# Patient Record
Sex: MALE | Race: WHITE | NOT HISPANIC OR LATINO | Employment: UNEMPLOYED | URBAN - NONMETROPOLITAN AREA
[De-identification: names, ages, dates, MRNs, and addresses within clinical notes are randomized per-mention and may not be internally consistent; named-entity substitution may affect disease eponyms.]

---

## 2017-01-03 RX ORDER — LOSARTAN POTASSIUM 50 MG/1
TABLET ORAL
Qty: 30 TABLET | Refills: 2 | Status: SHIPPED | OUTPATIENT
Start: 2017-01-03 | End: 2017-04-02 | Stop reason: SDUPTHER

## 2017-01-13 ENCOUNTER — OFFICE VISIT (OUTPATIENT)
Dept: CARDIOLOGY | Facility: CLINIC | Age: 62
End: 2017-01-13

## 2017-01-13 ENCOUNTER — HOSPITAL ENCOUNTER (OUTPATIENT)
Dept: CARDIOLOGY | Facility: HOSPITAL | Age: 62
Discharge: HOME | End: 2017-01-13
Admitting: NURSE PRACTITIONER

## 2017-01-13 VITALS
HEART RATE: 68 BPM | HEIGHT: 72 IN | BODY MASS INDEX: 36.03 KG/M2 | WEIGHT: 266 LBS | SYSTOLIC BLOOD PRESSURE: 138 MMHG | DIASTOLIC BLOOD PRESSURE: 80 MMHG

## 2017-01-13 VITALS
SYSTOLIC BLOOD PRESSURE: 123 MMHG | WEIGHT: 265 LBS | HEIGHT: 72 IN | BODY MASS INDEX: 35.89 KG/M2 | DIASTOLIC BLOOD PRESSURE: 73 MMHG

## 2017-01-13 DIAGNOSIS — I05.0 RHEUMATIC MITRAL STENOSIS: ICD-10-CM

## 2017-01-13 DIAGNOSIS — Z95.2 H/O MECHANICAL AORTIC VALVE REPLACEMENT: ICD-10-CM

## 2017-01-13 DIAGNOSIS — Z95.2 AORTIC VALVE REPLACED: ICD-10-CM

## 2017-01-13 DIAGNOSIS — E78.5 HYPERLIPIDEMIA LDL GOAL <70: ICD-10-CM

## 2017-01-13 DIAGNOSIS — I50.32 CHRONIC DIASTOLIC CONGESTIVE HEART FAILURE (HCC): Primary | ICD-10-CM

## 2017-01-13 DIAGNOSIS — I25.10 CORONARY ARTERY DISEASE INVOLVING NATIVE CORONARY ARTERY OF NATIVE HEART WITHOUT ANGINA PECTORIS: ICD-10-CM

## 2017-01-13 DIAGNOSIS — I10 ESSENTIAL HYPERTENSION: ICD-10-CM

## 2017-01-13 LAB
BH CV ECHO MEAS - AO MAX PG (FULL): 30.3 MMHG
BH CV ECHO MEAS - AO MAX PG: 32.7 MMHG
BH CV ECHO MEAS - AO MEAN PG (FULL): 17 MMHG
BH CV ECHO MEAS - AO MEAN PG: 18 MMHG
BH CV ECHO MEAS - AO ROOT AREA (BSA CORRECTED): 1.2
BH CV ECHO MEAS - AO ROOT AREA: 7.1 CM^2
BH CV ECHO MEAS - AO ROOT DIAM: 3 CM
BH CV ECHO MEAS - AO V2 MAX: 286 CM/SEC
BH CV ECHO MEAS - AO V2 MEAN: 202 CM/SEC
BH CV ECHO MEAS - AO V2 VTI: 56.1 CM
BH CV ECHO MEAS - AVA(I,A): 1.5 CM^2
BH CV ECHO MEAS - AVA(I,D): 1.5 CM^2
BH CV ECHO MEAS - AVA(V,A): 1.7 CM^2
BH CV ECHO MEAS - AVA(V,D): 1.7 CM^2
BH CV ECHO MEAS - BSA(HAYCOCK): 2.5 M^2
BH CV ECHO MEAS - BSA: 2.4 M^2
BH CV ECHO MEAS - BZI_BMI: 35.9 KILOGRAMS/M^2
BH CV ECHO MEAS - BZI_METRIC_HEIGHT: 182.9 CM
BH CV ECHO MEAS - BZI_METRIC_WEIGHT: 120.2 KG
BH CV ECHO MEAS - CONTRAST EF 4CH: 60.6 ML/M^2
BH CV ECHO MEAS - EDV(CUBED): 124.3 ML
BH CV ECHO MEAS - EDV(MOD-SP4): 160 ML
BH CV ECHO MEAS - EDV(TEICH): 117.7 ML
BH CV ECHO MEAS - EF(CUBED): 64.9 %
BH CV ECHO MEAS - EF(MOD-SP4): 60.6 %
BH CV ECHO MEAS - EF(TEICH): 56.2 %
BH CV ECHO MEAS - ESV(CUBED): 43.6 ML
BH CV ECHO MEAS - ESV(MOD-SP4): 63 ML
BH CV ECHO MEAS - ESV(TEICH): 51.6 ML
BH CV ECHO MEAS - FS: 29.5 %
BH CV ECHO MEAS - IVS/LVPW: 1
BH CV ECHO MEAS - IVSD: 1.1 CM
BH CV ECHO MEAS - LA DIMENSION: 4.1 CM
BH CV ECHO MEAS - LA/AO: 1.4
BH CV ECHO MEAS - LV DIASTOLIC VOL/BSA (35-75): 66.6 ML/M^2
BH CV ECHO MEAS - LV MASS(C)D: 196.3 GRAMS
BH CV ECHO MEAS - LV MASS(C)DI: 81.7 GRAMS/M^2
BH CV ECHO MEAS - LV MAX PG: 2.4 MMHG
BH CV ECHO MEAS - LV MEAN PG: 1 MMHG
BH CV ECHO MEAS - LV SYSTOLIC VOL/BSA (12-30): 26.2 ML/M^2
BH CV ECHO MEAS - LV V1 MAX: 77.1 CM/SEC
BH CV ECHO MEAS - LV V1 MEAN: 45.3 CM/SEC
BH CV ECHO MEAS - LV V1 VTI: 13.4 CM
BH CV ECHO MEAS - LVIDD: 5 CM
BH CV ECHO MEAS - LVIDS: 3.5 CM
BH CV ECHO MEAS - LVLD AP4: 8.2 CM
BH CV ECHO MEAS - LVLS AP4: 6.8 CM
BH CV ECHO MEAS - LVOT AREA (M): 6.2 CM^2
BH CV ECHO MEAS - LVOT AREA: 6.2 CM^2
BH CV ECHO MEAS - LVOT DIAM: 2.8 CM
BH CV ECHO MEAS - LVPWD: 1.1 CM
BH CV ECHO MEAS - MV A MAX VEL: 201 CM/SEC
BH CV ECHO MEAS - MV E MAX VEL: 163 CM/SEC
BH CV ECHO MEAS - MV E/A: 0.81
BH CV ECHO MEAS - MV MAX PG: 16 MMHG
BH CV ECHO MEAS - MV MEAN PG: 8 MMHG
BH CV ECHO MEAS - PA ACC SLOPE: 1238 CM/SEC^2
BH CV ECHO MEAS - PA ACC TIME: 0.12 SEC
BH CV ECHO MEAS - PA PR(ACCEL): 23.2 MMHG
BH CV ECHO MEAS - PI END-D VEL: 94.5 CM/SEC
BH CV ECHO MEAS - RAP SYSTOLE: 8 MMHG
BH CV ECHO MEAS - RVDD: 4.3 CM
BH CV ECHO MEAS - RVSP: 33.2 MMHG
BH CV ECHO MEAS - SI(AO): 165.2 ML/M^2
BH CV ECHO MEAS - SI(CUBED): 33.6 ML/M^2
BH CV ECHO MEAS - SI(LVOT): 34.4 ML/M^2
BH CV ECHO MEAS - SI(MOD-SP4): 40.4 ML/M^2
BH CV ECHO MEAS - SI(TEICH): 27.5 ML/M^2
BH CV ECHO MEAS - SV(AO): 396.5 ML
BH CV ECHO MEAS - SV(CUBED): 80.6 ML
BH CV ECHO MEAS - SV(LVOT): 82.5 ML
BH CV ECHO MEAS - SV(MOD-SP4): 97 ML
BH CV ECHO MEAS - SV(TEICH): 66.1 ML
BH CV ECHO MEAS - TR MAX VEL: 251 CM/SEC
LV EF 2D ECHO EST: 60 %

## 2017-01-13 PROCEDURE — C8929 TTE W OR WO FOL WCON,DOPPLER: HCPCS

## 2017-01-13 PROCEDURE — 25010000002 SULFUR HEXAFLUORIDE MICROSPH 60.7-25 MG RECONSTITUTED SUSPENSION

## 2017-01-13 PROCEDURE — 25010000002 SULFUR HEXAFLUORIDE MICROSPH 60.7-25 MG RECONSTITUTED SUSPENSION: Performed by: INTERNAL MEDICINE

## 2017-01-13 PROCEDURE — 93306 TTE W/DOPPLER COMPLETE: CPT | Performed by: INTERNAL MEDICINE

## 2017-01-13 PROCEDURE — 99213 OFFICE O/P EST LOW 20 MIN: CPT | Performed by: NURSE PRACTITIONER

## 2017-01-13 RX ADMIN — SULFUR HEXAFLUORIDE 3 ML: KIT at 13:46

## 2017-01-13 NOTE — MR AVS SNAPSHOT
Benitez GUARDADO Ruben   1/13/2017 2:15 PM   Office Visit    Dept Phone:  [phone]   Encounter #:  [id number]    Provider:  Velasquez Torres MD   Department:  [facility] CARDIOLOGY                Your Full Care Plan              Today's Medication Changes          These changes are accurate as of: 1/13/17  3:03 PM.  If you have any questions, ask your nurse or doctor.               Medication(s)that have changed:     Insulin Glargine 100 UNIT/ML injection pen   Commonly known as:  LANTUS SOLOSTAR   Inject 55 Units under the skin 2 (two) times a day.   What changed:  Another medication with the same name was removed. Continue taking this medication, and follow the directions you see here.   Changed by:  Mesha Christina MD         Stop taking medication(s)listed here:     albuterol 108 (90 BASE) MCG/ACT inhaler   Commonly known as:  PROVENTIL HFA;VENTOLIN HFA   Stopped by:  Velasquez Torres MD           cetirizine-pseudoephedrine 5-120 MG per 12 hr tablet   Commonly known as:  ZyrTEC-D   Stopped by:  Velasquez Torres MD           doxycycline 100 MG enteric coated tablet   Commonly known as:  DORYX   Stopped by:  Velasquez Torres MD           insulin aspart 100 UNIT/ML injection   Commonly known as:  novoLOG   Stopped by:  Velasquez Torres MD           tiotropium 18 MCG per inhalation capsule   Commonly known as:  SPIRIVA   Stopped by:  Velasquez Torres MD                      Your Updated Medication List          This list is accurate as of: 1/13/17  3:03 PM.  Always use your most recent med list.                cetirizine 10 MG tablet   Commonly known as:  zyrTEC       clotrimazole 1 % cream   Commonly known as:  LOTRIMIN   Apply  topically 2 (Two) Times a Day.       cyclobenzaprine 10 MG tablet   Commonly known as:  FLEXERIL   TAKE ONE TABLET BY MOUTH THREE TIMES DAILY AS NEEDED FOR MUSCLE SPASM       Insulin  Glargine 100 UNIT/ML injection pen   Commonly known as:  LANTUS SOLOSTAR   Inject 55 Units under the skin 2 (two) times a day.       insulin lispro 100 UNIT/ML injection   Commonly known as:  humaLOG   Inject 15 Units under the skin 3 (three) times a day before meals.       losartan 50 MG tablet   Commonly known as:  COZAAR   TAKE ONE TABLET BY MOUTH ONCE DAILY       metoprolol succinate XL 50 MG 24 hr tablet   Commonly known as:  TOPROL-XL   Take 1 tablet by mouth daily.       pravastatin 40 MG tablet   Commonly known as:  PRAVACHOL   Take 1 tablet by mouth daily.       sertraline 100 MG tablet   Commonly known as:  ZOLOFT   Take 1.5 tablets by mouth daily.       spironolactone 50 MG tablet   Commonly known as:  ALDACTONE   Take 1 tablet by mouth daily.       * warfarin 2 MG tablet   Commonly known as:  COUMADIN   Take 1 tablet by mouth daily.       * warfarin 5 MG tablet   Commonly known as:  COUMADIN   Take 1 tablet by mouth daily.       * warfarin 4 MG tablet   Commonly known as:  COUMADIN   Use as directed. (currently using two tablets daily)       * warfarin 7.5 MG tablet   Commonly known as:  COUMADIN   TAKE ONE TABLET BY MOUTH ONCE DAILY       * Notice:  This list has 4 medication(s) that are the same as other medications prescribed for you. Read the directions carefully, and ask your doctor or other care provider to review them with you.            You Were Diagnosed With        Codes Comments    Chronic diastolic congestive heart failure    -  Primary ICD-10-CM: I50.32  ICD-9-CM: 428.32, 428.0     Essential hypertension     ICD-10-CM: I10  ICD-9-CM: 401.9     Coronary artery disease involving native coronary artery of native heart without angina pectoris     ICD-10-CM: I25.10  ICD-9-CM: 414.01     Hyperlipidemia LDL goal <70     ICD-10-CM: E78.5  ICD-9-CM: 272.4     Rheumatic mitral stenosis     ICD-10-CM: I05.0  ICD-9-CM: 394.0     H/O mechanical aortic valve replacement     ICD-10-CM: Z95.2  ICD-9-CM:  V43.3       Instructions     None    Patient Instructions History      Upcoming Appointments     Visit Type Date Time Department    FOLLOW UP 2017  2:15 PM [facility] NIDA CARD [facility]     NIDA ECHO 2D COMPLETE WITH CONTRAST VT 2017  1:00 PM [facility] NIDA CARD CLIN [facility]    FOLLOW UP 2017  9:30 AM [facility] PC DIO    FOLLOW UP 2017  2:45 PM [facility] NIDA CARD [facility]      [facility] Signup     [facility] [employer] allows you to send messages to your doctor, view your test results, renew your prescriptions, schedule appointments, and more. To sign up, go to [employer] and click on the Sign Up Now link in the New User? box. Enter your [employer] Activation Code exactly as it appears below along with the last four digits of your Social Security Number and your Date of Birth () to complete the sign-up process. If you do not sign up before the expiration date, you must request a new code.    [employer] Activation Code: [email or web address]  Expires: 2017  5:36 AM    If you have questions, you can email [email or web address] or call [phone] to talk to our [employer] staff. Remember, [employer] is NOT to be used for urgent needs. For medical emergencies, dial 911.               Other Info from Your Visit           Your Appointments     2017  9:30 AM EST   Follow Up with Mesha Christina MD   [facility] FAMILY MEDICINE (--)    [address]  [address]  [facility] [address] [phone]           Arrive 15 minutes prior to appointment.            2017  2:45 PM EDT   Follow Up with Velasquez Torres MD   [facility] CARDIOLOGY (--)    [address]  [facility] [address] [phone]           Arrive 15 minutes prior to appointment.              Allergies     Penicillins Allergy Swelling    Tongue swelled    Ace Inhibitors  Angioedema    Contrast Dye Intolerance Rash      Reason for Visit     Mitral valve stenosis 6 month  "follow up with echo    Hypertension     Nicotine Dependence           Vital Signs     Blood Pressure Pulse Height Weight Body Mass Index Smoking Status    138/80 (BP Location: Left arm, Patient Position: Sitting) 68 72\" (182.9 cm) 266 lb (121 kg) 36.08 kg/m2 Former Smoker      Problems and Diagnoses Noted     Heart failure    Coronary artery disease involving native coronary artery of native heart without angina pectoris    High blood pressure    History of mechanical aortic valve replacement    Hyperlipidemia LDL goal <70    Rheumatic mitral valve obstruction        "

## 2017-01-13 NOTE — PROGRESS NOTES
"Encounter Date:01/13/2017    Patient ID: Benitez Miranda is a 61 y.o. male who is  and resides in [place], [place].    CC/Reason for visit:  Mitral valve stenosis (6 month follow up with echo); Hypertension; and Nicotine Dependence          Benitez Miranda returns today for follow-up of his coronary artery disease, hypertension, hyperlipidemia and valvular heart disease.  Since his last visit with us he has done well.  He has had no progression of his shortness of breath and  is currently at his baseline of an NYHA class II.  He has been diagnosed with having COPD and is using inhalers which has helped his symptoms.  His losartan was increased to 50 mg daily due to high blood pressures and his creatinine did slightly elevate but he does have known chronic kidney disease.  He follows Dr. Landis who is managing his medications and according to the patient he is \"okay\" with his current creatinine level in the higher dose of losartan.  He underwent an echocardiogram earlier today which shows no further progression of his mitral stenosis and his mechanical aortic valve appears to be functioning normally.    Review of Systems   Respiratory: Positive for sleep disturbances due to breathing and snoring.    Skin: Positive for poor wound healing.   Musculoskeletal: Positive for arthritis, back pain, joint pain and joint swelling.   Neurological: Positive for loss of balance.       The patient's past medical, social, and family history reviewed in the patient's electronic medical record.    Allergies  Penicillins; Ace inhibitors; and Contrast dye    Outpatient Prescriptions Marked as Taking for the 1/13/17 encounter (Office Visit) with Velasquez Torres MD   Medication Sig Dispense Refill   • clotrimazole (LOTRIMIN) 1 % cream Apply  topically 2 (Two) Times a Day. 60 g 2   • cyclobenzaprine (FLEXERIL) 10 MG tablet TAKE ONE TABLET BY MOUTH THREE TIMES DAILY AS NEEDED FOR MUSCLE SPASM 90 tablet 0   • Insulin Glargine " "(LANTUS SOLOSTAR) 100 UNIT/ML injection pen Inject 55 Units under the skin 2 (two) times a day. 5 pen 5   • insulin lispro (HumaLOG) 100 UNIT/ML injection Inject 15 Units under the skin 3 (three) times a day before meals. 1350 mL 5   • losartan (COZAAR) 50 MG tablet TAKE ONE TABLET BY MOUTH ONCE DAILY 30 tablet 2   • metoprolol succinate XL (TOPROL-XL) 50 MG 24 hr tablet Take 1 tablet by mouth daily. 30 tablet 5   • pravastatin (PRAVACHOL) 40 MG tablet Take 1 tablet by mouth daily. 30 tablet 5   • sertraline (ZOLOFT) 100 MG tablet Take 1.5 tablets by mouth daily. 45 tablet 5   • spironolactone (ALDACTONE) 50 MG tablet Take 1 tablet by mouth daily. 30 tablet 5   • warfarin (COUMADIN) 7.5 MG tablet TAKE ONE TABLET BY MOUTH ONCE DAILY 30 tablet 2   • [DISCONTINUED] cetirizine-pseudoephedrine (ZyrTEC-D) 5-120 MG per 12 hr tablet Take 1 tablet by mouth 2 (Two) Times a Day. 60 tablet 0   • [DISCONTINUED] insulin aspart (novoLOG) 100 UNIT/ML injection Inject 37 Units under the skin 3 (Three) Times a Day Before Meals.     • [DISCONTINUED] tiotropium (SPIRIVA) 18 MCG per inhalation capsule Place 2 puffs into inhaler and inhale 1 (one) time daily. 30 capsule 5         Blood pressure 138/80, pulse 68, height 72\" (182.9 cm), weight 266 lb (121 kg).  Body mass index is 36.08 kg/(m^2).    Physical Exam   Constitutional: He is oriented to person, place, and time. He appears well-developed and well-nourished.   HENT:   Head: Normocephalic and atraumatic.   Eyes: Pupils are equal, round, and reactive to light. No scleral icterus.   Neck: No JVD present. Carotid bruit is not present. No thyromegaly present.   Cardiovascular: Normal rate, regular rhythm, S1 normal and S2 normal.  Exam reveals no gallop.    Murmur heard.   Low-pitched rumbling crescendo presystolic murmur is present with a grade of 2/6  at the apex  Pulmonary/Chest: Effort normal and breath sounds normal.   Abdominal: Soft. There is no tenderness.   Neurological: He is " alert and oriented to person, place, and time.   Skin: Skin is warm and dry. No cyanosis. Nails show no clubbing.   Psychiatric: He has a normal mood and affect. His behavior is normal.       Data Review:   Procedures         Problem List Items Addressed This Visit        Cardiovascular and Mediastinum    Chronic diastolic congestive heart failure - Primary    Current Assessment & Plan     · Continue spironlactone 50 mg daily         Essential hypertension    Current Assessment & Plan     · Continue losartan 50 mg daily         Coronary artery disease involving native coronary artery of native heart without angina pectoris    Overview     · Cardiac catheterization by Dr. Carrasco (2004): NADINE to unknown coronary artery  · Nuclear stress test (02/26/2015): Small to moderate region of inferior scar; no reversible ischemia detected; LVEF 52%.  · Echocardiogram (02/13/2015): LVEF 50% to 55%. Mild LVH. Mechanical prosthetic aortic valve functioning well; mild to moderate MR.   · Cardiac catheterization by Dr. Eduardo Torres (06/08/2015): mild nonobstructive CAD with widely patent stent; normal functioning of mechanical aortic valve; pulmonary hypertension (PA 50/35 mmHg)  · Admission to [facility] with chronic obstructive pulmonary disease exacerbation/diastolic heart failure, December 2015         Current Assessment & Plan     · Continue metoprolol succinate 50 mg daily         Hyperlipidemia LDL goal <70    Current Assessment & Plan     · Continue Pravachol 40 mg daily  · Follow-up with PCP for routine lipid monitoring         H/O mechanical aortic valve replacement    Overview     · Severe aortic valve stenosis.  · Aortic valve replacement: [facility], 1991, Georgi Sánchez MD.  · Currently anticoagulated with Coumadin.           Current Assessment & Plan     · Continue Coumadin per PCP for anticoagulation dosage instructions with routine INR monitoring         Rheumatic mitral stenosis     Overview     · History of rheumatic fever.   · Echocardiogram (02/2016): Moderate to severe mitral stenosis. Rheumatic valve  · Echocardiogram (01/13/2017): Mean gradient of mitral valve 8 mmHg.  Normal functioning mechanical aortic valve           Current Assessment & Plan     · Repeat echocardiogram in 12 months                    · Continue current medications  · Follow-up 6 months or sooner if needed    Teresa Aldana, APRN  1/13/2017

## 2017-01-13 NOTE — LETTER
"January 13, 2017     Mesha Christina MD  [address]  [address]  [facility] [address]    Patient: Benitez Miranda   YOB: 1955   Date of Visit: 1/13/2017       Dear Dr. Carri MD:    Thank you for referring Benitez Miranda to me for evaluation. Below are the relevant portions of my assessment and plan of care.    If you have questions, please do not hesitate to call me. I look forward to following Benitez along with you.         Sincerely,        Velasquez Torres MD        CC: No Recipients  Teresa Aldana, APRN  1/13/2017  3:03 PM  Signed  Encounter Date:01/13/2017    Patient ID: Benitez Miranda is a 61 y.o. male who is  and resides in [place], [place].    CC/Reason for visit:  Mitral valve stenosis (6 month follow up with echo); Hypertension; and Nicotine Dependence          Benitez Miranda returns today for follow-up of his coronary artery disease, hypertension, hyperlipidemia and valvular heart disease.  Since his last visit with us he has done well.  He has had no progression of his shortness of breath and  is currently at his baseline of an NYHA class II.  He has been diagnosed with having COPD and is using inhalers which has helped his symptoms.  His losartan was increased to 50 mg daily due to high blood pressures and his creatinine did slightly elevate but he does have known chronic kidney disease.  He follows Dr. Landis who is managing his medications and according to the patient he is \"okay\" with his current creatinine level in the higher dose of losartan.  He underwent an echocardiogram earlier today which shows no further progression of his mitral stenosis and his mechanical aortic valve appears to be functioning normally.    Review of Systems   Respiratory: Positive for sleep disturbances due to breathing and snoring.    Skin: Positive for poor wound healing.   Musculoskeletal: Positive for arthritis, back pain, joint pain and joint swelling.   Neurological: Positive for loss of " "balance.       The patient's past medical, social, and family history reviewed in the patient's electronic medical record.    Allergies  Penicillins; Ace inhibitors; and Contrast dye    Outpatient Prescriptions Marked as Taking for the 1/13/17 encounter (Office Visit) with Velasquez Torres MD   Medication Sig Dispense Refill   • clotrimazole (LOTRIMIN) 1 % cream Apply  topically 2 (Two) Times a Day. 60 g 2   • cyclobenzaprine (FLEXERIL) 10 MG tablet TAKE ONE TABLET BY MOUTH THREE TIMES DAILY AS NEEDED FOR MUSCLE SPASM 90 tablet 0   • Insulin Glargine (LANTUS SOLOSTAR) 100 UNIT/ML injection pen Inject 55 Units under the skin 2 (two) times a day. 5 pen 5   • insulin lispro (HumaLOG) 100 UNIT/ML injection Inject 15 Units under the skin 3 (three) times a day before meals. 1350 mL 5   • losartan (COZAAR) 50 MG tablet TAKE ONE TABLET BY MOUTH ONCE DAILY 30 tablet 2   • metoprolol succinate XL (TOPROL-XL) 50 MG 24 hr tablet Take 1 tablet by mouth daily. 30 tablet 5   • pravastatin (PRAVACHOL) 40 MG tablet Take 1 tablet by mouth daily. 30 tablet 5   • sertraline (ZOLOFT) 100 MG tablet Take 1.5 tablets by mouth daily. 45 tablet 5   • spironolactone (ALDACTONE) 50 MG tablet Take 1 tablet by mouth daily. 30 tablet 5   • warfarin (COUMADIN) 7.5 MG tablet TAKE ONE TABLET BY MOUTH ONCE DAILY 30 tablet 2   • [DISCONTINUED] cetirizine-pseudoephedrine (ZyrTEC-D) 5-120 MG per 12 hr tablet Take 1 tablet by mouth 2 (Two) Times a Day. 60 tablet 0   • [DISCONTINUED] insulin aspart (novoLOG) 100 UNIT/ML injection Inject 37 Units under the skin 3 (Three) Times a Day Before Meals.     • [DISCONTINUED] tiotropium (SPIRIVA) 18 MCG per inhalation capsule Place 2 puffs into inhaler and inhale 1 (one) time daily. 30 capsule 5         Blood pressure 138/80, pulse 68, height 72\" (182.9 cm), weight 266 lb (121 kg).  Body mass index is 36.08 kg/(m^2).    Physical Exam   Constitutional: He is oriented to person, place, and time. He appears " well-developed and well-nourished.   HENT:   Head: Normocephalic and atraumatic.   Eyes: Pupils are equal, round, and reactive to light. No scleral icterus.   Neck: No JVD present. Carotid bruit is not present. No thyromegaly present.   Cardiovascular: Normal rate, regular rhythm, S1 normal and S2 normal.  Exam reveals no gallop.    Murmur heard.   Low-pitched rumbling crescendo presystolic murmur is present with a grade of 2/6  at the apex  Pulmonary/Chest: Effort normal and breath sounds normal.   Abdominal: Soft. There is no tenderness.   Neurological: He is alert and oriented to person, place, and time.   Skin: Skin is warm and dry. No cyanosis. Nails show no clubbing.   Psychiatric: He has a normal mood and affect. His behavior is normal.       Data Review:   Procedures         Problem List Items Addressed This Visit        Cardiovascular and Mediastinum    Chronic diastolic congestive heart failure - Primary    Current Assessment & Plan     · Continue spironlactone 50 mg daily         Essential hypertension    Current Assessment & Plan     · Continue losartan 50 mg daily         Coronary artery disease involving native coronary artery of native heart without angina pectoris    Overview     · Cardiac catheterization by Dr. Carrasco (2004): NADINE to unknown coronary artery  · Nuclear stress test (02/26/2015): Small to moderate region of inferior scar; no reversible ischemia detected; LVEF 52%.  · Echocardiogram (02/13/2015): LVEF 50% to 55%. Mild LVH. Mechanical prosthetic aortic valve functioning well; mild to moderate MR.   · Cardiac catheterization by Dr. Eduardo Torres (06/08/2015): mild nonobstructive CAD with widely patent stent; normal functioning of mechanical aortic valve; pulmonary hypertension (PA 50/35 mmHg)  · Admission to [facility] with chronic obstructive pulmonary disease exacerbation/diastolic heart failure, December 2015         Current Assessment & Plan     · Continue  metoprolol succinate 50 mg daily         Hyperlipidemia LDL goal <70    Current Assessment & Plan     · Continue Pravachol 40 mg daily  · Follow-up with PCP for routine lipid monitoring         H/O mechanical aortic valve replacement    Overview     · Severe aortic valve stenosis.  · Aortic valve replacement: [facility], 1991, Georgi Sánchez MD.  · Currently anticoagulated with Coumadin.           Current Assessment & Plan     · Continue Coumadin per PCP for anticoagulation dosage instructions with routine INR monitoring         Rheumatic mitral stenosis    Overview     · History of rheumatic fever.   · Echocardiogram (02/2016): Moderate to severe mitral stenosis. Rheumatic valve  · Echocardiogram (01/13/2017): Mean gradient of mitral valve 8 mmHg.  Normal functioning mechanical aortic valve           Current Assessment & Plan     · Repeat echocardiogram in 12 months                    · Continue current medications  · Follow-up 6 months or sooner if needed    Teresa Aldana, APRN  1/13/2017

## 2017-02-01 DIAGNOSIS — Z79.01 ANTICOAGULANT LONG-TERM USE: ICD-10-CM

## 2017-02-06 ENCOUNTER — OFFICE VISIT (OUTPATIENT)
Dept: FAMILY MEDICINE CLINIC | Facility: CLINIC | Age: 62
End: 2017-02-06

## 2017-02-06 VITALS
HEART RATE: 67 BPM | BODY MASS INDEX: 35.35 KG/M2 | OXYGEN SATURATION: 97 % | DIASTOLIC BLOOD PRESSURE: 77 MMHG | HEIGHT: 72 IN | SYSTOLIC BLOOD PRESSURE: 124 MMHG | WEIGHT: 261 LBS

## 2017-02-06 DIAGNOSIS — E08.8 DIABETES MELLITUS DUE TO UNDERLYING CONDITION WITH COMPLICATION, WITH LONG-TERM CURRENT USE OF INSULIN (HCC): ICD-10-CM

## 2017-02-06 DIAGNOSIS — I10 ESSENTIAL HYPERTENSION: ICD-10-CM

## 2017-02-06 DIAGNOSIS — N18.30 CKD (CHRONIC KIDNEY DISEASE), STAGE III (HCC): ICD-10-CM

## 2017-02-06 DIAGNOSIS — Z79.4 DIABETES MELLITUS DUE TO UNDERLYING CONDITION WITH COMPLICATION, WITH LONG-TERM CURRENT USE OF INSULIN (HCC): ICD-10-CM

## 2017-02-06 DIAGNOSIS — I25.10 CORONARY ARTERY DISEASE INVOLVING NATIVE CORONARY ARTERY OF NATIVE HEART WITHOUT ANGINA PECTORIS: ICD-10-CM

## 2017-02-06 DIAGNOSIS — Z95.2 H/O MECHANICAL AORTIC VALVE REPLACEMENT: ICD-10-CM

## 2017-02-06 DIAGNOSIS — F33.42 RECURRENT MAJOR DEPRESSIVE DISORDER, IN FULL REMISSION (HCC): ICD-10-CM

## 2017-02-06 DIAGNOSIS — E78.5 HYPERLIPIDEMIA LDL GOAL <70: ICD-10-CM

## 2017-02-06 DIAGNOSIS — Z79.01 ANTICOAGULANT LONG-TERM USE: Primary | ICD-10-CM

## 2017-02-06 LAB
ALBUMIN SERPL-MCNC: 4 G/DL (ref 3.4–4.8)
ALBUMIN/GLOB SERPL: 1.1 G/DL (ref 1.5–2.5)
ALP SERPL-CCNC: 62 U/L (ref 46–116)
ALT SERPL W P-5'-P-CCNC: 31 U/L (ref 10–44)
ANION GAP SERPL CALCULATED.3IONS-SCNC: 3.9 MMOL/L (ref 3.6–11.2)
AST SERPL-CCNC: 31 U/L (ref 10–34)
BILIRUB SERPL-MCNC: 0.6 MG/DL (ref 0.2–1.8)
BUN BLD-MCNC: 18 MG/DL (ref 7–21)
BUN/CREAT SERPL: 12.9 (ref 7–25)
CALCIUM SPEC-SCNC: 9.1 MG/DL (ref 7.7–10)
CHLORIDE SERPL-SCNC: 110 MMOL/L (ref 99–112)
CO2 SERPL-SCNC: 25.1 MMOL/L (ref 24.3–31.9)
CREAT BLD-MCNC: 1.39 MG/DL (ref 0.43–1.29)
GFR SERPL CREATININE-BSD FRML MDRD: 52 ML/MIN/1.73
GLOBULIN UR ELPH-MCNC: 3.5 GM/DL
GLUCOSE BLD-MCNC: 198 MG/DL (ref 70–110)
HBA1C MFR BLD: 8.9 % (ref 4.5–5.7)
OSMOLALITY SERPL CALC.SUM OF ELEC: 285 MOSM/KG (ref 273–305)
POTASSIUM BLD-SCNC: 4.4 MMOL/L (ref 3.5–5.3)
PROT SERPL-MCNC: 7.5 G/DL (ref 6–8)
SODIUM BLD-SCNC: 139 MMOL/L (ref 135–153)

## 2017-02-06 PROCEDURE — 36415 COLL VENOUS BLD VENIPUNCTURE: CPT | Performed by: FAMILY MEDICINE

## 2017-02-06 PROCEDURE — 80053 COMPREHEN METABOLIC PANEL: CPT | Performed by: FAMILY MEDICINE

## 2017-02-06 PROCEDURE — 83036 HEMOGLOBIN GLYCOSYLATED A1C: CPT | Performed by: FAMILY MEDICINE

## 2017-02-06 PROCEDURE — 99214 OFFICE O/P EST MOD 30 MIN: CPT | Performed by: FAMILY MEDICINE

## 2017-02-06 RX ORDER — SPIRONOLACTONE 50 MG/1
TABLET, FILM COATED ORAL
Qty: 30 TABLET | Refills: 0 | Status: SHIPPED | OUTPATIENT
Start: 2017-02-06 | End: 2017-02-06 | Stop reason: SDUPTHER

## 2017-02-06 RX ORDER — WARFARIN SODIUM 7.5 MG/1
7.5 TABLET ORAL
Qty: 30 TABLET | Refills: 5 | Status: SHIPPED | OUTPATIENT
Start: 2017-02-06 | End: 2017-09-05 | Stop reason: SDUPTHER

## 2017-02-06 RX ORDER — WARFARIN SODIUM 7.5 MG/1
TABLET ORAL
Qty: 30 TABLET | Refills: 0 | Status: SHIPPED | OUTPATIENT
Start: 2017-02-06 | End: 2017-02-06 | Stop reason: SDUPTHER

## 2017-02-06 RX ORDER — SPIRONOLACTONE 50 MG/1
50 TABLET, FILM COATED ORAL DAILY
Qty: 30 TABLET | Refills: 5 | Status: SHIPPED | OUTPATIENT
Start: 2017-02-06 | End: 2017-08-04 | Stop reason: SDUPTHER

## 2017-02-06 NOTE — PATIENT INSTRUCTIONS
Keep everything the same.  Call if you need anything.     Stop by in a month for INR check.  Follow up in 3 months

## 2017-02-07 ENCOUNTER — TELEPHONE (OUTPATIENT)
Dept: FAMILY MEDICINE CLINIC | Facility: CLINIC | Age: 62
End: 2017-02-07

## 2017-02-07 NOTE — TELEPHONE ENCOUNTER
----- Message from Mesha Christina MD sent at 2/7/2017  8:02 AM EST -----  Please call patient.   1) Kidney function just slightly better. He still has stage III CKD, but the filtration is better. Keep hydrating.  2) Diabetes is also better, not as good as it was 6 months ago. Please stress the need for diet and exercise secondary to his kidney disease. If he can, I'd like him to increase the Lantus to 55 in the AM and 57 in the PM. (currently at 55 BID). Thanks.     Side note for me: Needs CBC next time (we can't add it on this, right?) Thanks.      Spoke with patient & he verbalized understanding but reports his Lantus is only 37units bid please advise.Cant add a cbc.

## 2017-02-07 NOTE — TELEPHONE ENCOUNTER
Richard. Thanks for catching that - yes, then do Lantus 37 in the AM and 39 in the PM. Thanks.     Left a message for him to return call.    Patient notified & verbalized understanding.

## 2017-02-14 NOTE — PROGRESS NOTES
"Benitez Miranda     VITALS: Blood pressure 124/77, pulse 67, height 72\" (182.9 cm), weight 261 lb (118 kg), SpO2 97 %.    Subjective  Chief Complaint:   Chief Complaint   Patient presents with   • Follow-up        History of Present Illness:  Patient is a 61 y.o. male with a medical history significant for CAD, hypertension, hyperlipidemia, and type II diabetes who presents to clinic secondary to medical followup. No new or acute concerns today. Doing well.     Patient has a history of hypertension and is on losartan 50 mg orally daily, metoprolol XL 50 mg orally daily, and spironolactone 50 mg orally daily.. Denies any side effects of the medications. Denies any dizziness, lightheadedness, blurry vision, chest pain, or edema.   Home blood pressure: No  Low salt diet: Occasionally    Patient also has a history of type 2 diabetes and is currently on diet and exercise, longacting insulin 37 units BID and Humalog 15 units TID. We have given him samples of Toujeo and he reports using that 37 units BID and doing really well on it; his fasting sugars are below 150. Also occasionally utilizes Lantus 37 units BID, but that does not work as well as the Toujeo. Last A1C in 10/2016 was 9.1. Does not take full amounts of insulin secondary to him not having enough money to afford the medications. Denies any side effects of his medications. Patient denies any changes in vision, polydipsia, polyuria, numbness or tingling, or hypoglycemic or hyperglycemic episodes. Patient does follow a diabetic diet and checks his glucose levels regularly. Blood glucose levels are usually in the below 150s fasting. Patient does have complications of neuropathy and nephropathy. No retinopathy. No medications. No worsening or exacerbation of symptoms. Last eye exam was in May 2016 - also had cataracts taken out then. Last microalbumin was 7/2016 and elevated. Last foot exam was 9/2016 and patient does not see a podiatrist.   Diabetic " teaching/nutrition education: Yes  Medications for kidney protection: Yes, losartan 50 mg orally daily  Medications for cardiovascular protection: Yes    Patient also has a history of hyperlipidemia and is currently on pravastatin 40 mg orally daily. Denies any side effects of the medications. Last lipid panel in 7/2016 and was WNL with LDL 88. Denies any muscle weakness, jaundice or itching.   Low cholesterol diet: Yes    Patient has a history of allergic rhinitis and is currently on cetirizine 10 mg orally daily. Denies any side effects of the medication. Denies any sinus congestion, sinus headaches, watery eyes, itchy eyes, rhinorrhea, coughing, or postnasal discharge.   Allergy injections: No    Patient has a history of COPD and is currently on Spiriva daily and albuterol inhaler 2 puffs every 4-6 hours as needed. As he feels that his breathing is better right now, he has not been using the spiriva secondary to cost. Denies any side effects of the medications. Denies any shortness of breath, coughing, wheezing, or night coughing. He sees Dr. Solis. Reports breathing much better.  Exacerbation: No  Last utilized albuterol: Several weeks ago.    Patient has a history of depression and is currently on zoloft 150 mg orally daily. Patient states that this medication is working. Denies any side effects of the medication. Patient states that he is sleeping fairly well and can get out of bed daily to accomplish daily activities. Patient has no anhedonia. Mood is stable. Has no feelings of guilt. Has good concentration and memory ability. Has energy. Is able to make goals. Is not crying a lot. Appetite is good. Denies any suicidal or homicidal ideations. Does not have any auditory or visual hallucinations.    Patient has a history of chronic lower back pain, improved. Only on flexeril 10 mg orally as needed. Has not asked for a refill since 9/2016. Denies any side effects of the medication. States that the medications do  somewhat control the pain enough so that he can accomplish daily activities. Movement and ambulation are not improved. Denies any sedation from the medications. No pain medication seeking behavior. LEVI has no record of pain medication seeking behavior. Last UDS was done 7/13/2016 and was consistent and WNL. MRI of lumbar spine in 5/2015 shows disc desiccation without any herniations.     Patient has an extensive history of CAD. He does have a history of a mechanical aortic valve replacement and is on coumadin 7.5 mg orally daily. He also does have a history of two cardiac caths, 2004 (x 1 stent) and 6/2015 (pulmonary HTN, everything else patent). Echo in 2/2016 showed EF 50-55%. Does have a history of MV stenosis and rheumatic valve. He sees Dr. Torres, and is currently on metoprolol ER 50 mg orally daily, spironolactone 50 mg orally daily, and coumadin 7.5 mg orally daliy. INR 2.6 today.  History of stroke: No       Patient also states that he has been diagnosed with chronic kidney disease, stage III and sees Dr. Landis. Last visit was several months ago. Baseline creatinine with Dr. Landis per chart review. Last creatinine done here in 7/2016 shows a creatinine of 1.29. Has not had any exacerbations.     The following portions of the patient's history were reviewed and updated as appropriate: allergies, current medications, past family history, past medical history, past social history, past surgical history and problem list.    Past Medical History  Past Medical History   Diagnosis Date   • CAD (coronary artery disease)      x1 stent; pulmonary HTN; EF 50-55%; rheumatic valve; MV stenosis   • CHF (congestive heart failure)    • CKD (chronic kidney disease), stage III    • COPD (chronic obstructive pulmonary disease)    • Depression    • Diabetes mellitus    • H/O mechanical aortic valve replacement    • History of tobacco abuse    • Hyperlipidemia    • Hypertension    • Ischemic heart disease    • Kidney failure       third stage   • Low back pain    • Obesity      BMI 36.   • Valvular heart disease        Review of Systems  Constitutional: Denies any recent history of HAs, dizziness, fevers, chills, itching.  Eyes: Denies any changes in vision. Denies any blurry vision or diplopia.  Ears, Nose, Mouth, Throat: Denies any sore throat, rhinorrhea, or cough.  Cardiovascular: Denies any chest pain, pressure, or palpitations.  Respiratory: Denies any shortness of breath or wheezing.  Gastrointestinal: Denies any abdominal pain, nausea, vomiting, diarrhea, or constipation.  Genitourinary: Denies any changes in urination.  Musculoskeletal: Denies any muscle weakness.  Skin and/or breasts: Denies any rashes.  Neurological: Denies any changes in balance or gait.  Psychiatric: Denies any anxiety, depression, or insomnia. Denies any suicidal or homicidal ideations.  Endocrine: Denies any heat or cold intolerance. Denies any voice changes, polydipsia, or polyuria.  Hematologic/Lymphatic: Denies any anemia or easy bruising.    Surgical History  Past Surgical History   Procedure Laterality Date   • Cardiac surgery  1991     valve on aortic   • Cardiac surgery  2001     stent    • Colonoscopy  2001   • Aortic valve repair/replacement  1991   • Cardiac catheterization     • Coronary stent placement  2005   • Cataract extraction         Family History  Family History   Problem Relation Age of Onset   • Cancer Mother      breast   • COPD Father    • Heart attack Father 74   • Diabetes Paternal Grandmother      both legs removed       Social History  Social History     Social History   • Marital status:      Spouse name: N/A   • Number of children: N/A   • Years of education: N/A     Occupational History   • Not on file.     Social History Main Topics   • Smoking status: Former Smoker     Years: 40.00     Types: Pipe, Cigars     Quit date: 7/5/2016   • Smokeless tobacco: Never Used      Comment: currently smokes a pipe   • Alcohol use  No   • Drug use: No   • Sexual activity: Defer     Other Topics Concern   • Not on file     Social History Narrative       Objective  Physical Exam  Gen: Patient in NAD. Pleasant and answers appropriately. A&Ox3.    Skin: Warm and dry with normal turgor. No purpura, rashes, or unusual pigmentation noted. Hair is normal in appearance and distribution.    HEENT: NC/AT. No lesions noted. Conjunctiva clear, sclera nonicteric. PERRL. O/P nonerythematous and moist without exudate.    Neck: Supple without lymph nodes palpated. FROM. No evidence of tracheal deviation or thyromegaly. Carotid pulses 2+/4 B/L without bruits.     Lungs: CTA B/L without rales, rhonchi, crackles, or wheezes.    Heart: RRR. S1 and S2 normal. No S3 or S4. No MRGT.    Abd: Soft, nontender,nondistended. (+)BSx4 quadrants.No HSM, masses, or bruits noted.    Extrem: No CCE. FROMx4. No bone, joint, or muscle tenderness noted.    Neuro: No focal motor/sensory deficits.    Procedures    Assessment/Plan  Benitez Miranda is a 61 y.o. here for medical followup.  Diagnoses and all orders for this visit:    1) Anticoagulant long-term use/Mechanical heart valve present    -     warfarin (COUMADIN) 7.5 MG tablet; Take 1 tablet by mouth Daily.  Stable INR today. Refilled coumadin 7.5 mg orally daily. Recheck in a month.    2) Diabetes mellitus due to underlying condition with complication, with long-term current use of insulin   -     Hemoglobin A1c  -     Comprehensive Metabolic Panel  -     Insulin Lispro (HUMALOG KWIKPEN) 100 UNIT/ML solution pen-injector; Use with sliding scale before meals. Would prefer pen.  Elevated A1C of 9.1. Discussed with patient. Will check A1C and CMP today. Will increase lantus to 37 units BID or Toujeo 37 units BID. Tries to utilize as little as possible. Discussed with patient he really needs to be at toujeo 37 units BID if he can. Hold gabapentin 600 mg orally TID for neuropathy; patient states that it does not help right now. Eye  exam up to date 5/2016; foot exam done 9/12/2016. Patient researching diabetic shoes. Will continue losartan 50 mg orally daily for kidney protection and will need to monitor closely; no aspirin. Samples of Lantus given.      3) Tinea pedis of both feet  Continue clotrimazole BID. It is working.      4) CKD (chronic kidney disease), stage III  Per Dr. Landis management. Close monitoring of creatinine needed. Baseline creatinine from him 1.6. CrCl 80. Check today.      5) Low back pain with sciatica, unspecified back pain laterality  Improved and stable. On no current medications.       6) Hypertension  Stable today. Continue losartan 50 mg orally daily, metoprolol XL 50 mg orally daily, and spironolactone 50 mg orally daily. Will need to closely monitor losartan and creatinine secondary to kidney history. If continued to be WNL, may need to consider decreasing back losartan to 25 mg orally daily. Check CMP today.      7) Hyperlipidemia  Stable. Continue pravastatin 40 mg orally daily.       8) Allergic rhinitis  Continue cetirizine 10 mg orally daily along with flonase and sudafed as per above.      9) COPD   Per Dr. Solis management. Continue (Spiriva daily PRN) and albuterol inhaler 2 puffs every 4-6 hours as needed.      10) Depression  Stable. Continue zoloft 150 mg orally daily.       11) CAD.  Per Dr. Torres management. Continue metoprolol ER 50 mg orally daily, spironolactone 50 mg orally daily, and coumadin 7.5 mg orally daliy. .       12) Preventative Medicine.  Patient thinking about colonoscopy - declines today, but will do stool cards instead. PSA 7/2016 WNL. Will need AAA screening in 3 years. Declines pneumo. Flu vaccine 10/2016.    Findings and plans discussed with patient who verbalizes understanding and agreement. Patient to followup at clinic PRN or in three months for further medical followup.     Other orders  Refilled:  -     spironolactone (ALDACTONE) 50 MG tablet; Take 1 tablet by mouth  Daily.      Mesha Christina MD

## 2017-02-28 ENCOUNTER — CLINICAL SUPPORT (OUTPATIENT)
Dept: FAMILY MEDICINE CLINIC | Facility: CLINIC | Age: 62
End: 2017-02-28

## 2017-02-28 DIAGNOSIS — Z13.9 SCREENING: Primary | ICD-10-CM

## 2017-02-28 LAB
EXPIRATION DATE: NORMAL
GASTROCULT GAST QL: NEGATIVE
HEMOCCULT SP2 STL QL: NEGATIVE
HEMOCCULT SP3 STL QL: NEGATIVE
Lab: 161

## 2017-02-28 PROCEDURE — 82270 OCCULT BLOOD FECES: CPT | Performed by: FAMILY MEDICINE

## 2017-03-01 ENCOUNTER — TELEPHONE (OUTPATIENT)
Dept: FAMILY MEDICINE CLINIC | Facility: CLINIC | Age: 62
End: 2017-03-01

## 2017-03-01 NOTE — TELEPHONE ENCOUNTER
----- Message from Mesha Christina MD sent at 3/1/2017  5:15 AM EST -----  Did we let him know these were negative? Thanks.      Notified patient & he verbalized understanding.

## 2017-03-02 DIAGNOSIS — IMO0002 UNCONTROLLED TYPE 2 DIABETES WITH NEUROPATHY: ICD-10-CM

## 2017-03-03 RX ORDER — INSULIN GLARGINE 100 [IU]/ML
INJECTION, SOLUTION SUBCUTANEOUS
Qty: 5 PEN | Refills: 3 | Status: SHIPPED | OUTPATIENT
Start: 2017-03-03 | End: 2017-05-08 | Stop reason: SDUPTHER

## 2017-03-03 RX ORDER — PRAVASTATIN SODIUM 40 MG
TABLET ORAL
Qty: 30 TABLET | Refills: 0 | Status: SHIPPED | OUTPATIENT
Start: 2017-03-03 | End: 2017-04-02 | Stop reason: SDUPTHER

## 2017-03-24 RX ORDER — FLUTICASONE PROPIONATE 50 MCG
SPRAY, SUSPENSION (ML) NASAL
Qty: 16 G | Refills: 0 | Status: SHIPPED | OUTPATIENT
Start: 2017-03-24 | End: 2017-08-04 | Stop reason: SDUPTHER

## 2017-04-03 RX ORDER — PRAVASTATIN SODIUM 40 MG
TABLET ORAL
Qty: 30 TABLET | Refills: 0 | Status: SHIPPED | OUTPATIENT
Start: 2017-04-03 | End: 2017-05-04 | Stop reason: SDUPTHER

## 2017-04-03 RX ORDER — LOSARTAN POTASSIUM 50 MG/1
TABLET ORAL
Qty: 30 TABLET | Refills: 0 | Status: SHIPPED | OUTPATIENT
Start: 2017-04-03 | End: 2017-05-04 | Stop reason: SDUPTHER

## 2017-04-07 NOTE — TELEPHONE ENCOUNTER
Patient called for a refill of his humalog 15 units before each meal,refilled per orders/protocol.

## 2017-05-04 RX ORDER — LOSARTAN POTASSIUM 50 MG/1
TABLET ORAL
Qty: 30 TABLET | Refills: 0 | Status: SHIPPED | OUTPATIENT
Start: 2017-05-04 | End: 2017-08-04 | Stop reason: SDUPTHER

## 2017-05-04 RX ORDER — PRAVASTATIN SODIUM 40 MG
TABLET ORAL
Qty: 30 TABLET | Refills: 0 | Status: SHIPPED | OUTPATIENT
Start: 2017-05-04 | End: 2017-08-04 | Stop reason: SDUPTHER

## 2017-05-05 ENCOUNTER — OFFICE VISIT (OUTPATIENT)
Dept: FAMILY MEDICINE CLINIC | Facility: CLINIC | Age: 62
End: 2017-05-05

## 2017-05-05 ENCOUNTER — ANTICOAGULATION VISIT (OUTPATIENT)
Dept: FAMILY MEDICINE CLINIC | Facility: CLINIC | Age: 62
End: 2017-05-05

## 2017-05-05 VITALS
OXYGEN SATURATION: 96 % | SYSTOLIC BLOOD PRESSURE: 100 MMHG | BODY MASS INDEX: 36.03 KG/M2 | HEART RATE: 76 BPM | WEIGHT: 266 LBS | HEIGHT: 72 IN | DIASTOLIC BLOOD PRESSURE: 60 MMHG

## 2017-05-05 DIAGNOSIS — M54.50 CHRONIC BILATERAL LOW BACK PAIN WITHOUT SCIATICA: ICD-10-CM

## 2017-05-05 DIAGNOSIS — J43.1 PANLOBULAR EMPHYSEMA (HCC): ICD-10-CM

## 2017-05-05 DIAGNOSIS — R42 DIZZINESS: ICD-10-CM

## 2017-05-05 DIAGNOSIS — E78.5 HYPERLIPIDEMIA LDL GOAL <70: ICD-10-CM

## 2017-05-05 DIAGNOSIS — Z79.4 TYPE 2 DIABETES MELLITUS WITH HYPERGLYCEMIA, WITH LONG-TERM CURRENT USE OF INSULIN (HCC): ICD-10-CM

## 2017-05-05 DIAGNOSIS — G89.29 CHRONIC BILATERAL LOW BACK PAIN WITHOUT SCIATICA: ICD-10-CM

## 2017-05-05 DIAGNOSIS — E11.65 TYPE 2 DIABETES MELLITUS WITH HYPERGLYCEMIA, WITH LONG-TERM CURRENT USE OF INSULIN (HCC): ICD-10-CM

## 2017-05-05 DIAGNOSIS — I25.10 CORONARY ARTERY DISEASE INVOLVING NATIVE CORONARY ARTERY OF NATIVE HEART WITHOUT ANGINA PECTORIS: ICD-10-CM

## 2017-05-05 DIAGNOSIS — F33.42 RECURRENT MAJOR DEPRESSIVE DISORDER, IN FULL REMISSION (HCC): ICD-10-CM

## 2017-05-05 DIAGNOSIS — N18.30 CKD (CHRONIC KIDNEY DISEASE), STAGE III (HCC): ICD-10-CM

## 2017-05-05 DIAGNOSIS — R21 RASH: Primary | ICD-10-CM

## 2017-05-05 DIAGNOSIS — I10 ESSENTIAL HYPERTENSION: ICD-10-CM

## 2017-05-05 LAB
ALBUMIN SERPL-MCNC: 4.4 G/DL (ref 3.4–4.8)
ALBUMIN/GLOB SERPL: 1.2 G/DL (ref 1.5–2.5)
ALP SERPL-CCNC: 59 U/L (ref 40–129)
ALT SERPL W P-5'-P-CCNC: 31 U/L (ref 10–44)
ANION GAP SERPL CALCULATED.3IONS-SCNC: 3.8 MMOL/L (ref 3.6–11.2)
AST SERPL-CCNC: 25 U/L (ref 10–34)
BILIRUB SERPL-MCNC: 0.5 MG/DL (ref 0.2–1.8)
BUN BLD-MCNC: 25 MG/DL (ref 7–21)
BUN/CREAT SERPL: 18.2 (ref 7–25)
CALCIUM SPEC-SCNC: 9.6 MG/DL (ref 7.7–10)
CHLORIDE SERPL-SCNC: 107 MMOL/L (ref 99–112)
CO2 SERPL-SCNC: 27.2 MMOL/L (ref 24.3–31.9)
CREAT BLD-MCNC: 1.37 MG/DL (ref 0.43–1.29)
GFR SERPL CREATININE-BSD FRML MDRD: 52 ML/MIN/1.73
GLOBULIN UR ELPH-MCNC: 3.6 GM/DL
GLUCOSE BLD-MCNC: 115 MG/DL (ref 70–110)
HBA1C MFR BLD: 9.3 % (ref 4.5–5.7)
INR PPP: 2.2 (ref 2.5–3.5)
INR PPP: 2.2 (ref 2–3)
OSMOLALITY SERPL CALC.SUM OF ELEC: 281 MOSM/KG (ref 273–305)
POTASSIUM BLD-SCNC: 4.7 MMOL/L (ref 3.5–5.3)
PROT SERPL-MCNC: 8 G/DL (ref 6–8)
SODIUM BLD-SCNC: 138 MMOL/L (ref 135–153)

## 2017-05-05 PROCEDURE — 85610 PROTHROMBIN TIME: CPT | Performed by: FAMILY MEDICINE

## 2017-05-05 PROCEDURE — 99214 OFFICE O/P EST MOD 30 MIN: CPT | Performed by: FAMILY MEDICINE

## 2017-05-05 PROCEDURE — 83036 HEMOGLOBIN GLYCOSYLATED A1C: CPT | Performed by: FAMILY MEDICINE

## 2017-05-05 PROCEDURE — 80053 COMPREHEN METABOLIC PANEL: CPT | Performed by: FAMILY MEDICINE

## 2017-05-05 PROCEDURE — 36415 COLL VENOUS BLD VENIPUNCTURE: CPT | Performed by: FAMILY MEDICINE

## 2017-05-05 RX ORDER — CLOBETASOL PROPIONATE 0.5 MG/G
CREAM TOPICAL 2 TIMES DAILY
Qty: 60 G | Refills: 0 | Status: SHIPPED | OUTPATIENT
Start: 2017-05-05 | End: 2017-07-01 | Stop reason: SDUPTHER

## 2017-05-08 ENCOUNTER — TELEPHONE (OUTPATIENT)
Dept: FAMILY MEDICINE CLINIC | Facility: CLINIC | Age: 62
End: 2017-05-08

## 2017-05-08 DIAGNOSIS — IMO0002 UNCONTROLLED TYPE 2 DIABETES WITH NEUROPATHY: ICD-10-CM

## 2017-05-08 DIAGNOSIS — E11.65 TYPE 2 DIABETES MELLITUS WITH HYPERGLYCEMIA, WITH LONG-TERM CURRENT USE OF INSULIN (HCC): Primary | ICD-10-CM

## 2017-05-08 DIAGNOSIS — Z79.4 TYPE 2 DIABETES MELLITUS WITH HYPERGLYCEMIA, WITH LONG-TERM CURRENT USE OF INSULIN (HCC): Primary | ICD-10-CM

## 2017-05-11 ENCOUNTER — HOSPITAL ENCOUNTER (OUTPATIENT)
Dept: NUTRITION | Facility: HOSPITAL | Age: 62
Discharge: HOME | End: 2017-05-11
Admitting: FAMILY MEDICINE

## 2017-05-11 PROCEDURE — 97802 MEDICAL NUTRITION INDIV IN: CPT

## 2017-05-15 ENCOUNTER — TELEPHONE (OUTPATIENT)
Dept: FAMILY MEDICINE CLINIC | Facility: CLINIC | Age: 62
End: 2017-05-15

## 2017-06-06 RX ORDER — PRAVASTATIN SODIUM 40 MG
TABLET ORAL
Qty: 30 TABLET | Refills: 0 | OUTPATIENT
Start: 2017-06-06

## 2017-06-06 RX ORDER — LOSARTAN POTASSIUM 50 MG/1
TABLET ORAL
Qty: 30 TABLET | Refills: 0 | OUTPATIENT
Start: 2017-06-06

## 2017-07-03 RX ORDER — CLOBETASOL PROPIONATE 0.5 MG/G
CREAM TOPICAL
Qty: 60 G | Refills: 1 | Status: SHIPPED | OUTPATIENT
Start: 2017-07-03 | End: 2018-09-27 | Stop reason: SDUPTHER

## 2017-07-27 ENCOUNTER — LAB (OUTPATIENT)
Dept: FAMILY MEDICINE CLINIC | Facility: CLINIC | Age: 62
End: 2017-07-27

## 2017-07-27 DIAGNOSIS — N18.30 CHRONIC KIDNEY DISEASE, STAGE 3 (HCC): Primary | ICD-10-CM

## 2017-07-27 LAB
ALBUMIN SERPL-MCNC: 4.1 G/DL (ref 3.4–4.8)
ALBUMIN UR-MCNC: 35.8 MG/L
ALBUMIN/GLOB SERPL: 1.2 G/DL (ref 1.5–2.5)
ALP SERPL-CCNC: 58 U/L (ref 40–129)
ALT SERPL W P-5'-P-CCNC: 28 U/L (ref 10–44)
ANION GAP SERPL CALCULATED.3IONS-SCNC: 5 MMOL/L (ref 3.6–11.2)
AST SERPL-CCNC: 22 U/L (ref 10–34)
BILIRUB SERPL-MCNC: 0.6 MG/DL (ref 0.2–1.8)
BUN BLD-MCNC: 15 MG/DL (ref 7–21)
BUN/CREAT SERPL: 11.6 (ref 7–25)
CALCIUM SPEC-SCNC: 9.3 MG/DL (ref 7.7–10)
CHLORIDE SERPL-SCNC: 106 MMOL/L (ref 99–112)
CO2 SERPL-SCNC: 27 MMOL/L (ref 24.3–31.9)
CREAT BLD-MCNC: 1.29 MG/DL (ref 0.43–1.29)
CREAT UR-MCNC: 29.9 MG/DL
GFR SERPL CREATININE-BSD FRML MDRD: 56 ML/MIN/1.73
GLOBULIN UR ELPH-MCNC: 3.5 GM/DL
GLUCOSE BLD-MCNC: 153 MG/DL (ref 70–110)
MICROALBUMIN/CREAT UR: 119.7 MG/G
OSMOLALITY SERPL CALC.SUM OF ELEC: 279.5 MOSM/KG (ref 273–305)
POTASSIUM BLD-SCNC: 4.3 MMOL/L (ref 3.5–5.3)
PROT SERPL-MCNC: 7.6 G/DL (ref 6–8)
SODIUM BLD-SCNC: 138 MMOL/L (ref 135–153)

## 2017-07-27 PROCEDURE — 82043 UR ALBUMIN QUANTITATIVE: CPT | Performed by: FAMILY MEDICINE

## 2017-07-27 PROCEDURE — 80053 COMPREHEN METABOLIC PANEL: CPT | Performed by: FAMILY MEDICINE

## 2017-07-27 PROCEDURE — 82570 ASSAY OF URINE CREATININE: CPT | Performed by: FAMILY MEDICINE

## 2017-07-28 ENCOUNTER — TELEPHONE (OUTPATIENT)
Dept: FAMILY MEDICINE CLINIC | Facility: CLINIC | Age: 62
End: 2017-07-28

## 2017-07-28 NOTE — TELEPHONE ENCOUNTER
----- Message from Mesha Christina MD sent at 7/27/2017  8:08 PM EDT -----  Please let patient know that all labs good. Please print out copy and fax to Dr. Landis (he was the one that originally requested them). Thanks.      Spoke with patient & he verbalized understanding,copy of labs faxed to Dr. Landis as requested.

## 2017-08-04 ENCOUNTER — OFFICE VISIT (OUTPATIENT)
Dept: FAMILY MEDICINE CLINIC | Facility: CLINIC | Age: 62
End: 2017-08-04

## 2017-08-04 VITALS
SYSTOLIC BLOOD PRESSURE: 156 MMHG | WEIGHT: 265 LBS | HEIGHT: 72 IN | BODY MASS INDEX: 35.89 KG/M2 | HEART RATE: 69 BPM | DIASTOLIC BLOOD PRESSURE: 82 MMHG | OXYGEN SATURATION: 97 %

## 2017-08-04 DIAGNOSIS — I25.10 CORONARY ARTERY DISEASE INVOLVING NATIVE CORONARY ARTERY OF NATIVE HEART WITHOUT ANGINA PECTORIS: ICD-10-CM

## 2017-08-04 DIAGNOSIS — J43.1 PANLOBULAR EMPHYSEMA (HCC): ICD-10-CM

## 2017-08-04 DIAGNOSIS — R35.1 NOCTURIA: ICD-10-CM

## 2017-08-04 DIAGNOSIS — M54.50 CHRONIC BILATERAL LOW BACK PAIN WITHOUT SCIATICA: ICD-10-CM

## 2017-08-04 DIAGNOSIS — N18.30 CKD (CHRONIC KIDNEY DISEASE), STAGE III (HCC): ICD-10-CM

## 2017-08-04 DIAGNOSIS — F33.42 RECURRENT MAJOR DEPRESSIVE DISORDER, IN FULL REMISSION (HCC): ICD-10-CM

## 2017-08-04 DIAGNOSIS — I10 ESSENTIAL HYPERTENSION: ICD-10-CM

## 2017-08-04 DIAGNOSIS — G89.29 CHRONIC BILATERAL LOW BACK PAIN WITHOUT SCIATICA: ICD-10-CM

## 2017-08-04 DIAGNOSIS — Z12.5 SPECIAL SCREENING FOR MALIGNANT NEOPLASM OF PROSTATE: ICD-10-CM

## 2017-08-04 DIAGNOSIS — Z79.4 TYPE 2 DIABETES MELLITUS WITH DIABETIC POLYNEUROPATHY, WITH LONG-TERM CURRENT USE OF INSULIN (HCC): Primary | ICD-10-CM

## 2017-08-04 DIAGNOSIS — E78.5 HYPERLIPIDEMIA LDL GOAL <70: ICD-10-CM

## 2017-08-04 DIAGNOSIS — E11.42 TYPE 2 DIABETES MELLITUS WITH DIABETIC POLYNEUROPATHY, WITH LONG-TERM CURRENT USE OF INSULIN (HCC): Primary | ICD-10-CM

## 2017-08-04 LAB
ALBUMIN SERPL-MCNC: 4.4 G/DL (ref 3.4–4.8)
ALBUMIN/GLOB SERPL: 1.2 G/DL (ref 1.5–2.5)
ALP SERPL-CCNC: 65 U/L (ref 40–129)
ALT SERPL W P-5'-P-CCNC: 30 U/L (ref 10–44)
ANION GAP SERPL CALCULATED.3IONS-SCNC: 4 MMOL/L (ref 3.6–11.2)
AST SERPL-CCNC: 24 U/L (ref 10–34)
BILIRUB SERPL-MCNC: 0.6 MG/DL (ref 0.2–1.8)
BUN BLD-MCNC: 25 MG/DL (ref 7–21)
BUN/CREAT SERPL: 17.5 (ref 7–25)
CALCIUM SPEC-SCNC: 9.9 MG/DL (ref 7.7–10)
CHLORIDE SERPL-SCNC: 105 MMOL/L (ref 99–112)
CHOLEST SERPL-MCNC: 145 MG/DL (ref 0–200)
CO2 SERPL-SCNC: 29 MMOL/L (ref 24.3–31.9)
CREAT BLD-MCNC: 1.43 MG/DL (ref 0.43–1.29)
GFR SERPL CREATININE-BSD FRML MDRD: 50 ML/MIN/1.73
GLOBULIN UR ELPH-MCNC: 3.6 GM/DL
GLUCOSE BLD-MCNC: 226 MG/DL (ref 70–110)
HBA1C MFR BLD: 7.6 % (ref 4.5–5.7)
HDLC SERPL-MCNC: 34 MG/DL (ref 60–100)
LDLC SERPL CALC-MCNC: 66 MG/DL (ref 0–100)
LDLC/HDLC SERPL: 1.93 {RATIO}
OSMOLALITY SERPL CALC.SUM OF ELEC: 287.2 MOSM/KG (ref 273–305)
POTASSIUM BLD-SCNC: 4.4 MMOL/L (ref 3.5–5.3)
PROT SERPL-MCNC: 8 G/DL (ref 6–8)
PSA SERPL-MCNC: 0.64 NG/ML (ref 0–4)
SODIUM BLD-SCNC: 138 MMOL/L (ref 135–153)
TRIGL SERPL-MCNC: 227 MG/DL (ref 0–150)
VLDLC SERPL-MCNC: 45.4 MG/DL

## 2017-08-04 PROCEDURE — 80061 LIPID PANEL: CPT | Performed by: FAMILY MEDICINE

## 2017-08-04 PROCEDURE — 36415 COLL VENOUS BLD VENIPUNCTURE: CPT | Performed by: FAMILY MEDICINE

## 2017-08-04 PROCEDURE — 84153 ASSAY OF PSA TOTAL: CPT | Performed by: FAMILY MEDICINE

## 2017-08-04 PROCEDURE — 99214 OFFICE O/P EST MOD 30 MIN: CPT | Performed by: FAMILY MEDICINE

## 2017-08-04 PROCEDURE — 80053 COMPREHEN METABOLIC PANEL: CPT | Performed by: FAMILY MEDICINE

## 2017-08-04 PROCEDURE — 83036 HEMOGLOBIN GLYCOSYLATED A1C: CPT | Performed by: FAMILY MEDICINE

## 2017-08-04 RX ORDER — LOSARTAN POTASSIUM 50 MG/1
50 TABLET ORAL DAILY
Qty: 30 TABLET | Refills: 5 | Status: SHIPPED | OUTPATIENT
Start: 2017-08-04 | End: 2018-07-06 | Stop reason: SDUPTHER

## 2017-08-04 RX ORDER — PRAVASTATIN SODIUM 40 MG
40 TABLET ORAL DAILY
Qty: 30 TABLET | Refills: 5 | Status: SHIPPED | OUTPATIENT
Start: 2017-08-04 | End: 2018-07-06 | Stop reason: SDUPTHER

## 2017-08-04 RX ORDER — FLUTICASONE PROPIONATE 50 MCG
2 SPRAY, SUSPENSION (ML) NASAL DAILY
Qty: 16 G | Refills: 5 | Status: ON HOLD | OUTPATIENT
Start: 2017-08-04 | End: 2020-11-18

## 2017-08-04 RX ORDER — SPIRONOLACTONE 50 MG/1
50 TABLET, FILM COATED ORAL DAILY
Qty: 30 TABLET | Refills: 5 | Status: SHIPPED | OUTPATIENT
Start: 2017-08-04 | End: 2018-05-02 | Stop reason: SDUPTHER

## 2017-08-06 DIAGNOSIS — N28.9 RENAL INSUFFICIENCY: Primary | ICD-10-CM

## 2017-08-07 ENCOUNTER — TELEPHONE (OUTPATIENT)
Dept: FAMILY MEDICINE CLINIC | Facility: CLINIC | Age: 62
End: 2017-08-07

## 2017-08-07 NOTE — PROGRESS NOTES
"Benitez Miranda     VITALS: Blood pressure 156/82, pulse 69, height 72\" (182.9 cm), weight 265 lb (120 kg), SpO2 97 %. Recheck /80    Subjective  Chief Complaint:   Chief Complaint   Patient presents with   • Follow-up   • Hypertension   • Diabetes        History of Present Illness:  Patient is a 61 y.o. male with a medical history significant for CAD, hypertension, hyperlipidemia, and type II diabetes who presents to clinic secondary to medical followup. Continues to have bed bugs at home. Dizziness has resolved.      Patient has a history of hypertension and is on losartan 50 mg orally daily, metoprolol XL 50 mg orally daily, and spironolactone 50 mg orally daily.. Denies any side effects of the medications. Denies any dizziness, lightheadedness, blurry vision, chest pain, or edema.   Home blood pressure: No  Low salt diet: Occasionally    Patient also has a history of type 2 diabetes and is currently on diet and exercise, longacting insulin 37 units BID and Humalog 15 units TID. We have given him samples of Toujeo and he reports using that 37 units BID and doing really well on it; his fasting sugars are below 150. Also occasionally utilizes Lantus 37 units BID, but that does not work as well as the Toujeo. Last A1C in 2/2017 was 8.9. Does not take full amounts of insulin secondary to him not having enough money to afford the medications. Denies any side effects of his medications. Patient denies any changes in vision, polydipsia, polyuria, numbness or tingling, or hypoglycemic or hyperglycemic episodes. Patient does follow a diabetic diet and checks his glucose levels regularly. Blood glucose levels are usually in the below 150s fasting. Patient does have complications of neuropathy and nephropathy. No retinopathy. No medications. No worsening or exacerbation of symptoms. Last eye exam was in May 2016 - also had cataracts taken out then. Last microalbumin was 7/2017 and okay. Last foot exam was 9/2016 and " patient does not see a podiatrist. Would like foot exam today.  Diabetic teaching/nutrition education: Yes  Medications for kidney protection: Yes, losartan 50 mg orally daily  Medications for cardiovascular protection: Yes    Patient also has a history of hyperlipidemia and is currently on pravastatin 40 mg orally daily. Denies any side effects of the medications. Last lipid panel in 7/2016 and was WNL with LDL 88. Denies any muscle weakness, jaundice or itching.   Low cholesterol diet: Yes    Patient has a history of allergic rhinitis and is currently on cetirizine 10 mg orally daily. Denies any side effects of the medication. Denies any sinus congestion, sinus headaches, watery eyes, itchy eyes, rhinorrhea, coughing, or postnasal discharge.   Allergy injections: No    Patient has a history of COPD and is currently on Spiriva daily and albuterol inhaler 2 puffs every 4-6 hours as needed. As he feels that his breathing is better right now, he has not been using the spiriva secondary to cost. Denies any side effects of the medications. Denies any shortness of breath, coughing, wheezing, or night coughing. He sees Dr. Solis. Reports breathing much better.  Exacerbation: No  Last utilized albuterol: Several weeks ago.    Patient has a history of depression and is currently on zoloft 150 mg orally daily. Patient states that this medication is working. Denies any side effects of the medication. Patient states that he is sleeping fairly well and can get out of bed daily to accomplish daily activities. Patient has no anhedonia. Mood is stable. Has no feelings of guilt. Has good concentration and memory ability. Has energy. Is able to make goals. Is not crying a lot. Appetite is good. Denies any suicidal or homicidal ideations. Does not have any auditory or visual hallucinations.    Patient has a history of chronic lower back pain, improved. Only on flexeril 10 mg orally as needed. Has not asked for a refill since 9/2016.  Denies any side effects of the medication. States that the medications do somewhat control the pain enough so that he can accomplish daily activities. Movement and ambulation are not improved. Denies any sedation from the medications. No pain medication seeking behavior. LEVI has no record of pain medication seeking behavior. Last UDS was done 7/13/2016 and was consistent and WNL. MRI of lumbar spine in 5/2015 shows disc desiccation without any herniations.     Patient has an extensive history of CAD. He does have a history of a mechanical aortic valve replacement and is on coumadin 7.5 mg orally daily. He also does have a history of two cardiac caths, 2004 (x 1 stent) and 6/2015 (pulmonary HTN, everything else patent). Echo in 2/2016 showed EF 50-55%. Does have a history of MV stenosis and rheumatic valve. He sees Dr. Torres, and is currently on metoprolol ER 50 mg orally daily, spironolactone 50 mg orally daily, and coumadin 7.5 mg orally daliy. INR 1.5 today. States that he has missed several doses.   History of stroke: No       Patient also states that he has been diagnosed with chronic kidney disease, stage III and sees Dr. Landis. Last visit was several months ago. Baseline creatinine with Dr. Landis per chart review. Last creatinine done here in 5/2017 shows a creatinine of 1.37. Has not had any exacerbations.   No complaints about any of the medications.    The following portions of the patient's history were reviewed and updated as appropriate: allergies, current medications, past family history, past medical history, past social history, past surgical history and problem list.    Past Medical History  Past Medical History:   Diagnosis Date   • CAD (coronary artery disease)     x1 stent; pulmonary HTN; EF 50-55%; rheumatic valve; MV stenosis   • CHF (congestive heart failure)    • CKD (chronic kidney disease), stage III    • COPD (chronic obstructive pulmonary disease)    • Depression    • Diabetes mellitus     • H/O mechanical aortic valve replacement    • History of tobacco abuse    • Hyperlipidemia    • Hypertension    • Ischemic heart disease    • Kidney failure     third stage   • Low back pain    • Obesity     BMI 36.   • Valvular heart disease        Review of Systems  Constitutional: Denies any recent history of HAs, dizziness, fevers, chills, itching.  Eyes: Denies any changes in vision. Denies any blurry vision or diplopia.  Ears, Nose, Mouth, Throat: Denies any sore throat, rhinorrhea, or cough.  Cardiovascular: Denies any chest pain, pressure, or palpitations.  Respiratory: Denies any shortness of breath or wheezing.  Gastrointestinal: Denies any abdominal pain, nausea, vomiting, diarrhea, or constipation.  Genitourinary: Denies any changes in urination.  Musculoskeletal: Denies any muscle weakness.  Skin and/or breasts: Denies any rashes.  Neurological: Denies any changes in balance or gait.  Psychiatric: Denies any anxiety, depression, or insomnia. Denies any suicidal or homicidal ideations.  Endocrine: Denies any heat or cold intolerance. Denies any voice changes, polydipsia, or polyuria.  Hematologic/Lymphatic: Denies any anemia or easy bruising.    Surgical History  Past Surgical History:   Procedure Laterality Date   • AORTIC VALVE REPAIR/REPLACEMENT  1991   • CARDIAC CATHETERIZATION     • CARDIAC SURGERY  1991    valve on aortic   • CARDIAC SURGERY  2001    stent    • CATARACT EXTRACTION     • COLONOSCOPY  2001   • CORONARY STENT PLACEMENT  2005       Family History  Family History   Problem Relation Age of Onset   • Cancer Mother      breast   • COPD Father    • Heart attack Father 74   • Diabetes Paternal Grandmother      both legs removed       Social History  Social History     Social History   • Marital status:      Spouse name: N/A   • Number of children: N/A   • Years of education: N/A     Occupational History   • Not on file.     Social History Main Topics   • Smoking status: Former  Smoker     Years: 40.00     Types: Pipe, Cigars     Quit date: 7/5/2016   • Smokeless tobacco: Never Used      Comment: currently smokes a pipe   • Alcohol use No   • Drug use: No   • Sexual activity: Defer     Other Topics Concern   • Not on file     Social History Narrative       Objective  Physical Exam    Benitez had a diabetic foot exam performed today.   During the foot exam he had a monofilament test performed.    Neurological Sensory Findings - Unaltered hot/cold right ankle/foot discrimination and unaltered hot/cold left ankle/foot discrimination. Unaltered sharp/dull right ankle/foot discrimination and unaltered sharp/dull left ankle/foot discrimination. Right ankle/foot altered proprioception and left ankle/foot altered proprioception.    Vascular Status -  His exam exhibits right foot vasculature normal. His exam exhibits no right foot edema. His exam exhibits left foot vasculature normal. His exam exhibits no left foot edema.   Skin Integrity  -  His right foot skin is intact.     Benitez 's left foot skin is intact. .   Foot Structure and Biomechanics -  His right foot exhibits hallux valgus.  Fine filament test benign. Has the beginnings of decreased circulation in ankles with visible tortuous capillaries bilaterally. Has several calluses over the bottom of the feet.     Gen: Patient in NAD. Pleasant and answers appropriately. A&Ox3.    Skin: Warm and dry with normal turgor. No purpura, rashes, or unusual pigmentation noted. Hair is normal in appearance and distribution.    HEENT: NC/AT. No lesions noted. Conjunctiva clear, sclera nonicteric. PERRL. EOMI without nystagmus or strabismus. Fundi appear benign. No hemorrhages or exudates of eyes. Auditory canals are patent bilaterally without lesions. TMs intact,  nonerythematous, nonbulging without lesions. Nasal mucosa pink, nonerythematous, and nonedematous. Frontal and maxillary sinuses are nontender. O/P nonerythematous and moist without  exudate.    Neck: Supple without lymph nodes palpated. FROM.     Lungs: CTA B/L without rales, rhonchi, crackles, or wheezes.    Heart: RRR. S1 and S2 normal. No S3 or S4. No MRGT.    Abd: Soft, nontender,nondistended. (+)BSx4 quadrants.     Extrem: No CCE. Radial and dorsalis pedis pulses 2+/4 and equal B/L. FROMx4. No bone, joint, or muscle tenderness noted.    Neuro: No focal motor/sensory deficits.    Procedures    Assessment/Plan  Benitez Miranda is a 61 y.o. here for medical followup.  Diagnoses and all orders for this visit:    Type 2 diabetes mellitus with diabetic polyneuropathy, with long-term current use of insulin  -     Lipid Panel  -     Comprehensive Metabolic Panel  -     Hemoglobin A1c  -     Osmolality, Calculated; Future  -     Osmolality, Calculated  Will check A1C and CMP along with lipids today. Continue lantus to 37 units BID or Toujeo 37 units BID. Tries to utilize as little as possible. Hold gabapentin 600 mg orally TID for neuropathy; patient states that it does not help right now. Eye exam 5/2016 - needs new one; foot exam done today. Diabetic shoes written for. Will continue losartan 50 mg orally daily for kidney protection and will need to monitor closely; no aspirin secondary to CKD. Samples of Toujeo given.    Hypertension  Continue spironolactone 50 mg orally daily, losartan 50 mg orally daily and metoprolol XL 50 mg orally daily. Patient to monitor.       Anticoagulant long-term use/Mechanical heart valve present  Decreased INR today, but patient has missed doses. Continue coumadin 7.5 mg orally daily. Recheck in 2 weeks.       Tinea pedis of both feet  Continue clotrimazole BID. It is working.      CKD (chronic kidney disease), stage III  Per Dr. Landis management. Close monitoring of creatinine needed. Baseline creatinine from him 1.6. CrCl 80. Check today.      Low back pain with sciatica, unspecified back pain laterality  Improved and stable. On no current  medications.      Hyperlipidemia  Stable. Continue pravastatin 40 mg orally daily. Check lipids today.      Allergic rhinitis  Continue cetirizine 10 mg orally daily along with flonase and sudafed.      COPD   Per Dr. Solis management. Continue (Spiriva daily PRN) and albuterol inhaler 2 puffs every 4-6 hours as needed.      Depression  Stable. Continue zoloft 150 mg orally daily.       CAD.  Per Dr. Torres management. Continue metoprolol ER 50 mg orally daily, spironolactone 50 mg orally daily, and coumadin 7.5 mg orally daliy. .       Preventative Medicine.  Patient thinking about colonoscopy - declines today. 2/2017 hemeoccult negative.  PSA 7/2016 WNL - check today. Will need AAA screening in 2 years. Declines pneumo. Flu vaccine 10/2016.    Other orders  Refilled:  -     losartan (COZAAR) 50 MG tablet; Take 1 tablet by mouth Daily.  -     pravastatin (PRAVACHOL) 40 MG tablet; Take 1 tablet by mouth Daily.  -     fluticasone (FLONASE) 50 MCG/ACT nasal spray; 2 sprays into each nostril Daily.  -     spironolactone (ALDACTONE) 50 MG tablet; Take 1 tablet by mouth Daily.    Special screening for malignant neoplasm of prostate  -     PSA    Nocturia   -     PSA    Findings and plans discussed with patient who verbalizes understanding and agreement. Will followup with patient once results are in. Patient to followup at clinic PRN or in three months for further medical followup.    Mesha Christina MD

## 2017-08-07 NOTE — TELEPHONE ENCOUNTER
----- Message from Mesha Christina MD sent at 8/6/2017  9:44 PM EDT -----  Please call. Cholesterol is stable. Diabetes is a LOT better. Good for him - he has been working on his diet. I am a little worried about his kidneys. They are a little stressed. Let him know he needs more fluids - we will monitor - he is supposed to come back for an INR check in a month- let him know to get a repeat BMP at that time so we can double check his kidneys. Thanks.        Spoke with patient & he verbalized understanding.

## 2017-08-18 RX ORDER — METOPROLOL SUCCINATE 50 MG/1
50 TABLET, EXTENDED RELEASE ORAL DAILY
Qty: 30 TABLET | Refills: 5 | Status: SHIPPED | OUTPATIENT
Start: 2017-08-18 | End: 2018-09-04 | Stop reason: SDUPTHER

## 2017-08-18 RX ORDER — METOPROLOL SUCCINATE 50 MG/1
50 TABLET, EXTENDED RELEASE ORAL DAILY
Qty: 30 TABLET | Refills: 5 | Status: SHIPPED | OUTPATIENT
Start: 2017-08-18 | End: 2017-08-18 | Stop reason: SDUPTHER

## 2017-09-05 DIAGNOSIS — Z79.01 ANTICOAGULANT LONG-TERM USE: ICD-10-CM

## 2017-09-05 RX ORDER — WARFARIN SODIUM 7.5 MG/1
TABLET ORAL
Qty: 30 TABLET | Refills: 5 | Status: SHIPPED | OUTPATIENT
Start: 2017-09-05 | End: 2018-04-04 | Stop reason: SDUPTHER

## 2017-09-08 ENCOUNTER — ANTICOAGULATION VISIT (OUTPATIENT)
Dept: FAMILY MEDICINE CLINIC | Facility: CLINIC | Age: 62
End: 2017-09-08

## 2017-09-08 LAB
ANION GAP SERPL CALCULATED.3IONS-SCNC: 3.9 MMOL/L (ref 3.6–11.2)
BUN BLD-MCNC: 19 MG/DL (ref 7–21)
BUN/CREAT SERPL: 15.3 (ref 7–25)
CALCIUM SPEC-SCNC: 9.3 MG/DL (ref 7.7–10)
CHLORIDE SERPL-SCNC: 106 MMOL/L (ref 99–112)
CO2 SERPL-SCNC: 28.1 MMOL/L (ref 24.3–31.9)
CREAT BLD-MCNC: 1.24 MG/DL (ref 0.43–1.29)
GFR SERPL CREATININE-BSD FRML MDRD: 59 ML/MIN/1.73
GLUCOSE BLD-MCNC: 141 MG/DL (ref 70–110)
INR PPP: 1.9 (ref 2.5–3.5)
OSMOLALITY SERPL CALC.SUM OF ELEC: 280.3 MOSM/KG (ref 273–305)
POTASSIUM BLD-SCNC: 4.6 MMOL/L (ref 3.5–5.3)
SODIUM BLD-SCNC: 138 MMOL/L (ref 135–153)

## 2017-09-08 PROCEDURE — 36415 COLL VENOUS BLD VENIPUNCTURE: CPT | Performed by: FAMILY MEDICINE

## 2017-09-08 PROCEDURE — 80048 BASIC METABOLIC PNL TOTAL CA: CPT | Performed by: FAMILY MEDICINE

## 2017-09-08 PROCEDURE — 85610 PROTHROMBIN TIME: CPT | Performed by: NURSE PRACTITIONER

## 2017-09-11 ENCOUNTER — TELEPHONE (OUTPATIENT)
Dept: FAMILY MEDICINE CLINIC | Facility: CLINIC | Age: 62
End: 2017-09-11

## 2017-09-11 NOTE — TELEPHONE ENCOUNTER
----- Message from Mesha Christina MD sent at 9/11/2017  4:32 PM EDT -----  Please call patient. Kidneys and sugars are a lot better. Keep on drinking water. We will recheck in November when I see him. Thanks.        Notified patient, patient verbalized understanding

## 2017-09-19 ENCOUNTER — EPISODE CHANGES (OUTPATIENT)
Dept: CASE MANAGEMENT | Facility: OTHER | Age: 62
End: 2017-09-19

## 2017-11-03 ENCOUNTER — ANTICOAGULATION VISIT (OUTPATIENT)
Dept: FAMILY MEDICINE CLINIC | Facility: CLINIC | Age: 62
End: 2017-11-03

## 2017-11-03 ENCOUNTER — OFFICE VISIT (OUTPATIENT)
Dept: FAMILY MEDICINE CLINIC | Facility: CLINIC | Age: 62
End: 2017-11-03

## 2017-11-03 VITALS
DIASTOLIC BLOOD PRESSURE: 79 MMHG | WEIGHT: 262 LBS | BODY MASS INDEX: 35.49 KG/M2 | HEART RATE: 64 BPM | HEIGHT: 72 IN | OXYGEN SATURATION: 98 % | SYSTOLIC BLOOD PRESSURE: 123 MMHG

## 2017-11-03 DIAGNOSIS — IMO0002 UNCONTROLLED TYPE 2 DIABETES WITH NEUROPATHY: ICD-10-CM

## 2017-11-03 DIAGNOSIS — Z00.00 MEDICARE ANNUAL WELLNESS VISIT, SUBSEQUENT: Primary | ICD-10-CM

## 2017-11-03 DIAGNOSIS — Z23 IMMUNIZATION DUE: ICD-10-CM

## 2017-11-03 LAB
ALBUMIN SERPL-MCNC: 4.2 G/DL (ref 3.4–4.8)
ALBUMIN/GLOB SERPL: 1.1 G/DL (ref 1.5–2.5)
ALP SERPL-CCNC: 63 U/L (ref 40–129)
ALT SERPL W P-5'-P-CCNC: 31 U/L (ref 10–44)
ANION GAP SERPL CALCULATED.3IONS-SCNC: 4.6 MMOL/L (ref 3.6–11.2)
AST SERPL-CCNC: 24 U/L (ref 10–34)
BILIRUB SERPL-MCNC: 0.7 MG/DL (ref 0.2–1.8)
BUN BLD-MCNC: 18 MG/DL (ref 7–21)
BUN/CREAT SERPL: 14.4 (ref 7–25)
CALCIUM SPEC-SCNC: 9.3 MG/DL (ref 7.7–10)
CHLORIDE SERPL-SCNC: 107 MMOL/L (ref 99–112)
CO2 SERPL-SCNC: 26.4 MMOL/L (ref 24.3–31.9)
CREAT BLD-MCNC: 1.25 MG/DL (ref 0.43–1.29)
GFR SERPL CREATININE-BSD FRML MDRD: 58 ML/MIN/1.73
GLOBULIN UR ELPH-MCNC: 3.8 GM/DL
GLUCOSE BLD-MCNC: 201 MG/DL (ref 70–110)
HBA1C MFR BLD: 9.4 % (ref 4.5–5.7)
INR PPP: 2.3 (ref 2–3)
OSMOLALITY SERPL CALC.SUM OF ELEC: 283.3 MOSM/KG (ref 273–305)
POTASSIUM BLD-SCNC: 4.4 MMOL/L (ref 3.5–5.3)
PROT SERPL-MCNC: 8 G/DL (ref 6–8)
SODIUM BLD-SCNC: 138 MMOL/L (ref 135–153)

## 2017-11-03 PROCEDURE — 90686 IIV4 VACC NO PRSV 0.5 ML IM: CPT | Performed by: FAMILY MEDICINE

## 2017-11-03 PROCEDURE — 85610 PROTHROMBIN TIME: CPT | Performed by: FAMILY MEDICINE

## 2017-11-03 PROCEDURE — 80053 COMPREHEN METABOLIC PANEL: CPT | Performed by: FAMILY MEDICINE

## 2017-11-03 PROCEDURE — 83036 HEMOGLOBIN GLYCOSYLATED A1C: CPT | Performed by: FAMILY MEDICINE

## 2017-11-03 PROCEDURE — G0439 PPPS, SUBSEQ VISIT: HCPCS | Performed by: FAMILY MEDICINE

## 2017-11-03 PROCEDURE — G0008 ADMIN INFLUENZA VIRUS VAC: HCPCS | Performed by: FAMILY MEDICINE

## 2017-11-03 RX ORDER — SERTRALINE HYDROCHLORIDE 100 MG/1
150 TABLET, FILM COATED ORAL DAILY
Qty: 45 TABLET | Refills: 5 | Status: SHIPPED | OUTPATIENT
Start: 2017-11-03 | End: 2018-06-06 | Stop reason: SDUPTHER

## 2017-11-06 ENCOUNTER — TELEPHONE (OUTPATIENT)
Dept: FAMILY MEDICINE CLINIC | Facility: CLINIC | Age: 62
End: 2017-11-06

## 2017-11-06 NOTE — TELEPHONE ENCOUNTER
----- Message from Mesha Christina MD sent at 11/6/2017  7:15 AM EST -----  Please let patient know that his diabetes has again worsened a little - 7.6 to 9.4. Need to follow the diabetic diet more. He goes back and forth and he really has a hard time affording the insulin. We have encouraged him to discuss with someone his Medicare (and maybe even dropping part D so he can get into a free insulin program). Call and ask if there is anything we can do. Thanks.      Left a message to return call.    Spoke with patient he hasn't talked to anyone yet at Medicare but stated he will do that,has been out of his Humalog,provided him with a sample today,encouraged him to make that call to his insurance & he verbalized that he would.

## 2017-11-22 NOTE — PROGRESS NOTES
QUICK REFERENCE INFORMATION:  The ABCs of the Annual Wellness Visit    Subsequent Medicare Wellness Visit    HEALTH RISK ASSESSMENT    1955    Recent Hospitalizations:  No hospitalization(s) within the last year..        Current Medical Providers:  Patient Care Team:  Mesha Christina MD as PCP - General (Family Medicine)  Mesha Christina MD as PCP - Claims Attributed  Velasquez Torres IV, MD as Cardiologist (Cardiology)  Adriana Yusuf RN as Care Coordinator (Population Health)        Smoking Status:  History   Smoking Status   • Former Smoker   • Years: 40.00   • Types: Pipe, Cigars   • Quit date: 7/5/2016   Smokeless Tobacco   • Never Used     Comment: currently smokes a pipe       Alcohol Consumption:  History   Alcohol Use No       Depression Screen:   PHQ-2/PHQ-9 Depression Screening 11/3/2017   Little interest or pleasure in doing things 0   Feeling down, depressed, or hopeless 1   Total Score 1       Health Habits and Functional and Cognitive Screening:  Functional & Cognitive Status 11/3/2017   Do you have difficulty preparing food and eating? No   Do you have difficulty bathing yourself, getting dressed or grooming yourself? No   Do you have difficulty using the toilet? No   Do you have difficulty moving around from place to place? Yes   Do you have trouble with steps or getting out of a bed or a chair? Yes   In the past year have you fallen or experienced a near fall? No   Current Diet Diabetic Diet   Dental Exam Not up to date   Eye Exam Up to date   Exercise (times per week) 0 times per week   Current Exercise Activities Include None   Do you need help using the phone?  No   Are you deaf or do you have serious difficulty hearing?  No   Do you need help with transportation? No   Do you need help shopping? No   Do you need help preparing meals?  No   Do you need help with housework?  Yes   Do you need help with laundry? No   Do you need help taking your medications? No   Do you need help  managing money? No   Have you felt unusual stress, anger or loneliness in the last month? Yes   Who do you live with? Alone   If you need help, do you have trouble finding someone available to you? No   Have you been bothered in the last four weeks by sexual problems? No   Do you have difficulty concentrating, remembering or making decisions? No           Does the patient have evidence of cognitive impairment? No    Aspirin use counseling: Start ASA 81 mg daily       Recent Lab Results:  CMP:  Lab Results   Component Value Date     (H) 09/12/2016    BUN 18 11/03/2017    CREATININE 1.25 11/03/2017    EGFRIFNONA 58 (L) 11/03/2017    EGFRIFAFRI 59 (L) 09/12/2016    BCR 14.4 11/03/2017     11/03/2017    K 4.4 11/03/2017    CO2 26.4 11/03/2017    CALCIUM 9.3 11/03/2017    PROTENTOTREF 7.6 09/12/2016    ALBUMIN 4.20 11/03/2017    LABGLOBREF 3.6 09/12/2016    LABIL2 1.1 (L) 11/03/2017    BILITOT 0.7 11/03/2017    ALKPHOS 63 11/03/2017    AST 24 11/03/2017    ALT 31 11/03/2017     Lipid Panel:  Lab Results   Component Value Date    CHOL 145 08/04/2017    TRIG 227 (H) 08/04/2017    HDL 34 (L) 08/04/2017    VLDL 45.4 08/04/2017    LDLCALC 66 08/04/2017    LDLDIRECT 73 06/08/2015    LDLHDL 1.93 08/04/2017     HbA1c:  Lab Results   Component Value Date    HGBA1C 9.40 (H) 11/03/2017       Visual Acuity:   Visual Acuity Screening    Right eye Left eye Both eyes   Without correction: 20/25 20/200 20/25   With correction:          Age-appropriate Screening Schedule:  Refer to the list below for future screening recommendations based on patient's age, sex and/or medical conditions. Orders for these recommended tests are listed in the plan section. The patient has been provided with a written plan.    Health Maintenance   Topic Date Due   • TDAP/TD VACCINES (1 - Tdap) 08/15/1972   • ZOSTER VACCINE  07/12/2016   • DIABETIC EYE EXAM  05/13/2017   • HEMOGLOBIN A1C  05/03/2018   • URINE MICROALBUMIN  07/27/2018   • DIABETIC  FOOT EXAM  08/04/2018   • LIPID PANEL  08/04/2018   • COLONOSCOPY  10/12/2026   • PNEUMOCOCCAL VACCINE (19-64 MEDIUM RISK)  Addressed   • INFLUENZA VACCINE  Completed        Subjective   History of Present Illness    Benitez Miranda is a 62 y.o. male who presents for an Subsequent Wellness Visit.    The following portions of the patient's history were reviewed and updated as appropriate: allergies, current medications, past family history, past medical history, past social history, past surgical history and problem list.    Outpatient Medications Prior to Visit   Medication Sig Dispense Refill   • cetirizine (ZyrTEC) 10 MG tablet Take 10 mg by mouth daily.     • clobetasol (TEMOVATE) 0.05 % cream APPLY TO AFFECTED AREA TWICE DAILY 60 g 1   • clotrimazole (LOTRIMIN) 1 % cream Apply  topically 2 (Two) Times a Day. 60 g 2   • cyclobenzaprine (FLEXERIL) 10 MG tablet TAKE ONE TABLET BY MOUTH THREE TIMES DAILY AS NEEDED FOR MUSCLE SPASM 90 tablet 0   • fluticasone (FLONASE) 50 MCG/ACT nasal spray 2 sprays into each nostril Daily. 16 g 5   • losartan (COZAAR) 50 MG tablet Take 1 tablet by mouth Daily. 30 tablet 5   • metoprolol succinate XL (TOPROL-XL) 50 MG 24 hr tablet Take 1 tablet by mouth Daily. 30 tablet 5   • pravastatin (PRAVACHOL) 40 MG tablet Take 1 tablet by mouth Daily. 30 tablet 5   • spironolactone (ALDACTONE) 50 MG tablet Take 1 tablet by mouth Daily. 30 tablet 5   • warfarin (COUMADIN) 7.5 MG tablet TAKE ONE TABLET BY MOUTH ONCE DAILY 30 tablet 5   • Insulin Glargine (LANTUS SOLOSTAR) 100 UNIT/ML injection pen 41 units q hs & 37 units q am 5 pen 3   • Insulin Lispro (HUMALOG KWIKPEN) 100 UNIT/ML solution pen-injector Inject 15 Units under the skin 3 (Three) Times a Day Before Meals. Use with sliding scale before meals. Would prefer pen. 5 pen 5   • sertraline (ZOLOFT) 100 MG tablet Take 1.5 tablets by mouth daily. 45 tablet 5   • warfarin (COUMADIN) 2 MG tablet Take 1 tablet by mouth daily. 30 tablet 5   •  warfarin (COUMADIN) 4 MG tablet Use as directed. (currently using two tablets daily) 60 tablet 5   • warfarin (COUMADIN) 5 MG tablet Take 1 tablet by mouth daily. 30 tablet 5     No facility-administered medications prior to visit.        Patient Active Problem List   Diagnosis   • Diabetes mellitus   • COPD (chronic obstructive pulmonary disease)   • Chronic diastolic congestive heart failure   • Essential hypertension   • Tobacco abuse   • Coronary artery disease involving native coronary artery of native heart without angina pectoris   • Low back pain   • Hyperlipidemia LDL goal <70   • H/O mechanical aortic valve replacement   • Depression   • CKD (chronic kidney disease), stage III   • Rheumatic mitral stenosis   • Obesity       Advance Care Planning:  has NO advance directive - information provided to the patient today    Identification of Risk Factors:  Risk factors include: weight , cardiovascular risk, inactivity, financial stress and polypharmacy.    Review of Systems  Constitutional: Denies any recent history of HAs, dizziness, fevers, chills, itching.  Eyes: Denies any changes in vision. Denies any blurry vision or diplopia.  Ears, Nose, Mouth, Throat: Denies any sore throat, rhinorrhea, or cough.  Cardiovascular: Denies any chest pain, pressure, or palpitations.  Respiratory: Denies any shortness of breath or wheezing.  Gastrointestinal: Denies any abdominal pain, nausea, vomiting, diarrhea, or constipation.  Genitourinary: Denies any changes in urination.  Musculoskeletal: Denies any muscle weakness.  Skin and/or breasts: Denies any rashes.  Neurological: Denies any changes in balance or gait.  Psychiatric: Denies any anxiety, depression, or insomnia. Denies any suicidal or homicidal ideations.  Endocrine: Denies any heat or cold intolerance. Denies any voice changes, polydipsia, or polyuria.  Hematologic/Lymphatic: Denies any anemia or easy bruising.    Compared to one year ago, the patient feels his  "physical health is the same.  Compared to one year ago, the patient feels his mental health is better.    Objective     Physical Exam    Vitals:    11/03/17 1036   BP: 123/79   BP Location: Right arm   Patient Position: Sitting   Pulse: 64   SpO2: 98%   Weight: 262 lb (119 kg)   Height: 72\" (182.9 cm)       Body mass index is 35.53 kg/(m^2).  Discussed the patient's BMI with him. The BMI is above average; BMI management plan is completed.    Gen: Patient in NAD. Pleasant and answers appropriately. A&Ox3.    Skin: Warm and dry with normal turgor. No purpura, rashes, or unusual pigmentation noted. Hair is normal in appearance and distribution.    HEENT: NC/AT. No lesions noted. Conjunctiva clear, sclera nonicteric. PERRL. EOMI without nystagmus or strabismus. Fundi appear benign. No hemorrhages or exudates of eyes. Auditory canals are patent bilaterally without lesions. TMs intact,  nonerythematous, nonbulging without lesions. Nasal mucosa pink, nonerythematous, and nonedematous. Frontal and maxillary sinuses are nontender. O/P nonerythematous and moist without exudate.    Neck: Supple without lymph nodes palpated. FROM.      Lungs: CTA B/L without rales, rhonchi, crackles, or wheezes.    Heart: RRR. S1 and S2 normal. No S3 or S4. No MRGT.    Abd: Soft, nontender,nondistended. (+)BSx4 quadrants.     Extrem: No CCE. Radial and dorsalis pedis pulses 2+/4 and equal B/L. FROMx4. No bone, joint, or muscle tenderness noted.    Neuro: No focal motor/sensory deficits.      Assessment/Plan   Patient Self-Management and Personalized Health Advice  The patient has been provided with information about: diet, exercise, weight management, prevention of cardiac or vascular disease and designing advance directives and preventive services including:   · Advance directive, Exercise counseling provided, Fall Risk assessment done, Fall Risk plan of care done, Influenza vaccine, Nutrition counseling provided.    Visit Diagnoses:    " ICD-10-CM ICD-9-CM   1. Medicare annual wellness visit, subsequent Z00.00 V70.0   2. Uncontrolled type 2 diabetes with neuropathy E11.40 250.62    E11.65 357.2   3. Immunization due Z23 V05.9       Orders Placed This Encounter   Procedures   • Flu Vaccine Quad PF 3YR+ (FLUARIX/FLUZONE [id number])   • Comprehensive Metabolic Panel   • Hemoglobin A1c   • Osmolality, Calculated     Standing Status:   Future     Number of Occurrences:   1     Standing Expiration Date:   11/3/2018   • Ambulatory Referral to Ophthalmology     Referral Priority:   Routine     Referral Type:   Consultation     Referral Reason:   Specialty Services Required     Requested Specialty:   Ophthalmology     Number of Visits Requested:   1       Outpatient Encounter Prescriptions as of 11/3/2017   Medication Sig Dispense Refill   • cetirizine (ZyrTEC) 10 MG tablet Take 10 mg by mouth daily.     • clobetasol (TEMOVATE) 0.05 % cream APPLY TO AFFECTED AREA TWICE DAILY 60 g 1   • clotrimazole (LOTRIMIN) 1 % cream Apply  topically 2 (Two) Times a Day. 60 g 2   • cyclobenzaprine (FLEXERIL) 10 MG tablet TAKE ONE TABLET BY MOUTH THREE TIMES DAILY AS NEEDED FOR MUSCLE SPASM 90 tablet 0   • fluticasone (FLONASE) 50 MCG/ACT nasal spray 2 sprays into each nostril Daily. 16 g 5   • Insulin Glargine (LANTUS SOLOSTAR) 100 UNIT/ML injection pen 41 units q hs & 37 units q am 5 pen 5   • Insulin Lispro (HUMALOG KWIKPEN) 100 UNIT/ML solution pen-injector Inject 15 Units under the skin 3 (Three) Times a Day Before Meals. Use with sliding scale before meals. Would prefer pen. 5 pen 5   • losartan (COZAAR) 50 MG tablet Take 1 tablet by mouth Daily. 30 tablet 5   • metoprolol succinate XL (TOPROL-XL) 50 MG 24 hr tablet Take 1 tablet by mouth Daily. 30 tablet 5   • pravastatin (PRAVACHOL) 40 MG tablet Take 1 tablet by mouth Daily. 30 tablet 5   • sertraline (ZOLOFT) 100 MG tablet Take 1.5 tablets by mouth Daily. 45 tablet 5   • spironolactone (ALDACTONE) 50 MG tablet Take  1 tablet by mouth Daily. 30 tablet 5   • warfarin (COUMADIN) 7.5 MG tablet TAKE ONE TABLET BY MOUTH ONCE DAILY 30 tablet 5   • [DISCONTINUED] Insulin Glargine (LANTUS SOLOSTAR) 100 UNIT/ML injection pen 41 units q hs & 37 units q am 5 pen 3   • [DISCONTINUED] Insulin Lispro (HUMALOG KWIKPEN) 100 UNIT/ML solution pen-injector Inject 15 Units under the skin 3 (Three) Times a Day Before Meals. Use with sliding scale before meals. Would prefer pen. 5 pen 5   • [DISCONTINUED] sertraline (ZOLOFT) 100 MG tablet Take 1.5 tablets by mouth daily. 45 tablet 5   • warfarin (COUMADIN) 2 MG tablet Take 1 tablet by mouth daily. 30 tablet 5   • warfarin (COUMADIN) 4 MG tablet Use as directed. (currently using two tablets daily) 60 tablet 5   • warfarin (COUMADIN) 5 MG tablet Take 1 tablet by mouth daily. 30 tablet 5     No facility-administered encounter medications on file as of 11/3/2017.        Reviewed use of high risk medication in the elderly: yes  Reviewed for potential of harmful drug interactions in the elderly: yes    Follow Up:  Return in about 3 months (around 2/3/2018).     An After Visit Summary and PPPS with all of these plans were given to the patient.

## 2018-02-02 ENCOUNTER — OFFICE VISIT (OUTPATIENT)
Dept: FAMILY MEDICINE CLINIC | Facility: CLINIC | Age: 63
End: 2018-02-02

## 2018-02-02 VITALS
BODY MASS INDEX: 36.03 KG/M2 | HEART RATE: 76 BPM | HEIGHT: 72 IN | WEIGHT: 266 LBS | OXYGEN SATURATION: 98 % | SYSTOLIC BLOOD PRESSURE: 128 MMHG | DIASTOLIC BLOOD PRESSURE: 81 MMHG

## 2018-02-02 DIAGNOSIS — IMO0002 UNCONTROLLED TYPE 2 DIABETES WITH NEUROPATHY: Primary | ICD-10-CM

## 2018-02-02 DIAGNOSIS — N18.30 CHRONIC KIDNEY DISEASE, STAGE 3 (HCC): ICD-10-CM

## 2018-02-02 DIAGNOSIS — G89.29 CHRONIC BILATERAL LOW BACK PAIN WITHOUT SCIATICA: ICD-10-CM

## 2018-02-02 DIAGNOSIS — E78.5 HYPERLIPIDEMIA LDL GOAL <70: ICD-10-CM

## 2018-02-02 DIAGNOSIS — F33.42 RECURRENT MAJOR DEPRESSIVE DISORDER, IN FULL REMISSION (HCC): ICD-10-CM

## 2018-02-02 DIAGNOSIS — I10 ESSENTIAL HYPERTENSION: ICD-10-CM

## 2018-02-02 DIAGNOSIS — M54.50 CHRONIC BILATERAL LOW BACK PAIN WITHOUT SCIATICA: ICD-10-CM

## 2018-02-02 PROCEDURE — 99214 OFFICE O/P EST MOD 30 MIN: CPT | Performed by: FAMILY MEDICINE

## 2018-02-02 NOTE — PATIENT INSTRUCTIONS
Take the 7.5 mg and another 1/2 tablet today, tomorrow, and Sunday. You can take just the 7.5 mg tablet on Monday - when you come in for labs on Monday, we will do another INR.     Call if you ever need insulin.

## 2018-02-05 ENCOUNTER — ANTICOAGULATION VISIT (OUTPATIENT)
Dept: FAMILY MEDICINE CLINIC | Facility: CLINIC | Age: 63
End: 2018-02-05

## 2018-02-05 ENCOUNTER — TELEPHONE (OUTPATIENT)
Dept: FAMILY MEDICINE CLINIC | Facility: CLINIC | Age: 63
End: 2018-02-05

## 2018-02-05 DIAGNOSIS — N18.30 CKD (CHRONIC KIDNEY DISEASE), STAGE III (HCC): Primary | ICD-10-CM

## 2018-02-05 LAB
ALBUMIN SERPL-MCNC: 4.1 G/DL (ref 3.4–4.8)
ALBUMIN UR-MCNC: 107.9 MG/L
ALBUMIN/GLOB SERPL: 1.2 G/DL (ref 1.5–2.5)
ALP SERPL-CCNC: 64 U/L (ref 40–129)
ALT SERPL W P-5'-P-CCNC: 25 U/L (ref 10–44)
ANION GAP SERPL CALCULATED.3IONS-SCNC: 3 MMOL/L (ref 3.6–11.2)
AST SERPL-CCNC: 20 U/L (ref 10–34)
BILIRUB SERPL-MCNC: 0.5 MG/DL (ref 0.2–1.8)
BUN BLD-MCNC: 22 MG/DL (ref 7–21)
BUN/CREAT SERPL: 16.7 (ref 7–25)
CALCIUM SPEC-SCNC: 9 MG/DL (ref 7.7–10)
CHLORIDE SERPL-SCNC: 106 MMOL/L (ref 99–112)
CO2 SERPL-SCNC: 27 MMOL/L (ref 24.3–31.9)
CREAT BLD-MCNC: 1.32 MG/DL (ref 0.43–1.29)
CREAT UR-MCNC: 85.4 MG/DL
GFR SERPL CREATININE-BSD FRML MDRD: 55 ML/MIN/1.73
GLOBULIN UR ELPH-MCNC: 3.3 GM/DL
GLUCOSE BLD-MCNC: 207 MG/DL (ref 70–110)
INR PPP: 2.4 (ref 2.5–3.5)
MICROALBUMIN/CREAT UR: 126.3 MG/G
OSMOLALITY SERPL CALC.SUM OF ELEC: 281.3 MOSM/KG (ref 273–305)
POTASSIUM BLD-SCNC: 4.5 MMOL/L (ref 3.5–5.3)
PROT SERPL-MCNC: 7.4 G/DL (ref 6–8)
SODIUM BLD-SCNC: 136 MMOL/L (ref 135–153)

## 2018-02-05 PROCEDURE — 80053 COMPREHEN METABOLIC PANEL: CPT | Performed by: FAMILY MEDICINE

## 2018-02-05 PROCEDURE — 85610 PROTHROMBIN TIME: CPT | Performed by: FAMILY MEDICINE

## 2018-02-05 PROCEDURE — 82570 ASSAY OF URINE CREATININE: CPT | Performed by: FAMILY MEDICINE

## 2018-02-05 PROCEDURE — 82043 UR ALBUMIN QUANTITATIVE: CPT | Performed by: FAMILY MEDICINE

## 2018-02-05 NOTE — TELEPHONE ENCOUNTER
----- Message from Mesha Christina MD sent at 2/5/2018  2:40 PM EST -----  Please fax to Dr. Landis [phone]. Thanks. Can send him a stable letter.        Faxed as requested & stable letter mailed.

## 2018-02-21 NOTE — PROGRESS NOTES
"Benitez Miranda     VITALS: Blood pressure 128/81, pulse 76, height 182.9 cm (72.01\"), weight 121 kg (266 lb), SpO2 98 %.    Subjective  Chief Complaint:   Chief Complaint   Patient presents with   • Knee Pain     Right         History of Present Illness:  Patient is a 62 y.o. male with a medical history significant for CAD, hypertension, hyperlipidemia, and type II diabetes who presents to clinic secondary to medical followup. No new or acute concerns today. He is doing well.      Patient has a history of hypertension and is on losartan 50 mg orally daily, metoprolol XL 50 mg orally daily, and spironolactone 50 mg orally daily.. Denies any side effects of the medications. Denies any dizziness, lightheadedness, blurry vision, chest pain, or edema.   Home blood pressure: No  Low salt diet: Occasionally    Patient also has a history of type 2 diabetes and is currently on diet and exercise, longacting insulin 37 units BID and Humalog 15 units TID. We have given him samples of Toujeo and he reports using that 37 units BID and doing really well on it; his fasting sugars are below 150. Also occasionally utilizes Lantus 37 units BID, but that does not work as well as the Toujeo. Last A1C in 2/2017 was 8.9. Does not take full amounts of insulin secondary to him not having enough money to afford the medications. Denies any side effects of his medications. Patient denies any changes in vision, polydipsia, polyuria, numbness or tingling, or hypoglycemic or hyperglycemic episodes. Patient does follow a diabetic diet and checks his glucose levels regularly. Blood glucose levels are usually in the below 150s fasting. Patient does have complications of neuropathy and nephropathy. No retinopathy. No medications. No worsening or exacerbation of symptoms. Last eye exam was in 1/2018 - history cataracts taken out; recent laser. Last microalbumin was 7/2017 and okay. Last foot exam was 8/2017 and patient does not see a podiatrist. Would " like foot exam today.  Diabetic teaching/nutrition education: Yes  Medications for kidney protection: Yes, losartan 50 mg orally daily  Medications for cardiovascular protection: Yes    Patient also has a history of hyperlipidemia and is currently on pravastatin 40 mg orally daily. Denies any side effects of the medications. Last lipid panel in 8/2017 and was WNL with LDL 88. Denies any muscle weakness, jaundice or itching.   Low cholesterol diet: Yes    Patient has a history of allergic rhinitis and is currently on cetirizine 10 mg orally daily. Denies any side effects of the medication. Denies any sinus congestion, sinus headaches, watery eyes, itchy eyes, rhinorrhea, coughing, or postnasal discharge.   Allergy injections: No    Patient has a history of COPD and is currently on Spiriva daily and albuterol inhaler 2 puffs every 4-6 hours as needed. As he feels that his breathing is better right now, he has not been using the spiriva secondary to cost. Denies any side effects of the medications. Denies any shortness of breath, coughing, wheezing, or night coughing. He sees Dr. Solis. Reports breathing much better.  Exacerbation: No  Last utilized albuterol: Several weeks ago.    Patient has a history of depression and is currently on zoloft 150 mg orally daily. Patient states that this medication is working. Denies any side effects of the medication. Patient states that he is sleeping fairly well and can get out of bed daily to accomplish daily activities. Patient has no anhedonia. Mood is stable. Has no feelings of guilt. Has good concentration and memory ability. Has energy. Is able to make goals. Is not crying a lot. Appetite is good. Denies any suicidal or homicidal ideations. Does not have any auditory or visual hallucinations.    Patient has a history of chronic lower back pain, improved. Only on flexeril 10 mg orally as needed. Has not asked for a refill since 9/2016. Denies any side effects of the  medication. States that the medications do somewhat control the pain enough so that he can accomplish daily activities. Movement and ambulation are not improved. Denies any sedation from the medications. No pain medication seeking behavior. LEVI has no record of pain medication seeking behavior. Last UDS was done 7/13/2016 and was consistent and WNL. MRI of lumbar spine in 5/2015 shows disc desiccation without any herniations.     Patient has an extensive history of CAD. He does have a history of a mechanical aortic valve replacement and is on coumadin 7.5 mg orally daily. He also does have a history of two cardiac caths, 2004 (x 1 stent) and 6/2015 (pulmonary HTN, everything else patent). Echo in 2/2016 showed EF 50-55%. Does have a history of MV stenosis and rheumatic valve. He sees Dr. Torres, and is currently on metoprolol ER 50 mg orally daily, spironolactone 50 mg orally daily, and coumadin 7.5 mg orally daliy. INR 1.5 today. States that he has missed several doses.   History of stroke: No       Patient also states that he has been diagnosed with chronic kidney disease, stage III and sees Dr. Landis. Last visit was several months ago. Baseline creatinine with Dr. Landis per chart review. Last creatinine done here in 5/2017 shows a creatinine of 1.37. Has not had any exacerbations.   No complaints about any of the medications.    The following portions of the patient's history were reviewed and updated as appropriate: allergies, current medications, past family history, past medical history, past social history, past surgical history and problem list.    Past Medical History  Past Medical History:   Diagnosis Date   • CAD (coronary artery disease)     x1 stent; pulmonary HTN; EF 50-55%; rheumatic valve; MV stenosis   • CHF (congestive heart failure)    • CKD (chronic kidney disease), stage III    • COPD (chronic obstructive pulmonary disease)    • Depression    • Diabetes mellitus    • H/O mechanical aortic  valve replacement    • History of tobacco abuse    • Hyperlipidemia    • Hypertension    • Ischemic heart disease    • Kidney failure     third stage   • Low back pain    • Obesity     BMI 36.   • Valvular heart disease        Review of Systems  Constitutional: Denies any recent history of HAs, dizziness, fevers, chills, itching.  Eyes: Denies any changes in vision. Denies any blurry vision or diplopia.  Ears, Nose, Mouth, Throat: Denies any sore throat, rhinorrhea, or cough.  Cardiovascular: Denies any chest pain, pressure, or palpitations.  Respiratory: Denies any shortness of breath or wheezing.  Gastrointestinal: Denies any abdominal pain, nausea, vomiting, diarrhea, or constipation.  Genitourinary: Denies any changes in urination.  Musculoskeletal: Denies any muscle weakness.  Skin and/or breasts: Denies any rashes.  Neurological: Denies any changes in balance or gait.  Psychiatric: Denies any anxiety, depression, or insomnia. Denies any suicidal or homicidal ideations.    Surgical History  Past Surgical History:   Procedure Laterality Date   • AORTIC VALVE REPAIR/REPLACEMENT  1991   • CARDIAC CATHETERIZATION     • CARDIAC SURGERY  1991    valve on aortic   • CARDIAC SURGERY  2001    stent    • CATARACT EXTRACTION     • COLONOSCOPY  2001   • CORONARY STENT PLACEMENT  2005       Family History  Family History   Problem Relation Age of Onset   • Cancer Mother      breast   • COPD Father    • Heart attack Father 74   • Diabetes Paternal Grandmother      both legs removed       Social History  Social History     Social History   • Marital status:      Spouse name: N/A   • Number of children: N/A   • Years of education: N/A     Occupational History   • Not on file.     Social History Main Topics   • Smoking status: Former Smoker     Years: 40.00     Types: Pipe, Cigars     Quit date: 7/5/2016   • Smokeless tobacco: Never Used      Comment: currently smokes a pipe   • Alcohol use No   • Drug use: No   •  Sexual activity: Defer     Other Topics Concern   • Not on file     Social History Narrative       Objective  Physical Exam  Gen: Patient in NAD. Pleasant and answers appropriately. A&Ox3.    Skin: Warm and dry with normal turgor. No purpura, rashes, or unusual pigmentation noted. Hair is normal in appearance and distribution.    HEENT: NC/AT. No lesions noted. Conjunctiva clear, sclera nonicteric. PERRL. EOMI without nystagmus or strabismus. Fundi appear benign. No hemorrhages or exudates of eyes. Auditory canals are patent bilaterally without lesions. TMs intact,  nonerythematous, nonbulging without lesions. Nasal mucosa pink, nonerythematous, and nonedematous. Frontal and maxillary sinuses are nontender. O/P nonerythematous and moist without exudate.    Neck: Supple without lymph nodes palpated. FROM.     Lungs: CTA B/L without rales, rhonchi, crackles, or wheezes.    Heart: RRR. S1 and S2 normal. No S3 or S4. No MRGT.    Abd: Soft, nontender,nondistended. (+)BSx4 quadrants.     Extrem: No CCE. Radial pulses 2+/4 and equal B/L. FROMx4. No bone, joint, or muscle tenderness noted.    Neuro: No focal motor/sensory deficits.    Procedures    Assessment/Plan  Benitez Miranda is a 62 y.o. here for medical followup.  Diagnoses and all orders for this visit:     Type 2 diabetes mellitus with diabetic polyneuropathy, with long-term current use of insulin  -     Comprehensive Metabolic Panel  -     Hemoglobin A1c  -     Osmolality, Calculated; Future  -     Osmolality, Calculated  Will check A1C and CMP along with lipids today. Continue lantus to 37 units BID or Toujeo 37 units BID. Tries to utilize as little as possible. Not currently on any meds for neuropathy; patient states that it does not help right now. Eye exam 1/2018; foot exam done 8/2017. Diabetic shoes written for. Will continue losartan 50 mg orally daily for kidney protection and will need to monitor closely; no aspirin secondary to CKD. Samples of Toujeo  given.     Hypertension  Continue spironolactone 50 mg orally daily, losartan 50 mg orally daily and metoprolol XL 50 mg orally daily. Patient to monitor.       Anticoagulant long-term use/Mechanical heart valve present  Take the 7.5 mg and another 1/2 tablet today, tomorrow, and Sunday. You can take just the 7.5 mg tablet on Monday - when you come in for labs on Monday, we will do another INR.       Tinea pedis of both feet  Continue clotrimazole BID. It is working.      CKD (chronic kidney disease), stage III  Per Dr. Landis management. Close monitoring of creatinine needed. Baseline creatinine from him 1.6. CrCl 80. Check today.      Low back pain with sciatica, unspecified back pain laterality  Improved and stable. On no current medications.      Hyperlipidemia  Stable. Continue pravastatin 40 mg orally daily. Next lipid check next week.      Allergic rhinitis  Continue cetirizine 10 mg orally daily along with flonase and sudafed.      COPD   Per Dr. Solis management. Continue (Spiriva daily PRN) and albuterol inhaler 2 puffs every 4-6 hours as needed.      Depression  Stable. Continue zoloft 150 mg orally daily.       CAD.  Per Dr. Torres management. Continue metoprolol ER 50 mg orally daily, spironolactone 50 mg orally daily, and coumadin 7.5 mg orally daliy. .       Preventative Medicine.  Patient thinking about colonoscopy - declines today. 2/2017 hemeoccult negative.  PSA 7/2016 WNL - check today. Will need AAA screening in 2 years. Declines pneumo. Flu vaccine 10/2016.    Findings and plans discussed with patient who verbalizes understanding and agreement. Will followup with patient once results are in. Patient to followup at clinic PRN or in three months for further medical followup.  Mesha Christina MD

## 2018-04-04 DIAGNOSIS — Z79.01 ANTICOAGULANT LONG-TERM USE: ICD-10-CM

## 2018-04-04 RX ORDER — WARFARIN SODIUM 7.5 MG/1
TABLET ORAL
Qty: 30 TABLET | Refills: 5 | Status: SHIPPED | OUTPATIENT
Start: 2018-04-04 | End: 2018-09-21 | Stop reason: SDUPTHER

## 2018-05-02 ENCOUNTER — OFFICE VISIT (OUTPATIENT)
Dept: FAMILY MEDICINE CLINIC | Facility: CLINIC | Age: 63
End: 2018-05-02

## 2018-05-02 VITALS
HEART RATE: 62 BPM | SYSTOLIC BLOOD PRESSURE: 149 MMHG | BODY MASS INDEX: 36.84 KG/M2 | WEIGHT: 272 LBS | DIASTOLIC BLOOD PRESSURE: 83 MMHG | OXYGEN SATURATION: 97 % | HEIGHT: 72 IN

## 2018-05-02 DIAGNOSIS — F33.42 RECURRENT MAJOR DEPRESSIVE DISORDER, IN FULL REMISSION (HCC): ICD-10-CM

## 2018-05-02 DIAGNOSIS — I10 ESSENTIAL HYPERTENSION: ICD-10-CM

## 2018-05-02 DIAGNOSIS — Z79.01 ANTICOAGULANT LONG-TERM USE: Primary | ICD-10-CM

## 2018-05-02 DIAGNOSIS — IMO0002 UNCONTROLLED TYPE 2 DIABETES WITH NEUROPATHY: ICD-10-CM

## 2018-05-02 DIAGNOSIS — M54.50 CHRONIC BILATERAL LOW BACK PAIN WITHOUT SCIATICA: ICD-10-CM

## 2018-05-02 DIAGNOSIS — N18.30 CKD (CHRONIC KIDNEY DISEASE), STAGE III (HCC): ICD-10-CM

## 2018-05-02 DIAGNOSIS — G89.29 CHRONIC BILATERAL LOW BACK PAIN WITHOUT SCIATICA: ICD-10-CM

## 2018-05-02 DIAGNOSIS — E78.5 HYPERLIPIDEMIA LDL GOAL <70: ICD-10-CM

## 2018-05-02 LAB
ALBUMIN SERPL-MCNC: 3.8 G/DL (ref 3.4–4.8)
ALBUMIN/GLOB SERPL: 1 G/DL (ref 1.5–2.5)
ALP SERPL-CCNC: 61 U/L (ref 40–129)
ALT SERPL W P-5'-P-CCNC: 31 U/L (ref 10–44)
ANION GAP SERPL CALCULATED.3IONS-SCNC: 5.8 MMOL/L (ref 3.6–11.2)
AST SERPL-CCNC: 19 U/L (ref 10–34)
BILIRUB SERPL-MCNC: 0.6 MG/DL (ref 0.2–1.8)
BUN BLD-MCNC: 20 MG/DL (ref 7–21)
BUN/CREAT SERPL: 16.9 (ref 7–25)
CALCIUM SPEC-SCNC: 9.3 MG/DL (ref 7.7–10)
CHLORIDE SERPL-SCNC: 105 MMOL/L (ref 99–112)
CO2 SERPL-SCNC: 27.2 MMOL/L (ref 24.3–31.9)
CREAT BLD-MCNC: 1.18 MG/DL (ref 0.43–1.29)
GFR SERPL CREATININE-BSD FRML MDRD: 62 ML/MIN/1.73
GLOBULIN UR ELPH-MCNC: 3.7 GM/DL
GLUCOSE BLD-MCNC: 151 MG/DL (ref 70–110)
HBA1C MFR BLD: 9.1 % (ref 4.5–5.7)
OSMOLALITY SERPL CALC.SUM OF ELEC: 281.2 MOSM/KG (ref 273–305)
POTASSIUM BLD-SCNC: 4.5 MMOL/L (ref 3.5–5.3)
PROT SERPL-MCNC: 7.5 G/DL (ref 6–8)
SODIUM BLD-SCNC: 138 MMOL/L (ref 135–153)

## 2018-05-02 PROCEDURE — 80053 COMPREHEN METABOLIC PANEL: CPT | Performed by: FAMILY MEDICINE

## 2018-05-02 PROCEDURE — 99214 OFFICE O/P EST MOD 30 MIN: CPT | Performed by: FAMILY MEDICINE

## 2018-05-02 PROCEDURE — 83036 HEMOGLOBIN GLYCOSYLATED A1C: CPT | Performed by: FAMILY MEDICINE

## 2018-05-02 PROCEDURE — 36415 COLL VENOUS BLD VENIPUNCTURE: CPT | Performed by: FAMILY MEDICINE

## 2018-05-02 RX ORDER — SPIRONOLACTONE 50 MG/1
25 TABLET, FILM COATED ORAL DAILY
Qty: 45 TABLET | Refills: 1 | Status: SHIPPED | OUTPATIENT
Start: 2018-05-02 | End: 2018-12-04 | Stop reason: SDUPTHER

## 2018-05-03 ENCOUNTER — TELEPHONE (OUTPATIENT)
Dept: FAMILY MEDICINE CLINIC | Facility: CLINIC | Age: 63
End: 2018-05-03

## 2018-05-03 NOTE — TELEPHONE ENCOUNTER
----- Message from Mesha Christina MD sent at 5/3/2018  5:06 AM EDT -----  Please call and let him know that labs are ok. Not great, but ok. Kidneys are better than what they were several months ago - still high normal, but improved. A1C is actually better than what it was 6 months ago (9.4 to 9.1). He relies on insulin samples - please remind him that when he runs out, please call and we will set him up with more. Thanks.      Left a message to return call.      Patient returned call & verbalized understanding.

## 2018-05-04 DIAGNOSIS — IMO0002 UNCONTROLLED TYPE 2 DIABETES WITH NEUROPATHY: ICD-10-CM

## 2018-05-04 RX ORDER — INSULIN GLARGINE 100 [IU]/ML
INJECTION, SOLUTION SUBCUTANEOUS
Refills: 5 | OUTPATIENT
Start: 2018-05-04

## 2018-05-16 NOTE — PROGRESS NOTES
"Benitez Miranda     VITALS: Blood pressure 149/83, pulse 62, height 182.9 cm (72.01\"), weight 123 kg (272 lb), SpO2 97 %.    Subjective  Chief Complaint:   Chief Complaint   Patient presents with   • Follow-up   • Diabetes        History of Present Illness:  Patient is a 62 y.o. male with a medical history significant for CAD, hypertension, hyperlipidemia, and type II diabetes who presents to clinic secondary to medical followup. No new or acute concerns today. He is doing well. Since being seen, he has quit smoking.     Patient has a history of hypertension and is on losartan 50 mg orally daily, metoprolol XL 50 mg orally daily, and spironolactone 50 mg orally daily.. Denies any side effects of the medications. Denies any dizziness, lightheadedness, blurry vision, chest pain, or edema.   Home blood pressure: No  Low salt diet: Occasionally    Patient also has a history of type 2 diabetes and is currently on diet and exercise, longacting insulin 37 units in the PM and 41 units in the AM and Humalog 15 units TID. We have given him samples of Toujeo and he reports using that 37 units BID and doing really well on it; his fasting sugars are below 150. Also occasionally utilizes Lantus at the above dosage, but that does not work as well as the Toujeo. Last A1C in 11/2017 was 9.4. Does not take full amounts of insulin secondary to him not having enough money to afford the medications. Denies any side effects of his medications. Patient denies any changes in vision, polydipsia, polyuria, numbness or tingling, or hypoglycemic or hyperglycemic episodes. Patient does follow a diabetic diet and checks his glucose levels regularly. Blood glucose levels are usually in the below 150s fasting. Patient does have complications of neuropathy and nephropathy. No retinopathy. No medications. No worsening or exacerbation of symptoms. Last eye exam was in 1/2018 - history cataracts taken out; recent laser. Last microalbumin was 2/2018 and " okay. Last foot exam was 8/2017 and patient does not see a podiatrist. Would like foot exam today.  Diabetic teaching/nutrition education: Yes  Medications for kidney protection: Yes, losartan 50 mg orally daily  Medications for cardiovascular protection: Yes    Patient also has a history of hyperlipidemia and is currently on pravastatin 40 mg orally daily. Denies any side effects of the medications. Last lipid panel in 8/2017 and was WNL with LDL 88. Denies any muscle weakness, jaundice or itching.   Low cholesterol diet: Yes    Patient has a history of allergic rhinitis and is currently on cetirizine 10 mg orally daily. Denies any side effects of the medication. Denies any sinus congestion, sinus headaches, watery eyes, itchy eyes, rhinorrhea, coughing, or postnasal discharge.   Allergy injections: No    Patient has a history of COPD and is currently on Spiriva daily and albuterol inhaler 2 puffs every 4-6 hours as needed. As he feels that his breathing is better right now, he has not been using the spiriva secondary to cost. Denies any side effects of the medications. Denies any shortness of breath, coughing, wheezing, or night coughing. He sees Dr. Solis. Reports breathing much better.  Exacerbation: No  Last utilized albuterol: Several weeks ago.    Patient has a history of depression and is currently on zoloft 150 mg orally daily. Patient states that this medication is working. Denies any side effects of the medication. Patient states that he is sleeping fairly well and can get out of bed daily to accomplish daily activities. Patient has no anhedonia. Mood is stable. Has no feelings of guilt. Has good concentration and memory ability. Has energy. Is able to make goals. Is not crying a lot. Appetite is good. Denies any suicidal or homicidal ideations. Does not have any auditory or visual hallucinations.    Patient has a history of chronic lower back pain, improved. Only on flexeril 10 mg orally as needed. Has  not asked for a refill since 9/2016. Denies any side effects of the medication. States that the medications do somewhat control the pain enough so that he can accomplish daily activities. Movement and ambulation are not improved. Denies any sedation from the medications. No pain medication seeking behavior. LEVI has no record of pain medication seeking behavior. Last UDS was done 7/13/2016 and was consistent and WNL. MRI of lumbar spine in 5/2015 shows disc desiccation without any herniations.     Patient has an extensive history of CAD. He does have a history of a mechanical aortic valve replacement and is on coumadin 7.5 mg orally daily. He also does have a history of two cardiac caths, 2004 (x 1 stent) and 6/2015 (pulmonary HTN, everything else patent). Echo in 2/2016 showed EF 50-55%. Does have a history of MV stenosis and rheumatic valve. He sees Dr. Torres, and is currently on metoprolol ER 50 mg orally daily, spironolactone 50 mg orally daily, and coumadin 7.5 mg orally daliy. INR 2.3 today.   History of stroke: No       Patient also states that he has been diagnosed with chronic kidney disease, stage III and sees Dr. Landis. Last visit was several months ago. Baseline creatinine with Dr. Landis per chart review. Last creatinine done here in 5/2017 shows a creatinine of 1.37. Has not had any exacerbations.   No complaints about any of the medications.    The following portions of the patient's history were reviewed and updated as appropriate: allergies, current medications, past family history, past medical history, past social history, past surgical history and problem list.    Past Medical History  Past Medical History:   Diagnosis Date   • CAD (coronary artery disease)     x1 stent; pulmonary HTN; EF 50-55%; rheumatic valve; MV stenosis   • CHF (congestive heart failure)    • CKD (chronic kidney disease), stage III    • COPD (chronic obstructive pulmonary disease)    • Depression    • Diabetes mellitus     • H/O mechanical aortic valve replacement    • History of tobacco abuse    • Hyperlipidemia    • Hypertension    • Ischemic heart disease    • Kidney failure     third stage   • Low back pain    • Obesity     BMI 36.   • Valvular heart disease        Review of Systems  Constitutional: Denies any recent history of HAs, dizziness, fevers, chills, itching.  Eyes: Denies any changes in vision. Denies any blurry vision or diplopia.  Ears, Nose, Mouth, Throat: Denies any sore throat, rhinorrhea, or cough.  Cardiovascular: Denies any chest pain, pressure, or palpitations.  Respiratory: Denies any shortness of breath or wheezing.  Gastrointestinal: Denies any abdominal pain, nausea, vomiting, diarrhea, or constipation.  Genitourinary: Denies any changes in urination.  Musculoskeletal: Denies any muscle weakness.  Skin and/or breasts: Denies any rashes.  Neurological: Denies any changes in balance or gait.  Psychiatric: Denies any anxiety, depression, or insomnia. Denies any suicidal or homicidal ideations.  Endocrine: Denies any heat or cold intolerance. Denies any voice changes, polydipsia, or polyuria.  Hematologic/Lymphatic: Denies any anemia or easy bruising.    Surgical History  Past Surgical History:   Procedure Laterality Date   • AORTIC VALVE REPAIR/REPLACEMENT  1991   • CARDIAC CATHETERIZATION     • CARDIAC SURGERY  1991    valve on aortic   • CARDIAC SURGERY  2001    stent    • CATARACT EXTRACTION     • COLONOSCOPY  2001   • CORONARY STENT PLACEMENT  2005       Family History  Family History   Problem Relation Age of Onset   • Cancer Mother         breast   • COPD Father    • Heart attack Father 74   • Diabetes Paternal Grandmother         both legs removed       Social History  Social History     Social History   • Marital status:      Spouse name: N/A   • Number of children: N/A   • Years of education: N/A     Occupational History   • Not on file.     Social History Main Topics   • Smoking status:  Former Smoker     Years: 40.00     Types: Pipe, Cigars     Quit date: 7/5/2016   • Smokeless tobacco: Never Used      Comment: currently smokes a pipe   • Alcohol use No   • Drug use: No   • Sexual activity: Defer     Other Topics Concern   • Not on file     Social History Narrative   • No narrative on file       Objective  Physical Exam  Gen: Patient in NAD. Pleasant and answers appropriately. A&Ox3.    Skin: Warm and dry with normal turgor. No purpura, rashes, or unusual pigmentation noted. Hair is normal in appearance and distribution.    HEENT: NC/AT. No lesions noted. Conjunctiva clear, sclera nonicteric. PERRL. EOMI without nystagmus or strabismus. Fundi appear benign. No hemorrhages or exudates of eyes. Auditory canals are patent bilaterally without lesions. TMs intact,  nonerythematous, nonbulging without lesions. Nasal mucosa pink, nonerythematous, and nonedematous. Frontal and maxillary sinuses are nontender. O/P nonerythematous and moist without exudate.    Neck: Supple without lymph nodes palpated. FROM.     Lungs: CTA B/L without rales, rhonchi, crackles, or wheezes.    Heart: RRR. S1 and S2 normal. No S3 or S4. No MRGT.    Abd: Soft, nontender,nondistended. (+)BSx4 quadrants.     Extrem: No CCE. Radial pulses 2+/4 and equal B/L. FROMx4. No bone, joint, or muscle tenderness noted.    Neuro: No focal motor/sensory deficits.    Procedures    Assessment/Plan  Benitez Miranda is a 62 y.o. here for medical followup.  Diagnoses and all orders for this visit:    Uncontrolled type 2 diabetes with neuropathy  -     Insulin Glargine (LANTUS SOLOSTAR) 100 UNIT/ML injection pen; 39 units in the AM and 41 In the PM  -     Comprehensive Metabolic Panel  -     Hemoglobin A1c  -     Osmolality, Calculated; Future  -     Osmolality, Calculated  Will check A1C and CMP. Continue long acting insulin at 37 units in the PM and 41 units in the AM. Tries to utilize as little as possible. Humalog 15 units TID. Not currently on  any meds for neuropathy; patient states that it does not help right now. Eye exam 1/2018; foot exam done 8/2017. Microalbumin done 2/2018. Diabetic shoes written for. Will continue losartan 50 mg orally daily for kidney protection and will need to monitor closely; no aspirin secondary to CKD. Samples of Toujeo given.     Hypertension  -     spironolactone (ALDACTONE) 50 MG tablet; Take 0.5 tablets by mouth Daily.  Elevated today. Continue spironolactone 50 mg orally daily, losartan 50 mg orally daily and metoprolol XL 50 mg orally daily. Patient to monitor.       Anticoagulant long-term use/Mechanical heart valve present  Stable today. Continue coumadin 7.5 mg daily. Recheck in a month.       Tinea pedis of both feet  Continue clotrimazole BID. It is working.      CKD (chronic kidney disease), stage III  Per Dr. Landis management. Close monitoring of creatinine needed. Baseline creatinine from him 1.6. CrCl 80. Check today.      Low back pain with sciatica, unspecified back pain laterality  Improved and stable. On no current medications.      Hyperlipidemia  Stable. Continue pravastatin 40 mg orally daily. Next lipid check 8/2018.       Allergic rhinitis  Continue cetirizine 10 mg orally daily along with flonase and sudafed.      COPD   Per Dr. Solis management. Continue (Spiriva daily PRN) and albuterol inhaler 2 puffs every 4-6 hours as needed.      Depression  Stable. Continue zoloft 150 mg orally daily.       CAD.  Per Dr. Torres management. Continue metoprolol ER 50 mg orally daily, spironolactone 50 mg orally daily, and coumadin 7.5 mg orally susaniy. .       Preventative Medicine.  Patient thinking about colonoscopy - declines today. 2/2017 hemeoccult negative - will need to recheck.  PSA 8/2017 WNL. Will need AAA screening in 1 year. Declines pneumo. Flu vaccine 10/2017.    Findings and plans discussed with patient who verbalizes understanding and agreement. Will followup with patient once results are in.  Patient to followup at clinic PRN or in six months for further medical followup.    Mesha Christina MD

## 2018-06-06 RX ORDER — SERTRALINE HYDROCHLORIDE 100 MG/1
TABLET, FILM COATED ORAL
Qty: 45 TABLET | Refills: 5 | Status: SHIPPED | OUTPATIENT
Start: 2018-06-06 | End: 2019-02-08 | Stop reason: SDUPTHER

## 2018-07-06 RX ORDER — PRAVASTATIN SODIUM 40 MG
40 TABLET ORAL DAILY
Qty: 30 TABLET | Refills: 5 | Status: SHIPPED | OUTPATIENT
Start: 2018-07-06 | End: 2018-12-06 | Stop reason: SDUPTHER

## 2018-07-06 RX ORDER — LOSARTAN POTASSIUM 50 MG/1
50 TABLET ORAL DAILY
Qty: 30 TABLET | Refills: 5 | Status: SHIPPED | OUTPATIENT
Start: 2018-07-06 | End: 2019-01-04 | Stop reason: SDUPTHER

## 2018-09-04 RX ORDER — METOPROLOL SUCCINATE 50 MG/1
TABLET, EXTENDED RELEASE ORAL
Qty: 30 TABLET | Refills: 3 | Status: SHIPPED | OUTPATIENT
Start: 2018-09-04 | End: 2018-12-19 | Stop reason: SDUPTHER

## 2018-09-05 ENCOUNTER — OFFICE VISIT (OUTPATIENT)
Dept: FAMILY MEDICINE CLINIC | Facility: CLINIC | Age: 63
End: 2018-09-05

## 2018-09-05 VITALS
HEIGHT: 72 IN | OXYGEN SATURATION: 98 % | SYSTOLIC BLOOD PRESSURE: 145 MMHG | WEIGHT: 269 LBS | TEMPERATURE: 98.1 F | BODY MASS INDEX: 36.44 KG/M2 | HEART RATE: 67 BPM | DIASTOLIC BLOOD PRESSURE: 76 MMHG

## 2018-09-05 DIAGNOSIS — Z72.0 TOBACCO ABUSE: ICD-10-CM

## 2018-09-05 DIAGNOSIS — J40 BRONCHITIS: ICD-10-CM

## 2018-09-05 DIAGNOSIS — J06.9 VIRAL UPPER RESPIRATORY TRACT INFECTION: ICD-10-CM

## 2018-09-05 DIAGNOSIS — I10 ESSENTIAL HYPERTENSION: ICD-10-CM

## 2018-09-05 DIAGNOSIS — J01.10 ACUTE NON-RECURRENT FRONTAL SINUSITIS: Primary | ICD-10-CM

## 2018-09-05 DIAGNOSIS — R06.2 WHEEZING: ICD-10-CM

## 2018-09-05 PROCEDURE — 99214 OFFICE O/P EST MOD 30 MIN: CPT | Performed by: FAMILY MEDICINE

## 2018-09-05 RX ORDER — SPIRONOLACTONE 25 MG/1
25 TABLET ORAL DAILY
COMMUNITY
Start: 2018-09-04 | End: 2018-09-05 | Stop reason: SDUPTHER

## 2018-09-05 RX ORDER — PREDNISONE 20 MG/1
20 TABLET ORAL DAILY
Qty: 4 TABLET | Refills: 0 | Status: SHIPPED | OUTPATIENT
Start: 2018-09-05 | End: 2018-09-09

## 2018-09-05 NOTE — PROGRESS NOTES
Subjective   Benitez Miranda is a 63 y.o. male.   Pt presents today with CC of Sinusitis; Cough; and Neck Pain      History of Present Illness   Patient is a 63-year-old male with past medical history of hypertension, coronary artery disease, diabetes, and seasonal allergies.  He complains of 3 days of worsening congestion, left frontal sinus pressure, cough, and the inability to smoke cigars.    He reports that he has been taking his Zyrtec daily, though has not been using his Flonase.  Nothing has seemed to have helped.  The pain is worse when he leans forward, denies radiation, and is associated with some neck discomfort.  He has had sinusitis in the past, though they involve fever at those times.  He denies vision changes, slurred speech, weakness, chest pain, or severe shortness of breath.         The following portions of the patient's history were reviewed and updated as appropriate: allergies, current medications, past family history, past social history, past surgical history and problem list.    Review of Systems   Constitutional: Negative for chills, fever and unexpected weight loss.   HENT: Positive for congestion, rhinorrhea and sinus pressure. Negative for ear pain, facial swelling and sore throat.    Eyes: Negative for blurred vision, double vision, photophobia and visual disturbance.   Respiratory: Positive for cough. Negative for chest tightness, shortness of breath and wheezing.    Cardiovascular: Negative for chest pain, palpitations and leg swelling.   Gastrointestinal: Negative for abdominal pain, diarrhea and vomiting.   Genitourinary: Negative for dysuria and flank pain.   Musculoskeletal: Positive for neck pain. Negative for arthralgias.   Neurological: Negative for dizziness, seizures and syncope.   Psychiatric/Behavioral: Negative for self-injury, suicidal ideas and depressed mood.       Objective   Physical Exam   Constitutional: He is oriented to person, place, and time. He appears  well-developed and well-nourished.   HENT:   Head: Normocephalic and atraumatic.   Ears were normal bilaterally, turbinates were pink and boggy, clear discharge noted, oropharynx showed cobblestoning though no erythema, exudate, or petechia.  Left frontal and temporal tenderness to palpation.  Otherwise nontender sinuses.   Eyes: Pupils are equal, round, and reactive to light. Conjunctivae and EOM are normal.   Neck: Neck supple. No JVD present.   OA was flexed side bent left rotated right   Cardiovascular: Normal rate, regular rhythm and normal heart sounds.    1/6 systolic ejection murmur noted out over mitral pole   Pulmonary/Chest: He has no rales.   Some central large airway wheezing, no rhonchi noted   Abdominal: Soft. Bowel sounds are normal.   Lymphadenopathy:     He has no cervical adenopathy.   Neurological: He is alert and oriented to person, place, and time. No cranial nerve deficit or sensory deficit. Coordination normal.   Skin: Skin is warm and dry. No rash noted.         Assessment/Plan   Benitez was seen today for sinusitis, cough and neck pain.    Diagnoses and all orders for this visit:    Acute non-recurrent frontal sinusitis  -     predniSONE (DELTASONE) 20 MG tablet; Take 1 tablet by mouth Daily for 4 days.  I do not suspect a bacterial or fungal cause based on physical exam and lack of fever.  I think this is a viral URI given the constellation of symptoms.  As it is also associated with some bronchitis I will add a short burst of 20 mg of prednisone per day.  He is to continue Zyrtec, and to start back using his Flonase.  The risks and benefits to this medicine were discussed, including side effects including elevated blood sugar, elevated blood pressure, and the potential to cause insomnia.  He has taken prednisone in the past and tolerated it well.  He will call with any concerns.  Wheezing  -     predniSONE (DELTASONE) 20 MG tablet; Take 1 tablet by mouth Daily for 4 days.    Viral upper  respiratory tract infection    Essential hypertension  He was discussed with him that his blood pressure may go up and following days, he is to take his blood pressure medicine as prescribed and to call if blood pressure shoots up.  He was agreeable and says that at home his blood pressures are better.  He has an upcoming appointment with Dr. Christina    Bronchitis     Tobacco abuse  He is agreeable to stop smoking for the next 4 days, at least, he reports that he has not been able to smoke anyway as it is been causing him to cough.               I advised Benitez of the risks of continuing to use tobacco, and I provided him with tobacco cessation educational materials in the After Visit Summary.     During this visit, I spent 2 minutes counseling the patient regarding tobacco cessation.  He was agreeable to not smoke for the next 4 days at least.

## 2018-09-21 DIAGNOSIS — Z79.01 ANTICOAGULANT LONG-TERM USE: ICD-10-CM

## 2018-09-21 RX ORDER — WARFARIN SODIUM 7.5 MG/1
TABLET ORAL
Qty: 90 TABLET | Refills: 0 | Status: SHIPPED | OUTPATIENT
Start: 2018-09-21 | End: 2019-01-04 | Stop reason: SDUPTHER

## 2018-09-28 RX ORDER — CLOBETASOL PROPIONATE 0.5 MG/G
CREAM TOPICAL
Qty: 60 G | Refills: 1 | Status: SHIPPED | OUTPATIENT
Start: 2018-09-28 | End: 2020-11-17

## 2018-10-01 ENCOUNTER — LAB (OUTPATIENT)
Dept: FAMILY MEDICINE CLINIC | Facility: CLINIC | Age: 63
End: 2018-10-01

## 2018-10-01 DIAGNOSIS — N18.30 CKD (CHRONIC KIDNEY DISEASE), STAGE III (HCC): Primary | ICD-10-CM

## 2018-10-01 LAB
ALBUMIN UR-MCNC: 122.3 MG/L
ANION GAP SERPL CALCULATED.3IONS-SCNC: 5.7 MMOL/L (ref 3.6–11.2)
BUN BLD-MCNC: 23 MG/DL (ref 7–21)
BUN/CREAT SERPL: 16.2 (ref 7–25)
CALCIUM SPEC-SCNC: 9.4 MG/DL (ref 7.7–10)
CHLORIDE SERPL-SCNC: 110 MMOL/L (ref 99–112)
CO2 SERPL-SCNC: 20.3 MMOL/L (ref 24.3–31.9)
CREAT BLD-MCNC: 1.42 MG/DL (ref 0.43–1.29)
CREAT UR-MCNC: 202.7 MG/DL
GFR SERPL CREATININE-BSD FRML MDRD: 50 ML/MIN/1.73
GLUCOSE BLD-MCNC: 128 MG/DL (ref 70–110)
MICROALBUMIN/CREAT UR: 60.3 MG/G
OSMOLALITY SERPL CALC.SUM OF ELEC: 277.3 MOSM/KG (ref 273–305)
POTASSIUM BLD-SCNC: 4.5 MMOL/L (ref 3.5–5.3)
SODIUM BLD-SCNC: 136 MMOL/L (ref 135–153)

## 2018-10-01 PROCEDURE — 82570 ASSAY OF URINE CREATININE: CPT | Performed by: INTERNAL MEDICINE

## 2018-10-01 PROCEDURE — 82043 UR ALBUMIN QUANTITATIVE: CPT | Performed by: INTERNAL MEDICINE

## 2018-10-01 PROCEDURE — 80048 BASIC METABOLIC PNL TOTAL CA: CPT | Performed by: INTERNAL MEDICINE

## 2018-10-01 PROCEDURE — 36415 COLL VENOUS BLD VENIPUNCTURE: CPT

## 2018-11-02 ENCOUNTER — TELEPHONE (OUTPATIENT)
Dept: FAMILY MEDICINE CLINIC | Facility: CLINIC | Age: 63
End: 2018-11-02

## 2018-11-02 ENCOUNTER — OFFICE VISIT (OUTPATIENT)
Dept: FAMILY MEDICINE CLINIC | Facility: CLINIC | Age: 63
End: 2018-11-02

## 2018-11-02 VITALS
BODY MASS INDEX: 35.62 KG/M2 | DIASTOLIC BLOOD PRESSURE: 76 MMHG | TEMPERATURE: 98.1 F | SYSTOLIC BLOOD PRESSURE: 121 MMHG | HEIGHT: 72 IN | WEIGHT: 263 LBS | OXYGEN SATURATION: 98 % | HEART RATE: 69 BPM

## 2018-11-02 DIAGNOSIS — Z00.00 MEDICARE ANNUAL WELLNESS VISIT, SUBSEQUENT: Primary | ICD-10-CM

## 2018-11-02 DIAGNOSIS — I05.0 RHEUMATIC MITRAL STENOSIS: ICD-10-CM

## 2018-11-02 DIAGNOSIS — E78.5 HYPERLIPIDEMIA LDL GOAL <70: ICD-10-CM

## 2018-11-02 DIAGNOSIS — Z12.5 ENCOUNTER FOR SCREENING FOR MALIGNANT NEOPLASM OF PROSTATE: ICD-10-CM

## 2018-11-02 DIAGNOSIS — Z23 IMMUNIZATION DUE: ICD-10-CM

## 2018-11-02 DIAGNOSIS — N18.30 TYPE 2 DIABETES MELLITUS WITH STAGE 3 CHRONIC KIDNEY DISEASE, WITH LONG-TERM CURRENT USE OF INSULIN (HCC): ICD-10-CM

## 2018-11-02 DIAGNOSIS — E11.22 TYPE 2 DIABETES MELLITUS WITH STAGE 3 CHRONIC KIDNEY DISEASE, WITH LONG-TERM CURRENT USE OF INSULIN (HCC): ICD-10-CM

## 2018-11-02 DIAGNOSIS — Z79.4 TYPE 2 DIABETES MELLITUS WITH STAGE 3 CHRONIC KIDNEY DISEASE, WITH LONG-TERM CURRENT USE OF INSULIN (HCC): ICD-10-CM

## 2018-11-02 DIAGNOSIS — I10 ESSENTIAL HYPERTENSION: ICD-10-CM

## 2018-11-02 LAB
ALBUMIN SERPL-MCNC: 4.2 G/DL (ref 3.4–4.8)
ALBUMIN/GLOB SERPL: 1.2 G/DL (ref 1.5–2.5)
ALP SERPL-CCNC: 63 U/L (ref 40–129)
ALT SERPL W P-5'-P-CCNC: 42 U/L (ref 10–44)
ANION GAP SERPL CALCULATED.3IONS-SCNC: 2 MMOL/L (ref 3.6–11.2)
AST SERPL-CCNC: 26 U/L (ref 10–34)
BASOPHILS # BLD AUTO: 0.06 10*3/MM3 (ref 0–0.3)
BASOPHILS NFR BLD AUTO: 0.6 % (ref 0–2)
BILIRUB SERPL-MCNC: 0.5 MG/DL (ref 0.2–1.8)
BUN BLD-MCNC: 20 MG/DL (ref 7–21)
BUN/CREAT SERPL: 14.3 (ref 7–25)
CALCIUM SPEC-SCNC: 9.2 MG/DL (ref 7.7–10)
CHLORIDE SERPL-SCNC: 108 MMOL/L (ref 99–112)
CHOLEST SERPL-MCNC: 143 MG/DL (ref 0–200)
CO2 SERPL-SCNC: 24 MMOL/L (ref 24.3–31.9)
CREAT BLD-MCNC: 1.4 MG/DL (ref 0.43–1.29)
DEPRECATED RDW RBC AUTO: 45.9 FL (ref 37–54)
EOSINOPHIL # BLD AUTO: 0.54 10*3/MM3 (ref 0–0.7)
EOSINOPHIL NFR BLD AUTO: 5.3 % (ref 0–7)
ERYTHROCYTE [DISTWIDTH] IN BLOOD BY AUTOMATED COUNT: 14.6 % (ref 11.5–14.5)
GFR SERPL CREATININE-BSD FRML MDRD: 51 ML/MIN/1.73
GLOBULIN UR ELPH-MCNC: 3.6 GM/DL
GLUCOSE BLD-MCNC: 221 MG/DL (ref 70–110)
HBA1C MFR BLD: 10.5 % (ref 4.5–5.7)
HCT VFR BLD AUTO: 45.8 % (ref 42–52)
HDLC SERPL-MCNC: 34 MG/DL (ref 60–100)
HGB BLD-MCNC: 15.1 G/DL (ref 14–18)
IMM GRANULOCYTES # BLD: 0.06 10*3/MM3 (ref 0–0.03)
IMM GRANULOCYTES NFR BLD: 0.6 % (ref 0–0.5)
INR PPP: 2.44 (ref 0.9–1.1)
LDLC SERPL CALC-MCNC: 73 MG/DL (ref 0–100)
LDLC/HDLC SERPL: 2.14 {RATIO}
LYMPHOCYTES # BLD AUTO: 1.74 10*3/MM3 (ref 1–3)
LYMPHOCYTES NFR BLD AUTO: 17.2 % (ref 16–46)
MCH RBC QN AUTO: 28.3 PG (ref 27–33)
MCHC RBC AUTO-ENTMCNC: 33 G/DL (ref 33–37)
MCV RBC AUTO: 85.9 FL (ref 80–94)
MONOCYTES # BLD AUTO: 0.9 10*3/MM3 (ref 0.1–0.9)
MONOCYTES NFR BLD AUTO: 8.9 % (ref 0–12)
NEUTROPHILS # BLD AUTO: 6.83 10*3/MM3 (ref 1.4–6.5)
NEUTROPHILS NFR BLD AUTO: 67.4 % (ref 40–75)
OSMOLALITY SERPL CALC.SUM OF ELEC: 277.7 MOSM/KG (ref 273–305)
PLATELET # BLD AUTO: 150 10*3/MM3 (ref 130–400)
PMV BLD AUTO: 13 FL (ref 6–10)
POTASSIUM BLD-SCNC: 4.3 MMOL/L (ref 3.5–5.3)
PROT SERPL-MCNC: 7.8 G/DL (ref 6–8)
PROTHROMBIN TIME: 26.8 SECONDS (ref 11–15.4)
PSA SERPL-MCNC: 0.74 NG/ML (ref 0–4)
RBC # BLD AUTO: 5.33 10*6/MM3 (ref 4.7–6.1)
SODIUM BLD-SCNC: 134 MMOL/L (ref 135–153)
TRIGL SERPL-MCNC: 181 MG/DL (ref 0–150)
VLDLC SERPL-MCNC: 36.2 MG/DL
WBC NRBC COR # BLD: 10.13 10*3/MM3 (ref 4.5–12.5)

## 2018-11-02 PROCEDURE — 80061 LIPID PANEL: CPT | Performed by: FAMILY MEDICINE

## 2018-11-02 PROCEDURE — G0439 PPPS, SUBSEQ VISIT: HCPCS | Performed by: FAMILY MEDICINE

## 2018-11-02 PROCEDURE — 85025 COMPLETE CBC W/AUTO DIFF WBC: CPT | Performed by: FAMILY MEDICINE

## 2018-11-02 PROCEDURE — 85610 PROTHROMBIN TIME: CPT | Performed by: FAMILY MEDICINE

## 2018-11-02 PROCEDURE — 36415 COLL VENOUS BLD VENIPUNCTURE: CPT | Performed by: FAMILY MEDICINE

## 2018-11-02 PROCEDURE — 80053 COMPREHEN METABOLIC PANEL: CPT | Performed by: FAMILY MEDICINE

## 2018-11-02 PROCEDURE — 83036 HEMOGLOBIN GLYCOSYLATED A1C: CPT | Performed by: FAMILY MEDICINE

## 2018-11-02 PROCEDURE — G0008 ADMIN INFLUENZA VIRUS VAC: HCPCS | Performed by: FAMILY MEDICINE

## 2018-11-02 PROCEDURE — 90662 IIV NO PRSV INCREASED AG IM: CPT | Performed by: FAMILY MEDICINE

## 2018-11-02 PROCEDURE — G0103 PSA SCREENING: HCPCS | Performed by: FAMILY MEDICINE

## 2018-11-02 RX ORDER — GABAPENTIN 600 MG/1
600 TABLET ORAL AS NEEDED
Qty: 30 TABLET | Refills: 0 | Status: SHIPPED | OUTPATIENT
Start: 2018-11-02 | End: 2019-02-08 | Stop reason: SDUPTHER

## 2018-11-02 RX ORDER — CYCLOBENZAPRINE HCL 10 MG
10 TABLET ORAL 3 TIMES DAILY
Qty: 90 TABLET | Refills: 1 | Status: SHIPPED | OUTPATIENT
Start: 2018-11-02 | End: 2019-02-08 | Stop reason: SDUPTHER

## 2018-11-02 RX ORDER — GABAPENTIN 600 MG/1
600 TABLET ORAL AS NEEDED
COMMUNITY
End: 2018-11-02 | Stop reason: SDUPTHER

## 2018-11-05 ENCOUNTER — TELEPHONE (OUTPATIENT)
Dept: FAMILY MEDICINE CLINIC | Facility: CLINIC | Age: 63
End: 2018-11-05

## 2018-11-05 DIAGNOSIS — Z79.01 CHRONIC ANTICOAGULATION: Primary | ICD-10-CM

## 2018-11-05 NOTE — TELEPHONE ENCOUNTER
----- Message from Mesha Christina MD sent at 11/5/2018  7:17 AM EST -----  Please call Benitez. No new orders - just some discussion -   1) His A1C is higher at 10.5 - he has a habit of being stingy with his insulin as his co-pay is high. Please encourage him to come in for samples. I may start him on januvia samples if he cannot get it down next time I see him - I had put him out for six months, but please actually make an appointment for him in three months in February.   2) Kidneys and cholesterol are very stable.  3) INR is also stable. No changes on his coumadin intake - recheck in a month - how much is he currently on? Thanks.      Left a message to return call.      Left another message to return call.      Spoke with patient & he verbalized understanding,follow up scheduled for Feb.,his Coumadin is 7.5mg daily.

## 2018-11-06 ENCOUNTER — TELEPHONE (OUTPATIENT)
Dept: FAMILY MEDICINE CLINIC | Facility: CLINIC | Age: 63
End: 2018-11-06

## 2018-11-08 ENCOUNTER — TELEPHONE (OUTPATIENT)
Dept: FAMILY MEDICINE CLINIC | Facility: CLINIC | Age: 63
End: 2018-11-08

## 2018-11-08 NOTE — TELEPHONE ENCOUNTER
Please clarify it as gabapentin 600 mg orally daily as needed for pain. #30, no refills. Thanks.     Called the pharmacy & clarified.

## 2018-11-19 PROBLEM — R06.2 WHEEZING: Status: RESOLVED | Noted: 2018-09-05 | Resolved: 2018-11-19

## 2018-11-19 PROBLEM — J01.10 ACUTE NON-RECURRENT FRONTAL SINUSITIS: Status: RESOLVED | Noted: 2018-09-05 | Resolved: 2018-11-19

## 2018-11-19 PROBLEM — J06.9 VIRAL UPPER RESPIRATORY TRACT INFECTION: Status: RESOLVED | Noted: 2018-09-05 | Resolved: 2018-11-19

## 2018-11-19 PROBLEM — J40 BRONCHITIS: Status: RESOLVED | Noted: 2018-09-05 | Resolved: 2018-11-19

## 2018-11-20 NOTE — PROGRESS NOTES
QUICK REFERENCE INFORMATION:  The ABCs of the Annual Wellness Visit    Subsequent Medicare Wellness Visit    HEALTH RISK ASSESSMENT    1955    Recent Hospitalizations:  No hospitalization(s) within the last year..        Current Medical Providers:  Patient Care Team:  Mesha Christina MD as PCP - General (Family Medicine)  Jeovanny Landis MD as PCP - Claims Attributed  Velasquez Torres IV, MD as Cardiologist (Cardiology)        Smoking Status:  Social History     Tobacco Use   Smoking Status Former Smoker   • Years: 40.00   • Types: Pipe, Cigars   • Last attempt to quit: 2016   • Years since quittin.3   Smokeless Tobacco Never Used   Tobacco Comment    currently smokes a pipe       Alcohol Consumption:  Social History     Substance and Sexual Activity   Alcohol Use No       Depression Screen:   PHQ-2/PHQ-9 Depression Screening 2018   Little interest or pleasure in doing things 0   Feeling down, depressed, or hopeless 0   Total Score 0       Health Habits and Functional and Cognitive Screening:  Functional & Cognitive Status 2018   Do you have difficulty preparing food and eating? No   Do you have difficulty bathing yourself, getting dressed or grooming yourself? No   Do you have difficulty using the toilet? No   Do you have difficulty moving around from place to place? No   Do you have trouble with steps or getting out of a bed or a chair? No   In the past year have you fallen or experienced a near fall? Yes   Current Diet Well Balanced Diet   Dental Exam Not up to date   Eye Exam Not up to date   Exercise (times per week) 2 times per week   Current Exercise Activities Include Walking   Do you need help using the phone?  No   Are you deaf or do you have serious difficulty hearing?  No   Do you need help with transportation? No   Do you need help shopping? No   Do you need help preparing meals?  No   Do you need help with housework?  No   Do you need help with laundry? No   Do you  need help taking your medications? No   Do you need help managing money? No   Do you ever drive or ride in a car without wearing a seat belt? No   Have you felt unusual stress, anger or loneliness in the last month? No   Who do you live with? Alone   If you need help, do you have trouble finding someone available to you? No   Have you been bothered in the last four weeks by sexual problems? No   Do you have difficulty concentrating, remembering or making decisions? No           Does the patient have evidence of cognitive impairment? No    Aspirin use counseling: Start ASA 81 mg daily       Recent Lab Results:  CMP:  Lab Results   Component Value Date     (H) 09/12/2016    BUN 20 11/02/2018    CREATININE 1.40 (H) 11/02/2018    EGFRIFNONA 51 (L) 11/02/2018    EGFRIFAFRI 59 (L) 09/12/2016    BCR 14.3 11/02/2018     (L) 11/02/2018    K 4.3 11/02/2018    CO2 24.0 (L) 11/02/2018    CALCIUM 9.2 11/02/2018    PROTENTOTREF 7.6 09/12/2016    ALBUMIN 4.20 11/02/2018    LABGLOBREF 3.6 09/12/2016    LABIL2 1.1 (L) 09/12/2016    BILITOT 0.5 11/02/2018    ALKPHOS 63 11/02/2018    AST 26 11/02/2018    ALT 42 11/02/2018     Lipid Panel:  Lab Results   Component Value Date    CHOL 143 11/02/2018    TRIG 181 (H) 11/02/2018    HDL 34 (L) 11/02/2018    VLDL 36.2 11/02/2018    LDLHDL 2.14 11/02/2018     HbA1c:  Lab Results   Component Value Date    HGBA1C 10.50 (H) 11/02/2018       Visual Acuity:  No exam data present    Age-appropriate Screening Schedule:  Refer to the list below for future screening recommendations based on patient's age, sex and/or medical conditions. Orders for these recommended tests are listed in the plan section. The patient has been provided with a written plan.    Health Maintenance   Topic Date Due   • TDAP/TD VACCINES (1 - Tdap) 08/15/1972   • ZOSTER VACCINE (1 of 2) 08/15/2003   • DIABETIC EYE EXAM  05/13/2017   • DIABETIC FOOT EXAM  08/07/2018   • PNEUMOCOCCAL VACCINES (65+ LOW/MEDIUM RISK) (1  of 2 - PCV13) 08/15/2018   • HEMOGLOBIN A1C  05/02/2019   • URINE MICROALBUMIN  10/01/2019   • LIPID PANEL  11/02/2019   • COLONOSCOPY  10/12/2026   • INFLUENZA VACCINE  Completed        Subjective   History of Present Illness    Benitez Miranda is a 63 y.o. male who presents for an Subsequent Wellness Visit.    The following portions of the patient's history were reviewed and updated as appropriate: allergies, current medications, past family history, past medical history, past social history, past surgical history and problem list.    Outpatient Medications Prior to Visit   Medication Sig Dispense Refill   • cetirizine (ZyrTEC) 10 MG tablet Take 10 mg by mouth daily.     • clobetasol (TEMOVATE) 0.05 % cream APPLY TO AFFECTED AREA TWICE DAILY 60 g 1   • clotrimazole (LOTRIMIN) 1 % cream Apply  topically 2 (Two) Times a Day. 60 g 2   • fluticasone (FLONASE) 50 MCG/ACT nasal spray 2 sprays into each nostril Daily. 16 g 5   • Insulin Glargine (LANTUS SOLOSTAR) 100 UNIT/ML injection pen 39 units in the AM and 41 In the PM 22 mL 5   • Insulin Lispro (HUMALOG KWIKPEN) 100 UNIT/ML solution pen-injector Inject 15 Units under the skin 3 (Three) Times a Day Before Meals. Use with sliding scale before meals. Would prefer pen. 5 pen 5   • losartan (COZAAR) 50 MG tablet Take 1 tablet by mouth Daily. 30 tablet 5   • metoprolol succinate XL (TOPROL-XL) 50 MG 24 hr tablet TAKE ONE TABLET BY MOUTH ONCE DAILY 30 tablet 3   • pravastatin (PRAVACHOL) 40 MG tablet Take 1 tablet by mouth Daily. 30 tablet 5   • sertraline (ZOLOFT) 100 MG tablet TAKE ONE & ONE-HALF TABLETS BY MOUTH ONCE DAILY 45 tablet 5   • spironolactone (ALDACTONE) 50 MG tablet Take 0.5 tablets by mouth Daily. 45 tablet 1   • warfarin (COUMADIN) 7.5 MG tablet TAKE 1 TABLET BY MOUTH ONCE DAILY 90 tablet 0   • cyclobenzaprine (FLEXERIL) 10 MG tablet TAKE ONE TABLET BY MOUTH THREE TIMES DAILY AS NEEDED FOR MUSCLE SPASM 90 tablet 0   • gabapentin (NEURONTIN) 600 MG tablet  Take 600 mg by mouth As Needed.     • warfarin (COUMADIN) 2 MG tablet Take 1 tablet by mouth daily. 30 tablet 5   • warfarin (COUMADIN) 4 MG tablet Use as directed. (currently using two tablets daily) 60 tablet 5   • warfarin (COUMADIN) 5 MG tablet Take 1 tablet by mouth daily. 30 tablet 5     No facility-administered medications prior to visit.        Patient Active Problem List   Diagnosis   • Diabetes mellitus (CMS/[facility])   • COPD (chronic obstructive pulmonary disease) (CMS/[facility])   • Chronic diastolic congestive heart failure (CMS/[facility])   • Essential hypertension   • Tobacco abuse   • Coronary artery disease involving native coronary artery of native heart without angina pectoris   • Low back pain   • Hyperlipidemia LDL goal <70   • H/O mechanical aortic valve replacement   • Depression   • CKD (chronic kidney disease), stage III (CMS/[facility])   • Rheumatic mitral stenosis   • Obesity       Advance Care Planning:  has NO advance directive - information provided to the patient today    Identification of Risk Factors:  Risk factors include: weight , unhealthy diet, cardiovascular risk, inactivity, inadequate social support, caretaker stress, isolation, financial stress and polypharmacy.    Review of Systems  Constitutional: Denies any recent history of HAs, dizziness, fevers, chills, itching.  Eyes: Denies any changes in vision. Denies any blurry vision or diplopia.  Ears, Nose, Mouth, Throat: Denies any sore throat, rhinorrhea, or cough.  Cardiovascular: Denies any chest pain, pressure, or palpitations.  Respiratory: Denies any shortness of breath or wheezing.  Gastrointestinal: Denies any abdominal pain, nausea, vomiting, diarrhea, or constipation.  Genitourinary: Denies any changes in urination.  Musculoskeletal: Denies any muscle weakness.  Skin and/or breasts: Denies any rashes.  Neurological: Denies any changes in balance or gait.  Psychiatric: Denies any anxiety, depression, or insomnia. Denies any suicidal or  "homicidal ideations.  Endocrine: Denies any heat or cold intolerance. Denies any voice changes, polydipsia, or polyuria.  Hematologic/Lymphatic: Denies any anemia or easy bruising.    Compared to one year ago, the patient feels his physical health is better.  Compared to one year ago, the patient feels his mental health is better.    Objective     Physical Exam    Vitals:    11/02/18 1005   BP: 121/76   BP Location: Left arm   Patient Position: Sitting   Pulse: 69   Temp: 98.1 °F (36.7 °C)   TempSrc: Oral   SpO2: 98%   Weight: 119 kg (263 lb)   Height: 182.9 cm (72.01\")       Patient's Body mass index is 35.66 kg/m². BMI is above normal parameters. Recommendations include: exercise counseling and nutrition counseling.    Gen: Patient in NAD. Pleasant and answers appropriately. A&Ox3.    Skin: Warm and dry with normal turgor. No purpura, rashes, or unusual pigmentation noted. Hair is normal in appearance and distribution.    HEENT: NC/AT. No lesions noted. Conjunctiva clear, sclera nonicteric. PERRL. EOMI without nystagmus or strabismus. Fundi appear benign. No hemorrhages or exudates of eyes. Auditory canals are patent bilaterally without lesions. TMs intact,  nonerythematous, nonbulging without lesions. Nasal mucosa pink, nonerythematous, and nonedematous. Frontal and maxillary sinuses are nontender. O/P nonerythematous and moist without exudate.    Neck: Supple without lymph nodes palpated. FROM.      Lungs: CTA B/L without rales, rhonchi, crackles, or wheezes.    Heart: RRR. S1 and S2 normal. No S3 or S4. No MRGT.    Abd: Soft, nontender,nondistended. (+)BSx4 quadrants.     Extrem: No CCE. Radial pulses 2+/4 and equal B/L. FROMx4. No bone, joint, or muscle tenderness noted.    Neuro: No focal motor/sensory deficits.    Assessment/Plan   Patient Self-Management and Personalized Health Advice  The patient has been provided with information about: diet, exercise, weight management, prevention of cardiac or vascular " disease, the relationship between weight and GERD, fall prevention and designing advance directives and preventive services including:   · Advance directive, Alcohol use counseling completed, Counseling for cardiovascular disease risk reduction, Exercise counseling provided, Fall Risk assessment done, Fall Risk plan of care done, Glaucoma screening recommended, Influenza vaccine, Nutrition counseling provided, Prostate cancer screening discussed.    Visit Diagnoses:    ICD-10-CM ICD-9-CM   1. Medicare annual wellness visit, subsequent Z00.00 V70.0   2. Type 2 diabetes mellitus with stage 3 chronic kidney disease, with long-term current use of insulin (CMS/[facility]) E11.22 250.40    N18.3 585.3    Z79.4 V58.67   3. Hyperlipidemia LDL goal <70 E78.5 272.4   4. Essential hypertension I10 401.9   5. Encounter for screening for malignant neoplasm of prostate Z12.5 V76.44   6. Rheumatic mitral stenosis I05.0 394.0   7. Immunization due Z23 V05.9       Orders Placed This Encounter   Procedures   • Flu Vaccine High Dose PF 65YR+ ([id number])   • Comprehensive Metabolic Panel   • Protime-INR   • Hemoglobin A1c   • Lipid Panel   • PSA Screen   • CBC Auto Differential   • Osmolality, Calculated     Standing Status:   Future     Number of Occurrences:   1     Standing Expiration Date:   11/2/2019   • CBC & Differential     Order Specific Question:   Manual Differential     Answer:   No       Outpatient Encounter Medications as of 11/2/2018   Medication Sig Dispense Refill   • cetirizine (ZyrTEC) 10 MG tablet Take 10 mg by mouth daily.     • clobetasol (TEMOVATE) 0.05 % cream APPLY TO AFFECTED AREA TWICE DAILY 60 g 1   • clotrimazole (LOTRIMIN) 1 % cream Apply  topically 2 (Two) Times a Day. 60 g 2   • cyclobenzaprine (FLEXERIL) 10 MG tablet Take 1 tablet by mouth 3 (Three) Times a Day. for muscle spams 90 tablet 1   • fluticasone (FLONASE) 50 MCG/ACT nasal spray 2 sprays into each nostril Daily. 16 g 5   • gabapentin (NEURONTIN) 600  MG tablet Take 1 tablet by mouth As Needed (neuropathy). 30 tablet 0   • Insulin Glargine (LANTUS SOLOSTAR) 100 UNIT/ML injection pen 39 units in the AM and 41 In the PM 22 mL 5   • Insulin Lispro (HUMALOG KWIKPEN) 100 UNIT/ML solution pen-injector Inject 15 Units under the skin 3 (Three) Times a Day Before Meals. Use with sliding scale before meals. Would prefer pen. 5 pen 5   • losartan (COZAAR) 50 MG tablet Take 1 tablet by mouth Daily. 30 tablet 5   • metoprolol succinate XL (TOPROL-XL) 50 MG 24 hr tablet TAKE ONE TABLET BY MOUTH ONCE DAILY 30 tablet 3   • pravastatin (PRAVACHOL) 40 MG tablet Take 1 tablet by mouth Daily. 30 tablet 5   • sertraline (ZOLOFT) 100 MG tablet TAKE ONE & ONE-HALF TABLETS BY MOUTH ONCE DAILY 45 tablet 5   • spironolactone (ALDACTONE) 50 MG tablet Take 0.5 tablets by mouth Daily. 45 tablet 1   • warfarin (COUMADIN) 7.5 MG tablet TAKE 1 TABLET BY MOUTH ONCE DAILY 90 tablet 0   • [DISCONTINUED] cyclobenzaprine (FLEXERIL) 10 MG tablet TAKE ONE TABLET BY MOUTH THREE TIMES DAILY AS NEEDED FOR MUSCLE SPASM 90 tablet 0   • [DISCONTINUED] gabapentin (NEURONTIN) 600 MG tablet Take 600 mg by mouth As Needed.     • warfarin (COUMADIN) 2 MG tablet Take 1 tablet by mouth daily. 30 tablet 5   • warfarin (COUMADIN) 4 MG tablet Use as directed. (currently using two tablets daily) 60 tablet 5   • warfarin (COUMADIN) 5 MG tablet Take 1 tablet by mouth daily. 30 tablet 5     No facility-administered encounter medications on file as of 11/2/2018.        Reviewed use of high risk medication in the elderly: yes  Reviewed for potential of harmful drug interactions in the elderly: yes    Follow Up:  Return in about 6 months (around 5/2/2019).     An After Visit Summary and PPPS with all of these plans were given to the patient.

## 2018-12-04 RX ORDER — SPIRONOLACTONE 25 MG/1
TABLET ORAL
Qty: 90 TABLET | Refills: 1 | Status: SHIPPED | OUTPATIENT
Start: 2018-12-04 | End: 2019-05-02 | Stop reason: SDUPTHER

## 2018-12-06 RX ORDER — PRAVASTATIN SODIUM 40 MG
40 TABLET ORAL DAILY
Qty: 30 TABLET | Refills: 5 | Status: SHIPPED | OUTPATIENT
Start: 2018-12-06 | End: 2019-06-07 | Stop reason: SDUPTHER

## 2018-12-19 RX ORDER — METOPROLOL SUCCINATE 50 MG/1
TABLET, EXTENDED RELEASE ORAL
Qty: 90 TABLET | Refills: 1 | Status: SHIPPED | OUTPATIENT
Start: 2018-12-19 | End: 2019-06-07 | Stop reason: SDUPTHER

## 2019-01-04 DIAGNOSIS — Z79.01 ANTICOAGULANT LONG-TERM USE: ICD-10-CM

## 2019-01-04 RX ORDER — WARFARIN SODIUM 7.5 MG/1
TABLET ORAL
Qty: 90 TABLET | Refills: 0 | Status: SHIPPED | OUTPATIENT
Start: 2019-01-04 | End: 2019-05-15 | Stop reason: SDUPTHER

## 2019-01-04 RX ORDER — LOSARTAN POTASSIUM 50 MG/1
TABLET ORAL
Qty: 90 TABLET | Refills: 1 | Status: SHIPPED | OUTPATIENT
Start: 2019-01-04 | End: 2019-10-07 | Stop reason: DRUGHIGH

## 2019-02-08 ENCOUNTER — OFFICE VISIT (OUTPATIENT)
Dept: FAMILY MEDICINE CLINIC | Facility: CLINIC | Age: 64
End: 2019-02-08

## 2019-02-08 VITALS
HEART RATE: 60 BPM | BODY MASS INDEX: 35.21 KG/M2 | TEMPERATURE: 97.9 F | WEIGHT: 260 LBS | OXYGEN SATURATION: 98 % | SYSTOLIC BLOOD PRESSURE: 120 MMHG | HEIGHT: 72 IN | DIASTOLIC BLOOD PRESSURE: 70 MMHG

## 2019-02-08 DIAGNOSIS — I10 ESSENTIAL HYPERTENSION: ICD-10-CM

## 2019-02-08 DIAGNOSIS — N18.30 TYPE 2 DIABETES MELLITUS WITH STAGE 3 CHRONIC KIDNEY DISEASE, WITH LONG-TERM CURRENT USE OF INSULIN (HCC): Primary | ICD-10-CM

## 2019-02-08 DIAGNOSIS — F33.42 RECURRENT MAJOR DEPRESSIVE DISORDER, IN FULL REMISSION (HCC): ICD-10-CM

## 2019-02-08 DIAGNOSIS — I05.0 RHEUMATIC MITRAL STENOSIS: ICD-10-CM

## 2019-02-08 DIAGNOSIS — N18.30 CKD (CHRONIC KIDNEY DISEASE), STAGE III (HCC): ICD-10-CM

## 2019-02-08 DIAGNOSIS — E11.22 TYPE 2 DIABETES MELLITUS WITH STAGE 3 CHRONIC KIDNEY DISEASE, WITH LONG-TERM CURRENT USE OF INSULIN (HCC): Primary | ICD-10-CM

## 2019-02-08 DIAGNOSIS — Z79.4 TYPE 2 DIABETES MELLITUS WITH STAGE 3 CHRONIC KIDNEY DISEASE, WITH LONG-TERM CURRENT USE OF INSULIN (HCC): Primary | ICD-10-CM

## 2019-02-08 LAB
ALBUMIN SERPL-MCNC: 4.1 G/DL (ref 3.4–4.8)
ALBUMIN/GLOB SERPL: 1.2 G/DL (ref 1.5–2.5)
ALP SERPL-CCNC: 64 U/L (ref 40–129)
ALT SERPL W P-5'-P-CCNC: 28 U/L (ref 10–44)
ANION GAP SERPL CALCULATED.3IONS-SCNC: 4.7 MMOL/L (ref 3.6–11.2)
AST SERPL-CCNC: 22 U/L (ref 10–34)
BASOPHILS # BLD AUTO: 0.06 10*3/MM3 (ref 0–0.3)
BASOPHILS NFR BLD AUTO: 0.6 % (ref 0–2)
BILIRUB SERPL-MCNC: 0.5 MG/DL (ref 0.2–1.8)
BUN BLD-MCNC: 24 MG/DL (ref 7–21)
BUN/CREAT SERPL: 16.8 (ref 7–25)
CALCIUM SPEC-SCNC: 9.1 MG/DL (ref 7.7–10)
CHLORIDE SERPL-SCNC: 106 MMOL/L (ref 99–112)
CO2 SERPL-SCNC: 23.3 MMOL/L (ref 24.3–31.9)
CREAT BLD-MCNC: 1.43 MG/DL (ref 0.43–1.29)
DEPRECATED RDW RBC AUTO: 44.5 FL (ref 37–54)
EOSINOPHIL # BLD AUTO: 0.6 10*3/MM3 (ref 0–0.7)
EOSINOPHIL NFR BLD AUTO: 6.2 % (ref 0–7)
ERYTHROCYTE [DISTWIDTH] IN BLOOD BY AUTOMATED COUNT: 14.4 % (ref 11.5–14.5)
GFR SERPL CREATININE-BSD FRML MDRD: 50 ML/MIN/1.73
GLOBULIN UR ELPH-MCNC: 3.5 GM/DL
GLUCOSE BLD-MCNC: 248 MG/DL (ref 70–110)
HBA1C MFR BLD: 10 % (ref 4.5–5.7)
HCT VFR BLD AUTO: 45.8 % (ref 42–52)
HGB BLD-MCNC: 15 G/DL (ref 14–18)
IMM GRANULOCYTES # BLD AUTO: 0.05 10*3/MM3 (ref 0–0.03)
IMM GRANULOCYTES NFR BLD AUTO: 0.5 % (ref 0–0.5)
LYMPHOCYTES # BLD AUTO: 1.48 10*3/MM3 (ref 1–3)
LYMPHOCYTES NFR BLD AUTO: 15.4 % (ref 16–46)
MCH RBC QN AUTO: 27.7 PG (ref 27–33)
MCHC RBC AUTO-ENTMCNC: 32.8 G/DL (ref 33–37)
MCV RBC AUTO: 84.7 FL (ref 80–94)
MONOCYTES # BLD AUTO: 0.8 10*3/MM3 (ref 0.1–0.9)
MONOCYTES NFR BLD AUTO: 8.3 % (ref 0–12)
NEUTROPHILS # BLD AUTO: 6.64 10*3/MM3 (ref 1.4–6.5)
NEUTROPHILS NFR BLD AUTO: 69 % (ref 40–75)
OSMOLALITY SERPL CALC.SUM OF ELEC: 280.6 MOSM/KG (ref 273–305)
PLATELET # BLD AUTO: 141 10*3/MM3 (ref 130–400)
PMV BLD AUTO: 13 FL (ref 6–10)
POTASSIUM BLD-SCNC: 4.2 MMOL/L (ref 3.5–5.3)
PROT SERPL-MCNC: 7.6 G/DL (ref 6–8)
RBC # BLD AUTO: 5.41 10*6/MM3 (ref 4.7–6.1)
SODIUM BLD-SCNC: 134 MMOL/L (ref 135–153)
WBC NRBC COR # BLD: 9.63 10*3/MM3 (ref 4.5–12.5)

## 2019-02-08 PROCEDURE — 80053 COMPREHEN METABOLIC PANEL: CPT | Performed by: FAMILY MEDICINE

## 2019-02-08 PROCEDURE — 83036 HEMOGLOBIN GLYCOSYLATED A1C: CPT | Performed by: FAMILY MEDICINE

## 2019-02-08 PROCEDURE — 85025 COMPLETE CBC W/AUTO DIFF WBC: CPT | Performed by: FAMILY MEDICINE

## 2019-02-08 PROCEDURE — 99214 OFFICE O/P EST MOD 30 MIN: CPT | Performed by: FAMILY MEDICINE

## 2019-02-08 RX ORDER — SERTRALINE HYDROCHLORIDE 100 MG/1
150 TABLET, FILM COATED ORAL DAILY
Qty: 135 TABLET | Refills: 1 | Status: SHIPPED | OUTPATIENT
Start: 2019-02-08 | End: 2019-09-07 | Stop reason: SDUPTHER

## 2019-02-08 RX ORDER — CYCLOBENZAPRINE HCL 10 MG
10 TABLET ORAL 3 TIMES DAILY
Qty: 90 TABLET | Refills: 1 | Status: SHIPPED | OUTPATIENT
Start: 2019-02-08 | End: 2020-06-10 | Stop reason: SDUPTHER

## 2019-02-08 RX ORDER — GABAPENTIN 600 MG/1
600 TABLET ORAL AS NEEDED
Qty: 30 TABLET | Refills: 0 | Status: SHIPPED | OUTPATIENT
Start: 2019-02-08 | End: 2020-01-10 | Stop reason: SDUPTHER

## 2019-02-12 ENCOUNTER — ANTICOAGULATION VISIT (OUTPATIENT)
Dept: FAMILY MEDICINE CLINIC | Facility: CLINIC | Age: 64
End: 2019-02-12

## 2019-02-12 DIAGNOSIS — Z79.01 ENCOUNTER FOR CURRENT LONG-TERM USE OF ANTICOAGULANTS: Primary | ICD-10-CM

## 2019-02-12 LAB — INR PPP: 1.3 (ref 2.5–3.5)

## 2019-02-12 PROCEDURE — 85610 PROTHROMBIN TIME: CPT | Performed by: FAMILY MEDICINE

## 2019-02-26 ENCOUNTER — TELEPHONE (OUTPATIENT)
Dept: FAMILY MEDICINE CLINIC | Facility: CLINIC | Age: 64
End: 2019-02-26

## 2019-02-26 NOTE — TELEPHONE ENCOUNTER
----- Message from Mesha Christina MD sent at 2/26/2019 12:12 AM EST -----  Please call. Labs are stable. How are his glucose numbers? Thanks.    Left message for patient to return call.    Spoke with patient he states that his glucose has been running around 200 lowest he has had was in the 170's and highest was in the 300's.

## 2019-02-26 NOTE — TELEPHONE ENCOUNTER
Please clarify how much longacting insulin he is taking - is he on 37 units in the PM and 41 units in the AM. Increase both by two (in other words, if that is the same as above, new dose would be 39 in the PM and 41 in the PM). Thanks.

## 2019-02-26 NOTE — PROGRESS NOTES
"Benitez Miranda     VITALS: Blood pressure 120/70, pulse 60, temperature 97.9 °F (36.6 °C), temperature source Oral, height 182.9 cm (72.01\"), weight 118 kg (260 lb), SpO2 98 %.    Subjective  Chief Complaint:   Chief Complaint   Patient presents with   • Follow-up   • Diabetes        History of Present Illness:  Patient is a 63 y.o. male with a medical history significant for CAD, hypertension, hyperlipidemia, and type II diabetes who presents to clinic secondary to medical followup. No new or acute concerns today. He is doing well. Since being seen, he has quit smoking.     Patient has a history of hypertension and is on losartan 50 mg orally daily, metoprolol XL 50 mg orally daily, and spironolactone 50 mg orally daily.. Denies any side effects of the medications. Denies any dizziness, lightheadedness, blurry vision, chest pain, or edema.   Home blood pressure: No  Low salt diet: Occasionally    Patient also has a history of type 2 diabetes and is currently on diet and exercise, longacting insulin 37 units in the PM and 41 units in the AM and Humalog 15 units TID. We have given him samples of Toujeo and he reports using that 37 units BID and doing really well on it; his fasting sugars are below 150. Also occasionally utilizes Lantus at the above dosage, but that does not work as well as the Toujeo. Last A1C in 11/2017 was 9.4. Does not take full amounts of insulin secondary to him not having enough money to afford the medications. Denies any side effects of his medications. Patient denies any changes in vision, polydipsia, polyuria, numbness or tingling, or hypoglycemic or hyperglycemic episodes. Patient does follow a diabetic diet and checks his glucose levels regularly. Blood glucose levels are usually in the below 150s fasting. Patient does have complications of neuropathy and nephropathy. No retinopathy. No medications. No worsening or exacerbation of symptoms. Last eye exam was in 1/2018 - history cataracts " taken out; recent laser. Last microalbumin was 2/2018 and okay. Last foot exam was 8/2017 and patient does not see a podiatrist. Would like foot exam today.  Diabetic teaching/nutrition education: Yes  Medications for kidney protection: Yes, losartan 50 mg orally daily  Medications for cardiovascular protection: Yes    Patient also has a history of hyperlipidemia and is currently on pravastatin 40 mg orally daily. Denies any side effects of the medications. Last lipid panel in 8/2017 and was WNL with LDL 88. Denies any muscle weakness, jaundice or itching.   Low cholesterol diet: Yes    Patient has a history of allergic rhinitis and is currently on cetirizine 10 mg orally daily. Denies any side effects of the medication. Denies any sinus congestion, sinus headaches, watery eyes, itchy eyes, rhinorrhea, coughing, or postnasal discharge.   Allergy injections: No    Patient has a history of COPD and is currently on Spiriva daily and albuterol inhaler 2 puffs every 4-6 hours as needed. As he feels that his breathing is better right now, he has not been using the spiriva secondary to cost. Denies any side effects of the medications. Denies any shortness of breath, coughing, wheezing, or night coughing. He sees Dr. Solis. Reports breathing much better.  Exacerbation: No  Last utilized albuterol: Several weeks ago.    Patient has a history of depression and is currently on zoloft 150 mg orally daily. Patient states that this medication is working. Denies any side effects of the medication. Patient states that he is sleeping fairly well and can get out of bed daily to accomplish daily activities. Patient has no anhedonia. Mood is stable. Has no feelings of guilt. Has good concentration and memory ability. Has energy. Is able to make goals. Is not crying a lot. Appetite is good. Denies any suicidal or homicidal ideations. Does not have any auditory or visual hallucinations.    Patient has a history of chronic lower back  pain, improved. Only on flexeril 10 mg orally as needed. Has not asked for a refill since 9/2016. Denies any side effects of the medication. States that the medications do somewhat control the pain enough so that he can accomplish daily activities. Movement and ambulation are not improved. Denies any sedation from the medications. No pain medication seeking behavior. LEVI has no record of pain medication seeking behavior. Last UDS was done 7/13/2016 and was consistent and WNL. MRI of lumbar spine in 5/2015 shows disc desiccation without any herniations.     Patient has an extensive history of CAD. He does have a history of a mechanical aortic valve replacement and is on coumadin 7.5 mg orally daily. He also does have a history of two cardiac caths, 2004 (x 1 stent) and 6/2015 (pulmonary HTN, everything else patent). Echo in 2/2016 showed EF 50-55%. Does have a history of MV stenosis and rheumatic valve. He sees Dr. Torres, and is currently on metoprolol ER 50 mg orally daily, spironolactone 50 mg orally daily, and coumadin 7.5 mg orally daliy. INR 2.3 today.   History of stroke: No       Patient also states that he has been diagnosed with chronic kidney disease, stage III and sees Dr. Landis. Last visit was several months ago. Baseline creatinine with Dr. Landis per chart review. Last creatinine done here in 5/2017 shows a creatinine of 1.37. Has not had any exacerbations.     No complaints about any of the medications.    The following portions of the patient's history were reviewed and updated as appropriate: allergies, current medications, past family history, past medical history, past social history, past surgical history and problem list.    Past Medical History  Past Medical History:   Diagnosis Date   • CAD (coronary artery disease)     x1 stent; pulmonary HTN; EF 50-55%; rheumatic valve; MV stenosis   • CHF (congestive heart failure) (CMS/HCC)    • CKD (chronic kidney disease), stage III (CMS/[facility])    • COPD  (chronic obstructive pulmonary disease) (CMS/[facility])    • Depression    • Diabetes mellitus (CMS/[facility])    • H/O mechanical aortic valve replacement    • History of tobacco abuse    • Hyperlipidemia    • Hypertension    • Ischemic heart disease    • Kidney failure     third stage   • Low back pain    • Obesity     BMI 36.   • Valvular heart disease        Review of Systems  Constitutional: Denies any recent history of HAs, dizziness, fevers, chills, itching.  Eyes: Denies any changes in vision. Denies any blurry vision or diplopia.  Ears, Nose, Mouth, Throat: Denies any sore throat, rhinorrhea, or cough.  Cardiovascular: Denies any chest pain, pressure, or palpitations.  Respiratory: Denies any shortness of breath or wheezing.  Gastrointestinal: Denies any abdominal pain, nausea, vomiting, diarrhea, or constipation.  Genitourinary: Denies any changes in urination.  Musculoskeletal: Denies any muscle weakness.  Skin and/or breasts: Denies any rashes.  Neurological: Denies any changes in balance or gait.  Psychiatric: Denies any anxiety, depression, or insomnia. Denies any suicidal or homicidal ideations.  Endocrine: Denies any heat or cold intolerance. Denies any voice changes, polydipsia, or polyuria.  Hematologic/Lymphatic: Denies any anemia or easy bruising.    Surgical History  Past Surgical History:   Procedure Laterality Date   • AORTIC VALVE REPAIR/REPLACEMENT  1991   • CARDIAC CATHETERIZATION     • CARDIAC SURGERY  1991    valve on aortic   • CARDIAC SURGERY  2001    stent    • CATARACT EXTRACTION     • COLONOSCOPY  2001   • CORONARY STENT PLACEMENT  2005       Family History  Family History   Problem Relation Age of Onset   • Cancer Mother         breast   • COPD Father    • Heart attack Father 74   • Diabetes Paternal Grandmother         both legs removed       Social History  Social History     Socioeconomic History   • Marital status:      Spouse name: Not on file   • Number of children: Not on file    • Years of education: Not on file   • Highest education level: Not on file   Social Needs   • Financial resource strain: Not on file   • Food insecurity - worry: Not on file   • Food insecurity - inability: Not on file   • Transportation needs - medical: Not on file   • Transportation needs - non-medical: Not on file   Occupational History   • Not on file   Tobacco Use   • Smoking status: Former Smoker     Years: 40.00     Types: Pipe, Cigars     Last attempt to quit: 2016     Years since quittin.6   • Smokeless tobacco: Never Used   • Tobacco comment: currently smokes a pipe   Substance and Sexual Activity   • Alcohol use: No   • Drug use: No   • Sexual activity: Defer   Other Topics Concern   • Not on file   Social History Narrative   • Not on file       Objective  Physical Exam  Gen: Patient in NAD. Pleasant and answers appropriately. A&Ox3.    Skin: Warm and dry with normal turgor. No purpura, rashes, or unusual pigmentation noted. Hair is normal in appearance and distribution.    HEENT: NC/AT. No lesions noted. Conjunctiva clear, sclera nonicteric. PERRL. EOMI without nystagmus or strabismus. Fundi appear benign. No hemorrhages or exudates of eyes. Auditory canals are patent bilaterally without lesions. TMs intact,  nonerythematous, nonbulging without lesions. Nasal mucosa pink, nonerythematous, and nonedematous. Frontal and maxillary sinuses are nontender. O/P nonerythematous and moist without exudate.    Neck: Supple without lymph nodes palpated. FROM.     Lungs: CTA B/L without rales, rhonchi, crackles, or wheezes.    Heart: RRR. S1 and S2 normal. No S3 or S4. No MRGT.    Abd: Soft, nontender,nondistended. (+)BSx4 quadrants.     Extrem: No CCE. Radial pulses 2+/4 and equal B/L. FROMx4. No bone, joint, or muscle tenderness noted.    Neuro: No focal motor/sensory deficits.    Procedures    Assessment/Plan  Benitez Miranda is a 63 y.o. here for medical followup.  Diagnoses and all orders for this  visit:    Type 2 diabetes mellitus with stage 3 chronic kidney disease, with long-term current use of insulin (CMS/[facility])  -     CBC & Differential  -     Comprehensive Metabolic Panel  -     Hemoglobin A1c  -     CBC Auto Differential  -     Osmolality, Calculated; Future  -     Osmolality, Calculated    Rheumatic mitral stenosis    CKD (chronic kidney disease), stage III (CMS/[facility])    Essential hypertension    Recurrent major depressive disorder, in full remission (CMS/[facility])    Other orders  -     gabapentin (NEURONTIN) 600 MG tablet; Take 1 tablet by mouth As Needed (neuropathy).  -     cyclobenzaprine (FLEXERIL) 10 MG tablet; Take 1 tablet by mouth 3 (Three) Times a Day. for muscle spams  -     sertraline (ZOLOFT) 100 MG tablet; Take 1.5 tablets by mouth Daily.    Levi # [id number]  Reviewed and is consistent.  [facility] 7/2016 reviewed and is consistent. New one done today.  Patient comfort assessment guide reviewed and updated today.    As part of the patient's treatment plan they are being prescribed a controlled substance/ substances with potential for abuse.  This patient has been made aware of the appropriate use of such medications, including potential risk of somnolence, limited ability to drive and/or work safely, and potential for overdose.  It has also been made clear these medications are for use by the patient only, without concomitant use of alcohol or other substances unless prescribed/advised by medical provider.    Patient has completed prescribing agreement detailing terms of continued prescribing of controlled substances including monitoring LEVI reports, urine drug screens, and pill counts.  The patient is aware LEVI reports are reviewed on a regular basis and scanned into the chart.    History and physical exam exhibit continued safe and appropriate use of controlled substances.        Patient's Body mass index is 35.25 kg/m². BMI is above normal parameters. Recommendations include: exercise  counseling and nutrition counseling.     Findings and plans discussed with patient who verbalizes understanding and agreement. Will followup with patient once results are in. Patient to followup at clinic PRN or in three months for further medical followup.    Mesha Christina MD

## 2019-02-26 NOTE — TELEPHONE ENCOUNTER
Left message for patient to call me back.    left message for patient to return call.    Patient returned call he states he takes 15 units in the am and 15 units in the pm. Instructed patient to go up to 17 units in the am and pm. Patient verbally understands.

## 2019-03-04 ENCOUNTER — TELEPHONE (OUTPATIENT)
Dept: FAMILY MEDICINE CLINIC | Facility: CLINIC | Age: 64
End: 2019-03-04

## 2019-03-05 ENCOUNTER — ANTICOAGULATION VISIT (OUTPATIENT)
Dept: FAMILY MEDICINE CLINIC | Facility: CLINIC | Age: 64
End: 2019-03-05

## 2019-03-05 DIAGNOSIS — Z79.01 ENCOUNTER FOR CURRENT LONG-TERM USE OF ANTICOAGULANTS: Primary | ICD-10-CM

## 2019-03-05 LAB — INR PPP: 2.5 (ref 2.5–3.5)

## 2019-03-05 PROCEDURE — 85610 PROTHROMBIN TIME: CPT | Performed by: FAMILY MEDICINE

## 2019-03-20 ENCOUNTER — TELEPHONE (OUTPATIENT)
Dept: FAMILY MEDICINE CLINIC | Facility: CLINIC | Age: 64
End: 2019-03-20

## 2019-03-21 ENCOUNTER — ANTICOAGULATION VISIT (OUTPATIENT)
Dept: FAMILY MEDICINE CLINIC | Facility: CLINIC | Age: 64
End: 2019-03-21

## 2019-03-21 DIAGNOSIS — N18.30 CKD (CHRONIC KIDNEY DISEASE) STAGE 3, GFR 30-59 ML/MIN (HCC): Primary | ICD-10-CM

## 2019-03-21 LAB — INR PPP: 2.6 (ref 2.5–3.5)

## 2019-03-21 PROCEDURE — 80053 COMPREHEN METABOLIC PANEL: CPT | Performed by: FAMILY MEDICINE

## 2019-03-21 PROCEDURE — 82570 ASSAY OF URINE CREATININE: CPT | Performed by: FAMILY MEDICINE

## 2019-03-21 PROCEDURE — 82043 UR ALBUMIN QUANTITATIVE: CPT | Performed by: FAMILY MEDICINE

## 2019-03-21 PROCEDURE — 85610 PROTHROMBIN TIME: CPT | Performed by: FAMILY MEDICINE

## 2019-03-22 ENCOUNTER — TELEPHONE (OUTPATIENT)
Dept: FAMILY MEDICINE CLINIC | Facility: CLINIC | Age: 64
End: 2019-03-22

## 2019-03-22 LAB
ALBUMIN SERPL-MCNC: 3.9 G/DL (ref 3.5–5.2)
ALBUMIN UR-MCNC: 19.2 MG/L
ALBUMIN/GLOB SERPL: 1.1 G/DL
ALP SERPL-CCNC: 57 U/L (ref 39–117)
ALT SERPL W P-5'-P-CCNC: 22 U/L (ref 1–41)
ANION GAP SERPL CALCULATED.3IONS-SCNC: 10.6 MMOL/L
AST SERPL-CCNC: 19 U/L (ref 1–40)
BILIRUB SERPL-MCNC: 0.4 MG/DL (ref 0.2–1.2)
BUN BLD-MCNC: 16 MG/DL (ref 8–23)
BUN/CREAT SERPL: 13 (ref 7–25)
CALCIUM SPEC-SCNC: 9.1 MG/DL (ref 8.6–10.5)
CHLORIDE SERPL-SCNC: 104 MMOL/L (ref 98–107)
CO2 SERPL-SCNC: 25.4 MMOL/L (ref 22–29)
CREAT BLD-MCNC: 1.23 MG/DL (ref 0.76–1.27)
CREAT UR-MCNC: 190.8 MG/DL
GFR SERPL CREATININE-BSD FRML MDRD: 59 ML/MIN/1.73
GLOBULIN UR ELPH-MCNC: 3.6 GM/DL
GLUCOSE BLD-MCNC: 113 MG/DL (ref 65–99)
MICROALBUMIN/CREAT UR: 100.6 MG/G
POTASSIUM BLD-SCNC: 4.2 MMOL/L (ref 3.5–5.2)
PROT SERPL-MCNC: 7.5 G/DL (ref 6–8.5)
SODIUM BLD-SCNC: 140 MMOL/L (ref 136–145)

## 2019-03-22 NOTE — TELEPHONE ENCOUNTER
----- Message from Mesha Christina MD sent at 3/22/2019  7:39 AM EDT -----  Please call Benitez.  Labs are a lot better; I am relieved.  Continue fluid increase.  Please also let him know if he runs out of insulin, we have more.  Just Call.  He tries to skip on his insulin to save money and that is why his numbers are looking bad.      Left a message to return call.      Patient notified & verbalized understanding.

## 2019-05-02 ENCOUNTER — TELEPHONE (OUTPATIENT)
Dept: FAMILY MEDICINE CLINIC | Facility: CLINIC | Age: 64
End: 2019-05-02

## 2019-05-02 ENCOUNTER — OFFICE VISIT (OUTPATIENT)
Dept: FAMILY MEDICINE CLINIC | Facility: CLINIC | Age: 64
End: 2019-05-02

## 2019-05-02 VITALS
DIASTOLIC BLOOD PRESSURE: 89 MMHG | SYSTOLIC BLOOD PRESSURE: 150 MMHG | WEIGHT: 266 LBS | OXYGEN SATURATION: 97 % | TEMPERATURE: 98 F | HEIGHT: 72 IN | BODY MASS INDEX: 36.03 KG/M2 | HEART RATE: 55 BPM

## 2019-05-02 DIAGNOSIS — N18.30 CKD (CHRONIC KIDNEY DISEASE) STAGE 3, GFR 30-59 ML/MIN (HCC): ICD-10-CM

## 2019-05-02 DIAGNOSIS — I12.9 BENIGN HYPERTENSION WITH CKD (CHRONIC KIDNEY DISEASE) STAGE III (HCC): ICD-10-CM

## 2019-05-02 DIAGNOSIS — N18.30 BENIGN HYPERTENSION WITH CKD (CHRONIC KIDNEY DISEASE) STAGE III (HCC): ICD-10-CM

## 2019-05-02 DIAGNOSIS — E11.22 TYPE 2 DIABETES MELLITUS WITH STAGE 3 CHRONIC KIDNEY DISEASE, WITH LONG-TERM CURRENT USE OF INSULIN (HCC): Primary | ICD-10-CM

## 2019-05-02 DIAGNOSIS — N18.30 TYPE 2 DIABETES MELLITUS WITH STAGE 3 CHRONIC KIDNEY DISEASE, WITH LONG-TERM CURRENT USE OF INSULIN (HCC): Primary | ICD-10-CM

## 2019-05-02 DIAGNOSIS — Z79.4 TYPE 2 DIABETES MELLITUS WITH STAGE 3 CHRONIC KIDNEY DISEASE, WITH LONG-TERM CURRENT USE OF INSULIN (HCC): Primary | ICD-10-CM

## 2019-05-02 DIAGNOSIS — F33.42 RECURRENT MAJOR DEPRESSIVE DISORDER, IN FULL REMISSION (HCC): ICD-10-CM

## 2019-05-02 LAB
ALBUMIN SERPL-MCNC: 4.2 G/DL (ref 3.5–5.2)
ALBUMIN/GLOB SERPL: 1.1 G/DL
ALP SERPL-CCNC: 62 U/L (ref 39–117)
ALT SERPL W P-5'-P-CCNC: 26 U/L (ref 1–41)
ANION GAP SERPL CALCULATED.3IONS-SCNC: 11.6 MMOL/L
AST SERPL-CCNC: 21 U/L (ref 1–40)
BASOPHILS # BLD AUTO: 0.11 10*3/MM3 (ref 0–0.2)
BASOPHILS NFR BLD AUTO: 1.1 % (ref 0–1.5)
BILIRUB SERPL-MCNC: 0.6 MG/DL (ref 0.2–1.2)
BUN BLD-MCNC: 18 MG/DL (ref 8–23)
BUN/CREAT SERPL: 12.9 (ref 7–25)
CALCIUM SPEC-SCNC: 9.5 MG/DL (ref 8.6–10.5)
CHLORIDE SERPL-SCNC: 103 MMOL/L (ref 98–107)
CO2 SERPL-SCNC: 25.4 MMOL/L (ref 22–29)
CREAT BLD-MCNC: 1.4 MG/DL (ref 0.76–1.27)
DEPRECATED RDW RBC AUTO: 48.4 FL (ref 37–54)
EOSINOPHIL # BLD AUTO: 0.55 10*3/MM3 (ref 0–0.4)
EOSINOPHIL NFR BLD AUTO: 5.5 % (ref 0.3–6.2)
ERYTHROCYTE [DISTWIDTH] IN BLOOD BY AUTOMATED COUNT: 14.7 % (ref 12.3–15.4)
GFR SERPL CREATININE-BSD FRML MDRD: 51 ML/MIN/1.73
GLOBULIN UR ELPH-MCNC: 3.8 GM/DL
GLUCOSE BLD-MCNC: 149 MG/DL (ref 65–99)
HBA1C MFR BLD: 8.74 % (ref 4.8–5.6)
HCT VFR BLD AUTO: 46.8 % (ref 37.5–51)
HGB BLD-MCNC: 14.3 G/DL (ref 13–17.7)
LYMPHOCYTES # BLD AUTO: 1.6 10*3/MM3 (ref 0.7–3.1)
LYMPHOCYTES NFR BLD AUTO: 15.9 % (ref 19.6–45.3)
MCH RBC QN AUTO: 27.6 PG (ref 26.6–33)
MCHC RBC AUTO-ENTMCNC: 30.6 G/DL (ref 31.5–35.7)
MCV RBC AUTO: 90.2 FL (ref 79–97)
MONOCYTES # BLD AUTO: 0.74 10*3/MM3 (ref 0.1–0.9)
MONOCYTES NFR BLD AUTO: 7.4 % (ref 5–12)
NEUTROPHILS # BLD AUTO: 6.92 10*3/MM3 (ref 1.7–7)
NEUTROPHILS NFR BLD AUTO: 68.9 % (ref 42.7–76)
PLATELET # BLD AUTO: 158 10*3/MM3 (ref 140–450)
PMV BLD AUTO: 13.9 FL (ref 6–12)
POTASSIUM BLD-SCNC: 5.4 MMOL/L (ref 3.5–5.2)
PROT SERPL-MCNC: 8 G/DL (ref 6–8.5)
RBC # BLD AUTO: 5.19 10*6/MM3 (ref 4.14–5.8)
SODIUM BLD-SCNC: 140 MMOL/L (ref 136–145)
WBC NRBC COR # BLD: 10.04 10*3/MM3 (ref 3.4–10.8)

## 2019-05-02 PROCEDURE — 83036 HEMOGLOBIN GLYCOSYLATED A1C: CPT | Performed by: FAMILY MEDICINE

## 2019-05-02 PROCEDURE — 85007 BL SMEAR W/DIFF WBC COUNT: CPT | Performed by: FAMILY MEDICINE

## 2019-05-02 PROCEDURE — 85025 COMPLETE CBC W/AUTO DIFF WBC: CPT | Performed by: FAMILY MEDICINE

## 2019-05-02 PROCEDURE — 99214 OFFICE O/P EST MOD 30 MIN: CPT | Performed by: FAMILY MEDICINE

## 2019-05-02 PROCEDURE — 80053 COMPREHEN METABOLIC PANEL: CPT | Performed by: FAMILY MEDICINE

## 2019-05-02 RX ORDER — SPIRONOLACTONE 25 MG/1
25 TABLET ORAL 2 TIMES DAILY
Qty: 180 TABLET | Refills: 1 | Status: SHIPPED | OUTPATIENT
Start: 2019-05-02 | End: 2019-12-20

## 2019-05-08 DIAGNOSIS — Z79.01 ANTICOAGULANT LONG-TERM USE: ICD-10-CM

## 2019-05-08 RX ORDER — WARFARIN SODIUM 7.5 MG/1
TABLET ORAL
Qty: 90 TABLET | Refills: 0 | OUTPATIENT
Start: 2019-05-08

## 2019-05-14 ENCOUNTER — TELEPHONE (OUTPATIENT)
Dept: FAMILY MEDICINE CLINIC | Facility: CLINIC | Age: 64
End: 2019-05-14

## 2019-05-14 NOTE — TELEPHONE ENCOUNTER
Labs are okay  - two things to note.   Diabetes is much improved. It went from a 10 to an 8. Please don't go without insulin - call if you need some - we have plenty.    Kidneys are a little stressed out. Drink more water. We will watch it. It's been a little elevated before and then it goes down when he hydrates, so I'm not worried. We will recheck it at his next appointment, but make sure he keeps his sugar numbers down and his water up. Thanks.

## 2019-05-14 NOTE — TELEPHONE ENCOUNTER
Labs are okay  - two things to note.   Diabetes is much improved. It went from a 10 to an 8. Please don't go without insulin - call if you need some - we have plenty.    Kidneys are a little stressed out. Drink more water. We will watch it. It's been a little elevated before and then it goes down when he hydrates, so I'm not worried. We will recheck it at his next appointment, but make sure he keeps his sugar numbers down and his water up. Thanks.    Patient notified & verbalized understanding.

## 2019-05-15 DIAGNOSIS — Z79.01 ANTICOAGULANT LONG-TERM USE: ICD-10-CM

## 2019-05-15 RX ORDER — WARFARIN SODIUM 7.5 MG/1
TABLET ORAL
Qty: 90 TABLET | Refills: 0 | Status: SHIPPED | OUTPATIENT
Start: 2019-05-15 | End: 2019-09-14 | Stop reason: SDUPTHER

## 2019-05-19 NOTE — PROGRESS NOTES
"Benitez Miranda     VITALS: Blood pressure 150/89, pulse 55, temperature 98 °F (36.7 °C), temperature source Oral, height 182.9 cm (72.01\"), weight 121 kg (266 lb), SpO2 97 %.    Subjective  Chief Complaint:   Chief Complaint   Patient presents with   • Hypertension        History of Present Illness:  Patient is a 63 y.o. male with a medical history significant for CAD, hypertension, hyperlipidemia, and type II diabetes who presents to clinic secondary to medical followup. No new or acute concerns today. He is doing well. Since being seen, he has quit smoking.     Patient has a history of hypertension and is on losartan 100 mg orally daily, metoprolol XL 50 mg orally daily, and spironolactone 25 mg orally daily.. Denies any side effects of the medications. Denies any dizziness, lightheadedness, blurry vision, chest pain, or edema.   Home blood pressure: Yes 120s/60s  Low salt diet: Occasionally    Patient also has a history of type 2 diabetes and is currently on diet and exercise, longacting insulin 37 units in the PM and 41 units in the AM and Humalog 15 units TID. We have given him samples of Toujeo and he reports using that 37 units BID and doing really well on it; his fasting sugars are below 150. Also occasionally utilizes Lantus at the above dosage, but that does not work as well as the Toujeo. Last A1C in 11/2017 was 9.4. Does not take full amounts of insulin secondary to him not having enough money to afford the medications. Denies any side effects of his medications. Patient denies any changes in vision, polydipsia, polyuria, numbness or tingling, or hypoglycemic or hyperglycemic episodes. Patient does follow a diabetic diet and checks his glucose levels regularly. Blood glucose levels are usually in the below 150s fasting. Patient does have complications of neuropathy and nephropathy. No retinopathy. No medications. No worsening or exacerbation of symptoms. Last eye exam was in 1/2019 by 's Dr." Blackburn - history cataracts taken out; recent laser. Last microalbumin was 3/2019 and okay. Last foot exam was 8/2017 and patient does not see a podiatrist. Foot exam done 8/2018.  Diabetic teaching/nutrition education: Yes  Medications for kidney protection: Yes, losartan 50 mg orally daily  Medications for cardiovascular protection: Yes    Patient also has a history of hyperlipidemia and is currently on pravastatin 40 mg orally daily. Denies any side effects of the medications. Last lipid panel in 11/2018 and was WNL with LDL 88. Denies any muscle weakness, jaundice or itching.   Low cholesterol diet: Yes    Patient has a history of allergic rhinitis and is currently on cetirizine 10 mg orally daily. Denies any side effects of the medication. Denies any sinus congestion, sinus headaches, watery eyes, itchy eyes, rhinorrhea, coughing, or postnasal discharge.   Allergy injections: No    Patient has a history of COPD and is currently on Spiriva daily and albuterol inhaler 2 puffs every 4-6 hours as needed. As he feels that his breathing is better right now, he has not been using the spiriva secondary to cost. Denies any side effects of the medications. Denies any shortness of breath, coughing, wheezing, or night coughing. He sees Dr. Solis. Reports breathing much better.  Exacerbation: No  Last utilized albuterol: Several weeks ago.    Patient has a history of depression and is currently on zoloft 150 mg orally daily. Patient states that this medication is working. Denies any side effects of the medication. Patient states that he is sleeping fairly well and can get out of bed daily to accomplish daily activities. Patient has no anhedonia. Mood is stable. Has no feelings of guilt. Has good concentration and memory ability. Has energy. Is able to make goals. Is not crying a lot. Appetite is good. Denies any suicidal or homicidal ideations. Does not have any auditory or visual hallucinations.    Patient has a history  of chronic lower back pain, improved. Only on flexeril 10 mg orally as needed. Has not asked for a refill since 9/2016. Denies any side effects of the medication. States that the medications do somewhat control the pain enough so that he can accomplish daily activities. Movement and ambulation are not improved. Denies any sedation from the medications. No pain medication seeking behavior. LEVI [id number] has no record of pain medication seeking behavior. Last UDS was done 7/13/2016 and was consistent and WNL. MRI of lumbar spine in 5/2015 shows disc desiccation without any herniations.     Patient has an extensive history of CAD. He does have a history of a mechanical aortic valve replacement and is on coumadin 7.5 mg orally daily. He also does have a history of two cardiac caths, 2004 (x 1 stent) and 6/2015 (pulmonary HTN, everything else patent). Echo in 2/2016 showed EF 50-55%. Does have a history of MV stenosis and rheumatic valve. He sees Dr. Torres, and is currently on metoprolol ER 50 mg orally daily, spironolactone 50 mg orally daily, and coumadin 7.5 mg orally daliy. INR 2.5 today.   History of stroke: No       Patient also states that he has been diagnosed with chronic kidney disease, stage III and sees Dr. Landis. Last visit was several months ago. Baseline creatinine with Dr. Landis per chart review. Last creatinine done here in 5/2017 shows a creatinine of 1.37. Has not had any exacerbations.      No complaints about any of the medications.    The following portions of the patient's history were reviewed and updated as appropriate: allergies, current medications, past family history, past medical history, past social history, past surgical history and problem list.    Past Medical History  Past Medical History:   Diagnosis Date   • CAD (coronary artery disease)     x1 stent; pulmonary HTN; EF 50-55%; rheumatic valve; MV stenosis   • CHF (congestive heart failure) (CMS/[facility])    • CKD (chronic kidney  disease), stage III (CMS/[facility])    • COPD (chronic obstructive pulmonary disease) (CMS/[facility])    • Depression    • Diabetes mellitus (CMS/[facility])    • H/O mechanical aortic valve replacement    • History of tobacco abuse    • Hyperlipidemia    • Hypertension    • Ischemic heart disease    • Kidney failure     third stage   • Low back pain    • Obesity     BMI 36.   • Valvular heart disease        Review of Systems  Constitutional: Denies any recent history of HAs, dizziness, fevers, chills, itching.  Eyes: Denies any changes in vision. Denies any blurry vision or diplopia.  Ears, Nose, Mouth, Throat: Denies any sore throat, rhinorrhea, or cough.  Cardiovascular: Denies any chest pain, pressure, or palpitations.  Respiratory: Denies any shortness of breath or wheezing.  Gastrointestinal: Denies any abdominal pain, nausea, vomiting, diarrhea, or constipation.  Genitourinary: Denies any changes in urination.  Musculoskeletal: Denies any muscle weakness.  Skin and/or breasts: Denies any rashes.  Neurological: Denies any changes in balance or gait.  Psychiatric: Denies any anxiety, depression, or insomnia. Denies any suicidal or homicidal ideations.  Endocrine: Denies any heat or cold intolerance. Denies any voice changes, polydipsia, or polyuria.  Hematologic/Lymphatic: Denies any anemia or easy bruising.    Surgical History  Past Surgical History:   Procedure Laterality Date   • AORTIC VALVE REPAIR/REPLACEMENT  1991   • CARDIAC CATHETERIZATION     • CARDIAC SURGERY  1991    valve on aortic   • CARDIAC SURGERY  2001    stent    • CATARACT EXTRACTION     • COLONOSCOPY  2001   • CORONARY STENT PLACEMENT  2005       Family History  Family History   Problem Relation Age of Onset   • Cancer Mother         breast   • COPD Father    • Heart attack Father 74   • Diabetes Paternal Grandmother         both legs removed       Social History  Social History     Socioeconomic History   • Marital status:      Spouse name: Not on  file   • Number of children: Not on file   • Years of education: Not on file   • Highest education level: Not on file   Tobacco Use   • Smoking status: Former Smoker     Years: 40.00     Types: Pipe, Cigars     Last attempt to quit: 2016     Years since quittin.8   • Smokeless tobacco: Never Used   • Tobacco comment: currently smokes a pipe   Substance and Sexual Activity   • Alcohol use: No   • Drug use: No   • Sexual activity: Defer       Objective  Physical Exam    Gen: Patient in NAD. Pleasant and answers appropriately. A&Ox3.    Skin: Warm and dry with normal turgor. No purpura, rashes, or unusual pigmentation noted. Hair is normal in appearance and distribution.    HEENT: NC/AT. No lesions noted. Conjunctiva clear, sclera nonicteric. PERRL. EOMI without nystagmus or strabismus. Fundi appear benign. No hemorrhages or exudates of eyes. Auditory canals are patent bilaterally without lesions. TMs intact,  nonerythematous, nonbulging without lesions. Nasal mucosa pink, nonerythematous, and nonedematous. Frontal and maxillary sinuses are nontender. O/P nonerythematous and moist without exudate.    Neck: Supple without lymph nodes palpated. FROM.     Lungs: CTA B/L without rales, rhonchi, crackles, or wheezes.    Heart: RRR. S1 and S2 normal. No S3 or S4. No MRGT.    Abd: Soft, nontender,nondistended. (+)BSx4 quadrants.     Extrem: No CCE. Radial pulses 2+/4 and equal B/L. FROMx4. No bone, joint, or muscle tenderness noted.    Neuro: No focal motor/sensory deficits.    Procedures    Assessment/Plan  Benitez Miranda is a 63 y.o. here for medical followup.  Diagnoses and all orders for this visit:    Type 2 diabetes mellitus with stage 3 chronic kidney disease, with long-term current use of insulin (CMS/[facility])  -     Comprehensive Metabolic Panel; Future  -     Hemoglobin A1c; Future  -     CBC Auto Differential; Future  -     Comprehensive Metabolic Panel  -     Hemoglobin A1c  -     CBC Auto Differential  -      Manual Differential; Future  -     Cancel: Manual Differential  -     Scan Slide; Future  -     Scan Slide    Benign hypertension with CKD (chronic kidney disease) stage III (CMS/HCC)  -     spironolactone (ALDACTONE) 25 MG tablet; Take 1 tablet by mouth 2 (Two) Times a Day.  Increase spironolactone to 25 mg orally BID.    CKD (chronic kidney disease) stage 3, GFR 30-59 ml/min (CMS/HCC)  Stable. Check CMP today.     Recurrent major depressive disorder, in full remission (CMS/HCC)  Stable. Continue zoloft 150 mg orally daily.         Patient's Body mass index is 36.07 kg/m². BMI is above normal parameters. Recommendations include: exercise counseling and nutrition counseling.     Findings and plans discussed with patient who verbalizes understanding and agreement. Will followup with patient once results are in. Patient to followup at clinic PRN or in three months for further medical followup.    Mesha Christina MD    EMR Dragon/Transcription Disclaimer:  Much of this encounter note is an electronic transcription/translation of spoken language to printed text.  The electronic translation of spoken language may permit erroneous, or at times, nonsensical words or phrases to be inadvertently transcribed.  Although I have reviewed the note for such errors, some may still exist.

## 2019-06-04 ENCOUNTER — TELEPHONE (OUTPATIENT)
Dept: FAMILY MEDICINE CLINIC | Facility: CLINIC | Age: 64
End: 2019-06-04

## 2019-06-04 NOTE — TELEPHONE ENCOUNTER
Left a message for him to return call to remind him he is due to have his INR checked.      Patient returned call & verbalized understanding.

## 2019-06-07 ENCOUNTER — ANTICOAGULATION VISIT (OUTPATIENT)
Dept: FAMILY MEDICINE CLINIC | Facility: CLINIC | Age: 64
End: 2019-06-07

## 2019-06-07 LAB — INR PPP: 3.4 (ref 2.5–3.5)

## 2019-06-07 PROCEDURE — 85610 PROTHROMBIN TIME: CPT | Performed by: FAMILY MEDICINE

## 2019-06-07 RX ORDER — METOPROLOL SUCCINATE 50 MG/1
50 TABLET, EXTENDED RELEASE ORAL DAILY
Qty: 90 TABLET | Refills: 1 | Status: SHIPPED | OUTPATIENT
Start: 2019-06-07 | End: 2020-01-17 | Stop reason: SDUPTHER

## 2019-06-07 RX ORDER — PRAVASTATIN SODIUM 40 MG
40 TABLET ORAL DAILY
Qty: 90 TABLET | Refills: 1 | Status: SHIPPED | OUTPATIENT
Start: 2019-06-07 | End: 2019-12-10 | Stop reason: SDUPTHER

## 2019-07-12 ENCOUNTER — TELEPHONE (OUTPATIENT)
Dept: FAMILY MEDICINE CLINIC | Facility: CLINIC | Age: 64
End: 2019-07-12

## 2019-07-12 ENCOUNTER — ANTICOAGULATION VISIT (OUTPATIENT)
Dept: FAMILY MEDICINE CLINIC | Facility: CLINIC | Age: 64
End: 2019-07-12

## 2019-07-12 DIAGNOSIS — Z79.01 ANTICOAGULANT LONG-TERM USE: Primary | ICD-10-CM

## 2019-07-12 LAB — INR PPP: 3.6 (ref 2–3)

## 2019-07-12 PROCEDURE — 85610 PROTHROMBIN TIME: CPT | Performed by: FAMILY MEDICINE

## 2019-07-12 NOTE — TELEPHONE ENCOUNTER
Called patient & notified him he was past due to have his INR checked,stated he would try to come in today.

## 2019-07-17 ENCOUNTER — ANTICOAGULATION VISIT (OUTPATIENT)
Dept: FAMILY MEDICINE CLINIC | Facility: CLINIC | Age: 64
End: 2019-07-17

## 2019-07-17 DIAGNOSIS — Z79.01 ANTICOAGULANT LONG-TERM USE: Primary | ICD-10-CM

## 2019-07-17 LAB — INR PPP: 1.8 (ref 2.5–3.5)

## 2019-07-17 PROCEDURE — 85610 PROTHROMBIN TIME: CPT | Performed by: FAMILY MEDICINE

## 2019-08-02 ENCOUNTER — OFFICE VISIT (OUTPATIENT)
Dept: FAMILY MEDICINE CLINIC | Facility: CLINIC | Age: 64
End: 2019-08-02

## 2019-08-02 VITALS
DIASTOLIC BLOOD PRESSURE: 80 MMHG | SYSTOLIC BLOOD PRESSURE: 111 MMHG | TEMPERATURE: 97.7 F | HEIGHT: 72 IN | BODY MASS INDEX: 35.49 KG/M2 | WEIGHT: 262 LBS | HEART RATE: 59 BPM | OXYGEN SATURATION: 98 %

## 2019-08-02 DIAGNOSIS — I12.9 BENIGN HYPERTENSION WITH CKD (CHRONIC KIDNEY DISEASE) STAGE III (HCC): ICD-10-CM

## 2019-08-02 DIAGNOSIS — Z79.4 TYPE 2 DIABETES MELLITUS WITH STAGE 3 CHRONIC KIDNEY DISEASE, WITH LONG-TERM CURRENT USE OF INSULIN (HCC): ICD-10-CM

## 2019-08-02 DIAGNOSIS — E66.01 MORBIDLY OBESE (HCC): ICD-10-CM

## 2019-08-02 DIAGNOSIS — F33.42 RECURRENT MAJOR DEPRESSIVE DISORDER, IN FULL REMISSION (HCC): ICD-10-CM

## 2019-08-02 DIAGNOSIS — N18.30 CKD (CHRONIC KIDNEY DISEASE), STAGE III (HCC): Primary | ICD-10-CM

## 2019-08-02 DIAGNOSIS — E11.22 TYPE 2 DIABETES MELLITUS WITH STAGE 3 CHRONIC KIDNEY DISEASE, WITH LONG-TERM CURRENT USE OF INSULIN (HCC): ICD-10-CM

## 2019-08-02 DIAGNOSIS — N18.30 BENIGN HYPERTENSION WITH CKD (CHRONIC KIDNEY DISEASE) STAGE III (HCC): ICD-10-CM

## 2019-08-02 DIAGNOSIS — N18.30 TYPE 2 DIABETES MELLITUS WITH STAGE 3 CHRONIC KIDNEY DISEASE, WITH LONG-TERM CURRENT USE OF INSULIN (HCC): ICD-10-CM

## 2019-08-02 PROCEDURE — 99214 OFFICE O/P EST MOD 30 MIN: CPT | Performed by: FAMILY MEDICINE

## 2019-08-02 PROCEDURE — 80053 COMPREHEN METABOLIC PANEL: CPT | Performed by: FAMILY MEDICINE

## 2019-08-02 PROCEDURE — 83036 HEMOGLOBIN GLYCOSYLATED A1C: CPT | Performed by: FAMILY MEDICINE

## 2019-08-02 NOTE — PATIENT INSTRUCTIONS
Call [facility] for an eye exam.  Diabetic shoes.  Use the spiriva samples daily.  Use the tresiba and humalog samples.    No coumadin. Come back in Monday for an INR.

## 2019-08-03 LAB
ALBUMIN SERPL-MCNC: 4.1 G/DL (ref 3.5–5.2)
ALBUMIN/GLOB SERPL: 1.2 G/DL
ALP SERPL-CCNC: 56 U/L (ref 39–117)
ALT SERPL W P-5'-P-CCNC: 23 U/L (ref 1–41)
ANION GAP SERPL CALCULATED.3IONS-SCNC: 14.8 MMOL/L (ref 5–15)
AST SERPL-CCNC: 22 U/L (ref 1–40)
BILIRUB SERPL-MCNC: 0.4 MG/DL (ref 0.2–1.2)
BUN BLD-MCNC: 25 MG/DL (ref 8–23)
BUN/CREAT SERPL: 19.1 (ref 7–25)
CALCIUM SPEC-SCNC: 9.5 MG/DL (ref 8.6–10.5)
CHLORIDE SERPL-SCNC: 102 MMOL/L (ref 98–107)
CO2 SERPL-SCNC: 22.2 MMOL/L (ref 22–29)
CREAT BLD-MCNC: 1.31 MG/DL (ref 0.76–1.27)
GFR SERPL CREATININE-BSD FRML MDRD: 55 ML/MIN/1.73
GLOBULIN UR ELPH-MCNC: 3.4 GM/DL
GLUCOSE BLD-MCNC: 181 MG/DL (ref 65–99)
HBA1C MFR BLD: 8.3 % (ref 4.8–5.6)
POTASSIUM BLD-SCNC: 5.4 MMOL/L (ref 3.5–5.2)
PROT SERPL-MCNC: 7.5 G/DL (ref 6–8.5)
SODIUM BLD-SCNC: 139 MMOL/L (ref 136–145)

## 2019-08-05 ENCOUNTER — ANTICOAGULATION VISIT (OUTPATIENT)
Dept: FAMILY MEDICINE CLINIC | Facility: CLINIC | Age: 64
End: 2019-08-05

## 2019-08-05 DIAGNOSIS — Z79.01 ANTICOAGULANT LONG-TERM USE: Primary | ICD-10-CM

## 2019-08-05 LAB — INR PPP: 1.5 (ref 2–3)

## 2019-08-05 PROCEDURE — 85610 PROTHROMBIN TIME: CPT | Performed by: FAMILY MEDICINE

## 2019-08-05 RX ORDER — WARFARIN SODIUM 1 MG/1
0.5 TABLET ORAL DAILY
Qty: 15 TABLET | Refills: 0 | Status: SHIPPED | OUTPATIENT
Start: 2019-08-05 | End: 2019-09-06 | Stop reason: SDUPTHER

## 2019-08-05 RX ORDER — WARFARIN SODIUM 7.5 MG/1
7.5 TABLET ORAL DAILY
Qty: 30 TABLET | Refills: 0 | Status: SHIPPED | OUTPATIENT
Start: 2019-08-05 | End: 2020-01-27 | Stop reason: SDUPTHER

## 2019-08-09 ENCOUNTER — ANTICOAGULATION VISIT (OUTPATIENT)
Dept: FAMILY MEDICINE CLINIC | Facility: CLINIC | Age: 64
End: 2019-08-09

## 2019-08-09 LAB — INR PPP: 1.7 (ref 2.5–3.5)

## 2019-08-09 PROCEDURE — 85610 PROTHROMBIN TIME: CPT | Performed by: FAMILY MEDICINE

## 2019-08-16 ENCOUNTER — ANTICOAGULATION VISIT (OUTPATIENT)
Dept: FAMILY MEDICINE CLINIC | Facility: CLINIC | Age: 64
End: 2019-08-16

## 2019-08-16 DIAGNOSIS — Z79.01 ENCOUNTER FOR CURRENT LONG-TERM USE OF ANTICOAGULANTS: Primary | ICD-10-CM

## 2019-08-16 LAB — INR PPP: 2.6 (ref 2.5–3.5)

## 2019-08-16 PROCEDURE — 85610 PROTHROMBIN TIME: CPT | Performed by: FAMILY MEDICINE

## 2019-08-19 NOTE — PROGRESS NOTES
"Benitez Miranda     VITALS: Blood pressure 111/80, pulse 59, temperature 97.7 °F (36.5 °C), temperature source Oral, height 182.9 cm (72.01\"), weight 119 kg (262 lb), SpO2 98 %.    Subjective  Chief Complaint:   Chief Complaint   Patient presents with   • Diabetes        History of Present Illness:  Patient is a 64 y.o.  male who presents to clinic secondary to medical followup.  No new or acute concerns.  He is doing well.    Patient has hypertension and is currently on losartan 50 mg orally daily, metoprolol XL 50 mg orally daily, and spironolactone 25 mg orally twice a day without any side effects.  He denies any dizziness, blurry vision, chest pain, shortness of breath, or edema.    Patient has type 2 diabetes and is currently on Lantus 41 units in the a.m. and 43 units in the p.m.  He is also on lispro 15 units 3 times a day.  He does have complications of neuropathy and is currently on gabapentin 600 mg orally daily as needed.  He denies any side effects with any of the medications.  Blood glucose levels stay elevated.  He denies any blurry vision or changes in urination.  UDS within normal limits.  No seeking behavior.  Mac within normal limits.    Patient has hyperlipidemia and is currently on pravastatin 40 mg orally daily without any complications.  He denies any side effects.  He denies any muscle weakness, itching, or jaundice.    Patient has allergic rhinitis and is currently on Flonase daily and Zyrtec 10 mg orally daily without any side effects.  He denies any exacerbations.  He denies any sinus congestion, rhinorrhea, coughing.    Patient has depression and is currently on Zoloft 100 mg orally daily.  He states that is treating him well.  He denies any side effects.  He is able to get up in the morning and do his daily activities.  He has no changes in sleep.  He has no changes in appetite.  No anhedonia.  No crying.  No mood swings.  No auditory or visual hallucinations.  He denies any suicidal " homicidal ideations.    No complaints about any of the medications.    The following portions of the patient's history were reviewed and updated as appropriate: allergies, current medications, past family history, past medical history, past social history, past surgical history and problem list.    Past Medical History  Past Medical History:   Diagnosis Date   • CAD (coronary artery disease)     x1 stent; pulmonary HTN; EF 50-55%; rheumatic valve; MV stenosis   • CHF (congestive heart failure) (CMS/[facility])    • CKD (chronic kidney disease), stage III (CMS/[facility])    • COPD (chronic obstructive pulmonary disease) (CMS/[facility])    • Depression    • Diabetes mellitus (CMS/[facility])    • H/O mechanical aortic valve replacement    • History of tobacco abuse    • Hyperlipidemia    • Hypertension    • Ischemic heart disease    • Kidney failure     third stage   • Low back pain    • Obesity     BMI 36.   • Valvular heart disease        Review of Systems   Constitutional: Negative for chills, fatigue and fever.   HENT: Negative for congestion, ear pain, facial swelling, rhinorrhea, sinus pressure, sore throat and trouble swallowing.    Eyes: Negative for photophobia, discharge, itching and visual disturbance.   Respiratory: Negative for cough, shortness of breath and wheezing.    Cardiovascular: Negative for chest pain, palpitations and leg swelling.   Gastrointestinal: Negative for abdominal pain, constipation, diarrhea, nausea and vomiting.   Endocrine: Negative for cold intolerance, heat intolerance, polydipsia and polyuria.   Genitourinary: Negative for difficulty urinating, dysuria and hematuria.   Skin: Negative for rash.   Neurological: Negative for dizziness, light-headedness and headaches.   Psychiatric/Behavioral: Negative for agitation and suicidal ideas. The patient is not nervous/anxious.        Surgical History  Past Surgical History:   Procedure Laterality Date   • AORTIC VALVE REPAIR/REPLACEMENT  1991   • CARDIAC  CATHETERIZATION     • CARDIAC SURGERY  1991    valve on aortic   • CARDIAC SURGERY  2001    stent    • CATARACT EXTRACTION     • COLONOSCOPY  2001   • CORONARY STENT PLACEMENT  2005       Family History  Family History   Problem Relation Age of Onset   • Cancer Mother         breast   • COPD Father    • Heart attack Father 74   • Diabetes Paternal Grandmother         both legs removed       Social History  Social History     Socioeconomic History   • Marital status:      Spouse name: Not on file   • Number of children: Not on file   • Years of education: Not on file   • Highest education level: Not on file   Tobacco Use   • Smoking status: Former Smoker     Years: 40.00     Types: Pipe, Cigars     Last attempt to quit: 7/5/2016     Years since quitting: 3.1   • Smokeless tobacco: Never Used   • Tobacco comment: currently smokes a pipe   Substance and Sexual Activity   • Alcohol use: No   • Drug use: No   • Sexual activity: Defer       Objective  Physical Exam    Benitez had a diabetic foot exam performed today.   During the foot exam he had a monofilament test performed.    Neurological Sensory Findings - Altered hot/cold right ankle/foot discrimination and altered hot/cold left ankle/foot discrimination. Altered sharp/dull right ankle/foot discrimination and altered sharp/dull left ankle/foot discrimination. Right ankle/foot altered proprioception and left ankle/foot altered proprioception.  Vascular Status -  His right foot exhibits normal foot vasculature  and no edema. His left foot exhibits normal foot vasculature  and no edema.  Skin Integrity  -  His right foot skin is intact.His left foot skin is intact..         Gen: Patient in NAD. Pleasant and answers appropriately. A&Ox3.    Skin: Warm and dry with normal turgor. No purpura, rashes, or unusual pigmentation noted. Hair is normal in appearance and distribution.    HEENT: NC/AT. No lesions noted. Conjunctiva clear, sclera nonicteric. PERRL. EOMI  without nystagmus or strabismus. Fundi appear benign. No hemorrhages or exudates of eyes. Auditory canals are patent bilaterally without lesions. TMs intact,  nonerythematous, nonbulging without lesions. Nasal mucosa pink, nonerythematous, and nonedematous. Frontal and maxillary sinuses are nontender. O/P nonerythematous and moist without exudate.    Neck: Supple without lymph nodes palpated. FROM.     Lungs: CTA B/L without rales, rhonchi, crackles, or wheezes.    Heart: RRR. S1 and S2 normal. No S3 or S4. No MRGT.    Abd: Soft, nontender,nondistended. (+)BSx4 quadrants.     Extrem: No CCE. Radial pulses 2+/4 and equal B/L. FROMx4. No bone, joint, or muscle tenderness noted.    Neuro: No focal motor/sensory deficits.    Procedures    Assessment/Plan  Benitez Miranda is a 64 y.o. here for medical followup.  Diagnoses and all orders for this visit:    CKD (chronic kidney disease), stage III (CMS/[facility])  -     Comprehensive Metabolic Panel; Future  -     Hemoglobin A1c; Future  -     Comprehensive Metabolic Panel  -     Hemoglobin A1c    Type 2 diabetes mellitus with stage 3 chronic kidney disease, with long-term current use of insulin (CMS/[facility])  -     Comprehensive Metabolic Panel; Future  -     Hemoglobin A1c; Future  -     Comprehensive Metabolic Panel  -     Hemoglobin A1c    Benign hypertension with CKD (chronic kidney disease) stage III (CMS/HCC)  -     Comprehensive Metabolic Panel; Future  -     Comprehensive Metabolic Panel    Recurrent major depressive disorder, in full remission (CMS/[facility])    Morbidly obese (CMS/[facility])      Patient's Body mass index is 35.53 kg/m². BMI is above normal parameters. Recommendations include: exercise counseling and nutrition counseling.     Findings and plans discussed with patient who verbalizes understanding and agreement. Will followup with patient once results are in. Patient to followup at clinic PRN or in three months for further medical followup.    Mesha Christina,  MD HUYEN [place]/Transcription Disclaimer:  Much of this encounter note is an electronic transcription/translation of spoken language to printed text.  The electronic translation of spoken language may permit erroneous, or at times, nonsensical words or phrases to be inadvertently transcribed.  Although I have reviewed the note for such errors, some may still exist.

## 2019-08-29 ENCOUNTER — TELEPHONE (OUTPATIENT)
Dept: FAMILY MEDICINE CLINIC | Facility: CLINIC | Age: 64
End: 2019-08-29

## 2019-09-06 ENCOUNTER — ANTICOAGULATION VISIT (OUTPATIENT)
Dept: FAMILY MEDICINE CLINIC | Facility: CLINIC | Age: 64
End: 2019-09-06

## 2019-09-06 DIAGNOSIS — Z79.01 ANTICOAGULANT LONG-TERM USE: ICD-10-CM

## 2019-09-06 LAB — INR PPP: 3.2 (ref 2.5–3.5)

## 2019-09-06 PROCEDURE — 85610 PROTHROMBIN TIME: CPT | Performed by: FAMILY MEDICINE

## 2019-09-06 RX ORDER — WARFARIN SODIUM 1 MG/1
0.5 TABLET ORAL DAILY
Qty: 15 TABLET | Refills: 0 | Status: SHIPPED | OUTPATIENT
Start: 2019-09-06 | End: 2019-10-01 | Stop reason: SDUPTHER

## 2019-09-09 RX ORDER — SERTRALINE HYDROCHLORIDE 100 MG/1
TABLET, FILM COATED ORAL
Qty: 135 TABLET | Refills: 1 | Status: SHIPPED | OUTPATIENT
Start: 2019-09-09 | End: 2020-03-23 | Stop reason: SDUPTHER

## 2019-09-14 DIAGNOSIS — Z79.01 ANTICOAGULANT LONG-TERM USE: ICD-10-CM

## 2019-09-16 RX ORDER — WARFARIN SODIUM 7.5 MG/1
TABLET ORAL
Qty: 90 TABLET | Refills: 0 | Status: SHIPPED | OUTPATIENT
Start: 2019-09-16 | End: 2020-01-27 | Stop reason: SDUPTHER

## 2019-09-27 ENCOUNTER — ANTICOAGULATION VISIT (OUTPATIENT)
Dept: FAMILY MEDICINE CLINIC | Facility: CLINIC | Age: 64
End: 2019-09-27

## 2019-09-27 ENCOUNTER — TELEPHONE (OUTPATIENT)
Dept: FAMILY MEDICINE CLINIC | Facility: CLINIC | Age: 64
End: 2019-09-27

## 2019-09-27 DIAGNOSIS — Z79.01 ANTICOAGULANT LONG-TERM USE: Primary | ICD-10-CM

## 2019-09-27 LAB — INR PPP: 3.8 (ref 2.5–3.5)

## 2019-09-27 PROCEDURE — 85610 PROTHROMBIN TIME: CPT | Performed by: FAMILY MEDICINE

## 2019-09-27 NOTE — TELEPHONE ENCOUNTER
Left a message for him to return call to remind him his INR is past due to be checked.        Patient came in & had his INR checked.

## 2019-10-01 ENCOUNTER — ANTICOAGULATION VISIT (OUTPATIENT)
Dept: FAMILY MEDICINE CLINIC | Facility: CLINIC | Age: 64
End: 2019-10-01

## 2019-10-01 DIAGNOSIS — Z79.01 ANTICOAGULANT LONG-TERM USE: ICD-10-CM

## 2019-10-01 DIAGNOSIS — Z79.01 ANTICOAGULANT LONG-TERM USE: Primary | ICD-10-CM

## 2019-10-01 LAB — INR PPP: 1.3 (ref 2.5–3.5)

## 2019-10-01 PROCEDURE — 85610 PROTHROMBIN TIME: CPT | Performed by: FAMILY MEDICINE

## 2019-10-01 RX ORDER — WARFARIN SODIUM 1 MG/1
0.5 TABLET ORAL DAILY
Qty: 45 TABLET | Refills: 0 | Status: SHIPPED | OUTPATIENT
Start: 2019-10-01 | End: 2019-10-07 | Stop reason: SDUPTHER

## 2019-10-04 ENCOUNTER — ANTICOAGULATION VISIT (OUTPATIENT)
Dept: FAMILY MEDICINE CLINIC | Facility: CLINIC | Age: 64
End: 2019-10-04

## 2019-10-04 DIAGNOSIS — Z79.01 ANTICOAGULANT LONG-TERM USE: Primary | ICD-10-CM

## 2019-10-04 LAB — INR PPP: 1.4 (ref 2.5–3.5)

## 2019-10-04 PROCEDURE — 85610 PROTHROMBIN TIME: CPT | Performed by: FAMILY MEDICINE

## 2019-10-07 ENCOUNTER — TELEPHONE (OUTPATIENT)
Dept: FAMILY MEDICINE CLINIC | Facility: CLINIC | Age: 64
End: 2019-10-07

## 2019-10-07 ENCOUNTER — ANTICOAGULATION VISIT (OUTPATIENT)
Dept: FAMILY MEDICINE CLINIC | Facility: CLINIC | Age: 64
End: 2019-10-07

## 2019-10-07 DIAGNOSIS — Z79.01 CHRONIC ANTICOAGULATION: ICD-10-CM

## 2019-10-07 DIAGNOSIS — I12.9 BENIGN HYPERTENSION WITH CKD (CHRONIC KIDNEY DISEASE) STAGE III (HCC): ICD-10-CM

## 2019-10-07 DIAGNOSIS — N18.30 BENIGN HYPERTENSION WITH CKD (CHRONIC KIDNEY DISEASE) STAGE III (HCC): ICD-10-CM

## 2019-10-07 DIAGNOSIS — Z79.01 ANTICOAGULANT LONG-TERM USE: Primary | ICD-10-CM

## 2019-10-07 DIAGNOSIS — Z79.01 ANTICOAGULANT LONG-TERM USE: ICD-10-CM

## 2019-10-07 DIAGNOSIS — I05.0 RHEUMATIC MITRAL STENOSIS: Primary | ICD-10-CM

## 2019-10-07 LAB — INR PPP: 2.3 (ref 2.5–3.5)

## 2019-10-07 PROCEDURE — 85610 PROTHROMBIN TIME: CPT | Performed by: FAMILY MEDICINE

## 2019-10-07 RX ORDER — LOSARTAN POTASSIUM 100 MG/1
100 TABLET ORAL DAILY
Qty: 90 TABLET | Refills: 1 | Status: SHIPPED | OUTPATIENT
Start: 2019-10-07 | End: 2020-04-09

## 2019-10-07 RX ORDER — WARFARIN SODIUM 1 MG/1
1 TABLET ORAL DAILY
Qty: 90 TABLET | Refills: 2 | Status: SHIPPED | OUTPATIENT
Start: 2019-10-07 | End: 2020-01-27 | Stop reason: SDUPTHER

## 2019-10-07 NOTE — TELEPHONE ENCOUNTER
Notified patient by voicemail his refills were called in.  Yes, he came in for PT/INR today and was addressed by Vane and documented in chart.

## 2019-10-07 NOTE — TELEPHONE ENCOUNTER
Pt requests refill on Losartan.  He states he takes 1 100mg dose daily. He needs a refill on Coumadin 1mg as well.  I didn't feel comfortable moving forward because of the   dose discrepancy in the Losartan.     [facility], [place].

## 2019-10-07 NOTE — TELEPHONE ENCOUNTER
It's okay. It does say on the 50 mg script that he's on 100 mg. Refilled both losartan and coumadin. Please let him know. Did he come in for his INR? Thanks.

## 2019-10-25 ENCOUNTER — ANTICOAGULATION VISIT (OUTPATIENT)
Dept: FAMILY MEDICINE CLINIC | Facility: CLINIC | Age: 64
End: 2019-10-25

## 2019-10-25 DIAGNOSIS — N18.30 BENIGN HYPERTENSION WITH CKD (CHRONIC KIDNEY DISEASE) STAGE III (HCC): Primary | ICD-10-CM

## 2019-10-25 DIAGNOSIS — Z79.01 ANTICOAGULANT LONG-TERM USE: ICD-10-CM

## 2019-10-25 DIAGNOSIS — I12.9 BENIGN HYPERTENSION WITH CKD (CHRONIC KIDNEY DISEASE) STAGE III (HCC): Primary | ICD-10-CM

## 2019-10-25 LAB — INR PPP: 3.6 (ref 2.5–3.5)

## 2019-10-25 PROCEDURE — 80048 BASIC METABOLIC PNL TOTAL CA: CPT | Performed by: INTERNAL MEDICINE

## 2019-10-25 PROCEDURE — 82043 UR ALBUMIN QUANTITATIVE: CPT | Performed by: INTERNAL MEDICINE

## 2019-10-25 PROCEDURE — 85610 PROTHROMBIN TIME: CPT | Performed by: FAMILY MEDICINE

## 2019-10-25 PROCEDURE — 82570 ASSAY OF URINE CREATININE: CPT | Performed by: INTERNAL MEDICINE

## 2019-10-25 RX ORDER — WARFARIN SODIUM 6 MG/1
TABLET ORAL
Qty: 30 TABLET | Refills: 0 | Status: SHIPPED | OUTPATIENT
Start: 2019-10-25 | End: 2019-12-18 | Stop reason: SDUPTHER

## 2019-10-25 RX ORDER — WARFARIN SODIUM 1 MG/1
TABLET ORAL
Qty: 30 TABLET | Refills: 0 | Status: SHIPPED | OUTPATIENT
Start: 2019-10-25 | End: 2020-03-23 | Stop reason: SDUPTHER

## 2019-10-26 LAB
ALBUMIN UR-MCNC: 10.3 MG/DL
ANION GAP SERPL CALCULATED.3IONS-SCNC: 11.8 MMOL/L (ref 5–15)
BUN BLD-MCNC: 19 MG/DL (ref 8–23)
BUN/CREAT SERPL: 13.9 (ref 7–25)
CALCIUM SPEC-SCNC: 9 MG/DL (ref 8.6–10.5)
CHLORIDE SERPL-SCNC: 96 MMOL/L (ref 98–107)
CO2 SERPL-SCNC: 23.2 MMOL/L (ref 22–29)
CREAT BLD-MCNC: 1.37 MG/DL (ref 0.76–1.27)
CREAT UR-MCNC: 154.7 MG/DL
GFR SERPL CREATININE-BSD FRML MDRD: 52 ML/MIN/1.73
GLUCOSE BLD-MCNC: 210 MG/DL (ref 65–99)
MICROALBUMIN/CREAT UR: 66.6 MG/G
POTASSIUM BLD-SCNC: 4.5 MMOL/L (ref 3.5–5.2)
SODIUM BLD-SCNC: 131 MMOL/L (ref 136–145)

## 2019-12-10 RX ORDER — PRAVASTATIN SODIUM 40 MG
TABLET ORAL
Qty: 90 TABLET | Refills: 0 | Status: SHIPPED | OUTPATIENT
Start: 2019-12-10 | End: 2020-03-23 | Stop reason: SDUPTHER

## 2019-12-18 DIAGNOSIS — Z79.01 ANTICOAGULANT LONG-TERM USE: ICD-10-CM

## 2019-12-18 RX ORDER — WARFARIN SODIUM 6 MG/1
TABLET ORAL
Qty: 30 TABLET | Refills: 0 | Status: SHIPPED | OUTPATIENT
Start: 2019-12-18 | End: 2020-01-27 | Stop reason: SDUPTHER

## 2019-12-20 DIAGNOSIS — N18.30 BENIGN HYPERTENSION WITH CKD (CHRONIC KIDNEY DISEASE) STAGE III (HCC): ICD-10-CM

## 2019-12-20 DIAGNOSIS — I12.9 BENIGN HYPERTENSION WITH CKD (CHRONIC KIDNEY DISEASE) STAGE III (HCC): ICD-10-CM

## 2019-12-20 RX ORDER — SPIRONOLACTONE 25 MG/1
TABLET ORAL
Qty: 180 TABLET | Refills: 0 | Status: SHIPPED | OUTPATIENT
Start: 2019-12-20 | End: 2020-04-09

## 2020-01-10 ENCOUNTER — OFFICE VISIT (OUTPATIENT)
Dept: FAMILY MEDICINE CLINIC | Facility: CLINIC | Age: 65
End: 2020-01-10

## 2020-01-10 VITALS
OXYGEN SATURATION: 98 % | HEART RATE: 61 BPM | WEIGHT: 262 LBS | HEIGHT: 72 IN | BODY MASS INDEX: 35.49 KG/M2 | SYSTOLIC BLOOD PRESSURE: 113 MMHG | DIASTOLIC BLOOD PRESSURE: 72 MMHG | TEMPERATURE: 98.3 F

## 2020-01-10 DIAGNOSIS — Z79.01 ANTICOAGULANT LONG-TERM USE: ICD-10-CM

## 2020-01-10 DIAGNOSIS — Z00.00 MEDICARE ANNUAL WELLNESS VISIT, SUBSEQUENT: Primary | ICD-10-CM

## 2020-01-10 DIAGNOSIS — Z79.4 TYPE 2 DIABETES MELLITUS WITH STAGE 3 CHRONIC KIDNEY DISEASE, WITH LONG-TERM CURRENT USE OF INSULIN (HCC): ICD-10-CM

## 2020-01-10 DIAGNOSIS — E11.22 TYPE 2 DIABETES MELLITUS WITH STAGE 3 CHRONIC KIDNEY DISEASE, WITH LONG-TERM CURRENT USE OF INSULIN (HCC): ICD-10-CM

## 2020-01-10 DIAGNOSIS — Z13.220 ENCOUNTER FOR LIPID SCREENING FOR CARDIOVASCULAR DISEASE: ICD-10-CM

## 2020-01-10 DIAGNOSIS — Z13.6 ENCOUNTER FOR LIPID SCREENING FOR CARDIOVASCULAR DISEASE: ICD-10-CM

## 2020-01-10 DIAGNOSIS — N18.30 CKD (CHRONIC KIDNEY DISEASE), STAGE III (HCC): ICD-10-CM

## 2020-01-10 DIAGNOSIS — Z12.5 ENCOUNTER FOR SCREENING FOR MALIGNANT NEOPLASM OF PROSTATE: ICD-10-CM

## 2020-01-10 DIAGNOSIS — N18.30 TYPE 2 DIABETES MELLITUS WITH STAGE 3 CHRONIC KIDNEY DISEASE, WITH LONG-TERM CURRENT USE OF INSULIN (HCC): ICD-10-CM

## 2020-01-10 DIAGNOSIS — E11.42 DIABETIC POLYNEUROPATHY ASSOCIATED WITH TYPE 2 DIABETES MELLITUS (HCC): ICD-10-CM

## 2020-01-10 DIAGNOSIS — Z23 IMMUNIZATION DUE: ICD-10-CM

## 2020-01-10 LAB — INR PPP: 3.2 (ref 0.9–1.1)

## 2020-01-10 PROCEDURE — 83036 HEMOGLOBIN GLYCOSYLATED A1C: CPT | Performed by: FAMILY MEDICINE

## 2020-01-10 PROCEDURE — 80061 LIPID PANEL: CPT | Performed by: FAMILY MEDICINE

## 2020-01-10 PROCEDURE — G0008 ADMIN INFLUENZA VIRUS VAC: HCPCS | Performed by: FAMILY MEDICINE

## 2020-01-10 PROCEDURE — 90653 IIV ADJUVANT VACCINE IM: CPT | Performed by: FAMILY MEDICINE

## 2020-01-10 PROCEDURE — 80053 COMPREHEN METABOLIC PANEL: CPT | Performed by: FAMILY MEDICINE

## 2020-01-10 PROCEDURE — 36415 COLL VENOUS BLD VENIPUNCTURE: CPT | Performed by: FAMILY MEDICINE

## 2020-01-10 PROCEDURE — 85610 PROTHROMBIN TIME: CPT | Performed by: FAMILY MEDICINE

## 2020-01-10 PROCEDURE — G0439 PPPS, SUBSEQ VISIT: HCPCS | Performed by: FAMILY MEDICINE

## 2020-01-10 PROCEDURE — G0103 PSA SCREENING: HCPCS | Performed by: FAMILY MEDICINE

## 2020-01-10 RX ORDER — GABAPENTIN 600 MG/1
600 TABLET ORAL AS NEEDED
Qty: 30 TABLET | Refills: 0 | Status: CANCELLED | OUTPATIENT
Start: 2020-01-10

## 2020-01-10 RX ORDER — GABAPENTIN 600 MG/1
600 TABLET ORAL DAILY
Qty: 30 TABLET | Refills: 0 | Status: ON HOLD | OUTPATIENT
Start: 2020-01-10 | End: 2020-11-18

## 2020-01-11 LAB
ALBUMIN SERPL-MCNC: 4.1 G/DL (ref 3.5–5.2)
ALBUMIN/GLOB SERPL: 1 G/DL
ALP SERPL-CCNC: 65 U/L (ref 39–117)
ALT SERPL W P-5'-P-CCNC: 26 U/L (ref 1–41)
ANION GAP SERPL CALCULATED.3IONS-SCNC: 12.2 MMOL/L (ref 5–15)
AST SERPL-CCNC: 19 U/L (ref 1–40)
BILIRUB SERPL-MCNC: 0.3 MG/DL (ref 0.2–1.2)
BUN BLD-MCNC: 29 MG/DL (ref 8–23)
BUN/CREAT SERPL: 18 (ref 7–25)
CALCIUM SPEC-SCNC: 9.3 MG/DL (ref 8.6–10.5)
CHLORIDE SERPL-SCNC: 96 MMOL/L (ref 98–107)
CHOLEST SERPL-MCNC: 138 MG/DL (ref 0–200)
CO2 SERPL-SCNC: 22.8 MMOL/L (ref 22–29)
CREAT BLD-MCNC: 1.61 MG/DL (ref 0.76–1.27)
GFR SERPL CREATININE-BSD FRML MDRD: 43 ML/MIN/1.73
GLOBULIN UR ELPH-MCNC: 4 GM/DL
GLUCOSE BLD-MCNC: 239 MG/DL (ref 65–99)
HBA1C MFR BLD: 9.83 % (ref 4.8–5.6)
HDLC SERPL-MCNC: 35 MG/DL (ref 40–60)
LDLC SERPL CALC-MCNC: 71 MG/DL (ref 0–100)
LDLC/HDLC SERPL: 2.02 {RATIO}
POTASSIUM BLD-SCNC: 4.7 MMOL/L (ref 3.5–5.2)
PROT SERPL-MCNC: 8.1 G/DL (ref 6–8.5)
PSA SERPL-MCNC: 0.72 NG/ML (ref 0–4)
SODIUM BLD-SCNC: 131 MMOL/L (ref 136–145)
TRIGL SERPL-MCNC: 161 MG/DL (ref 0–150)
VLDLC SERPL-MCNC: 32.2 MG/DL (ref 5–40)

## 2020-01-17 RX ORDER — METOPROLOL SUCCINATE 50 MG/1
50 TABLET, EXTENDED RELEASE ORAL DAILY
Qty: 90 TABLET | Refills: 1 | Status: SHIPPED | OUTPATIENT
Start: 2020-01-17 | End: 2020-06-10 | Stop reason: SDUPTHER

## 2020-01-27 ENCOUNTER — TELEPHONE (OUTPATIENT)
Dept: FAMILY MEDICINE CLINIC | Facility: CLINIC | Age: 65
End: 2020-01-27

## 2020-01-27 DIAGNOSIS — Z79.01 ANTICOAGULANT LONG-TERM USE: ICD-10-CM

## 2020-01-27 RX ORDER — WARFARIN SODIUM 6 MG/1
TABLET ORAL
Qty: 90 TABLET | Refills: 0 | Status: SHIPPED | OUTPATIENT
Start: 2020-01-27 | End: 2020-08-03

## 2020-01-27 RX ORDER — WARFARIN SODIUM 7.5 MG/1
TABLET ORAL
Qty: 90 TABLET | Refills: 0 | Status: SHIPPED | OUTPATIENT
Start: 2020-01-27 | End: 2020-08-03

## 2020-01-27 NOTE — PROGRESS NOTES
The ABCs of the Annual Wellness Visit  Subsequent Medicare Wellness Visit    Chief Complaint   Patient presents with   • Medicare Wellness-subsequent       Subjective   History of Present Illness:  Benitez Miranda is a 64 y.o. male who presents for a Subsequent Medicare Wellness Visit.    HEALTH RISK ASSESSMENT    Recent Hospitalizations:  No hospitalization(s) within the last year.    Current Medical Providers:  Patient Care Team:  Mesha Christina MD as PCP - General (Family Medicine)  Velasquez Torres IV, MD as Cardiologist (Cardiology)    Smoking Status:  Social History     Tobacco Use   Smoking Status Former Smoker   • Years: 40.00   • Types: Pipe, Cigars   • Last attempt to quit: 7/5/2016   • Years since quitting: 3.5   Smokeless Tobacco Never Used   Tobacco Comment    currently smokes a pipe       Alcohol Consumption:  Social History     Substance and Sexual Activity   Alcohol Use No       Depression Screen:   PHQ-2/PHQ-9 Depression Screening 1/10/2020   Little interest or pleasure in doing things 0   Feeling down, depressed, or hopeless 0   Total Score 0       Fall Risk Screen:  HILTONADI Fall Risk Assessment was completed, and patient is at LOW risk for falls.Assessment completed on:1/10/2020    Health Habits and Functional and Cognitive Screening:  Functional & Cognitive Status 1/10/2020   Do you have difficulty preparing food and eating? No   Do you have difficulty bathing yourself, getting dressed or grooming yourself? No   Do you have difficulty using the toilet? No   Do you have difficulty moving around from place to place? No   Do you have trouble with steps or getting out of a bed or a chair? No   Current Diet Well Balanced Diet   Dental Exam Up to date   Eye Exam Up to date   Exercise (times per week) 3 times per week   Current Exercise Activities Include Walking   Do you need help using the phone?  No   Are you deaf or do you have serious difficulty hearing?  No   Do you need help with  transportation? No   Do you need help shopping? No   Do you need help preparing meals?  No   Do you need help with housework?  No   Do you need help with laundry? No   Do you need help taking your medications? No   Do you need help managing money? No   Do you ever drive or ride in a car without wearing a seat belt? No   Have you felt unusual stress, anger or loneliness in the last month? No   Who do you live with? Alone   If you need help, do you have trouble finding someone available to you? No   Have you been bothered in the last four weeks by sexual problems? No   Do you have difficulty concentrating, remembering or making decisions? No         Does the patient have evidence of cognitive impairment? No    Asprin use counseling:Currently on warfarin    Age-appropriate Screening Schedule:  Refer to the list below for future screening recommendations based on patient's age, sex and/or medical conditions. Orders for these recommended tests are listed in the plan section. The patient has been provided with a written plan.    Health Maintenance   Topic Date Due   • TDAP/TD VACCINES (1 - Tdap) 08/15/1964   • ZOSTER VACCINE (1 of 2) 08/15/2003   • DIABETIC EYE EXAM  05/13/2017   • PNEUMOCOCCAL VACCINE (65+ HIGH RISK) (1 of 2 - PCV13) 08/15/2018   • HEMOGLOBIN A1C  07/10/2020   • DIABETIC FOOT EXAM  09/04/2020   • URINE MICROALBUMIN  10/25/2020   • LIPID PANEL  01/10/2021   • COLONOSCOPY  10/12/2026   • INFLUENZA VACCINE  Completed          The following portions of the patient's history were reviewed and updated as appropriate: allergies, current medications, past family history, past medical history, past social history, past surgical history and problem list.    Outpatient Medications Prior to Visit   Medication Sig Dispense Refill   • cetirizine (ZyrTEC) 10 MG tablet Take 10 mg by mouth daily.     • clobetasol (TEMOVATE) 0.05 % cream APPLY TO AFFECTED AREA TWICE DAILY 60 g 1   • clotrimazole (LOTRIMIN) 1 % cream Apply   topically 2 (Two) Times a Day. 60 g 2   • cyclobenzaprine (FLEXERIL) 10 MG tablet Take 1 tablet by mouth 3 (Three) Times a Day. for muscle spams 90 tablet 1   • fluticasone (FLONASE) 50 MCG/ACT nasal spray 2 sprays into each nostril Daily. 16 g 5   • Insulin Glargine (LANTUS SOLOSTAR) 100 UNIT/ML injection pen 39 units in the AM and 41 In the PM (Patient taking differently: 41 units in the AM and 43 In the PM) 22 mL 5   • Insulin Lispro (HUMALOG KWIKPEN) 100 UNIT/ML solution pen-injector Inject 15 Units under the skin 3 (Three) Times a Day Before Meals. Use with sliding scale before meals. Would prefer pen. 5 pen 5   • losartan (COZAAR) 100 MG tablet Take 1 tablet by mouth Daily. 90 tablet 1   • pravastatin (PRAVACHOL) 40 MG tablet TAKE 1 TABLET BY MOUTH ONCE DAILY 90 tablet 0   • sertraline (ZOLOFT) 100 MG tablet TAKE 1 & 1/2 (ONE & ONE-HALF) TABLETS BY MOUTH ONCE DAILY 135 tablet 1   • spironolactone (ALDACTONE) 25 MG tablet TAKE 1 TABLET BY MOUTH TWICE DAILY 180 tablet 0   • warfarin (COUMADIN) 7.5 MG tablet TAKE 1 TABLET BY MOUTH ONCE DAILY (Patient taking differently: 7mg alt with 8mg) 90 tablet 0   • gabapentin (NEURONTIN) 600 MG tablet Take 1 tablet by mouth As Needed (neuropathy). 30 tablet 0   • metoprolol succinate XL (TOPROL-XL) 50 MG 24 hr tablet Take 1 tablet by mouth Daily. 90 tablet 1   • warfarin (COUMADIN) 1 MG tablet Patient is to take 1 mg once a day 30 tablet 0   • warfarin (COUMADIN) 2 MG tablet Take 1 tablet by mouth daily. 30 tablet 5   • warfarin (COUMADIN) 4 MG tablet Use as directed. (currently using two tablets daily) 60 tablet 5   • warfarin (COUMADIN) 5 MG tablet Take 1 tablet by mouth daily. 30 tablet 5   • warfarin (COUMADIN) 6 MG tablet TAKE 1 TABLET BY MOUTH ONCE DAILY FOR A TOTAL DOSE OF 7MG 30 tablet 0   • warfarin (COUMADIN) 1 MG tablet Take 1 tablet by mouth Daily. 90 tablet 2   • warfarin (COUMADIN) 7.5 MG tablet Take 1 tablet by mouth Daily. 30 tablet 0     No  facility-administered medications prior to visit.        Patient Active Problem List   Diagnosis   • Diabetes mellitus (CMS/HCC)   • COPD (chronic obstructive pulmonary disease) (CMS/HCC)   • Chronic diastolic congestive heart failure (CMS/[facility])   • Essential hypertension   • Tobacco abuse   • Coronary artery disease involving native coronary artery of native heart without angina pectoris   • Low back pain   • Hyperlipidemia LDL goal <70   • H/O mechanical aortic valve replacement   • Depression   • CKD (chronic kidney disease), stage III (CMS/[facility])   • Rheumatic mitral stenosis   • Obesity       Advanced Care Planning:  Patient does not have an advance directive - information provided to the patient today    Review of Systems   Constitutional: Positive for fatigue. Negative for chills and fever.   HENT: Positive for sinus pressure. Negative for congestion, ear pain, facial swelling, rhinorrhea, sore throat and trouble swallowing.    Eyes: Negative for photophobia, discharge, itching and visual disturbance.   Respiratory: Positive for cough, chest tightness, shortness of breath and wheezing.    Cardiovascular: Positive for palpitations. Negative for chest pain and leg swelling.   Gastrointestinal: Negative for abdominal pain, constipation, diarrhea, nausea and vomiting.   Endocrine: Negative for cold intolerance, heat intolerance, polydipsia and polyuria.   Genitourinary: Negative for difficulty urinating, dysuria and hematuria.   Musculoskeletal: Positive for arthralgias and myalgias.   Skin: Negative for rash.   Neurological: Positive for dizziness and weakness. Negative for light-headedness and headaches.   Psychiatric/Behavioral: Negative for agitation and suicidal ideas. The patient is not nervous/anxious.        Compared to one year ago, the patient feels his physical health is worse.  Compared to one year ago, the patient feels his mental health is the same.    Reviewed chart for potential of high risk  "medication in the elderly: yes  Reviewed chart for potential of harmful drug interactions in the elderly:yes    Objective         Vitals:    01/10/20 1110   BP: 113/72   BP Location: Right arm   Patient Position: Sitting   Pulse: 61   Temp: 98.3 °F (36.8 °C)   TempSrc: Oral   SpO2: 98%   Weight: 119 kg (262 lb)   Height: 182.9 cm (72.01\")       Body mass index is 35.53 kg/m².  Discussed the patient's BMI with him. The BMI is above average; BMI management plan is completed.    Physical Exam   Constitutional: He appears well-developed and well-nourished. No distress.   HENT:   Head: Normocephalic and atraumatic.   Right Ear: External ear normal.   Left Ear: External ear normal.   Nose: Nose normal.   Mouth/Throat: Oropharynx is clear and moist. No oropharyngeal exudate.   Eyes: Pupils are equal, round, and reactive to light. Conjunctivae and EOM are normal. Right eye exhibits no discharge. Left eye exhibits no discharge. No scleral icterus.   Neck: Normal range of motion. Neck supple. No JVD present. No thyromegaly present.   Cardiovascular: Normal rate, regular rhythm, normal heart sounds and intact distal pulses. Exam reveals no gallop and no friction rub.   No murmur heard.  Pulmonary/Chest: Effort normal and breath sounds normal. No stridor. No respiratory distress. He has no wheezes.   Abdominal: Soft. Bowel sounds are normal. He exhibits no distension. There is no tenderness. There is no guarding.   Musculoskeletal: Normal range of motion. He exhibits tenderness. He exhibits no edema or deformity.       Neurological Sensory Findings - Altered hot/cold right ankle/foot discrimination and altered hot/cold left ankle/foot discrimination. Altered sharp/dull right ankle/foot discrimination and altered sharp/dull left ankle/foot discrimination. Right ankle/foot altered proprioception and left ankle/foot altered proprioception.  Vascular Status -  His right foot exhibits normal foot vasculature  and no edema. His left " foot exhibits normal foot vasculature  and no edema.  Skin Integrity  -  His right foot skin is intact.His left foot skin is intact..     Lymphadenopathy:     He has no cervical adenopathy.   Neurological: He is alert. He displays normal reflexes. He exhibits normal muscle tone. Coordination normal.   Skin: Skin is warm and dry. Capillary refill takes 2 to 3 seconds. No rash noted. He is not diaphoretic. No erythema. No pallor.   Psychiatric: He has a normal mood and affect. His behavior is normal.   Nursing note and vitals reviewed.      Lab Results   Component Value Date    TRIG 161 (H) 01/10/2020    HDL 35 (L) 01/10/2020    LDL 71 01/10/2020    VLDL 32.2 01/10/2020    HGBA1C 9.83 (H) 01/10/2020        Assessment/Plan   Medicare Risks and Personalized Health Plan  CMS Preventative Services Quick Reference  Abdominal Aortic Aneurysm Screening  Advance Directive Discussion  Cardiovascular risk  Chronic Pain   Diabetic Lab Screening   Fall Risk  Glaucoma Risk  Immunizations Discussed/Encouraged (specific immunizations; Influenza, Pneumococcal 23 and Prevnar )  Inadequate Social Support, Isolation, Loneliness, Lack of Transportation, Financial Difficulties, or Caregiver Stress   Obesity/Overweight   Polypharmacy  Prostate Cancer Screening     The above risks/problems have been discussed with the patient.  Pertinent information has been shared with the patient in the After Visit Summary.  Follow up plans and orders are seen below in the Assessment/Plan Section.    Diagnoses and all orders for this visit:    1. Medicare annual wellness visit, subsequent (Primary)    2. Anticoagulant long-term use  -     POC Protime / INR  -     gabapentin (NEURONTIN) 600 MG tablet; Take 1 tablet by mouth Daily.  Dispense: 30 tablet; Refill: 0    3. Immunization due  -     Fluad Quad >65 years ([id number])    4. Type 2 diabetes mellitus with stage 3 chronic kidney disease, with long-term current use of insulin (CMS/[facility])  -     Lipid  Panel; Future  -     Comprehensive Metabolic Panel; Future  -     Hemoglobin A1c; Future  -     Lipid Panel  -     Comprehensive Metabolic Panel  -     Hemoglobin A1c    5. CKD (chronic kidney disease), stage III (CMS/HCC)  -     gabapentin (NEURONTIN) 600 MG tablet; Take 1 tablet by mouth Daily.  Dispense: 30 tablet; Refill: 0  -     Comprehensive Metabolic Panel; Future  -     Comprehensive Metabolic Panel    6. Encounter for screening for malignant neoplasm of prostate  -     PSA Screen; Future  -     PSA Screen    7. Encounter for lipid screening for cardiovascular disease  -     Lipid Panel; Future  -     Lipid Panel    8. Diabetic polyneuropathy associated with type 2 diabetes mellitus (CMS/HCC)  -     gabapentin (NEURONTIN) 600 MG tablet; Take 1 tablet by mouth Daily.  Dispense: 30 tablet; Refill: 0  -     Comprehensive Metabolic Panel; Future  -     Comprehensive Metabolic Panel      Follow Up:  Return in about 3 months (around 4/10/2020).     An After Visit Summary and PPPS were given to the patient.

## 2020-01-27 NOTE — TELEPHONE ENCOUNTER
----- Message from Mesha Christina MD sent at 1/26/2020  8:26 PM EST -----  Please call Benitez. He really needs to work on his diabetes. It is affecting his kidneys - it went from a 1.3 to 1.6. He really needs to get the diabetes down. Does he need us to review a low carb diet with him? Does he need to see a dietician? I am waiting on insulin samples for him, but he really needs to work on this with diet and exercise too. When he is supposed to get his INR rechecked? Thanks.      Spoke with patient & he verbalized understanding,he reports he has been to see the dietician before & knows what to do,states he is supposed to have his INR checked in 3 weeks.

## 2020-03-23 DIAGNOSIS — Z79.01 ANTICOAGULANT LONG-TERM USE: ICD-10-CM

## 2020-03-23 RX ORDER — PRAVASTATIN SODIUM 40 MG
40 TABLET ORAL DAILY
Qty: 90 TABLET | Refills: 1 | Status: SHIPPED | OUTPATIENT
Start: 2020-03-23 | End: 2020-09-17

## 2020-03-23 RX ORDER — WARFARIN SODIUM 1 MG/1
TABLET ORAL
Qty: 30 TABLET | Refills: 1 | Status: SHIPPED | OUTPATIENT
Start: 2020-03-23 | End: 2020-06-29

## 2020-03-23 RX ORDER — SERTRALINE HYDROCHLORIDE 100 MG/1
150 TABLET, FILM COATED ORAL DAILY
Qty: 135 TABLET | Refills: 1 | Status: SHIPPED | OUTPATIENT
Start: 2020-03-23 | End: 2020-11-16

## 2020-04-08 DIAGNOSIS — N18.30 BENIGN HYPERTENSION WITH CKD (CHRONIC KIDNEY DISEASE) STAGE III (HCC): ICD-10-CM

## 2020-04-08 DIAGNOSIS — I12.9 BENIGN HYPERTENSION WITH CKD (CHRONIC KIDNEY DISEASE) STAGE III (HCC): ICD-10-CM

## 2020-04-09 RX ORDER — LOSARTAN POTASSIUM 100 MG/1
TABLET ORAL
Qty: 90 TABLET | Refills: 0 | Status: SHIPPED | OUTPATIENT
Start: 2020-04-09 | End: 2020-06-10 | Stop reason: SDUPTHER

## 2020-04-09 RX ORDER — SPIRONOLACTONE 25 MG/1
TABLET ORAL
Qty: 180 TABLET | Refills: 0 | Status: SHIPPED | OUTPATIENT
Start: 2020-04-09 | End: 2020-06-10 | Stop reason: SDUPTHER

## 2020-04-29 DIAGNOSIS — IMO0002 UNCONTROLLED TYPE 2 DIABETES WITH NEUROPATHY: ICD-10-CM

## 2020-06-10 ENCOUNTER — OFFICE VISIT (OUTPATIENT)
Dept: FAMILY MEDICINE CLINIC | Facility: CLINIC | Age: 65
End: 2020-06-10

## 2020-06-10 VITALS
HEART RATE: 60 BPM | SYSTOLIC BLOOD PRESSURE: 120 MMHG | HEIGHT: 72 IN | WEIGHT: 269.6 LBS | DIASTOLIC BLOOD PRESSURE: 76 MMHG | BODY MASS INDEX: 36.52 KG/M2 | TEMPERATURE: 97.8 F | OXYGEN SATURATION: 97 %

## 2020-06-10 DIAGNOSIS — N18.30 CKD (CHRONIC KIDNEY DISEASE), STAGE III (HCC): ICD-10-CM

## 2020-06-10 DIAGNOSIS — N18.30 TYPE 2 DIABETES MELLITUS WITH STAGE 3 CHRONIC KIDNEY DISEASE, WITH LONG-TERM CURRENT USE OF INSULIN (HCC): ICD-10-CM

## 2020-06-10 DIAGNOSIS — E13.618: ICD-10-CM

## 2020-06-10 DIAGNOSIS — I12.9 BENIGN HYPERTENSION WITH CKD (CHRONIC KIDNEY DISEASE) STAGE III (HCC): ICD-10-CM

## 2020-06-10 DIAGNOSIS — E11.22 TYPE 2 DIABETES MELLITUS WITH STAGE 3 CHRONIC KIDNEY DISEASE, WITH LONG-TERM CURRENT USE OF INSULIN (HCC): ICD-10-CM

## 2020-06-10 DIAGNOSIS — N18.30 BENIGN HYPERTENSION WITH CKD (CHRONIC KIDNEY DISEASE) STAGE III (HCC): ICD-10-CM

## 2020-06-10 DIAGNOSIS — Z79.01 ANTICOAGULANT LONG-TERM USE: Primary | ICD-10-CM

## 2020-06-10 DIAGNOSIS — Z79.4 TYPE 2 DIABETES MELLITUS WITH STAGE 3 CHRONIC KIDNEY DISEASE, WITH LONG-TERM CURRENT USE OF INSULIN (HCC): ICD-10-CM

## 2020-06-10 LAB — INR PPP: 2.9 (ref 0.9–1.1)

## 2020-06-10 PROCEDURE — 83036 HEMOGLOBIN GLYCOSYLATED A1C: CPT | Performed by: FAMILY MEDICINE

## 2020-06-10 PROCEDURE — 85610 PROTHROMBIN TIME: CPT | Performed by: FAMILY MEDICINE

## 2020-06-10 PROCEDURE — 80053 COMPREHEN METABOLIC PANEL: CPT | Performed by: FAMILY MEDICINE

## 2020-06-10 PROCEDURE — 36416 COLLJ CAPILLARY BLOOD SPEC: CPT | Performed by: FAMILY MEDICINE

## 2020-06-10 PROCEDURE — 99214 OFFICE O/P EST MOD 30 MIN: CPT | Performed by: FAMILY MEDICINE

## 2020-06-10 RX ORDER — CYCLOBENZAPRINE HCL 10 MG
10 TABLET ORAL 3 TIMES DAILY
Qty: 90 TABLET | Refills: 1 | Status: ON HOLD | OUTPATIENT
Start: 2020-06-10 | End: 2020-11-18

## 2020-06-10 RX ORDER — SPIRONOLACTONE 25 MG/1
25 TABLET ORAL 2 TIMES DAILY
Qty: 180 TABLET | Refills: 0 | Status: CANCELLED | OUTPATIENT
Start: 2020-06-10

## 2020-06-10 RX ORDER — LOSARTAN POTASSIUM 100 MG/1
100 TABLET ORAL DAILY
Qty: 90 TABLET | Refills: 0 | Status: CANCELLED | OUTPATIENT
Start: 2020-06-10

## 2020-06-10 RX ORDER — METOPROLOL SUCCINATE 50 MG/1
50 TABLET, EXTENDED RELEASE ORAL DAILY
Qty: 90 TABLET | Refills: 1 | Status: CANCELLED | OUTPATIENT
Start: 2020-06-10

## 2020-06-10 RX ORDER — CYCLOBENZAPRINE HCL 10 MG
10 TABLET ORAL 3 TIMES DAILY
Qty: 90 TABLET | Refills: 1 | Status: CANCELLED | OUTPATIENT
Start: 2020-06-10

## 2020-06-10 RX ORDER — METOPROLOL SUCCINATE 50 MG/1
50 TABLET, EXTENDED RELEASE ORAL DAILY
Qty: 90 TABLET | Refills: 1 | Status: SHIPPED | OUTPATIENT
Start: 2020-06-10 | End: 2021-01-28

## 2020-06-10 RX ORDER — LOSARTAN POTASSIUM 100 MG/1
100 TABLET ORAL DAILY
Qty: 90 TABLET | Refills: 1 | Status: ON HOLD | OUTPATIENT
Start: 2020-06-10 | End: 2020-11-21 | Stop reason: SDUPTHER

## 2020-06-10 RX ORDER — SPIRONOLACTONE 25 MG/1
25 TABLET ORAL 2 TIMES DAILY
Qty: 180 TABLET | Refills: 1 | Status: ON HOLD | OUTPATIENT
Start: 2020-06-10 | End: 2020-11-18

## 2020-06-11 ENCOUNTER — TELEPHONE (OUTPATIENT)
Dept: FAMILY MEDICINE CLINIC | Facility: CLINIC | Age: 65
End: 2020-06-11

## 2020-06-11 LAB
ALBUMIN SERPL-MCNC: 4.1 G/DL (ref 3.5–5.2)
ALBUMIN/GLOB SERPL: 1.2 G/DL
ALP SERPL-CCNC: 48 U/L (ref 39–117)
ALT SERPL W P-5'-P-CCNC: 24 U/L (ref 1–41)
ANION GAP SERPL CALCULATED.3IONS-SCNC: 11.6 MMOL/L (ref 5–15)
AST SERPL-CCNC: 20 U/L (ref 1–40)
BILIRUB SERPL-MCNC: 0.4 MG/DL (ref 0.2–1.2)
BUN BLD-MCNC: 32 MG/DL (ref 8–23)
BUN/CREAT SERPL: 20.6 (ref 7–25)
CALCIUM SPEC-SCNC: 9.3 MG/DL (ref 8.6–10.5)
CHLORIDE SERPL-SCNC: 103 MMOL/L (ref 98–107)
CO2 SERPL-SCNC: 22.4 MMOL/L (ref 22–29)
CREAT BLD-MCNC: 1.55 MG/DL (ref 0.76–1.27)
GFR SERPL CREATININE-BSD FRML MDRD: 45 ML/MIN/1.73
GLOBULIN UR ELPH-MCNC: 3.4 GM/DL
GLUCOSE BLD-MCNC: 216 MG/DL (ref 65–99)
HBA1C MFR BLD: 9.1 % (ref 4.8–5.6)
POTASSIUM BLD-SCNC: 5.7 MMOL/L (ref 3.5–5.2)
PROT SERPL-MCNC: 7.5 G/DL (ref 6–8.5)
SODIUM BLD-SCNC: 137 MMOL/L (ref 136–145)

## 2020-06-11 NOTE — TELEPHONE ENCOUNTER
----- Message from Mesha Christina MD sent at 6/11/2020  6:56 AM EDT -----  Please call Benitez and let him know that his labs are actually not as bad as he expected. Kidney function is just a smidge better as are his diabetes - 9.8 to 9.1. He is going to work on applying to the [employer] program so we can ensure that he has insulin. Thanks.      Left a message to return call.    Patient returned call & verbalized understanding.

## 2020-06-29 ENCOUNTER — LAB (OUTPATIENT)
Dept: FAMILY MEDICINE CLINIC | Facility: CLINIC | Age: 65
End: 2020-06-29

## 2020-06-29 DIAGNOSIS — Z79.01 ANTICOAGULANT LONG-TERM USE: ICD-10-CM

## 2020-06-29 DIAGNOSIS — N18.30 CKD (CHRONIC KIDNEY DISEASE), STAGE III (HCC): Primary | ICD-10-CM

## 2020-06-29 PROCEDURE — 82570 ASSAY OF URINE CREATININE: CPT | Performed by: INTERNAL MEDICINE

## 2020-06-29 PROCEDURE — 82043 UR ALBUMIN QUANTITATIVE: CPT | Performed by: INTERNAL MEDICINE

## 2020-06-29 PROCEDURE — 80048 BASIC METABOLIC PNL TOTAL CA: CPT | Performed by: INTERNAL MEDICINE

## 2020-06-29 PROCEDURE — 36415 COLL VENOUS BLD VENIPUNCTURE: CPT

## 2020-06-29 RX ORDER — WARFARIN SODIUM 1 MG/1
TABLET ORAL
Qty: 30 TABLET | Refills: 0 | Status: SHIPPED | OUTPATIENT
Start: 2020-06-29 | End: 2020-08-12

## 2020-06-29 NOTE — PROGRESS NOTES
"Benitez Miranda     VITALS: Blood pressure 120/76, pulse 60, temperature 97.8 °F (36.6 °C), height 182.9 cm (72.01\"), weight 122 kg (269 lb 9.6 oz), SpO2 97 %.    Subjective  Chief Complaint:   Chief Complaint   Patient presents with   • Diabetes     follow up         History of Present Illness:  Patient is a 64 y.o.  male with a medical history significant for CAD, hypertension, hyperlipidemia, and type 2 diabetes who presents to clinic secondary to medical followup.  No new or acute concerns.  Patient is doing well.    Patient has hypertension and is currently on losartan 100 mg orally daily, metoprolol XL 50 mg orally daily, and spironolactone 25 mg orally twice a day without any side effects.  He denies any dizziness, lightheadedness, blurry vision, chest pain, or edema.  He does check his blood pressures at home and they are within normal limits.  He occasionally follows a low-salt diet.    Patient has type 2 diabetes and is currently on Lantus 41 units in the a.m. and 43 units in the p.m. along with Humalog 15 units 3 times a day with meals.  He cannot be on metformin or glipizide secondary to his renal function.  Toujeo does work better than Lantus, but Lantus is covered on his insurance.  He has a history of not taking full amounts of insulin secondary to him not having enough money to afford medications.  He denies any side effects of his medications.  He denies any changes in vision, polydipsia, polyuria, numbness or tingling, or hypoglycemic episodes.  He does follow a diabetic diet and checks his glucose levels regularly.  They are usually stable.  He does have complications of neuropathy and is on gabapentin 600 mg orally daily as needed.  He denies any side effects of that medication.  He does have nephropathy.  No retinopathy.  He sees Dr. De La Torre for his eye exams and he has a history of cataracts and laser procedures.    Patient has hyperlipidemia and is currently on pravastatin 40 mg orally daily " without any side effects.  He denies any muscle weakness, itching, or jaundice.  He tries to follow a low-cholesterol diet.    Patient has allergic rhinitis and is currently on Flonase daily and Zyrtec 10 mg orally daily without any side effects.  He denies any sinus congestion, sinus headache, watery eyes, itchy eyes, rhinorrhea, coughing, postnasal discharge.  He has not ever had any allergy injections.    Patient has depression and is currently on Zoloft 150 mg orally daily without any side effects.  He is sleeping fairly well and can get out of bed daily to accomplish daily activities.  He has no anhedonia.  Mood is stable.  He has no feelings of guilt.  He has good concentration and memory ability.  He has energy.  He is able to make goals.  He is not crying a lot.  Appetite is good.  He denies any auditory or visual hallucinations.  He denies any suicidal or homicidal ideations.    Patient does have COPD and is currently on no medications.  He feels that his breathing is better right now.  He has quit smoking last year.  Inhalers are too expensive for him.  He denies any coughing, wheezing, or night coughing.  Has not had exacerbation in quite some time.    Patient has chronic back pain and is currently on Flexeril 10 mg orally 3 times a day as needed very intermittently along with gabapentin 600 mg orally daily without any side effects.  Medications do somewhat control the pain enough so that he can accomplish daily activities.  Movement and ambulation are okay.  He denies any sedation from the medications.  No pain medication seeking behavior.  MRI of the lumbar spine in May 2015 shows disc desiccation without any herniations.    Patient has an extensive history of CAD with a history of a mechanical aortic valve replacement.  He does have a history of 2 cardiac caths, one in 2004 with 1 stent placement and another in June 2015 which showed pulmonary hypertension.  Echo in February 2016 showed ejection  fraction 50 to 55%.  He does have a history of MV stenosis and rheumatic valve.  Has seen Dr. Torres in the past.  Patient is currently on aspirin 81 mg orally daily, losartan 100 mg orally daily, metoprolol XL 50 mg orally daily, and Coumadin daily.    Patient has stage III chronic kidney disease and sees Dr. Landis.  Baseline creatinine is 1.3.  He has not had any exacerbations.    Lipid panel and PSA were checked January 2020.  Patient is overdue for colonoscopy; he declines today.    No complaints about any of the medications.    The following portions of the patient's history were reviewed and updated as appropriate: allergies, current medications, past family history, past medical history, past social history, past surgical history and problem list.    Past Medical History  Past Medical History:   Diagnosis Date   • CAD (coronary artery disease)     x1 stent; pulmonary HTN; EF 50-55%; rheumatic valve; MV stenosis   • CHF (congestive heart failure) (CMS/[facility])    • CKD (chronic kidney disease), stage III (CMS/[facility])    • COPD (chronic obstructive pulmonary disease) (CMS/[facility])    • Depression    • Diabetes mellitus (CMS/[facility])    • H/O mechanical aortic valve replacement    • History of tobacco abuse    • Hyperlipidemia    • Hypertension    • Ischemic heart disease    • Kidney failure     third stage   • Low back pain    • Obesity     BMI 36.   • Valvular heart disease        Review of Systems   Respiratory: Negative for shortness of breath and wheezing.    Cardiovascular: Negative for chest pain and palpitations.   Musculoskeletal: Positive for back pain and myalgias.       Surgical History  Past Surgical History:   Procedure Laterality Date   • AORTIC VALVE REPAIR/REPLACEMENT  1991   • CARDIAC CATHETERIZATION     • CARDIAC SURGERY  1991    valve on aortic   • CARDIAC SURGERY  2001    stent    • CATARACT EXTRACTION     • COLONOSCOPY  2001   • CORONARY STENT PLACEMENT  2005       Family History  Family History    Problem Relation Age of Onset   • Cancer Mother         breast   • COPD Father    • Heart attack Father 74   • Diabetes Paternal Grandmother         both legs removed       Social History  Social History     Socioeconomic History   • Marital status:      Spouse name: Not on file   • Number of children: Not on file   • Years of education: Not on file   • Highest education level: Not on file   Tobacco Use   • Smoking status: Former Smoker     Years: 40.00     Types: Pipe, Cigars     Last attempt to quit: 7/5/2016     Years since quitting: 3.9   • Smokeless tobacco: Never Used   • Tobacco comment: currently smokes a pipe   Substance and Sexual Activity   • Alcohol use: No   • Drug use: No   • Sexual activity: Defer       Objective  Physical Exam    Gen: Patient in NAD. Pleasant and answers appropriately. A&Ox3.    Skin: Warm and dry with normal turgor. No purpura, rashes, or unusual pigmentation noted. Hair is normal in appearance and distribution.    HEENT: NC/AT. No lesions noted. Conjunctiva clear, sclera nonicteric. PERRL. EOMI without nystagmus or strabismus. Fundi appear benign. No hemorrhages or exudates of eyes. Auditory canals are patent bilaterally without lesions. TMs intact,  nonerythematous, nonbulging without lesions. Nasal mucosa pink, nonerythematous, and nonedematous. Frontal and maxillary sinuses are nontender. O/P nonerythematous and moist without exudate.    Neck: Supple without lymph nodes palpated. FROM.     Lungs: CTA B/L without rales, rhonchi, crackles, or wheezes.    Heart: Distant.  RRR. S1 and S2 normal. No S3 or S4. No MRGT.    Abd: Soft, nontender,nondistended. (+)BSx4 quadrants.     Extrem: No CCE. Radial pulses 2+/4 and equal B/L. FROMx4.    Neuro: No focal motor/sensory deficits.    Procedures    Assessment/Plan  Benitez Miranda is a 64 y.o. here for medical followup.  Diagnoses and all orders for this visit:    Anticoagulant long-term use  -     POCT INR  2.9 INR today.   Patient is currently alternating 7 mg and 8 mg.  Will recheck in a month.    Benign hypertension with CKD (chronic kidney disease) stage III (CMS/HCC)  -     spironolactone (ALDACTONE) 25 MG tablet; Take 1 tablet by mouth 2 (Two) Times a Day.  -     losartan (COZAAR) 100 MG tablet; Take 1 tablet by mouth Daily.  -     metoprolol succinate XL (TOPROL-XL) 50 MG 24 hr tablet; Take 1 tablet by mouth Daily.  -     Comprehensive Metabolic Panel; Future  -     Comprehensive Metabolic Panel  Stable today.  Continue losartan 100 mg orally daily, metoprolol XL 50 mg orally daily, spironolactone 25 mg 1 twice a day.    Type 2 diabetes mellitus with stage 3 chronic kidney disease, with long-term current use of insulin (CMS/[facility])  -     Comprehensive Metabolic Panel; Future  -     Hemoglobin A1c; Future  -     Comprehensive Metabolic Panel  -     Hemoglobin A1c  Will likely be elevated.  Will get labs today.  Continue Lantus 41 units in the a.m. and 43 units in the p.m. along with Humalog 15 units 3 times a day.    CKD (chronic kidney disease), stage III (CMS/HCC)  -     Comprehensive Metabolic Panel; Future  -     Hemoglobin A1c; Future  -     Comprehensive Metabolic Panel  -     Hemoglobin A1c  Recheck creatinine today.  Has been stable    Arthritis associated with secondary diabetes (CMS/HCC)  -     cyclobenzaprine (FLEXERIL) 10 MG tablet; Take 1 tablet by mouth 3 (Three) Times a Day. for muscle spams  -     Comprehensive Metabolic Panel; Future  -     Comprehensive Metabolic Panel  Continue Flexeril 10 mg orally 3 times a day as needed along with gabapentin 600 mg orally daily.      Patient's Body mass index is 36.56 kg/m². BMI is above normal parameters. Recommendations include: exercise counseling and nutrition counseling.     Findings and plans discussed with patient who verbalizes understanding and agreement. Will followup with patient once results are in. Patient to followup at clinic PRN or in three months for further  medical followup.    MD HUYEN Kohler/Transcription Disclaimer:  Much of this encounter note is an electronic transcription/translation of spoken language to printed text.  The electronic translation of spoken language may permit erroneous, or at times, nonsensical words or phrases to be inadvertently transcribed.  Although I have reviewed the note for such errors, some may still exist.

## 2020-06-30 LAB
ALBUMIN UR-MCNC: 19.9 MG/DL
ANION GAP SERPL CALCULATED.3IONS-SCNC: 11.3 MMOL/L (ref 5–15)
BUN SERPL-MCNC: 20 MG/DL (ref 8–23)
BUN/CREAT SERPL: 13.2 (ref 7–25)
CALCIUM SPEC-SCNC: 9.6 MG/DL (ref 8.6–10.5)
CHLORIDE SERPL-SCNC: 99 MMOL/L (ref 98–107)
CO2 SERPL-SCNC: 23.7 MMOL/L (ref 22–29)
CREAT SERPL-MCNC: 1.51 MG/DL (ref 0.76–1.27)
CREAT UR-MCNC: 87.9 MG/DL
GFR SERPL CREATININE-BSD FRML MDRD: 46 ML/MIN/1.73
GLUCOSE SERPL-MCNC: 250 MG/DL (ref 65–99)
MICROALBUMIN/CREAT UR: 226.4 MG/G
POTASSIUM SERPL-SCNC: 4.8 MMOL/L (ref 3.5–5.2)
SODIUM SERPL-SCNC: 134 MMOL/L (ref 136–145)

## 2020-08-01 DIAGNOSIS — Z79.01 ANTICOAGULANT LONG-TERM USE: ICD-10-CM

## 2020-08-03 RX ORDER — WARFARIN SODIUM 7.5 MG/1
TABLET ORAL
Qty: 45 TABLET | Refills: 0 | Status: SHIPPED | OUTPATIENT
Start: 2020-08-03 | End: 2020-10-29

## 2020-08-03 RX ORDER — WARFARIN SODIUM 6 MG/1
TABLET ORAL
Qty: 45 TABLET | Refills: 0 | Status: SHIPPED | OUTPATIENT
Start: 2020-08-03 | End: 2020-10-29

## 2020-08-12 DIAGNOSIS — Z79.01 ANTICOAGULANT LONG-TERM USE: ICD-10-CM

## 2020-08-12 RX ORDER — WARFARIN SODIUM 1 MG/1
TABLET ORAL
Qty: 30 TABLET | Refills: 0 | Status: SHIPPED | OUTPATIENT
Start: 2020-08-12 | End: 2020-09-22 | Stop reason: SDUPTHER

## 2020-08-14 DIAGNOSIS — IMO0002 UNCONTROLLED TYPE 2 DIABETES WITH NEUROPATHY: ICD-10-CM

## 2020-09-17 RX ORDER — PRAVASTATIN SODIUM 40 MG
TABLET ORAL
Qty: 90 TABLET | Refills: 0 | Status: SHIPPED | OUTPATIENT
Start: 2020-09-17 | End: 2020-12-17

## 2020-09-22 ENCOUNTER — OFFICE VISIT (OUTPATIENT)
Dept: FAMILY MEDICINE CLINIC | Facility: CLINIC | Age: 65
End: 2020-09-22

## 2020-09-22 VITALS
BODY MASS INDEX: 36.9 KG/M2 | SYSTOLIC BLOOD PRESSURE: 108 MMHG | HEART RATE: 63 BPM | DIASTOLIC BLOOD PRESSURE: 48 MMHG | WEIGHT: 272.4 LBS | RESPIRATION RATE: 20 BRPM | HEIGHT: 72 IN | TEMPERATURE: 96.4 F | OXYGEN SATURATION: 98 %

## 2020-09-22 DIAGNOSIS — N18.30 BENIGN HYPERTENSION WITH CKD (CHRONIC KIDNEY DISEASE) STAGE III (HCC): ICD-10-CM

## 2020-09-22 DIAGNOSIS — IMO0002 UNCONTROLLED TYPE 2 DIABETES WITH NEUROPATHY: ICD-10-CM

## 2020-09-22 DIAGNOSIS — Z95.2 H/O MECHANICAL AORTIC VALVE REPLACEMENT: Primary | ICD-10-CM

## 2020-09-22 DIAGNOSIS — Z79.01 ANTICOAGULANT LONG-TERM USE: ICD-10-CM

## 2020-09-22 DIAGNOSIS — I12.9 BENIGN HYPERTENSION WITH CKD (CHRONIC KIDNEY DISEASE) STAGE III (HCC): ICD-10-CM

## 2020-09-22 DIAGNOSIS — Z23 NEEDS FLU SHOT: ICD-10-CM

## 2020-09-22 LAB — INR PPP: 3.7 (ref 0.9–1.1)

## 2020-09-22 PROCEDURE — 80053 COMPREHEN METABOLIC PANEL: CPT | Performed by: FAMILY MEDICINE

## 2020-09-22 PROCEDURE — 36416 COLLJ CAPILLARY BLOOD SPEC: CPT | Performed by: FAMILY MEDICINE

## 2020-09-22 PROCEDURE — 90694 VACC AIIV4 NO PRSRV 0.5ML IM: CPT | Performed by: FAMILY MEDICINE

## 2020-09-22 PROCEDURE — G0008 ADMIN INFLUENZA VIRUS VAC: HCPCS | Performed by: FAMILY MEDICINE

## 2020-09-22 PROCEDURE — 85610 PROTHROMBIN TIME: CPT | Performed by: FAMILY MEDICINE

## 2020-09-22 PROCEDURE — 83036 HEMOGLOBIN GLYCOSYLATED A1C: CPT | Performed by: FAMILY MEDICINE

## 2020-09-22 PROCEDURE — 99214 OFFICE O/P EST MOD 30 MIN: CPT | Performed by: FAMILY MEDICINE

## 2020-09-22 RX ORDER — AMLODIPINE BESYLATE 5 MG/1
1 TABLET ORAL DAILY
COMMUNITY
Start: 2020-07-01 | End: 2021-07-22 | Stop reason: SDUPTHER

## 2020-09-22 RX ORDER — WARFARIN SODIUM 1 MG/1
1 TABLET ORAL DAILY
Qty: 90 TABLET | Refills: 1 | Status: ON HOLD | OUTPATIENT
Start: 2020-09-22 | End: 2020-11-18

## 2020-09-22 RX ORDER — WARFARIN SODIUM 1 MG/1
1 TABLET ORAL DAILY
Qty: 30 TABLET | Refills: 0 | Status: SHIPPED | OUTPATIENT
Start: 2020-09-22 | End: 2020-09-22 | Stop reason: SDUPTHER

## 2020-09-23 LAB
ALBUMIN SERPL-MCNC: 3.9 G/DL (ref 3.5–5.2)
ALBUMIN/GLOB SERPL: 1.2 G/DL
ALP SERPL-CCNC: 58 U/L (ref 39–117)
ALT SERPL W P-5'-P-CCNC: 21 U/L (ref 1–41)
ANION GAP SERPL CALCULATED.3IONS-SCNC: 10.1 MMOL/L (ref 5–15)
AST SERPL-CCNC: 17 U/L (ref 1–40)
BILIRUB SERPL-MCNC: 0.4 MG/DL (ref 0–1.2)
BUN SERPL-MCNC: 29 MG/DL (ref 8–23)
BUN/CREAT SERPL: 19.5 (ref 7–25)
CALCIUM SPEC-SCNC: 9.1 MG/DL (ref 8.6–10.5)
CHLORIDE SERPL-SCNC: 103 MMOL/L (ref 98–107)
CO2 SERPL-SCNC: 23.9 MMOL/L (ref 22–29)
CREAT SERPL-MCNC: 1.49 MG/DL (ref 0.76–1.27)
GFR SERPL CREATININE-BSD FRML MDRD: 47 ML/MIN/1.73
GLOBULIN UR ELPH-MCNC: 3.3 GM/DL
GLUCOSE SERPL-MCNC: 305 MG/DL (ref 65–99)
HBA1C MFR BLD: 8.3 % (ref 4.8–5.6)
POTASSIUM SERPL-SCNC: 5.5 MMOL/L (ref 3.5–5.2)
PROT SERPL-MCNC: 7.2 G/DL (ref 6–8.5)
SODIUM SERPL-SCNC: 137 MMOL/L (ref 136–145)

## 2020-09-25 ENCOUNTER — TELEPHONE (OUTPATIENT)
Dept: FAMILY MEDICINE CLINIC | Facility: CLINIC | Age: 65
End: 2020-09-25

## 2020-09-25 NOTE — TELEPHONE ENCOUNTER
----- Message from Mesha Christina MD sent at 9/25/2020  4:40 AM EDT -----  Please call Darren and let him know that he is stable. I know he's been working at it - A1C has gone from a 9.1 to 8.3. Thanks.      Called patient no answer, left message to return call.     Patient returned call & verbalized understanding.

## 2020-09-25 NOTE — TELEPHONE ENCOUNTER
----- Message from Mesha Christina MD sent at 9/25/2020  4:40 AM EDT -----  Please call Darern and let him know that he is stable. I know he's been working at it - A1C has gone from a 9.1 to 8.3. Thanks.      Called patient no answer, left message to return call.

## 2020-10-12 NOTE — PROGRESS NOTES
"Benitez Miranda     VITALS: Blood pressure 108/48, pulse 63, temperature 96.4 °F (35.8 °C), temperature source Temporal, resp. rate 20, height 182.9 cm (72.01\"), weight 124 kg (272 lb 6.4 oz), SpO2 98 %.    Subjective  Chief Complaint:   Chief Complaint   Patient presents with   • Anticoagulant long-term use        History of Present Illness:  Patient is a 65 y.o.  male with a medical history significant for CAD, hypertension, hyperlipidemia, and type 2 diabetes who presents to clinic secondary to medical followup.  No new or acute concerns.  Patient is doing well.  Since his last visit, he really has been working on his diet and exercise for his diabetes.    Patient has chronic conditions including type 2 diabetes, hypertension, CKD stage III, neuropathy, status post aortic valve replacement, and anticoagulant long-term use secondary to the replacement.  These chronic conditions are stable and unchanged.  Medications associated with these chronic conditions are not giving him any side effects.    No complaints about any of the medications.    The following portions of the patient's history were reviewed and updated as appropriate: allergies, current medications, past family history, past medical history, past social history, past surgical history and problem list.    Past Medical History  Past Medical History:   Diagnosis Date   • CAD (coronary artery disease)     x1 stent; pulmonary HTN; EF 50-55%; rheumatic valve; MV stenosis   • CHF (congestive heart failure) (CMS/HCC)    • CKD (chronic kidney disease), stage III (CMS/HCC)    • COPD (chronic obstructive pulmonary disease) (CMS/HCC)    • Depression    • Diabetes mellitus (CMS/HCC)    • H/O mechanical aortic valve replacement    • History of tobacco abuse    • Hyperlipidemia    • Hypertension    • Ischemic heart disease    • Kidney failure     third stage   • Low back pain    • Obesity     BMI 36.   • Valvular heart disease        Review of Systems   Respiratory: " Negative for shortness of breath and wheezing.    Cardiovascular: Negative for chest pain and palpitations.       Surgical History  Past Surgical History:   Procedure Laterality Date   • AORTIC VALVE REPAIR/REPLACEMENT     • CARDIAC CATHETERIZATION     • CARDIAC SURGERY      valve on aortic   • CARDIAC SURGERY      stent    • CATARACT EXTRACTION     • COLONOSCOPY     • CORONARY STENT PLACEMENT         Family History  Family History   Problem Relation Age of Onset   • Cancer Mother         breast   • COPD Father    • Heart attack Father 74   • Diabetes Paternal Grandmother         both legs removed       Social History  Social History     Socioeconomic History   • Marital status:      Spouse name: Not on file   • Number of children: Not on file   • Years of education: Not on file   • Highest education level: Not on file   Tobacco Use   • Smoking status: Former Smoker     Years: 40.00     Types: Pipe, Cigars     Quit date: 2016     Years since quittin.2   • Smokeless tobacco: Never Used   • Tobacco comment: currently smokes a pipe   Substance and Sexual Activity   • Alcohol use: No   • Drug use: No   • Sexual activity: Defer       Objective  Physical Exam  Cardiovascular:      Pulses:           Dorsalis pedis pulses are 1+ on the right side and 1+ on the left side.        Posterior tibial pulses are 1+ on the right side and 1+ on the left side.   Feet:      Right foot:      Protective Sensation: 5 sites tested. 2 sites sensed.      Skin integrity: Skin integrity normal.      Toenail Condition: Right toenails are normal.      Left foot:      Protective Sensation: 5 sites tested. 2 sites sensed.      Skin integrity: Skin integrity normal.      Toenail Condition: Left toenails are normal.      Comments: Diabetic Foot Exam Performed and Monofilament Test Performed          Gen: Patient in NAD. Pleasant and answers appropriately. A&Ox3.    Skin: Warm and dry with normal turgor. No  purpura, rashes, or unusual pigmentation noted. Hair is normal in appearance and distribution.    HEENT: NC/AT. No lesions noted. Conjunctiva clear, sclera nonicteric. PERRL. EOMI without nystagmus or strabismus. Fundi appear benign. No hemorrhages or exudates of eyes. Auditory canals are patent bilaterally without lesions. TMs intact,  nonerythematous, nonbulging without lesions. Nasal mucosa pink, nonerythematous, and nonedematous. Frontal and maxillary sinuses are nontender. O/P nonerythematous and moist without exudate.    Neck: Supple without lymph nodes palpated. FROM.     Lungs: CTA B/L without rales, rhonchi, crackles, or wheezes.    Heart: RRR. S1 and S2 normal. No S3 or S4. No MRGT.  Click heard at upper left sternal border.    Abd: Soft, nontender,nondistended. (+)BSx4 quadrants.     Extrem: No CC.  Trace edema bilateral lower extremities.  Radial pulses 2+/4 and equal B/L. FROMx4. No bone, joint, or muscle tenderness noted.    Neuro: No focal motor/sensory deficits.    Procedures    Assessment/Plan  Benitez Miranda is a 65 y.o. here for medical followup.  Diagnoses and all orders for this visit:    H/O mechanical aortic valve replacement  -     POCT INR  INR today 3.7.  Patient currently alternating 7 mg and 8 mg of Coumadin every other day.  Patient to hold Coumadin and will come back in at the end of this week for recheck.    Anticoagulant long-term use  -     POCT INR  -     warfarin (COUMADIN) 1 MG tablet; Take 1 tablet by mouth Daily.  INR today 3.7.  Patient currently alternating 7 mg and 8 mg of Coumadin every other day.  Patient to hold Coumadin and will come back in addendum this week to recheck.      Uncontrolled type 2 diabetes with neuropathy (CMS/[facility])  -     Comprehensive Metabolic Panel; Future  -     Hemoglobin A1c; Future  -     Comprehensive Metabolic Panel  -     Hemoglobin A1c  Patient to continue Lantus 41 units at night and Humalog 15 units 3 times a day with meals.  Will check labs  today.    Needs flu shot  -     Fluad Quad 65+ yrs ([id number])    Benign hypertension with CKD (chronic kidney disease) stage III  Stable and within normal limits today.  Patient to continue amlodipine 5 mg orally daily, spironolactone 25 mg orally twice a day, losartan 100 mg orally daily, and metoprolol XL 50 mg orally daily.      Patient's Body mass index is 36.94 kg/m². BMI is above normal parameters. Recommendations include: exercise counseling and nutrition counseling.     Findings and plans discussed with patient who verbalizes understanding and agreement. Will followup with patient once results are in. Patient to followup at clinic PRN or in four months for further medical followup.    Mesha Christina MD    EMR Dragon/Transcription Disclaimer:  Much of this encounter note is an electronic transcription/translation of spoken language to printed text.  The electronic translation of spoken language may permit erroneous, or at times, nonsensical words or phrases to be inadvertently transcribed.  Although I have reviewed the note for such errors, some may still exist.

## 2020-10-28 DIAGNOSIS — Z79.01 ANTICOAGULANT LONG-TERM USE: ICD-10-CM

## 2020-10-29 DIAGNOSIS — Z79.01 ANTICOAGULANT LONG-TERM USE: ICD-10-CM

## 2020-10-29 RX ORDER — WARFARIN SODIUM 7.5 MG/1
TABLET ORAL
Qty: 45 TABLET | Refills: 5 | Status: ON HOLD | OUTPATIENT
Start: 2020-10-29 | End: 2020-11-18

## 2020-10-29 RX ORDER — WARFARIN SODIUM 6 MG/1
TABLET ORAL
Qty: 45 TABLET | Refills: 5 | Status: SHIPPED | OUTPATIENT
Start: 2020-10-29 | End: 2020-11-17 | Stop reason: SDUPTHER

## 2020-11-16 RX ORDER — SERTRALINE HYDROCHLORIDE 100 MG/1
TABLET, FILM COATED ORAL
Qty: 135 TABLET | Refills: 1 | Status: ON HOLD | OUTPATIENT
Start: 2020-11-16 | End: 2020-11-18

## 2020-11-17 ENCOUNTER — HOSPITAL ENCOUNTER (OUTPATIENT)
Dept: CT IMAGING | Facility: HOSPITAL | Age: 65
Discharge: HOME | End: 2020-11-17
Admitting: FAMILY MEDICINE

## 2020-11-17 ENCOUNTER — OFFICE VISIT (OUTPATIENT)
Dept: FAMILY MEDICINE CLINIC | Facility: CLINIC | Age: 65
End: 2020-11-17

## 2020-11-17 VITALS
SYSTOLIC BLOOD PRESSURE: 120 MMHG | TEMPERATURE: 97.5 F | OXYGEN SATURATION: 98 % | HEIGHT: 72 IN | HEART RATE: 78 BPM | BODY MASS INDEX: 37.82 KG/M2 | WEIGHT: 279.2 LBS | DIASTOLIC BLOOD PRESSURE: 78 MMHG

## 2020-11-17 DIAGNOSIS — Z79.01 ANTICOAGULANT LONG-TERM USE: Primary | ICD-10-CM

## 2020-11-17 DIAGNOSIS — R79.1 SUPRATHERAPEUTIC INR: ICD-10-CM

## 2020-11-17 DIAGNOSIS — Z87.442 FLANK PAIN WITH HISTORY OF UROLITHIASIS: ICD-10-CM

## 2020-11-17 DIAGNOSIS — M54.50 LOW BACK PAIN, UNSPECIFIED BACK PAIN LATERALITY, UNSPECIFIED CHRONICITY, UNSPECIFIED WHETHER SCIATICA PRESENT: ICD-10-CM

## 2020-11-17 DIAGNOSIS — R10.9 FLANK PAIN WITH HISTORY OF UROLITHIASIS: ICD-10-CM

## 2020-11-17 LAB
BILIRUB BLD-MCNC: NEGATIVE MG/DL
CLARITY, POC: CLEAR
COLOR UR: YELLOW
GLUCOSE UR STRIP-MCNC: NEGATIVE MG/DL
INR PPP: 3.9 (ref 0.9–1.1)
KETONES UR QL: NEGATIVE
LEUKOCYTE EST, POC: NEGATIVE
NITRITE UR-MCNC: NEGATIVE MG/ML
PH UR: 6 [PH] (ref 5–8)
PROT UR STRIP-MCNC: NEGATIVE MG/DL
RBC # UR STRIP: NEGATIVE /UL
SP GR UR: 1.01 (ref 1–1.03)
UROBILINOGEN UR QL: NORMAL

## 2020-11-17 PROCEDURE — 96372 THER/PROPH/DIAG INJ SC/IM: CPT | Performed by: FAMILY MEDICINE

## 2020-11-17 PROCEDURE — 36416 COLLJ CAPILLARY BLOOD SPEC: CPT | Performed by: FAMILY MEDICINE

## 2020-11-17 PROCEDURE — 81003 URINALYSIS AUTO W/O SCOPE: CPT | Performed by: FAMILY MEDICINE

## 2020-11-17 PROCEDURE — 74176 CT ABD & PELVIS W/O CONTRAST: CPT | Performed by: RADIOLOGY

## 2020-11-17 PROCEDURE — 85610 PROTHROMBIN TIME: CPT | Performed by: FAMILY MEDICINE

## 2020-11-17 PROCEDURE — 74176 CT ABD & PELVIS W/O CONTRAST: CPT

## 2020-11-17 PROCEDURE — 99214 OFFICE O/P EST MOD 30 MIN: CPT | Performed by: FAMILY MEDICINE

## 2020-11-17 RX ORDER — KETOROLAC TROMETHAMINE 30 MG/ML
30 INJECTION, SOLUTION INTRAMUSCULAR; INTRAVENOUS ONCE
Status: COMPLETED | OUTPATIENT
Start: 2020-11-17 | End: 2020-11-17

## 2020-11-17 RX ORDER — TAMSULOSIN HYDROCHLORIDE 0.4 MG/1
1 CAPSULE ORAL DAILY
Qty: 30 CAPSULE | Refills: 0 | Status: ON HOLD | OUTPATIENT
Start: 2020-11-17 | End: 2020-11-18

## 2020-11-17 RX ORDER — WARFARIN SODIUM 6 MG/1
TABLET ORAL
Qty: 45 TABLET | Refills: 5 | Status: SHIPPED | OUTPATIENT
Start: 2020-11-17 | End: 2021-01-22 | Stop reason: SDUPTHER

## 2020-11-17 RX ADMIN — KETOROLAC TROMETHAMINE 30 MG: 30 INJECTION, SOLUTION INTRAMUSCULAR; INTRAVENOUS at 12:13

## 2020-11-17 NOTE — PROGRESS NOTES
Subjective   Benitez Miranda is a 65 y.o. male.   Pt presents today with CC of Flank Pain      History of Present Illness   Patient is a 65-year-old male with history of urolithiasis requiring stent.  He reports he developed left flank pain associated with chills last night, his symptoms are consistent with his stones in the past.  He has history of chronic kidney disease stage III, he is overdue for his INR check.  His INR was checked and found to be 3.9, his goal is 2.5-3.5 for mechanical heart valve.  Last month at his last INR check, it was 3.7, his dose was decreased, and hiS warfarin was held for a few days.  He denies symptoms of bleeding.  He reports that his pain today is 9 out of 10, he requests a Toradol shot.       The following portions of the patient's history were reviewed and updated as appropriate: allergies, current medications, past family history, past medical history, past social history, past surgical history and problem list.    Review of Systems   Constitutional: Negative for chills, fever and unexpected weight loss.   HENT: Negative for congestion and sore throat.    Eyes: Negative for blurred vision and visual disturbance.   Respiratory: Negative for cough and wheezing.    Cardiovascular: Negative for chest pain and palpitations.   Gastrointestinal: Negative for abdominal pain and diarrhea.   Endocrine: Negative for cold intolerance and heat intolerance.   Genitourinary: Positive for flank pain. Negative for difficulty urinating and dysuria.   Musculoskeletal: Negative for arthralgias and neck stiffness.   Neurological: Negative for dizziness, seizures and syncope.   Psychiatric/Behavioral: Negative for self-injury, suicidal ideas and depressed mood.       Objective   Physical Exam  Vitals signs and nursing note reviewed.   Constitutional:       Appearance: He is well-developed.   HENT:      Head: Normocephalic and atraumatic.      Right Ear: External ear normal.      Left Ear: External ear  normal.      Nose: Nose normal.   Eyes:      Conjunctiva/sclera: Conjunctivae normal.      Pupils: Pupils are equal, round, and reactive to light.   Neck:      Musculoskeletal: Normal range of motion and neck supple.   Cardiovascular:      Rate and Rhythm: Normal rate and regular rhythm.      Heart sounds: Normal heart sounds.   Pulmonary:      Effort: Pulmonary effort is normal.      Breath sounds: Normal breath sounds.   Abdominal:      General: Bowel sounds are normal.      Palpations: Abdomen is soft.      Tenderness: There is left CVA tenderness.      Comments: Positive Eduardo sign on the left   Skin:     General: Skin is warm and dry.      Comments: There is a half dollar sized macular rash around the level of L1 on the left over the paraspinal musculature, this is not seem to be related, it does not appear to be shingles.   Neurological:      Mental Status: He is alert and oriented to person, place, and time.   Psychiatric:         Behavior: Behavior normal.           Assessment/Plan   Diagnoses and all orders for this visit:    1. Anticoagulant long-term use (Primary)  -     POCT INR  -     warfarin (COUMADIN) 6 MG tablet; TAKE ONE TABLET BY MOUTH EVERY EVENING.  Dispense: 45 tablet; Refill: 5    2. Low back pain, unspecified back pain laterality, unspecified chronicity, unspecified whether sciatica present  -     POCT urinalysis dipstick, automated  -     CT Abdomen Pelvis Without Contrast  -     ketorolac (TORADOL) injection 30 mg    3. Supratherapeutic INR  He is to hold warfarin for the very least 1 day.  If he has a kidney stone, I will defer to urology for their management of supratherapeutic INR as it will affect their ability to perform any invasive procedures.  If he does not have a stone, he is to hold for 1 day and then start taking 6 mg daily, follow-up INR in 1 week.  His goal is 2.5-3.5 because of mechanical valve.  He is agreeable to be more compliant with INR checks.  4. Flank pain with  history of urolithiasis  -     CT Abdomen Pelvis Without Contrast  -     ketorolac (TORADOL) injection 30 mg  -     tamsulosin (FLOMAX) 0.4 MG capsule 24 hr capsule; Take 1 capsule by mouth Daily.  Dispense: 30 capsule; Refill: 0    We discussed treatment options for his pain.  The risks and benefits to taking Toradol were discussed, he has chronic kidney disease and a supratherapeutic INR, despite this he would like to have a Toradol shot as his pain is significant.  His vital signs are stable today.  Ordered a stat CT scan with stone protocol, it is scheduled for 2:00 this afternoon.      We discussed Flomax as a treatment option, he has done well with this in the past.  If he has a stone, will defer to urology for management going forward.            Patient's Body mass index is 37.86 kg/m². BMI is above normal parameters. Recommendations include: exercise counseling and nutrition counseling.

## 2020-11-18 ENCOUNTER — TELEPHONE (OUTPATIENT)
Dept: FAMILY MEDICINE CLINIC | Facility: CLINIC | Age: 65
End: 2020-11-18

## 2020-11-18 ENCOUNTER — HOSPITAL ENCOUNTER (OUTPATIENT)
Dept: GENERAL RADIOLOGY | Facility: HOSPITAL | Age: 65
Discharge: HOME | End: 2020-11-18

## 2020-11-18 ENCOUNTER — HOSPITAL ENCOUNTER (EMERGENCY)
Facility: HOSPITAL | Age: 65
End: 2020-11-18
Attending: GENERAL ACUTE CARE HOSPITAL

## 2020-11-18 ENCOUNTER — OFFICE VISIT (OUTPATIENT)
Dept: UROLOGY | Facility: CLINIC | Age: 65
End: 2020-11-18

## 2020-11-18 ENCOUNTER — HOSPITAL ENCOUNTER (INPATIENT)
Facility: HOSPITAL | Age: 65
LOS: 3 days | Discharge: HOME | End: 2020-11-21
Attending: INTERNAL MEDICINE | Admitting: INTERNAL MEDICINE

## 2020-11-18 ENCOUNTER — LAB (OUTPATIENT)
Dept: LAB | Facility: HOSPITAL | Age: 65
End: 2020-11-18

## 2020-11-18 VITALS — TEMPERATURE: 98.1 F | HEIGHT: 72 IN | WEIGHT: 279.98 LBS | BODY MASS INDEX: 37.92 KG/M2

## 2020-11-18 DIAGNOSIS — N20.1 CALCULUS OF URETER: ICD-10-CM

## 2020-11-18 DIAGNOSIS — N20.1 URETERAL CALCULUS, LEFT: Primary | ICD-10-CM

## 2020-11-18 DIAGNOSIS — I12.9 BENIGN HYPERTENSION WITH CKD (CHRONIC KIDNEY DISEASE) STAGE III (HCC): ICD-10-CM

## 2020-11-18 DIAGNOSIS — N20.0 KIDNEY STONES: ICD-10-CM

## 2020-11-18 DIAGNOSIS — N20.1 LEFT URETERAL STONE: Primary | ICD-10-CM

## 2020-11-18 DIAGNOSIS — N18.30 BENIGN HYPERTENSION WITH CKD (CHRONIC KIDNEY DISEASE) STAGE III (HCC): ICD-10-CM

## 2020-11-18 DIAGNOSIS — N20.1 CALCULUS OF URETER: Primary | ICD-10-CM

## 2020-11-18 DIAGNOSIS — N20.0 KIDNEY STONES: Primary | ICD-10-CM

## 2020-11-18 LAB
ALBUMIN SERPL-MCNC: 3.7 G/DL (ref 3.5–5.2)
ALBUMIN SERPL-MCNC: 4.04 G/DL (ref 3.5–5.2)
ALBUMIN/GLOB SERPL: 1 G/DL
ALBUMIN/GLOB SERPL: 1.1 G/DL
ALP SERPL-CCNC: 57 U/L (ref 39–117)
ALP SERPL-CCNC: 61 U/L (ref 39–117)
ALT SERPL W P-5'-P-CCNC: 18 U/L (ref 1–41)
ALT SERPL W P-5'-P-CCNC: 19 U/L (ref 1–41)
ANION GAP SERPL CALCULATED.3IONS-SCNC: 6.9 MMOL/L (ref 5–15)
ANION GAP SERPL CALCULATED.3IONS-SCNC: 8.9 MMOL/L (ref 5–15)
AST SERPL-CCNC: 16 U/L (ref 1–40)
AST SERPL-CCNC: 16 U/L (ref 1–40)
BASOPHILS # BLD AUTO: 0.07 10*3/MM3 (ref 0–0.2)
BASOPHILS # BLD AUTO: 0.08 10*3/MM3 (ref 0–0.2)
BASOPHILS NFR BLD AUTO: 0.6 % (ref 0–1.5)
BASOPHILS NFR BLD AUTO: 0.9 % (ref 0–1.5)
BILIRUB SERPL-MCNC: 0.5 MG/DL (ref 0–1.2)
BILIRUB SERPL-MCNC: 0.5 MG/DL (ref 0–1.2)
BUN SERPL-MCNC: 31 MG/DL (ref 8–23)
BUN SERPL-MCNC: 31 MG/DL (ref 8–23)
BUN/CREAT SERPL: 11.2 (ref 7–25)
BUN/CREAT SERPL: 12.2 (ref 7–25)
CALCIUM SPEC-SCNC: 8.2 MG/DL (ref 8.6–10.5)
CALCIUM SPEC-SCNC: 8.6 MG/DL (ref 8.6–10.5)
CHLORIDE SERPL-SCNC: 105 MMOL/L (ref 98–107)
CHLORIDE SERPL-SCNC: 105 MMOL/L (ref 98–107)
CO2 SERPL-SCNC: 22.1 MMOL/L (ref 22–29)
CO2 SERPL-SCNC: 23.1 MMOL/L (ref 22–29)
CREAT SERPL-MCNC: 2.55 MG/DL (ref 0.76–1.27)
CREAT SERPL-MCNC: 2.76 MG/DL (ref 0.76–1.27)
DEPRECATED RDW RBC AUTO: 40.6 FL (ref 37–54)
DEPRECATED RDW RBC AUTO: 45 FL (ref 37–54)
EOSINOPHIL # BLD AUTO: 0.28 10*3/MM3 (ref 0–0.4)
EOSINOPHIL # BLD AUTO: 0.31 10*3/MM3 (ref 0–0.4)
EOSINOPHIL NFR BLD AUTO: 2.5 % (ref 0.3–6.2)
EOSINOPHIL NFR BLD AUTO: 3.4 % (ref 0.3–6.2)
ERYTHROCYTE [DISTWIDTH] IN BLOOD BY AUTOMATED COUNT: 13 % (ref 12.3–15.4)
ERYTHROCYTE [DISTWIDTH] IN BLOOD BY AUTOMATED COUNT: 13.5 % (ref 12.3–15.4)
GFR SERPL CREATININE-BSD FRML MDRD: 23 ML/MIN/1.73
GFR SERPL CREATININE-BSD FRML MDRD: 25 ML/MIN/1.73
GLOBULIN UR ELPH-MCNC: 3.6 GM/DL
GLOBULIN UR ELPH-MCNC: 3.7 GM/DL
GLUCOSE BLDC GLUCOMTR-MCNC: 212 MG/DL (ref 70–130)
GLUCOSE BLDC GLUCOMTR-MCNC: 92 MG/DL (ref 70–130)
GLUCOSE SERPL-MCNC: 234 MG/DL (ref 65–99)
GLUCOSE SERPL-MCNC: 92 MG/DL (ref 65–99)
HCT VFR BLD AUTO: 37.7 % (ref 37.5–51)
HCT VFR BLD AUTO: 39.2 % (ref 37.5–51)
HGB BLD-MCNC: 12.1 G/DL (ref 13–17.7)
HGB BLD-MCNC: 12.4 G/DL (ref 13–17.7)
IMM GRANULOCYTES # BLD AUTO: 0.07 10*3/MM3 (ref 0–0.05)
IMM GRANULOCYTES # BLD AUTO: 0.09 10*3/MM3 (ref 0–0.05)
IMM GRANULOCYTES NFR BLD AUTO: 0.6 % (ref 0–0.5)
IMM GRANULOCYTES NFR BLD AUTO: 1 % (ref 0–0.5)
INR PPP: 2.48 (ref 0.9–1.1)
LYMPHOCYTES # BLD AUTO: 0.79 10*3/MM3 (ref 0.7–3.1)
LYMPHOCYTES # BLD AUTO: 0.86 10*3/MM3 (ref 0.7–3.1)
LYMPHOCYTES NFR BLD AUTO: 7.1 % (ref 19.6–45.3)
LYMPHOCYTES NFR BLD AUTO: 9.3 % (ref 19.6–45.3)
MCH RBC QN AUTO: 27.7 PG (ref 26.6–33)
MCH RBC QN AUTO: 28.4 PG (ref 26.6–33)
MCHC RBC AUTO-ENTMCNC: 30.9 G/DL (ref 31.5–35.7)
MCHC RBC AUTO-ENTMCNC: 32.9 G/DL (ref 31.5–35.7)
MCV RBC AUTO: 86.5 FL (ref 79–97)
MCV RBC AUTO: 89.7 FL (ref 79–97)
MONOCYTES # BLD AUTO: 0.6 10*3/MM3 (ref 0.1–0.9)
MONOCYTES # BLD AUTO: 0.64 10*3/MM3 (ref 0.1–0.9)
MONOCYTES NFR BLD AUTO: 5.8 % (ref 5–12)
MONOCYTES NFR BLD AUTO: 6.5 % (ref 5–12)
NEUTROPHILS NFR BLD AUTO: 7.26 10*3/MM3 (ref 1.7–7)
NEUTROPHILS NFR BLD AUTO: 78.9 % (ref 42.7–76)
NEUTROPHILS NFR BLD AUTO: 83.4 % (ref 42.7–76)
NEUTROPHILS NFR BLD AUTO: 9.22 10*3/MM3 (ref 1.7–7)
NRBC BLD AUTO-RTO: 0 /100 WBC (ref 0–0.2)
NRBC BLD AUTO-RTO: 0 /100 WBC (ref 0–0.2)
PLATELET # BLD AUTO: 138 10*3/MM3 (ref 140–450)
PLATELET # BLD AUTO: 145 10*3/MM3 (ref 140–450)
PMV BLD AUTO: 12 FL (ref 6–12)
PMV BLD AUTO: 12.9 FL (ref 6–12)
POTASSIUM SERPL-SCNC: 4.6 MMOL/L (ref 3.5–5.2)
POTASSIUM SERPL-SCNC: 5.3 MMOL/L (ref 3.5–5.2)
PROT SERPL-MCNC: 7.4 G/DL (ref 6–8.5)
PROT SERPL-MCNC: 7.6 G/DL (ref 6–8.5)
PROTHROMBIN TIME: 26.8 SECONDS (ref 11.9–14.1)
RBC # BLD AUTO: 4.36 10*6/MM3 (ref 4.14–5.8)
RBC # BLD AUTO: 4.37 10*6/MM3 (ref 4.14–5.8)
SODIUM SERPL-SCNC: 135 MMOL/L (ref 136–145)
SODIUM SERPL-SCNC: 136 MMOL/L (ref 136–145)
WBC # BLD AUTO: 11.07 10*3/MM3 (ref 3.4–10.8)
WBC # BLD AUTO: 9.2 10*3/MM3 (ref 3.4–10.8)

## 2020-11-18 PROCEDURE — 36415 COLL VENOUS BLD VENIPUNCTURE: CPT

## 2020-11-18 PROCEDURE — 82962 GLUCOSE BLOOD TEST: CPT

## 2020-11-18 PROCEDURE — 63710000001 INSULIN DETEMIR PER 5 UNITS: Performed by: INTERNAL MEDICINE

## 2020-11-18 PROCEDURE — 85610 PROTHROMBIN TIME: CPT | Performed by: INTERNAL MEDICINE

## 2020-11-18 PROCEDURE — 85025 COMPLETE CBC W/AUTO DIFF WBC: CPT

## 2020-11-18 PROCEDURE — 74018 RADEX ABDOMEN 1 VIEW: CPT | Performed by: RADIOLOGY

## 2020-11-18 PROCEDURE — 94640 AIRWAY INHALATION TREATMENT: CPT

## 2020-11-18 PROCEDURE — 85025 COMPLETE CBC W/AUTO DIFF WBC: CPT | Performed by: INTERNAL MEDICINE

## 2020-11-18 PROCEDURE — 25010000002 MORPHINE PER 10 MG: Performed by: INTERNAL MEDICINE

## 2020-11-18 PROCEDURE — 93005 ELECTROCARDIOGRAM TRACING: CPT | Performed by: INTERNAL MEDICINE

## 2020-11-18 PROCEDURE — 74018 RADEX ABDOMEN 1 VIEW: CPT

## 2020-11-18 PROCEDURE — 93010 ELECTROCARDIOGRAM REPORT: CPT | Performed by: INTERNAL MEDICINE

## 2020-11-18 PROCEDURE — 80053 COMPREHEN METABOLIC PANEL: CPT

## 2020-11-18 PROCEDURE — 94799 UNLISTED PULMONARY SVC/PX: CPT

## 2020-11-18 PROCEDURE — 99204 OFFICE O/P NEW MOD 45 MIN: CPT | Performed by: UROLOGY

## 2020-11-18 PROCEDURE — 80053 COMPREHEN METABOLIC PANEL: CPT | Performed by: INTERNAL MEDICINE

## 2020-11-18 PROCEDURE — 99223 1ST HOSP IP/OBS HIGH 75: CPT | Performed by: INTERNAL MEDICINE

## 2020-11-18 RX ORDER — LOSARTAN POTASSIUM 50 MG/1
100 TABLET ORAL DAILY
Status: CANCELLED | OUTPATIENT
Start: 2020-11-19

## 2020-11-18 RX ORDER — SPIRONOLACTONE 25 MG/1
25 TABLET ORAL NIGHTLY
Status: CANCELLED | OUTPATIENT
Start: 2020-11-18

## 2020-11-18 RX ORDER — PRAVASTATIN SODIUM 40 MG
40 TABLET ORAL DAILY
Status: DISCONTINUED | OUTPATIENT
Start: 2020-11-19 | End: 2020-11-21 | Stop reason: HOSPADM

## 2020-11-18 RX ORDER — NICOTINE POLACRILEX 4 MG
15 LOZENGE BUCCAL
Status: DISCONTINUED | OUTPATIENT
Start: 2020-11-18 | End: 2020-11-21 | Stop reason: HOSPADM

## 2020-11-18 RX ORDER — NITROGLYCERIN 0.4 MG/1
0.4 TABLET SUBLINGUAL
Status: DISCONTINUED | OUTPATIENT
Start: 2020-11-18 | End: 2020-11-21 | Stop reason: HOSPADM

## 2020-11-18 RX ORDER — DEXTROSE MONOHYDRATE 25 G/50ML
25 INJECTION, SOLUTION INTRAVENOUS
Status: DISCONTINUED | OUTPATIENT
Start: 2020-11-18 | End: 2020-11-21 | Stop reason: HOSPADM

## 2020-11-18 RX ORDER — ONDANSETRON 2 MG/ML
4 INJECTION INTRAMUSCULAR; INTRAVENOUS EVERY 6 HOURS PRN
Status: DISCONTINUED | OUTPATIENT
Start: 2020-11-18 | End: 2020-11-21 | Stop reason: HOSPADM

## 2020-11-18 RX ORDER — SODIUM CHLORIDE 0.9 % (FLUSH) 0.9 %
10 SYRINGE (ML) INJECTION AS NEEDED
Status: DISCONTINUED | OUTPATIENT
Start: 2020-11-18 | End: 2020-11-21 | Stop reason: HOSPADM

## 2020-11-18 RX ORDER — LOSARTAN POTASSIUM 50 MG/1
100 TABLET ORAL DAILY
Status: DISCONTINUED | OUTPATIENT
Start: 2020-11-19 | End: 2020-11-20

## 2020-11-18 RX ORDER — METOPROLOL SUCCINATE 50 MG/1
50 TABLET, EXTENDED RELEASE ORAL DAILY
Status: CANCELLED | OUTPATIENT
Start: 2020-11-19

## 2020-11-18 RX ORDER — SODIUM CHLORIDE 0.9 % (FLUSH) 0.9 %
10 SYRINGE (ML) INJECTION AS NEEDED
Status: DISCONTINUED | OUTPATIENT
Start: 2020-11-18 | End: 2020-11-18 | Stop reason: HOSPADM

## 2020-11-18 RX ORDER — IPRATROPIUM BROMIDE AND ALBUTEROL SULFATE 2.5; .5 MG/3ML; MG/3ML
3 SOLUTION RESPIRATORY (INHALATION) EVERY 4 HOURS PRN
Status: DISCONTINUED | OUTPATIENT
Start: 2020-11-18 | End: 2020-11-21 | Stop reason: HOSPADM

## 2020-11-18 RX ORDER — SPIRONOLACTONE 25 MG/1
25 TABLET ORAL NIGHTLY
Status: ON HOLD | COMMUNITY
End: 2020-11-21 | Stop reason: SDUPTHER

## 2020-11-18 RX ORDER — METOPROLOL SUCCINATE 50 MG/1
50 TABLET, EXTENDED RELEASE ORAL DAILY
Status: DISCONTINUED | OUTPATIENT
Start: 2020-11-19 | End: 2020-11-20

## 2020-11-18 RX ORDER — SPIRONOLACTONE 25 MG/1
25 TABLET ORAL NIGHTLY
Status: DISCONTINUED | OUTPATIENT
Start: 2020-11-18 | End: 2020-11-20

## 2020-11-18 RX ORDER — LOSARTAN POTASSIUM 100 MG/1
100 TABLET ORAL DAILY
Status: ON HOLD | COMMUNITY
End: 2020-11-18

## 2020-11-18 RX ORDER — INSULIN GLARGINE 100 [IU]/ML
41 INJECTION, SOLUTION SUBCUTANEOUS DAILY
COMMUNITY
End: 2020-12-23 | Stop reason: SDUPTHER

## 2020-11-18 RX ORDER — INSULIN GLARGINE 100 [IU]/ML
43 INJECTION, SOLUTION SUBCUTANEOUS NIGHTLY
COMMUNITY
End: 2021-01-18 | Stop reason: DRUGHIGH

## 2020-11-18 RX ORDER — PRAVASTATIN SODIUM 40 MG
40 TABLET ORAL DAILY
Status: CANCELLED | OUTPATIENT
Start: 2020-11-19

## 2020-11-18 RX ORDER — AMLODIPINE BESYLATE 5 MG/1
5 TABLET ORAL DAILY
Status: CANCELLED | OUTPATIENT
Start: 2020-11-19

## 2020-11-18 RX ORDER — AMLODIPINE BESYLATE 5 MG/1
5 TABLET ORAL DAILY
Status: DISCONTINUED | OUTPATIENT
Start: 2020-11-19 | End: 2020-11-20

## 2020-11-18 RX ORDER — SODIUM CHLORIDE 0.9 % (FLUSH) 0.9 %
10 SYRINGE (ML) INJECTION EVERY 12 HOURS SCHEDULED
Status: DISCONTINUED | OUTPATIENT
Start: 2020-11-18 | End: 2020-11-21 | Stop reason: HOSPADM

## 2020-11-18 RX ORDER — HYDROCODONE BITARTRATE AND ACETAMINOPHEN 7.5; 325 MG/1; MG/1
1 TABLET ORAL EVERY 6 HOURS PRN
Status: DISCONTINUED | OUTPATIENT
Start: 2020-11-18 | End: 2020-11-21 | Stop reason: HOSPADM

## 2020-11-18 RX ADMIN — SPIRONOLACTONE 25 MG: 25 TABLET ORAL at 20:51

## 2020-11-18 RX ADMIN — MORPHINE SULFATE 4 MG: 4 INJECTION, SOLUTION INTRAMUSCULAR; INTRAVENOUS at 18:28

## 2020-11-18 RX ADMIN — INSULIN DETEMIR 43 UNITS: 100 INJECTION, SOLUTION SUBCUTANEOUS at 21:20

## 2020-11-18 RX ADMIN — SODIUM CHLORIDE, PRESERVATIVE FREE 10 ML: 5 INJECTION INTRAVENOUS at 20:52

## 2020-11-18 RX ADMIN — IPRATROPIUM BROMIDE AND ALBUTEROL SULFATE 3 ML: .5; 3 SOLUTION RESPIRATORY (INHALATION) at 23:58

## 2020-11-18 RX ADMIN — HYDROCODONE BITARTRATE AND ACETAMINOPHEN 1 TABLET: 7.5; 325 TABLET ORAL at 23:43

## 2020-11-18 RX ADMIN — MORPHINE SULFATE 4 MG: 4 INJECTION, SOLUTION INTRAMUSCULAR; INTRAVENOUS at 21:38

## 2020-11-18 NOTE — PROGRESS NOTES
Chief Complaint:          Left flank pain    HPI:   65 y.o. male.  Pt had pain Saturday and it was in the left flank and radiates to left groin.  His severity 8 on scale of 10.  He has hx of renal insufficiency with creat of 1.49 in September.  HPI  Pt has had CABG and aortic valve replacement. at age 39 and cardiac stents 10 years ago.    Past Medical History:        Past Medical History:   Diagnosis Date   • CAD (coronary artery disease)     x1 stent; pulmonary HTN; EF 50-55%; rheumatic valve; MV stenosis   • CHF (congestive heart failure) (CMS/[facility])    • CKD (chronic kidney disease), stage III    • COPD (chronic obstructive pulmonary disease) (CMS/[facility])    • Depression    • Diabetes mellitus (CMS/[facility])    • H/O mechanical aortic valve replacement    • History of tobacco abuse    • Hyperlipidemia    • Hypertension    • Ischemic heart disease    • Kidney failure     third stage   • Low back pain    • Obesity     BMI 36.   • Valvular heart disease          Current Meds:     Current Outpatient Medications   Medication Sig Dispense Refill   • amLODIPine (NORVASC) 5 MG tablet Take 1 tablet by mouth Daily.     • cetirizine (ZyrTEC) 10 MG tablet Take 10 mg by mouth daily.     • clotrimazole (LOTRIMIN) 1 % cream Apply  topically 2 (Two) Times a Day. 60 g 2   • cyclobenzaprine (FLEXERIL) 10 MG tablet Take 1 tablet by mouth 3 (Three) Times a Day. for muscle spams 90 tablet 1   • fluticasone (FLONASE) 50 MCG/ACT nasal spray 2 sprays into each nostril Daily. 16 g 5   • gabapentin (NEURONTIN) 600 MG tablet Take 1 tablet by mouth Daily. 30 tablet 0   • Insulin Glargine (Lantus SoloStar) 100 UNIT/ML injection pen INJECT 41 UNITS SUBCUTANEOUSLY EVERY MORNING AND 43 UNITS EVERY EVENING 15 mL 4   • Insulin Lispro (HUMALOG KWIKPEN) 100 UNIT/ML solution pen-injector Inject 15 Units under the skin 3 (Three) Times a Day Before Meals. Use with sliding scale before meals. Would prefer pen. 5 pen 5   • losartan (COZAAR) 100 MG tablet Take 1  tablet by mouth Daily. 90 tablet 1   • metoprolol succinate XL (TOPROL-XL) 50 MG 24 hr tablet Take 1 tablet by mouth Daily. 90 tablet 1   • pravastatin (PRAVACHOL) 40 MG tablet TAKE ONE TABLET BY MOUTH DAILY 90 tablet 0   • sertraline (ZOLOFT) 100 MG tablet TAKE 1 AND 1/2 TABLETS BY MOUTH DAILY 135 tablet 1   • spironolactone (ALDACTONE) 25 MG tablet Take 1 tablet by mouth 2 (Two) Times a Day. 180 tablet 1   • tamsulosin (FLOMAX) 0.4 MG capsule 24 hr capsule Take 1 capsule by mouth Daily. 30 capsule 0   • warfarin (COUMADIN) 1 MG tablet Take 1 tablet by mouth Daily. 90 tablet 1   • warfarin (COUMADIN) 2 MG tablet Take 1 tablet by mouth daily. 30 tablet 5   • warfarin (COUMADIN) 4 MG tablet Use as directed. (currently using two tablets daily) 60 tablet 5   • warfarin (COUMADIN) 5 MG tablet Take 1 tablet by mouth daily. 30 tablet 5   • warfarin (COUMADIN) 6 MG tablet TAKE ONE TABLET BY MOUTH EVERY EVENING. 45 tablet 5   • warfarin (COUMADIN) 7.5 MG tablet TAKE ONE TABLET BY MOUTH EVERY OTHER DAY WITH ONE-HALF TABLET OF THE 1 MG TABLET TO EQUAL 8 MG 45 tablet 5     No current facility-administered medications for this visit.         Allergies:      Allergies   Allergen Reactions   • Penicillins Swelling     Tongue swelled   • Ace Inhibitors Angioedema   • Contrast Dye Rash        Past Surgical History:     Past Surgical History:   Procedure Laterality Date   • AORTIC VALVE REPAIR/REPLACEMENT  1991   • CARDIAC CATHETERIZATION     • CARDIAC SURGERY  1991    valve on aortic   • CARDIAC SURGERY  2001    stent    • CATARACT EXTRACTION     • COLONOSCOPY  2001   • CORONARY STENT PLACEMENT  2005         Social History:     Social History     Socioeconomic History   • Marital status:      Spouse name: Not on file   • Number of children: Not on file   • Years of education: Not on file   • Highest education level: Not on file   Tobacco Use   • Smoking status: Former Smoker     Packs/day: 1.00     Years: 30.00     Pack  years: 30.00     Types: Pipe, Cigars     Quit date: 2016     Years since quittin.3   • Smokeless tobacco: Never Used   • Tobacco comment: currently smokes a pipe   Substance and Sexual Activity   • Alcohol use: No   • Drug use: No   • Sexual activity: Defer       Family History:     Family History   Problem Relation Age of Onset   • Cancer Mother         breast   • COPD Father    • Heart attack Father 74   • Diabetes Paternal Grandmother         both legs removed       Review of Systems:     Review of Systems   Constitutional: Negative for chills and fever.   HENT: Negative for sinus pressure.    Respiratory: Negative for cough.    Cardiovascular: Negative for leg swelling.   Gastrointestinal: Negative for abdominal pain.   Genitourinary: Positive for decreased urine volume, difficulty urinating, dysuria, flank pain, frequency and urgency.   Musculoskeletal: Positive for back pain.   Neurological: Negative for headaches.   Psychiatric/Behavioral: The patient is not nervous/anxious.          Physical Exam:     Physical Exam  Vitals signs and nursing note reviewed.   Constitutional:       Comments: Morbid obesity.   HENT:      Head: Normocephalic and atraumatic.      Right Ear: External ear normal.      Left Ear: External ear normal.      Nose: Nose normal.   Eyes:      Conjunctiva/sclera: Conjunctivae normal.      Pupils: Pupils are equal, round, and reactive to light.   Neck:      Musculoskeletal: Normal range of motion and neck supple.      Thyroid: No thyromegaly.   Cardiovascular:      Rate and Rhythm: Regular rhythm.      Heart sounds: No murmur.      Comments: Murmur from heart valve.  Pulmonary:      Effort: Pulmonary effort is normal. No respiratory distress.      Breath sounds: Normal breath sounds. No wheezing or rales.   Chest:      Chest wall: No tenderness.   Abdominal:      General: Bowel sounds are normal. There is no distension.      Palpations: Abdomen is soft. There is no mass.       "Tenderness: There is no abdominal tenderness.      Hernia: No hernia is present.   Genitourinary:     Penis: Normal.       Prostate: Normal.      Rectum: Normal.   Musculoskeletal: Normal range of motion.         General: No tenderness.   Lymphadenopathy:      Cervical: No cervical adenopathy.   Skin:     General: Skin is warm.      Findings: No rash.   Neurological:      Mental Status: He is alert and oriented to person, place, and time.      Cranial Nerves: No cranial nerve deficit.      Motor: No abnormal muscle tone.      Coordination: Coordination normal.   Psychiatric:         Behavior: Behavior normal.         Thought Content: Thought content normal.         Judgment: Judgment normal.         Temp 98.1 °F (36.7 °C)   Ht 182.9 cm (72.01\")   Wt 127 kg (279 lb 15.8 oz)   BMI 37.96 kg/m²    Procedure:         Assessment:     Encounter Diagnosis   Name Primary?   • Kidney stones Yes       Orders Placed This Encounter   Procedures   • XR Abdomen KUB     Standing Status:   Future     Number of Occurrences:   1     Standing Expiration Date:   11/18/2021     Order Specific Question:   Reason for Exam:     Answer:   kidney stones       Patient reports that he is not currently experiencing any symptoms of urinary incontinence.      Plan:   Pt has a large stone that is unlikely to pass.  The patient has multiple medical heart conditions including coronary artery disease status post CABG and aortic valve replacement and the patient is on Coumadin he also has COPD.  I discussed with the hospitalist service how complex his medical issues are and I recommended for pain control that he be admitted to them so he could undergo cardiac evaluation and get clearance prior to surgery the patient in the hospital service are in agreement with this plan    Patient's Body mass index is 37.96 kg/m². BMI is above normal parameters. Recommendations include: referral to primary care.      Smoking Cessation Counseling:  Current every day " smoker. less than 3 minutes spent counseling. Agreeable to stop.  Patient is going to follow up with PCP to discuss treatment/medication options to quit tobacco use.     During this visit, I spent 3 minutes counseling Benitez regarding tobacco cessation.        Counseling was given to patient for the following topics instructions for management as follows: Ureteral stone. The interim medical history and current results were reviewed.  A treatment plan with follow-up was made for Kidney stones [N20.0].     This document has been electronically signed by Jonah Montgomery MD November 18, 2020 14:07 EST

## 2020-11-19 ENCOUNTER — APPOINTMENT (OUTPATIENT)
Dept: CARDIOLOGY | Facility: HOSPITAL | Age: 65
End: 2020-11-19

## 2020-11-19 ENCOUNTER — APPOINTMENT (OUTPATIENT)
Dept: GENERAL RADIOLOGY | Facility: HOSPITAL | Age: 65
End: 2020-11-19

## 2020-11-19 LAB
ANION GAP SERPL CALCULATED.3IONS-SCNC: 10.6 MMOL/L (ref 5–15)
APTT PPP: 45.5 SECONDS (ref 25.6–35.3)
APTT PPP: 46.1 SECONDS (ref 25.6–35.3)
BASOPHILS # BLD AUTO: 0.06 10*3/MM3 (ref 0–0.2)
BASOPHILS NFR BLD AUTO: 0.6 % (ref 0–1.5)
BH CV ECHO MEAS - % IVS THICK: 8.4 %
BH CV ECHO MEAS - % LVPW THICK: 6.1 %
BH CV ECHO MEAS - AO MAX PG (FULL): 24.5 MMHG
BH CV ECHO MEAS - AO MAX PG: 27.7 MMHG
BH CV ECHO MEAS - AO MEAN PG (FULL): 13 MMHG
BH CV ECHO MEAS - AO MEAN PG: 15 MMHG
BH CV ECHO MEAS - AO ROOT AREA (BSA CORRECTED): 1.1
BH CV ECHO MEAS - AO ROOT AREA: 6.2 CM^2
BH CV ECHO MEAS - AO ROOT DIAM: 2.8 CM
BH CV ECHO MEAS - AO V2 MAX: 263 CM/SEC
BH CV ECHO MEAS - AO V2 MEAN: 179 CM/SEC
BH CV ECHO MEAS - AO V2 VTI: 50.3 CM
BH CV ECHO MEAS - AVA(I,A): 1.5 CM^2
BH CV ECHO MEAS - AVA(I,D): 1.5 CM^2
BH CV ECHO MEAS - AVA(V,A): 1.5 CM^2
BH CV ECHO MEAS - AVA(V,D): 1.5 CM^2
BH CV ECHO MEAS - BSA(HAYCOCK): 2.6 M^2
BH CV ECHO MEAS - BSA: 2.4 M^2
BH CV ECHO MEAS - BZI_BMI: 37.6 KILOGRAMS/M^2
BH CV ECHO MEAS - BZI_METRIC_HEIGHT: 182.9 CM
BH CV ECHO MEAS - BZI_METRIC_WEIGHT: 125.6 KG
BH CV ECHO MEAS - EDV(CUBED): 151 ML
BH CV ECHO MEAS - EDV(MOD-SP4): 125 ML
BH CV ECHO MEAS - EDV(TEICH): 136.8 ML
BH CV ECHO MEAS - EF(CUBED): 66.9 %
BH CV ECHO MEAS - EF(MOD-SP4): 60 %
BH CV ECHO MEAS - EF(TEICH): 57.9 %
BH CV ECHO MEAS - ESV(CUBED): 50 ML
BH CV ECHO MEAS - ESV(MOD-SP4): 50 ML
BH CV ECHO MEAS - ESV(TEICH): 57.6 ML
BH CV ECHO MEAS - FS: 30.8 %
BH CV ECHO MEAS - IVS/LVPW: 0.95
BH CV ECHO MEAS - IVSD: 1.3 CM
BH CV ECHO MEAS - IVSS: 1.4 CM
BH CV ECHO MEAS - LA DIMENSION: 4.7 CM
BH CV ECHO MEAS - LA/AO: 1.7
BH CV ECHO MEAS - LV DIASTOLIC VOL/BSA (35-75): 51.1 ML/M^2
BH CV ECHO MEAS - LV MASS(C)D: 304.2 GRAMS
BH CV ECHO MEAS - LV MASS(C)DI: 124.3 GRAMS/M^2
BH CV ECHO MEAS - LV MASS(C)S: 195.5 GRAMS
BH CV ECHO MEAS - LV MASS(C)SI: 79.9 GRAMS/M^2
BH CV ECHO MEAS - LV MAX PG: 3.2 MMHG
BH CV ECHO MEAS - LV MEAN PG: 2 MMHG
BH CV ECHO MEAS - LV SYSTOLIC VOL/BSA (12-30): 20.4 ML/M^2
BH CV ECHO MEAS - LV V1 MAX: 89.5 CM/SEC
BH CV ECHO MEAS - LV V1 MEAN: 55.9 CM/SEC
BH CV ECHO MEAS - LV V1 VTI: 17.2 CM
BH CV ECHO MEAS - LVIDD: 5.3 CM
BH CV ECHO MEAS - LVIDS: 3.7 CM
BH CV ECHO MEAS - LVLD AP4: 7.1 CM
BH CV ECHO MEAS - LVLS AP4: 5.6 CM
BH CV ECHO MEAS - LVOT AREA (M): 4.5 CM^2
BH CV ECHO MEAS - LVOT AREA: 4.5 CM^2
BH CV ECHO MEAS - LVOT DIAM: 2.4 CM
BH CV ECHO MEAS - LVPWD: 1.4 CM
BH CV ECHO MEAS - LVPWS: 1.5 CM
BH CV ECHO MEAS - MV A MAX VEL: 185.5 CM/SEC
BH CV ECHO MEAS - MV E MAX VEL: 262 CM/SEC
BH CV ECHO MEAS - MV E/A: 1.4
BH CV ECHO MEAS - MV MAX PG: 27.2 MMHG
BH CV ECHO MEAS - MV MEAN PG: 10 MMHG
BH CV ECHO MEAS - MV V2 MAX: 261 CM/SEC
BH CV ECHO MEAS - MV V2 MEAN: 128 CM/SEC
BH CV ECHO MEAS - MV V2 VTI: 64.8 CM
BH CV ECHO MEAS - MVA(VTI): 1.2 CM^2
BH CV ECHO MEAS - PA ACC TIME: 0.11 SEC
BH CV ECHO MEAS - PA PR(ACCEL): 31.3 MMHG
BH CV ECHO MEAS - SI(AO): 126.6 ML/M^2
BH CV ECHO MEAS - SI(CUBED): 41.3 ML/M^2
BH CV ECHO MEAS - SI(LVOT): 31.8 ML/M^2
BH CV ECHO MEAS - SI(MOD-SP4): 30.7 ML/M^2
BH CV ECHO MEAS - SI(TEICH): 32.4 ML/M^2
BH CV ECHO MEAS - SV(AO): 309.7 ML
BH CV ECHO MEAS - SV(CUBED): 101 ML
BH CV ECHO MEAS - SV(LVOT): 77.8 ML
BH CV ECHO MEAS - SV(MOD-SP4): 75 ML
BH CV ECHO MEAS - SV(TEICH): 79.3 ML
BILIRUB UR QL STRIP: NEGATIVE
BUN SERPL-MCNC: 34 MG/DL (ref 8–23)
BUN/CREAT SERPL: 12.4 (ref 7–25)
CALCIUM SPEC-SCNC: 8.2 MG/DL (ref 8.6–10.5)
CHLORIDE SERPL-SCNC: 104 MMOL/L (ref 98–107)
CLARITY UR: CLEAR
CO2 SERPL-SCNC: 18.4 MMOL/L (ref 22–29)
COLOR UR: YELLOW
CREAT SERPL-MCNC: 2.75 MG/DL (ref 0.76–1.27)
D-LACTATE SERPL-SCNC: 0.9 MMOL/L (ref 0.5–2)
D-LACTATE SERPL-SCNC: 3.9 MMOL/L (ref 0.5–2)
DEPRECATED RDW RBC AUTO: 46 FL (ref 37–54)
DEPRECATED RDW RBC AUTO: 46.4 FL (ref 37–54)
EOSINOPHIL # BLD AUTO: 0.32 10*3/MM3 (ref 0–0.4)
EOSINOPHIL NFR BLD AUTO: 3.1 % (ref 0.3–6.2)
ERYTHROCYTE [DISTWIDTH] IN BLOOD BY AUTOMATED COUNT: 13.7 % (ref 12.3–15.4)
ERYTHROCYTE [DISTWIDTH] IN BLOOD BY AUTOMATED COUNT: 13.8 % (ref 12.3–15.4)
GFR SERPL CREATININE-BSD FRML MDRD: 23 ML/MIN/1.73
GLUCOSE BLDC GLUCOMTR-MCNC: 125 MG/DL (ref 70–130)
GLUCOSE BLDC GLUCOMTR-MCNC: 185 MG/DL (ref 70–130)
GLUCOSE BLDC GLUCOMTR-MCNC: 83 MG/DL (ref 70–130)
GLUCOSE SERPL-MCNC: 144 MG/DL (ref 65–99)
GLUCOSE UR STRIP-MCNC: NEGATIVE MG/DL
HCT VFR BLD AUTO: 35.2 % (ref 37.5–51)
HCT VFR BLD AUTO: 35.9 % (ref 37.5–51)
HGB BLD-MCNC: 11 G/DL (ref 13–17.7)
HGB BLD-MCNC: 11 G/DL (ref 13–17.7)
HGB UR QL STRIP.AUTO: NEGATIVE
IMM GRANULOCYTES # BLD AUTO: 0.06 10*3/MM3 (ref 0–0.05)
IMM GRANULOCYTES NFR BLD AUTO: 0.6 % (ref 0–0.5)
INR PPP: 1.89 (ref 0.9–1.1)
KETONES UR QL STRIP: NEGATIVE
LACTATE HOLD SPECIMEN: NORMAL
LEUKOCYTE ESTERASE UR QL STRIP.AUTO: NEGATIVE
LYMPHOCYTES # BLD AUTO: 1.18 10*3/MM3 (ref 0.7–3.1)
LYMPHOCYTES NFR BLD AUTO: 11.5 % (ref 19.6–45.3)
MAXIMAL PREDICTED HEART RATE: 153 BPM
MCH RBC QN AUTO: 27.7 PG (ref 26.6–33)
MCH RBC QN AUTO: 28.4 PG (ref 26.6–33)
MCHC RBC AUTO-ENTMCNC: 30.6 G/DL (ref 31.5–35.7)
MCHC RBC AUTO-ENTMCNC: 31.3 G/DL (ref 31.5–35.7)
MCV RBC AUTO: 90.4 FL (ref 79–97)
MCV RBC AUTO: 90.7 FL (ref 79–97)
MONOCYTES # BLD AUTO: 0.72 10*3/MM3 (ref 0.1–0.9)
MONOCYTES NFR BLD AUTO: 7 % (ref 5–12)
NEUTROPHILS NFR BLD AUTO: 7.91 10*3/MM3 (ref 1.7–7)
NEUTROPHILS NFR BLD AUTO: 77.2 % (ref 42.7–76)
NITRITE UR QL STRIP: NEGATIVE
NRBC BLD AUTO-RTO: 0 /100 WBC (ref 0–0.2)
PH UR STRIP.AUTO: <=5 [PH] (ref 5–8)
PLATELET # BLD AUTO: 131 10*3/MM3 (ref 140–450)
PLATELET # BLD AUTO: 145 10*3/MM3 (ref 140–450)
PMV BLD AUTO: 12.1 FL (ref 6–12)
PMV BLD AUTO: 12.4 FL (ref 6–12)
POTASSIUM SERPL-SCNC: 5.1 MMOL/L (ref 3.5–5.2)
PROT UR QL STRIP: NEGATIVE
PROTHROMBIN TIME: 21.5 SECONDS (ref 11.9–14.1)
QT INTERVAL: 428 MS
QTC INTERVAL: 455 MS
RBC # BLD AUTO: 3.88 10*6/MM3 (ref 4.14–5.8)
RBC # BLD AUTO: 3.97 10*6/MM3 (ref 4.14–5.8)
SARS-COV-2 RDRP RESP QL NAA+PROBE: NOT DETECTED
SODIUM SERPL-SCNC: 133 MMOL/L (ref 136–145)
SP GR UR STRIP: 1.01 (ref 1–1.03)
STRESS TARGET HR: 130 BPM
UROBILINOGEN UR QL STRIP: NORMAL
WBC # BLD AUTO: 10.25 10*3/MM3 (ref 3.4–10.8)
WBC # BLD AUTO: 12.06 10*3/MM3 (ref 3.4–10.8)

## 2020-11-19 PROCEDURE — 94799 UNLISTED PULMONARY SVC/PX: CPT

## 2020-11-19 PROCEDURE — 82962 GLUCOSE BLOOD TEST: CPT

## 2020-11-19 PROCEDURE — 25010000002 HEPARIN (PORCINE) PER 1000 UNITS: Performed by: INTERNAL MEDICINE

## 2020-11-19 PROCEDURE — 93306 TTE W/DOPPLER COMPLETE: CPT | Performed by: INTERNAL MEDICINE

## 2020-11-19 PROCEDURE — 81003 URINALYSIS AUTO W/O SCOPE: CPT | Performed by: INTERNAL MEDICINE

## 2020-11-19 PROCEDURE — 80048 BASIC METABOLIC PNL TOTAL CA: CPT | Performed by: INTERNAL MEDICINE

## 2020-11-19 PROCEDURE — 85610 PROTHROMBIN TIME: CPT | Performed by: INTERNAL MEDICINE

## 2020-11-19 PROCEDURE — 71045 X-RAY EXAM CHEST 1 VIEW: CPT

## 2020-11-19 PROCEDURE — 63710000001 INSULIN DETEMIR PER 5 UNITS: Performed by: INTERNAL MEDICINE

## 2020-11-19 PROCEDURE — 71045 X-RAY EXAM CHEST 1 VIEW: CPT | Performed by: RADIOLOGY

## 2020-11-19 PROCEDURE — 83605 ASSAY OF LACTIC ACID: CPT | Performed by: INTERNAL MEDICINE

## 2020-11-19 PROCEDURE — 93306 TTE W/DOPPLER COMPLETE: CPT

## 2020-11-19 PROCEDURE — 85025 COMPLETE CBC W/AUTO DIFF WBC: CPT | Performed by: INTERNAL MEDICINE

## 2020-11-19 PROCEDURE — 25010000002 CEFTRIAXONE PER 250 MG: Performed by: INTERNAL MEDICINE

## 2020-11-19 PROCEDURE — 99232 SBSQ HOSP IP/OBS MODERATE 35: CPT | Performed by: INTERNAL MEDICINE

## 2020-11-19 PROCEDURE — 99231 SBSQ HOSP IP/OBS SF/LOW 25: CPT | Performed by: INTERNAL MEDICINE

## 2020-11-19 PROCEDURE — 25010000002 MORPHINE PER 10 MG: Performed by: INTERNAL MEDICINE

## 2020-11-19 PROCEDURE — 87635 SARS-COV-2 COVID-19 AMP PRB: CPT | Performed by: UROLOGY

## 2020-11-19 PROCEDURE — 63710000001 INSULIN ASPART PER 5 UNITS: Performed by: INTERNAL MEDICINE

## 2020-11-19 PROCEDURE — 99222 1ST HOSP IP/OBS MODERATE 55: CPT | Performed by: UROLOGY

## 2020-11-19 PROCEDURE — 85027 COMPLETE CBC AUTOMATED: CPT | Performed by: INTERNAL MEDICINE

## 2020-11-19 PROCEDURE — 94660 CPAP INITIATION&MGMT: CPT

## 2020-11-19 PROCEDURE — 85730 THROMBOPLASTIN TIME PARTIAL: CPT | Performed by: INTERNAL MEDICINE

## 2020-11-19 RX ORDER — ACETAMINOPHEN 325 MG/1
650 TABLET ORAL EVERY 6 HOURS PRN
Status: DISCONTINUED | OUTPATIENT
Start: 2020-11-19 | End: 2020-11-21 | Stop reason: HOSPADM

## 2020-11-19 RX ORDER — SODIUM CHLORIDE 9 MG/ML
75 INJECTION, SOLUTION INTRAVENOUS CONTINUOUS
Status: DISCONTINUED | OUTPATIENT
Start: 2020-11-19 | End: 2020-11-20

## 2020-11-19 RX ORDER — HEPARIN SODIUM 10000 [USP'U]/100ML
7.87 INJECTION, SOLUTION INTRAVENOUS
Status: DISCONTINUED | OUTPATIENT
Start: 2020-11-19 | End: 2020-11-20

## 2020-11-19 RX ORDER — HEPARIN SODIUM 5000 [USP'U]/ML
2500 INJECTION, SOLUTION INTRAVENOUS; SUBCUTANEOUS AS NEEDED
Status: DISCONTINUED | OUTPATIENT
Start: 2020-11-19 | End: 2020-11-20

## 2020-11-19 RX ORDER — HEPARIN SODIUM 5000 [USP'U]/ML
5000 INJECTION, SOLUTION INTRAVENOUS; SUBCUTANEOUS ONCE
Status: COMPLETED | OUTPATIENT
Start: 2020-11-19 | End: 2020-11-19

## 2020-11-19 RX ORDER — HEPARIN SODIUM 5000 [USP'U]/ML
5000 INJECTION, SOLUTION INTRAVENOUS; SUBCUTANEOUS AS NEEDED
Status: DISCONTINUED | OUTPATIENT
Start: 2020-11-19 | End: 2020-11-20

## 2020-11-19 RX ADMIN — MORPHINE SULFATE 4 MG: 4 INJECTION, SOLUTION INTRAMUSCULAR; INTRAVENOUS at 11:43

## 2020-11-19 RX ADMIN — METOPROLOL SUCCINATE 50 MG: 50 TABLET, FILM COATED, EXTENDED RELEASE ORAL at 09:20

## 2020-11-19 RX ADMIN — INSULIN DETEMIR 41 UNITS: 100 INJECTION, SOLUTION SUBCUTANEOUS at 09:21

## 2020-11-19 RX ADMIN — SODIUM CHLORIDE, PRESERVATIVE FREE 10 ML: 5 INJECTION INTRAVENOUS at 09:21

## 2020-11-19 RX ADMIN — SODIUM CHLORIDE 75 ML/HR: 9 INJECTION, SOLUTION INTRAVENOUS at 21:57

## 2020-11-19 RX ADMIN — MORPHINE SULFATE 4 MG: 4 INJECTION, SOLUTION INTRAMUSCULAR; INTRAVENOUS at 06:37

## 2020-11-19 RX ADMIN — LOSARTAN POTASSIUM 100 MG: 50 TABLET, FILM COATED ORAL at 09:20

## 2020-11-19 RX ADMIN — ACETAMINOPHEN 650 MG: 325 TABLET ORAL at 18:07

## 2020-11-19 RX ADMIN — INSULIN ASPART 2 UNITS: 100 INJECTION, SOLUTION INTRAVENOUS; SUBCUTANEOUS at 11:44

## 2020-11-19 RX ADMIN — AMLODIPINE BESYLATE 5 MG: 5 TABLET ORAL at 09:21

## 2020-11-19 RX ADMIN — INSULIN ASPART 15 UNITS: 100 INJECTION, SOLUTION INTRAVENOUS; SUBCUTANEOUS at 11:45

## 2020-11-19 RX ADMIN — PRAVASTATIN SODIUM 40 MG: 40 TABLET ORAL at 09:21

## 2020-11-19 RX ADMIN — HEPARIN SODIUM 7.87 UNITS/KG/HR: 10000 INJECTION, SOLUTION INTRAVENOUS at 11:45

## 2020-11-19 RX ADMIN — SPIRONOLACTONE 25 MG: 25 TABLET ORAL at 21:59

## 2020-11-19 RX ADMIN — HEPARIN SODIUM 5000 UNITS: 5000 INJECTION INTRAVENOUS; SUBCUTANEOUS at 11:45

## 2020-11-19 RX ADMIN — CEFTRIAXONE 1 G: 1 INJECTION, POWDER, FOR SOLUTION INTRAMUSCULAR; INTRAVENOUS at 23:08

## 2020-11-19 RX ADMIN — MORPHINE SULFATE 4 MG: 4 INJECTION, SOLUTION INTRAMUSCULAR; INTRAVENOUS at 15:45

## 2020-11-19 RX ADMIN — HEPARIN SODIUM 2500 UNITS: 5000 INJECTION INTRAVENOUS; SUBCUTANEOUS at 18:58

## 2020-11-19 RX ADMIN — IPRATROPIUM BROMIDE AND ALBUTEROL SULFATE 3 ML: .5; 3 SOLUTION RESPIRATORY (INHALATION) at 06:10

## 2020-11-19 RX ADMIN — HYDROCODONE BITARTRATE AND ACETAMINOPHEN 1 TABLET: 7.5; 325 TABLET ORAL at 09:19

## 2020-11-19 RX ADMIN — IPRATROPIUM BROMIDE AND ALBUTEROL SULFATE 3 ML: .5; 3 SOLUTION RESPIRATORY (INHALATION) at 18:29

## 2020-11-19 RX ADMIN — HYDROCODONE BITARTRATE AND ACETAMINOPHEN 1 TABLET: 7.5; 325 TABLET ORAL at 22:47

## 2020-11-20 ENCOUNTER — ANESTHESIA EVENT (OUTPATIENT)
Dept: PERIOP | Facility: HOSPITAL | Age: 65
End: 2020-11-20

## 2020-11-20 ENCOUNTER — APPOINTMENT (OUTPATIENT)
Dept: GENERAL RADIOLOGY | Facility: HOSPITAL | Age: 65
End: 2020-11-20

## 2020-11-20 ENCOUNTER — ANESTHESIA (OUTPATIENT)
Dept: PERIOP | Facility: HOSPITAL | Age: 65
End: 2020-11-20

## 2020-11-20 LAB
ANION GAP SERPL CALCULATED.3IONS-SCNC: 8.7 MMOL/L (ref 5–15)
APTT PPP: 71 SECONDS (ref 25.6–35.3)
APTT PPP: 75 SECONDS (ref 25.6–35.3)
BUN SERPL-MCNC: 41 MG/DL (ref 8–23)
BUN/CREAT SERPL: 13.4 (ref 7–25)
CALCIUM SPEC-SCNC: 8.2 MG/DL (ref 8.6–10.5)
CHLORIDE SERPL-SCNC: 101 MMOL/L (ref 98–107)
CO2 SERPL-SCNC: 19.3 MMOL/L (ref 22–29)
CREAT SERPL-MCNC: 3.06 MG/DL (ref 0.76–1.27)
DEPRECATED RDW RBC AUTO: 47.1 FL (ref 37–54)
ERYTHROCYTE [DISTWIDTH] IN BLOOD BY AUTOMATED COUNT: 14 % (ref 12.3–15.4)
GFR SERPL CREATININE-BSD FRML MDRD: 21 ML/MIN/1.73
GLUCOSE BLDC GLUCOMTR-MCNC: 115 MG/DL (ref 70–130)
GLUCOSE BLDC GLUCOMTR-MCNC: 121 MG/DL (ref 70–130)
GLUCOSE BLDC GLUCOMTR-MCNC: 177 MG/DL (ref 70–130)
GLUCOSE BLDC GLUCOMTR-MCNC: 82 MG/DL (ref 70–130)
GLUCOSE SERPL-MCNC: 158 MG/DL (ref 65–99)
HCT VFR BLD AUTO: 34.3 % (ref 37.5–51)
HGB BLD-MCNC: 10.5 G/DL (ref 13–17.7)
INR PPP: 1.6 (ref 0.9–1.1)
MCH RBC QN AUTO: 27.9 PG (ref 26.6–33)
MCHC RBC AUTO-ENTMCNC: 30.6 G/DL (ref 31.5–35.7)
MCV RBC AUTO: 91 FL (ref 79–97)
PLATELET # BLD AUTO: 129 10*3/MM3 (ref 140–450)
PMV BLD AUTO: 11.6 FL (ref 6–12)
POTASSIUM SERPL-SCNC: 6 MMOL/L (ref 3.5–5.2)
PROTHROMBIN TIME: 18.8 SECONDS (ref 11.9–14.1)
RBC # BLD AUTO: 3.77 10*6/MM3 (ref 4.14–5.8)
SODIUM SERPL-SCNC: 129 MMOL/L (ref 136–145)
WBC # BLD AUTO: 12.39 10*3/MM3 (ref 3.4–10.8)

## 2020-11-20 PROCEDURE — 94799 UNLISTED PULMONARY SVC/PX: CPT

## 2020-11-20 PROCEDURE — 76000 FLUOROSCOPY <1 HR PHYS/QHP: CPT

## 2020-11-20 PROCEDURE — 82962 GLUCOSE BLOOD TEST: CPT

## 2020-11-20 PROCEDURE — C2617 STENT, NON-COR, TEM W/O DEL: HCPCS | Performed by: UROLOGY

## 2020-11-20 PROCEDURE — C1894 INTRO/SHEATH, NON-LASER: HCPCS | Performed by: UROLOGY

## 2020-11-20 PROCEDURE — 63710000001 INSULIN DETEMIR PER 5 UNITS: Performed by: INTERNAL MEDICINE

## 2020-11-20 PROCEDURE — 99232 SBSQ HOSP IP/OBS MODERATE 35: CPT | Performed by: INTERNAL MEDICINE

## 2020-11-20 PROCEDURE — 0T778DZ DILATION OF LEFT URETER WITH INTRALUMINAL DEVICE, VIA NATURAL OR ARTIFICIAL OPENING ENDOSCOPIC: ICD-10-PCS | Performed by: UROLOGY

## 2020-11-20 PROCEDURE — 85027 COMPLETE CBC AUTOMATED: CPT | Performed by: INTERNAL MEDICINE

## 2020-11-20 PROCEDURE — 25010000002 PROPOFOL 10 MG/ML EMULSION: Performed by: NURSE ANESTHETIST, CERTIFIED REGISTERED

## 2020-11-20 PROCEDURE — 0TF78ZZ FRAGMENTATION IN LEFT URETER, VIA NATURAL OR ARTIFICIAL OPENING ENDOSCOPIC: ICD-10-PCS | Performed by: UROLOGY

## 2020-11-20 PROCEDURE — 85730 THROMBOPLASTIN TIME PARTIAL: CPT | Performed by: INTERNAL MEDICINE

## 2020-11-20 PROCEDURE — 25010000002 FENTANYL CITRATE (PF) 100 MCG/2ML SOLUTION: Performed by: NURSE ANESTHETIST, CERTIFIED REGISTERED

## 2020-11-20 PROCEDURE — C1769 GUIDE WIRE: HCPCS | Performed by: UROLOGY

## 2020-11-20 PROCEDURE — 52356 CYSTO/URETERO W/LITHOTRIPSY: CPT | Performed by: UROLOGY

## 2020-11-20 PROCEDURE — 25010000002 CEFTRIAXONE: Performed by: INTERNAL MEDICINE

## 2020-11-20 PROCEDURE — 76000 FLUOROSCOPY <1 HR PHYS/QHP: CPT | Performed by: RADIOLOGY

## 2020-11-20 PROCEDURE — 80048 BASIC METABOLIC PNL TOTAL CA: CPT | Performed by: INTERNAL MEDICINE

## 2020-11-20 PROCEDURE — 25010000002 MORPHINE PER 10 MG: Performed by: INTERNAL MEDICINE

## 2020-11-20 PROCEDURE — 85610 PROTHROMBIN TIME: CPT | Performed by: INTERNAL MEDICINE

## 2020-11-20 PROCEDURE — 25010000002 ONDANSETRON PER 1 MG: Performed by: NURSE ANESTHETIST, CERTIFIED REGISTERED

## 2020-11-20 DEVICE — URETERAL STENT
Type: IMPLANTABLE DEVICE | Site: URETER | Status: FUNCTIONAL
Brand: POLARIS™ ULTRA

## 2020-11-20 RX ORDER — KETOROLAC TROMETHAMINE 30 MG/ML
15 INJECTION, SOLUTION INTRAMUSCULAR; INTRAVENOUS EVERY 6 HOURS PRN
Status: DISCONTINUED | OUTPATIENT
Start: 2020-11-20 | End: 2020-11-20 | Stop reason: HOSPADM

## 2020-11-20 RX ORDER — FENTANYL CITRATE 50 UG/ML
50 INJECTION, SOLUTION INTRAMUSCULAR; INTRAVENOUS
Status: DISCONTINUED | OUTPATIENT
Start: 2020-11-20 | End: 2020-11-20 | Stop reason: SDUPTHER

## 2020-11-20 RX ORDER — DROPERIDOL 2.5 MG/ML
0.62 INJECTION, SOLUTION INTRAMUSCULAR; INTRAVENOUS ONCE AS NEEDED
Status: DISCONTINUED | OUTPATIENT
Start: 2020-11-20 | End: 2020-11-20 | Stop reason: HOSPADM

## 2020-11-20 RX ORDER — IPRATROPIUM BROMIDE AND ALBUTEROL SULFATE 2.5; .5 MG/3ML; MG/3ML
3 SOLUTION RESPIRATORY (INHALATION) ONCE AS NEEDED
Status: DISCONTINUED | OUTPATIENT
Start: 2020-11-20 | End: 2020-11-20 | Stop reason: SDUPTHER

## 2020-11-20 RX ORDER — ONDANSETRON 2 MG/ML
4 INJECTION INTRAMUSCULAR; INTRAVENOUS AS NEEDED
Status: DISCONTINUED | OUTPATIENT
Start: 2020-11-20 | End: 2020-11-20 | Stop reason: SDUPTHER

## 2020-11-20 RX ORDER — PROPOFOL 10 MG/ML
VIAL (ML) INTRAVENOUS AS NEEDED
Status: DISCONTINUED | OUTPATIENT
Start: 2020-11-20 | End: 2020-11-20 | Stop reason: SURG

## 2020-11-20 RX ORDER — OXYCODONE HYDROCHLORIDE AND ACETAMINOPHEN 5; 325 MG/1; MG/1
1 TABLET ORAL ONCE AS NEEDED
Status: DISCONTINUED | OUTPATIENT
Start: 2020-11-20 | End: 2020-11-20 | Stop reason: SDUPTHER

## 2020-11-20 RX ORDER — SODIUM CHLORIDE, SODIUM LACTATE, POTASSIUM CHLORIDE, CALCIUM CHLORIDE 600; 310; 30; 20 MG/100ML; MG/100ML; MG/100ML; MG/100ML
100 INJECTION, SOLUTION INTRAVENOUS ONCE AS NEEDED
Status: DISCONTINUED | OUTPATIENT
Start: 2020-11-20 | End: 2020-11-20 | Stop reason: HOSPADM

## 2020-11-20 RX ORDER — SODIUM CHLORIDE 0.9 % (FLUSH) 0.9 %
10 SYRINGE (ML) INJECTION EVERY 12 HOURS SCHEDULED
Status: DISCONTINUED | OUTPATIENT
Start: 2020-11-20 | End: 2020-11-20 | Stop reason: HOSPADM

## 2020-11-20 RX ORDER — MAGNESIUM HYDROXIDE 1200 MG/15ML
LIQUID ORAL AS NEEDED
Status: DISCONTINUED | OUTPATIENT
Start: 2020-11-20 | End: 2020-11-20 | Stop reason: HOSPADM

## 2020-11-20 RX ORDER — DROPERIDOL 2.5 MG/ML
0.62 INJECTION, SOLUTION INTRAMUSCULAR; INTRAVENOUS ONCE AS NEEDED
Status: DISCONTINUED | OUTPATIENT
Start: 2020-11-20 | End: 2020-11-20 | Stop reason: SDUPTHER

## 2020-11-20 RX ORDER — ONDANSETRON 2 MG/ML
4 INJECTION INTRAMUSCULAR; INTRAVENOUS AS NEEDED
Status: DISCONTINUED | OUTPATIENT
Start: 2020-11-20 | End: 2020-11-20 | Stop reason: HOSPADM

## 2020-11-20 RX ORDER — L.ACID,PARA/B.BIFIDUM/S.THERM 8B CELL
1 CAPSULE ORAL DAILY
Status: DISCONTINUED | OUTPATIENT
Start: 2020-11-20 | End: 2020-11-21 | Stop reason: HOSPADM

## 2020-11-20 RX ORDER — FENTANYL CITRATE 50 UG/ML
INJECTION, SOLUTION INTRAMUSCULAR; INTRAVENOUS AS NEEDED
Status: DISCONTINUED | OUTPATIENT
Start: 2020-11-20 | End: 2020-11-20 | Stop reason: SURG

## 2020-11-20 RX ORDER — FAMOTIDINE 10 MG/ML
INJECTION, SOLUTION INTRAVENOUS AS NEEDED
Status: DISCONTINUED | OUTPATIENT
Start: 2020-11-20 | End: 2020-11-20 | Stop reason: SURG

## 2020-11-20 RX ORDER — IPRATROPIUM BROMIDE AND ALBUTEROL SULFATE 2.5; .5 MG/3ML; MG/3ML
3 SOLUTION RESPIRATORY (INHALATION) ONCE AS NEEDED
Status: DISCONTINUED | OUTPATIENT
Start: 2020-11-20 | End: 2020-11-20 | Stop reason: HOSPADM

## 2020-11-20 RX ORDER — SODIUM CHLORIDE 0.9 % (FLUSH) 0.9 %
10 SYRINGE (ML) INJECTION AS NEEDED
Status: DISCONTINUED | OUTPATIENT
Start: 2020-11-20 | End: 2020-11-20 | Stop reason: HOSPADM

## 2020-11-20 RX ORDER — MIDAZOLAM HYDROCHLORIDE 1 MG/ML
1 INJECTION INTRAMUSCULAR; INTRAVENOUS
Status: DISCONTINUED | OUTPATIENT
Start: 2020-11-20 | End: 2020-11-20 | Stop reason: HOSPADM

## 2020-11-20 RX ORDER — CIPROFLOXACIN 250 MG/1
250 TABLET, FILM COATED ORAL 2 TIMES DAILY
Qty: 20 TABLET | Refills: 0 | Status: SHIPPED | OUTPATIENT
Start: 2020-11-20 | End: 2020-11-21 | Stop reason: SDUPTHER

## 2020-11-20 RX ORDER — ONDANSETRON 2 MG/ML
INJECTION INTRAMUSCULAR; INTRAVENOUS AS NEEDED
Status: DISCONTINUED | OUTPATIENT
Start: 2020-11-20 | End: 2020-11-20 | Stop reason: SURG

## 2020-11-20 RX ORDER — OXYCODONE HYDROCHLORIDE AND ACETAMINOPHEN 5; 325 MG/1; MG/1
TABLET ORAL
Qty: 28 TABLET | Refills: 0 | Status: SHIPPED | OUTPATIENT
Start: 2020-11-20 | End: 2021-01-22

## 2020-11-20 RX ORDER — SODIUM CHLORIDE 9 MG/ML
INJECTION, SOLUTION INTRAVENOUS CONTINUOUS PRN
Status: DISCONTINUED | OUTPATIENT
Start: 2020-11-20 | End: 2020-11-20 | Stop reason: SURG

## 2020-11-20 RX ORDER — SODIUM CHLORIDE 9 MG/ML
INJECTION, SOLUTION INTRAVENOUS AS NEEDED
Status: DISCONTINUED | OUTPATIENT
Start: 2020-11-20 | End: 2020-11-20 | Stop reason: HOSPADM

## 2020-11-20 RX ORDER — OXYCODONE HYDROCHLORIDE AND ACETAMINOPHEN 5; 325 MG/1; MG/1
1 TABLET ORAL ONCE AS NEEDED
Status: DISCONTINUED | OUTPATIENT
Start: 2020-11-20 | End: 2020-11-20 | Stop reason: HOSPADM

## 2020-11-20 RX ORDER — SODIUM CHLORIDE, SODIUM LACTATE, POTASSIUM CHLORIDE, CALCIUM CHLORIDE 600; 310; 30; 20 MG/100ML; MG/100ML; MG/100ML; MG/100ML
125 INJECTION, SOLUTION INTRAVENOUS ONCE
Status: COMPLETED | OUTPATIENT
Start: 2020-11-20 | End: 2020-11-20

## 2020-11-20 RX ORDER — SODIUM POLYSTYRENE SULFONATE 4.1 MEQ/G
30 POWDER, FOR SUSPENSION ORAL; RECTAL ONCE
Status: COMPLETED | OUTPATIENT
Start: 2020-11-20 | End: 2020-11-20

## 2020-11-20 RX ORDER — FENTANYL CITRATE 50 UG/ML
50 INJECTION, SOLUTION INTRAMUSCULAR; INTRAVENOUS
Status: DISCONTINUED | OUTPATIENT
Start: 2020-11-20 | End: 2020-11-20 | Stop reason: HOSPADM

## 2020-11-20 RX ADMIN — LOSARTAN POTASSIUM 100 MG: 50 TABLET, FILM COATED ORAL at 18:25

## 2020-11-20 RX ADMIN — SODIUM CHLORIDE: 9 INJECTION, SOLUTION INTRAVENOUS at 15:11

## 2020-11-20 RX ADMIN — SODIUM CHLORIDE, PRESERVATIVE FREE 10 ML: 5 INJECTION INTRAVENOUS at 08:22

## 2020-11-20 RX ADMIN — CEFTRIAXONE 1 G: 1 INJECTION, POWDER, FOR SOLUTION INTRAMUSCULAR; INTRAVENOUS at 20:00

## 2020-11-20 RX ADMIN — SODIUM CHLORIDE, POTASSIUM CHLORIDE, SODIUM LACTATE AND CALCIUM CHLORIDE 125 ML/HR: 600; 310; 30; 20 INJECTION, SOLUTION INTRAVENOUS at 15:02

## 2020-11-20 RX ADMIN — AMLODIPINE BESYLATE 5 MG: 5 TABLET ORAL at 18:25

## 2020-11-20 RX ADMIN — PRAVASTATIN SODIUM 40 MG: 40 TABLET ORAL at 18:25

## 2020-11-20 RX ADMIN — ONDANSETRON 4 MG: 2 INJECTION INTRAMUSCULAR; INTRAVENOUS at 15:13

## 2020-11-20 RX ADMIN — HYDROCODONE BITARTRATE AND ACETAMINOPHEN 1 TABLET: 7.5; 325 TABLET ORAL at 11:16

## 2020-11-20 RX ADMIN — MORPHINE SULFATE 4 MG: 4 INJECTION, SOLUTION INTRAMUSCULAR; INTRAVENOUS at 07:46

## 2020-11-20 RX ADMIN — IPRATROPIUM BROMIDE AND ALBUTEROL SULFATE 3 ML: .5; 3 SOLUTION RESPIRATORY (INHALATION) at 07:02

## 2020-11-20 RX ADMIN — PROPOFOL 100 MG: 10 INJECTION, EMULSION INTRAVENOUS at 15:13

## 2020-11-20 RX ADMIN — IPRATROPIUM BROMIDE AND ALBUTEROL SULFATE 3 ML: .5; 3 SOLUTION RESPIRATORY (INHALATION) at 16:29

## 2020-11-20 RX ADMIN — FAMOTIDINE 20 MG: 10 INJECTION INTRAVENOUS at 15:13

## 2020-11-20 RX ADMIN — INSULIN DETEMIR 41 UNITS: 100 INJECTION, SOLUTION SUBCUTANEOUS at 08:25

## 2020-11-20 RX ADMIN — AZTREONAM 2 G: 2 INJECTION, POWDER, FOR SOLUTION INTRAMUSCULAR; INTRAVENOUS at 15:15

## 2020-11-20 RX ADMIN — FENTANYL CITRATE 50 MCG: 50 INJECTION INTRAMUSCULAR; INTRAVENOUS at 15:09

## 2020-11-20 RX ADMIN — SODIUM POLYSTYRENE SULFONATE 30 G: 1 POWDER ORAL; RECTAL at 11:16

## 2020-11-20 RX ADMIN — FENTANYL CITRATE 50 MCG: 50 INJECTION INTRAMUSCULAR; INTRAVENOUS at 15:13

## 2020-11-20 RX ADMIN — Medication 1 CAPSULE: at 18:25

## 2020-11-20 RX ADMIN — METOPROLOL SUCCINATE 50 MG: 50 TABLET, FILM COATED, EXTENDED RELEASE ORAL at 08:22

## 2020-11-20 NOTE — ANESTHESIA PREPROCEDURE EVALUATION
Anesthesia Evaluation     Patient summary reviewed and Nursing notes reviewed   no history of anesthetic complications:               Airway   Mallampati: III  TM distance: >3 FB  Neck ROM: limited  No difficulty expected and Possible difficult intubation  Dental - normal exam   (+) poor dentition    Pulmonary - normal exam   (+) COPD,   Cardiovascular - normal exam  Exercise tolerance: poor (<4 METS)    NYHA Classification: II    (+) hypertension, CAD, CHF , hyperlipidemia,       Neuro/Psych  (+) psychiatric history,     GI/Hepatic/Renal/Endo    (+) morbid obesity,  renal disease, diabetes mellitus,     Musculoskeletal (-) negative ROS    Abdominal  - normal exam    Bowel sounds: normal.   Substance History - negative use     OB/GYN negative ob/gyn ROS         Other - negative ROS                       Anesthesia Plan    ASA 4     general     intravenous induction     Anesthetic plan, all risks, benefits, and alternatives have been provided, discussed and informed consent has been obtained with: patient.

## 2020-11-20 NOTE — ANESTHESIA PROCEDURE NOTES
Airway  Urgency: elective    Date/Time: 11/20/2020 3:24 PM  Airway not difficult    General Information and Staff    Patient location during procedure: OR  Anesthesiologist: Jose Juan Costa DO  CRNA: Isabel De La Cruz CRNA    Indications and Patient Condition  Indications for airway management: airway protection    Preoxygenated: yes  Mask difficulty assessment: 0 - not attempted    Final Airway Details  Final airway type: supraglottic airway      Successful airway: classic  Size 4    Number of attempts at approach: 1  Assessment: lips, teeth, and gum same as pre-op    Additional Comments  LMA placed with no trauma noted. Patient tolerated well. Good seal. Secured.

## 2020-11-21 ENCOUNTER — READMISSION MANAGEMENT (OUTPATIENT)
Dept: CALL CENTER | Facility: HOSPITAL | Age: 65
End: 2020-11-21

## 2020-11-21 VITALS
BODY MASS INDEX: 37.52 KG/M2 | OXYGEN SATURATION: 96 % | HEIGHT: 72 IN | WEIGHT: 277 LBS | RESPIRATION RATE: 20 BRPM | SYSTOLIC BLOOD PRESSURE: 119 MMHG | DIASTOLIC BLOOD PRESSURE: 62 MMHG | HEART RATE: 62 BPM | TEMPERATURE: 98 F

## 2020-11-21 PROBLEM — N20.1 LEFT URETERAL STONE: Status: RESOLVED | Noted: 2020-11-18 | Resolved: 2020-11-21

## 2020-11-21 LAB
ANION GAP SERPL CALCULATED.3IONS-SCNC: 12.8 MMOL/L (ref 5–15)
APTT PPP: 43.2 SECONDS (ref 25.6–35.3)
APTT PPP: 56.7 SECONDS (ref 25.6–35.3)
BASOPHILS # BLD AUTO: 0.05 10*3/MM3 (ref 0–0.2)
BASOPHILS NFR BLD AUTO: 0.5 % (ref 0–1.5)
BUN SERPL-MCNC: 46 MG/DL (ref 8–23)
BUN/CREAT SERPL: 16.4 (ref 7–25)
CALCIUM SPEC-SCNC: 8.4 MG/DL (ref 8.6–10.5)
CHLORIDE SERPL-SCNC: 104 MMOL/L (ref 98–107)
CO2 SERPL-SCNC: 20.2 MMOL/L (ref 22–29)
CREAT SERPL-MCNC: 2.81 MG/DL (ref 0.76–1.27)
DEPRECATED RDW RBC AUTO: 46.9 FL (ref 37–54)
EOSINOPHIL # BLD AUTO: 0.25 10*3/MM3 (ref 0–0.4)
EOSINOPHIL NFR BLD AUTO: 2.7 % (ref 0.3–6.2)
ERYTHROCYTE [DISTWIDTH] IN BLOOD BY AUTOMATED COUNT: 13.9 % (ref 12.3–15.4)
GFR SERPL CREATININE-BSD FRML MDRD: 23 ML/MIN/1.73
GLUCOSE BLDC GLUCOMTR-MCNC: 115 MG/DL (ref 70–130)
GLUCOSE BLDC GLUCOMTR-MCNC: 131 MG/DL (ref 70–130)
GLUCOSE BLDC GLUCOMTR-MCNC: 156 MG/DL (ref 70–130)
GLUCOSE SERPL-MCNC: 185 MG/DL (ref 65–99)
HCT VFR BLD AUTO: 32.6 % (ref 37.5–51)
HGB BLD-MCNC: 10 G/DL (ref 13–17.7)
IMM GRANULOCYTES # BLD AUTO: 0.06 10*3/MM3 (ref 0–0.05)
IMM GRANULOCYTES NFR BLD AUTO: 0.7 % (ref 0–0.5)
INR PPP: 1.44 (ref 0.9–1.1)
INR PPP: 1.47 (ref 0.9–1.1)
LYMPHOCYTES # BLD AUTO: 0.85 10*3/MM3 (ref 0.7–3.1)
LYMPHOCYTES NFR BLD AUTO: 9.3 % (ref 19.6–45.3)
MCH RBC QN AUTO: 27.9 PG (ref 26.6–33)
MCHC RBC AUTO-ENTMCNC: 30.7 G/DL (ref 31.5–35.7)
MCV RBC AUTO: 91.1 FL (ref 79–97)
MONOCYTES # BLD AUTO: 0.58 10*3/MM3 (ref 0.1–0.9)
MONOCYTES NFR BLD AUTO: 6.4 % (ref 5–12)
NEUTROPHILS NFR BLD AUTO: 7.32 10*3/MM3 (ref 1.7–7)
NEUTROPHILS NFR BLD AUTO: 80.4 % (ref 42.7–76)
NRBC BLD AUTO-RTO: 0 /100 WBC (ref 0–0.2)
PLATELET # BLD AUTO: 140 10*3/MM3 (ref 140–450)
PMV BLD AUTO: 11.7 FL (ref 6–12)
POTASSIUM SERPL-SCNC: 4.9 MMOL/L (ref 3.5–5.2)
PROTHROMBIN TIME: 17.3 SECONDS (ref 11.9–14.1)
PROTHROMBIN TIME: 17.7 SECONDS (ref 11.9–14.1)
RBC # BLD AUTO: 3.58 10*6/MM3 (ref 4.14–5.8)
SODIUM SERPL-SCNC: 137 MMOL/L (ref 136–145)
WBC # BLD AUTO: 9.11 10*3/MM3 (ref 3.4–10.8)

## 2020-11-21 PROCEDURE — 63710000001 INSULIN DETEMIR PER 5 UNITS: Performed by: INTERNAL MEDICINE

## 2020-11-21 PROCEDURE — 99232 SBSQ HOSP IP/OBS MODERATE 35: CPT | Performed by: INTERNAL MEDICINE

## 2020-11-21 PROCEDURE — 82962 GLUCOSE BLOOD TEST: CPT

## 2020-11-21 PROCEDURE — 25010000002 HEPARIN (PORCINE) PER 1000 UNITS: Performed by: INTERNAL MEDICINE

## 2020-11-21 PROCEDURE — 85610 PROTHROMBIN TIME: CPT | Performed by: INTERNAL MEDICINE

## 2020-11-21 PROCEDURE — 94799 UNLISTED PULMONARY SVC/PX: CPT

## 2020-11-21 PROCEDURE — 85730 THROMBOPLASTIN TIME PARTIAL: CPT | Performed by: INTERNAL MEDICINE

## 2020-11-21 PROCEDURE — 85025 COMPLETE CBC W/AUTO DIFF WBC: CPT | Performed by: INTERNAL MEDICINE

## 2020-11-21 PROCEDURE — 63710000001 INSULIN ASPART PER 5 UNITS: Performed by: INTERNAL MEDICINE

## 2020-11-21 PROCEDURE — 99239 HOSP IP/OBS DSCHRG MGMT >30: CPT | Performed by: INTERNAL MEDICINE

## 2020-11-21 PROCEDURE — 80048 BASIC METABOLIC PNL TOTAL CA: CPT | Performed by: INTERNAL MEDICINE

## 2020-11-21 RX ORDER — HEPARIN SODIUM 10000 [USP'U]/100ML
7.87 INJECTION, SOLUTION INTRAVENOUS
Status: DISCONTINUED | OUTPATIENT
Start: 2020-11-21 | End: 2020-11-21 | Stop reason: HOSPADM

## 2020-11-21 RX ORDER — WARFARIN SODIUM 5 MG/1
5 TABLET ORAL
Status: DISCONTINUED | OUTPATIENT
Start: 2020-11-21 | End: 2020-11-21 | Stop reason: DRUGHIGH

## 2020-11-21 RX ORDER — LOSARTAN POTASSIUM 100 MG/1
100 TABLET ORAL DAILY
Qty: 90 TABLET | Refills: 1
Start: 2020-11-28 | End: 2021-01-05

## 2020-11-21 RX ORDER — HEPARIN SODIUM 5000 [USP'U]/ML
2500 INJECTION, SOLUTION INTRAVENOUS; SUBCUTANEOUS AS NEEDED
Status: DISCONTINUED | OUTPATIENT
Start: 2020-11-21 | End: 2020-11-21 | Stop reason: HOSPADM

## 2020-11-21 RX ORDER — CIPROFLOXACIN 250 MG/1
250 TABLET, FILM COATED ORAL 2 TIMES DAILY
Qty: 20 TABLET | Refills: 0 | Status: SHIPPED | OUTPATIENT
Start: 2020-11-21 | End: 2021-01-22

## 2020-11-21 RX ORDER — HEPARIN SODIUM 5000 [USP'U]/ML
5000 INJECTION, SOLUTION INTRAVENOUS; SUBCUTANEOUS AS NEEDED
Status: DISCONTINUED | OUTPATIENT
Start: 2020-11-21 | End: 2020-11-21 | Stop reason: HOSPADM

## 2020-11-21 RX ORDER — SPIRONOLACTONE 25 MG/1
25 TABLET ORAL NIGHTLY
Start: 2020-11-28 | End: 2021-01-05

## 2020-11-21 RX ORDER — WARFARIN SODIUM 3 MG/1
9 TABLET ORAL
Status: COMPLETED | OUTPATIENT
Start: 2020-11-21 | End: 2020-11-21

## 2020-11-21 RX ORDER — HEPARIN SODIUM 5000 [USP'U]/ML
5000 INJECTION, SOLUTION INTRAVENOUS; SUBCUTANEOUS ONCE
Status: COMPLETED | OUTPATIENT
Start: 2020-11-21 | End: 2020-11-21

## 2020-11-21 RX ORDER — HEPARIN SODIUM 10000 [USP'U]/100ML
INJECTION, SOLUTION INTRAVENOUS
Status: DISPENSED
Start: 2020-11-21 | End: 2020-11-21

## 2020-11-21 RX ADMIN — WARFARIN 9 MG: 3 TABLET ORAL at 16:38

## 2020-11-21 RX ADMIN — INSULIN ASPART 15 UNITS: 100 INJECTION, SOLUTION INTRAVENOUS; SUBCUTANEOUS at 08:04

## 2020-11-21 RX ADMIN — HYDROCODONE BITARTRATE AND ACETAMINOPHEN 1 TABLET: 7.5; 325 TABLET ORAL at 07:46

## 2020-11-21 RX ADMIN — Medication 1 CAPSULE: at 08:03

## 2020-11-21 RX ADMIN — IPRATROPIUM BROMIDE AND ALBUTEROL SULFATE 3 ML: .5; 3 SOLUTION RESPIRATORY (INHALATION) at 01:00

## 2020-11-21 RX ADMIN — HEPARIN SODIUM 5000 UNITS: 5000 INJECTION INTRAVENOUS; SUBCUTANEOUS at 04:15

## 2020-11-21 RX ADMIN — PRAVASTATIN SODIUM 40 MG: 40 TABLET ORAL at 08:03

## 2020-11-21 RX ADMIN — INSULIN DETEMIR 41 UNITS: 100 INJECTION, SOLUTION SUBCUTANEOUS at 08:04

## 2020-11-21 RX ADMIN — INSULIN ASPART 2 UNITS: 100 INJECTION, SOLUTION INTRAVENOUS; SUBCUTANEOUS at 08:03

## 2020-11-21 RX ADMIN — HYDROCODONE BITARTRATE AND ACETAMINOPHEN 1 TABLET: 7.5; 325 TABLET ORAL at 01:27

## 2020-11-21 RX ADMIN — HEPARIN SODIUM 7.87 UNITS/KG/HR: 10000 INJECTION, SOLUTION INTRAVENOUS at 04:12

## 2020-11-21 RX ADMIN — SODIUM CHLORIDE, PRESERVATIVE FREE 10 ML: 5 INJECTION INTRAVENOUS at 08:03

## 2020-11-21 RX ADMIN — IPRATROPIUM BROMIDE AND ALBUTEROL SULFATE 3 ML: .5; 3 SOLUTION RESPIRATORY (INHALATION) at 07:21

## 2020-11-21 NOTE — OUTREACH NOTE
Prep Survey      Responses   [facility] patient discharged from?  [facility]   Is LACE score < 7 ?  No   Eligibility  [facility]   Date of Admission  11/18/20   Date of Discharge  11/21/20   Discharge Disposition  Home or Self Care   Discharge diagnosis  Patient underwent cystoscopy and ureteroscopy with laser lithotripsy    Does the patient have one of the following disease processes/diagnoses(primary or secondary)?  General Surgery   Does the patient have Home health ordered?  No   Is there a DME ordered?  No   Prep survey completed?  Yes          Angie Jauregui RN

## 2020-11-23 ENCOUNTER — TRANSITIONAL CARE MANAGEMENT TELEPHONE ENCOUNTER (OUTPATIENT)
Dept: CALL CENTER | Facility: HOSPITAL | Age: 65
End: 2020-11-23

## 2020-11-23 ENCOUNTER — TELEPHONE (OUTPATIENT)
Dept: MEDSURG UNIT | Facility: HOSPITAL | Age: 65
End: 2020-11-23

## 2020-11-23 LAB — GLUCOSE BLDC GLUCOMTR-MCNC: 105 MG/DL (ref 70–130)

## 2020-11-23 NOTE — OUTREACH NOTE
Call Center TCM Note      Responses   [facility] patient discharged from?  [facility]   Does the patient have one of the following disease processes/diagnoses(primary or secondary)?  General Surgery   TCM attempt successful?  No   Unsuccessful attempts  Attempt 1          Jillian Zuñiga RN    11/23/2020, 12:23 EST

## 2020-11-23 NOTE — OUTREACH NOTE
Call Center TCM Note      Responses   [facility] patient discharged from?  [facility]   Does the patient have one of the following disease processes/diagnoses(primary or secondary)?  General Surgery   TCM attempt successful?  No   Unsuccessful attempts  Attempt 2          Jillian Zuñiga RN    11/23/2020, 13:20 EST

## 2020-11-24 ENCOUNTER — TRANSITIONAL CARE MANAGEMENT TELEPHONE ENCOUNTER (OUTPATIENT)
Dept: CALL CENTER | Facility: HOSPITAL | Age: 65
End: 2020-11-24

## 2020-11-24 NOTE — OUTREACH NOTE
Call Center TCM Note      Responses   [facility] patient discharged from?  [facility]   Does the patient have one of the following disease processes/diagnoses(primary or secondary)?  General Surgery   TCM attempt successful?  No   Unsuccessful attempts  Attempt 3          Yue Menendez RN    11/24/2020, 11:05 EST

## 2020-11-25 ENCOUNTER — ANTICOAGULATION VISIT (OUTPATIENT)
Dept: FAMILY MEDICINE CLINIC | Facility: CLINIC | Age: 65
End: 2020-11-25

## 2020-11-25 DIAGNOSIS — Z79.01 ANTICOAGULANT LONG-TERM USE: Primary | ICD-10-CM

## 2020-11-25 LAB — INR PPP: 2.6 (ref 2.5–3.5)

## 2020-11-25 PROCEDURE — 85610 PROTHROMBIN TIME: CPT | Performed by: FAMILY MEDICINE

## 2020-11-25 PROCEDURE — 36416 COLLJ CAPILLARY BLOOD SPEC: CPT | Performed by: FAMILY MEDICINE

## 2020-11-28 NOTE — ANESTHESIA POSTPROCEDURE EVALUATION
Patient: Benitez Miranda    Procedure Summary     Date: 11/20/20 Room / Location:  [facility] OR 06 /  [facility]    Anesthesia Start: 1508 Anesthesia Stop: 1603    Procedure: CYSTOSCOPY URETEROSCOPY LASER LITHOTRIPSY WITH STENT PLACEMENT (Left ) Diagnosis:       Left ureteral stone      (Left ureteral stone [N20.1])    Surgeon: Jonah Montgomery MD Provider: Jose Juan Costa DO    Anesthesia Type: general ASA Status: 4          Anesthesia Type: general    Vitals  Vitals Value Taken Time   /83 11/20/20 1644   Temp 97.7 °F (36.5 °C) 11/20/20 1634   Pulse 70 11/20/20 1644   Resp 23 11/20/20 1644   SpO2 95 % 11/20/20 1644           Post Anesthesia Care and Evaluation    Patient location during evaluation: PHASE II  Patient participation: complete - patient participated  Level of consciousness: awake and alert  Pain score: 1  Pain management: adequate  Airway patency: patent  Anesthetic complications: No anesthetic complications  PONV Status: controlled  Cardiovascular status: acceptable  Respiratory status: acceptable  Hydration status: acceptable

## 2020-12-01 ENCOUNTER — OFFICE VISIT (OUTPATIENT)
Dept: UROLOGY | Facility: CLINIC | Age: 65
End: 2020-12-01

## 2020-12-01 ENCOUNTER — HOSPITAL ENCOUNTER (OUTPATIENT)
Dept: GENERAL RADIOLOGY | Facility: HOSPITAL | Age: 65
Discharge: HOME | End: 2020-12-01
Admitting: UROLOGY

## 2020-12-01 VITALS — HEIGHT: 72 IN | TEMPERATURE: 97.7 F | BODY MASS INDEX: 36.84 KG/M2 | WEIGHT: 272 LBS

## 2020-12-01 DIAGNOSIS — N20.0 KIDNEY STONES: Primary | ICD-10-CM

## 2020-12-01 DIAGNOSIS — N40.1 BENIGN PROSTATIC HYPERPLASIA WITH LOWER URINARY TRACT SYMPTOMS, SYMPTOM DETAILS UNSPECIFIED: ICD-10-CM

## 2020-12-01 PROCEDURE — 74018 RADEX ABDOMEN 1 VIEW: CPT | Performed by: RADIOLOGY

## 2020-12-01 PROCEDURE — 74018 RADEX ABDOMEN 1 VIEW: CPT

## 2020-12-01 PROCEDURE — 99213 OFFICE O/P EST LOW 20 MIN: CPT | Performed by: UROLOGY

## 2020-12-01 RX ORDER — TAMSULOSIN HYDROCHLORIDE 0.4 MG/1
1 CAPSULE ORAL NIGHTLY
Qty: 30 CAPSULE | Refills: 11 | Status: SHIPPED | OUTPATIENT
Start: 2020-12-01 | End: 2022-01-25 | Stop reason: SDUPTHER

## 2020-12-01 NOTE — PROGRESS NOTES
Chief Complaint:          Left kidney stone and stent    HPI:   65 y.o. male.  Pt had left ureteroscopy laser lithotripsy and stent placement 2 weeks ago.  His kub looks good.  He has a large pelvic calcification that is not in the urinary system it is 1 cm away.  It is triangular about 9 mm.  HPI      Past Medical History:        Past Medical History:   Diagnosis Date   • CAD (coronary artery disease)     x1 stent; pulmonary HTN; EF 50-55%; rheumatic valve; MV stenosis   • CHF (congestive heart failure) (CMS/[facility])    • CKD (chronic kidney disease), stage III    • COPD (chronic obstructive pulmonary disease) (CMS/[facility])    • Depression    • Diabetes mellitus (CMS/[facility])    • H/O mechanical aortic valve replacement    • History of tobacco abuse    • Hyperlipidemia    • Hypertension    • Ischemic heart disease    • Kidney failure     third stage   • Low back pain    • Obesity     BMI 36.   • Valvular heart disease          Current Meds:     Current Outpatient Medications   Medication Sig Dispense Refill   • amLODIPine (NORVASC) 5 MG tablet Take 1 tablet by mouth Daily.     • ciprofloxacin (Cipro) 250 MG tablet Take 1 tablet by mouth 2 (Two) Times a Day. 20 tablet 0   • insulin glargine (LANTUS) 100 UNIT/ML injection Inject 41 Units under the skin into the appropriate area as directed Daily.     • insulin glargine (LANTUS) 100 UNIT/ML injection Inject 43 Units under the skin into the appropriate area as directed Every Night.     • insulin lispro (humaLOG) 100 UNIT/ML injection Inject 15 Units under the skin into the appropriate area as directed 3 (Three) Times a Day With Meals.     • losartan (COZAAR) 100 MG tablet Take 1 tablet by mouth Daily. 90 tablet 1   • metoprolol succinate XL (TOPROL-XL) 50 MG 24 hr tablet Take 1 tablet by mouth Daily. 90 tablet 1   • oxyCODONE-acetaminophen (Percocet) 5-325 MG per tablet Take 1 to 2 Tablets Every 6 Hours as needed for PAIN 28 tablet 0   • pravastatin (PRAVACHOL) 40 MG tablet TAKE  ONE TABLET BY MOUTH DAILY 90 tablet 0   • spironolactone (ALDACTONE) 25 MG tablet Take 1 tablet by mouth Every Night.     • warfarin (COUMADIN) 6 MG tablet TAKE ONE TABLET BY MOUTH EVERY EVENING. 45 tablet 5   • tamsulosin (Flomax) 0.4 MG capsule 24 hr capsule Take 1 capsule by mouth Every Night. 30 capsule 11     No current facility-administered medications for this visit.         Allergies:      Allergies   Allergen Reactions   • Penicillins Swelling     Tongue swelled   • Ace Inhibitors Angioedema   • Contrast Dye Rash        Past Surgical History:     Past Surgical History:   Procedure Laterality Date   • AORTIC VALVE REPAIR/REPLACEMENT     • CARDIAC CATHETERIZATION     • CARDIAC SURGERY      valve on aortic   • CARDIAC SURGERY      stent    • CATARACT EXTRACTION     • COLONOSCOPY     • CORONARY STENT PLACEMENT     • CYSTOSCOPY URETEROSCOPY LASER LITHOTRIPSY Left 2020    Procedure: CYSTOSCOPY URETEROSCOPY LASER LITHOTRIPSY WITH STENT PLACEMENT;  Surgeon: Jonah Montgomery MD;  Location: [facility];  Service: Urology;  Laterality: Left;         Social History:     Social History     Socioeconomic History   • Marital status:      Spouse name: Not on file   • Number of children: Not on file   • Years of education: Not on file   • Highest education level: Not on file   Tobacco Use   • Smoking status: Former Smoker     Packs/day: 1.00     Years: 30.00     Pack years: 30.00     Types: Pipe, Cigars     Quit date: 2016     Years since quittin.4   • Smokeless tobacco: Never Used   • Tobacco comment: currently smokes a pipe   Substance and Sexual Activity   • Alcohol use: No   • Drug use: No   • Sexual activity: Defer       Family History:     Family History   Problem Relation Age of Onset   • Cancer Mother         breast   • COPD Father    • Heart attack Father 74   • Diabetes Paternal Grandmother         both legs removed       Review of Systems:     Review of Systems    "  Constitutional: Negative for chills and fever.   HENT: Negative for sinus pressure.    Respiratory: Negative for cough.    Cardiovascular: Negative for leg swelling.   Gastrointestinal: Positive for abdominal pain.   Musculoskeletal: Negative for back pain.   Neurological: Negative for headaches.   Psychiatric/Behavioral: The patient is not nervous/anxious.          Physical Exam:     Physical Exam    Temp 97.7 °F (36.5 °C)   Ht 182 cm (71.65\")   Wt 123 kg (272 lb)   BMI 37.25 kg/m²    Procedure:         Assessment:     Encounter Diagnoses   Name Primary?   • Kidney stones Yes   • Benign prostatic hyperplasia with lower urinary tract symptoms, symptom details unspecified        Orders Placed This Encounter   Procedures   • XR abdomen kub     Order Specific Question:   Reason for Exam:     Answer:   kidney stone     Order Specific Question:   Does this patient have a diabetic monitoring/medication delivering device on?     Answer:   No       Patient reports that he is not currently experiencing any symptoms of urinary incontinence.      Plan:   Will remove his stent tomorrow .    Patient's Body mass index is 37.25 kg/m². BMI is above normal parameters. Recommendations include: referral to primary care.      Smoking Cessation Counseling:  Never a smoker.  Patient is going to follow up with PCP to discuss treatment/medication options to quit tobacco use.     Counseling was given to patient for the following topics instructions for management as follows: ureteral stone. The interim medical history and current results were reviewed.  A treatment plan with follow-up was made for Kidney stones [N20.0]. I spent 21 minutes face to face with Benitez Miranda and 80 percentage was spent in counseling.       This document has been electronically signed by Jonah Montgomery MD December 2, 2020 08:06 EST      "

## 2020-12-02 ENCOUNTER — PROCEDURE VISIT (OUTPATIENT)
Dept: UROLOGY | Facility: CLINIC | Age: 65
End: 2020-12-02

## 2020-12-02 VITALS — BODY MASS INDEX: 36.84 KG/M2 | WEIGHT: 272 LBS | HEIGHT: 72 IN | TEMPERATURE: 97.8 F

## 2020-12-02 DIAGNOSIS — N28.1 COMPLEX RENAL CYST: ICD-10-CM

## 2020-12-02 DIAGNOSIS — N20.0 KIDNEY STONE: Primary | ICD-10-CM

## 2020-12-02 NOTE — PROGRESS NOTES
HPI:       HPI      Procedure:      Procedure: Cystoscopy Male    Indication: stent on left    Urinalysis Performed Today:  Negative for Infection    Informed Consent Obtained    Sterile prep performed in usual fashion    11 cc of topical lidocaine inserted urethrally for 10 minutes    Flexible cystoscope inserted and bladder examined    Findings: stent    Additional Procedure with Cystoscopy: Removal of Stent  left      Discussion:      Pt only had one stone on left side that was treated with ureteroscopy and laser lithotripsy.  He has an hyper dense cyst on the left side as well.  Will check a stone study ct scan in 6 months.    Assessment:     Encounter Diagnosis   Name Primary?   • Kidney stone Yes     No orders of the defined types were placed in this encounter.

## 2020-12-07 ENCOUNTER — OFFICE VISIT (OUTPATIENT)
Dept: FAMILY MEDICINE CLINIC | Facility: CLINIC | Age: 65
End: 2020-12-07

## 2020-12-07 VITALS
DIASTOLIC BLOOD PRESSURE: 56 MMHG | SYSTOLIC BLOOD PRESSURE: 112 MMHG | TEMPERATURE: 96.2 F | WEIGHT: 266 LBS | HEIGHT: 72 IN | HEART RATE: 57 BPM | BODY MASS INDEX: 36.03 KG/M2 | OXYGEN SATURATION: 98 %

## 2020-12-07 DIAGNOSIS — M54.50 LOW BACK PAIN, UNSPECIFIED BACK PAIN LATERALITY, UNSPECIFIED CHRONICITY, UNSPECIFIED WHETHER SCIATICA PRESENT: ICD-10-CM

## 2020-12-07 DIAGNOSIS — Z79.01 ANTICOAGULANT LONG-TERM USE: ICD-10-CM

## 2020-12-07 DIAGNOSIS — R31.0 GROSS HEMATURIA: Primary | ICD-10-CM

## 2020-12-07 DIAGNOSIS — R10.9 LEFT SIDED ABDOMINAL PAIN: ICD-10-CM

## 2020-12-07 LAB
BILIRUB BLD-MCNC: NEGATIVE MG/DL
CLARITY, POC: CLEAR
COLOR UR: YELLOW
GLUCOSE UR STRIP-MCNC: NEGATIVE MG/DL
INR PPP: 2 (ref 0.9–1.1)
KETONES UR QL: NEGATIVE
LEUKOCYTE EST, POC: NEGATIVE
NITRITE UR-MCNC: NEGATIVE MG/ML
PH UR: 6 [PH] (ref 5–8)
PROT UR STRIP-MCNC: ABNORMAL MG/DL
RBC # UR STRIP: ABNORMAL /UL
SP GR UR: 1.02 (ref 1–1.03)
UROBILINOGEN UR QL: NORMAL

## 2020-12-07 PROCEDURE — 99214 OFFICE O/P EST MOD 30 MIN: CPT | Performed by: FAMILY MEDICINE

## 2020-12-07 PROCEDURE — 87086 URINE CULTURE/COLONY COUNT: CPT | Performed by: FAMILY MEDICINE

## 2020-12-07 PROCEDURE — 36416 COLLJ CAPILLARY BLOOD SPEC: CPT | Performed by: FAMILY MEDICINE

## 2020-12-07 PROCEDURE — 85025 COMPLETE CBC W/AUTO DIFF WBC: CPT | Performed by: FAMILY MEDICINE

## 2020-12-07 PROCEDURE — 81003 URINALYSIS AUTO W/O SCOPE: CPT | Performed by: FAMILY MEDICINE

## 2020-12-07 PROCEDURE — 80053 COMPREHEN METABOLIC PANEL: CPT | Performed by: FAMILY MEDICINE

## 2020-12-07 PROCEDURE — 85610 PROTHROMBIN TIME: CPT | Performed by: FAMILY MEDICINE

## 2020-12-08 ENCOUNTER — READMISSION MANAGEMENT (OUTPATIENT)
Dept: CALL CENTER | Facility: HOSPITAL | Age: 65
End: 2020-12-08

## 2020-12-08 LAB
ALBUMIN SERPL-MCNC: 4.1 G/DL (ref 3.5–5.2)
ALBUMIN/GLOB SERPL: 1.2 G/DL
ALP SERPL-CCNC: 63 U/L (ref 39–117)
ALT SERPL W P-5'-P-CCNC: 21 U/L (ref 1–41)
ANION GAP SERPL CALCULATED.3IONS-SCNC: 10.8 MMOL/L (ref 5–15)
AST SERPL-CCNC: 14 U/L (ref 1–40)
BACTERIA SPEC AEROBE CULT: NO GROWTH
BASOPHILS # BLD AUTO: 0.1 10*3/MM3 (ref 0–0.2)
BASOPHILS NFR BLD AUTO: 0.9 % (ref 0–1.5)
BILIRUB SERPL-MCNC: 0.4 MG/DL (ref 0–1.2)
BUN SERPL-MCNC: 42 MG/DL (ref 8–23)
BUN/CREAT SERPL: 17.5 (ref 7–25)
CALCIUM SPEC-SCNC: 9.3 MG/DL (ref 8.6–10.5)
CHLORIDE SERPL-SCNC: 104 MMOL/L (ref 98–107)
CO2 SERPL-SCNC: 22.2 MMOL/L (ref 22–29)
CREAT SERPL-MCNC: 2.4 MG/DL (ref 0.76–1.27)
DEPRECATED RDW RBC AUTO: 40.4 FL (ref 37–54)
EOSINOPHIL # BLD AUTO: 0.38 10*3/MM3 (ref 0–0.4)
EOSINOPHIL NFR BLD AUTO: 3.6 % (ref 0.3–6.2)
ERYTHROCYTE [DISTWIDTH] IN BLOOD BY AUTOMATED COUNT: 13.1 % (ref 12.3–15.4)
GFR SERPL CREATININE-BSD FRML MDRD: 27 ML/MIN/1.73
GLOBULIN UR ELPH-MCNC: 3.5 GM/DL
GLUCOSE SERPL-MCNC: 182 MG/DL (ref 65–99)
HCT VFR BLD AUTO: 37.4 % (ref 37.5–51)
HGB BLD-MCNC: 12.4 G/DL (ref 13–17.7)
LYMPHOCYTES # BLD AUTO: 1.28 10*3/MM3 (ref 0.7–3.1)
LYMPHOCYTES NFR BLD AUTO: 12.1 % (ref 19.6–45.3)
MCH RBC QN AUTO: 28.2 PG (ref 26.6–33)
MCHC RBC AUTO-ENTMCNC: 33.2 G/DL (ref 31.5–35.7)
MCV RBC AUTO: 85.2 FL (ref 79–97)
MONOCYTES # BLD AUTO: 0.76 10*3/MM3 (ref 0.1–0.9)
MONOCYTES NFR BLD AUTO: 7.2 % (ref 5–12)
NEUTROPHILS NFR BLD AUTO: 75.6 % (ref 42.7–76)
NEUTROPHILS NFR BLD AUTO: 8 10*3/MM3 (ref 1.7–7)
PLATELET # BLD AUTO: 203 10*3/MM3 (ref 140–450)
PMV BLD AUTO: 13.1 FL (ref 6–12)
POTASSIUM SERPL-SCNC: 5.9 MMOL/L (ref 3.5–5.2)
PROT SERPL-MCNC: 7.6 G/DL (ref 6–8.5)
RBC # BLD AUTO: 4.39 10*6/MM3 (ref 4.14–5.8)
SODIUM SERPL-SCNC: 137 MMOL/L (ref 136–145)
WBC # BLD AUTO: 10.58 10*3/MM3 (ref 3.4–10.8)

## 2020-12-08 NOTE — OUTREACH NOTE
General Surgery Week 3 Survey      Responses   [facility] patient discharged from?  [facility]   Does the patient have one of the following disease processes/diagnoses(primary or secondary)?  General Surgery   Week 3 attempt successful?  Yes   Call start time  1316   Call end time  1322   Discharge diagnosis  Patient underwent cystoscopy and ureteroscopy with laser lithotripsy    Meds reviewed with patient/caregiver?  Yes   Is the patient having any side effects they believe may be caused by any medication additions or changes?  No   Does the patient have all medications related to this admission filled (includes all antibiotics, pain medications, etc.)  Yes   Is the patient taking all medications as directed (includes completed medication regime)?  Yes   Does the patient have a follow up appointment scheduled with their surgeon?  Yes   Has the patient kept scheduled appointments due by today?  Yes   Has home health visited the patient within 72 hours of discharge?  N/A   Psychosocial issues?  No   Did the patient receive a copy of their discharge instructions?  Yes   Nursing interventions  Reviewed instructions with patient   What is the patient's perception of their health status since discharge?  Improving   Nursing interventions  Nurse provided patient education   Is the patient /caregiver able to teach back basic post-op care?  Continue use of incentive spirometry at least 1 week post discharge, Practice 'cough and deep breath', Keep incision areas clean,dry and protected, Do not remove steri-strips, Lifting as instructed by MD in discharge instructions   Is the patient/caregiver able to teach back signs and symptoms of incisional infection?  Increased redness, swelling or pain at the incisonal site, Increased drainage or bleeding, Incisional warmth, Pus or odor from incision, Fever   Is the patient/caregiver able to teach back steps to recovery at home?  Set small, achievable goals for return to baseline  health, Rest and rebuild strength, gradually increase activity, Eat a well-balance diet, Make a list of questions for surgeon's appointment   If the patient is a current smoker, are they able to teach back resources for cessation?  Not a smoker [has quit smoking since d/c.]   Is the patient/caregiver able to teach back the hierarchy of who to call/visit for symptoms/problems? PCP, Specialist, Home health nurse, Urgent Care, ED, 911  Yes   Additional teach back comments  He states he is only a little better. Routed call to Dr. Montgomery.   Week 3 call completed?  Yes   Wrap up additional comments  Had blood work yesterday with PCP, waiting on results.          Angie Jauregui RN

## 2020-12-14 ENCOUNTER — OFFICE VISIT (OUTPATIENT)
Dept: CARDIOLOGY | Facility: CLINIC | Age: 65
End: 2020-12-14

## 2020-12-14 VITALS
OXYGEN SATURATION: 96 % | BODY MASS INDEX: 36.08 KG/M2 | HEART RATE: 66 BPM | SYSTOLIC BLOOD PRESSURE: 145 MMHG | DIASTOLIC BLOOD PRESSURE: 76 MMHG | WEIGHT: 266.4 LBS | HEIGHT: 72 IN

## 2020-12-14 DIAGNOSIS — I50.32 CHRONIC DIASTOLIC CONGESTIVE HEART FAILURE (HCC): ICD-10-CM

## 2020-12-14 DIAGNOSIS — I10 ESSENTIAL HYPERTENSION: ICD-10-CM

## 2020-12-14 DIAGNOSIS — I25.10 CORONARY ARTERY DISEASE INVOLVING NATIVE CORONARY ARTERY OF NATIVE HEART WITHOUT ANGINA PECTORIS: ICD-10-CM

## 2020-12-14 DIAGNOSIS — I05.0 RHEUMATIC MITRAL STENOSIS: Primary | ICD-10-CM

## 2020-12-14 DIAGNOSIS — E78.5 HYPERLIPIDEMIA LDL GOAL <70: ICD-10-CM

## 2020-12-14 DIAGNOSIS — Z95.2 H/O AORTIC VALVE REPLACEMENT: ICD-10-CM

## 2020-12-14 PROCEDURE — 99214 OFFICE O/P EST MOD 30 MIN: CPT | Performed by: INTERNAL MEDICINE

## 2020-12-14 RX ORDER — SERTRALINE HYDROCHLORIDE 100 MG/1
150 TABLET, FILM COATED ORAL DAILY
COMMUNITY
End: 2021-06-08

## 2020-12-14 NOTE — PROGRESS NOTES
[facility] CARDIOLOGY  [address] [facility] [facility] [place] [address]  Phone: [phone]  Fax: [phone]    12/14/2020    Chief Complaint   Patient presents with   • Coronary Artery Disease   • Chronic Kidney Disease   • Diabetes   • COPD        History:   Beintez Miranda is a 65 y.o. male seen in follow up hospital.  He has a past medical history significant for CAD with status post stent placement LAD, hypertension, dyslipidemia, status post AVR, and COPD. Echocardiogram showed moderate-to-severe mitral valve stenosis. No compliant of chest pain, shortness of breath or palpitations.   The chart and medications were reviewed today. Problems above addressed this visit.      Past Medical History:   Diagnosis Date   • CAD (coronary artery disease)     x1 stent; pulmonary HTN; EF 50-55%; rheumatic valve; MV stenosis   • CHF (congestive heart failure) (CMS/[facility])    • CKD (chronic kidney disease), stage III    • COPD (chronic obstructive pulmonary disease) (CMS/[facility])    • Depression    • Diabetes mellitus (CMS/[facility])    • H/O mechanical aortic valve replacement    • History of tobacco abuse    • Hyperlipidemia    • Hypertension    • Ischemic heart disease    • Kidney failure     third stage   • Low back pain    • Obesity     BMI 36.   • Valvular heart disease        Past Surgical History:   Procedure Laterality Date   • AORTIC VALVE REPAIR/REPLACEMENT  1991   • CARDIAC CATHETERIZATION     • CARDIAC SURGERY  1991    valve on aortic   • CARDIAC SURGERY  2001    stent    • CATARACT EXTRACTION     • COLONOSCOPY  2001   • CORONARY STENT PLACEMENT  2005   • CYSTOSCOPY URETEROSCOPY LASER LITHOTRIPSY Left 11/20/2020    Procedure: CYSTOSCOPY URETEROSCOPY LASER LITHOTRIPSY WITH STENT PLACEMENT;  Surgeon: Jonah Montgomery MD;  Location: [facility];  Service: Urology;  Laterality: Left;        Past Social History:  Social History     Socioeconomic History   • Marital status:      Spouse name: Not  on file   • Number of children: Not on file   • Years of education: Not on file   • Highest education level: Not on file   Tobacco Use   • Smoking status: Former Smoker     Packs/day: 1.00     Years: 30.00     Pack years: 30.00     Types: Pipe, Cigars     Quit date: 2016     Years since quittin.4   • Smokeless tobacco: Never Used   • Tobacco comment: currently smokes a pipe   Substance and Sexual Activity   • Alcohol use: No   • Drug use: No   • Sexual activity: Defer       Past Family History:  Family History   Problem Relation Age of Onset   • Cancer Mother         breast   • COPD Father    • Heart attack Father 74   • Diabetes Paternal Grandmother         both legs removed       Review of Systems:   Review of Systems   Constitution: Negative for chills and fever.   HENT: Negative for nosebleeds.    Eyes: Negative for blurred vision and double vision.   Cardiovascular: Negative for chest pain, dyspnea on exertion and leg swelling.   Respiratory: Negative for shortness of breath and wheezing.    Endocrine: Negative for polydipsia and polyuria.   Hematologic/Lymphatic: Negative for bleeding problem.   Skin: Negative for itching and rash.   Musculoskeletal: Negative for muscle weakness.   Gastrointestinal: Negative for dysphagia, nausea and vomiting.   Genitourinary: Negative for dysuria and hematuria.   Neurological: Negative for dizziness and headaches.   Psychiatric/Behavioral: Negative for altered mental status and depression.   Allergic/Immunologic: Negative for hives.          Current Outpatient Medications   Medication Sig Dispense Refill   • amLODIPine (NORVASC) 5 MG tablet Take 1 tablet by mouth Daily.     • insulin glargine (LANTUS) 100 UNIT/ML injection Inject 41 Units under the skin into the appropriate area as directed Daily.     • insulin glargine (LANTUS) 100 UNIT/ML injection Inject 43 Units under the skin into the appropriate area as directed Every Night.     • insulin lispro (humaLOG) 100  "UNIT/ML injection Inject 15 Units under the skin into the appropriate area as directed 3 (Three) Times a Day With Meals.     • losartan (COZAAR) 100 MG tablet Take 1 tablet by mouth Daily. 90 tablet 1   • metoprolol succinate XL (TOPROL-XL) 50 MG 24 hr tablet Take 1 tablet by mouth Daily. 90 tablet 1   • pravastatin (PRAVACHOL) 40 MG tablet TAKE ONE TABLET BY MOUTH DAILY 90 tablet 0   • sertraline (Zoloft) 100 MG tablet Take 150 mg by mouth Daily.     • spironolactone (ALDACTONE) 25 MG tablet Take 1 tablet by mouth Every Night.     • tamsulosin (Flomax) 0.4 MG capsule 24 hr capsule Take 1 capsule by mouth Every Night. 30 capsule 11   • warfarin (COUMADIN) 6 MG tablet TAKE ONE TABLET BY MOUTH EVERY EVENING. 45 tablet 5   • ciprofloxacin (Cipro) 250 MG tablet Take 1 tablet by mouth 2 (Two) Times a Day. 20 tablet 0   • oxyCODONE-acetaminophen (Percocet) 5-325 MG per tablet Take 1 to 2 Tablets Every 6 Hours as needed for PAIN 28 tablet 0     No current facility-administered medications for this visit.         Allergies   Allergen Reactions   • Penicillins Swelling     Tongue swelled   • Ace Inhibitors Angioedema   • Contrast Dye Rash       Objective     /76 (BP Location: Left arm)   Pulse 66   Ht 182.9 cm (72\")   Wt 121 kg (266 lb 6.4 oz)   SpO2 96%   BMI 36.13 kg/m²     Physical Exam  Constitutional:       General: He is not in acute distress.     Appearance: Normal appearance.   HENT:      Head: Normocephalic.      Nose: Nose normal.      Mouth/Throat:      Pharynx: Oropharynx is clear.   Eyes:      Pupils: Pupils are equal, round, and reactive to light.   Neck:      Musculoskeletal: Neck supple.      Vascular: No carotid bruit.   Cardiovascular:      Rate and Rhythm: Normal rate and regular rhythm.      Pulses: Normal pulses.      Heart sounds: Normal heart sounds.   Pulmonary:      Effort: Pulmonary effort is normal.      Breath sounds: Normal breath sounds. No wheezing.   Abdominal:      General: Bowel " sounds are normal.      Palpations: Abdomen is soft.   Musculoskeletal:         General: No swelling.   Skin:     General: Skin is warm and dry.   Neurological:      General: No focal deficit present.      Mental Status: He is alert and oriented to person, place, and time.   Psychiatric:         Mood and Affect: Mood normal.         Behavior: Behavior normal.         Judgment: Judgment normal.            DATA:      Results for orders placed during the hospital encounter of 20   Adult Transthoracic Echo Complete W/ Cont if Necessary Per Protocol    Narrative · Left ventricular ejection fraction appears to be 56 - 60%. Left   ventricular systolic function is normal.  · Moderate to severe mitral valve stenosis is present with rheumatic   changes of the mitral valve apparatus. Max gradient of 27 mmHg, Mean   gradient of 10 mm Hg( 8 mmHg prior) and valve area of 1.2 cm^2  · There is a mechanical aortic valve prosthesis present with peak systolic   velocity of 2.6 cm/sec ( similar to prior study)  · There is no evidence of pericardial effusion         Results for orders placed in visit on 14   Stress test with myocardial perfusion    Narrative Patient:      LAURA VELAZQUEZ    [facility] Rec#:     [id number]               :          1955            Date:         2014            Age:          58y                   Account#:     [id number]            Height:       183.1 cm / 72.1 in  Accession#:   [id number]               Sex:          M                      Weight:       120.45 kg / 265.5 lbs  BSA:          2.41  Admit Date#:  2014            Loc:          exam room 2    Referring:    Fabio Walker MD  Reading:      Yoshi Weldon MD   [facility]  Nuclear Med RAPHAEL [facility]  Nuclear Med TeStampJohn paul [facility]  ______________________________________________________________________    Combined Nuclear/Stress Test    ICD Codes:     • 786.50 Chest pain, unspecified          Checklists:     • Patient verbally identified self  • Patient identified by ID band  • Patient consent obtained in lab  • Procedure verified and explained to patient  • History and physical performed  • Last Caffeine 01/29/2014 18:00:00  • Last Meal 01/29/2014 18:00:00    History of:   • Dyslipidemia• Lipid Therapy• Hypertension• Renal  Failure• Coronary Artery Disease (CAD)• Family History of CAD• PCN / IVP  DYE / SULFA• Diabetes, managed with oral agents• Diabetes for 11 years•  METFORMIN,LEVEMIR• Current smoker• Typical Angina• Dyspnea at rest•  Paroxysmal nocturnal dyspnea• Dyspnea with minimal exertion• Dyspnea  with normal daily activity• Dyspnea with more than normal activityNo  History of:• Reactive Airway Disease• Chronic Lung Disease• Dialysis•  CHF• Peripheral Vascular Disease• Cerebrovascular Disease• Family  History of CHF  Cardiac Events and Interventions:  Most recent stent placed in 2007. Stent in LAD. Most recent valvular  surgery performed in 1997.     Most Recent Prior Cardiac Testing :  • Pharmacologic Stress Test (date unknown)     Current Clinical Presentation:  The patient had no chest pain on presentation. The patient has no recent  history of CHF.     Medications I :  •  Amlodipine,Victoza,Levemir,Eplercrone,Cetirizine,Metformin,Meclizine,Zol  pidem,Alprazolam.   • Coumadin (Warfarin)   • Hydrochlorthiazide (HCTZ)   • Metoprolol (Lopressor, Toprol)   • Pravastatin (Pravachol)         Discharge At Completion of Exam :  • Home     Baseline ECG:  NSR and IVCD   Pharmacologic Protocol:  A 400 mcg dose of Regadenoson was administered by intravenous bolus  injection.    Stress ECG :  Inconclusive due to baseline changes.   Recovery ECG:  3 minutes into recovery. No medications were administered during the  recovery period. No changes   Hemodynamics                   Rest        Stress        Recovery      SBP         103 mmHg    145 mmHg      143 mmHg      DBP         83 mmHg     79 mmHg        84 mmHg        HR =       65 bpm      84 bpm        74 bpm        %MPHR                   52 %                          Imaging Protocol:  One day stress-rest imaging protocol was followed using Tc-99m sestamibi  (Cardiolite) injected intravenously. For the rest portion of the study,  32 mCi of the radiopharmaceutical was administered at 2014  10:57:56. For the stress portion of the study, 10.7 mCi was administered  at 2014 09:00:10.     Perfusion Interpretation:  TID Ratio 1.01. There was a small, fixed defect in the apex segment(s).  The extent of this perfusion defect was mild.     Wall Motion Interpretation:  Gated imaging under rest conditions demonstrated normal wallmotion.   The patient's calculated rest LVEF was 59%. The patient's end diastolic  volume was 183ml. The patient's end systolic volume was 74ml.     Stress Test Conclusion:  There was a normal heart rate response, with a normal blood pressure  response.  The patient experienced no chest pain. Symptoms noted during  stress include: dyspnea. Stress test is inconclusive by ECG criteria due  baseline changes.   Nuclear Cardiology Conclusion:  No evidence of Lexiscan induced ishemia. A small ssized fixed defect in  apical segment is an artifact. Normal LV systolic function and wall  motion     Electronically signed by: Yoshi Weldon MD on 2014 19:01:19  Thank you for the courtesy of this referral.      Results for orders placed in visit on 14   Stress test with myocardial perfusion    Narrative Patient:      LAURA VELAZQUEZ    [employer] Rec#:     [id number]               :          1955            Date:         2014            Age:          58y                   Account#:     [id number]            Height:       183.1 cm / 72.1 in  Accession#:   [id number]               Sex:          M                      Weight:       120.45 kg / 265.5 lbs  BSA:          2.41  Admit Date#:  2014            Loc:          exam room  2    Referring:    Fabio Walker MD  Reading:      Yoshi Weldon MD   Technolog[facility]ART[facility]  Nuclear Med TeBARGORAPHAEL [facility]  Nuclear Med TeStampJohn paul CNMT  ______________________________________________________________________    Combined Nuclear/Stress Test    ICD Codes:     • 786.50 Chest pain, unspecified     Checklists:     • Patient verbally identified self  • Patient identified by ID band  • Patient consent obtained in lab  • Procedure verified and explained to patient  • History and physical performed  • Last Caffeine 01/29/2014 18:00:00  • Last Meal 01/29/2014 18:00:00    History of:   • Dyslipidemia• Lipid Therapy• Hypertension• Renal  Failure• Coronary Artery Disease (CAD)• Family History of CAD• PCN / IVP  DYE / SULFA• Diabetes, managed with oral agents• Diabetes for 11 years•  METFORMIN,LEVEMIR• Current smoker• Typical Angina• Dyspnea at rest•  Paroxysmal nocturnal dyspnea• Dyspnea with minimal exertion• Dyspnea  with normal daily activity• Dyspnea with more than normal activityNo  History of:• Reactive Airway Disease• Chronic Lung Disease• Dialysis•  CHF• Peripheral Vascular Disease• Cerebrovascular Disease• Family  History of CHF  Cardiac Events and Interventions:  Most recent stent placed in 2007. Stent in LAD. Most recent valvular  surgery performed in 1997.     Most Recent Prior Cardiac Testing :  • Pharmacologic Stress Test (date unknown)     Current Clinical Presentation:  The patient had no chest pain on presentation. The patient has no recent  history of CHF.     Medications I :  •  Amlodipine,Victoza,Levemir,Eplercrone,Cetirizine,Metformin,Meclizine,Zol  pidem,Alprazolam.   • Coumadin (Warfarin)   • Hydrochlorthiazide (HCTZ)   • Metoprolol (Lopressor, Toprol)   • Pravastatin (Pravachol)         Discharge At Completion of Exam :  • Home     Baseline ECG:  NSR and IVCD   Pharmacologic Protocol:  A 400 mcg dose of Regadenoson was administered by intravenous  bolus  injection.    Stress ECG :  Inconclusive due to baseline changes.   Recovery ECG:  3 minutes into recovery. No medications were administered during the  recovery period. No changes   Hemodynamics                   Rest        Stress        Recovery      SBP         103 mmHg    145 mmHg      143 mmHg      DBP         83 mmHg     79 mmHg       84 mmHg        HR =       65 bpm      84 bpm        74 bpm        %MPHR                   52 %                          Imaging Protocol:  One day stress-rest imaging protocol was followed using Tc-99m sestamibi  (Cardiolite) injected intravenously. For the rest portion of the study,  32 mCi of the radiopharmaceutical was administered at 01/30/2014  10:57:56. For the stress portion of the study, 10.7 mCi was administered  at 01/30/2014 09:00:10.     Perfusion Interpretation:  TID Ratio 1.01. There was a small, fixed defect in the apex segment(s).  The extent of this perfusion defect was mild.     Wall Motion Interpretation:  Gated imaging under rest conditions demonstrated normal wallmotion.   The patient's calculated rest LVEF was 59%. The patient's end diastolic  volume was 183ml. The patient's end systolic volume was 74ml.     Stress Test Conclusion:  There was a normal heart rate response, with a normal blood pressure  response.  The patient experienced no chest pain. Symptoms noted during  stress include: dyspnea. Stress test is inconclusive by ECG criteria due  baseline changes.   Nuclear Cardiology Conclusion:  No evidence of Lexiscan induced ishemia. A small ssized fixed defect in  apical segment is an artifact. Normal LV systolic function and wall  motion     Electronically signed by: Yoshi Weldon MD on 01/30/2014 19:01:19  Thank you for the courtesy of this referral.       Procedures     Lab Results   Component Value Date    CHOL 138 01/10/2020    CHOL 143 11/02/2018    CHOL 145 08/04/2017    CHLPL 167 07/18/2016    CHLPL 135 06/08/2015     Lab Results    Component Value Date    TRIG 161 (H) 01/10/2020    TRIG 181 (H) 11/02/2018    TRIG 227 (H) 08/04/2017     Lab Results   Component Value Date    HDL 35 (L) 01/10/2020    HDL 34 (L) 11/02/2018    HDL 34 (L) 08/04/2017     Lab Results   Component Value Date    LDL 71 01/10/2020    LDL 73 11/02/2018    LDL 66 08/04/2017       No results found for: TSH      Results from last 7 days   Lab Units 12/07/20  1514   SODIUM mmol/L 137   POTASSIUM mmol/L 5.9*   CHLORIDE mmol/L 104   CO2 mmol/L 22.2   BUN mg/dL 42*   CREATININE mg/dL 2.40*   CALCIUM mg/dL 9.3   BILIRUBIN mg/dL 0.4   ALK PHOS U/L 63   ALT (SGPT) U/L 21   AST (SGOT) U/L 14   GLUCOSE mg/dL 182*           Assessment and Plan    1. Rheumatic mitral stenosis  Status post AVR in 2004 by Dr. Sánchez. Referral to Dr. Philip to evaluate for MVR.     2. Coronary artery disease involving native coronary artery of native heart without angina pectoris  Status post PCI to the LAD patent  3. Chronic diastolic congestive heart failure (CMS/HCC)  Well compensated     4. Hyperlipidemia LDL goal <70  Stable with  Pravastatin 40 mg at bedtime    Recommended increase activity to 30 minutes of walking daily, most days of the week.  Discussed diet and weight loss with patient.        Patient's Body mass index is 36.13 kg/m². BMI is above normal parameters. Recommendations include: educational material.       Return in about 3 months (around 3/14/2021).    Thank you for allowing me to participate in the care of Benitez Miranda. Feel free to contact me directly with any further questions or concerns.          Oswaldo Ahuja MD, FAC  Interventional Cardiology    TRINITY Metzger, acting as scribe for Oswaldo Ahuja MD, [facility]    12/14/20  13:46 EST

## 2020-12-17 DIAGNOSIS — IMO0002 UNCONTROLLED TYPE 2 DIABETES WITH NEUROPATHY: ICD-10-CM

## 2020-12-17 RX ORDER — PRAVASTATIN SODIUM 40 MG
TABLET ORAL
Qty: 90 TABLET | Refills: 1 | Status: SHIPPED | OUTPATIENT
Start: 2020-12-17 | End: 2021-06-15

## 2020-12-17 RX ORDER — INSULIN GLARGINE 100 [IU]/ML
INJECTION, SOLUTION SUBCUTANEOUS
Refills: 3 | OUTPATIENT
Start: 2020-12-17

## 2020-12-21 ENCOUNTER — OFFICE VISIT (OUTPATIENT)
Dept: FAMILY MEDICINE CLINIC | Facility: CLINIC | Age: 65
End: 2020-12-21

## 2020-12-21 ENCOUNTER — TELEPHONE (OUTPATIENT)
Dept: FAMILY MEDICINE CLINIC | Facility: CLINIC | Age: 65
End: 2020-12-21

## 2020-12-21 VITALS
HEART RATE: 70 BPM | OXYGEN SATURATION: 96 % | DIASTOLIC BLOOD PRESSURE: 68 MMHG | HEIGHT: 72 IN | WEIGHT: 265.4 LBS | SYSTOLIC BLOOD PRESSURE: 120 MMHG | TEMPERATURE: 96.4 F | BODY MASS INDEX: 35.95 KG/M2

## 2020-12-21 DIAGNOSIS — R10.32 LEFT LOWER QUADRANT ABDOMINAL PAIN: Primary | ICD-10-CM

## 2020-12-21 DIAGNOSIS — Z79.01 ANTICOAGULANT LONG-TERM USE: ICD-10-CM

## 2020-12-21 DIAGNOSIS — IMO0002 UNCONTROLLED TYPE 2 DIABETES WITH NEUROPATHY: ICD-10-CM

## 2020-12-21 LAB
BILIRUB BLD-MCNC: NEGATIVE MG/DL
CLARITY, POC: CLEAR
COLOR UR: YELLOW
GLUCOSE BLDC GLUCOMTR-MCNC: 161 MG/DL (ref 70–130)
GLUCOSE UR STRIP-MCNC: NEGATIVE MG/DL
INR PPP: 2.2 (ref 0.9–1.1)
KETONES UR QL: NEGATIVE
LEUKOCYTE EST, POC: ABNORMAL
NITRITE UR-MCNC: NEGATIVE MG/ML
PH UR: 6 [PH] (ref 5–8)
PROT UR STRIP-MCNC: ABNORMAL MG/DL
RBC # UR STRIP: ABNORMAL /UL
SP GR UR: 1.02 (ref 1–1.03)
UROBILINOGEN UR QL: NORMAL

## 2020-12-21 PROCEDURE — 80053 COMPREHEN METABOLIC PANEL: CPT | Performed by: FAMILY MEDICINE

## 2020-12-21 PROCEDURE — 85025 COMPLETE CBC W/AUTO DIFF WBC: CPT | Performed by: FAMILY MEDICINE

## 2020-12-21 PROCEDURE — 81003 URINALYSIS AUTO W/O SCOPE: CPT | Performed by: FAMILY MEDICINE

## 2020-12-21 PROCEDURE — 87086 URINE CULTURE/COLONY COUNT: CPT | Performed by: FAMILY MEDICINE

## 2020-12-21 PROCEDURE — 99214 OFFICE O/P EST MOD 30 MIN: CPT | Performed by: FAMILY MEDICINE

## 2020-12-21 PROCEDURE — 36415 COLL VENOUS BLD VENIPUNCTURE: CPT | Performed by: FAMILY MEDICINE

## 2020-12-21 PROCEDURE — 85610 PROTHROMBIN TIME: CPT | Performed by: FAMILY MEDICINE

## 2020-12-21 PROCEDURE — 82962 GLUCOSE BLOOD TEST: CPT | Performed by: FAMILY MEDICINE

## 2020-12-21 PROCEDURE — 36416 COLLJ CAPILLARY BLOOD SPEC: CPT | Performed by: FAMILY MEDICINE

## 2020-12-21 NOTE — TELEPHONE ENCOUNTER
----- Message from Mesha Christina MD sent at 12/20/2020 10:20 PM EST -----  Creatinine slightly better. Will recheck at his next appointment. Please let him know. Please also fax a copy to Dr. Landis. Thanks.

## 2020-12-22 ENCOUNTER — TELEPHONE (OUTPATIENT)
Dept: FAMILY MEDICINE CLINIC | Facility: CLINIC | Age: 65
End: 2020-12-22

## 2020-12-22 DIAGNOSIS — E87.5 HYPERKALEMIA: ICD-10-CM

## 2020-12-22 DIAGNOSIS — N19 RENAL FAILURE, UNSPECIFIED CHRONICITY: Primary | ICD-10-CM

## 2020-12-22 LAB
ALBUMIN SERPL-MCNC: 4.1 G/DL (ref 3.5–5.2)
ALBUMIN/GLOB SERPL: 1 G/DL
ALP SERPL-CCNC: 66 U/L (ref 39–117)
ALT SERPL W P-5'-P-CCNC: 18 U/L (ref 1–41)
ANION GAP SERPL CALCULATED.3IONS-SCNC: 7.9 MMOL/L (ref 5–15)
AST SERPL-CCNC: 14 U/L (ref 1–40)
BACTERIA SPEC AEROBE CULT: NO GROWTH
BASOPHILS # BLD AUTO: 0.09 10*3/MM3 (ref 0–0.2)
BASOPHILS NFR BLD AUTO: 1 % (ref 0–1.5)
BILIRUB SERPL-MCNC: 0.4 MG/DL (ref 0–1.2)
BUN SERPL-MCNC: 43 MG/DL (ref 8–23)
BUN/CREAT SERPL: 18.4 (ref 7–25)
CALCIUM SPEC-SCNC: 9.1 MG/DL (ref 8.6–10.5)
CHLORIDE SERPL-SCNC: 108 MMOL/L (ref 98–107)
CO2 SERPL-SCNC: 20.1 MMOL/L (ref 22–29)
CREAT SERPL-MCNC: 2.34 MG/DL (ref 0.76–1.27)
DEPRECATED RDW RBC AUTO: 40.6 FL (ref 37–54)
EOSINOPHIL # BLD AUTO: 0.56 10*3/MM3 (ref 0–0.4)
EOSINOPHIL NFR BLD AUTO: 6 % (ref 0.3–6.2)
ERYTHROCYTE [DISTWIDTH] IN BLOOD BY AUTOMATED COUNT: 13.1 % (ref 12.3–15.4)
GFR SERPL CREATININE-BSD FRML MDRD: 28 ML/MIN/1.73
GLOBULIN UR ELPH-MCNC: 4 GM/DL
GLUCOSE SERPL-MCNC: 142 MG/DL (ref 65–99)
HCT VFR BLD AUTO: 38.7 % (ref 37.5–51)
HGB BLD-MCNC: 12.6 G/DL (ref 13–17.7)
IMM GRANULOCYTES # BLD AUTO: 0.06 10*3/MM3 (ref 0–0.05)
IMM GRANULOCYTES NFR BLD AUTO: 0.6 % (ref 0–0.5)
LYMPHOCYTES # BLD AUTO: 1.38 10*3/MM3 (ref 0.7–3.1)
LYMPHOCYTES NFR BLD AUTO: 14.8 % (ref 19.6–45.3)
MCH RBC QN AUTO: 27.7 PG (ref 26.6–33)
MCHC RBC AUTO-ENTMCNC: 32.6 G/DL (ref 31.5–35.7)
MCV RBC AUTO: 85.1 FL (ref 79–97)
MONOCYTES # BLD AUTO: 0.67 10*3/MM3 (ref 0.1–0.9)
MONOCYTES NFR BLD AUTO: 7.2 % (ref 5–12)
NEUTROPHILS NFR BLD AUTO: 6.56 10*3/MM3 (ref 1.7–7)
NEUTROPHILS NFR BLD AUTO: 70.4 % (ref 42.7–76)
NRBC BLD AUTO-RTO: 0 /100 WBC (ref 0–0.2)
PLATELET # BLD AUTO: 187 10*3/MM3 (ref 140–450)
PMV BLD AUTO: 12.8 FL (ref 6–12)
POTASSIUM SERPL-SCNC: 6.7 MMOL/L (ref 3.5–5.2)
PROT SERPL-MCNC: 8.1 G/DL (ref 6–8.5)
RBC # BLD AUTO: 4.55 10*6/MM3 (ref 4.14–5.8)
SODIUM SERPL-SCNC: 136 MMOL/L (ref 136–145)
WBC # BLD AUTO: 9.32 10*3/MM3 (ref 3.4–10.8)

## 2020-12-22 NOTE — TELEPHONE ENCOUNTER
----- Message from RED Garcia sent at 12/22/2020  9:41 AM EST -----  Lab called with elevated K.  Just had recent kidney procedure along with CRF.  Will need a repeat bmp next week to follow     Patient notified and verbally understand.

## 2020-12-23 NOTE — TELEPHONE ENCOUNTER
Caller: Benitez Miranda    Relationship: Self    Best call back number:[phone]  Medication needed:   Requested Prescriptions     Pending Prescriptions Disp Refills   • insulin glargine (LANTUS, SEMGLEE) 100 UNIT/ML injection        Sig: Inject 41 Units under the skin into the appropriate area as directed Daily.       When do you need the refill by: ASAP    What details did the patient provide when requesting the medication: OUT FOR 4 DAYS    Does the patient have less than a 3 day supply:  [x] Yes  [] No    What is the patient's preferred pharmacy:      [facility] [address] [address] [address] [address] [facility] [address] [facility] [phone] [facility] [phone] FX          PLEASE CALL PATIENT WHEN PRESCRIPTION FILLED

## 2020-12-27 RX ORDER — INSULIN GLARGINE 100 [IU]/ML
41 INJECTION, SOLUTION SUBCUTANEOUS DAILY
Qty: 15 ML | Refills: 5 | Status: SHIPPED | OUTPATIENT
Start: 2020-12-27 | End: 2021-01-18 | Stop reason: DRUGHIGH

## 2020-12-28 ENCOUNTER — LAB (OUTPATIENT)
Dept: FAMILY MEDICINE CLINIC | Facility: CLINIC | Age: 65
End: 2020-12-28

## 2020-12-28 DIAGNOSIS — N19 RENAL FAILURE, UNSPECIFIED CHRONICITY: ICD-10-CM

## 2020-12-28 DIAGNOSIS — E87.5 HYPERKALEMIA: ICD-10-CM

## 2020-12-28 PROCEDURE — 80048 BASIC METABOLIC PNL TOTAL CA: CPT | Performed by: NURSE PRACTITIONER

## 2020-12-28 PROCEDURE — 36415 COLL VENOUS BLD VENIPUNCTURE: CPT

## 2020-12-28 NOTE — PROGRESS NOTES
"Benitez Miranda     VITALS: Blood pressure 112/56, pulse 57, temperature 96.2 °F (35.7 °C), height 182.9 cm (72\"), weight 121 kg (266 lb), SpO2 98 %.    Subjective  Chief Complaint:   Chief Complaint   Patient presents with   • Flank Pain     hospital follow up        History of Present Illness:  Patient is a 65 y.o.  male with medical conditions significant for type 2 diabetes, hypertension, hyperlipidemia who presents to clinic secondary to medical followup.  Patient most recently was hospitalized from November 18 to November 21, 2020 secondary to a left UPJ stone which caused acute kidney injury on his stage III CKD.  Patient underwent cystoscopy and ureteroscopy with laser lithotripsy on November 20, 2020.  However, patient continues to have left-sided lower back and pelvic pain.  He denies any fevers, but occasionally is having chills.  Pain is throbbing, occasionally sharp, and intermittent.  He states that it occasionally hurts to urinate.  He denies any hematuria, urinary frequency, but he states that he occasionally experiences urinary retention.  He denies any nausea or vomiting.    No complaints about any of the medications.    The following portions of the patient's history were reviewed and updated as appropriate: allergies, current medications, past family history, past medical history, past social history, past surgical history and problem list.    Past Medical History  Past Medical History:   Diagnosis Date   • CAD (coronary artery disease)     x1 stent; pulmonary HTN; EF 50-55%; rheumatic valve; MV stenosis   • CHF (congestive heart failure) (CMS/[facility])    • CKD (chronic kidney disease), stage III    • COPD (chronic obstructive pulmonary disease) (CMS/[facility])    • Depression    • Diabetes mellitus (CMS/[facility])    • H/O mechanical aortic valve replacement    • History of tobacco abuse    • Hyperlipidemia    • Hypertension    • Ischemic heart disease    • Kidney failure     third stage   • Low back pain    • " Obesity     BMI 36.   • Valvular heart disease        Review of Systems   Respiratory: Negative for shortness of breath and wheezing.    Cardiovascular: Negative for chest pain and palpitations.       Surgical History  Past Surgical History:   Procedure Laterality Date   • AORTIC VALVE REPAIR/REPLACEMENT     • CARDIAC CATHETERIZATION     • CARDIAC SURGERY      valve on aortic   • CARDIAC SURGERY      stent    • CATARACT EXTRACTION     • COLONOSCOPY     • CORONARY STENT PLACEMENT     • CYSTOSCOPY URETEROSCOPY LASER LITHOTRIPSY Left 2020    Procedure: CYSTOSCOPY URETEROSCOPY LASER LITHOTRIPSY WITH STENT PLACEMENT;  Surgeon: Jonah Montgomery MD;  Location: [facility];  Service: Urology;  Laterality: Left;   • KIDNEY STONE SURGERY         Family History  Family History   Problem Relation Age of Onset   • Cancer Mother         breast   • COPD Father    • Heart attack Father 74   • Diabetes Paternal Grandmother         both legs removed       Social History  Social History     Socioeconomic History   • Marital status:      Spouse name: Not on file   • Number of children: Not on file   • Years of education: Not on file   • Highest education level: Not on file   Tobacco Use   • Smoking status: Former Smoker     Packs/day: 1.00     Years: 30.00     Pack years: 30.00     Types: Pipe, Cigars     Quit date: 2016     Years since quittin.4   • Smokeless tobacco: Never Used   • Tobacco comment: currently smokes a pipe   Substance and Sexual Activity   • Alcohol use: No   • Drug use: No   • Sexual activity: Defer       Objective  Physical Exam    Gen: Patient in some distress. Pleasant and answers appropriately. A&Ox3.    Skin: Warm and dry with normal turgor. No purpura, rashes, or unusual pigmentation noted. Hair is normal in appearance and distribution.    HEENT: NC/AT. No lesions noted. Conjunctiva clear, sclera nonicteric. PERRL. EOMI without nystagmus or strabismus. Fundi appear  benign. No hemorrhages or exudates of eyes. Auditory canals are patent bilaterally without lesions. TMs intact,  nonerythematous, nonbulging without lesions. Nasal mucosa pink, nonerythematous, and nonedematous. Frontal and maxillary sinuses are nontender. O/P nonerythematous and moist without exudate.    Neck: Supple without lymph nodes palpated. FROM.     Lungs: CTA B/L without rales, rhonchi, crackles, or wheezes.    Heart: RRR. S1 and S2 normal. No S3 or S4. No MRGT.    Abd: Soft, nontender,nondistended. (+)BSx4 quadrants.  Left flank tenderness noted.    Extrem: No CCE. Radial pulses 2+/4 and equal B/L. FROMx4.     Neuro: No focal motor/sensory deficits.    Procedures    Assessment/Plan  Benitez Miranda is a 65 y.o. here for medical followup.    Diagnoses and all orders for this visit:    1. Gross hematuria (Primary)  -     Urine Culture - Urine, Urine, Clean Catch; Future  -     Urine Culture - Urine, Urine, Clean Catch  Urinalysis shows positive blood, protein, and urobilinogen.  Will send for culture.    2. Left sided abdominal pain  -     POC Urinalysis Dipstick, Automated  -     CBC Auto Differential; Future  -     Comprehensive Metabolic Panel; Future  -     Urine Culture - Urine, Urine, Clean Catch; Future  -     CBC Auto Differential  -     Comprehensive Metabolic Panel  -     Urine Culture - Urine, Urine, Clean Catch  -     Cancel: Manual Differential  Will order labs.  May need repeat CT.  Patient follow with urology.    3. Low back pain, unspecified back pain laterality, unspecified chronicity, unspecified whether sciatica present  -     Urine Culture - Urine, Urine, Clean Catch; Future  -     Urine Culture - Urine, Urine, Clean Catch  UTI versus continued kidney stone.  We will do work-up.    4. Anticoagulant long-term use  -     POCT INR  Patient to continue current Coumadin dosage.  INR 2.0.    Patient's Body mass index is 36.08 kg/m². BMI is above normal parameters. Recommendations include:  exercise counseling and nutrition counseling.        Findings and plans discussed with patient who verbalizes understanding and agreement. Will followup with patient once results are in. Patient to followup at clinic PRN or in 2 weeks for further medical followup.    MD HUYEN Kohler Dragon/Transcription Disclaimer:  Much of this encounter note is an electronic transcription/translation of spoken language to printed text.  The electronic translation of spoken language may permit erroneous, or at times, nonsensical words or phrases to be inadvertently transcribed.  Although I have reviewed the note for such errors, some may still exist.

## 2020-12-29 LAB
ANION GAP SERPL CALCULATED.3IONS-SCNC: 11.7 MMOL/L (ref 5–15)
BUN SERPL-MCNC: 33 MG/DL (ref 8–23)
BUN/CREAT SERPL: 21.2 (ref 7–25)
CALCIUM SPEC-SCNC: 9 MG/DL (ref 8.6–10.5)
CHLORIDE SERPL-SCNC: 104 MMOL/L (ref 98–107)
CO2 SERPL-SCNC: 20.3 MMOL/L (ref 22–29)
CREAT SERPL-MCNC: 1.56 MG/DL (ref 0.76–1.27)
GFR SERPL CREATININE-BSD FRML MDRD: 45 ML/MIN/1.73
GLUCOSE SERPL-MCNC: 315 MG/DL (ref 65–99)
POTASSIUM SERPL-SCNC: 5 MMOL/L (ref 3.5–5.2)
SODIUM SERPL-SCNC: 136 MMOL/L (ref 136–145)

## 2020-12-31 ENCOUNTER — TELEPHONE (OUTPATIENT)
Dept: FAMILY MEDICINE CLINIC | Facility: CLINIC | Age: 65
End: 2020-12-31

## 2020-12-31 NOTE — TELEPHONE ENCOUNTER
----- Message from RED Garcia sent at 12/31/2020  7:36 AM EST -----  Let patient know Kidney function improved.  Will continue to follow      Left a message to return call.    Spoke with patient & he verbalized understanding.

## 2020-12-31 NOTE — TELEPHONE ENCOUNTER
----- Message from Mesha Christina MD sent at 12/30/2020  5:12 PM EST -----  Good. Was going to get ready to send to nephro. He had bad kidney stones. Just took some time to rehydrate. Thanks.      Left a message to return call.    Spoke with patient & he verbalized understanding.

## 2021-01-03 DIAGNOSIS — I12.9 BENIGN HYPERTENSION WITH CKD (CHRONIC KIDNEY DISEASE) STAGE III (HCC): ICD-10-CM

## 2021-01-03 DIAGNOSIS — N18.30 BENIGN HYPERTENSION WITH CKD (CHRONIC KIDNEY DISEASE) STAGE III (HCC): ICD-10-CM

## 2021-01-05 RX ORDER — SPIRONOLACTONE 25 MG/1
TABLET ORAL
Qty: 180 TABLET | Refills: 1 | Status: SHIPPED | OUTPATIENT
Start: 2021-01-05 | End: 2021-07-02

## 2021-01-05 RX ORDER — LOSARTAN POTASSIUM 100 MG/1
TABLET ORAL
Qty: 90 TABLET | Refills: 1 | Status: SHIPPED | OUTPATIENT
Start: 2021-01-05 | End: 2021-07-02

## 2021-01-11 NOTE — PROGRESS NOTES
"Benitez Miranda     VITALS: Blood pressure 120/68, pulse 70, temperature 96.4 °F (35.8 °C), temperature source Temporal, height 182.9 cm (72\"), weight 120 kg (265 lb 6.4 oz), SpO2 96 %.    Subjective  Chief Complaint:   Chief Complaint   Patient presents with   • Flank Pain     Patient is here for a follow up from hospital; patient had kidney stone surgery        History of Present Illness:  Patient is a 65 y.o.  male with chronic conditions significant for COPD, CHF, and stage III CKD who presents to clinic secondary to medical followup.  Patient continues to have left lower quadrant pain despite removal of his left obstructive UPJ stone approximately a month ago.  He underwent a cystoscopy and ureteroscopy with laser lithotripsy on November 20, 2020 for this.  Patient states that initially, he had felt better after the procedure, but as time has gone on, the pain has recurred.  He denies any fevers or chills.  He occasionally is having urinary frequency.  He denies any hematuria, dysuria, or urinary retention.  He denies any nausea or vomiting.  He denies any diarrhea or constipation.  Fluids make it better.  Activity makes it worse.  Pain is described as a dull, throbbing ache that does not radiate.    Patient has chronic conditions including type 2 diabetes and anticoagulation long-term use secondary to AVR.  These chronic conditions are stable and unchanged.  Medications associated with his chronic conditions are not giving him any side effects.    No complaints about any of the medications.    The following portions of the patient's history were reviewed and updated as appropriate: allergies, current medications, past family history, past medical history, past social history, past surgical history and problem list.    Past Medical History  Past Medical History:   Diagnosis Date   • CAD (coronary artery disease)     x1 stent; pulmonary HTN; EF 50-55%; rheumatic valve; MV stenosis   • CHF (congestive heart " failure) (CMS/[facility])    • CKD (chronic kidney disease), stage III    • COPD (chronic obstructive pulmonary disease) (CMS/[facility])    • Depression    • Diabetes mellitus (CMS/[facility])    • H/O mechanical aortic valve replacement    • History of tobacco abuse    • Hyperlipidemia    • Hypertension    • Ischemic heart disease    • Kidney failure     third stage   • Low back pain    • Obesity     BMI 36.   • Valvular heart disease        Review of Systems   Respiratory: Negative for shortness of breath and wheezing.    Cardiovascular: Negative for chest pain and palpitations.       Surgical History  Past Surgical History:   Procedure Laterality Date   • AORTIC VALVE REPAIR/REPLACEMENT     • CARDIAC CATHETERIZATION     • CARDIAC SURGERY      valve on aortic   • CARDIAC SURGERY      stent    • CATARACT EXTRACTION     • COLONOSCOPY     • CORONARY STENT PLACEMENT     • CYSTOSCOPY URETEROSCOPY LASER LITHOTRIPSY Left 2020    Procedure: CYSTOSCOPY URETEROSCOPY LASER LITHOTRIPSY WITH STENT PLACEMENT;  Surgeon: Jonah Montgomery MD;  Location: [facility];  Service: Urology;  Laterality: Left;   • KIDNEY STONE SURGERY         Family History  Family History   Problem Relation Age of Onset   • Cancer Mother         breast   • COPD Father    • Heart attack Father 74   • Diabetes Paternal Grandmother         both legs removed       Social History  Social History     Socioeconomic History   • Marital status:      Spouse name: Not on file   • Number of children: Not on file   • Years of education: Not on file   • Highest education level: Not on file   Tobacco Use   • Smoking status: Former Smoker     Packs/day: 1.00     Years: 30.00     Pack years: 30.00     Types: Pipe, Cigars     Quit date: 2016     Years since quittin.5   • Smokeless tobacco: Never Used   • Tobacco comment: currently smokes a pipe   Substance and Sexual Activity   • Alcohol use: No   • Drug use: No   • Sexual activity: Defer        Objective  Physical Exam    Gen: Patient in some distress. Pleasant and answers appropriately. A&Ox3.    Skin: Warm and dry with normal turgor. No purpura, rashes, or unusual pigmentation noted. Hair is normal in appearance and distribution.    HEENT: NC/AT. No lesions noted. Conjunctiva clear, sclera nonicteric. PERRL. EOMI without nystagmus or strabismus. Fundi appear benign. No hemorrhages or exudates of eyes. Auditory canals are patent bilaterally without lesions. TMs intact,  nonerythematous, nonbulging without lesions. Nasal mucosa pink, nonerythematous, and nonedematous. Frontal and maxillary sinuses are nontender. O/P nonerythematous and moist without exudate.    Neck: Supple without lymph nodes palpated. FROM.     Lungs: CTA B/L without rales, rhonchi, crackles, or wheezes.    Heart: RRR. S1 and S2 normal. No S3 or S4. No MRGT.    Abd: Soft, tender in left lower quadrant,nondistended. (+)BSx4 quadrants, none hyperechoic or hypoechoic.  Left flank tenderness noted.  No HSM or masses.    Extrem: No CCE. Radial pulses 2+/4 and equal B/L. FROMx4.     Neuro: No focal motor/sensory deficits.    Procedures    Assessment/Plan  Benitez Miranda is a 65 y.o. here for medical followup.    Diagnoses and all orders for this visit:    1. Left lower quadrant abdominal pain (Primary)  -     POC Urinalysis Dipstick, Automated  -     CBC Auto Differential; Future  -     Comprehensive Metabolic Panel; Future  -     Urine Culture - Urine, Urine, Clean Catch; Future  -     XR Abdomen KUB  -     CBC Auto Differential  -     Comprehensive Metabolic Panel  -     Urine Culture - Urine, Urine, Clean Catch  Will send urine to urine culture.  We will do lab work-up and will get KUB.  Uncertain etiology.  May be secondary kidney stone or possibly other GI etiology such as diverticulitis.  May need CT scan.    2. Uncontrolled type 2 diabetes with neuropathy (CMS/HCC)  -     POCT Glucose  -     CBC Auto Differential; Future  -      Comprehensive Metabolic Panel; Future  -     CBC Auto Differential  -     Comprehensive Metabolic Panel  Labs ordered.  Patient to continue Lantus 41 units in the a.m. and 43 units in the p.m. along with a sliding scale Humalog during meals.    3. Anticoagulant long-term use  -     POCT INR  -     CBC Auto Differential; Future  -     Comprehensive Metabolic Panel; Future  -     CBC Auto Differential  -     Comprehensive Metabolic Panel  INR today stable at 2.2.  Patient to continue Coumadin dosing.  Recheck in a month.      Patient's Body mass index is 35.99 kg/m². BMI is above normal parameters. Recommendations include: exercise counseling and nutrition counseling.     Findings and plans discussed with patient who verbalizes understanding and agreement. Will followup with patient once results are in. Patient to followup at clinic PRN or in one month for further medical followup.    MD HUYEN Kohler Dragon/Transcription Disclaimer:  Much of this encounter note is an electronic transcription/translation of spoken language to printed text.  The electronic translation of spoken language may permit erroneous, or at times, nonsensical words or phrases to be inadvertently transcribed.  Although I have reviewed the note for such errors, some may still exist.

## 2021-01-13 ENCOUNTER — OFFICE VISIT (OUTPATIENT)
Dept: CARDIAC SURGERY | Facility: CLINIC | Age: 66
End: 2021-01-13

## 2021-01-13 VITALS
WEIGHT: 268 LBS | SYSTOLIC BLOOD PRESSURE: 140 MMHG | OXYGEN SATURATION: 100 % | HEIGHT: 73 IN | BODY MASS INDEX: 35.52 KG/M2 | HEART RATE: 60 BPM | DIASTOLIC BLOOD PRESSURE: 71 MMHG | TEMPERATURE: 97.9 F

## 2021-01-13 DIAGNOSIS — I05.0 RHEUMATIC MITRAL STENOSIS: Primary | ICD-10-CM

## 2021-01-13 PROCEDURE — 99203 OFFICE O/P NEW LOW 30 MIN: CPT | Performed by: THORACIC SURGERY (CARDIOTHORACIC VASCULAR SURGERY)

## 2021-01-13 RX ORDER — INSULIN GLARGINE 100 [IU]/ML
INJECTION, SOLUTION SUBCUTANEOUS
COMMUNITY
Start: 2020-12-27 | End: 2021-01-18 | Stop reason: DRUGHIGH

## 2021-01-13 NOTE — PROGRESS NOTES
01/13/2021  Patient Information  Benitez Butts [address] [address]   1955  'PCP/Referring Physician'  Mesha Christina MD  [phone]  Oswaldo Ahuja Charo*  [phone]  Chief Complaint   Patient presents with   • Mitral Stenosis     NP per Dr. Ahuja for moderate mitral stenosis. Denies any symptoms.        History of Present Illness: 65-year-old  male with a history of hypertension, hyperlipidemia, diabetes mellitus, coronary artery disease status post stenting, aortic valve replacement with Dr. Sánchez in 1994 (25 mm mechanical), congestive heart failure and chronic kidney disease who presents with mitral valve stenosis.  The patient denies fatigue or shortness of breath.  Overall, he is doing well and has no functional limitations.    Patient Active Problem List   Diagnosis   • Diabetes mellitus (CMS/HCC)   • COPD (chronic obstructive pulmonary disease) (CMS/HCC)   • Chronic diastolic congestive heart failure (CMS/HCC)   • Essential hypertension   • Tobacco abuse   • Coronary artery disease involving native coronary artery of native heart without angina pectoris   • Low back pain   • Hyperlipidemia LDL goal <70   • H/O mechanical aortic valve replacement   • Depression   • CKD (chronic kidney disease), stage III   • Rheumatic mitral stenosis   • Obesity     Past Medical History:   Diagnosis Date   • Anxiety    • Arthritis    • CAD (coronary artery disease)     x1 stent; pulmonary HTN; EF 50-55%; rheumatic valve; MV stenosis   • CHF (congestive heart failure) (CMS/HCC)    • CKD (chronic kidney disease), stage III    • COPD (chronic obstructive pulmonary disease) (CMS/HCC)    • Depression    • Diabetes mellitus (CMS/HCC)    • H/O mechanical aortic valve replacement    • History of tobacco abuse    • Hyperlipidemia    • Hypertension    • Ischemic heart disease    • Kidney failure      third stage   • Low back pain    • Obesity     BMI 36.   • Valvular heart disease      Past Surgical History:   Procedure Laterality Date   • AORTIC VALVE REPAIR/REPLACEMENT  1991   • CARDIAC CATHETERIZATION     • CARDIAC SURGERY  1991    valve on aortic   • CARDIAC SURGERY  2001    stent    • CATARACT EXTRACTION     • COLONOSCOPY  2001   • CORONARY STENT PLACEMENT  2005   • CYSTOSCOPY URETEROSCOPY LASER LITHOTRIPSY Left 11/20/2020    Procedure: CYSTOSCOPY URETEROSCOPY LASER LITHOTRIPSY WITH STENT PLACEMENT;  Surgeon: Jonah Montgomery MD;  Location: [facility];  Service: Urology;  Laterality: Left;   • KIDNEY STONE SURGERY         Current Outpatient Medications:   •  amLODIPine (NORVASC) 5 MG tablet, Take 1 tablet by mouth Daily., Disp: , Rfl:   •  insulin glargine (LANTUS) 100 UNIT/ML injection, Inject 43 Units under the skin into the appropriate area as directed Every Night., Disp: , Rfl:   •  insulin glargine (LANTUS, SEMGLEE) 100 UNIT/ML injection, Inject 41 Units under the skin into the appropriate area as directed Daily., Disp: 15 mL, Rfl: 5  •  insulin lispro (humaLOG) 100 UNIT/ML injection, Inject 15 Units under the skin into the appropriate area as directed 3 (Three) Times a Day With Meals., Disp: , Rfl:   •  Lantus SoloStar 100 UNIT/ML injection pen, , Disp: , Rfl:   •  losartan (COZAAR) 100 MG tablet, TAKE ONE TABLET BY MOUTH DAILY, Disp: 90 tablet, Rfl: 1  •  metoprolol succinate XL (TOPROL-XL) 50 MG 24 hr tablet, Take 1 tablet by mouth Daily., Disp: 90 tablet, Rfl: 1  •  pravastatin (PRAVACHOL) 40 MG tablet, TAKE ONE TABLET BY MOUTH DAILY, Disp: 90 tablet, Rfl: 1  •  sertraline (Zoloft) 100 MG tablet, Take 150 mg by mouth Daily., Disp: , Rfl:   •  spironolactone (ALDACTONE) 25 MG tablet, TAKE ONE TABLET BY MOUTH TWICE A DAY, Disp: 180 tablet, Rfl: 1  •  tamsulosin (Flomax) 0.4 MG capsule 24 hr capsule, Take 1 capsule by mouth Every Night., Disp: 30 capsule, Rfl: 11  •  warfarin (COUMADIN) 6 MG  tablet, TAKE ONE TABLET BY MOUTH EVERY EVENING., Disp: 45 tablet, Rfl: 5  •  ciprofloxacin (Cipro) 250 MG tablet, Take 1 tablet by mouth 2 (Two) Times a Day., Disp: 20 tablet, Rfl: 0  •  oxyCODONE-acetaminophen (Percocet) 5-325 MG per tablet, Take 1 to 2 Tablets Every 6 Hours as needed for PAIN, Disp: 28 tablet, Rfl: 0  Allergies   Allergen Reactions   • Penicillins Swelling     Tongue swelled   • Ace Inhibitors Angioedema   • Contrast Dye Rash     Social History     Socioeconomic History   • Marital status:      Spouse name: Not on file   • Number of children: Not on file   • Years of education: Not on file   • Highest education level: Not on file   Tobacco Use   • Smoking status: Former Smoker     Packs/day: 1.00     Years: 30.00     Pack years: 30.00     Types: Pipe, Cigars     Quit date: 2016     Years since quittin.5   • Smokeless tobacco: Never Used   • Tobacco comment: currently smokes a pipe   Substance and Sexual Activity   • Alcohol use: No   • Drug use: No   • Sexual activity: Defer     Family History   Problem Relation Age of Onset   • Cancer Mother         breast   • COPD Father    • Heart attack Father 74   • Diabetes Paternal Grandmother         both legs removed     Review of Systems   Constitution: Negative for chills, fever, malaise/fatigue, night sweats and weight loss.   HENT: Negative for hearing loss, odynophagia and sore throat.    Cardiovascular: Negative for chest pain, dyspnea on exertion, leg swelling, orthopnea and palpitations.   Respiratory: Negative for cough and shortness of breath.    Hematologic/Lymphatic: Does not bruise/bleed easily.   Skin: Negative for itching and rash.   Musculoskeletal: Negative for arthritis, joint pain, joint swelling and myalgias.   Gastrointestinal: Negative for abdominal pain, constipation, diarrhea, hematemesis, hematochezia, nausea and vomiting.   Genitourinary: Negative for dysuria, frequency and hematuria.   Neurological: Negative for  "focal weakness, headaches, numbness and seizures.   Psychiatric/Behavioral: Positive for depression. Negative for suicidal ideas. The patient is nervous/anxious.      Vitals:    01/13/21 0859   BP: 140/71   BP Location: Right arm   Pulse: 60   Temp: 97.9 °F (36.6 °C)   SpO2: 100%   Weight: 122 kg (268 lb)   Height: 185.4 cm (73\")      Physical Exam  Constitutional:       General: He is not in acute distress.     Appearance: He is well-developed. He is not diaphoretic.      Comments:  male who appears stated age   HENT:      Head: Normocephalic and atraumatic.   Eyes:      General: No scleral icterus.     Conjunctiva/sclera: Conjunctivae normal.   Neck:      Musculoskeletal: Neck supple.      Vascular: No JVD.      Trachea: No tracheal deviation.      Comments: No carotid bruits bilaterally  Cardiovascular:      Rate and Rhythm: Normal rate and regular rhythm.      Heart sounds: Normal heart sounds. No murmur. No friction rub. No gallop.       Comments: Distant heart sounds  Pulmonary:      Effort: Pulmonary effort is normal. No respiratory distress.      Breath sounds: Normal breath sounds. No wheezing or rales.   Abdominal:      General: There is no distension.      Palpations: Abdomen is soft. There is no mass.      Tenderness: There is no abdominal tenderness. There is no guarding or rebound.   Musculoskeletal: Normal range of motion.   Skin:     General: Skin is warm and dry.      Findings: No erythema or rash.   Neurological:      Mental Status: He is alert and oriented to person, place, and time.   Psychiatric:         Behavior: Behavior normal.         Thought Content: Thought content normal.         Judgment: Judgment normal.         The ROS, past medical history, surgical history, family history, social history and vitals were reviewed by myself and corrected as needed.      Labs/Imaging:  -Transthoracic echocardiogram performed 11/19/2020, personally reviewed, demonstrates EF 56 to 60%, mechanical " aortic valve prosthesis with gradients within defined limits, mild mitral regurgitation, moderate to severe mitral stenosis and trace tricuspid regurgitation are present.  The maximum mitral gradient is 27 mmHg, mean 10 mmHg and valve area is 1.2 cm².    Assessment/Plan:  65-year-old  male with a history of hypertension, hyperlipidemia, diabetes mellitus, coronary artery disease status post stenting, aortic valve replacement with Dr. Sánchez in 1994 (25 mm mechanical), congestive heart failure and chronic kidney disease who presents with mitral valve stenosis.  The patient has asymptomatic moderate to severe mitral stenosis.  I recommended repeat echocardiogram in 6 months to evaluate for worsening mitral stenosis.  The patient is aware of symptoms associated with severe mitral stenosis and will notify the office with any complaints.  I will have the patient return to the clinic in 6 months to discuss the results of his repeat echocardiogram.    Patient Active Problem List   Diagnosis   • Diabetes mellitus (CMS/HCC)   • COPD (chronic obstructive pulmonary disease) (CMS/HCC)   • Chronic diastolic congestive heart failure (CMS/HCC)   • Essential hypertension   • Tobacco abuse   • Coronary artery disease involving native coronary artery of native heart without angina pectoris   • Low back pain   • Hyperlipidemia LDL goal <70   • H/O mechanical aortic valve replacement   • Depression   • CKD (chronic kidney disease), stage III   • Rheumatic mitral stenosis   • Obesity

## 2021-01-18 ENCOUNTER — TELEPHONE (OUTPATIENT)
Dept: FAMILY MEDICINE CLINIC | Facility: CLINIC | Age: 66
End: 2021-01-18

## 2021-01-18 RX ORDER — INSULIN GLARGINE 100 [IU]/ML
INJECTION, SOLUTION SUBCUTANEOUS
Qty: 27 ML | Refills: 5 | Status: SHIPPED | OUTPATIENT
Start: 2021-01-18 | End: 2021-01-19 | Stop reason: DRUGHIGH

## 2021-01-18 NOTE — TELEPHONE ENCOUNTER
Patient requested a call back. Patient stated his prescription for insulin glargine (LANTUS, SEMGLEE) 100 UNIT/ML injection was called in incorrectly. Patient stated he takes this twice a day, 41 units in the morning and 43 units in the evening. Patient stated it was called in as 41 units once a day. Patient stated he is out of this medication and needs this taken care of as soon as possible.    Please call and advise. Patient call back [phone]    [facility] [address] [address] [facility] [address] AT [facility] [address]  - [phone]  - [phone] FX

## 2021-01-18 NOTE — TELEPHONE ENCOUNTER
Original script had been broken up. It has been fixed now and resent. Please let Benitez know.      Patient notified.

## 2021-01-18 NOTE — TELEPHONE ENCOUNTER
JUAN FROM [facility] CALLED TO ASKED ABOUT THE MEDICATION DOSAGE. THERE IS CONFLICT FROM THE DIRECTIONS GIVEN ON RECENT PRESCRIPTION VERSUS THE PREVIOUS PRESCRIPTIONS. PLEASE RETURN CALL TO [facility] [phone] [place], [place] - [phone] [address] AT [facility] [address]  - [phone]  - [phone] FX (Pharmacy    PATIENT IS RUNNING OUT OF HIS PRESCRIPTION

## 2021-01-19 NOTE — TELEPHONE ENCOUNTER
Bebo. I don't know why his medications are so messed up all of a sudden. I think he has pens so as long as insurance is covering pens, I would authorize pens. Thanks.    Pens are what they request be re-sent.

## 2021-01-19 NOTE — TELEPHONE ENCOUNTER
Bebo. I don't know why his medications are so messed up all of a sudden. I think he has pens so as long as insurance is covering pens, I would authorize pens. Thanks.

## 2021-01-22 ENCOUNTER — OFFICE VISIT (OUTPATIENT)
Dept: FAMILY MEDICINE CLINIC | Facility: CLINIC | Age: 66
End: 2021-01-22

## 2021-01-22 VITALS
HEART RATE: 56 BPM | WEIGHT: 267.6 LBS | BODY MASS INDEX: 35.47 KG/M2 | OXYGEN SATURATION: 98 % | SYSTOLIC BLOOD PRESSURE: 116 MMHG | HEIGHT: 73 IN | DIASTOLIC BLOOD PRESSURE: 54 MMHG | TEMPERATURE: 97.1 F

## 2021-01-22 DIAGNOSIS — Z00.00 ENCOUNTER FOR SUBSEQUENT ANNUAL WELLNESS VISIT IN MEDICARE PATIENT: Primary | ICD-10-CM

## 2021-01-22 DIAGNOSIS — Z79.01 ANTICOAGULANT LONG-TERM USE: ICD-10-CM

## 2021-01-22 LAB — INR PPP: 2.6 (ref 0.9–1.1)

## 2021-01-22 PROCEDURE — 85610 PROTHROMBIN TIME: CPT | Performed by: FAMILY MEDICINE

## 2021-01-22 PROCEDURE — G0439 PPPS, SUBSEQ VISIT: HCPCS | Performed by: FAMILY MEDICINE

## 2021-01-22 PROCEDURE — 36416 COLLJ CAPILLARY BLOOD SPEC: CPT | Performed by: FAMILY MEDICINE

## 2021-01-22 RX ORDER — WARFARIN SODIUM 6 MG/1
TABLET ORAL
Qty: 90 TABLET | Refills: 1 | Status: SHIPPED | OUTPATIENT
Start: 2021-01-22 | End: 2021-07-22 | Stop reason: SDUPTHER

## 2021-01-27 DIAGNOSIS — I12.9 BENIGN HYPERTENSION WITH CKD (CHRONIC KIDNEY DISEASE) STAGE III (HCC): ICD-10-CM

## 2021-01-27 DIAGNOSIS — N18.30 BENIGN HYPERTENSION WITH CKD (CHRONIC KIDNEY DISEASE) STAGE III (HCC): ICD-10-CM

## 2021-01-28 RX ORDER — METOPROLOL SUCCINATE 50 MG/1
TABLET, EXTENDED RELEASE ORAL
Qty: 90 TABLET | Refills: 1 | Status: SHIPPED | OUTPATIENT
Start: 2021-01-28 | End: 2021-08-02

## 2021-02-08 NOTE — PROGRESS NOTES
The ABCs of the Annual Wellness Visit  Subsequent Medicare Wellness Visit    Chief Complaint   Patient presents with   • Medicare Wellness-subsequent       Subjective   History of Present Illness:  Benitez Miranda is a 65 y.o. male who presents for a Subsequent Medicare Wellness Visit.    HEALTH RISK ASSESSMENT    Recent Hospitalizations:  No hospitalization(s) within the last year.    Current Medical Providers:  Patient Care Team:  Mesha Christina MD as PCP - General (Family Medicine)  Oswaldo Ahuja MD as Cardiologist (Interventional Cardiology)    Smoking Status:  Social History     Tobacco Use   Smoking Status Former Smoker   • Packs/day: 1.00   • Years: 30.00   • Pack years: 30.00   • Types: Pipe, Cigars   • Quit date: 2016   • Years since quittin.6   Smokeless Tobacco Never Used   Tobacco Comment    currently smokes a pipe       Alcohol Consumption:  Social History     Substance and Sexual Activity   Alcohol Use No       Depression Screen:   PHQ-2/PHQ-9 Depression Screening 2021   Little interest or pleasure in doing things 0   Feeling down, depressed, or hopeless 0   Total Score 0       Fall Risk Screen:  STEADI Fall Risk Assessment has not been completed.    Health Habits and Functional and Cognitive Screening:  Functional & Cognitive Status 2021   Do you have difficulty preparing food and eating? No   Do you have difficulty bathing yourself, getting dressed or grooming yourself? Yes   Do you have difficulty using the toilet? Yes   Do you have difficulty moving around from place to place? No   Do you have trouble with steps or getting out of a bed or a chair? No   Current Diet Frequent Junk Food   Dental Exam Not up to date   Eye Exam Not up to date   Exercise (times per week) 0 times per week   Current Exercise Activities Include None   Do you need help using the phone?  No   Are you deaf or do you have serious difficulty hearing?  No   Do you need help with transportation?  No   Do you need help shopping? No   Do you need help preparing meals?  No   Do you need help with housework?  Yes   Do you need help with laundry? No   Do you need help taking your medications? No   Do you need help managing money? No   Do you ever drive or ride in a car without wearing a seat belt? Yes   Have you felt unusual stress, anger or loneliness in the last month? Yes   Who do you live with? Alone   If you need help, do you have trouble finding someone available to you? No   Have you been bothered in the last four weeks by sexual problems? No   Do you have difficulty concentrating, remembering or making decisions? No         Does the patient have evidence of cognitive impairment? No    Asprin use counseling:Does not need ASA (and currently is not on it)    Age-appropriate Screening Schedule:  Refer to the list below for future screening recommendations based on patient's age, sex and/or medical conditions. Orders for these recommended tests are listed in the plan section. The patient has been provided with a written plan.    Health Maintenance   Topic Date Due   • TDAP/TD VACCINES (1 - Tdap) 08/15/1972   • ZOSTER VACCINE (1 of 2) 08/15/2003   • DIABETIC EYE EXAM  05/13/2017   • LIPID PANEL  01/10/2021   • HEMOGLOBIN A1C  03/22/2021   • URINE MICROALBUMIN  06/29/2021   • DIABETIC FOOT EXAM  10/13/2021   • COLONOSCOPY  10/12/2026   • INFLUENZA VACCINE  Completed          The following portions of the patient's history were reviewed and updated as appropriate: allergies, current medications, past family history, past medical history, past social history, past surgical history and problem list.    Outpatient Medications Prior to Visit   Medication Sig Dispense Refill   • amLODIPine (NORVASC) 5 MG tablet Take 1 tablet by mouth Daily.     • Insulin Glargine (LANTUS SOLOSTAR) 100 UNIT/ML injection pen Take 41 units in the AM and 43 units in the PM injected daily 27 mL 5   • insulin lispro (humaLOG) 100 UNIT/ML  injection Inject 15 Units under the skin into the appropriate area as directed 3 (Three) Times a Day With Meals.     • losartan (COZAAR) 100 MG tablet TAKE ONE TABLET BY MOUTH DAILY 90 tablet 1   • pravastatin (PRAVACHOL) 40 MG tablet TAKE ONE TABLET BY MOUTH DAILY 90 tablet 1   • sertraline (Zoloft) 100 MG tablet Take 150 mg by mouth Daily.     • spironolactone (ALDACTONE) 25 MG tablet TAKE ONE TABLET BY MOUTH TWICE A  tablet 1   • tamsulosin (Flomax) 0.4 MG capsule 24 hr capsule Take 1 capsule by mouth Every Night. 30 capsule 11   • metoprolol succinate XL (TOPROL-XL) 50 MG 24 hr tablet Take 1 tablet by mouth Daily. 90 tablet 1   • warfarin (COUMADIN) 6 MG tablet TAKE ONE TABLET BY MOUTH EVERY EVENING. 45 tablet 5   • ciprofloxacin (Cipro) 250 MG tablet Take 1 tablet by mouth 2 (Two) Times a Day. 20 tablet 0   • oxyCODONE-acetaminophen (Percocet) 5-325 MG per tablet Take 1 to 2 Tablets Every 6 Hours as needed for PAIN 28 tablet 0     No facility-administered medications prior to visit.        Patient Active Problem List   Diagnosis   • Diabetes mellitus (CMS/HCC)   • COPD (chronic obstructive pulmonary disease) (CMS/HCC)   • Chronic diastolic congestive heart failure (CMS/HCC)   • Essential hypertension   • Tobacco abuse   • Coronary artery disease involving native coronary artery of native heart without angina pectoris   • Low back pain   • Hyperlipidemia LDL goal <70   • H/O mechanical aortic valve replacement   • Depression   • CKD (chronic kidney disease), stage III (CMS/HCC)   • Rheumatic mitral stenosis   • Obesity       Advanced Care Planning:  ACP discussion was held with the patient during this visit. Patient does not have an advance directive, information provided.    Review of Systems   Constitutional: Positive for fatigue. Negative for appetite change and fever.   HENT: Negative for congestion and rhinorrhea.    Eyes: Negative for discharge and itching.   Respiratory: Negative for shortness of  "breath and wheezing.    Cardiovascular: Negative for chest pain and palpitations.   Gastrointestinal: Negative for abdominal pain, constipation, diarrhea, nausea and vomiting.   Endocrine: Negative for cold intolerance and heat intolerance.   Genitourinary: Negative for dysuria and hematuria.   Musculoskeletal: Positive for arthralgias. Negative for myalgias.   Neurological: Negative for weakness and numbness.   Psychiatric/Behavioral: Negative for suicidal ideas. The patient is not nervous/anxious.        Compared to one year ago, the patient feels his physical health is the same.  Compared to one year ago, the patient feels his mental health is the same.    Reviewed chart for potential of high risk medication in the elderly: yes  Reviewed chart for potential of harmful drug interactions in the elderly:yes    Objective         Vitals:    01/22/21 1121   BP: 116/54   BP Location: Left arm   Patient Position: Sitting   Cuff Size: Adult   Pulse: 56   Temp: 97.1 °F (36.2 °C)   TempSrc: Infrared   SpO2: 98%   Weight: 121 kg (267 lb 9.6 oz)   Height: 185.4 cm (73\")       Body mass index is 35.31 kg/m².  Discussed the patient's BMI with him. The BMI is above average; BMI management plan is completed.    Physical Exam  Vitals signs and nursing note reviewed.   Constitutional:       Appearance: Normal appearance. He is obese. He is not ill-appearing or diaphoretic.   HENT:      Head: Normocephalic and atraumatic.      Right Ear: Tympanic membrane, ear canal and external ear normal.      Left Ear: Tympanic membrane, ear canal and external ear normal.      Nose: Nose normal. No congestion or rhinorrhea.      Mouth/Throat:      Mouth: Mucous membranes are moist.      Pharynx: Oropharynx is clear. No oropharyngeal exudate or posterior oropharyngeal erythema.   Eyes:      General:         Right eye: No discharge.         Left eye: No discharge.      Extraocular Movements: Extraocular movements intact.      Conjunctiva/sclera: " Conjunctivae normal.      Pupils: Pupils are equal, round, and reactive to light.   Neck:      Musculoskeletal: Normal range of motion and neck supple. No neck rigidity or muscular tenderness.      Vascular: No carotid bruit.   Cardiovascular:      Rate and Rhythm: Normal rate and regular rhythm.      Pulses: Normal pulses.      Heart sounds: Normal heart sounds.   Pulmonary:      Effort: Pulmonary effort is normal.      Breath sounds: Normal breath sounds. No stridor. No wheezing or rales.   Abdominal:      General: Abdomen is flat. Bowel sounds are normal. There is no distension.      Palpations: Abdomen is soft.      Tenderness: There is no guarding.   Musculoskeletal: Normal range of motion.         General: No swelling or tenderness.      Right lower leg: No edema.      Left lower leg: No edema.   Skin:     General: Skin is warm and dry.      Capillary Refill: Capillary refill takes 2 to 3 seconds.      Findings: No lesion or rash.   Neurological:      General: No focal deficit present.      Mental Status: He is oriented to person, place, and time.      Gait: Gait normal.      Deep Tendon Reflexes: Reflexes normal.   Psychiatric:         Mood and Affect: Mood normal.         Behavior: Behavior normal.               Assessment/Plan   Medicare Risks and Personalized Health Plan  CMS Preventative Services Quick Reference  Advance Directive Discussion  Cardiovascular risk  Colon Cancer Screening  Depression/Dysphoria  Fall Risk  Glaucoma Risk  Obesity/Overweight   Polypharmacy  Prostate Cancer Screening     The above risks/problems have been discussed with the patient.  Pertinent information has been shared with the patient in the After Visit Summary.  Follow up plans and orders are seen below in the Assessment/Plan Section.    Diagnoses and all orders for this visit:    1. Encounter for subsequent annual wellness visit in Medicare patient (Primary)    2. Anticoagulant long-term use  -     warfarin (COUMADIN) 6 MG  tablet; TAKE ONE TABLET BY MOUTH EVERY EVENING.  Dispense: 90 tablet; Refill: 1  -     POCT INR      Follow Up:  Return in about 3 months (around 4/22/2021).     An After Visit Summary and PPPS were given to the patient.

## 2021-03-18 ENCOUNTER — IMMUNIZATION (OUTPATIENT)
Dept: VACCINE CLINIC | Facility: HOSPITAL | Age: 66
End: 2021-03-18

## 2021-03-18 PROCEDURE — 0001A: CPT | Performed by: INTERNAL MEDICINE

## 2021-03-18 PROCEDURE — 91300 HC SARSCOV02 VAC 30MCG/0.3ML IM: CPT | Performed by: INTERNAL MEDICINE

## 2021-03-29 ENCOUNTER — OFFICE VISIT (OUTPATIENT)
Dept: CARDIOLOGY | Facility: CLINIC | Age: 66
End: 2021-03-29

## 2021-03-29 VITALS
SYSTOLIC BLOOD PRESSURE: 108 MMHG | OXYGEN SATURATION: 97 % | HEART RATE: 54 BPM | HEIGHT: 72 IN | BODY MASS INDEX: 37.11 KG/M2 | RESPIRATION RATE: 16 BRPM | WEIGHT: 274 LBS | DIASTOLIC BLOOD PRESSURE: 67 MMHG

## 2021-03-29 DIAGNOSIS — E66.01 MORBIDLY OBESE (HCC): ICD-10-CM

## 2021-03-29 DIAGNOSIS — F33.42 RECURRENT MAJOR DEPRESSIVE DISORDER, IN FULL REMISSION (HCC): ICD-10-CM

## 2021-03-29 DIAGNOSIS — I12.9 BENIGN HYPERTENSION WITH CKD (CHRONIC KIDNEY DISEASE) STAGE III (HCC): ICD-10-CM

## 2021-03-29 DIAGNOSIS — N18.30 BENIGN HYPERTENSION WITH CKD (CHRONIC KIDNEY DISEASE) STAGE III (HCC): ICD-10-CM

## 2021-03-29 DIAGNOSIS — I50.32 CHRONIC DIASTOLIC CONGESTIVE HEART FAILURE (HCC): ICD-10-CM

## 2021-03-29 DIAGNOSIS — J44.9 CHRONIC OBSTRUCTIVE PULMONARY DISEASE, UNSPECIFIED COPD TYPE (HCC): ICD-10-CM

## 2021-03-29 DIAGNOSIS — IMO0002 UNCONTROLLED TYPE 2 DIABETES WITH NEUROPATHY: ICD-10-CM

## 2021-03-29 PROCEDURE — 99213 OFFICE O/P EST LOW 20 MIN: CPT | Performed by: INTERNAL MEDICINE

## 2021-03-29 NOTE — PROGRESS NOTES
"Chief Complaint  Coronary Artery Disease (3 month follow up) and Congestive Heart Failure    Subjective         Patient was seen and examined.  He has a history of mild to moderate native mitral valve rheumatic stenosis, he also has a history of bioprosthetic aortic valve replacement, he saw Dr. Philip for his mitral stenosis who recommended to repeat an echo in 6 months.  Today in the office he denies any new symptoms of dyspnea, palpitations, chest pain, lower extremity edema.  His vitals are stable otherwise.    Benitez Miranda presents to [facility] for   History of Present Illness     Objective     Vital Signs:   /67 (BP Location: Right arm, Patient Position: Sitting)   Pulse 54   Resp 16   Ht 182.9 cm (72\")   Wt 124 kg (274 lb)   SpO2 97%   BMI 37.16 kg/m²       Physical Exam  Constitutional:       Appearance: He is well-developed.   HENT:      Head: Normocephalic and atraumatic.   Eyes:      Pupils: Pupils are equal, round, and reactive to light.   Cardiovascular:      Rate and Rhythm: Normal rate and regular rhythm.      Heart sounds: Murmur heard.     Pulmonary:      Effort: Pulmonary effort is normal.      Breath sounds: Normal breath sounds.   Abdominal:      General: Bowel sounds are normal.      Palpations: Abdomen is soft.   Musculoskeletal:         General: Normal range of motion.      Cervical back: Normal range of motion and neck supple.   Skin:     General: Skin is warm and dry.      Capillary Refill: Capillary refill takes less than 2 seconds.   Neurological:      Mental Status: He is alert and oriented to person, place, and time.          Result Review :                         Problem List Items Addressed This Visit        Cardiac and Vasculature    Chronic diastolic congestive heart failure (CMS/HCC)       Endocrine and Metabolic    Uncontrolled type 2 diabetes with neuropathy (CMS/HCC)    Morbidly obese (CMS/HCC)    Overview     BMI 36.            Mental " Health    Recurrent major depressive disorder, in full remission (CMS/[facility])       Pulmonary and Pneumonias    COPD (chronic obstructive pulmonary disease) (CMS/[facility])      Other Visit Diagnoses     Benign hypertension with CKD (chronic kidney disease) stage III (CMS/[facility])            Will get a repeat echocardiogram in 3 months, if there is any increasing gradients across his mitral valve will consider doing a transesophageal echocardiogram for better assessment of the mitral valve.  Continue with his current medications, his blood pressure and heart rate is well controlled.      Follow Up     Return in about 3 months (around 6/29/2021).  Patient was given instructions and counseling regarding his condition or for health maintenance advice. Please see specific information pulled into the AVS if appropriate.               Oswaldo Ahuja MD, [facility]  Interventional Cardiology    TRINITY Metzger, acting as scribe for Oswaldo Ahuja MD, [facility]    03/29/21  15:01 EDT

## 2021-04-08 ENCOUNTER — IMMUNIZATION (OUTPATIENT)
Dept: VACCINE CLINIC | Facility: HOSPITAL | Age: 66
End: 2021-04-08

## 2021-04-08 PROCEDURE — 0002A: CPT | Performed by: INTERNAL MEDICINE

## 2021-04-08 PROCEDURE — 91300 HC SARSCOV02 VAC 30MCG/0.3ML IM: CPT | Performed by: INTERNAL MEDICINE

## 2021-04-22 ENCOUNTER — OFFICE VISIT (OUTPATIENT)
Dept: FAMILY MEDICINE CLINIC | Facility: CLINIC | Age: 66
End: 2021-04-22

## 2021-04-22 VITALS
HEART RATE: 63 BPM | TEMPERATURE: 97.1 F | HEIGHT: 72 IN | BODY MASS INDEX: 36.84 KG/M2 | SYSTOLIC BLOOD PRESSURE: 120 MMHG | WEIGHT: 272 LBS | DIASTOLIC BLOOD PRESSURE: 72 MMHG | OXYGEN SATURATION: 97 %

## 2021-04-22 DIAGNOSIS — I12.9 BENIGN HYPERTENSION WITH CKD (CHRONIC KIDNEY DISEASE) STAGE III (HCC): Primary | ICD-10-CM

## 2021-04-22 DIAGNOSIS — E11.69 HYPERLIPIDEMIA DUE TO TYPE 2 DIABETES MELLITUS (HCC): ICD-10-CM

## 2021-04-22 DIAGNOSIS — N18.30 BENIGN HYPERTENSION WITH CKD (CHRONIC KIDNEY DISEASE) STAGE III (HCC): Primary | ICD-10-CM

## 2021-04-22 DIAGNOSIS — Z79.01 ANTICOAGULANT LONG-TERM USE: ICD-10-CM

## 2021-04-22 DIAGNOSIS — E78.5 HYPERLIPIDEMIA DUE TO TYPE 2 DIABETES MELLITUS (HCC): ICD-10-CM

## 2021-04-22 DIAGNOSIS — IMO0002 UNCONTROLLED TYPE 2 DIABETES WITH NEUROPATHY: ICD-10-CM

## 2021-04-22 LAB — INR PPP: 25.2 (ref 0.9–1.1)

## 2021-04-22 PROCEDURE — 99214 OFFICE O/P EST MOD 30 MIN: CPT | Performed by: FAMILY MEDICINE

## 2021-04-22 PROCEDURE — 83036 HEMOGLOBIN GLYCOSYLATED A1C: CPT | Performed by: FAMILY MEDICINE

## 2021-04-22 PROCEDURE — 80061 LIPID PANEL: CPT | Performed by: FAMILY MEDICINE

## 2021-04-22 PROCEDURE — 36415 COLL VENOUS BLD VENIPUNCTURE: CPT | Performed by: FAMILY MEDICINE

## 2021-04-22 PROCEDURE — 85610 PROTHROMBIN TIME: CPT | Performed by: FAMILY MEDICINE

## 2021-04-22 PROCEDURE — 80053 COMPREHEN METABOLIC PANEL: CPT | Performed by: FAMILY MEDICINE

## 2021-04-22 PROCEDURE — 85025 COMPLETE CBC W/AUTO DIFF WBC: CPT | Performed by: FAMILY MEDICINE

## 2021-04-22 PROCEDURE — 36416 COLLJ CAPILLARY BLOOD SPEC: CPT | Performed by: FAMILY MEDICINE

## 2021-04-23 LAB
ALBUMIN SERPL-MCNC: 4.2 G/DL (ref 3.5–5.2)
ALBUMIN/GLOB SERPL: 1.2 G/DL
ALP SERPL-CCNC: 61 U/L (ref 39–117)
ALT SERPL W P-5'-P-CCNC: 24 U/L (ref 1–41)
ANION GAP SERPL CALCULATED.3IONS-SCNC: 11.5 MMOL/L (ref 5–15)
AST SERPL-CCNC: 22 U/L (ref 1–40)
BASOPHILS # BLD AUTO: 0.1 10*3/MM3 (ref 0–0.2)
BASOPHILS NFR BLD AUTO: 1 % (ref 0–1.5)
BILIRUB SERPL-MCNC: 0.4 MG/DL (ref 0–1.2)
BUN SERPL-MCNC: 30 MG/DL (ref 8–23)
BUN/CREAT SERPL: 19.6 (ref 7–25)
CALCIUM SPEC-SCNC: 9.3 MG/DL (ref 8.6–10.5)
CHLORIDE SERPL-SCNC: 103 MMOL/L (ref 98–107)
CHOLEST SERPL-MCNC: 157 MG/DL (ref 0–200)
CO2 SERPL-SCNC: 22.5 MMOL/L (ref 22–29)
CREAT SERPL-MCNC: 1.53 MG/DL (ref 0.76–1.27)
DEPRECATED RDW RBC AUTO: 46.2 FL (ref 37–54)
EOSINOPHIL # BLD AUTO: 0.49 10*3/MM3 (ref 0–0.4)
EOSINOPHIL NFR BLD AUTO: 4.7 % (ref 0.3–6.2)
ERYTHROCYTE [DISTWIDTH] IN BLOOD BY AUTOMATED COUNT: 14.5 % (ref 12.3–15.4)
GFR SERPL CREATININE-BSD FRML MDRD: 46 ML/MIN/1.73
GLOBULIN UR ELPH-MCNC: 3.6 GM/DL
GLUCOSE SERPL-MCNC: 131 MG/DL (ref 65–99)
HBA1C MFR BLD: 7.96 % (ref 4.8–5.6)
HCT VFR BLD AUTO: 44.5 % (ref 37.5–51)
HDLC SERPL-MCNC: 35 MG/DL (ref 40–60)
HGB BLD-MCNC: 14.4 G/DL (ref 13–17.7)
LDLC SERPL CALC-MCNC: 94 MG/DL (ref 0–100)
LDLC/HDLC SERPL: 2.56 {RATIO}
LYMPHOCYTES # BLD AUTO: 1.87 10*3/MM3 (ref 0.7–3.1)
LYMPHOCYTES NFR BLD AUTO: 17.9 % (ref 19.6–45.3)
MCH RBC QN AUTO: 28.3 PG (ref 26.6–33)
MCHC RBC AUTO-ENTMCNC: 32.4 G/DL (ref 31.5–35.7)
MCV RBC AUTO: 87.4 FL (ref 79–97)
MONOCYTES # BLD AUTO: 0.64 10*3/MM3 (ref 0.1–0.9)
MONOCYTES NFR BLD AUTO: 6.1 % (ref 5–12)
NEUTROPHILS NFR BLD AUTO: 69.2 % (ref 42.7–76)
NEUTROPHILS NFR BLD AUTO: 7.23 10*3/MM3 (ref 1.7–7)
PLATELET # BLD AUTO: 188 10*3/MM3 (ref 140–450)
PMV BLD AUTO: 12.7 FL (ref 6–12)
POTASSIUM SERPL-SCNC: 5.4 MMOL/L (ref 3.5–5.2)
PROT SERPL-MCNC: 7.8 G/DL (ref 6–8.5)
RBC # BLD AUTO: 5.09 10*6/MM3 (ref 4.14–5.8)
SODIUM SERPL-SCNC: 137 MMOL/L (ref 136–145)
TRIGL SERPL-MCNC: 162 MG/DL (ref 0–150)
VLDLC SERPL-MCNC: 28 MG/DL (ref 5–40)
WBC # BLD AUTO: 10.44 10*3/MM3 (ref 3.4–10.8)

## 2021-04-26 ENCOUNTER — TELEPHONE (OUTPATIENT)
Dept: FAMILY MEDICINE CLINIC | Facility: CLINIC | Age: 66
End: 2021-04-26

## 2021-04-26 NOTE — TELEPHONE ENCOUNTER
----- Message from Mesha Christina MD sent at 4/26/2021  1:25 AM EDT -----  Labs stable. Okay to either call or send letter to patient. Thanks.    Letter mailed.

## 2021-06-07 DIAGNOSIS — I05.0 RHEUMATIC MITRAL STENOSIS: Primary | ICD-10-CM

## 2021-06-08 RX ORDER — SERTRALINE HYDROCHLORIDE 100 MG/1
TABLET, FILM COATED ORAL
Qty: 135 TABLET | Refills: 0 | Status: SHIPPED | OUTPATIENT
Start: 2021-06-08 | End: 2021-10-11 | Stop reason: SDUPTHER

## 2021-06-15 RX ORDER — PRAVASTATIN SODIUM 40 MG
TABLET ORAL
Qty: 90 TABLET | Refills: 1 | Status: SHIPPED | OUTPATIENT
Start: 2021-06-15 | End: 2021-12-09

## 2021-06-23 ENCOUNTER — HOSPITAL ENCOUNTER (OUTPATIENT)
Dept: CARDIOLOGY | Facility: HOSPITAL | Age: 66
Discharge: HOME | End: 2021-06-23
Admitting: THORACIC SURGERY (CARDIOTHORACIC VASCULAR SURGERY)

## 2021-06-23 DIAGNOSIS — I05.0 RHEUMATIC MITRAL STENOSIS: ICD-10-CM

## 2021-06-23 PROCEDURE — 93306 TTE W/DOPPLER COMPLETE: CPT | Performed by: INTERNAL MEDICINE

## 2021-06-23 PROCEDURE — 93306 TTE W/DOPPLER COMPLETE: CPT

## 2021-06-27 LAB
BH CV ECHO MEAS - % IVS THICK: 13.4 %
BH CV ECHO MEAS - % LVPW THICK: 17.2 %
BH CV ECHO MEAS - ACS: 2.3 CM
BH CV ECHO MEAS - AO MAX PG (FULL): 24.8 MMHG
BH CV ECHO MEAS - AO MAX PG: 27.1 MMHG
BH CV ECHO MEAS - AO MEAN PG (FULL): 12 MMHG
BH CV ECHO MEAS - AO MEAN PG: 13 MMHG
BH CV ECHO MEAS - AO ROOT AREA (BSA CORRECTED): 1.5
BH CV ECHO MEAS - AO ROOT AREA: 11 CM^2
BH CV ECHO MEAS - AO ROOT DIAM: 3.8 CM
BH CV ECHO MEAS - AO V2 MAX: 260 CM/SEC
BH CV ECHO MEAS - AO V2 MEAN: 163.5 CM/SEC
BH CV ECHO MEAS - AO V2 VTI: 56.5 CM
BH CV ECHO MEAS - AVA(I,A): 1.6 CM^2
BH CV ECHO MEAS - AVA(I,D): 1.6 CM^2
BH CV ECHO MEAS - AVA(V,A): 1.3 CM^2
BH CV ECHO MEAS - AVA(V,D): 1.3 CM^2
BH CV ECHO MEAS - BSA(HAYCOCK): 2.5 M^2
BH CV ECHO MEAS - BSA: 2.4 M^2
BH CV ECHO MEAS - BZI_BMI: 36.9 KILOGRAMS/M^2
BH CV ECHO MEAS - BZI_METRIC_HEIGHT: 182.9 CM
BH CV ECHO MEAS - BZI_METRIC_WEIGHT: 123.4 KG
BH CV ECHO MEAS - EDV(CUBED): 196.6 ML
BH CV ECHO MEAS - EDV(MOD-SP4): 109 ML
BH CV ECHO MEAS - EDV(TEICH): 167.5 ML
BH CV ECHO MEAS - EF(CUBED): 59.8 %
BH CV ECHO MEAS - EF(MOD-SP4): 52.4 %
BH CV ECHO MEAS - EF(TEICH): 50.7 %
BH CV ECHO MEAS - ESV(CUBED): 79 ML
BH CV ECHO MEAS - ESV(MOD-SP4): 51.9 ML
BH CV ECHO MEAS - ESV(TEICH): 82.6 ML
BH CV ECHO MEAS - FS: 26.2 %
BH CV ECHO MEAS - IVS/LVPW: 1.3
BH CV ECHO MEAS - IVSD: 1.5 CM
BH CV ECHO MEAS - IVSS: 1.7 CM
BH CV ECHO MEAS - LA DIMENSION: 3.6 CM
BH CV ECHO MEAS - LA/AO: 0.96
BH CV ECHO MEAS - LV DIASTOLIC VOL/BSA (35-75): 44.9 ML/M^2
BH CV ECHO MEAS - LV MASS(C)D: 335.4 GRAMS
BH CV ECHO MEAS - LV MASS(C)DI: 138.2 GRAMS/M^2
BH CV ECHO MEAS - LV MASS(C)S: 258.6 GRAMS
BH CV ECHO MEAS - LV MASS(C)SI: 106.5 GRAMS/M^2
BH CV ECHO MEAS - LV MAX PG: 2.2 MMHG
BH CV ECHO MEAS - LV MEAN PG: 1 MMHG
BH CV ECHO MEAS - LV SYSTOLIC VOL/BSA (12-30): 21.4 ML/M^2
BH CV ECHO MEAS - LV V1 MAX: 74.6 CM/SEC
BH CV ECHO MEAS - LV V1 MEAN: 50.5 CM/SEC
BH CV ECHO MEAS - LV V1 VTI: 20.1 CM
BH CV ECHO MEAS - LVIDD: 5.8 CM
BH CV ECHO MEAS - LVIDS: 4.3 CM
BH CV ECHO MEAS - LVLD AP4: 7.7 CM
BH CV ECHO MEAS - LVLS AP4: 7.2 CM
BH CV ECHO MEAS - LVOT AREA (M): 4.5 CM^2
BH CV ECHO MEAS - LVOT AREA: 4.5 CM^2
BH CV ECHO MEAS - LVOT DIAM: 2.4 CM
BH CV ECHO MEAS - LVPWD: 1.2 CM
BH CV ECHO MEAS - LVPWS: 1.4 CM
BH CV ECHO MEAS - MV DEC SLOPE: 506 CM/SEC^2
BH CV ECHO MEAS - MV MAX PG: 18.6 MMHG
BH CV ECHO MEAS - MV MEAN PG: 10.5 MMHG
BH CV ECHO MEAS - MV P1/2T MAX VEL: 256.5 CM/SEC
BH CV ECHO MEAS - MV P1/2T: 148.5 MSEC
BH CV ECHO MEAS - MV V2 MAX: 215.5 CM/SEC
BH CV ECHO MEAS - MV V2 MEAN: 148.5 CM/SEC
BH CV ECHO MEAS - MV V2 VTI: 80.6 CM
BH CV ECHO MEAS - MVA P1/2T LCG: 0.86 CM^2
BH CV ECHO MEAS - MVA(P1/2T): 1.5 CM^2
BH CV ECHO MEAS - MVA(TRACED): 1.5 CM^2
BH CV ECHO MEAS - MVA(VTI): 1.1 CM^2
BH CV ECHO MEAS - PA ACC TIME: 0.12 SEC
BH CV ECHO MEAS - PA PR(ACCEL): 26.8 MMHG
BH CV ECHO MEAS - RAP SYSTOLE: 10 MMHG
BH CV ECHO MEAS - RVSP: 38.9 MMHG
BH CV ECHO MEAS - SI(AO): 257 ML/M^2
BH CV ECHO MEAS - SI(CUBED): 48.5 ML/M^2
BH CV ECHO MEAS - SI(LVOT): 37.5 ML/M^2
BH CV ECHO MEAS - SI(MOD-SP4): 23.5 ML/M^2
BH CV ECHO MEAS - SI(TEICH): 35 ML/M^2
BH CV ECHO MEAS - SV(AO): 624 ML
BH CV ECHO MEAS - SV(CUBED): 117.7 ML
BH CV ECHO MEAS - SV(LVOT): 90.9 ML
BH CV ECHO MEAS - SV(MOD-SP4): 57.1 ML
BH CV ECHO MEAS - SV(TEICH): 84.9 ML
BH CV ECHO MEAS - TR MAX VEL: 268 CM/SEC
LV EF 2D ECHO EST: 50 %
MAXIMAL PREDICTED HEART RATE: 153 BPM
STRESS TARGET HR: 130 BPM

## 2021-07-01 ENCOUNTER — OFFICE VISIT (OUTPATIENT)
Dept: CARDIOLOGY | Facility: CLINIC | Age: 66
End: 2021-07-01

## 2021-07-01 VITALS
DIASTOLIC BLOOD PRESSURE: 59 MMHG | SYSTOLIC BLOOD PRESSURE: 106 MMHG | HEART RATE: 59 BPM | OXYGEN SATURATION: 98 % | BODY MASS INDEX: 36.82 KG/M2 | HEIGHT: 72 IN | WEIGHT: 271.8 LBS

## 2021-07-01 DIAGNOSIS — I10 ESSENTIAL HYPERTENSION: ICD-10-CM

## 2021-07-01 DIAGNOSIS — I25.10 CORONARY ARTERY DISEASE INVOLVING NATIVE CORONARY ARTERY OF NATIVE HEART WITHOUT ANGINA PECTORIS: ICD-10-CM

## 2021-07-01 DIAGNOSIS — I05.0 RHEUMATIC MITRAL STENOSIS: ICD-10-CM

## 2021-07-01 DIAGNOSIS — I50.32 CHRONIC DIASTOLIC CONGESTIVE HEART FAILURE (HCC): Primary | ICD-10-CM

## 2021-07-01 PROCEDURE — 99214 OFFICE O/P EST MOD 30 MIN: CPT | Performed by: INTERNAL MEDICINE

## 2021-07-01 NOTE — PROGRESS NOTES
"Chief Complaint  Hypertension, Congestive Heart Failure, Chronic Kidney Disease, and COPD    Subjective      Benitez Miranda presents to [facility] for follow up echocardiogram    History of Present Illness   He presents today with no chest pain, shortness of breath, palpitations, or edema. States that he has been doing well without any limitations with daily activity. Echocardiogram showed mild to moderate AV stenosis. EF 50%.. He has been compliant with his medications without any complications.   The chart, PMH, FMH, social history, PSH, and medications were reviewed today. Problems above addressed this visit.      Objective     Vital Signs:   /59 (BP Location: Left arm)   Pulse 59   Ht 182.9 cm (72\")   Wt 123 kg (271 lb 12.8 oz)   SpO2 98%   BMI 36.86 kg/m²       Physical Exam  Constitutional:       General: He is not in acute distress.     Appearance: Normal appearance.   HENT:      Head: Normocephalic.      Nose: Nose normal.      Mouth/Throat:      Pharynx: Oropharynx is clear.   Eyes:      Pupils: Pupils are equal, round, and reactive to light.   Neck:      Vascular: No carotid bruit.   Cardiovascular:      Rate and Rhythm: Normal rate and regular rhythm.      Pulses: Normal pulses.      Heart sounds: Normal heart sounds.   Pulmonary:      Effort: Pulmonary effort is normal. No respiratory distress.      Breath sounds: Normal breath sounds.   Abdominal:      General: Bowel sounds are normal.      Palpations: Abdomen is soft.   Musculoskeletal:      Cervical back: Neck supple.      Right lower leg: No edema.      Left lower leg: No edema.   Skin:     General: Skin is warm and dry.   Neurological:      General: No focal deficit present.      Mental Status: He is alert and oriented to person, place, and time.   Psychiatric:         Mood and Affect: Mood normal.         Behavior: Behavior normal.         Judgment: Judgment normal.          Result Review :Data reviewed: " Cardiology studies 07/01/2021         Assessment  AV stenosis mild to moderate with status post AVR   Essential hypertension  Dyslipidemia  Chronic diastolic CHF  Stage 3 CKD    Plan  Continue with current medications  Follow up in 3 month     Problem List Items Addressed This Visit        Cardiac and Vasculature    Chronic diastolic congestive heart failure (CMS/HCC) - Primary    Essential hypertension    Coronary artery disease involving native coronary artery of native heart without angina pectoris    Overview     · Cardiac catheterization by Dr. Carrasco (2004): NADINE to unknown coronary artery  · Nuclear stress test (02/26/2015): Small to moderate region of inferior scar; no reversible ischemia detected; LVEF 52%.  · Echocardiogram (02/13/2015): LVEF 50% to 55%. Mild LVH. Mechanical prosthetic aortic valve functioning well; mild to moderate MR.   · Cardiac catheterization by Dr. Eduardo Torres (06/08/2015): mild nonobstructive CAD with widely patent stent; normal functioning of mechanical aortic valve; pulmonary hypertension (PA 50/35 mmHg)  · Admission to [facility] Room with chronic obstructive pulmonary disease exacerbation/diastolic heart failure, December 2015         Rheumatic mitral stenosis    Overview     · History of rheumatic fever.   · Echocardiogram (02/2016): Moderate to severe mitral stenosis. Rheumatic valve  · Echocardiogram (01/13/2017): Mean gradient of mitral valve 8 mmHg.  Normal functioning mechanical aortic valve               Follow Up     Return in about 3 months (around 10/1/2021).  Patient was given instructions and counseling regarding his condition or for health maintenance advice. Please see specific information pulled into the AVS if appropriate.               Oswaldo Ahuja MD, [facility]  Interventional Cardiology    TRINITY Metzger, acting as scribe for Oswaldo Ahuja MD, [facility]    07/01/21  17:31 EDT

## 2021-07-02 DIAGNOSIS — I12.9 BENIGN HYPERTENSION WITH CKD (CHRONIC KIDNEY DISEASE) STAGE III (HCC): ICD-10-CM

## 2021-07-02 DIAGNOSIS — N18.30 BENIGN HYPERTENSION WITH CKD (CHRONIC KIDNEY DISEASE) STAGE III (HCC): ICD-10-CM

## 2021-07-02 RX ORDER — LOSARTAN POTASSIUM 100 MG/1
TABLET ORAL
Qty: 90 TABLET | Refills: 1 | Status: SHIPPED | OUTPATIENT
Start: 2021-07-02 | End: 2022-01-03

## 2021-07-02 RX ORDER — SPIRONOLACTONE 25 MG/1
TABLET ORAL
Qty: 180 TABLET | Refills: 1 | Status: SHIPPED | OUTPATIENT
Start: 2021-07-02 | End: 2022-01-03

## 2021-07-14 ENCOUNTER — OFFICE VISIT (OUTPATIENT)
Dept: CARDIAC SURGERY | Facility: CLINIC | Age: 66
End: 2021-07-14

## 2021-07-14 VITALS
WEIGHT: 274.6 LBS | BODY MASS INDEX: 37.19 KG/M2 | OXYGEN SATURATION: 98 % | HEIGHT: 72 IN | DIASTOLIC BLOOD PRESSURE: 88 MMHG | HEART RATE: 64 BPM | TEMPERATURE: 97.5 F | SYSTOLIC BLOOD PRESSURE: 138 MMHG

## 2021-07-14 DIAGNOSIS — Z95.2 H/O MECHANICAL AORTIC VALVE REPLACEMENT: ICD-10-CM

## 2021-07-14 DIAGNOSIS — I05.0 RHEUMATIC MITRAL STENOSIS: Primary | ICD-10-CM

## 2021-07-14 PROCEDURE — 99213 OFFICE O/P EST LOW 20 MIN: CPT | Performed by: NURSE PRACTITIONER

## 2021-07-14 NOTE — PROGRESS NOTES
[facility] Cardiothoracic Surgery Office Follow Up Note     Date of Encounter: 07/14/2021     MRN Number: [id number]  Name: Benitez Miranda  Phone Number: [phone]     Referred By: No ref. provider found  PCP: Mesha Christina MD    Chief Complaint:    Chief Complaint   Patient presents with   • Follow-up     6 month follow up with echo for mitral valve stenosis       Subjective      History of Present Illness:    Benitez Miranda is a 65 y.o. male former smoker with history of COPD, HTN, HLD on statin therapy, insulin-dependent DM (HA1C 7.96), CKD,CAD s/p stenting, CHF and VHD s/p AVR with mechanical valve by  in 1994.  Last seen in clinic 1/2021 by Dr. Philip with asymptomatic moderate to severe mitral stenosis and plans for repeat echo at 6 months.  He present today denying any symptoms of CP, palpitations, SOA, SELF, BLE edema, dizziness or presyncopal episodes. He is relatively active and has no difficulty completing his ADLs.     Review of Systems:  Review of Systems   Constitutional: Negative for chills, decreased appetite, diaphoresis, fever, malaise/fatigue, night sweats, weight gain and weight loss.   HENT: Negative for hoarse voice.    Eyes: Negative for blurred vision, double vision and visual disturbance.   Cardiovascular: Positive for dyspnea on exertion. Negative for chest pain, claudication, irregular heartbeat, leg swelling, near-syncope, orthopnea, palpitations, paroxysmal nocturnal dyspnea and syncope.   Respiratory: Positive for cough (due to allergies ). Negative for hemoptysis, shortness of breath, sputum production and wheezing.    Hematologic/Lymphatic: Negative for adenopathy and bleeding problem. Bruises/bleeds easily.   Skin: Negative for color change, nail changes, poor wound healing and rash.   Musculoskeletal: Positive for back pain and joint pain. Negative for falls and muscle cramps.   Gastrointestinal: Negative for abdominal pain, dysphagia and heartburn.    Genitourinary: Negative for flank pain.   Neurological: Negative for brief paralysis, disturbances in coordination, dizziness, focal weakness, headaches, light-headedness, loss of balance, numbness, paresthesias, sensory change, vertigo and weakness.   Psychiatric/Behavioral: Negative for depression and suicidal ideas.   Allergic/Immunologic: Positive for environmental allergies. Negative for persistent infections.       I have reviewed the following portions of the patient's history: allergies, current medications, past family history, past medical history, past social history, past surgical history and problem list and confirm it's accurate.    Allergies:  Allergies   Allergen Reactions   • Penicillins Swelling     Tongue swelled   • Ace Inhibitors Angioedema   • Contrast Dye Rash       Medications:      Current Outpatient Medications:   •  amLODIPine (NORVASC) 5 MG tablet, Take 1 tablet by mouth Daily., Disp: , Rfl:   •  Insulin Glargine (LANTUS SOLOSTAR) 100 UNIT/ML injection pen, Take 41 units in the AM and 43 units in the PM injected daily, Disp: 27 mL, Rfl: 5  •  insulin lispro (humaLOG) 100 UNIT/ML injection, Inject 15 Units under the skin into the appropriate area as directed 3 (Three) Times a Day With Meals., Disp: , Rfl:   •  losartan (COZAAR) 100 MG tablet, TAKE ONE TABLET BY MOUTH DAILY, Disp: 90 tablet, Rfl: 1  •  metoprolol succinate XL (TOPROL-XL) 50 MG 24 hr tablet, TAKE ONE TABLET BY MOUTH DAILY, Disp: 90 tablet, Rfl: 1  •  pravastatin (PRAVACHOL) 40 MG tablet, TAKE ONE TABLET BY MOUTH DAILY, Disp: 90 tablet, Rfl: 1  •  sertraline (ZOLOFT) 100 MG tablet, TAKE 1 AND 1/2 TABLET BY MOUTH DAILY, Disp: 135 tablet, Rfl: 0  •  spironolactone (ALDACTONE) 25 MG tablet, TAKE ONE TABLET BY MOUTH TWICE A DAY, Disp: 180 tablet, Rfl: 1  •  tamsulosin (Flomax) 0.4 MG capsule 24 hr capsule, Take 1 capsule by mouth Every Night., Disp: 30 capsule, Rfl: 11  •  warfarin (COUMADIN) 6 MG tablet, TAKE ONE TABLET BY  MOUTH EVERY EVENING., Disp: 90 tablet, Rfl: 1    History:   Past Medical History:   Diagnosis Date   • Anxiety    • Arthritis    • CAD (coronary artery disease)     x1 stent; pulmonary HTN; EF 50-55%; rheumatic valve; MV stenosis   • CHF (congestive heart failure) (CMS/[facility]ly [facility])    • CKD (chronic kidney disease), stage III (CMS/[facility]ly [facility])    • COPD (chronic obstructive pulmonary disease) (CMS/Formerly [facility])    • Depression    • Diabetes mellitus (CMS/[facility]ly [facility])    • H/O mechanical aortic valve replacement    • History of tobacco abuse    • Hyperlipidemia    • Hypertension    • Ischemic heart disease    • Kidney failure     third stage   • Low back pain    • Obesity     BMI 36.   • Valvular heart disease        Past Surgical History:   Procedure Laterality Date   • AORTIC VALVE REPAIR/REPLACEMENT     • CARDIAC CATHETERIZATION     • CARDIAC SURGERY      valve on aortic   • CARDIAC SURGERY      stent    • CATARACT EXTRACTION     • COLONOSCOPY     • CORONARY STENT PLACEMENT     • CYSTOSCOPY URETEROSCOPY LASER LITHOTRIPSY Left 2020    Procedure: CYSTOSCOPY URETEROSCOPY LASER LITHOTRIPSY WITH STENT PLACEMENT;  Surgeon: Jonah Montgomery MD;  [facility];  Service: Urology;  Laterality: Left;   • KIDNEY STONE SURGERY         Social History     Socioeconomic History   • Marital status:      Spouse name: Not on file   • Number of children: 2   • Years of education: Not on file   • Highest education level: Not on file   Tobacco Use   • Smoking status: Former Smoker     Packs/day: 1.00     Years: 30.00     Pack years: 30.00     Types: Pipe, Cigars     Quit date: 2016     Years since quittin.0   • Smokeless tobacco: Never Used   • Tobacco comment: currently smokes a pipe   Vaping Use   • Vaping Use: Never assessed   Substance and Sexual Activity   • Alcohol use: No   • Drug use: No   • Sexual activity: Defer        Family History   Problem Relation Age of Onset   • Cancer Mother         breast   •  "COPD Father    • Heart attack Father 74   • Diabetes Paternal Grandmother         both legs removed       Objective     Physical Exam:  Vitals:    07/14/21 0943   BP: 138/88   BP Location: Right arm   Patient Position: Sitting   Pulse: 64   Temp: 97.5 °F (36.4 °C)   SpO2: 98%   Weight: 125 kg (274 lb 9.6 oz)   Height: 182.9 cm (72\")      Body mass index is 37.24 kg/m².    Physical Exam  Vitals and nursing note reviewed.   Constitutional:       Appearance: Normal appearance. He is well-developed. He is obese.   HENT:      Head: Normocephalic and atraumatic.   Eyes:      Pupils: Pupils are equal, round, and reactive to light.   Neck:      Vascular: No carotid bruit.   Cardiovascular:      Rate and Rhythm: Normal rate and regular rhythm.      Pulses: Normal pulses.      Heart sounds: Normal heart sounds, S1 normal and S2 normal. No murmur heard.     Pulmonary:      Effort: Pulmonary effort is normal.      Breath sounds: Normal breath sounds.   Abdominal:      Palpations: Abdomen is soft.   Musculoskeletal:         General: No swelling.      Cervical back: Neck supple.      Right lower leg: No edema.      Left lower leg: No edema.   Skin:     General: Skin is warm and dry.      Capillary Refill: Capillary refill takes less than 2 seconds.      Findings: No bruising.   Neurological:      General: No focal deficit present.      Mental Status: He is alert and oriented to person, place, and time. Mental status is at baseline.      GCS: GCS eye subscore is 4. GCS verbal subscore is 5. GCS motor subscore is 6.      Motor: Motor function is intact.      Coordination: Coordination is intact.      Gait: Gait is intact.   Psychiatric:         Mood and Affect: Mood normal.         Speech: Speech normal.         Behavior: Behavior normal. Behavior is cooperative.         Cognition and Memory: Cognition normal.         Imaging/Labs:    Transthoracic echo 6/23/2021: Interpretation Summary    · Estimated left ventricular EF = 50% Left " ventricular ejection fraction appears to be 46 - 50%. Left ventricular systolic function is normal.  · Moderate aortic valve stenosis is present.  · Severe mitral valve stenosis is present. Peak gradient of 19 and mean 11, MV area by planimetry 1.53  · Estimated right ventricular systolic pressure from tricuspid regurgitation is mildly elevated (35-45 mmHg).  · There is a bioprosthetic aortic valve present.  · There is moderate to severe aortic stenosis of the bioprosthetic aortic valve with peak gradient of 26 and mean gradient of 13, LAUREL between 1.3 and 1.6  · No significant change since prev echo of 11/19/20     Transthoracic echo 11/19/20: Interpretation Summary    · Left ventricular ejection fraction appears to be 56 - 60%. Left ventricular systolic function is normal.  · Moderate to severe mitral valve stenosis is present with rheumatic changes of the mitral valve apparatus. Max gradient of 27 mmHg, Mean gradient of 10 mm Hg( 8 mmHg prior) and valve area of 1.2 cm^2  · There is a mechanical aortic valve prosthesis present with peak systolic velocity of 2.6 cm/sec ( similar to prior study)  · There is no evidence of pericardial effusion         Assessment / Plan      Assessment / Plan:  Diagnoses and all orders for this visit:    1. Rheumatic mitral stenosis (Primary)    2. H/O mechanical aortic valve replacement    Benitez Miranda is a 65 y.o. male former smoker with history of COPD, HTN, HLD on statin therapy, insulin-dependent DM (HA1C 7.96), CKD,CAD s/p stenting, CHF and VHD s/p AVR with mechanical valve by  in 1994.  Last seen in clinic 1/2021 by Dr. Philip with asymptomatic moderate to severe mitral stenosis and plans for repeat echo at 6 months.  He present today denying any symptoms of CP, palpitations, SOA, SELF, BLE edema, dizziness or presyncopal episodes. He is relatively active and has no difficulty completing his ADLs. His new echo shows progression of his mitral disease to severe stenosis.  However, since the patient remains asymptomatic we will continue with conservative measures and watchful waiting. Case discussed by Dr Philip to cardiologist Dr Ahuja via telephone who is in agreement. Patient has follow with Dr Ahuja in 3 months to discuss possible KIET. We will plan to see him back in 6 months or sooner as needed if patient were to develop symptoms.     Follow Up:   Return in about 6 months (around 1/14/2022).   Or sooner for any further concerns or worsening sign and symptoms. If unable to reach us in the office please dial 911 or go to the nearest emergency department.      Kendal Jaimes APRBEBA  [facility] Cardiothoracic Surgery    Time Spent: I spent 28 minutes caring for Benitez on this date of service. This time includes time spent by me in the following activities: preparing for the visit, reviewing tests, obtaining and/or reviewing a separately obtained history, performing a medically appropriate examination and/or evaluation, referring and communicating with other health care professionals, documenting information in the medical record, independently interpreting results and communicating that information with the patient/family/caregiver and care coordination.

## 2021-07-22 ENCOUNTER — OFFICE VISIT (OUTPATIENT)
Dept: FAMILY MEDICINE CLINIC | Facility: CLINIC | Age: 66
End: 2021-07-22

## 2021-07-22 VITALS
HEIGHT: 72 IN | TEMPERATURE: 96.9 F | SYSTOLIC BLOOD PRESSURE: 108 MMHG | OXYGEN SATURATION: 98 % | BODY MASS INDEX: 36.68 KG/M2 | WEIGHT: 270.8 LBS | DIASTOLIC BLOOD PRESSURE: 62 MMHG | HEART RATE: 64 BPM

## 2021-07-22 DIAGNOSIS — N28.1 CYST OF LEFT KIDNEY: Primary | ICD-10-CM

## 2021-07-22 DIAGNOSIS — IMO0002 UNCONTROLLED TYPE 2 DIABETES WITH NEUROPATHY: ICD-10-CM

## 2021-07-22 DIAGNOSIS — Z79.01 ANTICOAGULANT LONG-TERM USE: ICD-10-CM

## 2021-07-22 DIAGNOSIS — N18.30 BENIGN HYPERTENSION WITH CKD (CHRONIC KIDNEY DISEASE) STAGE III (HCC): ICD-10-CM

## 2021-07-22 DIAGNOSIS — I12.9 BENIGN HYPERTENSION WITH CKD (CHRONIC KIDNEY DISEASE) STAGE III (HCC): ICD-10-CM

## 2021-07-22 LAB — INR PPP: 2 (ref 0.9–1.1)

## 2021-07-22 PROCEDURE — 80053 COMPREHEN METABOLIC PANEL: CPT | Performed by: FAMILY MEDICINE

## 2021-07-22 PROCEDURE — 36416 COLLJ CAPILLARY BLOOD SPEC: CPT | Performed by: FAMILY MEDICINE

## 2021-07-22 PROCEDURE — 85610 PROTHROMBIN TIME: CPT | Performed by: FAMILY MEDICINE

## 2021-07-22 PROCEDURE — 36415 COLL VENOUS BLD VENIPUNCTURE: CPT | Performed by: FAMILY MEDICINE

## 2021-07-22 PROCEDURE — 83036 HEMOGLOBIN GLYCOSYLATED A1C: CPT | Performed by: FAMILY MEDICINE

## 2021-07-22 PROCEDURE — 82043 UR ALBUMIN QUANTITATIVE: CPT | Performed by: FAMILY MEDICINE

## 2021-07-22 PROCEDURE — 99214 OFFICE O/P EST MOD 30 MIN: CPT | Performed by: FAMILY MEDICINE

## 2021-07-22 RX ORDER — WARFARIN SODIUM 6 MG/1
TABLET ORAL
Qty: 90 TABLET | Refills: 1 | Status: SHIPPED | OUTPATIENT
Start: 2021-07-22 | End: 2021-10-22 | Stop reason: SDUPTHER

## 2021-07-22 RX ORDER — AMLODIPINE BESYLATE 5 MG/1
5 TABLET ORAL DAILY
Qty: 30 TABLET | Refills: 0 | Status: SHIPPED | OUTPATIENT
Start: 2021-07-22 | End: 2021-08-21

## 2021-07-23 LAB
ALBUMIN SERPL-MCNC: 4.3 G/DL (ref 3.5–5.2)
ALBUMIN UR-MCNC: 3.3 MG/DL
ALBUMIN/GLOB SERPL: 1.2 G/DL
ALP SERPL-CCNC: 55 U/L (ref 39–117)
ALT SERPL W P-5'-P-CCNC: 32 U/L (ref 1–41)
ANION GAP SERPL CALCULATED.3IONS-SCNC: 9.7 MMOL/L (ref 5–15)
AST SERPL-CCNC: 21 U/L (ref 1–40)
BILIRUB SERPL-MCNC: 0.4 MG/DL (ref 0–1.2)
BUN SERPL-MCNC: 30 MG/DL (ref 8–23)
BUN/CREAT SERPL: 16 (ref 7–25)
CALCIUM SPEC-SCNC: 9.2 MG/DL (ref 8.6–10.5)
CHLORIDE SERPL-SCNC: 105 MMOL/L (ref 98–107)
CO2 SERPL-SCNC: 23.3 MMOL/L (ref 22–29)
CREAT SERPL-MCNC: 1.87 MG/DL (ref 0.76–1.27)
GFR SERPL CREATININE-BSD FRML MDRD: 36 ML/MIN/1.73
GLOBULIN UR ELPH-MCNC: 3.6 GM/DL
GLUCOSE SERPL-MCNC: 137 MG/DL (ref 65–99)
HBA1C MFR BLD: 8.3 % (ref 4.8–5.6)
POTASSIUM SERPL-SCNC: 4.8 MMOL/L (ref 3.5–5.2)
PROT SERPL-MCNC: 7.9 G/DL (ref 6–8.5)
SODIUM SERPL-SCNC: 138 MMOL/L (ref 136–145)

## 2021-07-31 DIAGNOSIS — N18.30 BENIGN HYPERTENSION WITH CKD (CHRONIC KIDNEY DISEASE) STAGE III (HCC): ICD-10-CM

## 2021-07-31 DIAGNOSIS — I12.9 BENIGN HYPERTENSION WITH CKD (CHRONIC KIDNEY DISEASE) STAGE III (HCC): ICD-10-CM

## 2021-08-02 RX ORDER — METOPROLOL SUCCINATE 50 MG/1
TABLET, EXTENDED RELEASE ORAL
Qty: 90 TABLET | Refills: 0 | Status: SHIPPED | OUTPATIENT
Start: 2021-08-02 | End: 2021-11-01 | Stop reason: SDUPTHER

## 2021-08-11 ENCOUNTER — HOSPITAL ENCOUNTER (OUTPATIENT)
Dept: CT IMAGING | Facility: HOSPITAL | Age: 66
Discharge: HOME | End: 2021-08-11
Admitting: FAMILY MEDICINE

## 2021-08-11 DIAGNOSIS — N28.1 CYST OF LEFT KIDNEY: ICD-10-CM

## 2021-08-11 PROCEDURE — 74176 CT ABD & PELVIS W/O CONTRAST: CPT | Performed by: RADIOLOGY

## 2021-08-11 PROCEDURE — 74176 CT ABD & PELVIS W/O CONTRAST: CPT

## 2021-08-20 ENCOUNTER — TELEPHONE (OUTPATIENT)
Dept: FAMILY MEDICINE CLINIC | Facility: CLINIC | Age: 66
End: 2021-08-20

## 2021-08-20 NOTE — TELEPHONE ENCOUNTER
----- Message from Mesha Christina MD sent at 8/20/2021  1:47 AM EDT -----  Please call Benitez. He has no more kidney stones, but he continues to have that mass in his left kidney. It has not changed in size. We will continue to monitor.         Left a message to return call.

## 2021-08-27 ENCOUNTER — TELEPHONE (OUTPATIENT)
Dept: FAMILY MEDICINE CLINIC | Facility: CLINIC | Age: 66
End: 2021-08-27

## 2021-08-27 DIAGNOSIS — I12.9 BENIGN HYPERTENSION WITH CKD (CHRONIC KIDNEY DISEASE) STAGE III (HCC): Primary | ICD-10-CM

## 2021-08-27 DIAGNOSIS — N18.30 BENIGN HYPERTENSION WITH CKD (CHRONIC KIDNEY DISEASE) STAGE III (HCC): Primary | ICD-10-CM

## 2021-08-27 RX ORDER — AMLODIPINE BESYLATE 5 MG/1
5 TABLET ORAL DAILY
Qty: 90 TABLET | Refills: 1 | Status: SHIPPED | OUTPATIENT
Start: 2021-08-27 | End: 2022-02-22

## 2021-08-27 NOTE — TELEPHONE ENCOUNTER
"Filled. Please let him know.    Note: We never received a refill request that I can see. Also, he is on it, but with the last Epic update, when things get renewed, it pulls in with a \"for whatever amount of days\" and if we don't delete it, it goes through like that and then at the end of that course, Epic discontinues it automatically.   "

## 2021-08-27 NOTE — TELEPHONE ENCOUNTER
Caller: Beintez Miranda    Relationship: Self    Best call back number: [phone]     What medication are you requesting: amLODIPine (NORVASC) 5 MG tablet    Have you had these symptoms before:    [x] Yes  [] No    Have you been treated for these symptoms before:   [x] Yes  [] No    If a prescription is needed, what is your preferred pharmacy and phone number:    [facility] - [place], [place] - [address] [address] [facility] [address] [facility] [phone] [facility] [phone] FX      Additional notes:  PATIENT IS COMPLETELY OUT. PATIENT REPORTS THAT THE PHARMACY HAD SUBMITTED A REFILL REQUEST BUT WAS TOLD PCP NEVER RESPONDED

## 2021-08-27 NOTE — TELEPHONE ENCOUNTER
"Filled. Please let him know.    Note: We never received a refill request that I can see. Also, he is on it, but with the last Epic update, when things get renewed, it pulls in with a \"for whatever amount of days\" and if we don't delete it, it goes through like that and then at the end of that course, Epic discontinues it automatically.     Patient notified.  "

## 2021-10-05 ENCOUNTER — OFFICE VISIT (OUTPATIENT)
Dept: CARDIOLOGY | Facility: CLINIC | Age: 66
End: 2021-10-05

## 2021-10-05 ENCOUNTER — IMMUNIZATION (OUTPATIENT)
Dept: VACCINE CLINIC | Facility: HOSPITAL | Age: 66
End: 2021-10-05

## 2021-10-05 VITALS
WEIGHT: 273 LBS | HEART RATE: 54 BPM | BODY MASS INDEX: 36.98 KG/M2 | DIASTOLIC BLOOD PRESSURE: 66 MMHG | SYSTOLIC BLOOD PRESSURE: 106 MMHG | OXYGEN SATURATION: 97 % | HEIGHT: 72 IN

## 2021-10-05 DIAGNOSIS — I10 ESSENTIAL HYPERTENSION: ICD-10-CM

## 2021-10-05 DIAGNOSIS — I25.10 CORONARY ARTERY DISEASE INVOLVING NATIVE CORONARY ARTERY OF NATIVE HEART WITHOUT ANGINA PECTORIS: Primary | ICD-10-CM

## 2021-10-05 DIAGNOSIS — Z95.2 H/O AORTIC VALVE REPLACEMENT: ICD-10-CM

## 2021-10-05 DIAGNOSIS — E78.5 HYPERLIPIDEMIA LDL GOAL <70: ICD-10-CM

## 2021-10-05 DIAGNOSIS — I05.0 RHEUMATIC MITRAL STENOSIS: ICD-10-CM

## 2021-10-05 PROCEDURE — 99214 OFFICE O/P EST MOD 30 MIN: CPT | Performed by: INTERNAL MEDICINE

## 2021-10-05 PROCEDURE — 0003A: CPT | Performed by: INTERNAL MEDICINE

## 2021-10-05 PROCEDURE — 91300 HC SARSCOV02 VAC 30MCG/0.3ML IM: CPT | Performed by: INTERNAL MEDICINE

## 2021-10-05 PROCEDURE — 0004A ADM SARSCOV2 30MCG/0.3ML BOOSTER: CPT | Performed by: INTERNAL MEDICINE

## 2021-10-05 NOTE — PROGRESS NOTES
"Chief Complaint  Follow-up (c/o tiredness )    Subjective        Benitez Miranda presents to [facility] CARDIOLOGY for regular follow up  History of Present Illness   He denies any complaints today.  States that he has been doing well without any limitations. He has seen Dr. Philip and holding off for now with MVR.  Will keep monitoring with an echocardiogram.   The chart, PMH, FMH, social history, PSH, and medications were reviewed today. Problems above addressed this visit.    Objective     Vital Signs:   /66   Pulse 54   Ht 182.9 cm (72\")   Wt 124 kg (273 lb)   SpO2 97%   BMI 37.03 kg/m²       Physical Exam  Constitutional:       Appearance: Normal appearance.   HENT:      Head: Normocephalic.      Nose: Nose normal.      Mouth/Throat:      Pharynx: Oropharynx is clear.   Eyes:      Pupils: Pupils are equal, round, and reactive to light.   Neck:      Vascular: No carotid bruit.   Cardiovascular:      Rate and Rhythm: Normal rate and regular rhythm.      Pulses: Normal pulses.      Heart sounds: Normal heart sounds.   Pulmonary:      Effort: Pulmonary effort is normal. No respiratory distress.      Breath sounds: Normal breath sounds.   Abdominal:      General: Bowel sounds are normal.      Palpations: Abdomen is soft.   Musculoskeletal:      Cervical back: Neck supple.      Right lower leg: No edema.      Left lower leg: No edema.   Skin:     General: Skin is warm and dry.   Neurological:      General: No focal deficit present.      Mental Status: He is alert and oriented to person, place, and time.   Psychiatric:         Mood and Affect: Mood normal.         Behavior: Behavior normal.         Thought Content: Thought content normal.         Judgment: Judgment normal.          Result Review :  Data reviewed: Cardiology studies 10-5-2021       Assessment/Plan  Remote AV stenosis mild to moderate with status post AVR   Essential hypertension is well controlled.   Dyslipidemia  Chronic " diastolic CHF  Stage 3 CKD    He appears to be stable from a cardiac standpoint  Repeat an echocardiogram in 6 months  Follow up in 3 months    Problem List Items Addressed This Visit        Cardiac and Vasculature    Essential hypertension    Relevant Orders    Adult Transthoracic Echo Complete W/ Cont if Necessary Per Protocol    Coronary artery disease involving native coronary artery of native heart without angina pectoris - Primary    Overview     · Cardiac catheterization by Dr. Carrasco (2004): NADINE to unknown coronary artery  · Nuclear stress test (02/26/2015): Small to moderate region of inferior scar; no reversible ischemia detected; LVEF 52%.  · Echocardiogram (02/13/2015): LVEF 50% to 55%. Mild LVH. Mechanical prosthetic aortic valve functioning well; mild to moderate MR.   · Cardiac catheterization by Dr. Eduardo Torres (06/08/2015): mild nonobstructive CAD with widely patent stent; normal functioning of mechanical aortic valve; pulmonary hypertension (PA 50/35 mmHg)  · Admission to [facility] with chronic obstructive pulmonary disease exacerbation/diastolic heart failure, December 2015         Relevant Orders    Adult Transthoracic Echo Complete W/ Cont if Necessary Per Protocol    Hyperlipidemia LDL goal <70    Relevant Orders    Adult Transthoracic Echo Complete W/ Cont if Necessary Per Protocol    Rheumatic mitral stenosis    Overview     · History of rheumatic fever.   · Echocardiogram (02/2016): Moderate to severe mitral stenosis. Rheumatic valve  · Echocardiogram (01/13/2017): Mean gradient of mitral valve 8 mmHg.  Normal functioning mechanical aortic valve           Relevant Orders    Adult Transthoracic Echo Complete W/ Cont if Necessary Per Protocol      Other Visit Diagnoses     H/O aortic valve replacement        Relevant Orders    Adult Transthoracic Echo Complete W/ Cont if Necessary Per Protocol            Follow Up     Return in about 3 months (around  1/5/2022).  Patient was given instructions and counseling regarding his condition or for health maintenance advice. Please see specific information pulled into the AVS if appropriate.               Oswaldo Ahuja MD, Located within [facility]  Interventional Cardiology    TRINITY Metzger, acting as scribe for Oswaldo Ahuja MD, Located within [facility]    10/05/21  14:35 EDT

## 2021-10-11 RX ORDER — SERTRALINE HYDROCHLORIDE 100 MG/1
150 TABLET, FILM COATED ORAL DAILY
Qty: 135 TABLET | Refills: 0 | Status: SHIPPED | OUTPATIENT
Start: 2021-10-11 | End: 2021-10-22 | Stop reason: SDUPTHER

## 2021-10-11 NOTE — TELEPHONE ENCOUNTER
Caller: Benitez Miranda    Relationship: Self      Medication requested (name and dosage): sertraline (ZOLOFT) 100 MG tablet    Pharmacy where request should be sent: [facility] [address], [place] - [address] [facility] [address] [facility] [address] [facility] [phone] [facility] [phone] FX     Additional details provided by patient: PATIENT HAS BEEN OUT OF MEDICATION FOR 10 DAYS- PATIENT STATED THE PHARMACY HAS SENT SEVERAL REFILL REQUESTS WITH NO RESPONSE     Best call back number: [phone] (H)    Does the patient have less than a 3 day supply:  [x] Yes  [] No    Francesco Treadwell Rep   10/11/21 13:47 EDT

## 2021-10-13 NOTE — TELEPHONE ENCOUNTER
PATIENT CALLED TO CHECK THE STATUS OF THE RECENT MEDICATION REQUEST FOR THE FOLLOWING MEDICATION:     sertraline (ZOLOFT) 100 MG tablet  150 mg, Daily 0 ordered         Summary: Take 1.5 tablets by mouth Daily., Starting Mon 10/11/2021, Normal     Dose, Route, Frequency: 150 mg, Oral, Daily          PATIENT STATES THAT HE HAS BEEN WITH OUT THIS MEDICATION FOR OVER 2 WEEKS AND HE REALLY NEEDS IT TO AIDE HIM WITH SLEEPING.     PATIENT CONTACT: [phone] (H)    PATIENT HAS BEEN ADVISED THAT THIS REQUEST HAS BEEN MARKED AS A HIGH PRIORITY, PLEASE ALLOW 48 HOURS FOR OUR CLINICAL TEAM TO FOLLOW UP ON THIS REQUEST.       Patient notified.

## 2021-10-13 NOTE — TELEPHONE ENCOUNTER
PATIENT CALLED TO CHECK THE STATUS OF THE RECENT MEDICATION REQUEST FOR THE FOLLOWING MEDICATION:     sertraline (ZOLOFT) 100 MG tablet  150 mg, Daily 0 ordered         Summary: Take 1.5 tablets by mouth Daily., Starting Mon 10/11/2021, Normal     Dose, Route, Frequency: 150 mg, Oral, Daily          PATIENT STATES THAT HE HAS BEEN WITH OUT THIS MEDICATION FOR OVER 2 WEEKS AND HE REALLY NEEDS IT TO AIDE HIM WITH SLEEPING.     PATIENT CONTACT: [phone] (H)    PATIENT HAS BEEN ADVISED THAT THIS REQUEST HAS BEEN MARKED AS A HIGH PRIORITY, PLEASE ALLOW 48 HOURS FOR OUR CLINICAL TEAM TO FOLLOW UP ON THIS REQUEST.

## 2021-10-22 ENCOUNTER — OFFICE VISIT (OUTPATIENT)
Dept: FAMILY MEDICINE CLINIC | Facility: CLINIC | Age: 66
End: 2021-10-22

## 2021-10-22 VITALS
HEIGHT: 72 IN | SYSTOLIC BLOOD PRESSURE: 116 MMHG | BODY MASS INDEX: 36.98 KG/M2 | OXYGEN SATURATION: 98 % | WEIGHT: 273 LBS | HEART RATE: 60 BPM | TEMPERATURE: 97.2 F | DIASTOLIC BLOOD PRESSURE: 62 MMHG

## 2021-10-22 DIAGNOSIS — Z23 IMMUNIZATION DUE: ICD-10-CM

## 2021-10-22 DIAGNOSIS — IMO0002 UNCONTROLLED TYPE 2 DIABETES WITH NEUROPATHY: ICD-10-CM

## 2021-10-22 DIAGNOSIS — F33.42 RECURRENT MAJOR DEPRESSIVE DISORDER, IN FULL REMISSION (HCC): ICD-10-CM

## 2021-10-22 DIAGNOSIS — I05.0 RHEUMATIC MITRAL STENOSIS: Primary | ICD-10-CM

## 2021-10-22 DIAGNOSIS — I50.32 CHRONIC DIASTOLIC CONGESTIVE HEART FAILURE (HCC): ICD-10-CM

## 2021-10-22 DIAGNOSIS — Z79.01 ANTICOAGULANT LONG-TERM USE: ICD-10-CM

## 2021-10-22 DIAGNOSIS — N18.30 STAGE 3 CHRONIC KIDNEY DISEASE, UNSPECIFIED WHETHER STAGE 3A OR 3B CKD (HCC): ICD-10-CM

## 2021-10-22 LAB — INR PPP: 2.6 (ref 0.9–1.1)

## 2021-10-22 PROCEDURE — G0008 ADMIN INFLUENZA VIRUS VAC: HCPCS | Performed by: FAMILY MEDICINE

## 2021-10-22 PROCEDURE — 99214 OFFICE O/P EST MOD 30 MIN: CPT | Performed by: FAMILY MEDICINE

## 2021-10-22 PROCEDURE — 83036 HEMOGLOBIN GLYCOSYLATED A1C: CPT | Performed by: FAMILY MEDICINE

## 2021-10-22 PROCEDURE — 90662 IIV NO PRSV INCREASED AG IM: CPT | Performed by: FAMILY MEDICINE

## 2021-10-22 PROCEDURE — 36415 COLL VENOUS BLD VENIPUNCTURE: CPT | Performed by: FAMILY MEDICINE

## 2021-10-22 PROCEDURE — 80053 COMPREHEN METABOLIC PANEL: CPT | Performed by: FAMILY MEDICINE

## 2021-10-22 PROCEDURE — 36416 COLLJ CAPILLARY BLOOD SPEC: CPT | Performed by: FAMILY MEDICINE

## 2021-10-22 PROCEDURE — 85610 PROTHROMBIN TIME: CPT | Performed by: FAMILY MEDICINE

## 2021-10-22 RX ORDER — WARFARIN SODIUM 6 MG/1
TABLET ORAL
Qty: 90 TABLET | Refills: 1 | Status: SHIPPED | OUTPATIENT
Start: 2021-10-22 | End: 2022-09-12

## 2021-10-22 RX ORDER — SERTRALINE HYDROCHLORIDE 100 MG/1
150 TABLET, FILM COATED ORAL DAILY
Qty: 135 TABLET | Refills: 1 | Status: SHIPPED | OUTPATIENT
Start: 2021-10-22 | End: 2022-04-14 | Stop reason: SDUPTHER

## 2021-10-23 LAB
ALBUMIN SERPL-MCNC: 4.3 G/DL (ref 3.5–5.2)
ALBUMIN/GLOB SERPL: 1.2 G/DL
ALP SERPL-CCNC: 58 U/L (ref 39–117)
ALT SERPL W P-5'-P-CCNC: 24 U/L (ref 1–41)
ANION GAP SERPL CALCULATED.3IONS-SCNC: 11.6 MMOL/L (ref 5–15)
AST SERPL-CCNC: 18 U/L (ref 1–40)
BILIRUB SERPL-MCNC: 0.4 MG/DL (ref 0–1.2)
BUN SERPL-MCNC: 31 MG/DL (ref 8–23)
BUN/CREAT SERPL: 19.9 (ref 7–25)
CALCIUM SPEC-SCNC: 9.1 MG/DL (ref 8.6–10.5)
CHLORIDE SERPL-SCNC: 104 MMOL/L (ref 98–107)
CO2 SERPL-SCNC: 20.4 MMOL/L (ref 22–29)
CREAT SERPL-MCNC: 1.56 MG/DL (ref 0.76–1.27)
GFR SERPL CREATININE-BSD FRML MDRD: 44 ML/MIN/1.73
GLOBULIN UR ELPH-MCNC: 3.5 GM/DL
GLUCOSE SERPL-MCNC: 186 MG/DL (ref 65–99)
HBA1C MFR BLD: 9.44 % (ref 4.8–5.6)
POTASSIUM SERPL-SCNC: 5.5 MMOL/L (ref 3.5–5.2)
PROT SERPL-MCNC: 7.8 G/DL (ref 6–8.5)
SODIUM SERPL-SCNC: 136 MMOL/L (ref 136–145)

## 2021-11-01 ENCOUNTER — HOSPITAL ENCOUNTER (OUTPATIENT)
Dept: CARDIOLOGY | Facility: HOSPITAL | Age: 66
Discharge: HOME | End: 2021-11-01
Admitting: INTERNAL MEDICINE

## 2021-11-01 DIAGNOSIS — N18.30 BENIGN HYPERTENSION WITH CKD (CHRONIC KIDNEY DISEASE) STAGE III (HCC): ICD-10-CM

## 2021-11-01 DIAGNOSIS — E78.5 HYPERLIPIDEMIA LDL GOAL <70: ICD-10-CM

## 2021-11-01 DIAGNOSIS — I10 ESSENTIAL HYPERTENSION: ICD-10-CM

## 2021-11-01 DIAGNOSIS — I25.10 CORONARY ARTERY DISEASE INVOLVING NATIVE CORONARY ARTERY OF NATIVE HEART WITHOUT ANGINA PECTORIS: ICD-10-CM

## 2021-11-01 DIAGNOSIS — Z95.2 H/O AORTIC VALVE REPLACEMENT: ICD-10-CM

## 2021-11-01 DIAGNOSIS — I12.9 BENIGN HYPERTENSION WITH CKD (CHRONIC KIDNEY DISEASE) STAGE III (HCC): ICD-10-CM

## 2021-11-01 DIAGNOSIS — I05.0 RHEUMATIC MITRAL STENOSIS: ICD-10-CM

## 2021-11-01 PROCEDURE — 93306 TTE W/DOPPLER COMPLETE: CPT | Performed by: INTERNAL MEDICINE

## 2021-11-01 PROCEDURE — 93306 TTE W/DOPPLER COMPLETE: CPT

## 2021-11-01 RX ORDER — METOPROLOL SUCCINATE 50 MG/1
50 TABLET, EXTENDED RELEASE ORAL DAILY
Qty: 90 TABLET | Refills: 1 | Status: SHIPPED | OUTPATIENT
Start: 2021-11-01 | End: 2022-05-05

## 2021-11-07 LAB
BH CV ECHO MEAS - % IVS THICK: 34.5 %
BH CV ECHO MEAS - % LVPW THICK: 28.2 %
BH CV ECHO MEAS - ACS: 2.6 CM
BH CV ECHO MEAS - AO MAX PG (FULL): 30.8 MMHG
BH CV ECHO MEAS - AO MAX PG: 33.1 MMHG
BH CV ECHO MEAS - AO MEAN PG (FULL): 16 MMHG
BH CV ECHO MEAS - AO MEAN PG: 17 MMHG
BH CV ECHO MEAS - AO ROOT AREA (BSA CORRECTED): 1.5
BH CV ECHO MEAS - AO ROOT AREA: 10.2 CM^2
BH CV ECHO MEAS - AO ROOT DIAM: 3.6 CM
BH CV ECHO MEAS - AO V2 MAX: 287 CM/SEC
BH CV ECHO MEAS - AO V2 MEAN: 191 CM/SEC
BH CV ECHO MEAS - AO V2 VTI: 68.3 CM
BH CV ECHO MEAS - AVA(I,A): 1 CM^2
BH CV ECHO MEAS - AVA(I,D): 1 CM^2
BH CV ECHO MEAS - AVA(V,A): 0.91 CM^2
BH CV ECHO MEAS - AVA(V,D): 0.91 CM^2
BH CV ECHO MEAS - BSA(HAYCOCK): 2.6 M^2
BH CV ECHO MEAS - BSA: 2.4 M^2
BH CV ECHO MEAS - BZI_BMI: 37 KILOGRAMS/M^2
BH CV ECHO MEAS - BZI_METRIC_HEIGHT: 182.9 CM
BH CV ECHO MEAS - BZI_METRIC_WEIGHT: 123.8 KG
BH CV ECHO MEAS - EDV(CUBED): 260.9 ML
BH CV ECHO MEAS - EDV(MOD-SP4): 140 ML
BH CV ECHO MEAS - EDV(TEICH): 207.8 ML
BH CV ECHO MEAS - EF(CUBED): 45.2 %
BH CV ECHO MEAS - EF(MOD-SP4): 45.7 %
BH CV ECHO MEAS - EF(TEICH): 36.8 %
BH CV ECHO MEAS - ESV(CUBED): 143.1 ML
BH CV ECHO MEAS - ESV(MOD-SP4): 76 ML
BH CV ECHO MEAS - ESV(TEICH): 131.2 ML
BH CV ECHO MEAS - FS: 18.2 %
BH CV ECHO MEAS - IVS/LVPW: 1.1
BH CV ECHO MEAS - IVSD: 1.4 CM
BH CV ECHO MEAS - IVSS: 1.9 CM
BH CV ECHO MEAS - LA DIMENSION: 4.5 CM
BH CV ECHO MEAS - LA/AO: 0.78
BH CV ECHO MEAS - LV DIASTOLIC VOL/BSA (35-75): 57.6 ML/M^2
BH CV ECHO MEAS - LV MASS(C)D: 417.8 GRAMS
BH CV ECHO MEAS - LV MASS(C)DI: 171.8 GRAMS/M^2
BH CV ECHO MEAS - LV MASS(C)S: 456.3 GRAMS
BH CV ECHO MEAS - LV MASS(C)SI: 187.7 GRAMS/M^2
BH CV ECHO MEAS - LV MAX PG: 2.3 MMHG
BH CV ECHO MEAS - LV MEAN PG: 1 MMHG
BH CV ECHO MEAS - LV SYSTOLIC VOL/BSA (12-30): 31.3 ML/M^2
BH CV ECHO MEAS - LV V1 MAX: 75.1 CM/SEC
BH CV ECHO MEAS - LV V1 MEAN: 53.3 CM/SEC
BH CV ECHO MEAS - LV V1 VTI: 20.3 CM
BH CV ECHO MEAS - LVIDD: 6.4 CM
BH CV ECHO MEAS - LVIDS: 5.2 CM
BH CV ECHO MEAS - LVLD AP4: 8.5 CM
BH CV ECHO MEAS - LVLS AP4: 7.5 CM
BH CV ECHO MEAS - LVOT AREA (M): 3.5 CM^2
BH CV ECHO MEAS - LVOT AREA: 3.5 CM^2
BH CV ECHO MEAS - LVOT DIAM: 2.1 CM
BH CV ECHO MEAS - LVPWD: 1.3 CM
BH CV ECHO MEAS - LVPWS: 1.7 CM
BH CV ECHO MEAS - MV A MAX VEL: 159 CM/SEC
BH CV ECHO MEAS - MV DEC SLOPE: 332 CM/SEC^2
BH CV ECHO MEAS - MV E MAX VEL: 225 CM/SEC
BH CV ECHO MEAS - MV E/A: 1.4
BH CV ECHO MEAS - MV P1/2T MAX VEL: 247 CM/SEC
BH CV ECHO MEAS - MV P1/2T: 217.9 MSEC
BH CV ECHO MEAS - MVA P1/2T LCG: 0.89 CM^2
BH CV ECHO MEAS - MVA(P1/2T): 1 CM^2
BH CV ECHO MEAS - MVA(TRACED): 2.4 CM^2
BH CV ECHO MEAS - PA ACC TIME: 0.12 SEC
BH CV ECHO MEAS - PA PR(ACCEL): 23.7 MMHG
BH CV ECHO MEAS - RAP SYSTOLE: 10 MMHG
BH CV ECHO MEAS - RVSP: 42.1 MMHG
BH CV ECHO MEAS - SI(AO): 285.7 ML/M^2
BH CV ECHO MEAS - SI(CUBED): 48.5 ML/M^2
BH CV ECHO MEAS - SI(LVOT): 28.9 ML/M^2
BH CV ECHO MEAS - SI(MOD-SP4): 26.3 ML/M^2
BH CV ECHO MEAS - SI(TEICH): 31.5 ML/M^2
BH CV ECHO MEAS - SV(AO): 694.7 ML
BH CV ECHO MEAS - SV(CUBED): 117.9 ML
BH CV ECHO MEAS - SV(LVOT): 70.3 ML
BH CV ECHO MEAS - SV(MOD-SP4): 64 ML
BH CV ECHO MEAS - SV(TEICH): 76.5 ML
BH CV ECHO MEAS - TR MAX VEL: 283 CM/SEC
MAXIMAL PREDICTED HEART RATE: 152 BPM
STRESS TARGET HR: 129 BPM

## 2021-12-09 RX ORDER — PRAVASTATIN SODIUM 40 MG
TABLET ORAL
Qty: 90 TABLET | Refills: 1 | Status: SHIPPED | OUTPATIENT
Start: 2021-12-09 | End: 2022-06-09

## 2022-01-01 DIAGNOSIS — N18.30 BENIGN HYPERTENSION WITH CKD (CHRONIC KIDNEY DISEASE) STAGE III: ICD-10-CM

## 2022-01-01 DIAGNOSIS — I12.9 BENIGN HYPERTENSION WITH CKD (CHRONIC KIDNEY DISEASE) STAGE III: ICD-10-CM

## 2022-01-03 RX ORDER — LOSARTAN POTASSIUM 100 MG/1
TABLET ORAL
Qty: 90 TABLET | Refills: 1 | Status: SHIPPED | OUTPATIENT
Start: 2022-01-03 | End: 2022-07-05

## 2022-01-03 RX ORDER — SPIRONOLACTONE 25 MG/1
TABLET ORAL
Qty: 180 TABLET | Refills: 1 | Status: SHIPPED | OUTPATIENT
Start: 2022-01-03 | End: 2022-07-05

## 2022-01-11 ENCOUNTER — OFFICE VISIT (OUTPATIENT)
Dept: CARDIOLOGY | Facility: CLINIC | Age: 67
End: 2022-01-11

## 2022-01-11 VITALS
OXYGEN SATURATION: 98 % | WEIGHT: 273 LBS | BODY MASS INDEX: 36.98 KG/M2 | HEIGHT: 72 IN | SYSTOLIC BLOOD PRESSURE: 108 MMHG | DIASTOLIC BLOOD PRESSURE: 70 MMHG | HEART RATE: 64 BPM

## 2022-01-11 DIAGNOSIS — I05.0 RHEUMATIC MITRAL STENOSIS: ICD-10-CM

## 2022-01-11 DIAGNOSIS — I25.10 CORONARY ARTERY DISEASE INVOLVING NATIVE CORONARY ARTERY OF NATIVE HEART WITHOUT ANGINA PECTORIS: ICD-10-CM

## 2022-01-11 DIAGNOSIS — Z95.2 H/O MECHANICAL AORTIC VALVE REPLACEMENT: ICD-10-CM

## 2022-01-11 DIAGNOSIS — I10 ESSENTIAL HYPERTENSION: ICD-10-CM

## 2022-01-11 DIAGNOSIS — I50.32 CHRONIC DIASTOLIC CONGESTIVE HEART FAILURE: Primary | ICD-10-CM

## 2022-01-11 DIAGNOSIS — IMO0002 UNCONTROLLED TYPE 2 DIABETES WITH NEUROPATHY: ICD-10-CM

## 2022-01-11 DIAGNOSIS — E78.5 HYPERLIPIDEMIA LDL GOAL <70: ICD-10-CM

## 2022-01-11 PROCEDURE — 99213 OFFICE O/P EST LOW 20 MIN: CPT | Performed by: INTERNAL MEDICINE

## 2022-01-11 NOTE — PROGRESS NOTES
[facility] CARDIOLOGY  [address] [facility] [place] [address]  Phone: [phone]  Fax: [phone]    01/11/2022    Chief Complaint   Patient presents with   • Follow-up        History:   Benitez Miranda is a 66 y.o. male seen in followup, he is doing fine with no new symptoms.  I did not talk to him about his mitral valve stenosis and explained to him that if he ever develops shortness of breath or lower extremity edema palpitations to let us know.  He says been doing pretty good with no symptoms and no change in his functional status either.  We will just continue to monitor with serial echocardiograms for now.  His blood pressure and heart rate is well controlled.      Past Medical History:   Diagnosis Date   • Anxiety    • Arthritis    • CAD (coronary artery disease)     x1 stent; pulmonary HTN; EF 50-55%; rheumatic valve; MV stenosis   • CHF (congestive heart failure) ([facility])    • CKD (chronic kidney disease), stage III ([facility])    • COPD (chronic obstructive pulmonary disease) ([facility])    • Depression    • Diabetes mellitus ([facility])    • H/O mechanical aortic valve replacement    • History of tobacco abuse    • Hyperlipidemia    • Hypertension    • Ischemic heart disease    • Kidney failure     third stage   • Low back pain    • Obesity     BMI 36.   • Valvular heart disease        Past Surgical History:   Procedure Laterality Date   • AORTIC VALVE REPAIR/REPLACEMENT  1991   • CARDIAC CATHETERIZATION     • CARDIAC SURGERY  1991    valve on aortic   • CARDIAC SURGERY  2001    stent    • CATARACT EXTRACTION     • COLONOSCOPY  2001   • CORONARY STENT PLACEMENT  2005   • CYSTOSCOPY URETEROSCOPY LASER LITHOTRIPSY Left 11/20/2020    Procedure: CYSTOSCOPY URETEROSCOPY LASER LITHOTRIPSY WITH STENT PLACEMENT;  Surgeon: Jonah Montgomery MD;  Location: [facility];  Service: Urology;  Laterality: Left;   • KIDNEY STONE SURGERY          Past Social History:  Social History     Socioeconomic  History   • Marital status:    • Number of children: 2   Tobacco Use   • Smoking status: Former Smoker     Packs/day: 1.00     Years: 30.00     Pack years: 30.00     Types: Pipe, Cigars     Quit date: 2016     Years since quittin.5   • Smokeless tobacco: Never Used   • Tobacco comment: currently smokes a pipe   Substance and Sexual Activity   • Alcohol use: No   • Drug use: No   • Sexual activity: Defer       Past Family History:  Family History   Problem Relation Age of Onset   • Cancer Mother         breast   • COPD Father    • Heart attack Father 74   • Diabetes Paternal Grandmother         both legs removed       Review of Systems:   ROS       Current Outpatient Medications   Medication Sig Dispense Refill   • amLODIPine (NORVASC) 5 MG tablet Take 1 tablet by mouth Daily. 90 tablet 1   • Insulin Glargine (LANTUS SOLOSTAR) 100 UNIT/ML injection pen Take 41 units in the AM and 43 units in the PM injected daily 27 mL 5   • insulin lispro (humaLOG) 100 UNIT/ML injection Inject 15 Units under the skin into the appropriate area as directed 3 (Three) Times a Day With Meals.     • losartan (COZAAR) 100 MG tablet TAKE ONE TABLET BY MOUTH DAILY 90 tablet 1   • metoprolol succinate XL (TOPROL-XL) 50 MG 24 hr tablet Take 1 tablet by mouth Daily. 90 tablet 1   • pravastatin (PRAVACHOL) 40 MG tablet TAKE ONE TABLET BY MOUTH DAILY 90 tablet 1   • sertraline (ZOLOFT) 100 MG tablet Take 1.5 tablets by mouth Daily. 135 tablet 1   • spironolactone (ALDACTONE) 25 MG tablet TAKE ONE TABLET BY MOUTH TWICE A  tablet 1   • tamsulosin (Flomax) 0.4 MG capsule 24 hr capsule Take 1 capsule by mouth Every Night. 30 capsule 11   • warfarin (COUMADIN) 6 MG tablet TAKE ONE TABLET BY MOUTH EVERY EVENING. 90 tablet 1     No current facility-administered medications for this visit.        Allergies   Allergen Reactions   • Penicillins Swelling     Tongue swelled   • Ace Inhibitors Angioedema   • Contrast Dye Rash  "      Objective     /70 (BP Location: Left arm, Patient Position: Sitting, Cuff Size: Adult)   Pulse 64   Ht 182.9 cm (72\")   Wt 124 kg (273 lb)   SpO2 98%   BMI 37.03 kg/m²     Physical Exam  Constitutional:       Appearance: He is well-developed.   HENT:      Head: Normocephalic and atraumatic.   Eyes:      Pupils: Pupils are equal, round, and reactive to light.   Cardiovascular:      Rate and Rhythm: Normal rate and regular rhythm.      Heart sounds: Murmur heard.       Pulmonary:      Effort: Pulmonary effort is normal.      Breath sounds: Normal breath sounds.   Abdominal:      General: Bowel sounds are normal.      Palpations: Abdomen is soft.   Musculoskeletal:         General: Normal range of motion.      Cervical back: Normal range of motion and neck supple.   Skin:     General: Skin is warm and dry.      Capillary Refill: Capillary refill takes less than 2 seconds.   Neurological:      Mental Status: He is alert and oriented to person, place, and time.            DATA:   Results for orders placed during the hospital encounter of 11/01/21    Adult Transthoracic Echo Complete W/ Cont if Necessary Per Protocol    Interpretation Summary  · The left ventricular cavity is mildly dilated.  · Left ventricular ejection fraction appears to be 56 - 60%.  · Left ventricular diastolic function is consistent with (grade II w/high LAP) pseudonormalization.  · There is a mechanical aortic valve prosthesis present.  · Mild aortic valve stenosis is present.Mild paravalvular regurgitation  · Rhuematic mitral valvular disease. Moderate mitral annular calcification is present. There is moderate calcification of the mitral valve anterior and posterior leaflet(s  · Moderate to severe mitral valve stenosis is present.Moderate mitral valve regurgitation is present with an eccentric jet noted.  · Peak transmitral valvular gradient of around 15mm Hg.  · Moderate tricuspid valve regurgitation is present. Estimated right " ventricular systolic pressure from tricuspid regurgitation is mildly elevated (35-45 mmHg).  · There is no evidence of pericardial effusion. .  · Comments: No significant change noted since the last study in . May consider a KIET study to evaluate the mitral valve better if clinically warrented.   Results for orders placed in visit on 14    Stress test with myocardial perfusion    Narrative  Patient:      LAURA VELAZQUEZ  Med Rec#:     [id number]               :          1955  Date:         2014            Age:          58y  Account#:     [id number]            Height:       183.1 cm / 72.1 in  Accession#:   [id number]               Sex:          M  Weight:       120.45 kg / 265.5 lbs  BSA:          2.41  Admit Date#:  2014  Loc:          exam room 2    Referring:    Fabio Walker MD  Reading:      Yoshi Weldon MD   Technologis[facility]  Nuclear Med TeBARGO,RAPHAEL [facility]  Nuclear Med TeStamper ,[facility]  ______________________________________________________________________    Combined Nuclear/Stress Test    ICD Codes:     • 786.50 Chest pain, unspecified    Checklists:  • Patient verbally identified self  • Patient identified by ID band  • Patient consent obtained in lab  • Procedure verified and explained to patient  • History and physical performed  • Last Caffeine 2014 18:00:00  • Last Meal 2014 18:00:00    History of:   • Dyslipidemia• Lipid Therapy• Hypertension• Renal  Failure• Coronary Artery Disease (CAD)• Family History of CAD• PCN / IVP  DYE / SULFA• Diabetes, managed with oral agents• Diabetes for 11 years•  METFORMIN,LEVEMIR• Current smoker• Typical Angina• Dyspnea at rest•  Paroxysmal nocturnal dyspnea• Dyspnea with minimal exertion• Dyspnea  with normal daily activity• Dyspnea with more than normal activityNo  History of:• Reactive Airway Disease• Chronic Lung Disease• Dialysis•  CHF• Peripheral Vascular Disease• Cerebrovascular Disease•  Family  History of CHF  Cardiac Events and Interventions:  Most recent stent placed in 2007. Stent in LAD. Most recent valvular  surgery performed in 1997.    Most Recent Prior Cardiac Testing :  • Pharmacologic Stress Test (date unknown)    Current Clinical Presentation:  The patient had no chest pain on presentation. The patient has no recent  history of CHF.    Medications I :  •  Amlodipine,Victoza,Levemir,Eplercrone,Cetirizine,Metformin,Meclizine,Zol  pidem,Alprazolam.  • Coumadin (Warfarin)  • Hydrochlorthiazide (HCTZ)  • Metoprolol (Lopressor, Toprol)  • Pravastatin (Pravachol)        Discharge At Completion of Exam :  • Home    Baseline ECG:  NSR and IVCD  Pharmacologic Protocol:  A 400 mcg dose of Regadenoson was administered by intravenous bolus  injection.    Stress ECG :  Inconclusive due to baseline changes.  Recovery ECG:  3 minutes into recovery. No medications were administered during the  recovery period. No changes  Hemodynamics  Rest        Stress        Recovery  SBP         103 mmHg    145 mmHg      143 mmHg  DBP         83 mmHg     79 mmHg       84 mmHg  HR =       65 bpm      84 bpm        74 bpm  %MPHR                   52 %    Imaging Protocol:  One day stress-rest imaging protocol was followed using Tc-99m sestamibi  (Cardiolite) injected intravenously. For the rest portion of the study,  32 mCi of the radiopharmaceutical was administered at 01/30/2014  10:57:56. For the stress portion of the study, 10.7 mCi was administered  at 01/30/2014 09:00:10.    Perfusion Interpretation:  TID Ratio 1.01. There was a small, fixed defect in the apex segment(s).  The extent of this perfusion defect was mild.    Wall Motion Interpretation:  Gated imaging under rest conditions demonstrated normal wallmotion.  The patient's calculated rest LVEF was 59%. The patient's end diastolic  volume was 183ml. The patient's end systolic volume was 74ml.    Stress Test Conclusion:  There was a normal heart rate  response, with a normal blood pressure  response.  The patient experienced no chest pain. Symptoms noted during  stress include: dyspnea. Stress test is inconclusive by ECG criteria due  baseline changes.  Nuclear Cardiology Conclusion:  No evidence of Lexiscan induced ishemia. A small ssized fixed defect in  apical segment is an artifact. Normal LV systolic function and wall  motion    Electronically signed by: Yoshi Weldon MD on 2014 19:01:19  Thank you for the courtesy of this referral.   Results for orders placed in visit on 14    Stress test with myocardial perfusion    Narrative  Patient:      LAURA VELAZQUEZ  [facility] Rec#:     [id number]               :          1955  Date:         2014            Age:          58y  Account#:     [id number]            Height:       183.1 cm / 72.1 in  Accession#:   [id number]               Sex:          M  Weight:       120.45 kg / 265.5 lbs  BSA:          2.41  Admit Date#:  2014  Loc:          exam room 2    Referring:    Fabio Walker MD  Reading:      Yoshi Weldon MD   TechnologisMCCARTLUBA [facility]  Nuclear Med [facility]  Nuclear Med TeStamper ,[facility]  ______________________________________________________________________    Combined Nuclear/Stress Test    ICD Codes:     • 786.50 Chest pain, unspecified    Checklists:  • Patient verbally identified self  • Patient identified by ID band  • Patient consent obtained in lab  • Procedure verified and explained to patient  • History and physical performed  • Last Caffeine 2014 18:00:00  • Last Meal 2014 18:00:00    History of:   • Dyslipidemia• Lipid Therapy• Hypertension• Renal  Failure• Coronary Artery Disease (CAD)• Family History of CAD• PCN / IVP  DYE / SULFA• Diabetes, managed with oral agents• Diabetes for 11 years•  METFORMIN,LEVEMIR• Current smoker• Typical Angina• Dyspnea at rest•  Paroxysmal nocturnal dyspnea• Dyspnea with minimal exertion• Dyspnea  with  normal daily activity• Dyspnea with more than normal activityNo  History of:• Reactive Airway Disease• Chronic Lung Disease• Dialysis•  CHF• Peripheral Vascular Disease• Cerebrovascular Disease• Family  History of CHF  Cardiac Events and Interventions:  Most recent stent placed in 2007. Stent in LAD. Most recent valvular  surgery performed in 1997.    Most Recent Prior Cardiac Testing :  • Pharmacologic Stress Test (date unknown)    Current Clinical Presentation:  The patient had no chest pain on presentation. The patient has no recent  history of CHF.    Medications I :  •  Amlodipine,Victoza,Levemir,Eplercrone,Cetirizine,Metformin,Meclizine,Zol  pidem,Alprazolam.  • Coumadin (Warfarin)  • Hydrochlorthiazide (HCTZ)  • Metoprolol (Lopressor, Toprol)  • Pravastatin (Pravachol)        Discharge At Completion of Exam :  • Home    Baseline ECG:  NSR and IVCD  Pharmacologic Protocol:  A 400 mcg dose of Regadenoson was administered by intravenous bolus  injection.    Stress ECG :  Inconclusive due to baseline changes.  Recovery ECG:  3 minutes into recovery. No medications were administered during the  recovery period. No changes  Hemodynamics  Rest        Stress        Recovery  SBP         103 mmHg    145 mmHg      143 mmHg  DBP         83 mmHg     79 mmHg       84 mmHg  HR =       65 bpm      84 bpm        74 bpm  %MPHR                   52 %    Imaging Protocol:  One day stress-rest imaging protocol was followed using Tc-99m sestamibi  (Cardiolite) injected intravenously. For the rest portion of the study,  32 mCi of the radiopharmaceutical was administered at 01/30/2014  10:57:56. For the stress portion of the study, 10.7 mCi was administered  at 01/30/2014 09:00:10.    Perfusion Interpretation:  TID Ratio 1.01. There was a small, fixed defect in the apex segment(s).  The extent of this perfusion defect was mild.    Wall Motion Interpretation:  Gated imaging under rest conditions demonstrated normal wallmotion.  The  patient's calculated rest LVEF was 59%. The patient's end diastolic  volume was 183ml. The patient's end systolic volume was 74ml.    Stress Test Conclusion:  There was a normal heart rate response, with a normal blood pressure  response.  The patient experienced no chest pain. Symptoms noted during  stress include: dyspnea. Stress test is inconclusive by ECG criteria due  baseline changes.  Nuclear Cardiology Conclusion:  No evidence of Lexiscan induced ishemia. A small ssized fixed defect in  apical segment is an artifact. Normal LV systolic function and wall  motion    Electronically signed by: Yoshi Weldon MD on 01/30/2014 19:01:19  Thank you for the courtesy of this referral.     Procedures     Lab Results   Component Value Date    CHOL 157 04/22/2021    CHOL 138 01/10/2020    CHOL 143 11/02/2018    CHLPL 167 07/18/2016    CHLPL 135 06/08/2015     Lab Results   Component Value Date    TRIG 162 (H) 04/22/2021    TRIG 161 (H) 01/10/2020    TRIG 181 (H) 11/02/2018     Lab Results   Component Value Date    HDL 35 (L) 04/22/2021    HDL 35 (L) 01/10/2020    HDL 34 (L) 11/02/2018     Lab Results   Component Value Date    LDL 94 04/22/2021    LDL 71 01/10/2020    LDL 73 11/02/2018       No results found for: TSH            Invalid input(s): LABALBU, PROT        Assessment and Plan     Diagnosis Plan   1. Chronic diastolic congestive heart failure (HCC)     2. Coronary artery disease involving native coronary artery of native heart without angina pectoris     3. Essential hypertension     4. H/O mechanical aortic valve replacement     5. Hyperlipidemia LDL goal <70     6. Rheumatic mitral stenosis     7. Uncontrolled type 2 diabetes with neuropathy (HCC)         Continue with the current medications.  We will recheck his echocardiogram during next follow-up visit.    Recommended increase activity to 30 minutes of walking daily, most days of the week    Thank you for allowing me to participate in the care of Benitez  R Ruben. Feel free to contact me directly with any further questions or concerns.          Oswaldo Ahuja MD, FACC  Interventional Cardiology

## 2022-01-19 ENCOUNTER — TELEMEDICINE (OUTPATIENT)
Dept: CARDIAC SURGERY | Facility: CLINIC | Age: 67
End: 2022-01-19

## 2022-01-19 DIAGNOSIS — I05.0 RHEUMATIC MITRAL STENOSIS: Primary | ICD-10-CM

## 2022-01-19 PROCEDURE — 98968 PH1 ASSMT&MGMT NQHP 21-30: CPT | Performed by: NURSE PRACTITIONER

## 2022-01-19 NOTE — PROGRESS NOTES
You have chosen to receive care through a telephone visit. Do you consent to use a telephone visit for your medical care today? yes       [facility] Office Follow Up Note     Date of Encounter: 2022     Name: Benitez Miranda  : 1955     Referred By: No ref. provider found  PCP: Mesha Christina MD    Chief Complaint:    Chief Complaint   Patient presents with   • Follow-up     6 MO FU for Mitral Valve Stenosis       Subjective      History of Present Illness:    Benitez Miranda is a 66 y.o. male former smoker, with a history of COPD, HTN, HLD on statin therapy, insulin dependent DM (A1C 9.4), stage III CKD, CAD s/p stenting, CHF and VHD s/p AVR with mechanical aortic valve replacement by Dr. Sánchez in . Patient was referred to Dr. Philip 2021 for evaluation of moderate to severe mitral valve stenosis. At that time, patient was asymptomatic so surveillance with ECHOs. He was last seen in clinic by JAVIER MOON on 21 for follow up of his mitral stenosis. Patient was asymptomatic at that time so plans were made for 3 month follow up. He recently saw Dr. Velazquez 22, doing well with plans for serial EHCOs. Patient presents today for follow up. Patient denies any SOA, palpitations, fatigue, or lower extremity edema. He continues not to have any change to his functional status.     Review of Systems:  Review of Systems   Constitutional: Negative for chills, fever, malaise/fatigue, night sweats and weight loss.   HENT: Negative for hearing loss, odynophagia and sore throat.    Cardiovascular: Negative for chest pain, dyspnea on exertion, leg swelling, orthopnea and palpitations.   Respiratory: Negative for cough and hemoptysis.    Endocrine: Negative for cold intolerance, heat intolerance, polydipsia, polyphagia and polyuria.   Hematologic/Lymphatic: Bruises/bleeds easily.   Skin: Negative for itching and rash.   Musculoskeletal: Positive for back pain. Negative  for joint pain, joint swelling and myalgias.   Gastrointestinal: Negative for abdominal pain, constipation, diarrhea, hematemesis, hematochezia, melena, nausea and vomiting.   Genitourinary: Negative for dysuria, frequency and hematuria.   Neurological: Negative for focal weakness, headaches, numbness and seizures.   Psychiatric/Behavioral: Negative for suicidal ideas.   All other systems reviewed and are negative.      I have reviewed the following portions of the patient's history: allergies, current medications, past family history, past medical history, past social history, past surgical history, problem list and ROS and confirm it's accurate.    Allergies:  Allergies   Allergen Reactions   • Penicillins Swelling     Tongue swelled   • Ace Inhibitors Angioedema   • Contrast Dye Rash       Medications:      Current Outpatient Medications:   •  amLODIPine (NORVASC) 5 MG tablet, Take 1 tablet by mouth Daily., Disp: 90 tablet, Rfl: 1  •  Insulin Glargine (LANTUS SOLOSTAR) 100 UNIT/ML injection pen, Take 41 units in the AM and 43 units in the PM injected daily, Disp: 27 mL, Rfl: 5  •  insulin lispro (humaLOG) 100 UNIT/ML injection, Inject 15 Units under the skin into the appropriate area as directed 3 (Three) Times a Day With Meals., Disp: , Rfl:   •  losartan (COZAAR) 100 MG tablet, TAKE ONE TABLET BY MOUTH DAILY, Disp: 90 tablet, Rfl: 1  •  metoprolol succinate XL (TOPROL-XL) 50 MG 24 hr tablet, Take 1 tablet by mouth Daily., Disp: 90 tablet, Rfl: 1  •  pravastatin (PRAVACHOL) 40 MG tablet, TAKE ONE TABLET BY MOUTH DAILY, Disp: 90 tablet, Rfl: 1  •  sertraline (ZOLOFT) 100 MG tablet, Take 1.5 tablets by mouth Daily., Disp: 135 tablet, Rfl: 1  •  spironolactone (ALDACTONE) 25 MG tablet, TAKE ONE TABLET BY MOUTH TWICE A DAY, Disp: 180 tablet, Rfl: 1  •  tamsulosin (Flomax) 0.4 MG capsule 24 hr capsule, Take 1 capsule by mouth Every Night., Disp: 30 capsule, Rfl: 11  •  warfarin (COUMADIN) 6 MG tablet, TAKE ONE TABLET  BY MOUTH EVERY EVENING., Disp: 90 tablet, Rfl: 1    History:   Past Medical History:   Diagnosis Date   • Anxiety    • Arthritis    • CAD (coronary artery disease)     x1 stent; pulmonary HTN; EF 50-55%; rheumatic valve; MV stenosis   • CHF (congestive heart failure) ([facility])    • CKD (chronic kidney disease), stage III (HCC)    • COPD (chronic obstructive pulmonary disease) ([facility])    • Depression    • Diabetes mellitus ([facility])    • H/O mechanical aortic valve replacement    • History of tobacco abuse    • Hyperlipidemia    • Hypertension    • Ischemic heart disease    • Kidney failure     third stage   • Low back pain    • Obesity     BMI 36.   • Valvular heart disease        Past Surgical History:   Procedure Laterality Date   • AORTIC VALVE REPAIR/REPLACEMENT     • CARDIAC CATHETERIZATION     • CARDIAC SURGERY      valve on aortic   • CARDIAC SURGERY      stent    • CATARACT EXTRACTION     • COLONOSCOPY     • CORONARY STENT PLACEMENT     • CYSTOSCOPY URETEROSCOPY LASER LITHOTRIPSY Left 2020    Procedure: CYSTOSCOPY URETEROSCOPY LASER LITHOTRIPSY WITH STENT PLACEMENT;  Surgeon: Jonah Montgomery MD;  Location: [facility];  Service: Urology;  Laterality: Left;   • KIDNEY STONE SURGERY         Social History     Socioeconomic History   • Marital status:    • Number of children: 2   Tobacco Use   • Smoking status: Current Some Day Smoker     Packs/day: 1.00     Years: 30.00     Pack years: 30.00     Types: Pipe, Cigars     Last attempt to quit: 2016     Years since quittin.5   • Smokeless tobacco: Never Used   • Tobacco comment: currently smokes a pipe   Substance and Sexual Activity   • Alcohol use: No   • Drug use: No   • Sexual activity: Defer        Family History   Problem Relation Age of Onset   • Cancer Mother         breast   • COPD Father    • Heart attack Father 74   • Diabetes Paternal Grandmother         both legs removed       Objective     Physical Exam:  There  were no vitals filed for this visit.   There is no height or weight on file to calculate BMI.    Physical Exam  Constitutional:       General: He is awake.   Neurological:      Mental Status: He is alert.   Psychiatric:         Behavior: Behavior is cooperative.         Imaging/Labs:    TTE Result date 11/7/21:   · The left ventricular cavity is mildly dilated.  · Left ventricular ejection fraction appears to be 56 - 60%.  · Left ventricular diastolic function is consistent with (grade II w/high LAP) pseudonormalization.  · There is a mechanical aortic valve prosthesis present.  · Mild aortic valve stenosis is present. Mild paravalvular regurgitation  · Rhuematic mitral valvular disease. Moderate mitral annular calcification is present. There is moderate calcification of the mitral valve anterior and posterior leaflets  · Moderate to severe mitral valve stenosis is present.Moderate mitral valve regurgitation is present with an eccentric jet noted.  · Peak transmitral valvular gradient of around 15mm Hg.  · Moderate tricuspid valve regurgitation is present. Estimated right ventricular systolic pressure from tricuspid regurgitation is mildly elevated (35-45 mmHg).  · There is no evidence of pericardial effusion. .  · Comments: No significant change noted since the last study in June 21. May consider a KIET study to evaluate the mitral valve better if clinically warrented.         Assessment / Plan      Assessment / Plan:  Diagnoses and all orders for this visit:    1. Rheumatic mitral stenosis (Primary)       1. Mitral stenosis: Patient denies any SOA, palpitations, fatigue, or lower extremity edema. He continues not to have any change to his functional status. Reviewed patient’s most recent ECHO from 11/1/21 which did not note any significant changes compared to last study in June 2021, moderate to severe mitral regurgitation. Patient continues to deny any symptoms of mitral stenosis and remains very active. He  continues following closely with Dr. Velazquez for follow up and serial ECHOs, next appointment in May 2022. At this time, we will plan to see the patient on an as needed basis since he has had no symptomatic changes. Patient or Dr. Velazquez can notify our office with any concerns or changes to symptoms/functional status for sooner follow up.       Follow Up:   No follow-ups on file.   Or sooner for any further concerns or worsening sign and symptoms. If unable to reach us in the office please dial 911 or go to the nearest emergency department.      Milana MOON  [facility] Cardiothoracic Surgery    This visit has been rescheduled as a phone visit to comply with patient safety concerns in accordance with CDC recommendations. Total time of discussion was 21 minutes.

## 2022-01-25 ENCOUNTER — TELEPHONE (OUTPATIENT)
Dept: FAMILY MEDICINE CLINIC | Facility: CLINIC | Age: 67
End: 2022-01-25

## 2022-01-25 ENCOUNTER — OFFICE VISIT (OUTPATIENT)
Dept: FAMILY MEDICINE CLINIC | Facility: CLINIC | Age: 67
End: 2022-01-25

## 2022-01-25 ENCOUNTER — ANTICOAGULATION VISIT (OUTPATIENT)
Dept: FAMILY MEDICINE CLINIC | Facility: CLINIC | Age: 67
End: 2022-01-25

## 2022-01-25 VITALS
HEIGHT: 72 IN | DIASTOLIC BLOOD PRESSURE: 62 MMHG | SYSTOLIC BLOOD PRESSURE: 100 MMHG | WEIGHT: 267.8 LBS | TEMPERATURE: 96.8 F | OXYGEN SATURATION: 99 % | HEART RATE: 60 BPM | BODY MASS INDEX: 36.27 KG/M2

## 2022-01-25 DIAGNOSIS — N40.1 BENIGN PROSTATIC HYPERPLASIA WITH LOWER URINARY TRACT SYMPTOMS, SYMPTOM DETAILS UNSPECIFIED: ICD-10-CM

## 2022-01-25 DIAGNOSIS — IMO0002 UNCONTROLLED TYPE 2 DIABETES WITH NEUROPATHY: ICD-10-CM

## 2022-01-25 DIAGNOSIS — Z12.5 ENCOUNTER FOR SCREENING FOR MALIGNANT NEOPLASM OF PROSTATE: ICD-10-CM

## 2022-01-25 DIAGNOSIS — Z00.00 ENCOUNTER FOR SUBSEQUENT ANNUAL WELLNESS VISIT (AWV) IN MEDICARE PATIENT: Primary | ICD-10-CM

## 2022-01-25 DIAGNOSIS — Z79.01 ANTICOAGULANT LONG-TERM USE: Primary | ICD-10-CM

## 2022-01-25 DIAGNOSIS — M54.50 LOW BACK PAIN, UNSPECIFIED BACK PAIN LATERALITY, UNSPECIFIED CHRONICITY, UNSPECIFIED WHETHER SCIATICA PRESENT: ICD-10-CM

## 2022-01-25 LAB — INR PPP: 1 (ref 2.5–3.5)

## 2022-01-25 PROCEDURE — 80053 COMPREHEN METABOLIC PANEL: CPT | Performed by: FAMILY MEDICINE

## 2022-01-25 PROCEDURE — 1159F MED LIST DOCD IN RCRD: CPT | Performed by: FAMILY MEDICINE

## 2022-01-25 PROCEDURE — 83036 HEMOGLOBIN GLYCOSYLATED A1C: CPT | Performed by: FAMILY MEDICINE

## 2022-01-25 PROCEDURE — G0439 PPPS, SUBSEQ VISIT: HCPCS | Performed by: FAMILY MEDICINE

## 2022-01-25 PROCEDURE — 1170F FXNL STATUS ASSESSED: CPT | Performed by: FAMILY MEDICINE

## 2022-01-25 PROCEDURE — 36416 COLLJ CAPILLARY BLOOD SPEC: CPT | Performed by: FAMILY MEDICINE

## 2022-01-25 PROCEDURE — G0103 PSA SCREENING: HCPCS | Performed by: FAMILY MEDICINE

## 2022-01-25 PROCEDURE — 36415 COLL VENOUS BLD VENIPUNCTURE: CPT | Performed by: FAMILY MEDICINE

## 2022-01-25 PROCEDURE — 85610 PROTHROMBIN TIME: CPT | Performed by: FAMILY MEDICINE

## 2022-01-25 RX ORDER — CYCLOBENZAPRINE HCL 10 MG
10 TABLET ORAL 3 TIMES DAILY PRN
Qty: 90 TABLET | Refills: 5 | Status: SHIPPED | OUTPATIENT
Start: 2022-01-25 | End: 2022-11-11 | Stop reason: HOSPADM

## 2022-01-25 RX ORDER — TAMSULOSIN HYDROCHLORIDE 0.4 MG/1
1 CAPSULE ORAL NIGHTLY
Qty: 90 CAPSULE | Refills: 1 | Status: SHIPPED | OUTPATIENT
Start: 2022-01-25 | End: 2022-07-27

## 2022-01-26 LAB
ALBUMIN SERPL-MCNC: 4.2 G/DL (ref 3.5–5.2)
ALBUMIN/GLOB SERPL: 1.3 G/DL
ALP SERPL-CCNC: 62 U/L (ref 39–117)
ALT SERPL W P-5'-P-CCNC: 28 U/L (ref 1–41)
ANION GAP SERPL CALCULATED.3IONS-SCNC: 11.9 MMOL/L (ref 5–15)
AST SERPL-CCNC: 22 U/L (ref 1–40)
BILIRUB SERPL-MCNC: 0.4 MG/DL (ref 0–1.2)
BUN SERPL-MCNC: 31 MG/DL (ref 8–23)
BUN/CREAT SERPL: 19.1 (ref 7–25)
CALCIUM SPEC-SCNC: 9 MG/DL (ref 8.6–10.5)
CHLORIDE SERPL-SCNC: 104 MMOL/L (ref 98–107)
CO2 SERPL-SCNC: 21.1 MMOL/L (ref 22–29)
CREAT SERPL-MCNC: 1.62 MG/DL (ref 0.76–1.27)
GFR SERPL CREATININE-BSD FRML MDRD: 43 ML/MIN/1.73
GLOBULIN UR ELPH-MCNC: 3.3 GM/DL
GLUCOSE SERPL-MCNC: 79 MG/DL (ref 65–99)
HBA1C MFR BLD: 8.9 % (ref 4.8–5.6)
POTASSIUM SERPL-SCNC: 5 MMOL/L (ref 3.5–5.2)
PROT SERPL-MCNC: 7.5 G/DL (ref 6–8.5)
PSA SERPL-MCNC: 1.06 NG/ML (ref 0–4)
SODIUM SERPL-SCNC: 137 MMOL/L (ref 136–145)

## 2022-01-28 ENCOUNTER — ANTICOAGULATION VISIT (OUTPATIENT)
Dept: FAMILY MEDICINE CLINIC | Facility: CLINIC | Age: 67
End: 2022-01-28

## 2022-01-28 ENCOUNTER — LAB (OUTPATIENT)
Dept: FAMILY MEDICINE CLINIC | Facility: CLINIC | Age: 67
End: 2022-01-28

## 2022-01-28 DIAGNOSIS — Z79.01 ANTICOAGULANT LONG-TERM USE: Primary | ICD-10-CM

## 2022-01-28 LAB — INR PPP: 3.6 (ref 2–3)

## 2022-01-28 PROCEDURE — 85610 PROTHROMBIN TIME: CPT | Performed by: FAMILY MEDICINE

## 2022-01-28 PROCEDURE — 36416 COLLJ CAPILLARY BLOOD SPEC: CPT | Performed by: FAMILY MEDICINE

## 2022-02-01 ENCOUNTER — ANTICOAGULATION VISIT (OUTPATIENT)
Dept: FAMILY MEDICINE CLINIC | Facility: CLINIC | Age: 67
End: 2022-02-01

## 2022-02-01 DIAGNOSIS — Z79.01 ANTICOAGULANT LONG-TERM USE: Primary | ICD-10-CM

## 2022-02-01 LAB — INR PPP: 1.4 (ref 2.5–3.5)

## 2022-02-01 PROCEDURE — 36416 COLLJ CAPILLARY BLOOD SPEC: CPT | Performed by: FAMILY MEDICINE

## 2022-02-01 PROCEDURE — 85610 PROTHROMBIN TIME: CPT | Performed by: FAMILY MEDICINE

## 2022-02-08 ENCOUNTER — TELEPHONE (OUTPATIENT)
Dept: FAMILY MEDICINE CLINIC | Facility: CLINIC | Age: 67
End: 2022-02-08

## 2022-02-08 ENCOUNTER — ANTICOAGULATION VISIT (OUTPATIENT)
Dept: FAMILY MEDICINE CLINIC | Facility: CLINIC | Age: 67
End: 2022-02-08

## 2022-02-08 DIAGNOSIS — Z79.01 ANTICOAGULANT LONG-TERM USE: Primary | ICD-10-CM

## 2022-02-08 LAB — INR PPP: 2.6 (ref 2.5–3.5)

## 2022-02-08 PROCEDURE — 36416 COLLJ CAPILLARY BLOOD SPEC: CPT | Performed by: FAMILY MEDICINE

## 2022-02-08 PROCEDURE — 85610 PROTHROMBIN TIME: CPT | Performed by: FAMILY MEDICINE

## 2022-02-08 NOTE — TELEPHONE ENCOUNTER
Called patient to inform patient they met eligibility requirements for [facility] until the end of the year.

## 2022-02-22 ENCOUNTER — ANTICOAGULATION VISIT (OUTPATIENT)
Dept: FAMILY MEDICINE CLINIC | Facility: CLINIC | Age: 67
End: 2022-02-22

## 2022-02-22 DIAGNOSIS — Z79.01 ANTICOAGULANT LONG-TERM USE: Primary | ICD-10-CM

## 2022-02-22 DIAGNOSIS — N18.30 BENIGN HYPERTENSION WITH CKD (CHRONIC KIDNEY DISEASE) STAGE III: ICD-10-CM

## 2022-02-22 DIAGNOSIS — I12.9 BENIGN HYPERTENSION WITH CKD (CHRONIC KIDNEY DISEASE) STAGE III: ICD-10-CM

## 2022-02-22 LAB — INR PPP: 3.6 (ref 2.5–3.5)

## 2022-02-22 PROCEDURE — 85610 PROTHROMBIN TIME: CPT | Performed by: FAMILY MEDICINE

## 2022-02-22 PROCEDURE — 36416 COLLJ CAPILLARY BLOOD SPEC: CPT | Performed by: FAMILY MEDICINE

## 2022-02-22 RX ORDER — AMLODIPINE BESYLATE 5 MG/1
TABLET ORAL
Qty: 90 TABLET | Refills: 0 | Status: SHIPPED | OUTPATIENT
Start: 2022-02-22 | End: 2022-05-31

## 2022-04-14 ENCOUNTER — ANTICOAGULATION VISIT (OUTPATIENT)
Dept: FAMILY MEDICINE CLINIC | Facility: CLINIC | Age: 67
End: 2022-04-14

## 2022-04-14 DIAGNOSIS — I50.32 CHRONIC DIASTOLIC CONGESTIVE HEART FAILURE: ICD-10-CM

## 2022-04-14 DIAGNOSIS — R79.1 SUPRATHERAPEUTIC INR: ICD-10-CM

## 2022-04-14 DIAGNOSIS — Z79.01 ANTICOAGULANT LONG-TERM USE: Primary | ICD-10-CM

## 2022-04-14 DIAGNOSIS — F33.42 RECURRENT MAJOR DEPRESSIVE DISORDER, IN FULL REMISSION: ICD-10-CM

## 2022-04-14 LAB — INR PPP: 3.8 (ref 2.5–3.5)

## 2022-04-14 PROCEDURE — 36416 COLLJ CAPILLARY BLOOD SPEC: CPT | Performed by: FAMILY MEDICINE

## 2022-04-14 PROCEDURE — 85610 PROTHROMBIN TIME: CPT | Performed by: FAMILY MEDICINE

## 2022-04-14 NOTE — TELEPHONE ENCOUNTER
Pt requesting refill on Zoloft and also said he needs a refill on 1 MG Warfarin. Said he typically takes 6 MG & 1 MG.  Thank you.

## 2022-04-15 RX ORDER — WARFARIN SODIUM 1 MG/1
1 TABLET ORAL NIGHTLY
Qty: 90 TABLET | Refills: 2 | Status: SHIPPED | OUTPATIENT
Start: 2022-04-15 | End: 2022-07-25 | Stop reason: SDUPTHER

## 2022-04-15 RX ORDER — SERTRALINE HYDROCHLORIDE 100 MG/1
150 TABLET, FILM COATED ORAL DAILY
Qty: 135 TABLET | Refills: 1 | Status: SHIPPED | OUTPATIENT
Start: 2022-04-15 | End: 2022-11-21 | Stop reason: SDUPTHER

## 2022-04-18 ENCOUNTER — ANTICOAGULATION VISIT (OUTPATIENT)
Dept: FAMILY MEDICINE CLINIC | Facility: CLINIC | Age: 67
End: 2022-04-18

## 2022-04-18 DIAGNOSIS — I25.10 CORONARY ARTERY DISEASE INVOLVING NATIVE CORONARY ARTERY OF NATIVE HEART WITHOUT ANGINA PECTORIS: ICD-10-CM

## 2022-04-18 DIAGNOSIS — Z79.01 ANTICOAGULANT LONG-TERM USE: Primary | ICD-10-CM

## 2022-04-18 LAB — INR PPP: 1.3 (ref 2.5–3.5)

## 2022-04-18 PROCEDURE — 85610 PROTHROMBIN TIME: CPT | Performed by: FAMILY MEDICINE

## 2022-04-18 PROCEDURE — 36416 COLLJ CAPILLARY BLOOD SPEC: CPT | Performed by: FAMILY MEDICINE

## 2022-04-21 ENCOUNTER — ANTICOAGULATION VISIT (OUTPATIENT)
Dept: FAMILY MEDICINE CLINIC | Facility: CLINIC | Age: 67
End: 2022-04-21

## 2022-04-21 ENCOUNTER — CLINICAL SUPPORT (OUTPATIENT)
Dept: FAMILY MEDICINE CLINIC | Facility: CLINIC | Age: 67
End: 2022-04-21

## 2022-04-21 DIAGNOSIS — I25.10 CORONARY ARTERY DISEASE INVOLVING NATIVE CORONARY ARTERY OF NATIVE HEART WITHOUT ANGINA PECTORIS: ICD-10-CM

## 2022-04-21 DIAGNOSIS — Z79.01 ANTICOAGULANT LONG-TERM USE: Primary | ICD-10-CM

## 2022-04-21 LAB — INR PPP: 1.5 (ref 2.5–3.5)

## 2022-04-21 PROCEDURE — 85610 PROTHROMBIN TIME: CPT | Performed by: FAMILY MEDICINE

## 2022-04-21 PROCEDURE — 36416 COLLJ CAPILLARY BLOOD SPEC: CPT | Performed by: FAMILY MEDICINE

## 2022-04-25 ENCOUNTER — OFFICE VISIT (OUTPATIENT)
Dept: FAMILY MEDICINE CLINIC | Facility: CLINIC | Age: 67
End: 2022-04-25

## 2022-04-25 ENCOUNTER — ANTICOAGULATION VISIT (OUTPATIENT)
Dept: FAMILY MEDICINE CLINIC | Facility: CLINIC | Age: 67
End: 2022-04-25

## 2022-04-25 VITALS
OXYGEN SATURATION: 99 % | TEMPERATURE: 96.9 F | WEIGHT: 263.8 LBS | HEIGHT: 72 IN | SYSTOLIC BLOOD PRESSURE: 116 MMHG | BODY MASS INDEX: 35.73 KG/M2 | DIASTOLIC BLOOD PRESSURE: 64 MMHG | HEART RATE: 67 BPM

## 2022-04-25 DIAGNOSIS — E78.5 HYPERLIPIDEMIA DUE TO TYPE 2 DIABETES MELLITUS: ICD-10-CM

## 2022-04-25 DIAGNOSIS — M1A.09X0 IDIOPATHIC CHRONIC GOUT OF MULTIPLE SITES WITHOUT TOPHUS: Primary | ICD-10-CM

## 2022-04-25 DIAGNOSIS — N18.30 TYPE 2 DIABETES MELLITUS WITH STAGE 3 CHRONIC KIDNEY DISEASE, WITH LONG-TERM CURRENT USE OF INSULIN, UNSPECIFIED WHETHER STAGE 3A OR 3B CKD: ICD-10-CM

## 2022-04-25 DIAGNOSIS — E11.22 TYPE 2 DIABETES MELLITUS WITH STAGE 3 CHRONIC KIDNEY DISEASE, WITH LONG-TERM CURRENT USE OF INSULIN, UNSPECIFIED WHETHER STAGE 3A OR 3B CKD: ICD-10-CM

## 2022-04-25 DIAGNOSIS — Z79.4 TYPE 2 DIABETES MELLITUS WITH STAGE 3 CHRONIC KIDNEY DISEASE, WITH LONG-TERM CURRENT USE OF INSULIN, UNSPECIFIED WHETHER STAGE 3A OR 3B CKD: ICD-10-CM

## 2022-04-25 DIAGNOSIS — N18.30 STAGE 3 CHRONIC KIDNEY DISEASE, UNSPECIFIED WHETHER STAGE 3A OR 3B CKD: ICD-10-CM

## 2022-04-25 DIAGNOSIS — E11.69 HYPERLIPIDEMIA DUE TO TYPE 2 DIABETES MELLITUS: ICD-10-CM

## 2022-04-25 DIAGNOSIS — Z79.01 ANTICOAGULANT LONG-TERM USE: Primary | ICD-10-CM

## 2022-04-25 DIAGNOSIS — J43.8 OTHER EMPHYSEMA: ICD-10-CM

## 2022-04-25 DIAGNOSIS — F33.42 RECURRENT MAJOR DEPRESSIVE DISORDER, IN FULL REMISSION: ICD-10-CM

## 2022-04-25 LAB — INR PPP: 2.2 (ref 2.5–3.5)

## 2022-04-25 PROCEDURE — 85610 PROTHROMBIN TIME: CPT | Performed by: FAMILY MEDICINE

## 2022-04-25 PROCEDURE — 36416 COLLJ CAPILLARY BLOOD SPEC: CPT | Performed by: FAMILY MEDICINE

## 2022-04-25 PROCEDURE — 99214 OFFICE O/P EST MOD 30 MIN: CPT | Performed by: FAMILY MEDICINE

## 2022-04-25 RX ORDER — ALLOPURINOL 100 MG/1
100 TABLET ORAL DAILY
Qty: 30 TABLET | Refills: 2 | Status: SHIPPED | OUTPATIENT
Start: 2022-04-25 | End: 2022-04-25

## 2022-05-05 DIAGNOSIS — N18.30 BENIGN HYPERTENSION WITH CKD (CHRONIC KIDNEY DISEASE) STAGE III: ICD-10-CM

## 2022-05-05 DIAGNOSIS — I12.9 BENIGN HYPERTENSION WITH CKD (CHRONIC KIDNEY DISEASE) STAGE III: ICD-10-CM

## 2022-05-05 RX ORDER — METOPROLOL SUCCINATE 50 MG/1
TABLET, EXTENDED RELEASE ORAL
Qty: 90 TABLET | Refills: 1 | Status: ON HOLD | OUTPATIENT
Start: 2022-05-05 | End: 2022-10-25

## 2022-05-06 ENCOUNTER — ANTICOAGULATION VISIT (OUTPATIENT)
Dept: FAMILY MEDICINE CLINIC | Facility: CLINIC | Age: 67
End: 2022-05-06

## 2022-05-06 DIAGNOSIS — E11.69 HYPERLIPIDEMIA DUE TO TYPE 2 DIABETES MELLITUS: ICD-10-CM

## 2022-05-06 DIAGNOSIS — E11.22 TYPE 2 DIABETES MELLITUS WITH STAGE 3 CHRONIC KIDNEY DISEASE, WITH LONG-TERM CURRENT USE OF INSULIN, UNSPECIFIED WHETHER STAGE 3A OR 3B CKD: ICD-10-CM

## 2022-05-06 DIAGNOSIS — J43.8 OTHER EMPHYSEMA: ICD-10-CM

## 2022-05-06 DIAGNOSIS — N18.30 STAGE 3 CHRONIC KIDNEY DISEASE, UNSPECIFIED WHETHER STAGE 3A OR 3B CKD: ICD-10-CM

## 2022-05-06 DIAGNOSIS — M1A.09X0 IDIOPATHIC CHRONIC GOUT OF MULTIPLE SITES WITHOUT TOPHUS: ICD-10-CM

## 2022-05-06 DIAGNOSIS — N18.30 TYPE 2 DIABETES MELLITUS WITH STAGE 3 CHRONIC KIDNEY DISEASE, WITH LONG-TERM CURRENT USE OF INSULIN, UNSPECIFIED WHETHER STAGE 3A OR 3B CKD: ICD-10-CM

## 2022-05-06 DIAGNOSIS — Z79.4 TYPE 2 DIABETES MELLITUS WITH STAGE 3 CHRONIC KIDNEY DISEASE, WITH LONG-TERM CURRENT USE OF INSULIN, UNSPECIFIED WHETHER STAGE 3A OR 3B CKD: ICD-10-CM

## 2022-05-06 DIAGNOSIS — F33.42 RECURRENT MAJOR DEPRESSIVE DISORDER, IN FULL REMISSION: ICD-10-CM

## 2022-05-06 DIAGNOSIS — E78.5 HYPERLIPIDEMIA DUE TO TYPE 2 DIABETES MELLITUS: ICD-10-CM

## 2022-05-06 LAB — INR PPP: 3.3 (ref 2.5–3.5)

## 2022-05-06 PROCEDURE — 83036 HEMOGLOBIN GLYCOSYLATED A1C: CPT | Performed by: FAMILY MEDICINE

## 2022-05-06 PROCEDURE — 85610 PROTHROMBIN TIME: CPT | Performed by: FAMILY MEDICINE

## 2022-05-06 PROCEDURE — 36416 COLLJ CAPILLARY BLOOD SPEC: CPT | Performed by: FAMILY MEDICINE

## 2022-05-06 PROCEDURE — 80053 COMPREHEN METABOLIC PANEL: CPT | Performed by: FAMILY MEDICINE

## 2022-05-06 PROCEDURE — 80061 LIPID PANEL: CPT | Performed by: FAMILY MEDICINE

## 2022-05-07 LAB
ALBUMIN SERPL-MCNC: 4 G/DL (ref 3.5–5.2)
ALBUMIN/GLOB SERPL: 1 G/DL
ALP SERPL-CCNC: 58 U/L (ref 39–117)
ALT SERPL W P-5'-P-CCNC: 27 U/L (ref 1–41)
ANION GAP SERPL CALCULATED.3IONS-SCNC: 13.1 MMOL/L (ref 5–15)
AST SERPL-CCNC: 19 U/L (ref 1–40)
BILIRUB SERPL-MCNC: 0.4 MG/DL (ref 0–1.2)
BUN SERPL-MCNC: 21 MG/DL (ref 8–23)
BUN/CREAT SERPL: 15.1 (ref 7–25)
CALCIUM SPEC-SCNC: 8.7 MG/DL (ref 8.6–10.5)
CHLORIDE SERPL-SCNC: 107 MMOL/L (ref 98–107)
CHOLEST SERPL-MCNC: 142 MG/DL (ref 0–200)
CO2 SERPL-SCNC: 18.9 MMOL/L (ref 22–29)
CREAT SERPL-MCNC: 1.39 MG/DL (ref 0.76–1.27)
EGFRCR SERPLBLD CKD-EPI 2021: 55.2 ML/MIN/1.73
GLOBULIN UR ELPH-MCNC: 4 GM/DL
GLUCOSE SERPL-MCNC: 139 MG/DL (ref 65–99)
HBA1C MFR BLD: 8.4 % (ref 4.8–5.6)
HDLC SERPL-MCNC: 32 MG/DL (ref 40–60)
LDLC SERPL CALC-MCNC: 84 MG/DL (ref 0–100)
LDLC/HDLC SERPL: 2.51 {RATIO}
POTASSIUM SERPL-SCNC: 4.4 MMOL/L (ref 3.5–5.2)
PROT SERPL-MCNC: 8 G/DL (ref 6–8.5)
SODIUM SERPL-SCNC: 139 MMOL/L (ref 136–145)
TRIGL SERPL-MCNC: 148 MG/DL (ref 0–150)
VLDLC SERPL-MCNC: 26 MG/DL (ref 5–40)

## 2022-05-13 ENCOUNTER — ANTICOAGULATION VISIT (OUTPATIENT)
Dept: FAMILY MEDICINE CLINIC | Facility: CLINIC | Age: 67
End: 2022-05-13

## 2022-05-13 DIAGNOSIS — Z95.2 H/O MECHANICAL AORTIC VALVE REPLACEMENT: Primary | ICD-10-CM

## 2022-05-13 LAB — INR PPP: 2.5 (ref 2.5–3.5)

## 2022-05-13 PROCEDURE — 85610 PROTHROMBIN TIME: CPT | Performed by: FAMILY MEDICINE

## 2022-05-13 PROCEDURE — 36416 COLLJ CAPILLARY BLOOD SPEC: CPT | Performed by: FAMILY MEDICINE

## 2022-05-29 DIAGNOSIS — I12.9 BENIGN HYPERTENSION WITH CKD (CHRONIC KIDNEY DISEASE) STAGE III: ICD-10-CM

## 2022-05-29 DIAGNOSIS — N18.30 BENIGN HYPERTENSION WITH CKD (CHRONIC KIDNEY DISEASE) STAGE III: ICD-10-CM

## 2022-05-31 RX ORDER — AMLODIPINE BESYLATE 5 MG/1
TABLET ORAL
Qty: 90 TABLET | Refills: 0 | Status: SHIPPED | OUTPATIENT
Start: 2022-05-31 | End: 2022-08-29

## 2022-06-02 ENCOUNTER — ANTICOAGULATION VISIT (OUTPATIENT)
Dept: FAMILY MEDICINE CLINIC | Facility: CLINIC | Age: 67
End: 2022-06-02

## 2022-06-02 DIAGNOSIS — Z95.2 H/O MECHANICAL AORTIC VALVE REPLACEMENT: Primary | ICD-10-CM

## 2022-06-02 LAB — INR PPP: 3.3 (ref 2.5–3.5)

## 2022-06-02 PROCEDURE — 85610 PROTHROMBIN TIME: CPT | Performed by: FAMILY MEDICINE

## 2022-06-02 PROCEDURE — 36416 COLLJ CAPILLARY BLOOD SPEC: CPT | Performed by: FAMILY MEDICINE

## 2022-06-09 ENCOUNTER — ANTICOAGULATION VISIT (OUTPATIENT)
Dept: FAMILY MEDICINE CLINIC | Facility: CLINIC | Age: 67
End: 2022-06-09

## 2022-06-09 DIAGNOSIS — Z95.2 H/O MECHANICAL AORTIC VALVE REPLACEMENT: Primary | ICD-10-CM

## 2022-06-09 LAB — INR PPP: 2.1 (ref 2.5–3.5)

## 2022-06-09 PROCEDURE — 36416 COLLJ CAPILLARY BLOOD SPEC: CPT | Performed by: FAMILY MEDICINE

## 2022-06-09 PROCEDURE — 85610 PROTHROMBIN TIME: CPT | Performed by: FAMILY MEDICINE

## 2022-06-09 RX ORDER — PRAVASTATIN SODIUM 40 MG
TABLET ORAL
Qty: 90 TABLET | Refills: 1 | Status: ON HOLD | OUTPATIENT
Start: 2022-06-09 | End: 2022-10-25

## 2022-06-20 DIAGNOSIS — I12.9 BENIGN HYPERTENSION WITH CKD (CHRONIC KIDNEY DISEASE) STAGE III: ICD-10-CM

## 2022-06-20 DIAGNOSIS — N18.30 BENIGN HYPERTENSION WITH CKD (CHRONIC KIDNEY DISEASE) STAGE III: ICD-10-CM

## 2022-06-20 RX ORDER — METOPROLOL SUCCINATE 50 MG/1
50 TABLET, EXTENDED RELEASE ORAL DAILY
Qty: 90 TABLET | Refills: 1 | OUTPATIENT
Start: 2022-06-20

## 2022-07-04 DIAGNOSIS — N18.30 BENIGN HYPERTENSION WITH CKD (CHRONIC KIDNEY DISEASE) STAGE III: ICD-10-CM

## 2022-07-04 DIAGNOSIS — I12.9 BENIGN HYPERTENSION WITH CKD (CHRONIC KIDNEY DISEASE) STAGE III: ICD-10-CM

## 2022-07-05 ENCOUNTER — ANTICOAGULATION VISIT (OUTPATIENT)
Dept: FAMILY MEDICINE CLINIC | Facility: CLINIC | Age: 67
End: 2022-07-05

## 2022-07-05 DIAGNOSIS — Z79.01 ANTICOAGULANT LONG-TERM USE: Primary | ICD-10-CM

## 2022-07-05 LAB — INR PPP: 2.9 (ref 0.9–1.1)

## 2022-07-05 PROCEDURE — 36416 COLLJ CAPILLARY BLOOD SPEC: CPT | Performed by: FAMILY MEDICINE

## 2022-07-05 PROCEDURE — 85610 PROTHROMBIN TIME: CPT | Performed by: FAMILY MEDICINE

## 2022-07-05 RX ORDER — LOSARTAN POTASSIUM 100 MG/1
TABLET ORAL
Qty: 90 TABLET | Refills: 1 | Status: ON HOLD | OUTPATIENT
Start: 2022-07-05 | End: 2022-10-25

## 2022-07-05 RX ORDER — SPIRONOLACTONE 25 MG/1
TABLET ORAL
Qty: 180 TABLET | Refills: 1 | Status: ON HOLD | OUTPATIENT
Start: 2022-07-05 | End: 2022-10-25

## 2022-07-25 ENCOUNTER — OFFICE VISIT (OUTPATIENT)
Dept: FAMILY MEDICINE CLINIC | Facility: CLINIC | Age: 67
End: 2022-07-25

## 2022-07-25 VITALS
TEMPERATURE: 96.9 F | BODY MASS INDEX: 35.24 KG/M2 | HEART RATE: 64 BPM | HEIGHT: 72 IN | OXYGEN SATURATION: 98 % | WEIGHT: 260.2 LBS | DIASTOLIC BLOOD PRESSURE: 66 MMHG | SYSTOLIC BLOOD PRESSURE: 120 MMHG

## 2022-07-25 DIAGNOSIS — Z79.01 ANTICOAGULANT LONG-TERM USE: ICD-10-CM

## 2022-07-25 DIAGNOSIS — M54.50 LOW BACK PAIN, UNSPECIFIED BACK PAIN LATERALITY, UNSPECIFIED CHRONICITY, UNSPECIFIED WHETHER SCIATICA PRESENT: ICD-10-CM

## 2022-07-25 DIAGNOSIS — N18.30 STAGE 3 CHRONIC KIDNEY DISEASE, UNSPECIFIED WHETHER STAGE 3A OR 3B CKD: ICD-10-CM

## 2022-07-25 DIAGNOSIS — Z79.4 TYPE 2 DIABETES MELLITUS WITH STAGE 3 CHRONIC KIDNEY DISEASE, WITH LONG-TERM CURRENT USE OF INSULIN, UNSPECIFIED WHETHER STAGE 3A OR 3B CKD: Primary | ICD-10-CM

## 2022-07-25 DIAGNOSIS — N18.30 TYPE 2 DIABETES MELLITUS WITH STAGE 3 CHRONIC KIDNEY DISEASE, WITH LONG-TERM CURRENT USE OF INSULIN, UNSPECIFIED WHETHER STAGE 3A OR 3B CKD: Primary | ICD-10-CM

## 2022-07-25 DIAGNOSIS — F33.42 RECURRENT MAJOR DEPRESSIVE DISORDER, IN FULL REMISSION: ICD-10-CM

## 2022-07-25 DIAGNOSIS — E11.22 TYPE 2 DIABETES MELLITUS WITH STAGE 3 CHRONIC KIDNEY DISEASE, WITH LONG-TERM CURRENT USE OF INSULIN, UNSPECIFIED WHETHER STAGE 3A OR 3B CKD: Primary | ICD-10-CM

## 2022-07-25 LAB
INR PPP: 4.1 (ref 2.5–3.5)
PROTHROMBIN TIME: 49.2 SECONDS

## 2022-07-25 PROCEDURE — 85610 PROTHROMBIN TIME: CPT | Performed by: FAMILY MEDICINE

## 2022-07-25 PROCEDURE — 99214 OFFICE O/P EST MOD 30 MIN: CPT | Performed by: FAMILY MEDICINE

## 2022-07-25 PROCEDURE — 82043 UR ALBUMIN QUANTITATIVE: CPT | Performed by: FAMILY MEDICINE

## 2022-07-25 PROCEDURE — 36416 COLLJ CAPILLARY BLOOD SPEC: CPT | Performed by: FAMILY MEDICINE

## 2022-07-25 RX ORDER — CLOBETASOL PROPIONATE 0.5 MG/G
1 CREAM TOPICAL 2 TIMES DAILY
Qty: 60 G | Refills: 2 | Status: ON HOLD | OUTPATIENT
Start: 2022-07-25 | End: 2022-10-25

## 2022-07-25 RX ORDER — GABAPENTIN 600 MG/1
600 TABLET ORAL DAILY
Qty: 30 TABLET | Refills: 0 | Status: ON HOLD | OUTPATIENT
Start: 2022-07-25 | End: 2022-10-25

## 2022-07-25 RX ORDER — WARFARIN SODIUM 1 MG/1
1 TABLET ORAL NIGHTLY
Qty: 90 TABLET | Refills: 2 | Status: SHIPPED | OUTPATIENT
Start: 2022-07-25 | End: 2022-11-11 | Stop reason: HOSPADM

## 2022-07-26 ENCOUNTER — TELEPHONE (OUTPATIENT)
Dept: FAMILY MEDICINE CLINIC | Facility: CLINIC | Age: 67
End: 2022-07-26

## 2022-07-26 LAB — ALBUMIN UR-MCNC: 7.3 MG/DL

## 2022-07-26 NOTE — TELEPHONE ENCOUNTER
PA for cream has been approved. The pt was contacted and informed of this approval. The pt verbalized understanding.

## 2022-07-26 NOTE — TELEPHONE ENCOUNTER
Attempted to contact the pt in regards to his Clobetasol Cream. The pt did not answer. I will work on the PA today.

## 2022-07-27 DIAGNOSIS — N40.1 BENIGN PROSTATIC HYPERPLASIA WITH LOWER URINARY TRACT SYMPTOMS, SYMPTOM DETAILS UNSPECIFIED: ICD-10-CM

## 2022-07-27 RX ORDER — TAMSULOSIN HYDROCHLORIDE 0.4 MG/1
CAPSULE ORAL
Qty: 90 CAPSULE | Refills: 1 | Status: ON HOLD | OUTPATIENT
Start: 2022-07-27 | End: 2022-10-25

## 2022-07-28 ENCOUNTER — LAB (OUTPATIENT)
Dept: FAMILY MEDICINE CLINIC | Facility: CLINIC | Age: 67
End: 2022-07-28

## 2022-07-28 ENCOUNTER — ANTICOAGULATION VISIT (OUTPATIENT)
Dept: FAMILY MEDICINE CLINIC | Facility: CLINIC | Age: 67
End: 2022-07-28

## 2022-07-28 DIAGNOSIS — Z79.01 ANTICOAGULANT LONG-TERM USE: Primary | ICD-10-CM

## 2022-07-28 LAB — INR PPP: 1.3 (ref 2.5–3.5)

## 2022-07-28 PROCEDURE — 36416 COLLJ CAPILLARY BLOOD SPEC: CPT | Performed by: FAMILY MEDICINE

## 2022-07-28 PROCEDURE — 83036 HEMOGLOBIN GLYCOSYLATED A1C: CPT | Performed by: FAMILY MEDICINE

## 2022-07-28 PROCEDURE — 36415 COLL VENOUS BLD VENIPUNCTURE: CPT | Performed by: FAMILY MEDICINE

## 2022-07-28 PROCEDURE — 80053 COMPREHEN METABOLIC PANEL: CPT | Performed by: FAMILY MEDICINE

## 2022-07-28 PROCEDURE — 85610 PROTHROMBIN TIME: CPT | Performed by: FAMILY MEDICINE

## 2022-07-28 PROCEDURE — 85025 COMPLETE CBC W/AUTO DIFF WBC: CPT | Performed by: FAMILY MEDICINE

## 2022-07-29 LAB
ALBUMIN SERPL-MCNC: 4.1 G/DL (ref 3.5–5.2)
ALBUMIN/GLOB SERPL: 1.2 G/DL
ALP SERPL-CCNC: 58 U/L (ref 39–117)
ALT SERPL W P-5'-P-CCNC: 19 U/L (ref 1–41)
ANION GAP SERPL CALCULATED.3IONS-SCNC: 13.6 MMOL/L (ref 5–15)
AST SERPL-CCNC: 18 U/L (ref 1–40)
BASOPHILS # BLD AUTO: 0.11 10*3/MM3 (ref 0–0.2)
BASOPHILS NFR BLD AUTO: 1 % (ref 0–1.5)
BILIRUB SERPL-MCNC: 0.5 MG/DL (ref 0–1.2)
BUN SERPL-MCNC: 30 MG/DL (ref 8–23)
BUN/CREAT SERPL: 18.6 (ref 7–25)
CALCIUM SPEC-SCNC: 9.4 MG/DL (ref 8.6–10.5)
CHLORIDE SERPL-SCNC: 102 MMOL/L (ref 98–107)
CO2 SERPL-SCNC: 19.4 MMOL/L (ref 22–29)
CREAT SERPL-MCNC: 1.61 MG/DL (ref 0.76–1.27)
DEPRECATED RDW RBC AUTO: 41.6 FL (ref 37–54)
EGFRCR SERPLBLD CKD-EPI 2021: 46.3 ML/MIN/1.73
EOSINOPHIL # BLD AUTO: 0.44 10*3/MM3 (ref 0–0.4)
EOSINOPHIL NFR BLD AUTO: 4.2 % (ref 0.3–6.2)
ERYTHROCYTE [DISTWIDTH] IN BLOOD BY AUTOMATED COUNT: 13.8 % (ref 12.3–15.4)
GLOBULIN UR ELPH-MCNC: 3.3 GM/DL
GLUCOSE SERPL-MCNC: 254 MG/DL (ref 65–99)
HBA1C MFR BLD: 9 % (ref 4.8–5.6)
HCT VFR BLD AUTO: 42 % (ref 37.5–51)
HGB BLD-MCNC: 14.2 G/DL (ref 13–17.7)
LYMPHOCYTES # BLD AUTO: 1.61 10*3/MM3 (ref 0.7–3.1)
LYMPHOCYTES NFR BLD AUTO: 15.2 % (ref 19.6–45.3)
MCH RBC QN AUTO: 28.5 PG (ref 26.6–33)
MCHC RBC AUTO-ENTMCNC: 33.8 G/DL (ref 31.5–35.7)
MCV RBC AUTO: 84.2 FL (ref 79–97)
MONOCYTES # BLD AUTO: 0.66 10*3/MM3 (ref 0.1–0.9)
MONOCYTES NFR BLD AUTO: 6.2 % (ref 5–12)
NEUTROPHILS NFR BLD AUTO: 7.65 10*3/MM3 (ref 1.7–7)
NEUTROPHILS NFR BLD AUTO: 72.3 % (ref 42.7–76)
PLATELET # BLD AUTO: 173 10*3/MM3 (ref 140–450)
PMV BLD AUTO: 13 FL (ref 6–12)
POTASSIUM SERPL-SCNC: 5 MMOL/L (ref 3.5–5.2)
PROT SERPL-MCNC: 7.4 G/DL (ref 6–8.5)
RBC # BLD AUTO: 4.99 10*6/MM3 (ref 4.14–5.8)
SODIUM SERPL-SCNC: 135 MMOL/L (ref 136–145)
WBC NRBC COR # BLD: 10.59 10*3/MM3 (ref 3.4–10.8)

## 2022-08-02 ENCOUNTER — ANTICOAGULATION VISIT (OUTPATIENT)
Dept: FAMILY MEDICINE CLINIC | Facility: CLINIC | Age: 67
End: 2022-08-02

## 2022-08-02 DIAGNOSIS — Z79.01 ANTICOAGULANT LONG-TERM USE: Primary | ICD-10-CM

## 2022-08-02 LAB — INR PPP: 1.7 (ref 2.5–3.5)

## 2022-08-02 PROCEDURE — 36416 COLLJ CAPILLARY BLOOD SPEC: CPT | Performed by: FAMILY MEDICINE

## 2022-08-02 PROCEDURE — 85610 PROTHROMBIN TIME: CPT | Performed by: FAMILY MEDICINE

## 2022-08-03 ENCOUNTER — TELEPHONE (OUTPATIENT)
Dept: FAMILY MEDICINE CLINIC | Facility: CLINIC | Age: 67
End: 2022-08-03

## 2022-08-03 DIAGNOSIS — Z79.4 TYPE 2 DIABETES MELLITUS WITH STAGE 3 CHRONIC KIDNEY DISEASE, WITH LONG-TERM CURRENT USE OF INSULIN, UNSPECIFIED WHETHER STAGE 3A OR 3B CKD: ICD-10-CM

## 2022-08-03 DIAGNOSIS — N18.30 STAGE 3 CHRONIC KIDNEY DISEASE, UNSPECIFIED WHETHER STAGE 3A OR 3B CKD: Primary | ICD-10-CM

## 2022-08-03 DIAGNOSIS — N18.30 TYPE 2 DIABETES MELLITUS WITH STAGE 3 CHRONIC KIDNEY DISEASE, WITH LONG-TERM CURRENT USE OF INSULIN, UNSPECIFIED WHETHER STAGE 3A OR 3B CKD: ICD-10-CM

## 2022-08-03 DIAGNOSIS — E11.22 TYPE 2 DIABETES MELLITUS WITH STAGE 3 CHRONIC KIDNEY DISEASE, WITH LONG-TERM CURRENT USE OF INSULIN, UNSPECIFIED WHETHER STAGE 3A OR 3B CKD: ICD-10-CM

## 2022-08-03 NOTE — TELEPHONE ENCOUNTER
Please let him know that I'd like to get a repeat kidney lab in about a month. He can get it done one day when he comes in to get his INR checked.      Patient notified & verbalized understanding.

## 2022-08-03 NOTE — TELEPHONE ENCOUNTER
Please let him know that I'd like to get a repeat kidney lab in about a month. He can get it done one day when he comes in to get his INR checked.

## 2022-08-03 NOTE — TELEPHONE ENCOUNTER
"----- Message from Mesha Christina MD sent at 8/3/2022  3:54 AM EDT -----  Please call Benitez. Two concerning things about his labs. His A1C has jumped from 8.4 to 9. Can he get it down by diet? Do we need to increase his insulin? Secondly, is he getting enough fluids? His kidney function worsened from 1.39 to 1.61, which is a pretty big jump.       Spoke with patient & he verbalized understanding,reports \"ITS ME\" I know what to do & will work on it,reports he drinks about 3-4 glasses of water a day,instructed to increase to 6-8 & he verbalized understanding.  "

## 2022-08-09 ENCOUNTER — ANTICOAGULATION VISIT (OUTPATIENT)
Dept: FAMILY MEDICINE CLINIC | Facility: CLINIC | Age: 67
End: 2022-08-09

## 2022-08-09 DIAGNOSIS — Z79.01 ANTICOAGULANT LONG-TERM USE: Primary | ICD-10-CM

## 2022-08-09 LAB — INR PPP: 2.8 (ref 2.5–3.5)

## 2022-08-09 PROCEDURE — 36416 COLLJ CAPILLARY BLOOD SPEC: CPT | Performed by: FAMILY MEDICINE

## 2022-08-09 PROCEDURE — 85610 PROTHROMBIN TIME: CPT | Performed by: FAMILY MEDICINE

## 2022-08-18 ENCOUNTER — TELEPHONE (OUTPATIENT)
Dept: FAMILY MEDICINE CLINIC | Facility: CLINIC | Age: 67
End: 2022-08-18

## 2022-08-27 DIAGNOSIS — N18.30 BENIGN HYPERTENSION WITH CKD (CHRONIC KIDNEY DISEASE) STAGE III: ICD-10-CM

## 2022-08-27 DIAGNOSIS — I12.9 BENIGN HYPERTENSION WITH CKD (CHRONIC KIDNEY DISEASE) STAGE III: ICD-10-CM

## 2022-08-29 ENCOUNTER — LAB (OUTPATIENT)
Dept: FAMILY MEDICINE CLINIC | Facility: CLINIC | Age: 67
End: 2022-08-29

## 2022-08-29 ENCOUNTER — ANTICOAGULATION VISIT (OUTPATIENT)
Dept: FAMILY MEDICINE CLINIC | Facility: CLINIC | Age: 67
End: 2022-08-29

## 2022-08-29 DIAGNOSIS — Z79.4 TYPE 2 DIABETES MELLITUS WITH STAGE 3 CHRONIC KIDNEY DISEASE, WITH LONG-TERM CURRENT USE OF INSULIN, UNSPECIFIED WHETHER STAGE 3A OR 3B CKD: ICD-10-CM

## 2022-08-29 DIAGNOSIS — Z95.2 H/O MECHANICAL AORTIC VALVE REPLACEMENT: Primary | ICD-10-CM

## 2022-08-29 DIAGNOSIS — E11.22 TYPE 2 DIABETES MELLITUS WITH STAGE 3 CHRONIC KIDNEY DISEASE, WITH LONG-TERM CURRENT USE OF INSULIN, UNSPECIFIED WHETHER STAGE 3A OR 3B CKD: ICD-10-CM

## 2022-08-29 DIAGNOSIS — N18.30 STAGE 3 CHRONIC KIDNEY DISEASE, UNSPECIFIED WHETHER STAGE 3A OR 3B CKD: ICD-10-CM

## 2022-08-29 DIAGNOSIS — N18.30 TYPE 2 DIABETES MELLITUS WITH STAGE 3 CHRONIC KIDNEY DISEASE, WITH LONG-TERM CURRENT USE OF INSULIN, UNSPECIFIED WHETHER STAGE 3A OR 3B CKD: ICD-10-CM

## 2022-08-29 LAB — INR PPP: 3.5 (ref 2.5–3.5)

## 2022-08-29 PROCEDURE — 80048 BASIC METABOLIC PNL TOTAL CA: CPT | Performed by: FAMILY MEDICINE

## 2022-08-29 PROCEDURE — 36416 COLLJ CAPILLARY BLOOD SPEC: CPT | Performed by: FAMILY MEDICINE

## 2022-08-29 PROCEDURE — 85610 PROTHROMBIN TIME: CPT | Performed by: FAMILY MEDICINE

## 2022-08-29 RX ORDER — AMLODIPINE BESYLATE 5 MG/1
TABLET ORAL
Qty: 90 TABLET | Refills: 0 | Status: ON HOLD | OUTPATIENT
Start: 2022-08-29 | End: 2022-10-25

## 2022-08-30 LAB
ANION GAP SERPL CALCULATED.3IONS-SCNC: 14.6 MMOL/L (ref 5–15)
BUN SERPL-MCNC: 20 MG/DL (ref 8–23)
BUN/CREAT SERPL: 14.7 (ref 7–25)
CALCIUM SPEC-SCNC: 9.1 MG/DL (ref 8.6–10.5)
CHLORIDE SERPL-SCNC: 102 MMOL/L (ref 98–107)
CO2 SERPL-SCNC: 20.4 MMOL/L (ref 22–29)
CREAT SERPL-MCNC: 1.36 MG/DL (ref 0.76–1.27)
EGFRCR SERPLBLD CKD-EPI 2021: 56.3 ML/MIN/1.73
GLUCOSE SERPL-MCNC: 127 MG/DL (ref 65–99)
POTASSIUM SERPL-SCNC: 4.8 MMOL/L (ref 3.5–5.2)
SODIUM SERPL-SCNC: 137 MMOL/L (ref 136–145)

## 2022-09-08 DIAGNOSIS — Z79.01 ANTICOAGULANT LONG-TERM USE: ICD-10-CM

## 2022-09-08 DIAGNOSIS — I05.0 RHEUMATIC MITRAL STENOSIS: ICD-10-CM

## 2022-09-09 ENCOUNTER — ANTICOAGULATION VISIT (OUTPATIENT)
Dept: FAMILY MEDICINE CLINIC | Facility: CLINIC | Age: 67
End: 2022-09-09

## 2022-09-09 DIAGNOSIS — I50.9 CONGESTIVE HEART FAILURE, UNSPECIFIED HF CHRONICITY, UNSPECIFIED HEART FAILURE TYPE: Primary | ICD-10-CM

## 2022-09-09 LAB
INR PPP: 2.7 (ref 2.5–3.5)
INR PPP: 2.7 (ref 2.5–3.5)

## 2022-09-09 PROCEDURE — 36416 COLLJ CAPILLARY BLOOD SPEC: CPT | Performed by: FAMILY MEDICINE

## 2022-09-09 PROCEDURE — 85610 PROTHROMBIN TIME: CPT | Performed by: FAMILY MEDICINE

## 2022-09-12 RX ORDER — WARFARIN SODIUM 6 MG/1
TABLET ORAL
Qty: 90 TABLET | Refills: 1 | Status: ON HOLD | OUTPATIENT
Start: 2022-09-12 | End: 2022-10-25

## 2022-09-30 ENCOUNTER — ANTICOAGULATION VISIT (OUTPATIENT)
Dept: FAMILY MEDICINE CLINIC | Facility: CLINIC | Age: 67
End: 2022-09-30

## 2022-09-30 LAB — INR PPP: 3.5 (ref 0.9–1.1)

## 2022-09-30 PROCEDURE — 85610 PROTHROMBIN TIME: CPT | Performed by: FAMILY MEDICINE

## 2022-09-30 PROCEDURE — 36416 COLLJ CAPILLARY BLOOD SPEC: CPT | Performed by: FAMILY MEDICINE

## 2022-10-17 ENCOUNTER — ANTICOAGULATION VISIT (OUTPATIENT)
Dept: FAMILY MEDICINE CLINIC | Facility: CLINIC | Age: 67
End: 2022-10-17

## 2022-10-17 DIAGNOSIS — Z95.2 H/O MECHANICAL AORTIC VALVE REPLACEMENT: Primary | ICD-10-CM

## 2022-10-17 LAB — INR PPP: 3.7 (ref 2.5–3.5)

## 2022-10-17 PROCEDURE — 36416 COLLJ CAPILLARY BLOOD SPEC: CPT | Performed by: FAMILY MEDICINE

## 2022-10-17 PROCEDURE — 85610 PROTHROMBIN TIME: CPT | Performed by: FAMILY MEDICINE

## 2022-10-20 ENCOUNTER — ANTICOAGULATION VISIT (OUTPATIENT)
Dept: FAMILY MEDICINE CLINIC | Facility: CLINIC | Age: 67
End: 2022-10-20

## 2022-10-25 ENCOUNTER — APPOINTMENT (OUTPATIENT)
Dept: CT IMAGING | Facility: HOSPITAL | Age: 67
End: 2022-10-25

## 2022-10-25 ENCOUNTER — OFFICE VISIT (OUTPATIENT)
Dept: FAMILY MEDICINE CLINIC | Facility: CLINIC | Age: 67
End: 2022-10-25

## 2022-10-25 ENCOUNTER — HOSPITAL ENCOUNTER (INPATIENT)
Facility: HOSPITAL | Age: 67
LOS: 2 days | Discharge: HOME | End: 2022-10-27
Attending: STUDENT IN AN ORGANIZED HEALTH CARE EDUCATION/TRAINING PROGRAM | Admitting: HOSPITALIST

## 2022-10-25 VITALS — BODY MASS INDEX: 35.07 KG/M2 | DIASTOLIC BLOOD PRESSURE: 87 MMHG | SYSTOLIC BLOOD PRESSURE: 130 MMHG | WEIGHT: 258.6 LBS

## 2022-10-25 DIAGNOSIS — I63.9 CEREBROVASCULAR ACCIDENT (CVA), UNSPECIFIED MECHANISM: Primary | ICD-10-CM

## 2022-10-25 DIAGNOSIS — Z79.4 TYPE 2 DIABETES MELLITUS WITH OTHER SPECIFIED COMPLICATION, WITH LONG-TERM CURRENT USE OF INSULIN: ICD-10-CM

## 2022-10-25 DIAGNOSIS — E66.01 MORBIDLY OBESE: ICD-10-CM

## 2022-10-25 DIAGNOSIS — E11.69 TYPE 2 DIABETES MELLITUS WITH OTHER SPECIFIED COMPLICATION, WITH LONG-TERM CURRENT USE OF INSULIN: ICD-10-CM

## 2022-10-25 LAB
ALBUMIN SERPL-MCNC: 3.75 G/DL (ref 3.5–5.2)
ALBUMIN/GLOB SERPL: 1 G/DL
ALP SERPL-CCNC: 61 U/L (ref 39–117)
ALT SERPL W P-5'-P-CCNC: 22 U/L (ref 1–41)
ANION GAP SERPL CALCULATED.3IONS-SCNC: 13.8 MMOL/L (ref 5–15)
APTT PPP: 36.3 SECONDS (ref 26.5–34.5)
AST SERPL-CCNC: 19 U/L (ref 1–40)
BASOPHILS # BLD AUTO: 0.1 10*3/MM3 (ref 0–0.2)
BASOPHILS NFR BLD AUTO: 0.9 % (ref 0–1.5)
BILIRUB SERPL-MCNC: 0.4 MG/DL (ref 0–1.2)
BILIRUB UR QL STRIP: NEGATIVE
BUN SERPL-MCNC: 29 MG/DL (ref 8–23)
BUN/CREAT SERPL: 19 (ref 7–25)
CALCIUM SPEC-SCNC: 9.3 MG/DL (ref 8.6–10.5)
CHLORIDE SERPL-SCNC: 106 MMOL/L (ref 98–107)
CLARITY UR: CLEAR
CO2 SERPL-SCNC: 18.2 MMOL/L (ref 22–29)
COLOR UR: YELLOW
CREAT BLDA-MCNC: 1.6 MG/DL (ref 0.6–1.3)
CREAT SERPL-MCNC: 1.53 MG/DL (ref 0.76–1.27)
DEPRECATED RDW RBC AUTO: 45.9 FL (ref 37–54)
EGFRCR SERPLBLD CKD-EPI 2021: 48.9 ML/MIN/1.73
EOSINOPHIL # BLD AUTO: 0.57 10*3/MM3 (ref 0–0.4)
EOSINOPHIL NFR BLD AUTO: 5 % (ref 0.3–6.2)
ERYTHROCYTE [DISTWIDTH] IN BLOOD BY AUTOMATED COUNT: 14.6 % (ref 12.3–15.4)
FLUAV RNA RESP QL NAA+PROBE: NOT DETECTED
FLUBV RNA RESP QL NAA+PROBE: NOT DETECTED
GLOBULIN UR ELPH-MCNC: 3.8 GM/DL
GLUCOSE BLDC GLUCOMTR-MCNC: 151 MG/DL (ref 70–130)
GLUCOSE BLDC GLUCOMTR-MCNC: 202 MG/DL (ref 70–130)
GLUCOSE BLDC GLUCOMTR-MCNC: 276 MG/DL (ref 70–130)
GLUCOSE SERPL-MCNC: 140 MG/DL (ref 65–99)
GLUCOSE UR STRIP-MCNC: NEGATIVE MG/DL
HBA1C MFR BLD: 9 % (ref 4.8–5.6)
HCT VFR BLD AUTO: 43.9 % (ref 37.5–51)
HGB BLD-MCNC: 14.4 G/DL (ref 13–17.7)
HGB UR QL STRIP.AUTO: NEGATIVE
IMM GRANULOCYTES # BLD AUTO: 0.11 10*3/MM3 (ref 0–0.05)
IMM GRANULOCYTES NFR BLD AUTO: 1 % (ref 0–0.5)
INR PPP: 1.52 (ref 0.9–1.1)
KETONES UR QL STRIP: NEGATIVE
LEUKOCYTE ESTERASE UR QL STRIP.AUTO: NEGATIVE
LYMPHOCYTES # BLD AUTO: 1.93 10*3/MM3 (ref 0.7–3.1)
LYMPHOCYTES NFR BLD AUTO: 16.8 % (ref 19.6–45.3)
MCH RBC QN AUTO: 28.4 PG (ref 26.6–33)
MCHC RBC AUTO-ENTMCNC: 32.8 G/DL (ref 31.5–35.7)
MCV RBC AUTO: 86.6 FL (ref 79–97)
MONOCYTES # BLD AUTO: 0.92 10*3/MM3 (ref 0.1–0.9)
MONOCYTES NFR BLD AUTO: 8 % (ref 5–12)
NEUTROPHILS NFR BLD AUTO: 68.3 % (ref 42.7–76)
NEUTROPHILS NFR BLD AUTO: 7.85 10*3/MM3 (ref 1.7–7)
NITRITE UR QL STRIP: NEGATIVE
NRBC BLD AUTO-RTO: 0 /100 WBC (ref 0–0.2)
PH UR STRIP.AUTO: <=5 [PH] (ref 5–8)
PLATELET # BLD AUTO: 166 10*3/MM3 (ref 140–450)
PMV BLD AUTO: 11.7 FL (ref 6–12)
POTASSIUM SERPL-SCNC: 4.6 MMOL/L (ref 3.5–5.2)
PROT SERPL-MCNC: 7.5 G/DL (ref 6–8.5)
PROT UR QL STRIP: NEGATIVE
PROTHROMBIN TIME: 18.6 SECONDS (ref 12.1–14.7)
RBC # BLD AUTO: 5.07 10*6/MM3 (ref 4.14–5.8)
SARS-COV-2 RNA RESP QL NAA+PROBE: NOT DETECTED
SODIUM SERPL-SCNC: 138 MMOL/L (ref 136–145)
SP GR UR STRIP: >1.03 (ref 1–1.03)
T4 FREE SERPL-MCNC: 1.07 NG/DL (ref 0.93–1.7)
TROPONIN T SERPL-MCNC: <0.01 NG/ML (ref 0–0.03)
TSH SERPL DL<=0.05 MIU/L-ACNC: 4.68 UIU/ML (ref 0.27–4.2)
UROBILINOGEN UR QL STRIP: ABNORMAL
WBC NRBC COR # BLD: 11.48 10*3/MM3 (ref 3.4–10.8)

## 2022-10-25 PROCEDURE — 87636 SARSCOV2 & INF A&B AMP PRB: CPT | Performed by: STUDENT IN AN ORGANIZED HEALTH CARE EDUCATION/TRAINING PROGRAM

## 2022-10-25 PROCEDURE — 84439 ASSAY OF FREE THYROXINE: CPT

## 2022-10-25 PROCEDURE — 94799 UNLISTED PULMONARY SVC/PX: CPT

## 2022-10-25 PROCEDURE — 99223 1ST HOSP IP/OBS HIGH 75: CPT

## 2022-10-25 PROCEDURE — 85610 PROTHROMBIN TIME: CPT | Performed by: STUDENT IN AN ORGANIZED HEALTH CARE EDUCATION/TRAINING PROGRAM

## 2022-10-25 PROCEDURE — G0008 ADMIN INFLUENZA VIRUS VAC: HCPCS | Performed by: HOSPITALIST

## 2022-10-25 PROCEDURE — 0 IOPAMIDOL PER 1 ML: Performed by: STUDENT IN AN ORGANIZED HEALTH CARE EDUCATION/TRAINING PROGRAM

## 2022-10-25 PROCEDURE — 81003 URINALYSIS AUTO W/O SCOPE: CPT | Performed by: HOSPITALIST

## 2022-10-25 PROCEDURE — 94660 CPAP INITIATION&MGMT: CPT

## 2022-10-25 PROCEDURE — 99223 1ST HOSP IP/OBS HIGH 75: CPT | Performed by: STUDENT IN AN ORGANIZED HEALTH CARE EDUCATION/TRAINING PROGRAM

## 2022-10-25 PROCEDURE — 70496 CT ANGIOGRAPHY HEAD: CPT

## 2022-10-25 PROCEDURE — 63710000001 INSULIN DETEMIR PER 5 UNITS: Performed by: HOSPITALIST

## 2022-10-25 PROCEDURE — 0042T HC CT CEREBRAL PERFUSION W/WO CONTRAST: CPT

## 2022-10-25 PROCEDURE — 36415 COLL VENOUS BLD VENIPUNCTURE: CPT

## 2022-10-25 PROCEDURE — 85730 THROMBOPLASTIN TIME PARTIAL: CPT | Performed by: STUDENT IN AN ORGANIZED HEALTH CARE EDUCATION/TRAINING PROGRAM

## 2022-10-25 PROCEDURE — 25010000002 DIPHENHYDRAMINE PER 50 MG

## 2022-10-25 PROCEDURE — 94761 N-INVAS EAR/PLS OXIMETRY MLT: CPT

## 2022-10-25 PROCEDURE — 70450 CT HEAD/BRAIN W/O DYE: CPT | Performed by: RADIOLOGY

## 2022-10-25 PROCEDURE — 63710000001 INSULIN ASPART PER 5 UNITS

## 2022-10-25 PROCEDURE — 85025 COMPLETE CBC W/AUTO DIFF WBC: CPT | Performed by: STUDENT IN AN ORGANIZED HEALTH CARE EDUCATION/TRAINING PROGRAM

## 2022-10-25 PROCEDURE — 25010000002 ENOXAPARIN PER 10 MG: Performed by: STUDENT IN AN ORGANIZED HEALTH CARE EDUCATION/TRAINING PROGRAM

## 2022-10-25 PROCEDURE — 82746 ASSAY OF FOLIC ACID SERUM: CPT | Performed by: HOSPITALIST

## 2022-10-25 PROCEDURE — 99214 OFFICE O/P EST MOD 30 MIN: CPT | Performed by: FAMILY MEDICINE

## 2022-10-25 PROCEDURE — 83036 HEMOGLOBIN GLYCOSYLATED A1C: CPT | Performed by: HOSPITALIST

## 2022-10-25 PROCEDURE — 82962 GLUCOSE BLOOD TEST: CPT

## 2022-10-25 PROCEDURE — 70498 CT ANGIOGRAPHY NECK: CPT

## 2022-10-25 PROCEDURE — 25010000002 DEXAMETHASONE SODIUM PHOSPHATE 10 MG/ML SOLUTION: Performed by: STUDENT IN AN ORGANIZED HEALTH CARE EDUCATION/TRAINING PROGRAM

## 2022-10-25 PROCEDURE — 70498 CT ANGIOGRAPHY NECK: CPT | Performed by: RADIOLOGY

## 2022-10-25 PROCEDURE — 63710000001 INSULIN ASPART PER 5 UNITS: Performed by: HOSPITALIST

## 2022-10-25 PROCEDURE — 82565 ASSAY OF CREATININE: CPT

## 2022-10-25 PROCEDURE — 70496 CT ANGIOGRAPHY HEAD: CPT | Performed by: RADIOLOGY

## 2022-10-25 PROCEDURE — 82607 VITAMIN B-12: CPT | Performed by: HOSPITALIST

## 2022-10-25 PROCEDURE — 84484 ASSAY OF TROPONIN QUANT: CPT | Performed by: STUDENT IN AN ORGANIZED HEALTH CARE EDUCATION/TRAINING PROGRAM

## 2022-10-25 PROCEDURE — 80053 COMPREHEN METABOLIC PANEL: CPT | Performed by: STUDENT IN AN ORGANIZED HEALTH CARE EDUCATION/TRAINING PROGRAM

## 2022-10-25 PROCEDURE — 99285 EMERGENCY DEPT VISIT HI MDM: CPT

## 2022-10-25 PROCEDURE — 90662 IIV NO PRSV INCREASED AG IM: CPT | Performed by: HOSPITALIST

## 2022-10-25 PROCEDURE — 84443 ASSAY THYROID STIM HORMONE: CPT | Performed by: HOSPITALIST

## 2022-10-25 PROCEDURE — 93005 ELECTROCARDIOGRAM TRACING: CPT | Performed by: STUDENT IN AN ORGANIZED HEALTH CARE EDUCATION/TRAINING PROGRAM

## 2022-10-25 PROCEDURE — 70450 CT HEAD/BRAIN W/O DYE: CPT

## 2022-10-25 PROCEDURE — 25010000002 INFLUENZA VAC HIGH-DOSE QUAD 0.7 ML SUSPENSION PREFILLED SYRINGE: Performed by: HOSPITALIST

## 2022-10-25 PROCEDURE — 94762 N-INVAS EAR/PLS OXIMTRY CONT: CPT

## 2022-10-25 RX ORDER — CLOBETASOL PROPIONATE 0.5 MG/G
1 CREAM TOPICAL 2 TIMES DAILY
Status: DISCONTINUED | OUTPATIENT
Start: 2022-10-25 | End: 2022-10-27 | Stop reason: HOSPADM

## 2022-10-25 RX ORDER — DEXAMETHASONE SODIUM PHOSPHATE 10 MG/ML
10 INJECTION, SOLUTION INTRAMUSCULAR; INTRAVENOUS EVERY 4 HOURS
Status: DISCONTINUED | OUTPATIENT
Start: 2022-10-25 | End: 2022-10-25

## 2022-10-25 RX ORDER — AMLODIPINE BESYLATE 5 MG/1
5 TABLET ORAL DAILY
Status: ON HOLD | COMMUNITY
End: 2022-10-25

## 2022-10-25 RX ORDER — PRAVASTATIN SODIUM 40 MG
40 TABLET ORAL DAILY
Status: DISCONTINUED | OUTPATIENT
Start: 2022-10-26 | End: 2022-10-27 | Stop reason: HOSPADM

## 2022-10-25 RX ORDER — CLOBETASOL PROPIONATE 0.5 MG/G
1 CREAM TOPICAL 2 TIMES DAILY PRN
Status: CANCELLED | OUTPATIENT
Start: 2022-10-25

## 2022-10-25 RX ORDER — IPRATROPIUM BROMIDE AND ALBUTEROL SULFATE 2.5; .5 MG/3ML; MG/3ML
3 SOLUTION RESPIRATORY (INHALATION)
Status: DISCONTINUED | OUTPATIENT
Start: 2022-10-25 | End: 2022-10-27 | Stop reason: HOSPADM

## 2022-10-25 RX ORDER — SPIRONOLACTONE 25 MG/1
25 TABLET ORAL DAILY
Status: DISCONTINUED | OUTPATIENT
Start: 2022-10-26 | End: 2022-10-27 | Stop reason: HOSPADM

## 2022-10-25 RX ORDER — METOPROLOL SUCCINATE 50 MG/1
50 TABLET, EXTENDED RELEASE ORAL DAILY
Status: DISCONTINUED | OUTPATIENT
Start: 2022-10-26 | End: 2022-10-27 | Stop reason: HOSPADM

## 2022-10-25 RX ORDER — AMLODIPINE BESYLATE 5 MG/1
5 TABLET ORAL DAILY
COMMUNITY
End: 2022-11-11 | Stop reason: HOSPADM

## 2022-10-25 RX ORDER — CLOBETASOL PROPIONATE 0.5 MG/G
1 CREAM TOPICAL 2 TIMES DAILY PRN
COMMUNITY
End: 2022-10-27

## 2022-10-25 RX ORDER — AMOXICILLIN 250 MG
2 CAPSULE ORAL 2 TIMES DAILY
Status: DISCONTINUED | OUTPATIENT
Start: 2022-10-25 | End: 2022-10-27 | Stop reason: HOSPADM

## 2022-10-25 RX ORDER — METOPROLOL SUCCINATE 50 MG/1
50 TABLET, EXTENDED RELEASE ORAL DAILY
Status: ON HOLD | COMMUNITY
End: 2022-10-31

## 2022-10-25 RX ORDER — WARFARIN SODIUM 1 MG/1
1 TABLET ORAL NIGHTLY
Status: CANCELLED | OUTPATIENT
Start: 2022-10-25

## 2022-10-25 RX ORDER — INSULIN ASPART 100 [IU]/ML
8 INJECTION, SOLUTION INTRAVENOUS; SUBCUTANEOUS
Status: DISCONTINUED | OUTPATIENT
Start: 2022-10-25 | End: 2022-10-27 | Stop reason: HOSPADM

## 2022-10-25 RX ORDER — AMLODIPINE BESYLATE 5 MG/1
5 TABLET ORAL DAILY
Status: DISCONTINUED | OUTPATIENT
Start: 2022-10-26 | End: 2022-10-27 | Stop reason: HOSPADM

## 2022-10-25 RX ORDER — DIPHENHYDRAMINE HYDROCHLORIDE 50 MG/ML
50 INJECTION INTRAMUSCULAR; INTRAVENOUS
Status: COMPLETED | OUTPATIENT
Start: 2022-10-25 | End: 2022-10-25

## 2022-10-25 RX ORDER — NITROGLYCERIN 0.4 MG/1
0.4 TABLET SUBLINGUAL
Status: DISCONTINUED | OUTPATIENT
Start: 2022-10-25 | End: 2022-10-27 | Stop reason: HOSPADM

## 2022-10-25 RX ORDER — LOSARTAN POTASSIUM 50 MG/1
100 TABLET ORAL DAILY
Status: DISCONTINUED | OUTPATIENT
Start: 2022-10-26 | End: 2022-10-27 | Stop reason: HOSPADM

## 2022-10-25 RX ORDER — PRAVASTATIN SODIUM 40 MG
40 TABLET ORAL NIGHTLY
Status: DISCONTINUED | OUTPATIENT
Start: 2022-10-25 | End: 2022-10-25 | Stop reason: ALTCHOICE

## 2022-10-25 RX ORDER — DIPHENHYDRAMINE HYDROCHLORIDE 50 MG/ML
INJECTION INTRAMUSCULAR; INTRAVENOUS
Status: COMPLETED
Start: 2022-10-25 | End: 2022-10-25

## 2022-10-25 RX ORDER — WARFARIN SODIUM 3 MG/1
6 TABLET ORAL NIGHTLY
Status: CANCELLED | OUTPATIENT
Start: 2022-10-25

## 2022-10-25 RX ORDER — SODIUM CHLORIDE 0.9 % (FLUSH) 0.9 %
10 SYRINGE (ML) INJECTION AS NEEDED
Status: DISCONTINUED | OUTPATIENT
Start: 2022-10-25 | End: 2022-10-27 | Stop reason: HOSPADM

## 2022-10-25 RX ORDER — PRAVASTATIN SODIUM 40 MG
40 TABLET ORAL DAILY
COMMUNITY
End: 2022-11-11 | Stop reason: HOSPADM

## 2022-10-25 RX ORDER — NICOTINE POLACRILEX 4 MG
15 LOZENGE BUCCAL
Status: DISCONTINUED | OUTPATIENT
Start: 2022-10-25 | End: 2022-10-27 | Stop reason: HOSPADM

## 2022-10-25 RX ORDER — ACETAMINOPHEN 325 MG/1
650 TABLET ORAL EVERY 4 HOURS PRN
Status: DISCONTINUED | OUTPATIENT
Start: 2022-10-25 | End: 2022-10-27 | Stop reason: HOSPADM

## 2022-10-25 RX ORDER — WARFARIN SODIUM 5 MG/1
10 TABLET ORAL
Status: COMPLETED | OUTPATIENT
Start: 2022-10-25 | End: 2022-10-25

## 2022-10-25 RX ORDER — CETIRIZINE HYDROCHLORIDE 10 MG/1
10 TABLET ORAL DAILY PRN
Status: DISCONTINUED | OUTPATIENT
Start: 2022-10-25 | End: 2022-10-27 | Stop reason: HOSPADM

## 2022-10-25 RX ORDER — WARFARIN SODIUM 6 MG/1
6 TABLET ORAL NIGHTLY
Status: ON HOLD | COMMUNITY
End: 2022-11-11 | Stop reason: SDUPTHER

## 2022-10-25 RX ORDER — POLYETHYLENE GLYCOL 3350 17 G/17G
17 POWDER, FOR SOLUTION ORAL DAILY PRN
Status: DISCONTINUED | OUTPATIENT
Start: 2022-10-25 | End: 2022-10-27 | Stop reason: HOSPADM

## 2022-10-25 RX ORDER — ENOXAPARIN SODIUM 100 MG/ML
1 INJECTION SUBCUTANEOUS EVERY 12 HOURS
Status: DISCONTINUED | OUTPATIENT
Start: 2022-10-25 | End: 2022-10-27 | Stop reason: HOSPADM

## 2022-10-25 RX ORDER — INSULIN GLARGINE 100 [IU]/ML
41 INJECTION, SOLUTION SUBCUTANEOUS
Status: ON HOLD | COMMUNITY
End: 2022-10-27 | Stop reason: SDUPTHER

## 2022-10-25 RX ORDER — LOSARTAN POTASSIUM 100 MG/1
100 TABLET ORAL DAILY
COMMUNITY
End: 2022-11-11 | Stop reason: HOSPADM

## 2022-10-25 RX ORDER — GABAPENTIN 600 MG/1
600 TABLET ORAL DAILY PRN
COMMUNITY
End: 2022-11-11 | Stop reason: HOSPADM

## 2022-10-25 RX ORDER — CETIRIZINE HYDROCHLORIDE 10 MG/1
10 TABLET ORAL DAILY PRN
COMMUNITY
End: 2022-11-11 | Stop reason: HOSPADM

## 2022-10-25 RX ORDER — CYCLOBENZAPRINE HCL 10 MG
10 TABLET ORAL 3 TIMES DAILY PRN
Status: DISCONTINUED | OUTPATIENT
Start: 2022-10-25 | End: 2022-10-27 | Stop reason: HOSPADM

## 2022-10-25 RX ORDER — BISACODYL 5 MG/1
5 TABLET, DELAYED RELEASE ORAL DAILY PRN
Status: DISCONTINUED | OUTPATIENT
Start: 2022-10-25 | End: 2022-10-27 | Stop reason: HOSPADM

## 2022-10-25 RX ORDER — DEXTROSE MONOHYDRATE 25 G/50ML
25 INJECTION, SOLUTION INTRAVENOUS
Status: DISCONTINUED | OUTPATIENT
Start: 2022-10-25 | End: 2022-10-27 | Stop reason: HOSPADM

## 2022-10-25 RX ORDER — SODIUM CHLORIDE 0.9 % (FLUSH) 0.9 %
10 SYRINGE (ML) INJECTION EVERY 12 HOURS SCHEDULED
Status: DISCONTINUED | OUTPATIENT
Start: 2022-10-25 | End: 2022-10-27 | Stop reason: HOSPADM

## 2022-10-25 RX ORDER — GABAPENTIN 300 MG/1
300 CAPSULE ORAL DAILY PRN
Status: DISCONTINUED | OUTPATIENT
Start: 2022-10-25 | End: 2022-10-27 | Stop reason: HOSPADM

## 2022-10-25 RX ORDER — ACETAMINOPHEN 650 MG/1
650 SUPPOSITORY RECTAL EVERY 4 HOURS PRN
Status: DISCONTINUED | OUTPATIENT
Start: 2022-10-25 | End: 2022-10-27 | Stop reason: HOSPADM

## 2022-10-25 RX ORDER — INSULIN ASPART 100 [IU]/ML
0-14 INJECTION, SOLUTION INTRAVENOUS; SUBCUTANEOUS
Status: DISCONTINUED | OUTPATIENT
Start: 2022-10-25 | End: 2022-10-27 | Stop reason: HOSPADM

## 2022-10-25 RX ORDER — INSULIN GLARGINE 100 [IU]/ML
43 INJECTION, SOLUTION SUBCUTANEOUS NIGHTLY
Status: ON HOLD | COMMUNITY
End: 2022-11-10 | Stop reason: SDUPTHER

## 2022-10-25 RX ORDER — SPIRONOLACTONE 25 MG/1
25 TABLET ORAL DAILY
COMMUNITY
End: 2022-11-11 | Stop reason: HOSPADM

## 2022-10-25 RX ORDER — BISACODYL 10 MG
10 SUPPOSITORY, RECTAL RECTAL DAILY PRN
Status: DISCONTINUED | OUTPATIENT
Start: 2022-10-25 | End: 2022-10-27 | Stop reason: HOSPADM

## 2022-10-25 RX ADMIN — DIPHENHYDRAMINE HYDROCHLORIDE 50 MG: 50 INJECTION INTRAMUSCULAR; INTRAVENOUS at 11:46

## 2022-10-25 RX ADMIN — INSULIN ASPART 5 UNITS: 100 INJECTION, SOLUTION INTRAVENOUS; SUBCUTANEOUS at 18:13

## 2022-10-25 RX ADMIN — WARFARIN 10 MG: 5 TABLET ORAL at 18:14

## 2022-10-25 RX ADMIN — INSULIN ASPART 8 UNITS: 100 INJECTION, SOLUTION INTRAVENOUS; SUBCUTANEOUS at 18:19

## 2022-10-25 RX ADMIN — DIPHENHYDRAMINE HYDROCHLORIDE 50 MG: 50 INJECTION, SOLUTION INTRAMUSCULAR; INTRAVENOUS at 11:46

## 2022-10-25 RX ADMIN — INSULIN DETEMIR 30 UNITS: 100 INJECTION, SOLUTION SUBCUTANEOUS at 22:35

## 2022-10-25 RX ADMIN — DEXAMETHASONE SODIUM PHOSPHATE 10 MG: 10 INJECTION INTRAMUSCULAR; INTRAVENOUS at 11:46

## 2022-10-25 RX ADMIN — IOPAMIDOL 140 ML: 755 INJECTION, SOLUTION INTRAVENOUS at 12:10

## 2022-10-25 RX ADMIN — Medication 10 ML: at 15:15

## 2022-10-25 RX ADMIN — IPRATROPIUM BROMIDE AND ALBUTEROL SULFATE 3 ML: .5; 2.5 SOLUTION RESPIRATORY (INHALATION) at 19:05

## 2022-10-25 RX ADMIN — Medication 10 ML: at 22:34

## 2022-10-25 RX ADMIN — ENOXAPARIN SODIUM 120 MG: 60 INJECTION SUBCUTANEOUS at 12:47

## 2022-10-25 RX ADMIN — CLOBETASOL PROPIONATE CREAM USP, 0.05% 1 APPLICATION: 0.5 CREAM TOPICAL at 22:35

## 2022-10-25 RX ADMIN — INFLUENZA A VIRUS A/VICTORIA/2570/2019 IVR-215 (H1N1) ANTIGEN (FORMALDEHYDE INACTIVATED), INFLUENZA A VIRUS A/DARWIN/9/2021 SAN-010 (H3N2) ANTIGEN (FORMALDEHYDE INACTIVATED), INFLUENZA B VIRUS B/PHUKET/3073/2013 ANTIGEN (FORMALDEHYDE INACTIVATED), AND INFLUENZA B VIRUS B/MICHIGAN/01/2021 ANTIGEN (FORMALDEHYDE INACTIVATED) 0.7 ML: 60; 60; 60; 60 INJECTION, SUSPENSION INTRAMUSCULAR at 22:35

## 2022-10-26 LAB
ALBUMIN SERPL-MCNC: 3.53 G/DL (ref 3.5–5.2)
ALBUMIN/GLOB SERPL: 1 G/DL
ALP SERPL-CCNC: 55 U/L (ref 39–117)
ALT SERPL W P-5'-P-CCNC: 17 U/L (ref 1–41)
ANION GAP SERPL CALCULATED.3IONS-SCNC: 10.8 MMOL/L (ref 5–15)
AST SERPL-CCNC: 17 U/L (ref 1–40)
BASOPHILS # BLD AUTO: 0.03 10*3/MM3 (ref 0–0.2)
BASOPHILS NFR BLD AUTO: 0.3 % (ref 0–1.5)
BILIRUB SERPL-MCNC: 0.3 MG/DL (ref 0–1.2)
BUN SERPL-MCNC: 31 MG/DL (ref 8–23)
BUN/CREAT SERPL: 20.1 (ref 7–25)
CALCIUM SPEC-SCNC: 8.7 MG/DL (ref 8.6–10.5)
CHLORIDE SERPL-SCNC: 104 MMOL/L (ref 98–107)
CHOLEST SERPL-MCNC: 159 MG/DL (ref 0–200)
CO2 SERPL-SCNC: 17.2 MMOL/L (ref 22–29)
CREAT SERPL-MCNC: 1.54 MG/DL (ref 0.76–1.27)
DEPRECATED RDW RBC AUTO: 46.6 FL (ref 37–54)
EGFRCR SERPLBLD CKD-EPI 2021: 48.5 ML/MIN/1.73
EOSINOPHIL # BLD AUTO: 0 10*3/MM3 (ref 0–0.4)
EOSINOPHIL NFR BLD AUTO: 0 % (ref 0.3–6.2)
ERYTHROCYTE [DISTWIDTH] IN BLOOD BY AUTOMATED COUNT: 14.2 % (ref 12.3–15.4)
FOLATE SERPL-MCNC: 10.5 NG/ML (ref 4.78–24.2)
GLOBULIN UR ELPH-MCNC: 3.4 GM/DL
GLUCOSE BLDC GLUCOMTR-MCNC: 188 MG/DL (ref 70–130)
GLUCOSE BLDC GLUCOMTR-MCNC: 225 MG/DL (ref 70–130)
GLUCOSE BLDC GLUCOMTR-MCNC: 239 MG/DL (ref 70–130)
GLUCOSE BLDC GLUCOMTR-MCNC: 245 MG/DL (ref 70–130)
GLUCOSE SERPL-MCNC: 209 MG/DL (ref 65–99)
HCT VFR BLD AUTO: 43.2 % (ref 37.5–51)
HDLC SERPL-MCNC: 39 MG/DL (ref 40–60)
HGB BLD-MCNC: 13.6 G/DL (ref 13–17.7)
IMM GRANULOCYTES # BLD AUTO: 0.09 10*3/MM3 (ref 0–0.05)
IMM GRANULOCYTES NFR BLD AUTO: 0.8 % (ref 0–0.5)
INR PPP: 1.69 (ref 0.9–1.1)
LDLC SERPL CALC-MCNC: 96 MG/DL (ref 0–100)
LDLC/HDLC SERPL: 2.38 {RATIO}
LYMPHOCYTES # BLD AUTO: 0.83 10*3/MM3 (ref 0.7–3.1)
LYMPHOCYTES NFR BLD AUTO: 6.9 % (ref 19.6–45.3)
MCH RBC QN AUTO: 28.5 PG (ref 26.6–33)
MCHC RBC AUTO-ENTMCNC: 31.5 G/DL (ref 31.5–35.7)
MCV RBC AUTO: 90.4 FL (ref 79–97)
MONOCYTES # BLD AUTO: 0.42 10*3/MM3 (ref 0.1–0.9)
MONOCYTES NFR BLD AUTO: 3.5 % (ref 5–12)
NEUTROPHILS NFR BLD AUTO: 10.62 10*3/MM3 (ref 1.7–7)
NEUTROPHILS NFR BLD AUTO: 88.5 % (ref 42.7–76)
NRBC BLD AUTO-RTO: 0 /100 WBC (ref 0–0.2)
PLATELET # BLD AUTO: 143 10*3/MM3 (ref 140–450)
PMV BLD AUTO: 11.5 FL (ref 6–12)
POTASSIUM SERPL-SCNC: 5.2 MMOL/L (ref 3.5–5.2)
PROT SERPL-MCNC: 6.9 G/DL (ref 6–8.5)
PROTHROMBIN TIME: 20.4 SECONDS (ref 12.1–14.7)
QT INTERVAL: 468 MS
QTC INTERVAL: 459 MS
RBC # BLD AUTO: 4.78 10*6/MM3 (ref 4.14–5.8)
SODIUM SERPL-SCNC: 132 MMOL/L (ref 136–145)
TRIGL SERPL-MCNC: 136 MG/DL (ref 0–150)
VIT B12 BLD-MCNC: 427 PG/ML (ref 211–946)
VLDLC SERPL-MCNC: 24 MG/DL (ref 5–40)
WBC NRBC COR # BLD: 11.99 10*3/MM3 (ref 3.4–10.8)

## 2022-10-26 PROCEDURE — 25010000002 ENOXAPARIN PER 10 MG: Performed by: HOSPITALIST

## 2022-10-26 PROCEDURE — 63710000001 INSULIN ASPART PER 5 UNITS

## 2022-10-26 PROCEDURE — 85025 COMPLETE CBC W/AUTO DIFF WBC: CPT | Performed by: HOSPITALIST

## 2022-10-26 PROCEDURE — 94660 CPAP INITIATION&MGMT: CPT

## 2022-10-26 PROCEDURE — 80061 LIPID PANEL: CPT | Performed by: HOSPITALIST

## 2022-10-26 PROCEDURE — 99233 SBSQ HOSP IP/OBS HIGH 50: CPT | Performed by: STUDENT IN AN ORGANIZED HEALTH CARE EDUCATION/TRAINING PROGRAM

## 2022-10-26 PROCEDURE — 63710000001 INSULIN ASPART PER 5 UNITS: Performed by: HOSPITALIST

## 2022-10-26 PROCEDURE — 94799 UNLISTED PULMONARY SVC/PX: CPT

## 2022-10-26 PROCEDURE — 92610 EVALUATE SWALLOWING FUNCTION: CPT

## 2022-10-26 PROCEDURE — 82962 GLUCOSE BLOOD TEST: CPT

## 2022-10-26 PROCEDURE — 97161 PT EVAL LOW COMPLEX 20 MIN: CPT

## 2022-10-26 PROCEDURE — 80053 COMPREHEN METABOLIC PANEL: CPT | Performed by: HOSPITALIST

## 2022-10-26 PROCEDURE — 85610 PROTHROMBIN TIME: CPT | Performed by: HOSPITALIST

## 2022-10-26 PROCEDURE — 63710000001 INSULIN DETEMIR PER 5 UNITS: Performed by: HOSPITALIST

## 2022-10-26 PROCEDURE — 99232 SBSQ HOSP IP/OBS MODERATE 35: CPT | Performed by: HOSPITALIST

## 2022-10-26 PROCEDURE — 92523 SPEECH SOUND LANG COMPREHEN: CPT

## 2022-10-26 PROCEDURE — 94640 AIRWAY INHALATION TREATMENT: CPT

## 2022-10-26 PROCEDURE — 97166 OT EVAL MOD COMPLEX 45 MIN: CPT

## 2022-10-26 PROCEDURE — 94761 N-INVAS EAR/PLS OXIMETRY MLT: CPT

## 2022-10-26 RX ORDER — WARFARIN SODIUM 5 MG/1
10 TABLET ORAL
Status: COMPLETED | OUTPATIENT
Start: 2022-10-26 | End: 2022-10-26

## 2022-10-26 RX ADMIN — Medication 10 ML: at 09:18

## 2022-10-26 RX ADMIN — IPRATROPIUM BROMIDE AND ALBUTEROL SULFATE 3 ML: .5; 2.5 SOLUTION RESPIRATORY (INHALATION) at 12:06

## 2022-10-26 RX ADMIN — SERTRALINE 150 MG: 50 TABLET, FILM COATED ORAL at 09:16

## 2022-10-26 RX ADMIN — DOCUSATE SODIUM 50 MG AND SENNOSIDES 8.6 MG 2 TABLET: 8.6; 5 TABLET, FILM COATED ORAL at 09:17

## 2022-10-26 RX ADMIN — ENOXAPARIN SODIUM 120 MG: 60 INJECTION SUBCUTANEOUS at 13:30

## 2022-10-26 RX ADMIN — INSULIN ASPART 8 UNITS: 100 INJECTION, SOLUTION INTRAVENOUS; SUBCUTANEOUS at 08:33

## 2022-10-26 RX ADMIN — LOSARTAN POTASSIUM 100 MG: 50 TABLET, FILM COATED ORAL at 09:17

## 2022-10-26 RX ADMIN — INSULIN ASPART 5 UNITS: 100 INJECTION, SOLUTION INTRAVENOUS; SUBCUTANEOUS at 17:37

## 2022-10-26 RX ADMIN — INSULIN DETEMIR 30 UNITS: 100 INJECTION, SOLUTION SUBCUTANEOUS at 06:42

## 2022-10-26 RX ADMIN — Medication 10 ML: at 09:19

## 2022-10-26 RX ADMIN — INSULIN DETEMIR 30 UNITS: 100 INJECTION, SOLUTION SUBCUTANEOUS at 21:18

## 2022-10-26 RX ADMIN — IPRATROPIUM BROMIDE AND ALBUTEROL SULFATE 3 ML: .5; 2.5 SOLUTION RESPIRATORY (INHALATION) at 00:20

## 2022-10-26 RX ADMIN — INSULIN ASPART 3 UNITS: 100 INJECTION, SOLUTION INTRAVENOUS; SUBCUTANEOUS at 07:39

## 2022-10-26 RX ADMIN — CLOBETASOL PROPIONATE CREAM USP, 0.05% 1 APPLICATION: 0.5 CREAM TOPICAL at 21:18

## 2022-10-26 RX ADMIN — AMLODIPINE BESYLATE 5 MG: 5 TABLET ORAL at 09:17

## 2022-10-26 RX ADMIN — INSULIN ASPART 8 UNITS: 100 INJECTION, SOLUTION INTRAVENOUS; SUBCUTANEOUS at 12:44

## 2022-10-26 RX ADMIN — SPIRONOLACTONE 25 MG: 25 TABLET ORAL at 09:17

## 2022-10-26 RX ADMIN — IPRATROPIUM BROMIDE AND ALBUTEROL SULFATE 3 ML: .5; 2.5 SOLUTION RESPIRATORY (INHALATION) at 18:49

## 2022-10-26 RX ADMIN — INSULIN ASPART 8 UNITS: 100 INJECTION, SOLUTION INTRAVENOUS; SUBCUTANEOUS at 18:38

## 2022-10-26 RX ADMIN — ENOXAPARIN SODIUM 120 MG: 60 INJECTION SUBCUTANEOUS at 21:17

## 2022-10-26 RX ADMIN — IPRATROPIUM BROMIDE AND ALBUTEROL SULFATE 3 ML: .5; 2.5 SOLUTION RESPIRATORY (INHALATION) at 06:46

## 2022-10-26 RX ADMIN — CLOBETASOL PROPIONATE CREAM USP, 0.05% 1 APPLICATION: 0.5 CREAM TOPICAL at 09:19

## 2022-10-26 RX ADMIN — PRAVASTATIN SODIUM 40 MG: 40 TABLET ORAL at 09:17

## 2022-10-26 RX ADMIN — ENOXAPARIN SODIUM 120 MG: 60 INJECTION SUBCUTANEOUS at 01:42

## 2022-10-26 RX ADMIN — WARFARIN 10 MG: 5 TABLET ORAL at 18:38

## 2022-10-26 RX ADMIN — INSULIN ASPART 5 UNITS: 100 INJECTION, SOLUTION INTRAVENOUS; SUBCUTANEOUS at 11:59

## 2022-10-27 ENCOUNTER — READMISSION MANAGEMENT (OUTPATIENT)
Dept: CALL CENTER | Facility: HOSPITAL | Age: 67
End: 2022-10-27

## 2022-10-27 ENCOUNTER — APPOINTMENT (OUTPATIENT)
Dept: MRI IMAGING | Facility: HOSPITAL | Age: 67
End: 2022-10-27

## 2022-10-27 VITALS
RESPIRATION RATE: 18 BRPM | HEART RATE: 72 BPM | DIASTOLIC BLOOD PRESSURE: 72 MMHG | BODY MASS INDEX: 34.57 KG/M2 | WEIGHT: 255.2 LBS | SYSTOLIC BLOOD PRESSURE: 147 MMHG | OXYGEN SATURATION: 99 % | HEIGHT: 72 IN | TEMPERATURE: 98.3 F

## 2022-10-27 LAB
GLUCOSE BLDC GLUCOMTR-MCNC: 147 MG/DL (ref 70–130)
GLUCOSE BLDC GLUCOMTR-MCNC: 278 MG/DL (ref 70–130)
INR PPP: 2.29 (ref 0.9–1.1)
PROTHROMBIN TIME: 25.9 SECONDS (ref 12.1–14.7)
QT INTERVAL: 472 MS
QTC INTERVAL: 483 MS

## 2022-10-27 PROCEDURE — 82962 GLUCOSE BLOOD TEST: CPT

## 2022-10-27 PROCEDURE — 63710000001 INSULIN ASPART PER 5 UNITS: Performed by: HOSPITALIST

## 2022-10-27 PROCEDURE — 93005 ELECTROCARDIOGRAM TRACING: CPT | Performed by: HOSPITALIST

## 2022-10-27 PROCEDURE — 94660 CPAP INITIATION&MGMT: CPT

## 2022-10-27 PROCEDURE — 63710000001 INSULIN ASPART PER 5 UNITS

## 2022-10-27 PROCEDURE — 99239 HOSP IP/OBS DSCHRG MGMT >30: CPT | Performed by: HOSPITALIST

## 2022-10-27 PROCEDURE — 94761 N-INVAS EAR/PLS OXIMETRY MLT: CPT

## 2022-10-27 PROCEDURE — 94799 UNLISTED PULMONARY SVC/PX: CPT

## 2022-10-27 PROCEDURE — 85610 PROTHROMBIN TIME: CPT

## 2022-10-27 PROCEDURE — 70551 MRI BRAIN STEM W/O DYE: CPT

## 2022-10-27 PROCEDURE — 63710000001 INSULIN DETEMIR PER 5 UNITS: Performed by: HOSPITALIST

## 2022-10-27 PROCEDURE — 70551 MRI BRAIN STEM W/O DYE: CPT | Performed by: RADIOLOGY

## 2022-10-27 PROCEDURE — 99233 SBSQ HOSP IP/OBS HIGH 50: CPT | Performed by: STUDENT IN AN ORGANIZED HEALTH CARE EDUCATION/TRAINING PROGRAM

## 2022-10-27 RX ORDER — INSULIN GLARGINE 100 [IU]/ML
41 INJECTION, SOLUTION SUBCUTANEOUS
Refills: 12
Start: 2022-10-27 | End: 2022-11-11 | Stop reason: HOSPADM

## 2022-10-27 RX ORDER — ENOXAPARIN SODIUM 150 MG/ML
120 INJECTION SUBCUTANEOUS EVERY 12 HOURS SCHEDULED
Qty: 8 ML | Refills: 0 | Status: SHIPPED | OUTPATIENT
Start: 2022-10-27 | End: 2022-11-11 | Stop reason: HOSPADM

## 2022-10-27 RX ORDER — CLOBETASOL PROPIONATE 0.5 MG/G
1 CREAM TOPICAL 2 TIMES DAILY
Qty: 60 G | Refills: 2 | Status: ON HOLD
Start: 2022-10-27 | End: 2022-11-01

## 2022-10-27 RX ADMIN — SPIRONOLACTONE 25 MG: 25 TABLET ORAL at 09:17

## 2022-10-27 RX ADMIN — PRAVASTATIN SODIUM 40 MG: 40 TABLET ORAL at 09:17

## 2022-10-27 RX ADMIN — INSULIN ASPART 8 UNITS: 100 INJECTION, SOLUTION INTRAVENOUS; SUBCUTANEOUS at 11:47

## 2022-10-27 RX ADMIN — IPRATROPIUM BROMIDE AND ALBUTEROL SULFATE 3 ML: .5; 2.5 SOLUTION RESPIRATORY (INHALATION) at 00:15

## 2022-10-27 RX ADMIN — Medication 10 ML: at 09:17

## 2022-10-27 RX ADMIN — CLOBETASOL PROPIONATE CREAM USP, 0.05% 1 APPLICATION: 0.5 CREAM TOPICAL at 09:17

## 2022-10-27 RX ADMIN — INSULIN DETEMIR 30 UNITS: 100 INJECTION, SOLUTION SUBCUTANEOUS at 07:55

## 2022-10-27 RX ADMIN — LOSARTAN POTASSIUM 100 MG: 50 TABLET, FILM COATED ORAL at 09:17

## 2022-10-27 RX ADMIN — INSULIN ASPART 8 UNITS: 100 INJECTION, SOLUTION INTRAVENOUS; SUBCUTANEOUS at 08:15

## 2022-10-27 RX ADMIN — IPRATROPIUM BROMIDE AND ALBUTEROL SULFATE 3 ML: .5; 2.5 SOLUTION RESPIRATORY (INHALATION) at 06:50

## 2022-10-27 RX ADMIN — SERTRALINE 150 MG: 50 TABLET, FILM COATED ORAL at 09:17

## 2022-10-27 RX ADMIN — IPRATROPIUM BROMIDE AND ALBUTEROL SULFATE 3 ML: .5; 2.5 SOLUTION RESPIRATORY (INHALATION) at 12:02

## 2022-10-27 RX ADMIN — AMLODIPINE BESYLATE 5 MG: 5 TABLET ORAL at 09:17

## 2022-10-27 RX ADMIN — DOCUSATE SODIUM 50 MG AND SENNOSIDES 8.6 MG 2 TABLET: 8.6; 5 TABLET, FILM COATED ORAL at 09:17

## 2022-10-27 NOTE — OUTREACH NOTE
Prep Survey    Flowsheet Row Responses   [facility] facility patient discharged from? [place]   Is LACE score < 7 ? No   Emergency Room discharge w/ pulse ox? No   Eligibility [facility] [place]   Date of Admission 10/25/22   Date of Discharge 10/27/22   Discharge Disposition Home or Self Care   Discharge diagnosis ischemic stroke   Does the patient have one of the following disease processes/diagnoses(primary or secondary)? Stroke   Does the patient have Home health ordered? No   Is there a DME ordered? No   Prep survey completed? Yes          BRINA PERALTA - Registered Nurse

## 2022-10-28 ENCOUNTER — APPOINTMENT (OUTPATIENT)
Dept: CARDIOLOGY | Facility: HOSPITAL | Age: 67
End: 2022-10-28

## 2022-10-28 ENCOUNTER — APPOINTMENT (OUTPATIENT)
Dept: CT IMAGING | Facility: HOSPITAL | Age: 67
End: 2022-10-28

## 2022-10-28 ENCOUNTER — TELEPHONE (OUTPATIENT)
Dept: FAMILY MEDICINE CLINIC | Facility: CLINIC | Age: 67
End: 2022-10-28

## 2022-10-28 ENCOUNTER — APPOINTMENT (OUTPATIENT)
Dept: MRI IMAGING | Facility: HOSPITAL | Age: 67
End: 2022-10-28

## 2022-10-28 ENCOUNTER — HOSPITAL ENCOUNTER (INPATIENT)
Facility: HOSPITAL | Age: 67
LOS: 4 days | Discharge: NURSING, REHAB OR LONG-TERM CARE | End: 2022-11-01
Attending: STUDENT IN AN ORGANIZED HEALTH CARE EDUCATION/TRAINING PROGRAM | Admitting: INTERNAL MEDICINE

## 2022-10-28 ENCOUNTER — APPOINTMENT (OUTPATIENT)
Dept: GENERAL RADIOLOGY | Facility: HOSPITAL | Age: 67
End: 2022-10-28

## 2022-10-28 ENCOUNTER — TRANSITIONAL CARE MANAGEMENT TELEPHONE ENCOUNTER (OUTPATIENT)
Dept: CALL CENTER | Facility: HOSPITAL | Age: 67
End: 2022-10-28

## 2022-10-28 DIAGNOSIS — I63.9 CEREBROVASCULAR ACCIDENT (CVA), UNSPECIFIED MECHANISM: Primary | ICD-10-CM

## 2022-10-28 DIAGNOSIS — Z95.2 H/O MECHANICAL AORTIC VALVE REPLACEMENT: ICD-10-CM

## 2022-10-28 LAB
ABO GROUP BLD: NORMAL
ABO GROUP BLD: NORMAL
ALBUMIN SERPL-MCNC: 3.61 G/DL (ref 3.5–5.2)
ALBUMIN SERPL-MCNC: 3.81 G/DL (ref 3.5–5.2)
ALBUMIN/GLOB SERPL: 1 G/DL
ALBUMIN/GLOB SERPL: 1.1 G/DL
ALP SERPL-CCNC: 53 U/L (ref 39–117)
ALP SERPL-CCNC: 55 U/L (ref 39–117)
ALT SERPL W P-5'-P-CCNC: 25 U/L (ref 1–41)
ALT SERPL W P-5'-P-CCNC: 32 U/L (ref 1–41)
ANION GAP SERPL CALCULATED.3IONS-SCNC: 10.1 MMOL/L (ref 5–15)
ANION GAP SERPL CALCULATED.3IONS-SCNC: 12.5 MMOL/L (ref 5–15)
APTT PPP: 51.2 SECONDS (ref 26.5–34.5)
AST SERPL-CCNC: 24 U/L (ref 1–40)
AST SERPL-CCNC: 32 U/L (ref 1–40)
BASOPHILS # BLD AUTO: 0.06 10*3/MM3 (ref 0–0.2)
BASOPHILS NFR BLD AUTO: 0.6 % (ref 0–1.5)
BH CV ECHO MEAS - AO MAX PG: 18.7 MMHG
BH CV ECHO MEAS - AO MEAN PG: 10 MMHG
BH CV ECHO MEAS - AO ROOT DIAM: 2.6 CM
BH CV ECHO MEAS - AO V2 MAX: 216 CM/SEC
BH CV ECHO MEAS - AO V2 VTI: 47.4 CM
BH CV ECHO MEAS - AVA(I,D): 2.35 CM2
BH CV ECHO MEAS - EDV(CUBED): 185.2 ML
BH CV ECHO MEAS - EDV(MOD-SP4): 98.2 ML
BH CV ECHO MEAS - EF(MOD-SP4): 76.8 %
BH CV ECHO MEAS - ESV(CUBED): 24.4 ML
BH CV ECHO MEAS - ESV(MOD-SP4): 22.8 ML
BH CV ECHO MEAS - FS: 49.1 %
BH CV ECHO MEAS - IVS/LVPW: 0.71 CM
BH CV ECHO MEAS - IVSD: 1.2 CM
BH CV ECHO MEAS - LA DIMENSION: 4 CM
BH CV ECHO MEAS - LAT PEAK E' VEL: 9 CM/SEC
BH CV ECHO MEAS - LV DIASTOLIC VOL/BSA (35-75): 41.6 CM2
BH CV ECHO MEAS - LV MASS(C)D: 375.7 GRAMS
BH CV ECHO MEAS - LV MAX PG: 14.4 MMHG
BH CV ECHO MEAS - LV MEAN PG: 8 MMHG
BH CV ECHO MEAS - LV SYSTOLIC VOL/BSA (12-30): 9.7 CM2
BH CV ECHO MEAS - LV V1 MAX: 190 CM/SEC
BH CV ECHO MEAS - LV V1 VTI: 39.3 CM
BH CV ECHO MEAS - LVIDD: 5.7 CM
BH CV ECHO MEAS - LVIDS: 2.9 CM
BH CV ECHO MEAS - LVOT AREA: 2.8 CM2
BH CV ECHO MEAS - LVOT DIAM: 1.9 CM
BH CV ECHO MEAS - LVPWD: 1.7 CM
BH CV ECHO MEAS - MED PEAK E' VEL: 4 CM/SEC
BH CV ECHO MEAS - MV A MAX VEL: 172 CM/SEC
BH CV ECHO MEAS - MV DEC SLOPE: 737 CM/SEC2
BH CV ECHO MEAS - MV E MAX VEL: 147 CM/SEC
BH CV ECHO MEAS - MV E/A: 0.85
BH CV ECHO MEAS - MV MAX PG: 17.1 MMHG
BH CV ECHO MEAS - MV MEAN PG: 8 MMHG
BH CV ECHO MEAS - MV P1/2T: 82.3 MSEC
BH CV ECHO MEAS - MV V2 VTI: 75.7 CM
BH CV ECHO MEAS - MVA(P1/2T): 2.7 CM2
BH CV ECHO MEAS - MVA(VTI): 1.47 CM2
BH CV ECHO MEAS - PA ACC TIME: 0.09 SEC
BH CV ECHO MEAS - PA PR(ACCEL): 37.6 MMHG
BH CV ECHO MEAS - RAP SYSTOLE: 10 MMHG
BH CV ECHO MEAS - RVSP: 37.5 MMHG
BH CV ECHO MEAS - SI(MOD-SP4): 31.9 ML/M2
BH CV ECHO MEAS - SV(LVOT): 111.4 ML
BH CV ECHO MEAS - SV(MOD-SP4): 75.4 ML
BH CV ECHO MEAS - TAPSE (>1.6): 2.06 CM
BH CV ECHO MEAS - TR MAX PG: 27.5 MMHG
BH CV ECHO MEAS - TR MAX VEL: 262 CM/SEC
BH CV ECHO MEASUREMENTS AVERAGE E/E' RATIO: 22.62
BILIRUB SERPL-MCNC: 0.3 MG/DL (ref 0–1.2)
BILIRUB SERPL-MCNC: 0.4 MG/DL (ref 0–1.2)
BLD GP AB SCN SERPL QL: NEGATIVE
BUN SERPL-MCNC: 27 MG/DL (ref 8–23)
BUN SERPL-MCNC: 34 MG/DL (ref 8–23)
BUN/CREAT SERPL: 19.9 (ref 7–25)
BUN/CREAT SERPL: 20.7 (ref 7–25)
CALCIUM SPEC-SCNC: 8.4 MG/DL (ref 8.6–10.5)
CALCIUM SPEC-SCNC: 9 MG/DL (ref 8.6–10.5)
CHLORIDE SERPL-SCNC: 106 MMOL/L (ref 98–107)
CHLORIDE SERPL-SCNC: 108 MMOL/L (ref 98–107)
CO2 SERPL-SCNC: 19.5 MMOL/L (ref 22–29)
CO2 SERPL-SCNC: 20.9 MMOL/L (ref 22–29)
CREAT SERPL-MCNC: 1.36 MG/DL (ref 0.76–1.27)
CREAT SERPL-MCNC: 1.64 MG/DL (ref 0.76–1.27)
DEPRECATED RDW RBC AUTO: 45.4 FL (ref 37–54)
DEPRECATED RDW RBC AUTO: 47.3 FL (ref 37–54)
EGFRCR SERPLBLD CKD-EPI 2021: 45 ML/MIN/1.73
EGFRCR SERPLBLD CKD-EPI 2021: 56.3 ML/MIN/1.73
EOSINOPHIL # BLD AUTO: 0.35 10*3/MM3 (ref 0–0.4)
EOSINOPHIL NFR BLD AUTO: 3.5 % (ref 0.3–6.2)
ERYTHROCYTE [DISTWIDTH] IN BLOOD BY AUTOMATED COUNT: 14.7 % (ref 12.3–15.4)
ERYTHROCYTE [DISTWIDTH] IN BLOOD BY AUTOMATED COUNT: 14.8 % (ref 12.3–15.4)
FLUAV RNA RESP QL NAA+PROBE: NOT DETECTED
FLUBV RNA RESP QL NAA+PROBE: NOT DETECTED
GLOBULIN UR ELPH-MCNC: 3.2 GM/DL
GLOBULIN UR ELPH-MCNC: 3.7 GM/DL
GLUCOSE BLDC GLUCOMTR-MCNC: 117 MG/DL (ref 70–130)
GLUCOSE BLDC GLUCOMTR-MCNC: 117 MG/DL (ref 70–130)
GLUCOSE BLDC GLUCOMTR-MCNC: 132 MG/DL (ref 70–130)
GLUCOSE BLDC GLUCOMTR-MCNC: 137 MG/DL (ref 70–130)
GLUCOSE BLDC GLUCOMTR-MCNC: 224 MG/DL (ref 70–130)
GLUCOSE SERPL-MCNC: 116 MG/DL (ref 65–99)
GLUCOSE SERPL-MCNC: 137 MG/DL (ref 65–99)
HCT VFR BLD AUTO: 40.8 % (ref 37.5–51)
HCT VFR BLD AUTO: 42.1 % (ref 37.5–51)
HGB BLD-MCNC: 13.1 G/DL (ref 13–17.7)
HGB BLD-MCNC: 13.7 G/DL (ref 13–17.7)
HOLD SPECIMEN: NORMAL
HOLD SPECIMEN: NORMAL
IMM GRANULOCYTES # BLD AUTO: 0.09 10*3/MM3 (ref 0–0.05)
IMM GRANULOCYTES NFR BLD AUTO: 0.9 % (ref 0–0.5)
INR PPP: 2.17 (ref 0.9–1.1)
INR PPP: 2.24 (ref 0.9–1.1)
LEFT ATRIUM VOLUME INDEX: 29.9 ML/M2
LYMPHOCYTES # BLD AUTO: 1.5 10*3/MM3 (ref 0.7–3.1)
LYMPHOCYTES NFR BLD AUTO: 14.8 % (ref 19.6–45.3)
MAXIMAL PREDICTED HEART RATE: 151 BPM
MCH RBC QN AUTO: 27.8 PG (ref 26.6–33)
MCH RBC QN AUTO: 28 PG (ref 26.6–33)
MCHC RBC AUTO-ENTMCNC: 32.1 G/DL (ref 31.5–35.7)
MCHC RBC AUTO-ENTMCNC: 32.5 G/DL (ref 31.5–35.7)
MCV RBC AUTO: 85.4 FL (ref 79–97)
MCV RBC AUTO: 87.2 FL (ref 79–97)
MONOCYTES # BLD AUTO: 0.93 10*3/MM3 (ref 0.1–0.9)
MONOCYTES NFR BLD AUTO: 9.2 % (ref 5–12)
NEUTROPHILS NFR BLD AUTO: 7.18 10*3/MM3 (ref 1.7–7)
NEUTROPHILS NFR BLD AUTO: 71 % (ref 42.7–76)
NRBC BLD AUTO-RTO: 0 /100 WBC (ref 0–0.2)
PLATELET # BLD AUTO: 119 10*3/MM3 (ref 140–450)
PLATELET # BLD AUTO: 150 10*3/MM3 (ref 140–450)
PMV BLD AUTO: 11.6 FL (ref 6–12)
PMV BLD AUTO: 11.7 FL (ref 6–12)
POTASSIUM SERPL-SCNC: 4.5 MMOL/L (ref 3.5–5.2)
POTASSIUM SERPL-SCNC: 4.8 MMOL/L (ref 3.5–5.2)
PROT SERPL-MCNC: 6.8 G/DL (ref 6–8.5)
PROT SERPL-MCNC: 7.5 G/DL (ref 6–8.5)
PROTHROMBIN TIME: 24.8 SECONDS (ref 12.1–14.7)
PROTHROMBIN TIME: 25.4 SECONDS (ref 12.1–14.7)
QT INTERVAL: 466 MS
QTC INTERVAL: 513 MS
RBC # BLD AUTO: 4.68 10*6/MM3 (ref 4.14–5.8)
RBC # BLD AUTO: 4.93 10*6/MM3 (ref 4.14–5.8)
RH BLD: NEGATIVE
RH BLD: NEGATIVE
SARS-COV-2 RNA RESP QL NAA+PROBE: NOT DETECTED
SODIUM SERPL-SCNC: 137 MMOL/L (ref 136–145)
SODIUM SERPL-SCNC: 140 MMOL/L (ref 136–145)
STRESS TARGET HR: 128 BPM
T&S EXPIRATION DATE: NORMAL
TROPONIN T SERPL-MCNC: 0.02 NG/ML (ref 0–0.03)
WBC NRBC COR # BLD: 10.11 10*3/MM3 (ref 3.4–10.8)
WBC NRBC COR # BLD: 8.05 10*3/MM3 (ref 3.4–10.8)
WHOLE BLOOD HOLD COAG: NORMAL
WHOLE BLOOD HOLD SPECIMEN: NORMAL

## 2022-10-28 PROCEDURE — 80053 COMPREHEN METABOLIC PANEL: CPT | Performed by: STUDENT IN AN ORGANIZED HEALTH CARE EDUCATION/TRAINING PROGRAM

## 2022-10-28 PROCEDURE — 71045 X-RAY EXAM CHEST 1 VIEW: CPT

## 2022-10-28 PROCEDURE — 97162 PT EVAL MOD COMPLEX 30 MIN: CPT

## 2022-10-28 PROCEDURE — 25010000002 ENOXAPARIN PER 10 MG: Performed by: STUDENT IN AN ORGANIZED HEALTH CARE EDUCATION/TRAINING PROGRAM

## 2022-10-28 PROCEDURE — 99232 SBSQ HOSP IP/OBS MODERATE 35: CPT | Performed by: NURSE PRACTITIONER

## 2022-10-28 PROCEDURE — 82962 GLUCOSE BLOOD TEST: CPT

## 2022-10-28 PROCEDURE — 85610 PROTHROMBIN TIME: CPT | Performed by: INTERNAL MEDICINE

## 2022-10-28 PROCEDURE — 85730 THROMBOPLASTIN TIME PARTIAL: CPT | Performed by: STUDENT IN AN ORGANIZED HEALTH CARE EDUCATION/TRAINING PROGRAM

## 2022-10-28 PROCEDURE — 36415 COLL VENOUS BLD VENIPUNCTURE: CPT

## 2022-10-28 PROCEDURE — 71045 X-RAY EXAM CHEST 1 VIEW: CPT | Performed by: RADIOLOGY

## 2022-10-28 PROCEDURE — 86850 RBC ANTIBODY SCREEN: CPT | Performed by: STUDENT IN AN ORGANIZED HEALTH CARE EDUCATION/TRAINING PROGRAM

## 2022-10-28 PROCEDURE — 84484 ASSAY OF TROPONIN QUANT: CPT | Performed by: STUDENT IN AN ORGANIZED HEALTH CARE EDUCATION/TRAINING PROGRAM

## 2022-10-28 PROCEDURE — 99223 1ST HOSP IP/OBS HIGH 75: CPT | Performed by: INTERNAL MEDICINE

## 2022-10-28 PROCEDURE — 87636 SARSCOV2 & INF A&B AMP PRB: CPT | Performed by: STUDENT IN AN ORGANIZED HEALTH CARE EDUCATION/TRAINING PROGRAM

## 2022-10-28 PROCEDURE — 70551 MRI BRAIN STEM W/O DYE: CPT

## 2022-10-28 PROCEDURE — 99223 1ST HOSP IP/OBS HIGH 75: CPT | Performed by: PSYCHIATRY & NEUROLOGY

## 2022-10-28 PROCEDURE — 25010000002 ENOXAPARIN PER 10 MG: Performed by: INTERNAL MEDICINE

## 2022-10-28 PROCEDURE — 92610 EVALUATE SWALLOWING FUNCTION: CPT

## 2022-10-28 PROCEDURE — 0 IOPAMIDOL PER 1 ML: Performed by: STUDENT IN AN ORGANIZED HEALTH CARE EDUCATION/TRAINING PROGRAM

## 2022-10-28 PROCEDURE — 85027 COMPLETE CBC AUTOMATED: CPT | Performed by: INTERNAL MEDICINE

## 2022-10-28 PROCEDURE — 70496 CT ANGIOGRAPHY HEAD: CPT

## 2022-10-28 PROCEDURE — 25010000002 DIPHENHYDRAMINE PER 50 MG: Performed by: STUDENT IN AN ORGANIZED HEALTH CARE EDUCATION/TRAINING PROGRAM

## 2022-10-28 PROCEDURE — 86900 BLOOD TYPING SEROLOGIC ABO: CPT | Performed by: STUDENT IN AN ORGANIZED HEALTH CARE EDUCATION/TRAINING PROGRAM

## 2022-10-28 PROCEDURE — 93306 TTE W/DOPPLER COMPLETE: CPT

## 2022-10-28 PROCEDURE — 85610 PROTHROMBIN TIME: CPT | Performed by: STUDENT IN AN ORGANIZED HEALTH CARE EDUCATION/TRAINING PROGRAM

## 2022-10-28 PROCEDURE — 93005 ELECTROCARDIOGRAM TRACING: CPT | Performed by: STUDENT IN AN ORGANIZED HEALTH CARE EDUCATION/TRAINING PROGRAM

## 2022-10-28 PROCEDURE — 99285 EMERGENCY DEPT VISIT HI MDM: CPT

## 2022-10-28 PROCEDURE — 92523 SPEECH SOUND LANG COMPREHEN: CPT

## 2022-10-28 PROCEDURE — 80053 COMPREHEN METABOLIC PANEL: CPT | Performed by: INTERNAL MEDICINE

## 2022-10-28 PROCEDURE — 86901 BLOOD TYPING SEROLOGIC RH(D): CPT | Performed by: STUDENT IN AN ORGANIZED HEALTH CARE EDUCATION/TRAINING PROGRAM

## 2022-10-28 PROCEDURE — 70498 CT ANGIOGRAPHY NECK: CPT

## 2022-10-28 PROCEDURE — 70450 CT HEAD/BRAIN W/O DYE: CPT

## 2022-10-28 PROCEDURE — 86901 BLOOD TYPING SEROLOGIC RH(D): CPT

## 2022-10-28 PROCEDURE — 70551 MRI BRAIN STEM W/O DYE: CPT | Performed by: RADIOLOGY

## 2022-10-28 PROCEDURE — 85025 COMPLETE CBC W/AUTO DIFF WBC: CPT | Performed by: STUDENT IN AN ORGANIZED HEALTH CARE EDUCATION/TRAINING PROGRAM

## 2022-10-28 PROCEDURE — 86900 BLOOD TYPING SEROLOGIC ABO: CPT

## 2022-10-28 RX ORDER — ATORVASTATIN CALCIUM 40 MG/1
80 TABLET, FILM COATED ORAL NIGHTLY
Status: DISCONTINUED | OUTPATIENT
Start: 2022-10-28 | End: 2022-11-01 | Stop reason: HOSPADM

## 2022-10-28 RX ORDER — INSULIN ASPART 100 [IU]/ML
0-7 INJECTION, SOLUTION INTRAVENOUS; SUBCUTANEOUS
Status: DISCONTINUED | OUTPATIENT
Start: 2022-10-28 | End: 2022-11-01 | Stop reason: HOSPADM

## 2022-10-28 RX ORDER — SODIUM CHLORIDE 0.9 % (FLUSH) 0.9 %
10 SYRINGE (ML) INJECTION EVERY 12 HOURS SCHEDULED
Status: DISCONTINUED | OUTPATIENT
Start: 2022-10-28 | End: 2022-11-01 | Stop reason: HOSPADM

## 2022-10-28 RX ORDER — SODIUM CHLORIDE 0.9 % (FLUSH) 0.9 %
10 SYRINGE (ML) INJECTION AS NEEDED
Status: DISCONTINUED | OUTPATIENT
Start: 2022-10-28 | End: 2022-11-01 | Stop reason: HOSPADM

## 2022-10-28 RX ORDER — SODIUM CHLORIDE 9 MG/ML
1000 INJECTION, SOLUTION INTRAVENOUS ONCE
Status: COMPLETED | OUTPATIENT
Start: 2022-10-28 | End: 2022-10-28

## 2022-10-28 RX ORDER — INSULIN ASPART 100 [IU]/ML
15 INJECTION, SOLUTION INTRAVENOUS; SUBCUTANEOUS
Status: CANCELLED | OUTPATIENT
Start: 2022-10-28

## 2022-10-28 RX ORDER — WARFARIN SODIUM 5 MG/1
10 TABLET ORAL
Status: COMPLETED | OUTPATIENT
Start: 2022-10-28 | End: 2022-10-28

## 2022-10-28 RX ORDER — ENOXAPARIN SODIUM 100 MG/ML
1 INJECTION SUBCUTANEOUS EVERY 12 HOURS
Status: DISCONTINUED | OUTPATIENT
Start: 2022-10-28 | End: 2022-11-01

## 2022-10-28 RX ORDER — DIPHENHYDRAMINE HYDROCHLORIDE 50 MG/ML
25 INJECTION INTRAMUSCULAR; INTRAVENOUS ONCE
Status: COMPLETED | OUTPATIENT
Start: 2022-10-28 | End: 2022-10-28

## 2022-10-28 RX ORDER — DEXTROSE MONOHYDRATE 25 G/50ML
25 INJECTION, SOLUTION INTRAVENOUS
Status: DISCONTINUED | OUTPATIENT
Start: 2022-10-28 | End: 2022-11-01 | Stop reason: HOSPADM

## 2022-10-28 RX ORDER — NICOTINE POLACRILEX 4 MG
15 LOZENGE BUCCAL
Status: DISCONTINUED | OUTPATIENT
Start: 2022-10-28 | End: 2022-11-01 | Stop reason: HOSPADM

## 2022-10-28 RX ORDER — ENOXAPARIN SODIUM 100 MG/ML
1 INJECTION SUBCUTANEOUS ONCE
Status: COMPLETED | OUTPATIENT
Start: 2022-10-28 | End: 2022-10-28

## 2022-10-28 RX ADMIN — Medication 10 ML: at 20:37

## 2022-10-28 RX ADMIN — DIPHENHYDRAMINE HYDROCHLORIDE 25 MG: 50 INJECTION INTRAMUSCULAR; INTRAVENOUS at 06:15

## 2022-10-28 RX ADMIN — IOPAMIDOL 70 ML: 755 INJECTION, SOLUTION INTRAVENOUS at 06:47

## 2022-10-28 RX ADMIN — ATORVASTATIN CALCIUM 80 MG: 40 TABLET, FILM COATED ORAL at 20:36

## 2022-10-28 RX ADMIN — SERTRALINE 150 MG: 50 TABLET, FILM COATED ORAL at 20:36

## 2022-10-28 RX ADMIN — WARFARIN 10 MG: 5 TABLET ORAL at 20:36

## 2022-10-28 RX ADMIN — ENOXAPARIN SODIUM 110 MG: 60 INJECTION SUBCUTANEOUS at 20:37

## 2022-10-28 RX ADMIN — Medication 10 ML: at 10:44

## 2022-10-28 RX ADMIN — ENOXAPARIN SODIUM 120 MG: 60 INJECTION SUBCUTANEOUS at 07:37

## 2022-10-28 RX ADMIN — SODIUM CHLORIDE 1000 ML: 9 INJECTION, SOLUTION INTRAVENOUS at 06:45

## 2022-10-28 NOTE — OUTREACH NOTE
Call Center TCM Note    Flowsheet Row Responses   [facility] facility patient discharged from? [place]   Does the patient have one of the following disease processes/diagnoses(primary or secondary)? Stroke   TCM attempt successful? No   Unsuccessful attempts Attempt 1   Change in Health Status Readmitted          Gwen Abraham RN    10/28/2022, 08:36 EDT

## 2022-10-29 LAB
ALBUMIN SERPL-MCNC: 3.2 G/DL (ref 3.5–5.2)
ALBUMIN/GLOB SERPL: 0.9 G/DL
ALP SERPL-CCNC: 50 U/L (ref 39–117)
ALT SERPL W P-5'-P-CCNC: 26 U/L (ref 1–41)
ANION GAP SERPL CALCULATED.3IONS-SCNC: 12 MMOL/L (ref 5–15)
AST SERPL-CCNC: 25 U/L (ref 1–40)
BILIRUB SERPL-MCNC: 0.3 MG/DL (ref 0–1.2)
BUN SERPL-MCNC: 24 MG/DL (ref 8–23)
BUN/CREAT SERPL: 19.2 (ref 7–25)
CALCIUM SPEC-SCNC: 8.6 MG/DL (ref 8.6–10.5)
CHLORIDE SERPL-SCNC: 105 MMOL/L (ref 98–107)
CO2 SERPL-SCNC: 17 MMOL/L (ref 22–29)
CREAT SERPL-MCNC: 1.25 MG/DL (ref 0.76–1.27)
DEPRECATED RDW RBC AUTO: 50.1 FL (ref 37–54)
EGFRCR SERPLBLD CKD-EPI 2021: 62.3 ML/MIN/1.73
ERYTHROCYTE [DISTWIDTH] IN BLOOD BY AUTOMATED COUNT: 14.7 % (ref 12.3–15.4)
GLOBULIN UR ELPH-MCNC: 3.4 GM/DL
GLUCOSE BLDC GLUCOMTR-MCNC: 140 MG/DL (ref 70–130)
GLUCOSE BLDC GLUCOMTR-MCNC: 201 MG/DL (ref 70–130)
GLUCOSE BLDC GLUCOMTR-MCNC: 205 MG/DL (ref 70–130)
GLUCOSE BLDC GLUCOMTR-MCNC: 245 MG/DL (ref 70–130)
GLUCOSE BLDC GLUCOMTR-MCNC: 253 MG/DL (ref 70–130)
GLUCOSE SERPL-MCNC: 277 MG/DL (ref 65–99)
HCT VFR BLD AUTO: 43.8 % (ref 37.5–51)
HGB BLD-MCNC: 13.1 G/DL (ref 13–17.7)
INR PPP: 2.43 (ref 0.9–1.1)
MCH RBC QN AUTO: 27.6 PG (ref 26.6–33)
MCHC RBC AUTO-ENTMCNC: 29.9 G/DL (ref 31.5–35.7)
MCV RBC AUTO: 92.4 FL (ref 79–97)
PLATELET # BLD AUTO: 116 10*3/MM3 (ref 140–450)
PMV BLD AUTO: 11.3 FL (ref 6–12)
POTASSIUM SERPL-SCNC: 4.3 MMOL/L (ref 3.5–5.2)
PROT SERPL-MCNC: 6.6 G/DL (ref 6–8.5)
PROTHROMBIN TIME: 27.1 SECONDS (ref 12.1–14.7)
RBC # BLD AUTO: 4.74 10*6/MM3 (ref 4.14–5.8)
SODIUM SERPL-SCNC: 134 MMOL/L (ref 136–145)
WBC NRBC COR # BLD: 8.04 10*3/MM3 (ref 3.4–10.8)

## 2022-10-29 PROCEDURE — 25010000002 ENOXAPARIN PER 10 MG: Performed by: INTERNAL MEDICINE

## 2022-10-29 PROCEDURE — 63710000001 INSULIN ASPART PER 5 UNITS: Performed by: INTERNAL MEDICINE

## 2022-10-29 PROCEDURE — 80053 COMPREHEN METABOLIC PANEL: CPT | Performed by: INTERNAL MEDICINE

## 2022-10-29 PROCEDURE — 82962 GLUCOSE BLOOD TEST: CPT

## 2022-10-29 PROCEDURE — 85610 PROTHROMBIN TIME: CPT | Performed by: INTERNAL MEDICINE

## 2022-10-29 PROCEDURE — 99233 SBSQ HOSP IP/OBS HIGH 50: CPT | Performed by: INTERNAL MEDICINE

## 2022-10-29 PROCEDURE — 85027 COMPLETE CBC AUTOMATED: CPT | Performed by: INTERNAL MEDICINE

## 2022-10-29 PROCEDURE — 63710000001 INSULIN DETEMIR PER 5 UNITS: Performed by: INTERNAL MEDICINE

## 2022-10-29 RX ORDER — WARFARIN SODIUM 5 MG/1
10 TABLET ORAL
Status: COMPLETED | OUTPATIENT
Start: 2022-10-29 | End: 2022-10-29

## 2022-10-29 RX ORDER — ASPIRIN 81 MG/1
81 TABLET ORAL DAILY
Status: DISCONTINUED | OUTPATIENT
Start: 2022-10-29 | End: 2022-11-01 | Stop reason: HOSPADM

## 2022-10-29 RX ADMIN — INSULIN DETEMIR 20 UNITS: 100 INJECTION, SOLUTION SUBCUTANEOUS at 09:11

## 2022-10-29 RX ADMIN — INSULIN ASPART 4 UNITS: 100 INJECTION, SOLUTION INTRAVENOUS; SUBCUTANEOUS at 09:11

## 2022-10-29 RX ADMIN — WARFARIN 10 MG: 5 TABLET ORAL at 18:21

## 2022-10-29 RX ADMIN — Medication 10 ML: at 20:29

## 2022-10-29 RX ADMIN — Medication 10 ML: at 09:20

## 2022-10-29 RX ADMIN — ENOXAPARIN SODIUM 110 MG: 60 INJECTION SUBCUTANEOUS at 09:10

## 2022-10-29 RX ADMIN — Medication 10 ML: at 09:21

## 2022-10-29 RX ADMIN — INSULIN ASPART 3 UNITS: 100 INJECTION, SOLUTION INTRAVENOUS; SUBCUTANEOUS at 12:25

## 2022-10-29 RX ADMIN — INSULIN DETEMIR 20 UNITS: 100 INJECTION, SOLUTION SUBCUTANEOUS at 20:28

## 2022-10-29 RX ADMIN — ASPIRIN 81 MG: 81 TABLET, COATED ORAL at 12:25

## 2022-10-29 RX ADMIN — INSULIN ASPART 3 UNITS: 100 INJECTION, SOLUTION INTRAVENOUS; SUBCUTANEOUS at 20:29

## 2022-10-29 RX ADMIN — ENOXAPARIN SODIUM 110 MG: 60 INJECTION SUBCUTANEOUS at 20:30

## 2022-10-29 RX ADMIN — ATORVASTATIN CALCIUM 80 MG: 40 TABLET, FILM COATED ORAL at 20:29

## 2022-10-29 RX ADMIN — SERTRALINE 150 MG: 50 TABLET, FILM COATED ORAL at 09:11

## 2022-10-30 LAB
ALBUMIN SERPL-MCNC: 2.99 G/DL (ref 3.5–5.2)
ALBUMIN/GLOB SERPL: 0.9 G/DL
ALP SERPL-CCNC: 50 U/L (ref 39–117)
ALT SERPL W P-5'-P-CCNC: 22 U/L (ref 1–41)
ANION GAP SERPL CALCULATED.3IONS-SCNC: 11.4 MMOL/L (ref 5–15)
AST SERPL-CCNC: 24 U/L (ref 1–40)
BILIRUB SERPL-MCNC: 0.4 MG/DL (ref 0–1.2)
BUN SERPL-MCNC: 19 MG/DL (ref 8–23)
BUN/CREAT SERPL: 15.6 (ref 7–25)
CALCIUM SPEC-SCNC: 8.4 MG/DL (ref 8.6–10.5)
CHLORIDE SERPL-SCNC: 107 MMOL/L (ref 98–107)
CO2 SERPL-SCNC: 16.6 MMOL/L (ref 22–29)
CREAT SERPL-MCNC: 1.22 MG/DL (ref 0.76–1.27)
DEPRECATED RDW RBC AUTO: 46.9 FL (ref 37–54)
EGFRCR SERPLBLD CKD-EPI 2021: 64.2 ML/MIN/1.73
ERYTHROCYTE [DISTWIDTH] IN BLOOD BY AUTOMATED COUNT: 14.4 % (ref 12.3–15.4)
GLOBULIN UR ELPH-MCNC: 3.3 GM/DL
GLUCOSE BLDC GLUCOMTR-MCNC: 156 MG/DL (ref 70–130)
GLUCOSE BLDC GLUCOMTR-MCNC: 161 MG/DL (ref 70–130)
GLUCOSE BLDC GLUCOMTR-MCNC: 164 MG/DL (ref 70–130)
GLUCOSE BLDC GLUCOMTR-MCNC: 245 MG/DL (ref 70–130)
GLUCOSE SERPL-MCNC: 152 MG/DL (ref 65–99)
HCT VFR BLD AUTO: 41.4 % (ref 37.5–51)
HGB BLD-MCNC: 13 G/DL (ref 13–17.7)
INR PPP: 3.21 (ref 0.9–1.1)
MCH RBC QN AUTO: 28.1 PG (ref 26.6–33)
MCHC RBC AUTO-ENTMCNC: 31.4 G/DL (ref 31.5–35.7)
MCV RBC AUTO: 89.6 FL (ref 79–97)
PLATELET # BLD AUTO: 109 10*3/MM3 (ref 140–450)
PMV BLD AUTO: 11.1 FL (ref 6–12)
POTASSIUM SERPL-SCNC: 4.1 MMOL/L (ref 3.5–5.2)
PROT SERPL-MCNC: 6.3 G/DL (ref 6–8.5)
PROTHROMBIN TIME: 33.8 SECONDS (ref 12.1–14.7)
RBC # BLD AUTO: 4.62 10*6/MM3 (ref 4.14–5.8)
SODIUM SERPL-SCNC: 135 MMOL/L (ref 136–145)
WBC NRBC COR # BLD: 7.68 10*3/MM3 (ref 3.4–10.8)

## 2022-10-30 PROCEDURE — 63710000001 INSULIN DETEMIR PER 5 UNITS: Performed by: INTERNAL MEDICINE

## 2022-10-30 PROCEDURE — 82962 GLUCOSE BLOOD TEST: CPT

## 2022-10-30 PROCEDURE — 99497 ADVNCD CARE PLAN 30 MIN: CPT | Performed by: INTERNAL MEDICINE

## 2022-10-30 PROCEDURE — 85610 PROTHROMBIN TIME: CPT | Performed by: INTERNAL MEDICINE

## 2022-10-30 PROCEDURE — 63710000001 INSULIN ASPART PER 5 UNITS: Performed by: INTERNAL MEDICINE

## 2022-10-30 PROCEDURE — 80053 COMPREHEN METABOLIC PANEL: CPT | Performed by: INTERNAL MEDICINE

## 2022-10-30 PROCEDURE — 85027 COMPLETE CBC AUTOMATED: CPT | Performed by: INTERNAL MEDICINE

## 2022-10-30 PROCEDURE — 25010000002 ENOXAPARIN PER 10 MG: Performed by: INTERNAL MEDICINE

## 2022-10-30 PROCEDURE — 99232 SBSQ HOSP IP/OBS MODERATE 35: CPT | Performed by: INTERNAL MEDICINE

## 2022-10-30 RX ORDER — WARFARIN SODIUM 5 MG/1
5 TABLET ORAL
Status: COMPLETED | OUTPATIENT
Start: 2022-10-30 | End: 2022-10-30

## 2022-10-30 RX ADMIN — INSULIN DETEMIR 20 UNITS: 100 INJECTION, SOLUTION SUBCUTANEOUS at 20:37

## 2022-10-30 RX ADMIN — Medication 10 ML: at 09:24

## 2022-10-30 RX ADMIN — ENOXAPARIN SODIUM 110 MG: 60 INJECTION SUBCUTANEOUS at 09:23

## 2022-10-30 RX ADMIN — INSULIN ASPART 2 UNITS: 100 INJECTION, SOLUTION INTRAVENOUS; SUBCUTANEOUS at 20:36

## 2022-10-30 RX ADMIN — INSULIN DETEMIR 20 UNITS: 100 INJECTION, SOLUTION SUBCUTANEOUS at 09:24

## 2022-10-30 RX ADMIN — Medication 10 ML: at 20:36

## 2022-10-30 RX ADMIN — WARFARIN 5 MG: 5 TABLET ORAL at 17:13

## 2022-10-30 RX ADMIN — INSULIN ASPART 2 UNITS: 100 INJECTION, SOLUTION INTRAVENOUS; SUBCUTANEOUS at 09:23

## 2022-10-30 RX ADMIN — SERTRALINE 150 MG: 50 TABLET, FILM COATED ORAL at 09:23

## 2022-10-30 RX ADMIN — INSULIN ASPART 2 UNITS: 100 INJECTION, SOLUTION INTRAVENOUS; SUBCUTANEOUS at 17:14

## 2022-10-30 RX ADMIN — ENOXAPARIN SODIUM 110 MG: 60 INJECTION SUBCUTANEOUS at 20:35

## 2022-10-30 RX ADMIN — INSULIN ASPART 3 UNITS: 100 INJECTION, SOLUTION INTRAVENOUS; SUBCUTANEOUS at 12:34

## 2022-10-30 RX ADMIN — ATORVASTATIN CALCIUM 80 MG: 40 TABLET, FILM COATED ORAL at 20:35

## 2022-10-30 RX ADMIN — ASPIRIN 81 MG: 81 TABLET, COATED ORAL at 09:23

## 2022-10-31 PROBLEM — I63.9 CEREBROVASCULAR ACCIDENT (CVA), UNSPECIFIED MECHANISM: Status: RESOLVED | Noted: 2022-10-28 | Resolved: 2022-10-31

## 2022-10-31 LAB
ALBUMIN SERPL-MCNC: 3.28 G/DL (ref 3.5–5.2)
ALBUMIN/GLOB SERPL: 1 G/DL
ALP SERPL-CCNC: 55 U/L (ref 39–117)
ALT SERPL W P-5'-P-CCNC: 27 U/L (ref 1–41)
ANION GAP SERPL CALCULATED.3IONS-SCNC: 10.5 MMOL/L (ref 5–15)
AST SERPL-CCNC: 26 U/L (ref 1–40)
BILIRUB SERPL-MCNC: 0.4 MG/DL (ref 0–1.2)
BUN SERPL-MCNC: 18 MG/DL (ref 8–23)
BUN/CREAT SERPL: 14.5 (ref 7–25)
CALCIUM SPEC-SCNC: 8.6 MG/DL (ref 8.6–10.5)
CHLORIDE SERPL-SCNC: 107 MMOL/L (ref 98–107)
CO2 SERPL-SCNC: 19.5 MMOL/L (ref 22–29)
CREAT SERPL-MCNC: 1.24 MG/DL (ref 0.76–1.27)
DEPRECATED RDW RBC AUTO: 44.9 FL (ref 37–54)
EGFRCR SERPLBLD CKD-EPI 2021: 62.9 ML/MIN/1.73
ERYTHROCYTE [DISTWIDTH] IN BLOOD BY AUTOMATED COUNT: 14.4 % (ref 12.3–15.4)
GLOBULIN UR ELPH-MCNC: 3.3 GM/DL
GLUCOSE BLDC GLUCOMTR-MCNC: 180 MG/DL (ref 70–130)
GLUCOSE BLDC GLUCOMTR-MCNC: 195 MG/DL (ref 70–130)
GLUCOSE BLDC GLUCOMTR-MCNC: 203 MG/DL (ref 70–130)
GLUCOSE SERPL-MCNC: 186 MG/DL (ref 65–99)
HCT VFR BLD AUTO: 40.1 % (ref 37.5–51)
HGB BLD-MCNC: 13.1 G/DL (ref 13–17.7)
INR PPP: 3.13 (ref 0.9–1.1)
MCH RBC QN AUTO: 28.1 PG (ref 26.6–33)
MCHC RBC AUTO-ENTMCNC: 32.7 G/DL (ref 31.5–35.7)
MCV RBC AUTO: 86.1 FL (ref 79–97)
PLATELET # BLD AUTO: 111 10*3/MM3 (ref 140–450)
PMV BLD AUTO: 12.3 FL (ref 6–12)
POTASSIUM SERPL-SCNC: 4 MMOL/L (ref 3.5–5.2)
PROT SERPL-MCNC: 6.6 G/DL (ref 6–8.5)
PROTHROMBIN TIME: 33.2 SECONDS (ref 12.1–14.7)
RBC # BLD AUTO: 4.66 10*6/MM3 (ref 4.14–5.8)
SODIUM SERPL-SCNC: 137 MMOL/L (ref 136–145)
WBC NRBC COR # BLD: 9.03 10*3/MM3 (ref 3.4–10.8)

## 2022-10-31 PROCEDURE — 85027 COMPLETE CBC AUTOMATED: CPT | Performed by: INTERNAL MEDICINE

## 2022-10-31 PROCEDURE — 97110 THERAPEUTIC EXERCISES: CPT

## 2022-10-31 PROCEDURE — 97116 GAIT TRAINING THERAPY: CPT

## 2022-10-31 PROCEDURE — 97166 OT EVAL MOD COMPLEX 45 MIN: CPT

## 2022-10-31 PROCEDURE — 63710000001 INSULIN DETEMIR PER 5 UNITS: Performed by: INTERNAL MEDICINE

## 2022-10-31 PROCEDURE — 63710000001 INSULIN ASPART PER 5 UNITS: Performed by: INTERNAL MEDICINE

## 2022-10-31 PROCEDURE — 82962 GLUCOSE BLOOD TEST: CPT

## 2022-10-31 PROCEDURE — 85610 PROTHROMBIN TIME: CPT | Performed by: INTERNAL MEDICINE

## 2022-10-31 PROCEDURE — 99239 HOSP IP/OBS DSCHRG MGMT >30: CPT | Performed by: INTERNAL MEDICINE

## 2022-10-31 PROCEDURE — 80053 COMPREHEN METABOLIC PANEL: CPT | Performed by: INTERNAL MEDICINE

## 2022-10-31 PROCEDURE — 25010000002 ENOXAPARIN PER 10 MG: Performed by: INTERNAL MEDICINE

## 2022-10-31 RX ORDER — METOPROLOL SUCCINATE 50 MG/1
TABLET, EXTENDED RELEASE ORAL
Qty: 90 TABLET | Refills: 0 | Status: ON HOLD | OUTPATIENT
Start: 2022-10-31 | End: 2022-11-01

## 2022-10-31 RX ORDER — INSULIN ASPART 100 [IU]/ML
0-7 INJECTION, SOLUTION INTRAVENOUS; SUBCUTANEOUS
Status: CANCELLED | OUTPATIENT
Start: 2022-10-31

## 2022-10-31 RX ORDER — ASPIRIN 81 MG/1
81 TABLET ORAL DAILY
Status: CANCELLED | OUTPATIENT
Start: 2022-11-01

## 2022-10-31 RX ORDER — ATORVASTATIN CALCIUM 40 MG/1
80 TABLET, FILM COATED ORAL NIGHTLY
Status: CANCELLED | OUTPATIENT
Start: 2022-10-31

## 2022-10-31 RX ADMIN — INSULIN DETEMIR 20 UNITS: 100 INJECTION, SOLUTION SUBCUTANEOUS at 09:10

## 2022-10-31 RX ADMIN — INSULIN ASPART 2 UNITS: 100 INJECTION, SOLUTION INTRAVENOUS; SUBCUTANEOUS at 17:22

## 2022-10-31 RX ADMIN — INSULIN ASPART 2 UNITS: 100 INJECTION, SOLUTION INTRAVENOUS; SUBCUTANEOUS at 09:10

## 2022-10-31 RX ADMIN — ATORVASTATIN CALCIUM 80 MG: 40 TABLET, FILM COATED ORAL at 22:18

## 2022-10-31 RX ADMIN — INSULIN DETEMIR 20 UNITS: 100 INJECTION, SOLUTION SUBCUTANEOUS at 22:19

## 2022-10-31 RX ADMIN — INSULIN ASPART 3 UNITS: 100 INJECTION, SOLUTION INTRAVENOUS; SUBCUTANEOUS at 12:08

## 2022-10-31 RX ADMIN — Medication 10 ML: at 22:19

## 2022-10-31 RX ADMIN — INSULIN ASPART 2 UNITS: 100 INJECTION, SOLUTION INTRAVENOUS; SUBCUTANEOUS at 22:18

## 2022-10-31 RX ADMIN — ENOXAPARIN SODIUM 110 MG: 60 INJECTION SUBCUTANEOUS at 09:11

## 2022-10-31 RX ADMIN — ENOXAPARIN SODIUM 110 MG: 60 INJECTION SUBCUTANEOUS at 22:18

## 2022-10-31 RX ADMIN — Medication 10 ML: at 09:11

## 2022-10-31 RX ADMIN — WARFARIN 7 MG: 5 TABLET ORAL at 17:21

## 2022-10-31 RX ADMIN — SERTRALINE 150 MG: 50 TABLET, FILM COATED ORAL at 09:11

## 2022-10-31 RX ADMIN — ASPIRIN 81 MG: 81 TABLET, COATED ORAL at 09:11

## 2022-11-01 ENCOUNTER — HOSPITAL ENCOUNTER (INPATIENT)
Facility: HOSPITAL | Age: 67
LOS: 10 days | Discharge: HOME | End: 2022-11-11
Attending: FAMILY MEDICINE | Admitting: FAMILY MEDICINE

## 2022-11-01 VITALS
OXYGEN SATURATION: 98 % | TEMPERATURE: 98.2 F | HEART RATE: 75 BPM | SYSTOLIC BLOOD PRESSURE: 150 MMHG | DIASTOLIC BLOOD PRESSURE: 77 MMHG | BODY MASS INDEX: 34.55 KG/M2 | HEIGHT: 72 IN | WEIGHT: 255.1 LBS | RESPIRATION RATE: 18 BRPM

## 2022-11-01 DIAGNOSIS — I63.311 CEREBROVASCULAR ACCIDENT (CVA) DUE TO THROMBOSIS OF RIGHT MIDDLE CEREBRAL ARTERY: Primary | ICD-10-CM

## 2022-11-01 LAB
ALBUMIN SERPL-MCNC: 3.23 G/DL (ref 3.5–5.2)
ALBUMIN/GLOB SERPL: 1 G/DL
ALP SERPL-CCNC: 55 U/L (ref 39–117)
ALT SERPL W P-5'-P-CCNC: 27 U/L (ref 1–41)
ANION GAP SERPL CALCULATED.3IONS-SCNC: 12.1 MMOL/L (ref 5–15)
AST SERPL-CCNC: 25 U/L (ref 1–40)
BILIRUB SERPL-MCNC: 0.4 MG/DL (ref 0–1.2)
BUN SERPL-MCNC: 16 MG/DL (ref 8–23)
BUN/CREAT SERPL: 12.4 (ref 7–25)
CALCIUM SPEC-SCNC: 8.4 MG/DL (ref 8.6–10.5)
CHLORIDE SERPL-SCNC: 103 MMOL/L (ref 98–107)
CO2 SERPL-SCNC: 19.9 MMOL/L (ref 22–29)
CREAT SERPL-MCNC: 1.29 MG/DL (ref 0.76–1.27)
DEPRECATED RDW RBC AUTO: 45.3 FL (ref 37–54)
EGFRCR SERPLBLD CKD-EPI 2021: 60 ML/MIN/1.73
ERYTHROCYTE [DISTWIDTH] IN BLOOD BY AUTOMATED COUNT: 14.5 % (ref 12.3–15.4)
GLOBULIN UR ELPH-MCNC: 3.4 GM/DL
GLUCOSE BLDC GLUCOMTR-MCNC: 173 MG/DL (ref 70–130)
GLUCOSE BLDC GLUCOMTR-MCNC: 194 MG/DL (ref 70–130)
GLUCOSE BLDC GLUCOMTR-MCNC: 227 MG/DL (ref 70–130)
GLUCOSE BLDC GLUCOMTR-MCNC: 297 MG/DL (ref 70–130)
GLUCOSE SERPL-MCNC: 149 MG/DL (ref 65–99)
HCT VFR BLD AUTO: 39.6 % (ref 37.5–51)
HGB BLD-MCNC: 13.1 G/DL (ref 13–17.7)
INR PPP: 3.11 (ref 0.9–1.1)
MCH RBC QN AUTO: 28.4 PG (ref 26.6–33)
MCHC RBC AUTO-ENTMCNC: 33.1 G/DL (ref 31.5–35.7)
MCV RBC AUTO: 85.7 FL (ref 79–97)
PLATELET # BLD AUTO: 113 10*3/MM3 (ref 140–450)
PMV BLD AUTO: 12.3 FL (ref 6–12)
POTASSIUM SERPL-SCNC: 4 MMOL/L (ref 3.5–5.2)
PROT SERPL-MCNC: 6.6 G/DL (ref 6–8.5)
PROTHROMBIN TIME: 33 SECONDS (ref 12.1–14.7)
RBC # BLD AUTO: 4.62 10*6/MM3 (ref 4.14–5.8)
SODIUM SERPL-SCNC: 135 MMOL/L (ref 136–145)
WBC NRBC COR # BLD: 9.27 10*3/MM3 (ref 3.4–10.8)

## 2022-11-01 PROCEDURE — 63710000001 INSULIN DETEMIR PER 5 UNITS: Performed by: FAMILY MEDICINE

## 2022-11-01 PROCEDURE — 85027 COMPLETE CBC AUTOMATED: CPT | Performed by: INTERNAL MEDICINE

## 2022-11-01 PROCEDURE — 63710000001 INSULIN DETEMIR PER 5 UNITS: Performed by: INTERNAL MEDICINE

## 2022-11-01 PROCEDURE — 63710000001 INSULIN ASPART PER 5 UNITS: Performed by: INTERNAL MEDICINE

## 2022-11-01 PROCEDURE — 97116 GAIT TRAINING THERAPY: CPT

## 2022-11-01 PROCEDURE — 85610 PROTHROMBIN TIME: CPT | Performed by: INTERNAL MEDICINE

## 2022-11-01 PROCEDURE — 97110 THERAPEUTIC EXERCISES: CPT

## 2022-11-01 PROCEDURE — 99223 1ST HOSP IP/OBS HIGH 75: CPT | Performed by: FAMILY MEDICINE

## 2022-11-01 PROCEDURE — 82962 GLUCOSE BLOOD TEST: CPT

## 2022-11-01 PROCEDURE — 80053 COMPREHEN METABOLIC PANEL: CPT | Performed by: INTERNAL MEDICINE

## 2022-11-01 PROCEDURE — 63710000001 INSULIN ASPART PER 5 UNITS: Performed by: FAMILY MEDICINE

## 2022-11-01 RX ORDER — METOPROLOL SUCCINATE 50 MG/1
50 TABLET, EXTENDED RELEASE ORAL DAILY
COMMUNITY
End: 2022-11-11 | Stop reason: HOSPADM

## 2022-11-01 RX ORDER — DEXTROSE MONOHYDRATE 25 G/50ML
25 INJECTION, SOLUTION INTRAVENOUS
Status: DISCONTINUED | OUTPATIENT
Start: 2022-11-01 | End: 2022-11-11 | Stop reason: HOSPADM

## 2022-11-01 RX ORDER — ATORVASTATIN CALCIUM 40 MG/1
80 TABLET, FILM COATED ORAL NIGHTLY
Status: DISCONTINUED | OUTPATIENT
Start: 2022-11-01 | End: 2022-11-11 | Stop reason: HOSPADM

## 2022-11-01 RX ORDER — ASPIRIN 81 MG/1
81 TABLET ORAL DAILY
Status: DISCONTINUED | OUTPATIENT
Start: 2022-11-02 | End: 2022-11-11 | Stop reason: HOSPADM

## 2022-11-01 RX ORDER — NICOTINE POLACRILEX 4 MG
15 LOZENGE BUCCAL
Status: DISCONTINUED | OUTPATIENT
Start: 2022-11-01 | End: 2022-11-11 | Stop reason: HOSPADM

## 2022-11-01 RX ORDER — INSULIN ASPART 100 [IU]/ML
0-7 INJECTION, SOLUTION INTRAVENOUS; SUBCUTANEOUS
Status: DISCONTINUED | OUTPATIENT
Start: 2022-11-01 | End: 2022-11-11 | Stop reason: HOSPADM

## 2022-11-01 RX ADMIN — INSULIN ASPART 2 UNITS: 100 INJECTION, SOLUTION INTRAVENOUS; SUBCUTANEOUS at 08:24

## 2022-11-01 RX ADMIN — INSULIN ASPART 3 UNITS: 100 INJECTION, SOLUTION INTRAVENOUS; SUBCUTANEOUS at 21:15

## 2022-11-01 RX ADMIN — Medication 10 ML: at 10:47

## 2022-11-01 RX ADMIN — ASPIRIN 81 MG: 81 TABLET, COATED ORAL at 10:47

## 2022-11-01 RX ADMIN — INSULIN DETEMIR 20 UNITS: 100 INJECTION, SOLUTION SUBCUTANEOUS at 21:07

## 2022-11-01 RX ADMIN — SERTRALINE 150 MG: 50 TABLET, FILM COATED ORAL at 10:47

## 2022-11-01 RX ADMIN — INSULIN DETEMIR 20 UNITS: 100 INJECTION, SOLUTION SUBCUTANEOUS at 10:47

## 2022-11-01 RX ADMIN — WARFARIN 7 MG: 5 TABLET ORAL at 21:05

## 2022-11-01 RX ADMIN — ATORVASTATIN CALCIUM 80 MG: 40 TABLET, FILM COATED ORAL at 21:04

## 2022-11-01 RX ADMIN — INSULIN ASPART 2 UNITS: 100 INJECTION, SOLUTION INTRAVENOUS; SUBCUTANEOUS at 18:18

## 2022-11-01 RX ADMIN — INSULIN ASPART 4 UNITS: 100 INJECTION, SOLUTION INTRAVENOUS; SUBCUTANEOUS at 12:44

## 2022-11-01 RX ADMIN — Medication 10 ML: at 10:48

## 2022-11-01 NOTE — DISCHARGE SUMMARY
[facility] HOSPITALIST MEDICINE DISCHARGE SUMMARY     Patient Identification:  Name:  Benitez Miranda  Age:  67 y.o.  Sex:  male  :  1955  MRN:  [id number]  Visit Number:  [id number]     Date of Admission: 10/28/2022  Date of Discharge: 2022     PCP: Mesha Christina MD     DISCHARGE DIAGNOSIS   1.  Right-sided MCA CVA  2.  Essential hypertension  3.  Hyperlipidemia  4.  Mechanical aortic valve  5.  Insulin-dependent type 2 diabetes mellitus  6.  CKD stage III  7.  COPD           CONSULTS  1. Dr. Ulrich, Teleneurology  2. Dr. Weldon, Cardiology        PROCEDURES PERFORMED   None        HOSPITAL COURSE  Please see previously dictated discharge summary, as patient was intended to be discharged on 10/31/22.  However, secondary to time constraints, patient was not able to be discharged to inpatient rehab yesterday.  Patient has had no new events since dictation of the previous discharge summary.  Please see below for the complete discharge summary encompassing patient's hospital stay.    Mr. Miranda is a 67 y.o. male who presented to [facility] ED on 10/28/2022 with a chief complaint of CVA.  Patient has a past medical history remarkable for anxiety/depression, arthritis, chronic low back pain, COPD, CKD stage III, insulin-dependent type 2 diabetes mellitus, essential hypertension, hyperlipidemia, CAD, pulmonary hypertension and mechanical aortic valve on Coumadin therapy.  Patient had been hospitalized at [facility] from 10/25/2022 through 10/27/2022 for acute infarct of the right posterior/parietal white matter from suspected subtherapeutic INR.  Patient apparently was wanting to leave [place] but was ultimately discharged home on Lovenox to bridge his INR to therapeutic levels.  However, after patient was discharged home, he had worsening left-sided weakness and return to the emergency department for further treatment and evaluation.  Initial evaluation in the emergency  department did consist of basic laboratory work as well as physical exam and vital signs.  Initial vital signs found patient's blood pressure 127/78, respirations 22, heart rate 88 and oxygen saturation 100% on room air.  Initial lab work demonstrated white blood cell count of 10, creatinine 1.6, INR 2.1.  CT of head demonstrated a new 14 mm area of decreased density in the right corona radiata consistent with a subacute infarct.  CT head and neck demonstrated no hemodynamically significant stenosis.  After these findings, emergency room provider did consult teleneurology services who recommended admission for further evaluation and treatment.  Patient was deemed outside the treatment window for tPA.     Patient was started on Lovenox therapy for bridging until Coumadin was 3-3.5.  MRI of brain was obtained on 10/28/2022 which demonstrated grossly stable appearance of the right posterior parietal infarct.  Patient was continued on high intensity statin and Lovenox bridge while Coumadin was being given with target INR of 3-3.5.  PT/OT was consulted during this hospitalization who did recommend inpatient rehab.  Once patient's INR became therapeutic, patient was evaluated for inpatient rehab admission.  Patient will be admitted to inpatient rehab in stable condition today.  With this in mind, it is felt patient has reached maximum medical benefit of current hospitalization and will be discharged to inpatient rehab in stable condition today.  The beforementioned plan was discussed with the patient and his son at bedside who both expressed their understanding and willingness to proceed with the beforementioned plan.     VITAL SIGNS:  Vitals               10/29/22  0500 10/30/22  0500 10/31/22  0500   Weight: 112 kg (247 lb 12.8 oz) (New admit less than 24 hours) 117 kg (258 lb 1.6 oz) 116 kg (255 lb 12.8 oz)         Body mass index is 34.69 kg/m².     PHYSICAL EXAM:  General -well-nourished  male appearing  stated age in no apparent distress  HEENT -head normocephalic and atraumatic, pupils equally round and reactive to light  Lungs -clear to auscultation bilaterally with no wheezes, rales or rhonchi appreciated  Cardiovascular -loud systolic click heard best over right second intercostal space  Neurologic -cranial nerves II through XII intact with no focal deficit or unintentional motor movement appreciated     DISCHARGE DISPOSITION   Stable     DISCHARGE MEDICATIONS:           Discharge Medications            ASK your doctor about these medications      Instructions Start Date   amLODIPine 5 MG tablet  Commonly known as: NORVASC    5 mg, Oral, Daily        cetirizine 10 MG tablet  Commonly known as: zyrTEC    10 mg, Oral, Daily PRN        clobetasol 0.05 % cream  Commonly known as: TEMOVATE    1 application, Topical, 2 Times Daily        cyclobenzaprine 10 MG tablet  Commonly known as: FLEXERIL    10 mg, Oral, 3 Times Daily PRN        Enoxaparin Sodium 120 MG/0.8ML solution prefilled syringe syringe  Commonly known as: Lovenox    120 mg, Subcutaneous, Every 12 Hours Scheduled, Recheck INR tomorrow (10/28) at primary care physician's office.        gabapentin 600 MG tablet  Commonly known as: NEURONTIN    600 mg, Oral, Daily PRN        insulin glargine 100 UNIT/ML injection  Commonly known as: LANTUS, SEMGLEE    43 Units, Subcutaneous, Nightly        insulin glargine 100 UNIT/ML injection  Commonly known as: LANTUS, SEMGLEE    41 Units, Subcutaneous, Every Early Morning        insulin lispro 100 UNIT/ML injection  Commonly known as: humaLOG    15 Units, Subcutaneous, 3 Times Daily        losartan 100 MG tablet  Commonly known as: COZAAR    100 mg, Oral, Daily        metoprolol succinate XL 50 MG 24 hr tablet  Commonly known as: TOPROL-XL  Ask about: Which instructions should I use?    TAKE ONE TABLET BY MOUTH DAILY        pravastatin 40 MG tablet  Commonly known as: PRAVACHOL    40 mg, Oral, Daily        sertraline  100 MG tablet  Commonly known as: ZOLOFT    150 mg, Oral, Daily        spironolactone 25 MG tablet  Commonly known as: ALDACTONE    25 mg, Oral, Daily        warfarin 6 MG tablet  Commonly known as: COUMADIN    6 mg, Oral, Nightly, Taking in addition to the 1mg to equal 7mg daily        warfarin 1 MG tablet  Commonly known as: Coumadin    1 mg, Oral, Nightly, In addition to the 6 mg tablet to equal 7 mg.                       Your Scheduled Appointments          Nov 09, 2022 11:30 AM  Office Visit with Mesha Christina MD  [facility] FAMILY MEDICINE ([place]) [address] [place] [address] [address] [place] [id number] [phone]    Arrive 15 minutes prior to appointment.  If you are in need of a language or hearing  please call the Department.           Dec 02, 2022 11:00 AM  Hospital Follow Up with RED Vasquez  [facility] NEUROLOGY [place] (--) [address]  [place] [id number] [phone]                      Follow-up Information           Mesha Christina MD .    Specialty: Family Medicine  Contact information:  [id number]  [address] [address]  [place] [id number]  [phone]                                 TEST  RESULTS PENDING AT DISCHARGE     The ASCVD Risk score (Adore DK, et al., 2019) failed to calculate for the following reasons:    The patient has a prior MI or stroke diagnosis      Johnie Bledsoe DO  10/31/22  17:10 EDT     Please note that this discharge summary required more than 30 minutes to complete.     Please send a copy of this dictation to the following providers:  Mesha Christina MD

## 2022-11-01 NOTE — PROGRESS NOTES
"Patient Assessment Instrument  Quality Indicators - Admission FY 2023    Section A. Ethnicity/ Race/Language  Ethnicity:  Not of , /a, Indonesian Origin  Race:  White  Preferred Language:  English  Requests  to Communicate:   No    Section A. Transportation  Issues Due to Lack of Transportation:   No    Section B. Hearing and Vision  Expression of Ideas and Wants: Expresses complex messages without difficulty and  with speech that is clear and easy to understand.  Understanding Verbal and Non-Verbal Content: Understands: Clear comprehension  without cues or repetitions.  Ability to Hear:  Adequate - no difficulty in normal conversation, social  interaction, listening to TV  Ability to See in Adequate Light:  Adequate - sees fine detail, such as regular  print in newspapers/books    Section B. Health Literacy  Frequency of Needing Assistance Reading:  Never      Section C. Cognitive Patterns  Brief Interview for Mental Status (BIMS) was conducted.  Repetition of Three Words: Three words  Able to report correct year: Correct  Able to report correct month: Accurate within 5 days  Able to report correct day of the week: Correct  Able to recall \"sock\": Yes, no cue required  Able to recall \"blue\": Yes, no cue required  Able to recall \"bed\": Yes, no cue required    BIMS SUMMARY SCORE: 15 Cognitively intact Patient was able to complete the Brief  Interview for Mental Status    Section C. Signs and Symptoms of Delirium (from CAM)  Evidence of Acute Change in Mental Status:   No  Inattention: Behavior not present  Thinking Disorganized or Incoherent:   Behavior not present  Altered Level of Consciousness:   Behavior not present    Section D. Mood  Presence of little interest or pleasure in doing things:   No  Frequency of having little interest or pleasure in doing things:   Never or 1  day  Presence of feeling down, depressed, or hopeless:   No  Frequency of feeling down, depressed, or hopeless:   Never " or 1 day   Interview Ended. Above responses do not meet criteria to continue.  Total Severity Score:   00    Section D. Social Isolation  Frequ[place]y of Feeling Lonely or Isolated:  Never    Section [id number]. Prior Functioning    Self Care: Patient completed all the activities by themself, with or without an  assistive device, with no assistance from a helper.  Indoor Mobility: Patient completed the activities by themself, with or without  an assistive device, with no assistance from a helper.  Stairs: Patient completed the activities by themself, with or without an  assistive device, with no assistance from a helper.  Functional Cognition: Patient completed the activities by themself, with or  without an assistive device, with no assistance from a helper.    Section HT7201. Prior Device Use  Patient does not use manual or motorized wheelchair or scooter, mechanical lift,  walker, or an orthotic/prosthesis.    Section [id number]. Self Care Performance      Section OL6596. Self Care Discharge Goals      Section [id number]. Mobility Performance      Section DT7943. Mobility Discharge Goals      Section H. Bladder and Bowel      Section I. Active Diagnosis      Section J. Health Conditions      Section J. Health Conditions (Pain)  Pain Effect on Sleep:   Occasionally  Pain Interference with Therapy Activities:   Rarely or not at all  Pain Interference with Day-to-Day Activities:   Rarely or not at all    Section K. Swallowing/Nutritional Status  Regular food (solids and liquids swallowed safely without supervision or  modified food or liquid consistency).  Nutritional Approaches on Admission:  Nutritional Approaches on Admission:  Therapeutic diet (e.g., low salt, diabetic, low cholesterol)    Section M. Skin Conditions  Unhealed Pressure Ulcer/Injuries at Stage 1 or Higher on Admission:  No.    Section N. Medication    Potential Clinically Significant Medication Issues: No issues found during  review  Patient is taking medications  in the following pharmacological classification:  E. Anticoagulant An indication is noted for all medications in the Anticoagulant  drug class. J. Hypoglycemic (including insulin) An indication is noted for all  medications in the Hypoglycemic drug class. I. Antiplatelet An indication is NOT  noted for all medications in the Antiplatelet drug class.  Potential Clinically Significant Medication Issues: No issues found during  review    Section O. Special Treatments, Procedures, and Programs  None    Signed by: Nurse Asa

## 2022-11-01 NOTE — NURSING NOTE
Patient is being transferred to inpatient rehab, room 108A. Report called to Maddie and accepted.     Son Jose Angel has been informed of patient being moved to rehab and room number.     [id number]: Patient being moved to rehab with all belongings at this time

## 2022-11-01 NOTE — PROGRESS NOTES
Rehabilitation Nursing  Inpatient Rehabilitation Plan of Care Note    Plan of Care  Safety    Potential for Injury (Active)  Current Status (11/1/2022 3:00:00 PM): At risk for falls  Weekly Goal: No falls this week  Discharge Goal: No falls during admission    Body Systems    Integumentary (Active)  Current Status (11/1/2022 3:00:00 PM): At risk for skin breakdown  Weekly Goal: No skin breakdown this week  Discharge Goal: No skin breakdown during admission    Signed by: Maddie Rodney, Supervisor

## 2022-11-01 NOTE — PLAN OF CARE
Goal Outcome Evaluation:  Plan of Care Reviewed With: patient        Progress: no change  Outcome Evaluation: monitor

## 2022-11-01 NOTE — PLAN OF CARE
Goal Outcome Evaluation:              Outcome Evaluation: Pt has been resting in bed throughout the night with no concerns or complaints. VSS. No s/s of acute distress noted. Will continue to follow plan of care.

## 2022-11-01 NOTE — CASE MANAGEMENT/SOCIAL WORK
Discharge Planning Assessment  NORBERT Parkinson     Patient Name: Benitez Miranda  MRN: [id number]  Today's Date: 11/1/2022    Admit Date: 10/28/2022    Plan: SS received consult for Stroke. SS attempted visit with pt and pt was out of the room. SS will follow up tomorrow.   Discharge Needs Assessment     Row Name 11/01/22 1332       Living Environment    People in Home alone    Primary Care Provided by self    Provides Primary Care For no one    Family Caregiver if Needed child(samanta), adult    Quality of Family Relationships helpful;involved;supportive    Able to Return to Prior Arrangements yes       Transition Planning    Patient/Family Anticipates Transition to inpatient rehabilitation facility  Pt and pt's son is agreeable for pt to be discharged to inpatient rehab. Inpatient rehab consult ordered and they are agreeable to admit pt today.    Transportation Anticipated family or friend will provide       Discharge Needs Assessment    Equipment Currently Used at Home none    Discharge Facility/Level of Care Needs acute rehab  Pt did not utilize home health services prior to admission.               Discharge Plan     Row Name 11/01/22 7893       Plan    Final Discharge Disposition Code 62 - inpatient rehab facility    Final Note Pt is being discharged to inpatient rehab today. Pt lives at home alone and he plans to return home at discharge from inpatient rehab. Pt does not utilize home health services or DME. PCP is Dr. Mesha Christina. Pt does not have a POA. Pt has a living will that was completed this encounter. No other needs identified.              Continued Care and Services - Admitted Since 10/28/2022     Home Medical Care     Service Provider Request Status Selected Services Address Phone Fax Patient Preferred     [facility] Accepted N/A [address] [id number] [phone] --              Expected Discharge Date and Time     Expected Discharge Date Expected Discharge Time     Nov 1, 2022          Demographic Summary     Row Name 11/01/22 0088       General Information    Referral Source physician    Reason for Consult --  SS received consult for Stroke. SS spoke to pt and pt's son, Jose Angel at bedside.              MARY ALICE Soriano

## 2022-11-01 NOTE — THERAPY TREATMENT NOTE
Acute Care - Physical Therapy Treatment Note   [facility]     Patient Name: Benitez Miranda  : 1955  MRN: [id number]  Today's Date: 2022   Onset of Illness/Injury or Date of Surgery: 10/28/22  Visit Dx:     ICD-10-CM ICD-9-CM   1. Cerebrovascular accident (CVA), unspecified mechanism ([facility])  I63.9 434.91   2. H/O mechanical aortic valve replacement  Z95.2 V43.3     Patient Active Problem List   Diagnosis   • Uncontrolled type 2 diabetes with neuropathy   • COPD (chronic obstructive pulmonary disease) ([facility])   • Chronic diastolic congestive heart failure ([facility])   • Essential hypertension   • Tobacco abuse   • Coronary artery disease involving native coronary artery of native heart without angina pectoris   • Low back pain   • Hyperlipidemia LDL goal <70   • H/O mechanical aortic valve replacement   • Depression   • CKD (chronic kidney disease), stage III ([facility])   • Rheumatic mitral stenosis   • Morbidly obese ([facility])   • Recurrent major depressive disorder, in full remission ([facility])   • Ischemic stroke ([facility])   • CVA (cerebral vascular accident) ([facility])     Past Medical History:   Diagnosis Date   • Anxiety    • Arthritis    • CAD (coronary artery disease)     x1 stent; pulmonary HTN; EF 50-55%; rheumatic valve; MV stenosis   • CHF (congestive heart failure) ([facility])    • CKD (chronic kidney disease), stage III ([facility])    • COPD (chronic obstructive pulmonary disease) ([facility])    • Depression    • Diabetes mellitus ([facility])    • H/O mechanical aortic valve replacement    • History of tobacco abuse    • Hyperlipidemia    • Hypertension    • Ischemic heart disease    • Kidney failure     third stage   • Low back pain    • Obesity     BMI 36.   • Stroke ([facility])    • Valvular heart disease      Past Surgical History:   Procedure Laterality Date   • AORTIC VALVE REPAIR/REPLACEMENT     • CARDIAC CATHETERIZATION     • CARDIAC SURGERY      valve on aortic   • CARDIAC SURGERY      stent    • CATARACT EXTRACTION     •  COLONOSCOPY  2001   • CORONARY STENT PLACEMENT  2005   • CYSTOSCOPY URETEROSCOPY LASER LITHOTRIPSY Left 11/20/2020    Procedure: CYSTOSCOPY URETEROSCOPY LASER LITHOTRIPSY WITH STENT PLACEMENT;  Surgeon: Jonah Montgomery MD;  Location: [facility];  Service: Urology;  Laterality: Left;   • KIDNEY STONE SURGERY       PT Assessment (last 12 hours)     PT Evaluation and Treatment     Row Name 11/01/22 1523          Physical Therapy Time and Intention    Subjective Information no complaints  -HR     Document Type therapy note (daily note)  -HR     Mode of Treatment physical therapy  -HR     Patient Effort good  -HR     Comment Pt in agreement for PT. Supine and EOB exercises comepleted until tolerance. Pt walked 48' CGA with No AD.  -HR     Row Name 11/01/22 1523          General Information    Patient Profile Reviewed yes  -HR     Row Name 11/01/22 1523          Cognition    Orientation Status (Cognition) oriented x 4  -HR     Personal Safety Interventions fall prevention program maintained;gait belt;nonskid shoes/slippers when out of bed;supervised activity  -HR     Row Name 11/01/22 1523          Bed Mobility    Bed Mobility bed mobility (all) activities  -HR     All Activities, [place] (Bed Mobility) minimum assist (75% patient effort);1 person assist;contact guard  -HR     Row Name 11/01/22 1523          Transfers    Transfers sit-stand transfer;stand-sit transfer  -HR     Row Name 11/01/22 1523          Sit-Stand Transfer    Sit-Stand [place] (Transfers) contact guard;minimum assist (75% patient effort);2 person assist  -HR     Row Name 11/01/22 1523          Stand-Sit Transfer    Stand-Sit [place] (Transfers) contact guard;minimum assist (75% patient effort)  -HR     Row Name 11/01/22 1523          Gait/Stairs (Locomotion)    Gait/Stairs Locomotion gait/ambulation independence;distance ambulated;gait deviations  -HR     [place] Level (Gait) 1 person assist;contact guard  -HR     Distance  in Feet (Gait) 48'  -HR     Pattern (Gait) step-to;step-through  -HR     Deviations/Abnormal Patterns (Gait) left sided deviations;ataxic;candi decreased;gait speed decreased;stride length decreased;weight shifting decreased  -HR     Row Name 11/01/22 1523          Motor Skills    Therapeutic Exercise other (see comments)  Supine: AP, SLR, Hip abd, heel slide. Inversion.eversion. EOB: LAQ, march, Knees in/out  -HR     Row Name 11/01/22 1523          Positioning and Restraints    Pre-Treatment Position in bed  -HR     Post Treatment Position bed  -HR     In Bed sitting EOB;call light within reach;encouraged to call for assist;with family/caregiver  -HR     Row Name 11/01/22 1523          Therapy Assessment/Plan (PT)    Rehab Potential (PT) good, to achieve stated therapy goals  -HR     Criteria for Skilled Interventions Met (PT) yes;meets criteria;skilled treatment is necessary  -HR     Therapy Frequency (PT) 5 times/wk  5-6x/week  -HR     Problem List (PT) problems related to;balance;hemiparesis/hemiplegia;mobility  -HR     Activity Limitations Related to Problem List (PT) unable to ambulate safely;unable to transfer safely  -HR     Row Name 11/01/22 1523          Physical Therapy Goals    Bed Mobility Goal Selection (PT) bed mobility, PT goal 1  -HR     Transfer Goal Selection (PT) transfer, PT goal 1  -HR     Gait Training Goal Selection (PT) gait training, PT goal 1  -HR     Row Name 11/01/22 1523          Bed Mobility Goal 1 (PT)    Activity/Assistive Device (Bed Mobility Goal 1, PT) bed mobility activities, all  -HR     [place] Level/Cues Needed (Bed Mobility Goal 1, PT) standby assist  -HR     Time Frame (Bed Mobility Goal 1, PT) long term goal (LTG);by discharge  -HR     Row Name 11/01/22 1523          Transfer Goal 1 (PT)    Activity/Assistive Device (Transfer Goal 1, PT) transfers, all  -HR     [place] Level/Cues Needed (Transfer Goal 1, PT) standby assist  -HR     Time Frame (Transfer Goal 1,  PT) long term goal (LTG);by discharge  -HR     Row Name 11/01/22 1523          Gait Training Goal 1 (PT)    Activity/Assistive Device (Gait Training Goal 1, PT) gait (walking locomotion);assistive device use  -HR     [place] Level (Gait Training Goal 1, PT) standby assist  -HR     Distance (Gait Training Goal 1, PT) 150'+  -HR     Time Frame (Gait Training Goal 1, PT) long term goal (LTG);by discharge  -HR           User Key  (r) = Recorded By, (t) = Taken By, (c) = Cosigned By    Initials Name Provider Type    Zahra Reyes PTA Physical Therapist Assistant                Physical Therapy Education     Title: PT OT SLP Therapies (Resolved)     Topic: Physical Therapy (Resolved)     Point: Mobility training (Resolved)     Learner Progress:  Not documented in this visit.          Point: Home exercise program (Resolved)     Learner Progress:  Not documented in this visit.          Point: Body mechanics (Resolved)     Learner Progress:  Not documented in this visit.          Point: Precautions (Resolved)     Learner Progress:  Not documented in this visit.                          PT Recommendation and Plan  Anticipated Discharge Disposition (PT): inpatient rehabilitation facility  Therapy Frequency (PT): 5 times/wk (5-6x/week)      Outcome Measures     Row Name 10/31/22 1300             Modified Pender Scale    Pre-Stroke Modified [place] Scale 1 - No significant disability despite symptoms.  Able to carry out all usual duties and activities.  -LA      Modified [place] Scale 4 - Moderately severe disability.  Unable to walk without assistance, and unable to attend to own bodily needs without assistance.  -LA         Functional Assessment    Outcome Measure Options Modified [place]  -LA            User Key  (r) = Recorded By, (t) = Taken By, (c) = Cosigned By    Initials Name Provider Type    Leilani Bertrand OT Occupational Therapist                 Time Calculation:    PT Charges     Row Name 11/01/22 1520              Time Calculation    Start Time 1050  -HR      PT Received On 11/01/22  -HR         Time Calculation- PT    Total Timed Code Minutes- PT 30 minute(s)  -HR            User Key  (r) = Recorded By, (t) = Taken By, (c) = Cosigned By    Initials Name Provider Type    HR Zahra Mclean PTA Physical Therapist Assistant              Therapy Charges for Today     Code Description Service Date Service Provider Modifiers Qty    14665536594 HC GAIT TRAINING EA 15 MIN 10/31/2022 Zahra Mclean PTA GP, CQ 1    18742247404 HC PT THER PROC EA 15 MIN 10/31/2022 Zahra Mclean PTA GP, CQ 1    98858337114 HC GAIT TRAINING EA 15 MIN 11/1/2022 Zahra Mclean PTA GP, CQ 1    84795598563 HC PT THER PROC EA 15 MIN 11/1/2022 Zahra Mclean PTA GP, CQ 1          PT G-Codes  Outcome Measure Options: Modified [place]  Modified [place] Scale: 4 - Moderately severe disability.  Unable to walk without assistance, and unable to attend to own bodily needs without assistance.    Zahra Mclean PTA  11/1/2022

## 2022-11-02 LAB
ANION GAP SERPL CALCULATED.3IONS-SCNC: 12.9 MMOL/L (ref 5–15)
BASOPHILS # BLD AUTO: 0.06 10*3/MM3 (ref 0–0.2)
BASOPHILS NFR BLD AUTO: 0.6 % (ref 0–1.5)
BUN SERPL-MCNC: 17 MG/DL (ref 8–23)
BUN/CREAT SERPL: 11.9 (ref 7–25)
CALCIUM SPEC-SCNC: 8.6 MG/DL (ref 8.6–10.5)
CHLORIDE SERPL-SCNC: 103 MMOL/L (ref 98–107)
CO2 SERPL-SCNC: 20.1 MMOL/L (ref 22–29)
CREAT SERPL-MCNC: 1.43 MG/DL (ref 0.76–1.27)
DEPRECATED RDW RBC AUTO: 45.2 FL (ref 37–54)
EGFRCR SERPLBLD CKD-EPI 2021: 53 ML/MIN/1.73
EOSINOPHIL # BLD AUTO: 0.32 10*3/MM3 (ref 0–0.4)
EOSINOPHIL NFR BLD AUTO: 3 % (ref 0.3–6.2)
ERYTHROCYTE [DISTWIDTH] IN BLOOD BY AUTOMATED COUNT: 14.6 % (ref 12.3–15.4)
GLUCOSE BLDC GLUCOMTR-MCNC: 174 MG/DL (ref 70–130)
GLUCOSE BLDC GLUCOMTR-MCNC: 241 MG/DL (ref 70–130)
GLUCOSE BLDC GLUCOMTR-MCNC: 258 MG/DL (ref 70–130)
GLUCOSE BLDC GLUCOMTR-MCNC: 288 MG/DL (ref 70–130)
GLUCOSE SERPL-MCNC: 221 MG/DL (ref 65–99)
HCT VFR BLD AUTO: 41.4 % (ref 37.5–51)
HGB BLD-MCNC: 13.4 G/DL (ref 13–17.7)
IMM GRANULOCYTES # BLD AUTO: 0.12 10*3/MM3 (ref 0–0.05)
IMM GRANULOCYTES NFR BLD AUTO: 1.1 % (ref 0–0.5)
INR PPP: 2.45 (ref 0.9–1.1)
LYMPHOCYTES # BLD AUTO: 1.57 10*3/MM3 (ref 0.7–3.1)
LYMPHOCYTES NFR BLD AUTO: 14.9 % (ref 19.6–45.3)
MAGNESIUM SERPL-MCNC: 1.7 MG/DL (ref 1.6–2.4)
MCH RBC QN AUTO: 27.7 PG (ref 26.6–33)
MCHC RBC AUTO-ENTMCNC: 32.4 G/DL (ref 31.5–35.7)
MCV RBC AUTO: 85.7 FL (ref 79–97)
MONOCYTES # BLD AUTO: 0.78 10*3/MM3 (ref 0.1–0.9)
MONOCYTES NFR BLD AUTO: 7.4 % (ref 5–12)
NEUTROPHILS NFR BLD AUTO: 7.66 10*3/MM3 (ref 1.7–7)
NEUTROPHILS NFR BLD AUTO: 73 % (ref 42.7–76)
NRBC BLD AUTO-RTO: 0 /100 WBC (ref 0–0.2)
PLATELET # BLD AUTO: 113 10*3/MM3 (ref 140–450)
PMV BLD AUTO: 12.7 FL (ref 6–12)
POTASSIUM SERPL-SCNC: 4.2 MMOL/L (ref 3.5–5.2)
PROTHROMBIN TIME: 27.3 SECONDS (ref 12.1–14.7)
RBC # BLD AUTO: 4.83 10*6/MM3 (ref 4.14–5.8)
SODIUM SERPL-SCNC: 136 MMOL/L (ref 136–145)
WBC NRBC COR # BLD: 10.51 10*3/MM3 (ref 3.4–10.8)

## 2022-11-02 PROCEDURE — 97161 PT EVAL LOW COMPLEX 20 MIN: CPT

## 2022-11-02 PROCEDURE — 97116 GAIT TRAINING THERAPY: CPT

## 2022-11-02 PROCEDURE — 94799 UNLISTED PULMONARY SVC/PX: CPT

## 2022-11-02 PROCEDURE — 63710000001 INSULIN ASPART PER 5 UNITS: Performed by: FAMILY MEDICINE

## 2022-11-02 PROCEDURE — 82962 GLUCOSE BLOOD TEST: CPT

## 2022-11-02 PROCEDURE — 85025 COMPLETE CBC W/AUTO DIFF WBC: CPT | Performed by: FAMILY MEDICINE

## 2022-11-02 PROCEDURE — 97535 SELF CARE MNGMENT TRAINING: CPT | Performed by: OCCUPATIONAL THERAPIST

## 2022-11-02 PROCEDURE — 97112 NEUROMUSCULAR REEDUCATION: CPT

## 2022-11-02 PROCEDURE — 97166 OT EVAL MOD COMPLEX 45 MIN: CPT | Performed by: OCCUPATIONAL THERAPIST

## 2022-11-02 PROCEDURE — 80048 BASIC METABOLIC PNL TOTAL CA: CPT | Performed by: FAMILY MEDICINE

## 2022-11-02 PROCEDURE — 85610 PROTHROMBIN TIME: CPT | Performed by: FAMILY MEDICINE

## 2022-11-02 PROCEDURE — 97112 NEUROMUSCULAR REEDUCATION: CPT | Performed by: OCCUPATIONAL THERAPIST

## 2022-11-02 PROCEDURE — 83735 ASSAY OF MAGNESIUM: CPT | Performed by: FAMILY MEDICINE

## 2022-11-02 PROCEDURE — 63710000001 INSULIN DETEMIR PER 5 UNITS: Performed by: FAMILY MEDICINE

## 2022-11-02 PROCEDURE — 94660 CPAP INITIATION&MGMT: CPT

## 2022-11-02 PROCEDURE — 97110 THERAPEUTIC EXERCISES: CPT

## 2022-11-02 RX ORDER — CHOLECALCIFEROL (VITAMIN D3) 125 MCG
5 CAPSULE ORAL NIGHTLY
Status: DISCONTINUED | OUTPATIENT
Start: 2022-11-02 | End: 2022-11-11 | Stop reason: HOSPADM

## 2022-11-02 RX ORDER — WARFARIN SODIUM 5 MG/1
10 TABLET ORAL
Status: COMPLETED | OUTPATIENT
Start: 2022-11-02 | End: 2022-11-02

## 2022-11-02 RX ADMIN — DICLOFENAC 2 G: 10 GEL TOPICAL at 18:08

## 2022-11-02 RX ADMIN — INSULIN ASPART 3 UNITS: 100 INJECTION, SOLUTION INTRAVENOUS; SUBCUTANEOUS at 12:00

## 2022-11-02 RX ADMIN — INSULIN ASPART 4 UNITS: 100 INJECTION, SOLUTION INTRAVENOUS; SUBCUTANEOUS at 09:34

## 2022-11-02 RX ADMIN — INSULIN DETEMIR 20 UNITS: 100 INJECTION, SOLUTION SUBCUTANEOUS at 21:00

## 2022-11-02 RX ADMIN — INSULIN ASPART 4 UNITS: 100 INJECTION, SOLUTION INTRAVENOUS; SUBCUTANEOUS at 20:59

## 2022-11-02 RX ADMIN — WARFARIN 10 MG: 5 TABLET ORAL at 18:08

## 2022-11-02 RX ADMIN — SERTRALINE 150 MG: 50 TABLET, FILM COATED ORAL at 09:34

## 2022-11-02 RX ADMIN — INSULIN ASPART 2 UNITS: 100 INJECTION, SOLUTION INTRAVENOUS; SUBCUTANEOUS at 18:09

## 2022-11-02 RX ADMIN — DICLOFENAC 2 G: 10 GEL TOPICAL at 11:58

## 2022-11-02 RX ADMIN — ASPIRIN 81 MG: 81 TABLET, COATED ORAL at 09:34

## 2022-11-02 RX ADMIN — INSULIN DETEMIR 20 UNITS: 100 INJECTION, SOLUTION SUBCUTANEOUS at 09:34

## 2022-11-02 RX ADMIN — DICLOFENAC 2 G: 10 GEL TOPICAL at 20:59

## 2022-11-02 RX ADMIN — Medication 5 MG: at 20:59

## 2022-11-02 RX ADMIN — ATORVASTATIN CALCIUM 80 MG: 40 TABLET, FILM COATED ORAL at 20:59

## 2022-11-02 NOTE — PROGRESS NOTES
Rehabilitation Nursing  Inpatient Rehabilitation Plan of Care Note    Plan of Care  Copy from POCSafety    Potential for Injury (Active)  Current Status (11/1/2022 3:00:00 PM): At risk for falls  Weekly Goal: No falls this week  Discharge Goal: No falls during admission    Body Systems    Integumentary (Active)  Current Status (11/1/2022 3:00:00 PM): At risk for skin breakdown  Weekly Goal: No skin breakdown this week  Discharge Goal: No skin breakdown during admission    Signed by: Kimi Du, Nurse

## 2022-11-02 NOTE — SIGNIFICANT NOTE
11/02/22 1130   Living Environment   People in Home alone   Current Living Arrangements home   Primary Care Provided by self   Provides Primary Care For no one   Family Caregiver if Needed child(samanta), adult   Family Caregiver Names Son Jose Angel Miranda can check on pt and visit too.  Son is unable to provide 24 hour assistance.  Son has health issues and disabled.  Pt has a friend Xavi Zaldivar who can check on him.   Quality of Family Relationships helpful;involved;supportive   Able to Return to Prior Arrangements yes   Living Arrangement Comments SS spoke to pt and son Jose Angel Miranda.  Pt is  and lives at home alone.  Pt has two sons Jose Angel Miranda ([place]) and Maksim Miranda (no contact with son in 2 years).  Pt adopted two daughters from previous marriage, however, no contact with them in over 30 years.  Pt receives Medicare A,B,D and [employer] insurance.  PCP is Dr. Mesha Christina.  [facility].  Pt has a living will and son Jose Angel Miranda is healthcare surrogate.  Pt does not have POA.  Pt was independent with ADL's and mobility prior to admission.  Pt was able to drive prior to admission and took himself to MD appointments.   Resource/Environmental Concerns   Transportation Concerns none   Transition Planning   Patient/Family Anticipates Transition to home   Patient/Family Anticipated Services at Transition durable medical equipment;home health care   Transportation Anticipated family or friend will provide   Discharge Needs Assessment (IRF)   Current Outpatient/Agency/Support Group   (Pt did not receive home health or outpatient therapy prior to admission.)   Concerns to be Addressed adjustment to diagnosis/illness   Concerns Comments Pt lives alone.  Mayur Walter can check on pt and visit.  Grandson Khadar Miranda will check on pt too.  Son can assist with shopping and transportation.   Equipment Currently Used at Home glucometer;bp cuff;bipap  (Pt's Bi-PAP machine  was not working prior to admission.  Pt is suppose to follow-up with PCP for new Bi-PAP machine.  [facility] Bi[facility] machine.)   Current Discharge Risk lives alone   Discharge Coordination/Progress Pt plans to return home alone at discharge.  Son Jose Angel, grandamanuel Rubalcava and friend Tiffanie can check on pt at home.  Son will assist with shopping and transportation.  Pt will not have a 24 hour caregiver.  Team conference will be held on 11-2-22.  SS will follow and assist with discharge planning.

## 2022-11-02 NOTE — PROGRESS NOTES
Inpatient Rehabilitation Functional Measures Assessment and Plan of Care    Plan of Care  Self Care    [OT] Dressing (Lower)(Active)  Current Status(11/02/2022): max  Weekly Goal(11/09/2022): mod  Discharge Goal: sup/set up    Functional Measures  JAMAR Eating:  JAMAR Grooming:  JAMAR Bathing:  JAMAR Upper Body Dressing:  JAMAR Lower Body Dressing:  JAMAR Toileting:    JAMAR Bladder Management  Level of Assistance:  Frequency/Number of Accidents this Shift:    JAMAR Bowel Management  Level of Assistance:  Frequency/Number of Accidents this Shift:    JAMAR Bed/Chair/Wheelchair Transfer:  JAMAR Toilet Transfer:  JAMAR Tub/Shower Transfer:    Previously Documented Mode of Locomotion at Discharge:  JAMAR Expected Mode of Locomotion at Discharge:  JAMAR Walk/Wheelchair:  JAMAR Stairs:    JAMAR Comprehension:  JAMAR Expression:  JAMAR Social Interaction:  [facility] Problem Solving:  JAMAR Memory:    Therapy Mode Minutes  Occupational Therapy: Individual: 90 minutes.  Physical Therapy:  Speech Language Pathology:    Discharge Functional Goals:    Signed by: Michaelle Hicks, Occupational Therapist

## 2022-11-02 NOTE — PROGRESS NOTES
Case Management  Inpatient Rehabilitation Plan of Care and Discharge Plan Note    Rehabilitation Diagnosis:  right CVA  Date of Onset:  10-28-22    Medical Summary:  right CVA, right MCA stroke    Plan of Care      Expected Intensity:  Average of 3 hours of therapy 5 days/week.  Interdisciplinary Team:  Interdisciplinary Team: Medical Supervision and 24 Hour Rehabilitation Nursing.,  Physical Therapy:, Occupational Therapy:, Social Work, Therapeutic Recreation.  Physical Therapy Intensity/Duration: PT 1.5 hours per day/5 days per week  Occupational Therapy Intensity/Duration: OT 1.5 hours per day/5 days per week  Estimated Length of Stay/Anticipated Discharge Date: 14 days  Anticipated Discharge Destination:  Anticipated discharge destination from inpatient rehabilitation is community  discharge with assistance. Pt plans to return home alone at discharge if  possible.  Son will check on pt daily.  Friend will also check on pt.  Pt will  not have a full-time caregiver.      Based on the patient's medical and functional status, their prognosis and  expected level of functional improvement is:  good    Signed by: YUNIER Gillette

## 2022-11-02 NOTE — PROGRESS NOTES
Inpatient Rehabilitation Functional Measures Assessment and Plan of Care    Plan of Care      Functional Measures  JAMAR Eating:  JAMAR Grooming:  JAMAR Bathing:  JAMAR Upper Body Dressing:  JAMAR Lower Body Dressing:  JAMAR Toileting:    JAMAR Bladder Management  Level of Assistance:  Frequency/Number of Accidents this Shift:    JAMAR Bowel Management  Level of Assistance:  Frequency/Number of Accidents this Shift:    JAMAR Bed/Chair/Wheelchair Transfer:  Bed/chair/wheelchair Transfer Score = 4.  Patient performs 75% or more of effort and minimal assistance (little/incidental  help/lifting of one limb/steadying) for transferring to and from the  bed/chair/wheelchair, requiring: Contact guard. Patient requires the following  assistive device(s): Walker. Seating system of wheelchair.  JAMAR Toilet Transfer:  JAMAR Tub/Shower Transfer:    Previously Documented Mode of Locomotion at Discharge:  JAMAR Expected Mode of Locomotion at Discharge: The expected mode of most  frequently used locomotion, at discharge, is expected to be walking.  JAMAR Walk/Wheelchair:  WHEELCHAIR OBSERVATION   Wheelchair did not occur.    WALK OBSERVATION   Walk Distance Scale = 3.  Distance walked is greater than 150 feet. Walk Score  = 4.  Patient performs 75% or more of effort and requires minimal assistance.  Incidental help/contact guard/steadying was provided. Patient walked a distance  of  150 feet. Patient requires the following assistive device(s): platform RW .  JAMAR Stairs:  Stairs did not occur.    JAMAR Comprehension:  JAMAR Expression:  JAMAR Social Interaction:  JAMAR Problem Solving:  JAMAR Memory:    Therapy Mode Minutes  Occupational Therapy:  Physical Therapy: Individual: 90 minutes.  Speech Language Pathology:    Discharge Functional Goals:  Bed, Chair, Wheelchair Transfers: 6  Walk: 5    Signed by: Yulia Cabrera Physical Therapist

## 2022-11-02 NOTE — H&P
[facility] HOSPITALIST HISTORY AND PHYSICAL    Patient Identification:  Name:  Benitez Miranda  Age:  67 y.o.  Sex:  male  :  1955  MRN:  [id number]   Visit Number:  [id number]  Room number:  108/1S  Primary Care Physician:  Mesha Christina MD     Subjective     2022   Chief complaint:  No chief complaint on file.      History of presenting illness:  67 y.o. male  This pt is seen today for post admission physician evaluation to the inpatient rehabilitation facility.  Patient is a 67-year-old gentleman with a history of coronary artery disease with history of PCI, pulmonary hypertension, history of aortic valve replacement with mechanical valve, carotid artery disease, diabetes mellitus, lumbago, osteoarthritis, depression, COPD, chronic kidney disease stage III, hypertension, dyslipidemia, pulmonary hypertension, history of obstructive sleep apnea.  Patient presented to the hospital initially on 1025 with left upper extremity numbness and clumsiness.  Patient apparently was discharged to home and then had worsening of of left-sided weakness on 10/28/2022.  CT of head showed a 14 mm area of decreased density in the right corona radiata consistent with subacute infarct with no hemorrhage.  CTA of the head neck showed no hemodynamically significant stenosis.  Patient was outside window for tPA.  Patient diagnosed with right MCA CVA.  Patient treated medically.  Patient is has been stabilized and was thought to be a good candidate for inpatient physical rehab.      Patient continued to progress medically.  Physical therapy and Occupational therapy evaluations were completed with recommended acute inpatient rehabilitation referral for continued functional mobility intervention and reeducation.  Acute rehab referral ordered for continued medical monitoring and management post prolonged hospitalization, continued respiratory status monitoring, lab monitoring, pain mgt needs, bowel/bladder  care with new medication education, skin monitoring and breakdown prevention along with ongoing medical comorbidities that require ongoing care.    The preadmission mini-FIM score as assessed by the referring facility as follows: Eating 4; grooming 4; bathing 2; dressing upper body 3; dressing lower body 3; transfers to bed chair and wheelchair at 5; transfers to toilet 5; locomotion 5; bladder management 6; bowel management 6; comprehension 7; expression 7; substractions 7; problem solving 7; memory 7.  Estimated length of stay 14 days.    ---------------------------------------------------------------------------------------------------------------------   Review of Systems:  General: Denies fever denies chills  HEENT: Denies headaches denies visual difficulties  Heart: Denies difficulty with chest pain.  Denies palpitations  Lungs: Denies cough.  Does have history of COPD  Abdomen: No nausea vomiting or diarrhea negative  : Negative   Musculoskeletal: Complains of pain in the left shoulder left biceps with movement of the arm  Neuro: Left upper extremity paresis  Skin: Negative  ---------------------------------------------------------------------------------------------------------------------   Past Medical History:   Diagnosis Date   • Anxiety    • Arthritis    • CAD (coronary artery disease)     x1 stent; pulmonary HTN; EF 50-55%; rheumatic valve; MV stenosis   • CHF (congestive heart failure) ([facility])    • CKD (chronic kidney disease), stage III ([facility])    • COPD (chronic obstructive pulmonary disease) ([facility])    • Depression    • Diabetes mellitus (HCC)    • H/O mechanical aortic valve replacement    • History of tobacco abuse    • Hyperlipidemia    • Hypertension    • Ischemic heart disease    • Kidney failure     third stage   • Low back pain    • Obesity     BMI 36.   • Stroke (HCC)    • Valvular heart disease      Past Surgical History:   Procedure Laterality Date   • AORTIC VALVE REPAIR/REPLACEMENT  1991    • CARDIAC CATHETERIZATION     • CARDIAC SURGERY      valve on aortic   • CARDIAC SURGERY      stent    • CATARACT EXTRACTION     • COLONOSCOPY     • CORONARY STENT PLACEMENT     • CYSTOSCOPY URETEROSCOPY LASER LITHOTRIPSY Left 2020    Procedure: CYSTOSCOPY URETEROSCOPY LASER LITHOTRIPSY WITH STENT PLACEMENT;  Surgeon: Jonah Montgomery MD;  Location: [facility];  Service: Urology;  Laterality: Left;   • KIDNEY STONE SURGERY       Family History   Problem Relation Age of Onset   • Cancer Mother         breast   • COPD Father    • Heart attack Father 74   • Diabetes Paternal Grandmother         both legs removed     Social History     Socioeconomic History   • Marital status:    • Number of children: 2   Tobacco Use   • Smoking status: Some Days     Packs/day: 1.00     Years: 30.00     Pack years: 30.00     Types: Pipe, Cigars, Cigarettes     Last attempt to quit: 2016     Years since quittin.3   • Smokeless tobacco: Never   • Tobacco comments:     currently smokes a pipe   Vaping Use   • Vaping Use: Never used   Substance and Sexual Activity   • Alcohol use: No   • Drug use: No   • Sexual activity: Defer     ---------------------------------------------------------------------------------------------------------------------   Allergies:  Penicillins, Ace inhibitors, and Contrast dye  ---------------------------------------------------------------------------------------------------------------------   Medications below are reported home medications pulling from within the system; at this time, these medications have not been reconciled unless otherwise specified and are in the verification process for further verifcation as current home medications.    Prior to Admission Medications     Prescriptions Last Dose Informant Patient Reported? Taking?    amLODIPine (NORVASC) 5 MG tablet Unknown Pharmacy Yes No    Take 1 tablet by mouth Daily.    cetirizine (zyrTEC) 10 MG tablet  Unknown Self Yes No    Take 1 tablet by mouth Daily As Needed for Allergies.    cyclobenzaprine (FLEXERIL) 10 MG tablet Unknown Self No No    Take 1 tablet by mouth 3 (Three) Times a Day As Needed for Muscle Spasms.    Enoxaparin Sodium (Lovenox) 120 MG/0.8ML solution prefilled syringe syringe Unknown  No No    Inject 0.8 mL under the skin into the appropriate area as directed Every 12 (Twelve) Hours. Recheck INR tomorrow (10/28) at primary care physician's office.    gabapentin (NEURONTIN) 600 MG tablet Unknown Pharmacy Yes No    Take 1 tablet by mouth Daily As Needed (nerve pain).    insulin glargine (LANTUS, SEMGLEE) 100 UNIT/ML injection Unknown Self Yes No    Inject 43 Units under the skin into the appropriate area as directed Every Night.    insulin glargine (LANTUS, SEMGLEE) 100 UNIT/ML injection Unknown  No No    Inject 41 Units under the skin into the appropriate area as directed Every Morning.    insulin lispro (humaLOG) 100 UNIT/ML injection Unknown Self Yes No    Inject 15 Units under the skin into the appropriate area as directed 3 (Three) Times a Day.    losartan (COZAAR) 100 MG tablet Unknown Pharmacy Yes No    Take 1 tablet by mouth Daily.    metoprolol succinate XL (TOPROL-XL) 50 MG 24 hr tablet Unknown Other Yes No    Take 1 tablet by mouth Daily.    pravastatin (PRAVACHOL) 40 MG tablet Unknown Pharmacy Yes No    Take 1 tablet by mouth Daily.    sertraline (ZOLOFT) 100 MG tablet Unknown Pharmacy No No    Take 1.5 tablets by mouth Daily.    spironolactone (ALDACTONE) 25 MG tablet Unknown Pharmacy Yes No    Take 1 tablet by mouth Daily.    warfarin (Coumadin) 1 MG tablet Unknown Pharmacy No No    Take 1 tablet by mouth Every Night. In addition to the 6 mg tablet to equal 7 mg.    warfarin (COUMADIN) 6 MG tablet Unknown Pharmacy Yes No    Take 1 tablet by mouth Every Night. Taking in addition to the 1mg to equal 7mg daily        Objective     Vital Signs:  Temp:  [97.6 °F (36.4 °C)-98.5 °F (36.9 °C)]  98.5 °F (36.9 °C)  Heart Rate:  [72-79] 78  Resp:  [18-20] 20  BP: (132-160)/(73-80) 143/73    No data found.  SpO2:  [94 %-98 %] 94 %  on   ;   Device (Oxygen Therapy): room air  Body mass index is 34.58 kg/m².    Wt Readings from Last 3 Encounters:   11/01/22 116 kg (255 lb)   11/01/22 116 kg (255 lb 1.6 oz)   10/27/22 116 kg (255 lb 3.2 oz)      ---------------------------------------------------------------------------------------------------------------------     Physical exam:  Constitutional:   No acute distress  HEENT: Normocephalic atraumatic  Neck: Supple   Cardiovascular: Regular rate and rhythm with mechanical valve click  Pulmonary/Chest: Clear to auscultation  Abdominal: Positive bowel sounds soft.   Musculoskeletal: Patient does have some disc increased discomfort with abduction of the left shoulder.  Neurological: 2 out of 5 strength in the left upper extremity.  4 out of 5 strength in left lower extremity.  No other focal neurologic deficits noted  Skin: No rash  Peripheral vascular: No edema  Genitourinary::  ---------------------------------------------------------------------------------------------------------------------  EKG:    No orders to display           Last echocardiogram:  Results for orders placed during the hospital encounter of 10/28/22    Adult Transthoracic Echo Complete W/ Cont if Necessary Per Protocol (With Agitated Saline)    Interpretation Summary  •  The left ventricular cavity is mildly dilated.  •  Left ventricular wall thickness is consistent with mild concentric hypertrophy.  •  Left ventricular systolic function is normal. Left ventricular ejection fraction appears to be 56 - 60%.  •  Left ventricular diastolic function is consistent with (grade I) impaired relaxation.  •  There is a mechanical aortic valve prosthesis present. The aortic valve peak and mean gradients are within defined limits. The prosthetic aortic valve is grossly normal.  •  Moderate calcific  mitral  valve stenosis is present. ( Peak PG= 17 mm Hg and mean PG = 8 mm Hg)).  Unchanged since the study done 10/5/2021.  •  Moderate mitral valve regurgitation is present.  •  Mild pulmonary hypertension is present.  •  Saline test is negative for the evidence of shunt at interatrial septum.  •  There is no evidence of pericardial effusion.    --------------------------------------------------------------------------------------------------------------------  Labs:  Results from last 7 days   Lab Units 11/02/22  0115 11/01/22  0256 10/31/22  0207 10/30/22  0456 10/29/22  0504 10/28/22  1905 10/28/22  0619   WBC 10*3/mm3 10.51 9.27 9.03 7.68 8.04 8.05 10.11   HEMOGLOBIN g/dL 13.4 13.1 13.1 13.0 13.1 13.1 13.7   HEMATOCRIT % 41.4 39.6 40.1 41.4 43.8 40.8 42.1   MCV fL 85.7 85.7 86.1 89.6 92.4 87.2 85.4   MCHC g/dL 32.4 33.1 32.7 31.4* 29.9* 32.1 32.5   PLATELETS 10*3/mm3 113* 113* 111* 109* 116* 119* 150   INR  2.45* 3.11* 3.13* 3.21* 2.43* 2.24* 2.17*         Results from last 7 days   Lab Units 11/02/22  0115 11/01/22  0256 10/31/22  0207 10/30/22  0456   SODIUM mmol/L 136 135* 137 135*   POTASSIUM mmol/L 4.2 4.0 4.0 4.1   MAGNESIUM mg/dL 1.7  --   --   --    CHLORIDE mmol/L 103 103 107 107   CO2 mmol/L 20.1* 19.9* 19.5* 16.6*   BUN mg/dL 17 16 18 19   CREATININE mg/dL 1.43* 1.29* 1.24 1.22   CALCIUM mg/dL 8.6 8.4* 8.6 8.4*   GLUCOSE mg/dL 221* 149* 186* 152*   ALBUMIN g/dL  --  3.23* 3.28* 2.99*   BILIRUBIN mg/dL  --  0.4 0.4 0.4   ALK PHOS U/L  --  55 55 50   AST (SGOT) U/L  --  25 26 24   ALT (SGPT) U/L  --  27 27 22   Estimated Creatinine Clearance: 64.1 mL/min (A) (by C-G formula based on SCr of 1.43 mg/dL (H)).  No results found for: AMMONIA  Results from last 7 days   Lab Units 10/28/22  0619   TROPONIN T ng/mL 0.019         Glucose   Date/Time Value Ref Range Status   11/02/2022 0705 258 (H) 70 - 130 mg/dL Final     Comment:     Meter: [id number] : [id number] Harshal Crystal   11/01/2022 2031 227 (H) 70 - 130  mg/dL Final     Comment:     Meter: [id number] : [id number] Harshal Crystal   11/01/2022 1609 173 (H) 70 - 130 mg/dL Final     Comment:     Meter: [id number] : [id number] toñito agrawal   11/01/2022 1055 297 (H) 70 - 130 mg/dL Final     Comment:     Meter: [id number] : [id number] LUIS MYERS   11/01/2022 0650 194 (H) 70 - 130 mg/dL Final     Comment:     Meter: [id number] : [id number] LOIS RAJIV   10/31/2022 1605 195 (H) 70 - 130 mg/dL Final     Comment:     Meter: [id number] : [id number] LOIS RAJIV   10/31/2022 1008 203 (H) 70 - 130 mg/dL Final     Comment:     Meter: [id number] : [id number] LOIS RAJIV   10/31/2022 0632 180 (H) 70 - 130 mg/dL Final     Comment:     Meter: [id number] : [id number] LOIS VANCE     Lab Results   Component Value Date    TSH 4.680 (H) 10/25/2022    FREET4 1.07 10/25/2022     No results found for: PREGTESTUR, PREGSERUM, HCG, HCGQUANT  Pain Management Panel     Pain Management Panel Latest Ref Rng & Units 6/29/2020 10/25/2019    CREATININE UR mg/dL 87.9 154.7        Brief Urine Lab Results  (Last result in the past 365 days)      Color   Clarity   Blood   Leuk Est   Nitrite   Protein   CREAT   Urine HCG        10/25/22 1734 Yellow   Clear   Negative   Negative   Negative   Negative               No results found for: BLOODCX  No results found for: URINECX  No results found for: WOUNDCX  No results found for: STOOLCX    Last Urine Toxicity     LAST URINE TOXICITY RESULTS Latest Ref Rng & Units 6/29/2020 10/25/2019    CREATININE UR mg/dL 87.9 154.7          I have personally looked at the labs and they are summarized above.  ----------------------------------------------------------------------------------------------------------------------  Detailed radiology reports for the last 24 hours:    Imaging Results (Last 24 Hours)     ** No results found for the last 24 hours. **        Final impressions for the last 30 days of radiology  reports:    CT Head Without Contrast    Result Date: 10/25/2022    No acute findings in the head/brain.  Findings were communicated.  This report was finalized on 10/25/2022 11:40 AM by Dr. Leno Du MD.      CT Angiogram Neck    Result Date: 10/28/2022  No hemodynamically significant stenosis is seen at the carotid bifurcations or in the posterior circulation. No large vessel occlusions are noted. There is evidence of mild to moderate diffuse small vessel disease. There is asymmetric filling of distal MCA branches, greater on the left than on the right. An acute occlusion of a mid to distal MCA branch on the right could account for this finding. This could also reflect chronic atherosclerotic disease. Signer Name: Ortega Waldrop MD  Signed: 10/28/2022 7:35 AM  Workstation Name: [id number]  [facility]    CT Angiogram Neck    Result Date: 10/25/2022    Mild bilateral proximal internal carotid artery stenosis secondary to atherosclerotic vascular calcifications.  This report was finalized on 10/25/2022 12:44 PM by Dr. Leno Du MD.      MRI Brain Without Contrast    Result Date: 10/28/2022    Grossly stable appearance of right posterior parietal acute infarct.  This report was finalized on 10/28/2022 11:44 AM by Dr. Leno Du MD.      MRI Brain Without Contrast    Result Date: 10/27/2022    Focal 1.9 cm acute infarct in the right posterior parietal white matter.  This report was finalized on 10/27/2022 11:05 AM by Dr. Leno Du MD.      XR Chest 1 View    Result Date: 10/28/2022    Cardiomegaly.  This report was finalized on 10/28/2022 8:46 AM by Dr. Leno Du MD.      CT Angiogram Head    Result Date: 10/25/2022    No acute findings in the arteries of the head/brain.  This report was finalized on 10/25/2022 12:43 PM by Dr. Leno Du MD.      CT Head Without Contrast Stroke Protocol    Result Date: 10/28/2022  New 14 mm area of decreased density in the right corona  radiata consistent with a subacute infarct. There is no hemorrhage. Signer Name: Nate Lake MD  Signed: 10/28/2022 6:17 AM  Workstation Name: [id number]  [facility]    CT Angiogram Head w AI Analysis of LVO    Result Date: 10/28/2022  No hemodynamically significant stenosis is seen at the carotid bifurcations or in the posterior circulation. No large vessel occlusions are noted. There is evidence of mild to moderate diffuse small vessel disease. There is asymmetric filling of distal MCA branches, greater on the left than on the right. An acute occlusion of a mid to distal MCA branch on the right could account for this finding. This could also reflect chronic atherosclerotic disease. Signer Name: Ortega Waldrop MD  Signed: 10/28/2022 7:35 AM  Workstation Name: [id number]  [facility]    CT CEREBRAL PERFUSION WITH & WITHOUT CONTRAST    Result Date: 10/25/2022  No core infarct detected.  This report was finalized on 10/25/2022 12:45 PM by Dr. Leno Du MD.      I have personally looked at the radiology images and read the final radiology report.    Assessment & Plan    Status post right MCA distribution CVA with a left upper extremity paresis--patient currently on Coumadin at this time with goal INR between 3 and 3.5.  Patient currently subtherapeutic with INR of 2.45.  Patient will require physical therapy 60 to 90 minutes/day 5 to 6 days/week with training, safety, stair navigation, strengthening, therapeutic exercise, range of motion, endurance and gait training.  Require occupational Therapy to 60 to 90 minutes/day 5 to 6 days/week for up with dressing, ADLs, feeding, home skills, safety and toileting.  Will have speech therapy evaluation on admission but patient's speech at this time appears to be well doing well and I do not expect to see any swallowing issues.  We expect patient to be able to safely perform activities of daily living using adaptive equipment  for improved functional mobility.  Independent and safe bowel and bladder function.  Able to safely consume food and fluids without signs of aspiration.  Able navigate home and community territory safe without injury or falls.  We dissipate patient going home with assistance may require home health with PT, OT and nursing services.  We expect patient may require walker and bedside commode at discharge.    Left shoulder pain with movement patient markedly decreased in his left upper extremity.  We will give Voltaren at this time patient that probably is does have some increased risk for subluxation of the left humerus.  Will consider sling.    Diabetes mellitus marginal control continueLevemir and Humalog at this time.    Neuropathic pain continue gabapentin    Obstructive sleep apnea we will start patient back on BiPAP this evening.    Hypertension continue antihypertensive therapy.  Reasonably well-controlled at this time    CKD stage III    COPD compensated and stable at this time    CAD with history of stent placement patient asymptomatic at this time medical management.    History of mechanical aortic valve--pharmacy is titrating Coumadin at this time goal INR is 3-3.5.        VTE Prophylaxis:   Mechanical Order History:     None      Pharmalogical Order History:      Ordered     Dose Route Frequency Stop    11/01/22 1450  warfarin (COUMADIN) tablet 7 mg        Question:  Target INR  Answer:  3 - 3.5    7 mg PO Once (Warfarin) 11/01/22 2105 11/01/22 1450  Pharmacy to dose warfarin        Question:  Target INR  Answer:  3 - 3.5    -- XX Continuous PRN --                Fall risk evaluation: Elevated risk for fall secondary to recent CVA with left-sided weakness        Odell Beyer MD  [facility]  11/02/22  07:43 EDT

## 2022-11-02 NOTE — PLAN OF CARE
Goal Outcome Evaluation:               New admit to physical rehab unit. PT and OT to eval. Pt resting quietly with no complaints at this time. Continue current plan of care.

## 2022-11-02 NOTE — THERAPY EVALUATION
Inpatient Rehabilitation - Physical Therapy Initial Evaluation        Iggy     Patient Name: Benitez Miranda  : 1955  MRN: [id number]    Today's Date: 2022                    Admit Date: 2022      Visit Dx:   No diagnosis found.    Patient Active Problem List   Diagnosis   • Uncontrolled type 2 diabetes with neuropathy   • COPD (chronic obstructive pulmonary disease) ([facility])   • Chronic diastolic congestive heart failure ([facility])   • Essential hypertension   • Tobacco abuse   • Coronary artery disease involving native coronary artery of native heart without angina pectoris   • Low back pain   • Hyperlipidemia LDL goal <70   • H/O mechanical aortic valve replacement   • Depression   • CKD (chronic kidney disease), stage III ([facility])   • Rheumatic mitral stenosis   • Morbidly obese ([facility])   • Recurrent major depressive disorder, in full remission ([facility])   • Ischemic stroke ([facility])   • CVA (cerebral vascular accident) ([facility])       Past Medical History:   Diagnosis Date   • Anxiety    • Arthritis    • CAD (coronary artery disease)     x1 stent; pulmonary HTN; EF 50-55%; rheumatic valve; MV stenosis   • CHF (congestive heart failure) ([facility])    • CKD (chronic kidney disease), stage III ([facility])    • COPD (chronic obstructive pulmonary disease) ([facility])    • Depression    • Diabetes mellitus ([facility])    • H/O mechanical aortic valve replacement    • History of tobacco abuse    • Hyperlipidemia    • Hypertension    • Ischemic heart disease    • Kidney failure     third stage   • Low back pain    • Obesity     BMI 36.   • Stroke ([facility])    • Valvular heart disease        Past Surgical History:   Procedure Laterality Date   • AORTIC VALVE REPAIR/REPLACEMENT     • CARDIAC CATHETERIZATION     • CARDIAC SURGERY      valve on aortic   • CARDIAC SURGERY      stent    • CATARACT EXTRACTION     • COLONOSCOPY     • CORONARY STENT PLACEMENT     • CYSTOSCOPY URETEROSCOPY LASER LITHOTRIPSY Left 2020    Procedure:  CYSTOSCOPY URETEROSCOPY LASER LITHOTRIPSY WITH STENT PLACEMENT;  Surgeon: Jonah Montgomery MD;  Location: [facility];  Service: Urology;  Laterality: Left;   • KIDNEY STONE SURGERY         PT ASSESSMENT (last 12 hours)     IRF PT Evaluation and Treatment     Row Name 11/02/22 1526          PT Time and Intention    Document Type initial evaluation;daily treatment  -LB     Mode of Treatment individual therapy;physical therapy  -LB     Row Name 11/02/22 1526          General Information    Existing Precautions/Restrictions fall  L shoulder pain-needs sling or support  -LB     Row Name 11/02/22 1526          Pain Scale: FACES Pre/Post-Treatment    Pain: FACES Scale, Pretreatment 6-->hurts even more  -LB     Posttreatment Pain Rating 6-->hurts even more  -LB     Pain Location - --  L shoulder  -LB     Pre/Posttreatment Pain Comment rest, repositioned-used a sling or pillow for support  -LB     Row Name 11/02/22 1526          Cognition/Psychosocial    Affect/Mental Status (Cognition) WFL  -LB     Follows Commands (Cognition) WFL  -LB     Personal Safety Interventions gait belt;nonskid shoes/slippers when out of bed;supervised activity  -     Row Name 11/02/22 1526          Range of Motion (ROM)    Range of Motion bilateral lower extremities;ROM is WFL  -LB     [facility] Name 11/02/22 1526          Strength (Manual Muscle Testing)    Strength (Manual Muscle Testing) --  RLE WFL, LLE 3+/5 fatigues quickly  -     Row Name 11/02/22 1526          Bed Mobility    Supine-Sit [place] (Bed Mobility) standby assist;verbal cues  -LB     Assistive Device (Bed Mobility) bed rails  -LB     Row Name 11/02/22 1526          Bed-Chair Transfer    Bed-Chair [place] (Transfers) contact guard;verbal cues;nonverbal cues (demo/gesture)  -     Assistive Device (Bed-Chair Transfers) wheelchair  -LB     Row Name 11/02/22 1526          Sit-Stand Transfer    Sit-Stand [place] (Transfers) contact guard;verbal cues;nonverbal cues  (demo/gesture)  -LB     Assistive Device (Sit-Stand Transfers) wheelchair  -LB     Row Name 11/02/22 1526          Stand-Sit Transfer    Stand-Sit [place] (Transfers) contact guard;verbal cues;nonverbal cues (demo/gesture)  -LB     Assistive Device (Stand-Sit Transfers) wheelchair  -LB     Row Name 11/02/22 1526          Gait/Stairs (Locomotion)    Distance in Feet (Gait) am-50' RW CGA, pm-150' PRW min A to steer  -LB     Deviations/Abnormal Patterns (Gait) candi decreased;gait speed decreased;stride length decreased  -LB     Bilateral Gait Deviations foot drop/toe drag  he scoots his left foot forward on the forefoot, it drags more with fatigue  -     Row Name 11/02/22 1526          Balance    Comment, Balance sitting in w/c ballon tap with RUE for balance, coordination, and weight shfiting  -     Row Name 11/02/22 1526          Motor Skills    Therapeutic Exercise --  sitting ex bid, standing ex in PB  -     Row Name 11/02/22 1526          Positioning and Restraints    Pre-Treatment Position --  am-bed, pm-w/c  -LB     In Wheelchair with OT  bid  -LB     Row Name 11/02/22 1526          Therapy Assessment/Plan (PT)    Patient's Goals For Discharge return home  maximize recovery  -     Row Name 11/02/22 1526          Therapy Assessment/Plan (PT)    PT Diagnosis (PT) CVA, L side weakness  -LB     Rehab Potential/Prognosis (PT) adequate, monitor progress closely  -LB     Frequency of Treatment (PT) 5 times per week  -LB     Estimated Duration of Therapy (PT) 2 weeks  -LB     Problem List (PT) balance;coordination;hemiparesis/hemiplegia;mobility;motor control;muscle tone;strength;pain  -LB     Activity Limitations Related to Problem List (PT) unable to ambulate safely;unable to transfer safely  -     Row Name 11/02/22 1526          Therapy Plan Review/Discharge Plan (PT)    Therapy Plan Review (PT) evaluation/treatment results reviewed;care plan/treatment goals reviewed;risks/benefits  reviewed;participants voiced agreement with care plan  -LB     Anticipated Equipment Needs at Discharge (PT Eval) --  tbd  -LB     Anticipated Discharge Disposition (PT) home with home health;home with assist  -LB     Row Name 11/02/22 1526          IRF PT Goals    Bed Mobility Goal Selection (PT-IRF) bed mobility, PT goal 1  -LB     Transfer Goal Selection (PT-IRF) transfers, PT goal 1  -LB     Gait (Walking Locomotion) Goal Selection (PT-IRF) gait, PT goal 1  -LB     Row Name 11/02/22 1526          Bed Mobility Goal 1 (PT-IRF)    Activity/Assistive Device (Bed Mobility Goal 1, PT-IRF) sit to supine/supine to sit  -LB     [place] (Bed Mobility Goal 1, PT-IRF) independent  -LB     Time Frame (Bed Mobility Goal 1, PT-IRF) by discharge  -LB     Row Name 11/02/22 1526          Transfer Goal 1 (PT-IRF)    Activity/Assistive Device (Transfer Goal 1, PT-IRF) sit-to-stand/stand-to-sit;bed-to-chair/chair-to-bed  -LB     [place] (Transfer Goal 1, PT-IRF) modified independence  -LB     Time Frame (Transfer Goal 1, PT-IRF) by discharge  -LB     Row Name 11/02/22 1526          Gait/Walking Locomotion Goal 1 (PT-IRF)    Activity/Assistive Device (Gait/Walking Locomotion Goal 1, PT-IRF) --  AAD  -LB     Gait/Walking Locomotion Distance Goal 1 (PT-IRF) 300'  -LB     [place] (Gait/Walking Locomotion Goal 1, PT-IRF) supervision required  -LB     Time Frame (Gait/Walking Locomotion Goal 1, PT-IRF) by discharge  -LB           User Key  (r) = Recorded By, (t) = Taken By, (c) = Cosigned By    Initials Name Provider Type    Yulia Devine, PT Physical Therapist                 Physical Therapy Education     Title: PT OT SLP Therapies (Done)     Topic: Physical Therapy (Done)     Point: Mobility training (Done)     Learning Progress Summary           Patient Acceptance, E, VU,NR by GISELLE at 11/2/2022 1602                   Point: Home exercise program (Done)     Learning Progress Summary            Patient Acceptance, E, VU,NR by LB at 11/2/2022 1602                   Point: Body mechanics (Done)     Learning Progress Summary           Patient Acceptance, E, VU,NR by LB at 11/2/2022 1602                   Point: Precautions (Done)     Learning Progress Summary           Patient Acceptance, E, VU,NR by LB at 11/2/2022 1602                               User Key     Initials Effective Dates Name Provider Type Discipline     06/16/21 -  Yulia Cabrera, PT Physical Therapist PT                PT Recommendation and Plan    Planned Therapy Interventions (PT): balance training, bed mobility training, gait training, neuromuscular re-education, patient/family education, postural re-education, strengthening, transfer training  Frequency of Treatment (PT): 5 times per week  Anticipated Equipment Needs at Discharge (PT Eval):  (tbd)                  Time Calculation:      PT Charges     Row Name 11/02/22 1603 11/02/22 1602          Time Calculation    Start Time 1245  -LB 1000  -LB     Stop Time 1330  -LB 1045  -LB     Time Calculation (min) 45 min  -LB 45 min  -LB     PT Received On -- 11/02/22  -LB     PT Goal Re-Cert Due Date -- 11/09/22  -LB           User Key  (r) = Recorded By, (t) = Taken By, (c) = Cosigned By    Initials Name Provider Type    Yulia Devine, REYNALDO Physical Therapist                Therapy Charges for Today     Code Description Service Date Service Provider Modifiers Qty    64285893592 HC PT EVAL LOW COMPLEXITY 1 11/2/2022 Yulia Cabrera, PT GP 1    98177541644 HC GAIT TRAINING EA 15 MIN 11/2/2022 Yulia Cabrera, PT GP 2    88273407288 HC PT THER PROC EA 15 MIN 11/2/2022 Yulia Cabrera, PT GP 2    21364193726 HC PT NEUROMUSC RE EDUCATION EA 15 MIN 11/2/2022 Yulia Cabrera, PT GP 1                   Yulia Cabrera PT  11/2/2022

## 2022-11-02 NOTE — THERAPY EVALUATION
Inpatient Rehabilitation - Occupational Therapy Initial Evaluation     Iggy     Patient Name: Benitez Miranda  : 1955  MRN: [id number]    Today's Date: 2022                 Admit Date: 2022       No diagnosis found.    Patient Active Problem List   Diagnosis   • Uncontrolled type 2 diabetes with neuropathy   • COPD (chronic obstructive pulmonary disease) ([facility])   • Chronic diastolic congestive heart failure ([facility])   • Essential hypertension   • Tobacco abuse   • Coronary artery disease involving native coronary artery of native heart without angina pectoris   • Low back pain   • Hyperlipidemia LDL goal <70   • H/O mechanical aortic valve replacement   • Depression   • CKD (chronic kidney disease), stage III ([facility])   • Rheumatic mitral stenosis   • Morbidly obese ([facility])   • Recurrent major depressive disorder, in full remission ([facility])   • Ischemic stroke ([facility])   • CVA (cerebral vascular accident) ([facility])       Past Medical History:   Diagnosis Date   • Anxiety    • Arthritis    • CAD (coronary artery disease)     x1 stent; pulmonary HTN; EF 50-55%; rheumatic valve; MV stenosis   • CHF (congestive heart failure) ([facility])    • CKD (chronic kidney disease), stage III ([facility])    • COPD (chronic obstructive pulmonary disease) ([facility])    • Depression    • Diabetes mellitus ([facility])    • H/O mechanical aortic valve replacement    • History of tobacco abuse    • Hyperlipidemia    • Hypertension    • Ischemic heart disease    • Kidney failure     third stage   • Low back pain    • Obesity     BMI 36.   • Stroke ([facility])    • Valvular heart disease        Past Surgical History:   Procedure Laterality Date   • AORTIC VALVE REPAIR/REPLACEMENT     • CARDIAC CATHETERIZATION     • CARDIAC SURGERY      valve on aortic   • CARDIAC SURGERY      stent    • CATARACT EXTRACTION     • COLONOSCOPY     • CORONARY STENT PLACEMENT     • CYSTOSCOPY URETEROSCOPY LASER LITHOTRIPSY Left 2020    Procedure: CYSTOSCOPY  URETEROSCOPY LASER LITHOTRIPSY WITH STENT PLACEMENT;  Surgeon: Jonah Montgomery MD;  Location: [facility];  Service: Urology;  Laterality: Left;   • KIDNEY STONE SURGERY               IRF OT ASSESSMENT FLOWSHEET (last 12 hours)     IRF OT Evaluation and Treatment     Row Name 11/02/22 1400          OT Time and Intention    Document Type initial evaluation;daily treatment  -     Mode of Treatment individual therapy;occupational therapy  -     Patient Effort good  -     Symptoms Noted During/After Treatment fatigue  -     Row Name 11/02/22 1400          General Information    Patient/Family/Caregiver Comments/Observations patient agreeable to therapy. patient tolerated well with rest breaks provided. patient seen for OT due to recent CVA  -     Existing Precautions/Restrictions fall  -     Row Name 11/02/22 1400          Living Environment    Current Living Arrangements home  -     People in Home alone  -     Primary Care Provided by self  -[facility] Name 11/02/22 1400          Pain Assessment    Pain Location - Side/Orientation Left  left shoulder  -     Row Name 11/02/22 1400          Cognition/Psychosocial    Affect/Mental Status (Cognition) WFL  -     Follows Commands (Cognition) WFL  -     Row Name 11/02/22 1400          Range of Motion Comprehensive    General Range of Motion upper extremity range of motion deficits identified  L shoulder decreased 1/4 range appears mostly due to pain  -     Row Name 11/02/22 1400          Strength Comprehensive (MMT)    General Manual Muscle Testing (MMT) Assessment upper extremity strength deficits identified  -     Comment, General Manual Muscle Testing (MMT) Assessment left 2/5 left  26,29,40  -     Row Name 11/02/22 1400          Sensory Assessment (Somatosensory)    Sensory Assessment (Somatosensory) UE sensation intact  no reports of numbness and tingling  -     Row Name 11/02/22 1400          Bathing    [place] Level  (Bathing) maximum assist (25% patient effort)  -AH     Row Name 11/02/22 1400          Upper Body Dressing    [place] Level (Upper Body Dressing) minimum assist (75% or more patient effort)  -AH     Row Name 11/02/22 1400          Lower Body Dressing    [place] Level (Lower Body Dressing) maximum assist (25% patient effort)  -AH     Row Name 11/02/22 1400          Grooming    [place] Level (Grooming) contact guard assist;minimum assist (75% patient effort)  -AH     Row Name 11/02/22 1400          Toileting    [place] Level (Toileting) moderate assist (50% patient effort)  -AH     Row Name 11/02/22 [facility]          Self-Feeding    [place] Level (Self-Feeding) supervision  -AH     Row Name 11/02/22 1400          Toilet Transfer    Type (Toilet Transfer) stand pivot/stand step  -     [place] Level (Toilet Transfer) minimum assist (75% patient effort);verbal cues  -AH     Row Name 11/02/22 1400          Motor Skills    Motor Skills coordination;functional endurance;neuro-muscular function  -     Therapeutic Exercise --  BUE ROM , B shoulder,  strength, rickshaw 10lb, pushbox  -AH     Row Name 11/02/22 1400          Positioning and Restraints    Post Treatment Position bed  -     In Bed supine;call light within reach;encouraged to call for assist  -AH     Row Name 11/02/22 1400          Therapy Assessment/Plan (OT)    Patient's Goals For Discharge return home;take care of myself at home  -AH     Row Name 11/02/22 1400          Therapy Assessment/Plan (OT)    Rehab Potential/Prognosis (OT) good, to achieve stated therapy goals  -     Frequency of Treatment (OT) 5 times per week  -     Estimated Duration of Therapy (OT) 2 weeks  -     Problem List (OT) motor control;range of motion (ROM);strength  -     Activity Limitations Related to Problem List (OT) BADLs not performed adequately or safely  -     Planned Therapy Interventions (OT) activity tolerance training;adaptive  equipment training;BADL retraining;neuromuscular control/coordination retraining;passive ROM/stretching;ROM/therapeutic exercise;strengthening exercise;transfer/mobility retraining  -     Row Name 11/02/22 1400          Therapy Plan Review/Discharge Plan (OT)    Therapy Plan Review (OT) patient  -           User Key  (r) = Recorded By, (t) = Taken By, (c) = Cosigned By    Initials Name Effective Dates     Luda Hicks, OT 06/16/21 -                          OT Recommendation and Plan    Planned Therapy Interventions (OT): activity tolerance training, adaptive equipment training, BADL retraining, neuromuscular control/coordination retraining, passive ROM/stretching, ROM/therapeutic exercise, strengthening exercise, transfer/mobility retraining                    Time Calculation:      Time Calculation- OT     Row Name 11/02/22 1458 11/02/22 1457 11/02/22 1456       Time Calculation-     OT Start Time 1330  -AH 1045  -AH 1040  -    OT Stop Time 1415  -AH 1130  -AH 1130  -AH    OT Time Calculation (min) 45 min  - 45 min  - 50 min  -          User Key  (r) = Recorded By, (t) = Taken By, (c) = Cosigned By    Initials Name Provider Type     Luda Hicks, OT Occupational Therapist              Therapy Charges for Today     Code Description Service Date Service Provider Modifiers Qty    30736312929  OT EVAL MOD COMPLEXITY 3 11/2/2022 Luda Hicks, OT GO 1    [id number]  OT SELF CARE/MGMT/TRAIN EA 15 MIN 11/2/2022 Luda Hicks OT GO 1    [id number]  OT NEUROMUSC RE EDUCATION EA 15 MIN 11/2/2022 Luda Hicks OT GO 2                   Luda Hicks OT  11/2/2022

## 2022-11-02 NOTE — PROGRESS NOTES
Patient Assessment Instrument  Quality Indicators - Admission FY 2023    Section A. Ethnicity/ Race/Language  Ethnicity:  Race:  Preferred Language:    Section A. Transportation      Section B. Hearing and Vision        Section B. Health Literacy        Section C. Cognitive Patterns      Section C. Signs and Symptoms of Delirium (from CAM)      Section D. Mood      Section D. Social Isolation      Section FI3126. Prior Functioning      Section LF8363. Prior Device Use      Section CI4033. Self Care Performance      Section PO8046. Self Care Discharge Goals      Section HT1390. Mobility Performance     Roll Left and Right: [place] provides verbal cues and/or touching/steadying  and/or contact guard assistance as patient completes activity. Assistance may be  provided throughout the activity or intermittently.   Sit to Lying: [place] provides verbal cues and/or touching/steadying and/or  contact guard assistance as patient completes activity. Assistance may be  provided throughout the activity or intermittently.   Lying to Sitting on Side of Bed: [place] provides verbal cues and/or  touching/steadying and/or contact guard assistance as patient completes  activity. Assistance may be provided throughout the activity or intermittently.   Sit to Stand: [place] provides verbal cues and/or touching/steadying and/or  contact guard assistance as patient completes activity. Assistance may be  provided throughout the activity or intermittently.   Chair/Bed to Chair Transfer: [place] provides verbal cues and/or  touching/steadying and/or contact guard assistance as patient completes  activity. Assistance may be provided throughout the activity or intermittently.   Toilet Transfer [place] provides verbal cues and/or touching/steadying and/or  contact guard assistance as patient completes activity. Assistance may be  provided throughout the activity or intermittently.   Car Transfer: [place] provides verbal cues and/or touching/steadying  and/or  contact guard assistance as patient completes activity. Assistance may be  provided throughout the activity or intermittently.   Walk 10 Feet:   [place] does less than half the effort. [place] lifts, holds or  supports trunk or limbs but provides less than half the effort.  Walk 50 Feet with 2 Turns:   [place] does less than half the effort. [place]  lifts, holds or supports trunk or limbs but provides less than half the effort.  Walk 150 Feet:   [place] does less than half the effort. [place] lifts, holds or  supports trunk or limbs but provides less than half the effort.  Walking 10 Feet on Uneven Surfaces:   Not attempted due to medical or safety  concerns.  1 Step Over Curb or Up/Down Stair:   Not attempted due to medical or safety  concerns.  Picking up an Object:   Not attempted due to medical or safety concerns.  Uses Wheelchair/Scooter: No    Section [id number]. Mobility Discharge Goals     Sit to Stand: Patient completed the activities by themself with no assistance  from a helper.   Chair/Bed to Chair Transfer: Patient completed the activities by themself with  no assistance from a helper.   Walk Discharge Goals:   Walk 150 Feet: [place] provides verbal cues or touching/steadying assistance as  patient completes activity.    Section H. Bladder and Bowel      Section I. Active Diagnosis      Section J. Health Conditions      Section J. Health Conditions (Pain)      Section K. Swallowing/Nutritional Status    Nutritional Approaches on Admission:    Section M. Skin Conditions      Section N. Medication        Section O. Special Treatments, Procedures, and Programs    Signed by: Yulia Cabrera, Physical Therapist

## 2022-11-02 NOTE — PROGRESS NOTES
Patient Assessment Instrument  Quality Indicators - Admission FY 2023    Section A. Ethnicity/ Race/Language  Ethnicity:  Race:  Preferred Language:    Section A. Transportation      Section B. Hearing and Vision        Section B. Health Literacy        Section C. Cognitive Patterns      Section C. Signs and Symptoms of Delirium (from CAM)      Section D. Mood      Section D. Social Isolation      Section [id number]. Prior Functioning      Section OU0848. Prior Device Use      Section RC3952. Self Care Performance     Eating: Gilman does less than half the effort. Gilman lifts, holds or supports  trunk or limbs but provides less than half the effort.   Oral Hygiene: Gilman provides verbal cues and/or touching/steadying and/or  contact guard assistance as patient completes activity.   Toileting Hygiene: Gilman does more than half the effort. Gilman lifts or holds  trunk or limbs and provides more than half the effort.   Shower/Bathe Self: Gilman does more than half the effort. Gilman lifts or holds  trunk or limbs and provides more than half the effort.   Upper Body Dressing: Gilman does less than half the effort. Gilman lifts, holds  or supports trunk or limbs but provides less than half the effort.   Lower Body Dressing: Gilman does more than half the effort. Gilman lifts or  holds trunk or limbs and provides more than half the effort.   Putting On/Taking Off Footwear: Gilman does all of the effort. Patient does  none of the effort to complete the activity. Or, the assistance of 2 or more  helpers is required for the patient to complete the activity.    Section YN8491. Self Care Discharge Goals     Eating: Gilman sets up or cleans up; patient completes activity. Gilman assists  only prior to or following the activity.   Oral Hygiene: Gilman sets up or cleans up; patient completes activity. Gilman  assists only prior to or following the activity.   Toileting Hygiene: Gilman sets up or cleans up; patient completes  activity.  [place] assists only prior to or following the activity.   Shower/Bathe Self: [place] sets up or cleans up; patient completes activity.  [place] assists only prior to or following the activity.   Upper Body Dressing: [place] sets up or cleans up; patient completes activity.  [place] assists only prior to or following the activity.   Lower Body Dressing: [place] provides verbal cues or touching/steadying  assistance as patient completes activity.   Putting On/Taking Off Footwear: [place] provides verbal cues or  touching/steadying assistance as patient completes activity.    Section PO2692. Mobility Performance      Section QS8505. Mobility Discharge Goals      Section H. Bladder and Bowel      Section I. Active Diagnosis      Section J. Health Conditions      Section J. Health Conditions (Pain)      Section K. Swallowing/Nutritional Status    Nutritional Approaches on Admission:    Section M. Skin Conditions      Section N. Medication        Section O. Special Treatments, Procedures, and Programs    Signed by: Michaelle Hicks, Occupational Therapist

## 2022-11-02 NOTE — PROGRESS NOTES
Patient Assessment Instrument  Quality Indicators - Admission FY 2023    Section A. Ethnicity/ Race/Language  Ethnicity:  Race:  Preferred Language:    Section A. Transportation  Issues Due to Lack of Transportation:   No    Section B. Hearing and Vision        Section B. Health Literacy        Section C. Cognitive Patterns      Section C. Signs and Symptoms of Delirium (from CAM)      Section D. Mood      Section D. Social Isolation      Section [id number]. Prior Functioning      Section XS4975. Prior Device Use  Patient does not use manual or motorized wheelchair or scooter, mechanical lift,  walker, or an orthotic/prosthesis.    Section IY1268. Self Care Performance      Section IG8881. Self Care Discharge Goals      Section OO9152. Mobility Performance      Section GH9602. Mobility Discharge Goals      Section H. Bladder and Bowel      Section I. Active Diagnosis      Section J. Health Conditions      Section J. Health Conditions (Pain)      Section K. Swallowing/Nutritional Status    Nutritional Approaches on Admission:    Section M. Skin Conditions      Section N. Medication        Section O. Special Treatments, Procedures, and Programs    Signed by: YUNIER Gillette

## 2022-11-03 LAB
ANION GAP SERPL CALCULATED.3IONS-SCNC: 12.3 MMOL/L (ref 5–15)
BUN SERPL-MCNC: 24 MG/DL (ref 8–23)
BUN/CREAT SERPL: 16.7 (ref 7–25)
CALCIUM SPEC-SCNC: 8.8 MG/DL (ref 8.6–10.5)
CHLORIDE SERPL-SCNC: 103 MMOL/L (ref 98–107)
CO2 SERPL-SCNC: 21.7 MMOL/L (ref 22–29)
CREAT SERPL-MCNC: 1.44 MG/DL (ref 0.76–1.27)
EGFRCR SERPLBLD CKD-EPI 2021: 52.6 ML/MIN/1.73
GLUCOSE BLDC GLUCOMTR-MCNC: 182 MG/DL (ref 70–130)
GLUCOSE BLDC GLUCOMTR-MCNC: 195 MG/DL (ref 70–130)
GLUCOSE BLDC GLUCOMTR-MCNC: 216 MG/DL (ref 70–130)
GLUCOSE BLDC GLUCOMTR-MCNC: 252 MG/DL (ref 70–130)
GLUCOSE SERPL-MCNC: 201 MG/DL (ref 65–99)
INR PPP: 1.97 (ref 0.9–1.1)
POTASSIUM SERPL-SCNC: 4.2 MMOL/L (ref 3.5–5.2)
PROTHROMBIN TIME: 23 SECONDS (ref 12.1–14.7)
SODIUM SERPL-SCNC: 137 MMOL/L (ref 136–145)

## 2022-11-03 PROCEDURE — 97112 NEUROMUSCULAR REEDUCATION: CPT | Performed by: OCCUPATIONAL THERAPIST

## 2022-11-03 PROCEDURE — 94660 CPAP INITIATION&MGMT: CPT

## 2022-11-03 PROCEDURE — 97530 THERAPEUTIC ACTIVITIES: CPT | Performed by: OCCUPATIONAL THERAPIST

## 2022-11-03 PROCEDURE — 97110 THERAPEUTIC EXERCISES: CPT

## 2022-11-03 PROCEDURE — 85610 PROTHROMBIN TIME: CPT | Performed by: FAMILY MEDICINE

## 2022-11-03 PROCEDURE — 97530 THERAPEUTIC ACTIVITIES: CPT

## 2022-11-03 PROCEDURE — 63710000001 INSULIN DETEMIR PER 5 UNITS: Performed by: FAMILY MEDICINE

## 2022-11-03 PROCEDURE — 97535 SELF CARE MNGMENT TRAINING: CPT | Performed by: OCCUPATIONAL THERAPIST

## 2022-11-03 PROCEDURE — 97116 GAIT TRAINING THERAPY: CPT

## 2022-11-03 PROCEDURE — 94799 UNLISTED PULMONARY SVC/PX: CPT

## 2022-11-03 PROCEDURE — 63710000001 INSULIN ASPART PER 5 UNITS: Performed by: FAMILY MEDICINE

## 2022-11-03 PROCEDURE — 99231 SBSQ HOSP IP/OBS SF/LOW 25: CPT | Performed by: FAMILY MEDICINE

## 2022-11-03 PROCEDURE — 25010000002 ENOXAPARIN PER 10 MG: Performed by: FAMILY MEDICINE

## 2022-11-03 PROCEDURE — 82962 GLUCOSE BLOOD TEST: CPT

## 2022-11-03 PROCEDURE — 80048 BASIC METABOLIC PNL TOTAL CA: CPT | Performed by: FAMILY MEDICINE

## 2022-11-03 RX ORDER — ENOXAPARIN SODIUM 100 MG/ML
1 INJECTION SUBCUTANEOUS EVERY 12 HOURS
Status: DISCONTINUED | OUTPATIENT
Start: 2022-11-03 | End: 2022-11-06

## 2022-11-03 RX ORDER — WARFARIN SODIUM 5 MG/1
10 TABLET ORAL
Status: COMPLETED | OUTPATIENT
Start: 2022-11-03 | End: 2022-11-03

## 2022-11-03 RX ADMIN — DICLOFENAC 2 G: 10 GEL TOPICAL at 08:21

## 2022-11-03 RX ADMIN — DICLOFENAC 2 G: 10 GEL TOPICAL at 11:50

## 2022-11-03 RX ADMIN — INSULIN ASPART 2 UNITS: 100 INJECTION, SOLUTION INTRAVENOUS; SUBCUTANEOUS at 17:15

## 2022-11-03 RX ADMIN — DICLOFENAC 2 G: 10 GEL TOPICAL at 20:52

## 2022-11-03 RX ADMIN — INSULIN ASPART 3 UNITS: 100 INJECTION, SOLUTION INTRAVENOUS; SUBCUTANEOUS at 11:50

## 2022-11-03 RX ADMIN — INSULIN ASPART 4 UNITS: 100 INJECTION, SOLUTION INTRAVENOUS; SUBCUTANEOUS at 20:52

## 2022-11-03 RX ADMIN — WARFARIN 10 MG: 5 TABLET ORAL at 17:16

## 2022-11-03 RX ADMIN — DICLOFENAC 2 G: 10 GEL TOPICAL at 17:16

## 2022-11-03 RX ADMIN — INSULIN DETEMIR 20 UNITS: 100 INJECTION, SOLUTION SUBCUTANEOUS at 08:15

## 2022-11-03 RX ADMIN — SERTRALINE 150 MG: 50 TABLET, FILM COATED ORAL at 08:21

## 2022-11-03 RX ADMIN — INSULIN ASPART 2 UNITS: 100 INJECTION, SOLUTION INTRAVENOUS; SUBCUTANEOUS at 08:21

## 2022-11-03 RX ADMIN — Medication 5 MG: at 20:52

## 2022-11-03 RX ADMIN — INSULIN DETEMIR 20 UNITS: 100 INJECTION, SOLUTION SUBCUTANEOUS at 20:52

## 2022-11-03 RX ADMIN — ATORVASTATIN CALCIUM 80 MG: 40 TABLET, FILM COATED ORAL at 20:52

## 2022-11-03 RX ADMIN — ASPIRIN 81 MG: 81 TABLET, COATED ORAL at 08:21

## 2022-11-03 RX ADMIN — ENOXAPARIN SODIUM 120 MG: 60 INJECTION SUBCUTANEOUS at 13:48

## 2022-11-03 NOTE — PROGRESS NOTES
[facility]  PROGRESS NOTE     Patient Identification:  Name:  Benitez Miranda  Age:  67 y.o.  Sex:  male  :  1955  MRN:  [id number]  Visit Number:  [id number]  ROOM: [facility]     Primary Care Provider:  Mesha Christina MD    Length of stay in inpatient status:  2    Subjective     Chief Compliant:  No chief complaint on file.      History of Presenting Illness: 67-year-old gentleman who is status post right MCA distribution CVA, diabetes mellitus, neuropathic pain, obstructive sleep apnea, hypertension, CKD stage III, COPD, CAD, history of mechanical aortic valve.  Patient had left shoulder pain but states that the Voltaren is giving him some relief at this time.  Patient does have decreased range of motion in the left upper extremity may be secondary to pain.    Objective     Current Hospital Meds:aspirin, 81 mg, Oral, Daily  atorvastatin, 80 mg, Oral, Nightly  Diclofenac Sodium, 2 g, Topical, 4x Daily  Insulin Aspart, 0-7 Units, Subcutaneous, 4x Daily PC & at Bedtime  insulin detemir, 20 Units, Subcutaneous, Q12H  melatonin, 5 mg, Oral, Nightly  sertraline, 150 mg, Oral, Daily    Pharmacy to dose warfarin,       ----------------------------------------------------------------------------------------------------------------------  Vital Signs:  Temp:  [97.6 °F (36.4 °C)-97.8 °F (36.6 °C)] 97.8 °F (36.6 °C)  Heart Rate:  [65-72] 65  Resp:  [18] 18  BP: (147-148)/(74-84) 147/84  SpO2:  [96 %-99 %] 97 %  on   ;   Device (Oxygen Therapy): room air  Body mass index is 34.58 kg/m².    Wt Readings from Last 3 Encounters:   22 116 kg (255 lb)   22 116 kg (255 lb 1.6 oz)   10/27/22 116 kg (255 lb 3.2 oz)     Intake & Output (last 3 days)       10/31 0701  11/01 07 07 07 07 0700    P.O.  960 1560 120    Total Intake(mL/kg)  960 (8.3) 1560 (13.4) 120 (1)    Net  +960 +1560 +120            Urine Unmeasured Occurrence  3 x 7 x          Diet Regular; Cardiac, Consistent Carbohydrate  ----------------------------------------------------------------------------------------------------------------------  Physical exam:  Constitutional:   No acute distress  HEENT: Normocephalic atraumatic  Neck: Supple   Cardiovascular: Regular rate and rhythm  Pulmonary/Chest: Clear to auscultation  Abdominal: Positive bowel sounds soft.   Musculoskeletal: Patient able to abduct the left shoulder is little more today at approximately 25 to 30 degrees.  Patient still quite sore.  Neurological: Has left upper extremity paresis.  Skin: No rash  Peripheral vascular:  Genitourinary:  ----------------------------------------------------------------------------------------------------------------------    Last echocardiogram:  Results for orders placed during the hospital encounter of 10/28/22    Adult Transthoracic Echo Complete W/ Cont if Necessary Per Protocol (With Agitated Saline)    Interpretation Summary  •  The left ventricular cavity is mildly dilated.  •  Left ventricular wall thickness is consistent with mild concentric hypertrophy.  •  Left ventricular systolic function is normal. Left ventricular ejection fraction appears to be 56 - 60%.  •  Left ventricular diastolic function is consistent with (grade I) impaired relaxation.  •  There is a mechanical aortic valve prosthesis present. The aortic valve peak and mean gradients are within defined limits. The prosthetic aortic valve is grossly normal.  •  Moderate calcific  mitral valve stenosis is present. ( Peak PG= 17 mm Hg and mean PG = 8 mm Hg)).  Unchanged since the study done 10/5/2021.  •  Moderate mitral valve regurgitation is present.  •  Mild pulmonary hypertension is present.  •  Saline test is negative for the evidence of shunt at interatrial septum.  •  There is no evidence of pericardial  effusion.    ----------------------------------------------------------------------------------------------------------------------  Results from last 7 days   Lab Units 11/03/22  0035 11/02/22  0115 11/01/22  0256 10/31/22  0207 10/30/22  0456 10/29/22  0504 10/28/22  1905   WBC 10*3/mm3  --  10.51 9.27 9.03 7.68 8.04 8.05   HEMOGLOBIN g/dL  --  13.4 13.1 13.1 13.0 13.1 13.1   HEMATOCRIT %  --  41.4 39.6 40.1 41.4 43.8 40.8   MCV fL  --  85.7 85.7 86.1 89.6 92.4 87.2   MCHC g/dL  --  32.4 33.1 32.7 31.4* 29.9* 32.1   PLATELETS 10*3/mm3  --  113* 113* 111* 109* 116* 119*   INR  1.97* 2.45* 3.11* 3.13* 3.21* 2.43* 2.24*         Results from last 7 days   Lab Units 11/03/22  0035 11/02/22  0115 11/01/22  0256 10/31/22  0207 10/30/22  0456   SODIUM mmol/L 137 136 135* 137 135*   POTASSIUM mmol/L 4.2 4.2 4.0 4.0 4.1   MAGNESIUM mg/dL  --  1.7  --   --   --    CHLORIDE mmol/L 103 103 103 107 107   CO2 mmol/L 21.7* 20.1* 19.9* 19.5* 16.6*   BUN mg/dL 24* 17 16 18 19   CREATININE mg/dL 1.44* 1.43* 1.29* 1.24 1.22   CALCIUM mg/dL 8.8 8.6 8.4* 8.6 8.4*   GLUCOSE mg/dL 201* 221* 149* 186* 152*   ALBUMIN g/dL  --   --  3.23* 3.28* 2.99*   BILIRUBIN mg/dL  --   --  0.4 0.4 0.4   ALK PHOS U/L  --   --  55 55 50   AST (SGOT) U/L  --   --  25 26 24   ALT (SGPT) U/L  --   --  27 27 22   Estimated Creatinine Clearance: 63.7 mL/min (A) (by C-G formula based on SCr of 1.44 mg/dL (H)).  No results found for: AMMONIA  Results from last 7 days   Lab Units 10/28/22  0619   TROPONIN T ng/mL 0.019             Glucose   Date/Time Value Ref Range Status   11/03/2022 1059 216 (H) 70 - 130 mg/dL Final     Comment:     Meter: [id number] : [id number] ROSALINDA JOHNSONA   11/03/2022 0605 195 (H) 70 - 130 mg/dL Final     Comment:     Meter: [id number] : [id number] Hicks Victorina   11/02/2022 1951 288 (H) 70 - 130 mg/dL Final     Comment:     Meter: [id number] : [id number] [place] Crystal   11/02/2022 1557 174 (H) 70 - 130 mg/dL Final      Comment:     Meter: [id number] : [id number] omar massengill   11/02/2022 1113 241 (H) 70 - 130 mg/dL Final     Comment:     Meter: [id number] : [id number] omar massengill   11/02/2022 0705 258 (H) 70 - 130 mg/dL Final     Comment:     Meter: [id number] : [id number] Harshal Crystal   11/01/2022 2031 227 (H) 70 - 130 mg/dL Final     Comment:     Meter: [id number] : [id number] Harshal Crystal   11/01/2022 1609 173 (H) 70 - 130 mg/dL Final     Comment:     Meter: [id number] : [id number] toñito agrawal     Lab Results   Component Value Date    TSH 4.680 (H) 10/25/2022    FREET4 1.07 10/25/2022     No results found for: PREGTESTUR, PREGSERUM, HCG, HCGQUANT  Pain Management Panel     Pain Management Panel Latest Ref Rng & Units 6/29/2020 10/25/2019    CREATININE UR mg/dL 87.9 154.7        Brief Urine Lab Results  (Last result in the past 365 days)      Color   Clarity   Blood   Leuk Est   Nitrite   Protein   CREAT   Urine HCG        10/25/22 1734 Yellow   Clear   Negative   Negative   Negative   Negative               No results found for: BLOODCX      No results found for: URINECX  No results found for: WOUNDCX  No results found for: STOOLCX        I have personally looked at the labs and they are summarized above.  ----------------------------------------------------------------------------------------------------------------------  Detailed radiology reports for the last 24 hours:    Imaging Results (Last 24 Hours)     ** No results found for the last 24 hours. **        Final impressions for the last 30 days of radiology reports:    CT Head Without Contrast    Result Date: 10/25/2022    No acute findings in the head/brain.  Findings were communicated.  This report was finalized on 10/25/2022 11:40 AM by Dr. Leno Du MD.      CT Angiogram Neck    Result Date: 10/28/2022  No hemodynamically significant stenosis is seen at the carotid bifurcations or in the posterior circulation. No large vessel  occlusions are noted. There is evidence of mild to moderate diffuse small vessel disease. There is asymmetric filling of distal MCA branches, greater on the left than on the right. An acute occlusion of a mid to distal MCA branch on the right could account for this finding. This could also reflect chronic atherosclerotic disease. Signer Name: Ortega Waldrop MD  Signed: 10/28/2022 7:35 AM  Workstation Name: [email or web address]  [facility]    CT Angiogram Neck    Result Date: 10/25/2022    Mild bilateral proximal internal carotid artery stenosis secondary to atherosclerotic vascular calcifications.  This report was finalized on 10/25/2022 12:44 PM by Dr. Leno Du MD.      MRI Brain Without Contrast    Result Date: 10/28/2022    Grossly stable appearance of right posterior parietal acute infarct.  This report was finalized on 10/28/2022 11:44 AM by Dr. Leno Du MD.      MRI Brain Without Contrast    Result Date: 10/27/2022    Focal 1.9 cm acute infarct in the right posterior parietal white matter.  This report was finalized on 10/27/2022 11:05 AM by Dr. Leno Du MD.      XR Chest 1 View    Result Date: 10/28/2022    Cardiomegaly.  This report was finalized on 10/28/2022 8:46 AM by Dr. Leno Du MD.      CT Angiogram Head    Result Date: 10/25/2022    No acute findings in the arteries of the head/brain.  This report was finalized on 10/25/2022 12:43 PM by Dr. Leno Du MD.      CT Head Without Contrast Stroke Protocol    Result Date: 10/28/2022  New 14 mm area of decreased density in the right corona radiata consistent with a subacute infarct. There is no hemorrhage. Signer Name: Nate Lake MD  Signed: 10/28/2022 6:17 AM  Workstation Name: [facility]  [facility]    CT Angiogram Head w AI Analysis of LVO    Result Date: 10/28/2022  No hemodynamically significant stenosis is seen at the carotid bifurcations or in the posterior circulation. No large vessel  occlusions are noted. There is evidence of mild to moderate diffuse small vessel disease. There is asymmetric filling of distal MCA branches, greater on the left than on the right. An acute occlusion of a mid to distal MCA branch on the right could account for this finding. This could also reflect chronic atherosclerotic disease. Signer Name: Ortega Waldrop MD  Signed: 10/28/2022 7:35 AM  Workstation Name: [email or web address]  [facility] [place]    CT CEREBRAL PERFUSION WITH & WITHOUT CONTRAST    Result Date: 10/25/2022  No core infarct detected.  This report was finalized on 10/25/2022 12:45 PM by Dr. Leno Du MD.      I have personally looked at the radiology images and read the final radiology report.    Assessment & Plan    Status post right MCA distribution CVA with left upper extremity paresis--continue Coumadin, aspirin and statin therapy.  Patient requiring standby assistance for up with bed mobility; contact-guard for transfers; ambulated 50 feet in the morning with rolling walker and contact-guard assistance 150 feet in the afternoon with a platform rolling walker with minimum assistance to steer.  Patient requiring maximum assist for up with bathing; minimum assist for upper body dressing; maximum assist for lower body dressing; contact-guard for grooming; moderate assistance for toileting    Left shoulder pain--possible could be frozen 6 shoulder versus acute tendinitis involving the rotator cuff.  Continue Voltaren cream at this time as he has seen some improvement.  Will monitor closely    Diabetes mellitus continue current treatment    Neuropathic pain gabapentin    Obstructive sleep apnea reinstituted BiPAP last evening which he tolerated.    Hypertension continue antihypertensive therapy.    CKD stage III    COPD compensated stable    CAD with history of stent placement continue medical management    History mechanical aortic valve titrating Coumadin goal INR 3-3.5    Acute kidney  injury stable    VTE Prophylaxis:   Mechanical Order History:     None      Pharmalogical Order History:      Ordered     Dose Route Frequency Stop    11/02/22 1119  warfarin (COUMADIN) tablet 10 mg        Question:  Target INR  Answer:  3 - 3.5    10 mg PO Once (Warfarin) 11/02/22 1808    11/01/22 1450  warfarin (COUMADIN) tablet 7 mg        Question:  Target INR  Answer:  3 - 3.5    7 mg PO Once (Warfarin) 11/01/22 2105 11/01/22 1450  Pharmacy to dose warfarin        Question:  Target INR  Answer:  3 - 3.5    -- XX Continuous PRN --                    Odell Beyer MD  [facility]  11/03/22  11:15 EDT

## 2022-11-03 NOTE — SIGNIFICANT NOTE
11/03/22 3007   Plan   Plan Team conference held today.  Spoke to son Jose Angel [phone] and pt about plans for discharge on 11-16-22 and how he is doing in therapy.  Pt plans to return home alone at discharge.  Son can check on pt daily and he will also visit some.  Pt says friend Xavi will check on him too.  Will follow.   Patient/Family in Agreement with Plan yes

## 2022-11-03 NOTE — THERAPY TREATMENT NOTE
Inpatient Rehabilitation - Occupational Therapy Treatment Note    NORBERT Parkinson     Patient Name: Benitez Miranda  : 1955  MRN: [id number]    Today's Date: 11/3/2022                 Admit Date: 2022       No diagnosis found.    Patient Active Problem List   Diagnosis   • Uncontrolled type 2 diabetes with neuropathy   • COPD (chronic obstructive pulmonary disease) ([facility])   • Chronic diastolic congestive heart failure ([facility])   • Essential hypertension   • Tobacco abuse   • Coronary artery disease involving native coronary artery of native heart without angina pectoris   • Low back pain   • Hyperlipidemia LDL goal <70   • H/O mechanical aortic valve replacement   • Depression   • CKD (chronic kidney disease), stage III ([facility])   • Rheumatic mitral stenosis   • Morbidly obese ([facility])   • Recurrent major depressive disorder, in full remission ([facility])   • Ischemic stroke ([facility])   • CVA (cerebral vascular accident) ([facility])       Past Medical History:   Diagnosis Date   • Anxiety    • Arthritis    • CAD (coronary artery disease)     x1 stent; pulmonary HTN; EF 50-55%; rheumatic valve; MV stenosis   • CHF (congestive heart failure) ([facility])    • CKD (chronic kidney disease), stage III ([facility])    • COPD (chronic obstructive pulmonary disease) ([facility])    • Depression    • Diabetes mellitus ([facility])    • H/O mechanical aortic valve replacement    • History of tobacco abuse    • Hyperlipidemia    • Hypertension    • Ischemic heart disease    • Kidney failure     third stage   • Low back pain    • Obesity     BMI 36.   • Stroke ([facility])    • Valvular heart disease        Past Surgical History:   Procedure Laterality Date   • AORTIC VALVE REPAIR/REPLACEMENT     • CARDIAC CATHETERIZATION     • CARDIAC SURGERY      valve on aortic   • CARDIAC SURGERY      stent    • CATARACT EXTRACTION     • COLONOSCOPY     • CORONARY STENT PLACEMENT     • CYSTOSCOPY URETEROSCOPY LASER LITHOTRIPSY Left 2020    Procedure: CYSTOSCOPY  URETEROSCOPY LASER LITHOTRIPSY WITH STENT PLACEMENT;  Surgeon: Jonah Montgomery MD;  Location: [facility];  Service: Urology;  Laterality: Left;   • KIDNEY STONE SURGERY               IRF OT ASSESSMENT FLOWSHEET (last 12 hours)     IRF OT Evaluation and Treatment     Row Name 11/03/22 1458          OT Time and Intention    Document Type daily treatment  -     Mode of Treatment individual therapy;occupational therapy  -     Patient Effort good  -     Row Name 11/03/22 1458          General Information    Patient/Family/Caregiver Comments/Observations patient agreeable to therapy. patient states his left shoulder is feeling much better today.  -     Existing Precautions/Restrictions fall  -     Row Name 11/03/22 1458          Cognition/Psychosocial    Affect/Mental Status (Cognition) WFL  -     Row Name 11/03/22 1458          General Upper Extremity Assessment (Range of Motion)    Upper Extremity: Range of Motion shoulder, left: UE ROM  increased to 1/4 range, no subluxation noted  -     Row Name 11/03/22 1500          Chair-Bed Transfer    Chair-Bed [place] (Transfers) minimum assist (75% patient effort);verbal cues  -     Row Name 11/03/22 1451          Motor Skills    Motor Skills coordination;functional endurance;neuro-muscular function  -     Therapeutic Exercise --  BUE ROM, left UE ROM,  strengthening gross grasp, reaching  -     Row Name 11/03/22 1500          Positioning and Restraints    Post Treatment Position bed  -     In Bed supine;call light within reach;encouraged to call for assist  -           User Key  (r) = Recorded By, (t) = Taken By, (c) = Cosigned By    Initials Name Effective Dates     Luda Hicks, OT 06/16/21 -                          OT Recommendation and Plan    Planned Therapy Interventions (OT): activity tolerance training, adaptive equipment training, BADL retraining, neuromuscular control/coordination retraining, passive ROM/stretching,  ROM/therapeutic exercise, strengthening exercise, transfer/mobility retraining                    Time Calculation:      Time Calculation- OT     Row Name 11/03/22 1508 11/03/22 1507          Time Calculation- OT    OT Start Time 1345  - 1045  -     OT Stop Time 1430  - 1130  -     OT Time Calculation (min) 45 min  - 45 min  -           User Key  (r) = Recorded By, (t) = Taken By, (c) = Cosigned By    Initials Name Provider Type     Luda Hicks, OT Occupational Therapist              Therapy Charges for Today     Code Description Service Date Service Provider Modifiers Qty    90694947446 HC OT EVAL MOD COMPLEXITY 3 11/2/2022 Luda Hicks, OT GO 1    62387232369 HC OT SELF CARE/MGMT/TRAIN EA 15 MIN 11/2/2022 Luda Hicks, OT GO 1    01901118471 HC OT NEUROMUSC RE EDUCATION EA 15 MIN 11/2/2022 Luda Hicks, OT GO 2    50089871062 HC OT THERAPEUTIC ACT EA 15 MIN 11/3/2022 Luda Hicks, OT GO 2    93147061279 HC OT SELF CARE/MGMT/TRAIN EA 15 MIN 11/3/2022 Luda Hicks, OT GO 1    40542160561 HC OT NEUROMUSC RE EDUCATION EA 15 MIN 11/3/2022 Luda Hicks, OT GO 3                   Luda Hicks OT  11/3/2022

## 2022-11-03 NOTE — THERAPY TREATMENT NOTE
Inpatient Rehabilitation - Physical Therapy Treatment Note       NORBERT Parkinson     Patient Name: Benitez Miranda  : 1955  MRN: [id number]    Today's Date: 11/3/2022                    Admit Date: 2022      Visit Dx:   No diagnosis found.    Patient Active Problem List   Diagnosis   • Uncontrolled type 2 diabetes with neuropathy   • COPD (chronic obstructive pulmonary disease) ([facility])   • Chronic diastolic congestive heart failure ([facility])   • Essential hypertension   • Tobacco abuse   • Coronary artery disease involving native coronary artery of native heart without angina pectoris   • Low back pain   • Hyperlipidemia LDL goal <70   • H/O mechanical aortic valve replacement   • Depression   • CKD (chronic kidney disease), stage III ([facility])   • Rheumatic mitral stenosis   • Morbidly obese ([facility])   • Recurrent major depressive disorder, in full remission ([facility])   • Ischemic stroke ([facility])   • CVA (cerebral vascular accident) ([facility])       Past Medical History:   Diagnosis Date   • Anxiety    • Arthritis    • CAD (coronary artery disease)     x1 stent; pulmonary HTN; EF 50-55%; rheumatic valve; MV stenosis   • CHF (congestive heart failure) ([facility])    • CKD (chronic kidney disease), stage III ([facility])    • COPD (chronic obstructive pulmonary disease) ([facility])    • Depression    • Diabetes mellitus ([facility])    • H/O mechanical aortic valve replacement    • History of tobacco abuse    • Hyperlipidemia    • Hypertension    • Ischemic heart disease    • Kidney failure     third stage   • Low back pain    • Obesity     BMI 36.   • Stroke ([facility])    • Valvular heart disease        Past Surgical History:   Procedure Laterality Date   • AORTIC VALVE REPAIR/REPLACEMENT     • CARDIAC CATHETERIZATION     • CARDIAC SURGERY      valve on aortic   • CARDIAC SURGERY      stent    • CATARACT EXTRACTION     • COLONOSCOPY     • CORONARY STENT PLACEMENT     • CYSTOSCOPY URETEROSCOPY LASER LITHOTRIPSY Left 2020    Procedure:  CYSTOSCOPY URETEROSCOPY LASER LITHOTRIPSY WITH STENT PLACEMENT;  Surgeon: Jonah Montgomery MD;  Location: [facility];  Service: Urology;  Laterality: Left;   • KIDNEY STONE SURGERY         PT ASSESSMENT (last 12 hours)     IRF PT Evaluation and Treatment     Row Name 11/03/22 1441          PT Time and Intention    Document Type daily treatment  -HR     Mode of Treatment individual therapy;physical therapy  -HR     Patient/Family/Caregiver Comments/Observations Pt and RN in agreement for AM and PM PT sessions. Pt walked 160' x 2 with PRW CGA, in both sessions. Sitting exercises completed in AM until tolerance with added Wt and resistance. Bike x 10' in PM and standing exercises until tolerance in // bars.  -HR     Row Name 11/03/22 1441          General Information    Existing Precautions/Restrictions fall  L shoulder pain-needs sling or support  -HR     Row Name 11/03/22 1441          Pain Scale: FACES Pre/Post-Treatment    Pain: FACES Scale, Pretreatment 6-->hurts even more  -HR     Posttreatment Pain Rating 6-->hurts even more  -HR     Row Name 11/03/22 1441          Cognition/Psychosocial    Affect/Mental Status (Cognition) WFL  -HR     Follows Commands (Cognition) WFL  -HR     Personal Safety Interventions fall prevention program maintained;gait belt;supervised activity;nonskid shoes/slippers when out of bed  -HR     Row Name 11/03/22 1441          Bed Mobility    Supine-Sit Giles (Bed Mobility) standby assist;verbal cues  -HR     Assistive Device (Bed Mobility) bed rails  -HR     Row Name 11/03/22 1441          Bed-Chair Transfer    Bed-Chair Giles (Transfers) contact guard;verbal cues;nonverbal cues (demo/gesture)  -HR     Assistive Device (Bed-Chair Transfers) wheelchair  -HR     Row Name 11/03/22 1441          Chair-Bed Transfer    Chair-Bed Giles (Transfers) contact guard  -HR     Assistive Device (Chair-Bed Transfers) wheelchair  -HR     Row Name 11/03/22 1441          Sit-Stand  Transfer    Sit-Stand Yates (Transfers) contact guard;verbal cues;nonverbal cues (demo/gesture)  -HR     Assistive Device (Sit-Stand Transfers) wheelchair  -HR     Row Name 11/03/22 1441          Stand-Sit Transfer    Stand-Sit Yates (Transfers) contact guard;verbal cues;nonverbal cues (demo/gesture)  -HR     Assistive Device (Stand-Sit Transfers) wheelchair  -HR     Row Name 11/03/22 1441          Gait/Stairs (Locomotion)    Gait/Stairs Locomotion gait/ambulation independence;distance ambulated;gait deviations  -HR     Yates Level (Gait) contact guard  -HR     Distance in Feet (Gait) 160' x 2 AM and PM  -HR     Deviations/Abnormal Patterns (Gait) candi decreased;gait speed decreased;stride length decreased  -HR     Bilateral Gait Deviations foot drop/toe drag  he scoots his left foot forward on the forefoot, it drags more with fatigue  -HR     Row Name 11/03/22 1441          Balance    Comment, Balance Sitting: 3# AP, LAQ, march, Ball sq. BTB : HS curls, Hip abd, March. // bars: March, hip abd, squats, sitting bean bag toss.  -HR     Row Name 11/03/22 1441          Motor Skills    Therapeutic Exercise hip;knee;ankle  -HR     Row Name 11/03/22 1441          Hip (Therapeutic Exercise)    Hip (Therapeutic Exercise) strengthening exercise  -HR     Hip Strengthening (Therapeutic Exercise) bilateral;flexion;extension;aBduction;aDduction;marching while seated;marching while standing;mini squats;sitting;standing;3 lb free weight;resistance band;blue  -HR     Row Name 11/03/22 1441          Knee (Therapeutic Exercise)    Knee (Therapeutic Exercise) strengthening exercise  -HR     Knee Strengthening (Therapeutic Exercise) bilateral;flexion;extension;marching while seated;marching while standing;LAQ (long arc quad);hamstring curls;sitting;standing;resistance band;blue  -HR     Row Name 11/03/22 1441          Ankle (Therapeutic Exercise)    Ankle (Therapeutic Exercise) strengthening exercise  -HR      Ankle Strengthening (Therapeutic Exercise) bilateral;dorsiflexion;plantarflexion;sitting;3 lb free weight;resistance band;blue  -HR     Row Name 11/03/22 1441          Positioning and Restraints    Pre-Treatment Position other (comment)  AM EOB, PM  -HR     Post Treatment Position other  Bed in AM, With OT in PM  -HR     In Bed fowlers;encouraged to call for assist;call light within reach;side rails up x2  -HR     In Wheelchair with OT  -HR     Row Name 11/03/22 1441          Therapy Assessment/Plan (PT)    Patient's Goals For Discharge return home  maximize recovery  -HR     Row Name 11/03/22 1441          Therapy Assessment/Plan (PT)    Rehab Potential/Prognosis (PT) adequate, monitor progress closely  -HR     Frequency of Treatment (PT) 5 times per week  -HR     Estimated Duration of Therapy (PT) 2 weeks  -HR     Problem List (PT) balance;coordination;hemiparesis/hemiplegia;mobility;motor control;muscle tone;strength;pain  -HR     Activity Limitations Related to Problem List (PT) unable to ambulate safely;unable to transfer safely  -HR     Row Name 11/03/22 1441          Therapy Plan Review/Discharge Plan (PT)    Anticipated Equipment Needs at Discharge (PT Eval) --  tbd  -HR     Anticipated Discharge Disposition (PT) home with home health;home with assist  -HR     Row Name 11/03/22 1441          IRF PT Goals    Bed Mobility Goal Selection (PT-IRF) bed mobility, PT goal 1  -HR     Transfer Goal Selection (PT-IRF) transfers, PT goal 1  -HR     Gait (Walking Locomotion) Goal Selection (PT-IRF) gait, PT goal 1  -HR     Row Name 11/03/22 1441          Bed Mobility Goal 1 (PT-IRF)    Activity/Assistive Device (Bed Mobility Goal 1, PT-IRF) sit to supine/supine to sit  -HR     [place] Level (Bed Mobility Goal 1, PT-IRF) independent  -HR     Time Frame (Bed Mobility Goal 1, PT-IRF) by discharge  -HR     Row Name 11/03/22 1441          Transfer Goal 1 (PT-IRF)    Activity/Assistive Device (Transfer Goal 1, PT-IRF)  sit-to-stand/stand-to-sit;bed-to-chair/chair-to-bed  -HR     [place] Level (Transfer Goal 1, PT-IRF) modified independence  -HR     Time Frame (Transfer Goal 1, PT-IRF) by discharge  -HR     Row Name 11/03/22 1441          Gait/Walking Locomotion Goal 1 (PT-IRF)    Activity/Assistive Device (Gait/Walking Locomotion Goal 1, PT-IRF) --  AAD  -HR     Gait/Walking Locomotion Distance Goal 1 (PT-IRF) 300'  -HR     [place] Level (Gait/Walking Locomotion Goal 1, PT-IRF) supervision required  -HR     Time Frame (Gait/Walking Locomotion Goal 1, PT-IRF) by discharge  -HR           User Key  (r) = Recorded By, (t) = Taken By, (c) = Cosigned By    Initials Name Provider Type    HR Zahra Mclean PTA Physical Therapist Assistant                 Physical Therapy Education     Title: PT OT SLP Therapies (Done)     Topic: Physical Therapy (Done)     Point: Mobility training (Done)     Learning Progress Summary           Patient Acceptance, E,TB, VU,DU by HR at 11/3/2022 1458    Acceptance, E, VU,NR by LB at 11/2/2022 1602                   Point: Home exercise program (Done)     Learning Progress Summary           Patient Acceptance, E,TB, VU,DU by HR at 11/3/2022 1458    Acceptance, E, VU,NR by LB at 11/2/2022 1602                   Point: Body mechanics (Done)     Learning Progress Summary           Patient Acceptance, E,TB, VU,DU by HR at 11/3/2022 1458    Acceptance, E, VU,NR by LB at 11/2/2022 1602                   Point: Precautions (Done)     Learning Progress Summary           Patient Acceptance, E,TB, VU,DU by HR at 11/3/2022 1458    Acceptance, E, VU,NR by LB at 11/2/2022 1602                               User Key     Initials Effective Dates Name Provider Type Discipline    LB 06/16/21 -  Yulia Cabrera, PT Physical Therapist PT    HR 01/14/22 -  Zahra Mclean PTA Physical Therapist Assistant PT                PT Recommendation and Plan    Frequency of Treatment (PT): 5 times per week  Anticipated  Equipment Needs at Discharge (PT Eval):  (tbd)                  Time Calculation:      PT Charges     Row Name 11/03/22 1506 11/03/22 1458          Time Calculation    Start Time 1245  -HR 0735  -HR     Stop Time 1330  -HR 0830  -HR     Time Calculation (min) 45 min  -HR 55 min  -HR     PT Received On 11/03/22  -HR 11/03/22  -HR        Time Calculation- PT    Total Timed Code Minutes- PT 45 minute(s)  -HR 55 minute(s)  -HR           User Key  (r) = Recorded By, (t) = Taken By, (c) = Cosigned By    Initials Name Provider Type    HR Zahra Mclean PTA Physical Therapist Assistant                Therapy Charges for Today     Code Description Service Date Service Provider Modifiers Qty    80525126427 HC GAIT TRAINING EA 15 MIN 11/3/2022 Zahra Mclean PTA GP, CQ 2    46721451674 HC PT THER PROC EA 15 MIN 11/3/2022 Zahra Mclean PTA GP, CQ 3    10069793046 HC PT THERAPEUTIC ACT EA 15 MIN 11/3/2022 Zahra Mclean PTA GP, CQ 2                   Zahra Mclean PTA  11/3/2022

## 2022-11-03 NOTE — PROGRESS NOTES
Occupational Therapy:    Physical Therapy: Individual: 100 minutes.    Speech Language Pathology:    Signed by: Zahra Mclean PTA

## 2022-11-03 NOTE — PROGRESS NOTES
Occupational Therapy: Individual: 90 minutes.    Physical Therapy:    Speech Language Pathology:    Signed by: Michaelle Hicks, Occupational Therapist

## 2022-11-03 NOTE — PLAN OF CARE
Goal Outcome Evaluation:               Pt is progressing medically and physically with all therapies, continue with current plan of care.

## 2022-11-03 NOTE — PROGRESS NOTES
PPS CMG Coordinator  Inpatient Rehabilitation Admission    Ethnic Group: White.  Marital Status:  Marital Status: .    IRF Admission Date:  11/01/2022  Admission Class: Initial Rehab.  Admit From:  [facility]    Pre-Hospital Living: Home. Pre-Hospital Living  With: (1) Alone.    Payment Sources: Primary: Medicare Fee for Service  Secondary: Not Listed.  Impairment Group: 01.1 Left Body Involvement (Right Brain)  Date of Onset of Impairment: 10/28/2022    Etiologic Diagnosis Code(s):  Rank Code      Description  1    I63.511   Cerebral infarction due to unspecified                 occlusion or stenosis of right middle cerebral                 artery    Comorbidities:      Height on Admission: 72 inches.  Weight on Admission: 255 pounds.    Are there any arthritis conditions recorded for Impairment Group, Etiologic  Diagnosis, or Comorbid Conditions that meet all of the regulatory requirements  for IRF classification (in 42 .29(b)(2)(x), (xi), and xii))?    JAMAR Bladder Accidents:  0 - Accidents.  Bladder Score = 6. Patient has not had an accident, but uses a  device/medication. urinal  JAMAR Bowel Accident: 0 -Accidents.  Bowel Score = 7. Patient has no accidents.    Signed by: Maddie Rodney, Supervisor

## 2022-11-03 NOTE — PROGRESS NOTES
Rehabilitation Nursing  Inpatient Rehabilitation Plan of Care Note    Plan of Care  Copy from POCSafety    Potential for Injury (Active)  Current Status (11/1/2022 3:00:00 PM): At risk for falls  Weekly Goal: No falls this week  Discharge Goal: No falls during admission    Body Systems    Integumentary (Active)  Current Status (11/1/2022 3:00:00 PM): At risk for skin breakdown  Weekly Goal: No skin breakdown this week  Discharge Goal: No skin breakdown during admission    Signed by: Victorina Hicks, Nurse

## 2022-11-03 NOTE — SIGNIFICANT NOTE
11/03/22 2828   Plan   Plan Team conference held today.  Spoke to son Jose Angel [phone] and pt about plans for discharge on 11-16-22 and how he is doing in therapy.  Pt plans to return home alone at discharge.  Son can check on pt daily and he will also visit some.  Pt says friend Xavi will check on him too.  Pt says his Bi-PAP machine no longer works after he accidently knocked it over into the floor at home.  Pt says [facility] provided Bi-PAP and informed him it is out of warranty and over 5 years old, therefore, he needs a new one.  Will follow.   Patient/Family in Agreement with Plan yes

## 2022-11-03 NOTE — PROGRESS NOTES
PPS CMG Coordinator  Inpatient Rehabilitation Admission    Ethnic Group:  Marital Status:    [facility] Admission Date:  11/01/2022  Admission Class:  Admit From:    Pre-Hospital Living:    Payment Sources:  Impairment Group:  Date of Onset of Impairment:    Etiologic Diagnosis Code(s):  Rank Code      Description  1    I63.511   Cerebral infarction due to unspecified                 occlusion or stenosis of right middle cerebral                 artery    Comorbidities:  Rank Code      Description    1    I13.0     Hypertensive heart and chronic kidney disease                 with heart failure and stage 1 through stage 4                 chronic kidney disease, or unspecified chronic                 kidney disease  2    E11.22    Type 2 diabetes mellitus with diabetic chronic                 kidney disease  3    I50.9     Heart failure, unspecified  4    I27.20    Pulmonary hypertension, unspecified  5    I69.354   Hemiplegia and hemiparesis following cerebral                 infarction affecting left non-dominant side  6    J44.9     Chronic obstructive pulmonary disease,                 unspecified  7    I05.0     Rheumatic mitral stenosis  8    E78.5     Hyperlipidemia, unspecified  9    F17.290   Nicotine dependence, other tobacco product,                 uncomplicated  10   G47.33    Obstructive sleep apnea (adult) (pediatric)  11   I25.10    Atherosclerotic heart disease of native                 coronary artery without angina pectoris  12   Z80.9     Family history of malignant neoplasm,                 unspecified  13   Z82.49    Family history of ischemic heart disease and                 other diseases of the circulatory system  14   Z82.5     Family history of asthma and other chronic                 lower respiratory diseases  15   Z83.3     Family history of diabetes mellitus  16   Z91.81    History of falling  17   Z95.2     Presence of prosthetic heart valve  18   Z95.5     Presence of coronary angioplasty  implant and                 graft    Height on Admission:  Weight on Admission:    Are there any arthritis conditions recorded for Impairment Group, Etiologic  Diagnosis, or Comorbid Conditions that meet all of the regulatory requirements  for IRF classification (in 42 .29(b)(2)(x), (xi), and xii))?  No    JAMAR Bladder Accidents:   - Accidents.    JAMAR Bowel Accident:  -Accidents.    Signed by: Nurse Asa

## 2022-11-03 NOTE — PLAN OF CARE
Goal Outcome Evaluation:  Plan of Care Reviewed With: patient        Progress: improving  Outcome Evaluation: Patient making progress with therapies. Continue with plan of care.

## 2022-11-03 NOTE — PROGRESS NOTES
Physical Medicine and Rehabilitation  Inpatient Rehabilitation Interdisciplinary Plan of Care    Demographics            Age: 67Y            Gender: Male    Admission Date: 11/1/2022 2:43:00 PM  Rehabilitation Diagnosis:  right CVA    Plan of Care  Anticipated Discharge Date/Estimated Length of Stay: 14 days  Anticipated Discharge Destination: Community discharge with assistance  Discharge Plan : Pt plans to return home alone at discharge if possible.  Son  will check on pt daily.  Friend will also check on pt.  Pt will not have a  full-time caregiver.  Medical Necessity Expected Level Rationale: good  Intensity and Duration: an average of 3 hours/5 days per week  Medical Supervision and 24 Hour Rehab Nursing: x  Physical Therapy: x  PT Intensity/Duration: PT 1.5 hours per day/5 days per week  Occupational Therapy: x  OT Intensity/Duration: OT 1.5 hours per day/5 days per week  Social Work: x  Therapeutic Recreation: x  Updated (if changes indicated)  No changes to plan.    Based on the patient's medical and functional status, their prognosis and  expected level of functional improvement is: good    Interdisciplinary Problem/Goals/Status  Copy from POCMobility    [PT] Bed/Chair/Wheelchair (Active)  Current Status (11/2/2022 12:00:00 AM): CGA  Weekly Goal: MI  Discharge Goal: MI    [PT] Walk (Active)  Current Status (11/2/2022 12:00:00 AM): amb 150' PRW min A  Weekly Goal: amb 300' AAD Sup  Discharge Goal: amb 300' AAD Sup    Self Care    [OT] Dressing (Lower) (Active)  Current Status (11/2/2022 12:00:00 AM): max  Weekly Goal: mod  Discharge Goal: sup/set up    Safety    [RN] Potential for Injury (Active)  Current Status (11/1/2022 3:00:00 PM): At risk for falls  Weekly Goal: No falls this week  Discharge Goal: No falls during admission    Body Systems    [RN] Integumentary (Active)  Current Status (11/1/2022 3:00:00 PM): At risk for skin breakdown  Weekly Goal: No skin breakdown this week  Discharge Goal: No skin  breakdown during admission    Medical Problems    Comments:    Signed by: Odell Beyer, Physician

## 2022-11-04 LAB
GLUCOSE BLDC GLUCOMTR-MCNC: 168 MG/DL (ref 70–130)
GLUCOSE BLDC GLUCOMTR-MCNC: 178 MG/DL (ref 70–130)
GLUCOSE BLDC GLUCOMTR-MCNC: 214 MG/DL (ref 70–130)
GLUCOSE BLDC GLUCOMTR-MCNC: 243 MG/DL (ref 70–130)
INR PPP: 2.58 (ref 0.9–1.1)
PROTHROMBIN TIME: 28.5 SECONDS (ref 12.1–14.7)

## 2022-11-04 PROCEDURE — 97530 THERAPEUTIC ACTIVITIES: CPT | Performed by: OCCUPATIONAL THERAPIST

## 2022-11-04 PROCEDURE — 94799 UNLISTED PULMONARY SVC/PX: CPT

## 2022-11-04 PROCEDURE — 97110 THERAPEUTIC EXERCISES: CPT

## 2022-11-04 PROCEDURE — 63710000001 INSULIN DETEMIR PER 5 UNITS: Performed by: FAMILY MEDICINE

## 2022-11-04 PROCEDURE — 97112 NEUROMUSCULAR REEDUCATION: CPT

## 2022-11-04 PROCEDURE — 97530 THERAPEUTIC ACTIVITIES: CPT

## 2022-11-04 PROCEDURE — 99231 SBSQ HOSP IP/OBS SF/LOW 25: CPT | Performed by: FAMILY MEDICINE

## 2022-11-04 PROCEDURE — 63710000001 INSULIN ASPART PER 5 UNITS: Performed by: FAMILY MEDICINE

## 2022-11-04 PROCEDURE — 97116 GAIT TRAINING THERAPY: CPT

## 2022-11-04 PROCEDURE — 82962 GLUCOSE BLOOD TEST: CPT

## 2022-11-04 PROCEDURE — 97112 NEUROMUSCULAR REEDUCATION: CPT | Performed by: OCCUPATIONAL THERAPIST

## 2022-11-04 PROCEDURE — 85610 PROTHROMBIN TIME: CPT | Performed by: FAMILY MEDICINE

## 2022-11-04 PROCEDURE — 25010000002 ENOXAPARIN PER 10 MG: Performed by: FAMILY MEDICINE

## 2022-11-04 PROCEDURE — 97535 SELF CARE MNGMENT TRAINING: CPT | Performed by: OCCUPATIONAL THERAPIST

## 2022-11-04 PROCEDURE — 94660 CPAP INITIATION&MGMT: CPT

## 2022-11-04 RX ORDER — WARFARIN SODIUM 5 MG/1
10 TABLET ORAL
Status: COMPLETED | OUTPATIENT
Start: 2022-11-04 | End: 2022-11-04

## 2022-11-04 RX ADMIN — Medication 5 MG: at 20:32

## 2022-11-04 RX ADMIN — DICLOFENAC 2 G: 10 GEL TOPICAL at 11:24

## 2022-11-04 RX ADMIN — DICLOFENAC 2 G: 10 GEL TOPICAL at 20:32

## 2022-11-04 RX ADMIN — ENOXAPARIN SODIUM 120 MG: 60 INJECTION SUBCUTANEOUS at 01:13

## 2022-11-04 RX ADMIN — INSULIN DETEMIR 20 UNITS: 100 INJECTION, SOLUTION SUBCUTANEOUS at 20:31

## 2022-11-04 RX ADMIN — DICLOFENAC 2 G: 10 GEL TOPICAL at 09:08

## 2022-11-04 RX ADMIN — INSULIN ASPART 3 UNITS: 100 INJECTION, SOLUTION INTRAVENOUS; SUBCUTANEOUS at 11:23

## 2022-11-04 RX ADMIN — ASPIRIN 81 MG: 81 TABLET, COATED ORAL at 09:09

## 2022-11-04 RX ADMIN — INSULIN ASPART 2 UNITS: 100 INJECTION, SOLUTION INTRAVENOUS; SUBCUTANEOUS at 17:25

## 2022-11-04 RX ADMIN — DICLOFENAC 2 G: 10 GEL TOPICAL at 17:25

## 2022-11-04 RX ADMIN — ATORVASTATIN CALCIUM 80 MG: 40 TABLET, FILM COATED ORAL at 20:32

## 2022-11-04 RX ADMIN — INSULIN DETEMIR 20 UNITS: 100 INJECTION, SOLUTION SUBCUTANEOUS at 09:00

## 2022-11-04 RX ADMIN — ENOXAPARIN SODIUM 120 MG: 60 INJECTION SUBCUTANEOUS at 14:18

## 2022-11-04 RX ADMIN — WARFARIN 10 MG: 5 TABLET ORAL at 17:25

## 2022-11-04 RX ADMIN — SERTRALINE 150 MG: 50 TABLET, FILM COATED ORAL at 09:09

## 2022-11-04 RX ADMIN — INSULIN ASPART 3 UNITS: 100 INJECTION, SOLUTION INTRAVENOUS; SUBCUTANEOUS at 20:32

## 2022-11-04 RX ADMIN — INSULIN ASPART 2 UNITS: 100 INJECTION, SOLUTION INTRAVENOUS; SUBCUTANEOUS at 09:09

## 2022-11-04 NOTE — THERAPY TREATMENT NOTE
Inpatient Rehabilitation - Physical Therapy Treatment Note        Iggy     Patient Name: Benitez Miranda  : 1955  MRN: [id number]    Today's Date: 2022                    Admit Date: 2022      Visit Dx:   No diagnosis found.    Patient Active Problem List   Diagnosis   • Uncontrolled type 2 diabetes with neuropathy   • COPD (chronic obstructive pulmonary disease) ([facility])   • Chronic diastolic congestive heart failure ([facility])   • Essential hypertension   • Tobacco abuse   • Coronary artery disease involving native coronary artery of native heart without angina pectoris   • Low back pain   • Hyperlipidemia LDL goal <70   • H/O mechanical aortic valve replacement   • Depression   • CKD (chronic kidney disease), stage III ([facility])   • Rheumatic mitral stenosis   • Morbidly obese ([facility])   • Recurrent major depressive disorder, in full remission ([facility])   • Ischemic stroke ([facility])   • CVA (cerebral vascular accident) ([facility])       Past Medical History:   Diagnosis Date   • Anxiety    • Arthritis    • CAD (coronary artery disease)     x1 stent; pulmonary HTN; EF 50-55%; rheumatic valve; MV stenosis   • CHF (congestive heart failure) ([facility])    • CKD (chronic kidney disease), stage III ([facility])    • COPD (chronic obstructive pulmonary disease) ([facility])    • Depression    • Diabetes mellitus ([facility])    • H/O mechanical aortic valve replacement    • History of tobacco abuse    • Hyperlipidemia    • Hypertension    • Ischemic heart disease    • Kidney failure     third stage   • Low back pain    • Obesity     BMI 36.   • Stroke ([facility])    • Valvular heart disease        Past Surgical History:   Procedure Laterality Date   • AORTIC VALVE REPAIR/REPLACEMENT     • CARDIAC CATHETERIZATION     • CARDIAC SURGERY      valve on aortic   • CARDIAC SURGERY      stent    • CATARACT EXTRACTION     • COLONOSCOPY     • CORONARY STENT PLACEMENT     • CYSTOSCOPY URETEROSCOPY LASER LITHOTRIPSY Left 2020    Procedure:  CYSTOSCOPY URETEROSCOPY LASER LITHOTRIPSY WITH STENT PLACEMENT;  Surgeon: Jonah Montgomery MD;  Location: [facility];  Service: Urology;  Laterality: Left;   • KIDNEY STONE SURGERY         PT ASSESSMENT (last 12 hours)     IRF PT Evaluation and Treatment     Row Name 11/04/22 1415          PT Time and Intention    Document Type daily treatment  -HR     Mode of Treatment individual therapy;physical therapy  -HR     Patient/Family/Caregiver Comments/Observations Pt and RN in agreement for PT. In AM  Pt completed sitting exercises and walked 320' with PRW CGA. In PM session standing exercises to challege balance in // bars and rode bike x 15' on level 1.0. Transfers are SBA, no adverse reactions noted on this date. Participated well  -HR     Row Name 11/04/22 1415          General Information    Existing Precautions/Restrictions fall  L shoulder pain-needs sling or support  -HR     Row Name 11/04/22 1415          Pain Scale: FACES Pre/Post-Treatment    Pain: FACES Scale, Pretreatment 6-->hurts even more  -HR     Posttreatment Pain Rating 6-->hurts even more  -HR     Row Name 11/04/22 1415          Cognition/Psychosocial    Affect/Mental Status (Cognition) WFL  -HR     Follows Commands (Cognition) WFL  -HR     Personal Safety Interventions fall prevention program maintained;gait belt;nonskid shoes/slippers when out of bed;supervised activity  -HR     Row Name 11/04/22 1415          Bed Mobility    Supine-Sit [place] (Bed Mobility) standby assist;verbal cues  -HR     Assistive Device (Bed Mobility) bed rails  -HR     Row Name 11/04/22 1415          Bed-Chair Transfer    Bed-Chair [place] (Transfers) contact guard;verbal cues;nonverbal cues (demo/gesture);standby assist  -HR     Assistive Device (Bed-Chair Transfers) wheelchair  -HR     Row Name 11/04/22 1415          Chair-Bed Transfer    Chair-Bed [place] (Transfers) contact guard;standby assist  -HR     Assistive Device (Chair-Bed Transfers)  wheelchair  -HR     Row Name 11/04/22 1415          Sit-Stand Transfer    Sit-Stand [place] (Transfers) contact guard;verbal cues;nonverbal cues (demo/gesture)  -HR     Assistive Device (Sit-Stand Transfers) wheelchair;parallel bars;walker, platform  -HR     Row Name 11/04/22 1415          Stand-Sit Transfer    Stand-Sit [place] (Transfers) contact guard;verbal cues;nonverbal cues (demo/gesture)  -HR     Assistive Device (Stand-Sit Transfers) wheelchair;parallel bars;walker, platform  -HR     Row Name 11/04/22 1415          Toilet Transfer    Type (Toilet Transfer) stand pivot/stand step  -HR     [place] Level (Toilet Transfer) standby assist  -HR     Row Name 11/04/22 1415          Gait/Stairs (Locomotion)    Gait/Stairs Locomotion gait/ambulation independence;distance ambulated;gait deviations  -HR     [place] Level (Gait) contact guard  -HR     Assistive Device (Gait) walker, platform  -HR     Distance in Feet (Gait) 320'  -HR     Deviations/Abnormal Patterns (Gait) candi decreased;gait speed decreased;stride length decreased  -HR     Bilateral Gait Deviations foot drop/toe drag  he scoots his left foot forward on the forefoot, it drags more with fatigue  -HR     Row Name 11/04/22 1415          Balance    Comment, Balance Sitting: 3# AP, LAQ, March, Ball sq, BTB: HS curls, hip abd, march . // bars on FOAM:  March, hip abd, squats, heel raise, HS curls, Bike x 15'  -HR     Row Name 11/04/22 1415          Hip (Therapeutic Exercise)    Hip Strengthening (Therapeutic Exercise) bilateral;flexion;extension;aBduction;aDduction;marching while seated;marching while standing;mini squats;sitting;standing;3 lb free weight;resistance band;blue  -HR     Row Name 11/04/22 1415          Knee (Therapeutic Exercise)    Knee Strengthening (Therapeutic Exercise) bilateral;flexion;extension;marching while seated;marching while standing;LAQ (long arc quad);hamstring curls;sitting;standing;3 lb free  weight;resistance band;blue  -HR     Row Name 11/04/22 1415          Ankle (Therapeutic Exercise)    Ankle Strengthening (Therapeutic Exercise) bilateral;dorsiflexion;plantarflexion;sitting;standing;3 lb free weight  -HR     Row Name 11/04/22 1415          Positioning and Restraints    Pre-Treatment Position other (comment)  AM OT, PM Bed  -HR     Post Treatment Position other  AM  BR and PM Bed  -HR     In Bed fowlers;call light within reach;encouraged to call for assist;side rails up x2  -HR     Bathroom sitting;call light within reach;encouraged to call for assist  -HR     Row Name 11/04/22 1415          Therapy Assessment/Plan (PT)    Patient's Goals For Discharge return home  maximize recovery  -HR     Row Name 11/04/22 1415          Therapy Assessment/Plan (PT)    Rehab Potential/Prognosis (PT) adequate, monitor progress closely  -HR     Frequency of Treatment (PT) 5 times per week  -HR     Estimated Duration of Therapy (PT) 2 weeks  -HR     Problem List (PT) balance;coordination;hemiparesis/hemiplegia;mobility;motor control;muscle tone;strength;pain  -HR     Activity Limitations Related to Problem List (PT) unable to ambulate safely;unable to transfer safely  -HR     Row Name 11/04/22 1415          Therapy Plan Review/Discharge Plan (PT)    Anticipated Equipment Needs at Discharge (PT Eval) --  tbd  -HR     Anticipated Discharge Disposition (PT) home with home health;home with assist  -HR     Row Name 11/04/22 1415          IRF PT Goals    Bed Mobility Goal Selection (PT-IRF) bed mobility, PT goal 1  -HR     Transfer Goal Selection (PT-IRF) transfers, PT goal 1  -HR     Gait (Walking Locomotion) Goal Selection (PT-IRF) gait, PT goal 1  -HR     Row Name 11/04/22 1415          Bed Mobility Goal 1 (PT-IRF)    Activity/Assistive Device (Bed Mobility Goal 1, PT-IRF) sit to supine/supine to sit  -HR     [place] Level (Bed Mobility Goal 1, PT-IRF) independent  -HR     Time Frame (Bed Mobility Goal 1, PT-IRF) by  discharge  -HR     Row Name 11/04/22 1415          Transfer Goal 1 (PT-IRF)    Activity/Assistive Device (Transfer Goal 1, PT-IRF) sit-to-stand/stand-to-sit;bed-to-chair/chair-to-bed  -HR     [place] Level (Transfer Goal 1, PT-IRF) modified independence  -HR     Time Frame (Transfer Goal 1, PT-IRF) by discharge  -HR     Row Name 11/04/22 1415          Gait/Walking Locomotion Goal 1 (PT-IRF)    Activity/Assistive Device (Gait/Walking Locomotion Goal 1, PT-IRF) --  AAD  -HR     Gait/Walking Locomotion Distance Goal 1 (PT-IRF) 300'  -HR     [place] Level (Gait/Walking Locomotion Goal 1, PT-IRF) supervision required  -HR     Time Frame (Gait/Walking Locomotion Goal 1, PT-IRF) by discharge  -HR           User Key  (r) = Recorded By, (t) = Taken By, (c) = Cosigned By    Initials Name Provider Type    HR Zahra Mclean PTA Physical Therapist Assistant                 Physical Therapy Education     Title: PT OT SLP Therapies (Done)     Topic: Physical Therapy (Done)     Point: Mobility training (Done)     Learning Progress Summary           Patient Acceptance, E,TB, VU,DU by HR at 11/4/2022 1423    Acceptance, E,TB, VU,DU by HR at 11/3/2022 1458    Acceptance, E, VU,NR by LB at 11/2/2022 1602                   Point: Home exercise program (Done)     Learning Progress Summary           Patient Acceptance, E,TB, VU,DU by HR at 11/4/2022 1423    Acceptance, E,TB, VU,DU by HR at 11/3/2022 1458    Acceptance, E, VU,NR by LB at 11/2/2022 1602                   Point: Body mechanics (Done)     Learning Progress Summary           Patient Acceptance, E,TB, VU,DU by HR at 11/4/2022 1423    Acceptance, E,TB, VU,DU by HR at 11/3/2022 1458    Acceptance, E, VU,NR by LB at 11/2/2022 1602                   Point: Precautions (Done)     Learning Progress Summary           Patient Acceptance, E,TB, VU,DU by HR at 11/4/2022 1423    Acceptance, E,TB, VU,DU by HR at 11/3/2022 1458    Acceptance, E, VU,NR by LB at 11/2/2022 0666                                User Key     Initials Effective Dates Name Provider Type Discipline    LB 06/16/21 -  Yulia Cabrera, PT Physical Therapist PT    HR 01/14/22 -  Zahra Mclean PTA Physical Therapist Assistant PT                PT Recommendation and Plan    Frequency of Treatment (PT): 5 times per week  Anticipated Equipment Needs at Discharge (PT Eval):  (tbd)                  Time Calculation:      PT Charges     Row Name 11/04/22 1425 11/04/22 1423          Time Calculation    Start Time 1245  -HR 1045  -HR     Stop Time 1340  -HR 1130  -HR     Time Calculation (min) 55 min  -HR 45 min  -HR     PT Received On 11/04/22  -HR 11/04/22  -HR        Time Calculation- PT    Total Timed Code Minutes- PT 55 minute(s)  -HR 45 minute(s)  -HR           User Key  (r) = Recorded By, (t) = Taken By, (c) = Cosigned By    Initials Name Provider Type    HR Zahra Mclean PTA Physical Therapist Assistant                Therapy Charges for Today     Code Description Service Date Service Provider Modifiers Qty    37290467556 HC GAIT TRAINING EA 15 MIN 11/3/2022 Zahra Mclean PTA GP, CQ 2    44065318546 HC PT THER PROC EA 15 MIN 11/3/2022 Zahra Mclean PTA GP, CQ 3    01956753426 HC PT THERAPEUTIC ACT EA 15 MIN 11/3/2022 Zahra Mclean PTA GP, CQ 2    38236116268 HC GAIT TRAINING EA 15 MIN 11/4/2022 Zahra Mclean PTA GP, CQ 1    45954833757 HC PT THER PROC EA 15 MIN 11/4/2022 Zahra Mclean PTA GP, CQ 2    58502376502 HC PT THERAPEUTIC ACT EA 15 MIN 11/4/2022 Zahra Mclean PTA GP, CQ 2    89401637750 HC PT NEUROMUSC RE EDUCATION EA 15 MIN 11/4/2022 Zahra Mclean PTA GP, CQ 2                   Zahra Mclean PTA  11/4/2022

## 2022-11-04 NOTE — THERAPY TREATMENT NOTE
Inpatient Rehabilitation - Occupational Therapy Treatment Note    NORBERT Parkinson     Patient Name: Benitez Miranda  : 1955  MRN: [id number]    Today's Date: 2022                 Admit Date: 2022       No diagnosis found.    Patient Active Problem List   Diagnosis   • Uncontrolled type 2 diabetes with neuropathy   • COPD (chronic obstructive pulmonary disease) ([facility])   • Chronic diastolic congestive heart failure ([facility])   • Essential hypertension   • Tobacco abuse   • Coronary artery disease involving native coronary artery of native heart without angina pectoris   • Low back pain   • Hyperlipidemia LDL goal <70   • H/O mechanical aortic valve replacement   • Depression   • CKD (chronic kidney disease), stage III ([facility])   • Rheumatic mitral stenosis   • Morbidly obese ([facility])   • Recurrent major depressive disorder, in full remission ([facility])   • Ischemic stroke ([facility])   • CVA (cerebral vascular accident) ([facility])       Past Medical History:   Diagnosis Date   • Anxiety    • Arthritis    • CAD (coronary artery disease)     x1 stent; pulmonary HTN; EF 50-55%; rheumatic valve; MV stenosis   • CHF (congestive heart failure) ([facility])    • CKD (chronic kidney disease), stage III ([facility])    • COPD (chronic obstructive pulmonary disease) ([facility])    • Depression    • Diabetes mellitus ([facility])    • H/O mechanical aortic valve replacement    • History of tobacco abuse    • Hyperlipidemia    • Hypertension    • Ischemic heart disease    • Kidney failure     third stage   • Low back pain    • Obesity     BMI 36.   • Stroke ([facility])    • Valvular heart disease        Past Surgical History:   Procedure Laterality Date   • AORTIC VALVE REPAIR/REPLACEMENT     • CARDIAC CATHETERIZATION     • CARDIAC SURGERY      valve on aortic   • CARDIAC SURGERY      stent    • CATARACT EXTRACTION     • COLONOSCOPY     • CORONARY STENT PLACEMENT     • CYSTOSCOPY URETEROSCOPY LASER LITHOTRIPSY Left 2020    Procedure: CYSTOSCOPY  URETEROSCOPY LASER LITHOTRIPSY WITH STENT PLACEMENT;  Surgeon: Jonah Montgomery MD;  Location: [facility];  Service: Urology;  Laterality: Left;   • KIDNEY STONE SURGERY               IRF OT ASSESSMENT FLOWSHEET (last 12 hours)     IRF OT Evaluation and Treatment     Row Name 11/04/22 1200          OT Time and Intention    Document Type daily treatment  -     Mode of Treatment individual therapy;occupational therapy  -     Patient Effort good  -     Row Name 11/04/22 1200          General Information    Patient/Family/Caregiver Comments/Observations patient agreeable to therapy. patient continues to improve with LUE function, decreased pain noted today  -     Existing Precautions/Restrictions fall  -     Row Name 11/04/22 1200          Cognition/Psychosocial    Affect/Mental Status (Cognition) WFL  -     Row Name 11/04/22 1200          Bed-Chair Transfer    Bed-Chair [place] (Transfers) minimum assist (75% patient effort);contact guard;verbal cues  -     Row Name 11/04/22 1200          Motor Skills    Motor Skills coordination;functional endurance;neuro-muscular function  -     Therapeutic Exercise --  BUE ROM, LUE ROM, gross grasp,  strengthening, gmc  -           User Key  (r) = Recorded By, (t) = Taken By, (c) = Cosigned By    Initials Name Effective Dates     Luda Hicks, OT 06/16/21 -                          OT Recommendation and Plan    Planned Therapy Interventions (OT): activity tolerance training, adaptive equipment training, BADL retraining, neuromuscular control/coordination retraining, passive ROM/stretching, ROM/therapeutic exercise, strengthening exercise, transfer/mobility retraining                    Time Calculation:      Time Calculation- OT     Row Name 11/04/22 1258             Time Calculation-     OT Start Time 0915  -      OT Stop Time 1045  -      OT Time Calculation (min) 90 min  -            User Key  (r) = Recorded By, (t) = Taken By,  (c) = Cosigned By    Initials Name Provider Type     Luda Hicks, OT Occupational Therapist              Therapy Charges for Today     Code Description Service Date Service Provider Modifiers Qty    67581333207 HC OT THERAPEUTIC ACT EA 15 MIN 11/3/2022 Luda Hicks, OT GO 2    63222381586 HC OT SELF CARE/MGMT/TRAIN EA 15 MIN 11/3/2022 Luda Hicks, OT GO 1    62218331527 HC OT NEUROMUSC RE EDUCATION EA 15 MIN 11/3/2022 Luda Hicks, OT GO 3    73931319730 HC OT THERAPEUTIC ACT EA 15 MIN 11/4/2022 Luda Hicks, OT GO 2    68637236291 HC OT SELF CARE/MGMT/TRAIN EA 15 MIN 11/4/2022 Luda Hicks, OT GO 1    [id number] HC OT NEUROMUSC RE EDUCATION EA 15 MIN 11/4/2022 Luda Hicks, OT GO 3                   Luda Hicks OT  11/4/2022

## 2022-11-04 NOTE — PROGRESS NOTES
[facility]  PROGRESS NOTE     Patient Identification:  Name:  Benitez Miranda  Age:  67 y.o.  Sex:  male  :  1955  MRN:  [id number]  Visit Number:  [id number]  ROOM: [facility]     Primary Care Provider:  Mesha Christina MD    Length of stay in inpatient status:  3    Subjective     Chief Compliant:  No chief complaint on file.      History of Presenting Illness: 67-year-old gentleman who is status post right MCA distribution CVA.  Patient also with history of mechanical aortic valve for which we are titrating Coumadin at this time.  Patient states his left shoulder pain is improving and he is improving his range of motion at this time.  Has no new complaints otherwise    Objective     Current Hospital Meds:aspirin, 81 mg, Oral, Daily  atorvastatin, 80 mg, Oral, Nightly  Diclofenac Sodium, 2 g, Topical, 4x Daily  enoxaparin, 1 mg/kg, Subcutaneous, Q12H  Insulin Aspart, 0-7 Units, Subcutaneous, 4x Daily PC & at Bedtime  insulin detemir, 20 Units, Subcutaneous, Q12H  melatonin, 5 mg, Oral, Nightly  sertraline, 150 mg, Oral, Daily  warfarin, 10 mg, Oral, Once    Pharmacy to dose warfarin,       ----------------------------------------------------------------------------------------------------------------------  Vital Signs:  Temp:  [97.4 °F (36.3 °C)-98.2 °F (36.8 °C)] 97.4 °F (36.3 °C)  Heart Rate:  [65-77] 65  Resp:  [16-22] 20  BP: (108-146)/(64-84) 146/84  SpO2:  [97 %-99 %] 99 %  on   ;   Device (Oxygen Therapy): room air  Body mass index is 34.58 kg/m².    Wt Readings from Last 3 Encounters:   22 116 kg (255 lb)   22 116 kg (255 lb 1.6 oz)   10/27/22 116 kg (255 lb 3.2 oz)     Intake & Output (last 3 days)        07 07 07 07 07 07 0701   0700    P.O. 960 1560 1200 120    Total Intake(mL/kg) 960 (8.3) 1560 (13.4) 1200 (10.3) 120 (1)    Net +960 +1560 +1200 +120            Urine Unmeasured Occurrence 3 x 7 x 5 x          Diet Regular; Cardiac, Consistent Carbohydrate  ----------------------------------------------------------------------------------------------------------------------  Physical exam:  Constitutional:   No acute distress  HEENT: Normocephalic atraumatic  Neck: Supple   Cardiovascular: Regular rate and rhythm  Pulmonary/Chest: Clear to auscultation  Abdominal: Positive bowel sounds soft.   Musculoskeletal: No obvious arthropathy, has decreased range of motion the left shoulder  Neurological: Mild left-sided hemiparesis  Skin: No rash  Peripheral vascular:  Genitourinary:  ----------------------------------------------------------------------------------------------------------------------    Last echocardiogram:  Results for orders placed during the hospital encounter of 10/28/22    Adult Transthoracic Echo Complete W/ Cont if Necessary Per Protocol (With Agitated Saline)    Interpretation Summary  •  The left ventricular cavity is mildly dilated.  •  Left ventricular wall thickness is consistent with mild concentric hypertrophy.  •  Left ventricular systolic function is normal. Left ventricular ejection fraction appears to be 56 - 60%.  •  Left ventricular diastolic function is consistent with (grade I) impaired relaxation.  •  There is a mechanical aortic valve prosthesis present. The aortic valve peak and mean gradients are within defined limits. The prosthetic aortic valve is grossly normal.  •  Moderate calcific  mitral valve stenosis is present. ( Peak PG= 17 mm Hg and mean PG = 8 mm Hg)).  Unchanged since the study done 10/5/2021.  •  Moderate mitral valve regurgitation is present.  •  Mild pulmonary hypertension is present.  •  Saline test is negative for the evidence of shunt at interatrial septum.  •  There is no evidence of pericardial effusion.    ----------------------------------------------------------------------------------------------------------------------  Results from last 7 days   Lab Units  11/04/22  0135 11/03/22  0035 11/02/22  0115 11/01/22 0256 10/31/22  0207 10/30/22  0456 10/29/22  0504   WBC 10*3/mm3  --   --  10.51 9.27 9.03 7.68 8.04   HEMOGLOBIN g/dL  --   --  13.4 13.1 13.1 13.0 13.1   HEMATOCRIT %  --   --  41.4 39.6 40.1 41.4 43.8   MCV fL  --   --  85.7 85.7 86.1 89.6 92.4   MCHC g/dL  --   --  32.4 33.1 32.7 31.4* 29.9*   PLATELETS 10*3/mm3  --   --  113* 113* 111* 109* 116*   INR  2.58* 1.97* 2.45* 3.11* 3.13* 3.21* 2.43*         Results from last 7 days   Lab Units 11/03/22  0035 11/02/22  0115 11/01/22  0256 10/31/22  0207 10/30/22  0456   SODIUM mmol/L 137 136 135* 137 135*   POTASSIUM mmol/L 4.2 4.2 4.0 4.0 4.1   MAGNESIUM mg/dL  --  1.7  --   --   --    CHLORIDE mmol/L 103 103 103 107 107   CO2 mmol/L 21.7* 20.1* 19.9* 19.5* 16.6*   BUN mg/dL 24* 17 16 18 19   CREATININE mg/dL 1.44* 1.43* 1.29* 1.24 1.22   CALCIUM mg/dL 8.8 8.6 8.4* 8.6 8.4*   GLUCOSE mg/dL 201* 221* 149* 186* 152*   ALBUMIN g/dL  --   --  3.23* 3.28* 2.99*   BILIRUBIN mg/dL  --   --  0.4 0.4 0.4   ALK PHOS U/L  --   --  55 55 50   AST (SGOT) U/L  --   --  25 26 24   ALT (SGPT) U/L  --   --  27 27 22   Estimated Creatinine Clearance: 63.7 mL/min (A) (by C-G formula based on SCr of 1.44 mg/dL (H)).  No results found for: AMMONIA              Glucose   Date/Time Value Ref Range Status   11/04/2022 1108 214 (H) 70 - 130 mg/dL Final     Comment:     Meter: [id number] : [id number] TELLEZ KAMARI   11/04/2022 0557 178 (H) 70 - 130 mg/dL Final     Comment:     Meter: [id number] : [id number] Hicks Victorina   11/03/2022 1946 252 (H) 70 - 130 mg/dL Final     Comment:     Meter: [id number] : [id number] Harshal Crystal   11/03/2022 1638 182 (H) 70 - 130 mg/dL Final     Comment:     Meter: [id number] : [id number] TELLEZ KAMARI   11/03/2022 1059 216 (H) 70 - 130 mg/dL Final     Comment:     Meter: [id number] : [id number] TELLEZ KAMARI   11/03/2022 0605 195 (H) 70 - 130 mg/dL Final     Comment:     Meter:  [id number] : [id number] Kurt Prajapati   11/02/2022 1951 288 (H) 70 - 130 mg/dL Final     Comment:     Meter: [id number] : [id number] [place] Crystal   11/02/2022 1557 174 (H) 70 - 130 mg/dL Final     Comment:     Meter: [id number] : [id number] omar bolañosengill     Lab Results   Component Value Date    TSH 4.680 (H) 10/25/2022    FREET4 1.07 10/25/2022     No results found for: PREGTESTUR, PREGSERUM, HCG, HCGQUANT  Pain Management Panel     Pain Management Panel Latest Ref Rng & Units 6/29/2020 10/25/2019    CREATININE UR mg/dL 87.9 154.7        Brief Urine Lab Results  (Last result in the past 365 days)      Color   Clarity   Blood   Leuk Est   Nitrite   Protein   CREAT   Urine HCG        10/25/22 1734 Yellow   Clear   Negative   Negative   Negative   Negative               No results found for: BLOODCX      No results found for: URINECX  No results found for: WOUNDCX  No results found for: STOOLCX        I have personally looked at the labs and they are summarized above.  ----------------------------------------------------------------------------------------------------------------------  Detailed radiology reports for the last 24 hours:    Imaging Results (Last 24 Hours)     ** No results found for the last 24 hours. **        Final impressions for the last 30 days of radiology reports:    CT Head Without Contrast    Result Date: 10/25/2022    No acute findings in the head/brain.  Findings were communicated.  This report was finalized on 10/25/2022 11:40 AM by Dr. Leno Du MD.      CT Angiogram Neck    Result Date: 10/28/2022  No hemodynamically significant stenosis is seen at the carotid bifurcations or in the posterior circulation. No large vessel occlusions are noted. There is evidence of mild to moderate diffuse small vessel disease. There is asymmetric filling of distal MCA branches, greater on the left than on the right. An acute occlusion of a mid to distal MCA branch on the right could  account for this finding. This could also reflect chronic atherosclerotic disease. Signer Name: Ortega Waldrop MD  Signed: 10/28/2022 7:35 AM  Workstation Name: [email or web address]  [facility]    CT Angiogram Neck    Result Date: 10/25/2022    Mild bilateral proximal internal carotid artery stenosis secondary to atherosclerotic vascular calcifications.  This report was finalized on 10/25/2022 12:44 PM by Dr. Leno Du MD.      MRI Brain Without Contrast    Result Date: 10/28/2022    Grossly stable appearance of right posterior parietal acute infarct.  This report was finalized on 10/28/2022 11:44 AM by Dr. Leno Du MD.      MRI Brain Without Contrast    Result Date: 10/27/2022    Focal 1.9 cm acute infarct in the right posterior parietal white matter.  This report was finalized on 10/27/2022 11:05 AM by Dr. Leno Du MD.      XR Chest 1 View    Result Date: 10/28/2022    Cardiomegaly.  This report was finalized on 10/28/2022 8:46 AM by Dr. Leno Du MD.      CT Angiogram Head    Result Date: 10/25/2022    No acute findings in the arteries of the head/brain.  This report was finalized on 10/25/2022 12:43 PM by Dr. Leno Du MD.      CT Head Without Contrast Stroke Protocol    Result Date: 10/28/2022  New 14 mm area of decreased density in the right corona radiata consistent with a subacute infarct. There is no hemorrhage. Signer Name: Nate Lake MD  Signed: 10/28/2022 6:17 AM  Workstation Name: [email or web address]  [facility]    CT Angiogram Head w AI Analysis of LVO    Result Date: 10/28/2022  No hemodynamically significant stenosis is seen at the carotid bifurcations or in the posterior circulation. No large vessel occlusions are noted. There is evidence of mild to moderate diffuse small vessel disease. There is asymmetric filling of distal MCA branches, greater on the left than on the right. An acute occlusion of a mid to distal MCA branch on the right could  account for this finding. This could also reflect chronic atherosclerotic disease. Signer Name: Ortega Waldrop MD  Signed: 10/28/2022 7:35 AM  Workstation Name: [email or web address]  [facility] [place]    CT CEREBRAL PERFUSION WITH & WITHOUT CONTRAST    Result Date: 10/25/2022  No core infarct detected.  This report was finalized on 10/25/2022 12:45 PM by Dr. Leno Du MD.      I have personally looked at the radiology images and read the final radiology report.    Assessment & Plan    Status post right MCA distribution CVA with left upper extremity paresis although this weakness may be somewhat secondary to left shoulder pain.  Patient has been continued on Coumadin, aspirin and statin therapy.  Patient continues to work on motor skills and neuromuscular reeducation and is working on improving range of motion left shoulder at this time and is getting some increased movement.  Requiring standby assistance for for bed mobility; contact-guard for transfers.    History of mechanical aortic valve--INR today is 2.6.  Pharmacy is continue to dose at this time and we are bridging with Lovenox while this titration is being done.  Goal INR is between 3 and 3.5    Diabetes mellitus continue current treatment    Left shoulder pain cannot rule out the possibility of adhesive capsulitis versus acute tendinitis.  Continue Voltaren cream as we seem to be making improvements daily    Neuropathic pain continue gabapentin    Obstructive sleep apnea BiPAP at night    Hypertension continue current treatment.  Well-controlled    CKD stage III    COPD's compensated    CAD with history of stent placement medical management.      VTE Prophylaxis:   Mechanical Order History:     None      Pharmalogical Order History:      Ordered     Dose Route Frequency Stop    11/04/22 1140  warfarin (COUMADIN) tablet 10 mg        Question:  Target INR  Answer:  3 - 3.5    10 mg PO Once (Warfarin) --    11/03/22 1207  warfarin (COUMADIN) tablet  10 mg        Question:  Target INR  Answer:  3 - 3.5    10 mg PO Once (Warfarin) 11/03/22 1716    11/03/22 1213  Enoxaparin Sodium (LOVENOX) syringe 120 mg         1 mg/kg SC Every 12 Hours --    11/02/22 1119  warfarin (COUMADIN) tablet 10 mg        Question:  Target INR  Answer:  3 - 3.5    10 mg PO Once (Warfarin) 11/02/22 1808    11/01/22 1450  warfarin (COUMADIN) tablet 7 mg        Question:  Target INR  Answer:  3 - 3.5    7 mg PO Once (Warfarin) 11/01/22 2105 11/01/22 1450  Pharmacy to dose warfarin        Question:  Target INR  Answer:  3 - 3.5    -- XX Continuous PRN --                    Odell Beyer MD  [facility]  11/04/22  11:56 EDT

## 2022-11-04 NOTE — PROGRESS NOTES
Rehabilitation Nursing  Inpatient Rehabilitation Plan of Care Note    Plan of Care  Safety    Potential for Injury (Active)  Current Status (11/1/2022 3:00:00 PM): At risk for falls  Weekly Goal: No falls this week  Discharge Goal: No falls during admission    Body Systems    Integumentary (Active)  Current Status (11/1/2022 3:00:00 PM): At risk for skin breakdown  Weekly Goal: No skin breakdown this week  Discharge Goal: No skin breakdown during admission    Signed by: Gian Herrera RN

## 2022-11-05 LAB
ANION GAP SERPL CALCULATED.3IONS-SCNC: 10 MMOL/L (ref 5–15)
BASOPHILS # BLD AUTO: 0.05 10*3/MM3 (ref 0–0.2)
BASOPHILS NFR BLD AUTO: 0.5 % (ref 0–1.5)
BUN SERPL-MCNC: 25 MG/DL (ref 8–23)
BUN/CREAT SERPL: 18.4 (ref 7–25)
CALCIUM SPEC-SCNC: 8.7 MG/DL (ref 8.6–10.5)
CHLORIDE SERPL-SCNC: 105 MMOL/L (ref 98–107)
CO2 SERPL-SCNC: 22 MMOL/L (ref 22–29)
CREAT SERPL-MCNC: 1.36 MG/DL (ref 0.76–1.27)
DEPRECATED RDW RBC AUTO: 46.8 FL (ref 37–54)
EGFRCR SERPLBLD CKD-EPI 2021: 56.3 ML/MIN/1.73
EOSINOPHIL # BLD AUTO: 0.31 10*3/MM3 (ref 0–0.4)
EOSINOPHIL NFR BLD AUTO: 3.2 % (ref 0.3–6.2)
ERYTHROCYTE [DISTWIDTH] IN BLOOD BY AUTOMATED COUNT: 14.8 % (ref 12.3–15.4)
GLUCOSE BLDC GLUCOMTR-MCNC: 136 MG/DL (ref 70–130)
GLUCOSE BLDC GLUCOMTR-MCNC: 144 MG/DL (ref 70–130)
GLUCOSE BLDC GLUCOMTR-MCNC: 180 MG/DL (ref 70–130)
GLUCOSE BLDC GLUCOMTR-MCNC: 224 MG/DL (ref 70–130)
GLUCOSE SERPL-MCNC: 107 MG/DL (ref 65–99)
HBV SURFACE AG SERPL QL IA: NORMAL
HCT VFR BLD AUTO: 39.2 % (ref 37.5–51)
HCV AB SER DONR QL: NORMAL
HGB BLD-MCNC: 12.5 G/DL (ref 13–17.7)
HIV1+2 AB SER QL: NORMAL
IMM GRANULOCYTES # BLD AUTO: 0.11 10*3/MM3 (ref 0–0.05)
IMM GRANULOCYTES NFR BLD AUTO: 1.2 % (ref 0–0.5)
INR PPP: 2.88 (ref 0.9–1.1)
LYMPHOCYTES # BLD AUTO: 1.66 10*3/MM3 (ref 0.7–3.1)
LYMPHOCYTES NFR BLD AUTO: 17.4 % (ref 19.6–45.3)
MCH RBC QN AUTO: 27.8 PG (ref 26.6–33)
MCHC RBC AUTO-ENTMCNC: 31.9 G/DL (ref 31.5–35.7)
MCV RBC AUTO: 87.3 FL (ref 79–97)
MONOCYTES # BLD AUTO: 0.68 10*3/MM3 (ref 0.1–0.9)
MONOCYTES NFR BLD AUTO: 7.1 % (ref 5–12)
NEUTROPHILS NFR BLD AUTO: 6.74 10*3/MM3 (ref 1.7–7)
NEUTROPHILS NFR BLD AUTO: 70.6 % (ref 42.7–76)
NRBC BLD AUTO-RTO: 0 /100 WBC (ref 0–0.2)
PLATELET # BLD AUTO: 136 10*3/MM3 (ref 140–450)
PMV BLD AUTO: 11.4 FL (ref 6–12)
POTASSIUM SERPL-SCNC: 4.2 MMOL/L (ref 3.5–5.2)
PROTHROMBIN TIME: 31 SECONDS (ref 12.1–14.7)
RBC # BLD AUTO: 4.49 10*6/MM3 (ref 4.14–5.8)
SODIUM SERPL-SCNC: 137 MMOL/L (ref 136–145)
WBC NRBC COR # BLD: 9.55 10*3/MM3 (ref 3.4–10.8)

## 2022-11-05 PROCEDURE — 94799 UNLISTED PULMONARY SVC/PX: CPT

## 2022-11-05 PROCEDURE — G0432 EIA HIV-1/HIV-2 SCREEN: HCPCS | Performed by: FAMILY MEDICINE

## 2022-11-05 PROCEDURE — 63710000001 ONDANSETRON PER 8 MG: Performed by: FAMILY MEDICINE

## 2022-11-05 PROCEDURE — 80048 BASIC METABOLIC PNL TOTAL CA: CPT | Performed by: FAMILY MEDICINE

## 2022-11-05 PROCEDURE — 97110 THERAPEUTIC EXERCISES: CPT

## 2022-11-05 PROCEDURE — 82962 GLUCOSE BLOOD TEST: CPT

## 2022-11-05 PROCEDURE — 97530 THERAPEUTIC ACTIVITIES: CPT

## 2022-11-05 PROCEDURE — 85610 PROTHROMBIN TIME: CPT | Performed by: FAMILY MEDICINE

## 2022-11-05 PROCEDURE — 87340 HEPATITIS B SURFACE AG IA: CPT | Performed by: FAMILY MEDICINE

## 2022-11-05 PROCEDURE — 97112 NEUROMUSCULAR REEDUCATION: CPT

## 2022-11-05 PROCEDURE — 25010000002 ENOXAPARIN PER 10 MG: Performed by: FAMILY MEDICINE

## 2022-11-05 PROCEDURE — 63710000001 INSULIN ASPART PER 5 UNITS: Performed by: FAMILY MEDICINE

## 2022-11-05 PROCEDURE — 86803 HEPATITIS C AB TEST: CPT | Performed by: FAMILY MEDICINE

## 2022-11-05 PROCEDURE — 94660 CPAP INITIATION&MGMT: CPT

## 2022-11-05 PROCEDURE — 85025 COMPLETE CBC W/AUTO DIFF WBC: CPT | Performed by: FAMILY MEDICINE

## 2022-11-05 PROCEDURE — 97116 GAIT TRAINING THERAPY: CPT

## 2022-11-05 PROCEDURE — 63710000001 INSULIN DETEMIR PER 5 UNITS: Performed by: FAMILY MEDICINE

## 2022-11-05 RX ORDER — ONDANSETRON 4 MG/1
4 TABLET, FILM COATED ORAL EVERY 6 HOURS PRN
Status: DISCONTINUED | OUTPATIENT
Start: 2022-11-05 | End: 2022-11-11 | Stop reason: HOSPADM

## 2022-11-05 RX ADMIN — INSULIN ASPART 3 UNITS: 100 INJECTION, SOLUTION INTRAVENOUS; SUBCUTANEOUS at 20:42

## 2022-11-05 RX ADMIN — ENOXAPARIN SODIUM 120 MG: 60 INJECTION SUBCUTANEOUS at 04:21

## 2022-11-05 RX ADMIN — Medication 5 MG: at 20:43

## 2022-11-05 RX ADMIN — DICLOFENAC 2 G: 10 GEL TOPICAL at 08:45

## 2022-11-05 RX ADMIN — DICLOFENAC 2 G: 10 GEL TOPICAL at 13:21

## 2022-11-05 RX ADMIN — ENOXAPARIN SODIUM 120 MG: 60 INJECTION SUBCUTANEOUS at 13:23

## 2022-11-05 RX ADMIN — INSULIN ASPART 2 UNITS: 100 INJECTION, SOLUTION INTRAVENOUS; SUBCUTANEOUS at 13:20

## 2022-11-05 RX ADMIN — ATORVASTATIN CALCIUM 80 MG: 40 TABLET, FILM COATED ORAL at 20:43

## 2022-11-05 RX ADMIN — DICLOFENAC 2 G: 10 GEL TOPICAL at 17:30

## 2022-11-05 RX ADMIN — WARFARIN 8 MG: 5 TABLET ORAL at 17:29

## 2022-11-05 RX ADMIN — ASPIRIN 81 MG: 81 TABLET, COATED ORAL at 08:42

## 2022-11-05 RX ADMIN — DICLOFENAC 2 G: 10 GEL TOPICAL at 21:29

## 2022-11-05 RX ADMIN — SERTRALINE 150 MG: 50 TABLET, FILM COATED ORAL at 08:42

## 2022-11-05 RX ADMIN — INSULIN DETEMIR 20 UNITS: 100 INJECTION, SOLUTION SUBCUTANEOUS at 20:42

## 2022-11-05 RX ADMIN — ONDANSETRON HYDROCHLORIDE 4 MG: 4 TABLET, FILM COATED ORAL at 11:13

## 2022-11-05 RX ADMIN — INSULIN DETEMIR 20 UNITS: 100 INJECTION, SOLUTION SUBCUTANEOUS at 09:05

## 2022-11-05 NOTE — PLAN OF CARE
Goal Outcome Evaluation:  Plan of Care Reviewed With: patient        Progress: improving  Outcome Evaluation: Patient states the gel is helping with his left shoulder/arm and helping him have more mobility to the left arm. Patient rested this shift with no complaints.

## 2022-11-05 NOTE — THERAPY TREATMENT NOTE
Inpatient Rehabilitation - Occupational Therapy Treatment Note    NORBERT Parkinson     Patient Name: Benitez Miranda  : 1955  MRN: [id number]    Today's Date: 2022                 Admit Date: 2022       No diagnosis found.    Patient Active Problem List   Diagnosis   • Uncontrolled type 2 diabetes with neuropathy   • COPD (chronic obstructive pulmonary disease) ([facility])   • Chronic diastolic congestive heart failure ([facility])   • Essential hypertension   • Tobacco abuse   • Coronary artery disease involving native coronary artery of native heart without angina pectoris   • Low back pain   • Hyperlipidemia LDL goal <70   • H/O mechanical aortic valve replacement   • Depression   • CKD (chronic kidney disease), stage III ([facility])   • Rheumatic mitral stenosis   • Morbidly obese ([facility])   • Recurrent major depressive disorder, in full remission ([facility])   • Ischemic stroke ([facility])   • CVA (cerebral vascular accident) ([facility])       Past Medical History:   Diagnosis Date   • Anxiety    • Arthritis    • CAD (coronary artery disease)     x1 stent; pulmonary HTN; EF 50-55%; rheumatic valve; MV stenosis   • CHF (congestive heart failure) ([facility])    • CKD (chronic kidney disease), stage III ([facility])    • COPD (chronic obstructive pulmonary disease) ([facility])    • Depression    • Diabetes mellitus ([facility])    • H/O mechanical aortic valve replacement    • History of tobacco abuse    • Hyperlipidemia    • Hypertension    • Ischemic heart disease    • Kidney failure     third stage   • Low back pain    • Obesity     BMI 36.   • Stroke ([facility])    • Valvular heart disease        Past Surgical History:   Procedure Laterality Date   • AORTIC VALVE REPAIR/REPLACEMENT     • CARDIAC CATHETERIZATION     • CARDIAC SURGERY      valve on aortic   • CARDIAC SURGERY      stent    • CATARACT EXTRACTION     • COLONOSCOPY     • CORONARY STENT PLACEMENT     • CYSTOSCOPY URETEROSCOPY LASER LITHOTRIPSY Left 2020    Procedure: CYSTOSCOPY  URETEROSCOPY LASER LITHOTRIPSY WITH STENT PLACEMENT;  Surgeon: Jonah Montgomery MD;  Location: [facility];  Service: Urology;  Laterality: Left;   • KIDNEY STONE SURGERY               IRF OT ASSESSMENT FLOWSHEET (last 12 hours)     IRF OT Evaluation and Treatment     Row Name 11/05/22 1526          OT Time and Intention    Document Type daily treatment  -LA     Mode of Treatment occupational therapy;individual therapy  -LA     Patient Effort good  -LA     Symptoms Noted During/After Treatment none  -LA     Row Name 11/05/22 1526          General Information    Patient Profile Reviewed yes  -LA     General Observations of Patient Patient agreeable to therapy this date. Patient pleasant and cooperative throughout session. Patient continues to verbalize and demonstrate good motivation to return to PLOF. Patient engaged in NMR, fine motor and fx tasks with focus on increasing fx use of left UE. Standing balance also addressed this date.  -LA     Existing Precautions/Restrictions fall  -LA     Row Name 11/05/22 1526          Cognition/Psychosocial    Affect/Mental Status (Cognition) WFL  -LA     Orientation Status (Cognition) oriented x 4  -LA     Follows Commands (Cognition) WFL  -LA     Row Name 11/05/22 1526          Sit-Stand Transfer    Sit-Stand [place] (Transfers) contact guard;nonverbal cues (demo/gesture);verbal cues  table top used for support during fx task  -LA     Row Name 11/05/22 1526          Motor Skills    Motor Skills coordination;functional endurance;neuro-muscular function  -LA     Coordination fine motor deficit;left;upper extremity;moderate impairment  -LA     Functional Endurance fair+  -LA     Neuromuscular Function left;upper extremity  -LA     Therapeutic Exercise shoulder;elbow/forearm;wrist;hand  -LA     Row Name 11/05/22 1526          Positioning and Restraints    Pre-Treatment Position other (comment)  w/c from PT  -LA     Post Treatment Position wheelchair  -LA     In Wheelchair  with PT;sitting  -LA     Row Name 11/05/22 1526          Therapy Plan Review/Discharge Plan (OT)    Therapy Plan Review (OT) patient  -LA           User Key  (r) = Recorded By, (t) = Taken By, (c) = Cosigned By    Initials Name Effective Dates    Leilani Bertrand OT 02/14/22 -                  Occupational Therapy Education     Title: PT OT SLP Therapies (Done)     Topic: Occupational Therapy (Done)     Point: ADL training (Done)     Description:   Instruct learner(s) on proper safety adaptation and remediation techniques during self care or transfers.   Instruct in proper use of assistive devices.              Learning Progress Summary           Patient Acceptance, E,TB,D, VU,DU by LA at 11/5/2022 1535                   Point: Home exercise program (Done)     Description:   Instruct learner(s) on appropriate technique for monitoring, assisting and/or progressing therapeutic exercises/activities.              Learning Progress Summary           Patient Acceptance, E,TB,D, VU,DU by LA at 11/5/2022 1535                   Point: Precautions (Done)     Description:   Instruct learner(s) on prescribed precautions during self-care and functional transfers.              Learning Progress Summary           Patient Acceptance, E,TB,D, VU,DU by LA at 11/5/2022 1535                   Point: Body mechanics (Done)     Description:   Instruct learner(s) on proper positioning and spine alignment during self-care, functional mobility activities and/or exercises.              Learning Progress Summary           Patient Acceptance, E,TB,D, VU,DU by LA at 11/5/2022 1535                               User Key     Initials Effective Dates Name Provider Type Discipline    LA 02/14/22 -  Leilani Sanders OT Occupational Therapist OT                    OT Recommendation and Plan                         Time Calculation:      Time Calculation- OT     Row Name 11/05/22 1535             Time Calculation- OT    OT Start Time 0830  -THELMA MILLER  Stop Time 1000  -LA      OT Time Calculation (min) 90 min  -LA      Total Timed Code Minutes- OT 90 minute(s)  -LA            User Key  (r) = Recorded By, (t) = Taken By, (c) = Cosigned By    Initials Name Provider Type    Leilani Bertrand OT Occupational Therapist              Therapy Charges for Today     Code Description Service Date Service Provider Modifiers Qty    [id number]  OT NEUROMUSC RE EDUCATION EA 15 MIN 11/5/2022 Leilani Sanders OT GO 2    [id number]  OT THER PROC EA 15 MIN 11/5/2022 Leilani Sanders OT GO 2    [id number]  OT THERAPEUTIC ACT EA 15 MIN 11/5/2022 Leilani Sanders OT GO 2                   Leilani Sanders OT  11/5/2022

## 2022-11-05 NOTE — THERAPY TREATMENT NOTE
Inpatient Rehabilitation - Physical Therapy Treatment Note       NORBERT Parkinson     Patient Name: Benietz Miranda  : 1955  MRN: [id number]    Today's Date: 2022                    Admit Date: 2022      Visit Dx:   No diagnosis found.    Patient Active Problem List   Diagnosis   • Uncontrolled type 2 diabetes with neuropathy   • COPD (chronic obstructive pulmonary disease) ([facility])   • Chronic diastolic congestive heart failure ([facility])   • Essential hypertension   • Tobacco abuse   • Coronary artery disease involving native coronary artery of native heart without angina pectoris   • Low back pain   • Hyperlipidemia LDL goal <70   • H/O mechanical aortic valve replacement   • Depression   • CKD (chronic kidney disease), stage III ([facility])   • Rheumatic mitral stenosis   • Morbidly obese ([facility])   • Recurrent major depressive disorder, in full remission ([facility])   • Ischemic stroke ([facility])   • CVA (cerebral vascular accident) ([facility])       Past Medical History:   Diagnosis Date   • Anxiety    • Arthritis    • CAD (coronary artery disease)     x1 stent; pulmonary HTN; EF 50-55%; rheumatic valve; MV stenosis   • CHF (congestive heart failure) ([facility])    • CKD (chronic kidney disease), stage III ([facility])    • COPD (chronic obstructive pulmonary disease) ([facility])    • Depression    • Diabetes mellitus ([facility])    • H/O mechanical aortic valve replacement    • History of tobacco abuse    • Hyperlipidemia    • Hypertension    • Ischemic heart disease    • Kidney failure     third stage   • Low back pain    • Obesity     BMI 36.   • Stroke ([facility])    • Valvular heart disease        Past Surgical History:   Procedure Laterality Date   • AORTIC VALVE REPAIR/REPLACEMENT     • CARDIAC CATHETERIZATION     • CARDIAC SURGERY      valve on aortic   • CARDIAC SURGERY      stent    • CATARACT EXTRACTION     • COLONOSCOPY     • CORONARY STENT PLACEMENT     • CYSTOSCOPY URETEROSCOPY LASER LITHOTRIPSY Left 2020    Procedure:  CYSTOSCOPY URETEROSCOPY LASER LITHOTRIPSY WITH STENT PLACEMENT;  Surgeon: Jonah Montgomery MD;  Location: [facility];  Service: Urology;  Laterality: Left;   • KIDNEY STONE SURGERY         PT ASSESSMENT (last 12 hours)     IRF PT Evaluation and Treatment     Row Name 11/05/22 1201          PT Time and Intention    Document Type daily treatment  -HR     Mode of Treatment individual therapy;physical therapy  -HR     Patient/Family/Caregiver Comments/Observations Pt and RN in agreement for PT. Pt completed two AM session on this date, but became sick at end of 2nd session and had to lay back down. Pt walked 320' with PRW CGA/SBA. Sitting exercises completed with added Wt and resistance for strengthening. // bar activities on FOAM pad in order to challenge balance.  -HR     Row Name 11/05/22 1201          General Information    Existing Precautions/Restrictions fall  L shoulder pain-needs sling or support  -HR     Row Name 11/05/22 1201          Pain Scale: FACES Pre/Post-Treatment    Pain: FACES Scale, Pretreatment 6-->hurts even more  -HR     Posttreatment Pain Rating 6-->hurts even more  -HR     Row Name 11/05/22 1201          Cognition/Psychosocial    Affect/Mental Status (Cognition) WFL  -HR     Follows Commands (Cognition) WFL  -HR     Personal Safety Interventions fall prevention program maintained;gait belt;nonskid shoes/slippers when out of bed;supervised activity  -HR     Row Name 11/05/22 1201          Bed Mobility    Supine-Sit [place] (Bed Mobility) standby assist;verbal cues  -HR     Assistive Device (Bed Mobility) bed rails  -HR     Row Name 11/05/22 1201          Bed-Chair Transfer    Bed-Chair [place] (Transfers) verbal cues;nonverbal cues (demo/gesture);standby assist  -HR     Assistive Device (Bed-Chair Transfers) wheelchair  -HR     Row Name 11/05/22 1201          Chair-Bed Transfer    Chair-Bed [place] (Transfers) standby assist  -HR     Assistive Device (Chair-Bed Transfers)  wheelchair  -HR     Row Name 11/05/22 1201          Sit-Stand Transfer    Sit-Stand Circle (Transfers) contact guard;verbal cues;nonverbal cues (demo/gesture)  -HR     Assistive Device (Sit-Stand Transfers) wheelchair;parallel bars;walker, platform  -HR     Row Name 11/05/22 1201          Stand-Sit Transfer    Stand-Sit Circle (Transfers) contact guard;verbal cues;nonverbal cues (demo/gesture)  -HR     Assistive Device (Stand-Sit Transfers) wheelchair;parallel bars;walker, platform  -HR     Row Name 11/05/22 1201          Gait/Stairs (Locomotion)    Gait/Stairs Locomotion gait/ambulation independence;distance ambulated;gait deviations  -HR     Circle Level (Gait) contact guard;standby assist  -HR     Assistive Device (Gait) walker, platform  -HR     Distance in Feet (Gait) 320'  -HR     Deviations/Abnormal Patterns (Gait) candi decreased;gait speed decreased;stride length decreased  -HR     Bilateral Gait Deviations foot drop/toe drag  he scoots his left foot forward on the forefoot, it drags more with fatigue  -HR     Row Name 11/05/22 1201          Balance    Comment, Balance Sitting 3#: AP, LAQ, March, Ball sq, BTB: [place] GTB for HS curls, hip abd , march. Standing bean bag toss with PRW. // Bars: FOAM: March, hip abd, HS curls, calf raise, squats, hip circles.  -HR     Row Name 11/05/22 1201          Hip (Therapeutic Exercise)    Hip Strengthening (Therapeutic Exercise) bilateral;flexion;aBduction;extension;aDduction;marching while seated;marching while standing;mini squats;sitting;standing;3 lb free weight;resistance band;blue  -HR     Row Name 11/05/22 1201          Knee (Therapeutic Exercise)    Knee Strengthening (Therapeutic Exercise) bilateral;flexion;extension;marching while seated;marching while standing;LAQ (long arc quad);hamstring curls;sitting;standing;3 lb free weight;resistance band;green;blue  -HR     Row Name 11/05/22 1201          Ankle (Therapeutic Exercise)    Ankle  Strengthening (Therapeutic Exercise) bilateral;dorsiflexion;plantarflexion;sitting;standing;3 lb free weight  -HR     Row Name 11/05/22 1201          Positioning and Restraints    Pre-Treatment Position other (comment)  In bed first session, In WC from OT second session  -HR     Post Treatment Position other  In WC with OT First session, In Bed second session  -HR     In Bed sitting;sitting EOB;call light within reach;encouraged to call for assist;notified nsg  -HR     In Wheelchair with OT  -HR     Row Name 11/05/22 1201          Therapy Assessment/Plan (PT)    Patient's Goals For Discharge return home  maximize recovery  -HR     Row Name 11/05/22 1201          Therapy Assessment/Plan (PT)    Rehab Potential/Prognosis (PT) adequate, monitor progress closely  -HR     Frequency of Treatment (PT) 5 times per week  -HR     Estimated Duration of Therapy (PT) 2 weeks  -HR     Problem List (PT) balance;coordination;hemiparesis/hemiplegia;mobility;motor control;muscle tone;strength;pain  -HR     Activity Limitations Related to Problem List (PT) unable to ambulate safely;unable to transfer safely  -HR     Row Name 11/05/22 1201          Therapy Plan Review/Discharge Plan (PT)    Anticipated Equipment Needs at Discharge (PT Eval) --  tbd  -HR     Anticipated Discharge Disposition (PT) home with home health;home with assist  -HR     Row Name 11/05/22 1201          IRF PT Goals    Bed Mobility Goal Selection (PT-IRF) bed mobility, PT goal 1  -HR     Transfer Goal Selection (PT-IRF) transfers, PT goal 1  -HR     Gait (Walking Locomotion) Goal Selection (PT-IRF) gait, PT goal 1  -HR     Row Name 11/05/22 1201          Bed Mobility Goal 1 (PT-IRF)    Activity/Assistive Device (Bed Mobility Goal 1, PT-IRF) sit to supine/supine to sit  -HR     [place] Level (Bed Mobility Goal 1, PT-IRF) independent  -HR     Time Frame (Bed Mobility Goal 1, PT-IRF) by discharge  -HR     Row Name 11/05/22 1201          Transfer Goal 1 (PT-IRF)     Activity/Assistive Device (Transfer Goal 1, PT-IRF) sit-to-stand/stand-to-sit;bed-to-chair/chair-to-bed  -HR     [place] Level (Transfer Goal 1, PT-IRF) modified independence  -HR     Time Frame (Transfer Goal 1, PT-IRF) by discharge  -HR     Row Name 11/05/22 1201          Gait/Walking Locomotion Goal 1 (PT-IRF)    Activity/Assistive Device (Gait/Walking Locomotion Goal 1, PT-IRF) --  AAD  -HR     Gait/Walking Locomotion Distance Goal 1 (PT-IRF) 300'  -HR     [place] Level (Gait/Walking Locomotion Goal 1, PT-IRF) supervision required  -HR     Time Frame (Gait/Walking Locomotion Goal 1, PT-IRF) by discharge  -HR           User Key  (r) = Recorded By, (t) = Taken By, (c) = Cosigned By    Initials Name Provider Type    HR Zahra Mclean PTA Physical Therapist Assistant                 Physical Therapy Education     Title: PT OT SLP Therapies (Done)     Topic: Physical Therapy (Done)     Point: Mobility training (Done)     Learning Progress Summary           Patient Acceptance, E,TB, VU,DU by HR at 11/5/2022 1211    Acceptance, E,TB, VU,DU by HR at 11/4/2022 1423    Acceptance, E,TB, VU,DU by HR at 11/3/2022 1458    Acceptance, E, VU,NR by LB at 11/2/2022 1602                   Point: Home exercise program (Done)     Learning Progress Summary           Patient Acceptance, E,TB, VU,DU by HR at 11/5/2022 1211    Acceptance, E,TB, VU,DU by HR at 11/4/2022 1423    Acceptance, E,TB, VU,DU by HR at 11/3/2022 1458    Acceptance, E, VU,NR by LB at 11/2/2022 1602                   Point: Body mechanics (Done)     Learning Progress Summary           Patient Acceptance, E,TB, VU,DU by HR at 11/5/2022 1211    Acceptance, E,TB, VU,DU by HR at 11/4/2022 1423    Acceptance, E,TB, VU,DU by HR at 11/3/2022 1458    Acceptance, E, VU,NR by LB at 11/2/2022 1602                   Point: Precautions (Done)     Learning Progress Summary           Patient Acceptance, E,TB, VU,DU by HR at 11/5/2022 1211    Acceptance, ALBERTO BARNES VU, DU  by HR at 11/4/2022 1423    Acceptance, E,TB, VU,DU by HR at 11/3/2022 1458    Acceptance, E, VU,NR by LB at 11/2/2022 1602                               User Key     Initials Effective Dates Name Provider Type Discipline    LB 06/16/21 -  Yulia Cabrera, PT Physical Therapist PT    HR 01/14/22 -  Zahra Mclean PTA Physical Therapist Assistant PT                PT Recommendation and Plan    Frequency of Treatment (PT): 5 times per week  Anticipated Equipment Needs at Discharge (PT Eval):  (tbd)                  Time Calculation:      PT Charges     Row Name 11/05/22 1216 11/05/22 1212          Time Calculation    Start Time 1000  -HR 0745  -HR     Stop Time 1045  -HR 0830  -HR     Time Calculation (min) 45 min  -HR 45 min  -HR     PT Received On 11/05/22  -HR 11/05/22  -HR        Time Calculation- PT    Total Timed Code Minutes- PT 45 minute(s)  -HR 45 minute(s)  -HR           User Key  (r) = Recorded By, (t) = Taken By, (c) = Cosigned By    Initials Name Provider Type    HR Zahra Mclean PTA Physical Therapist Assistant                Therapy Charges for Today     Code Description Service Date Service Provider Modifiers Qty    21463410491 HC GAIT TRAINING EA 15 MIN 11/4/2022 Zahra Mclean, PTA GP, CQ 1    91699160635 HC PT THER PROC EA 15 MIN 11/4/2022 Zahra Mclean PTA GP, CQ 2    51043155019 HC PT THERAPEUTIC ACT EA 15 MIN 11/4/2022 Zahra Mclean PTA GP, CQ 2    73853049234 HC PT NEUROMUSC RE EDUCATION EA 15 MIN 11/4/2022 Zahra Mclean PTA GP, CQ 2    52632453941 HC GAIT TRAINING EA 15 MIN 11/5/2022 Zahra Mclean PTA GP, CQ 2    85982460479 HC PT THER PROC EA 15 MIN 11/5/2022 Zahra Mclean PTA GP, CQ 2    50037767364 HC PT NEUROMUSC RE EDUCATION EA 15 MIN 11/5/2022 Zahra Mclean PTA GP, CQ 2                   Zahra Mclean PTA  11/5/2022

## 2022-11-05 NOTE — PROGRESS NOTES
Occupational Therapy: Individual: 90 minutes.    Physical Therapy:    Speech Language Pathology:    Signed by: Leilani Sanders OT

## 2022-11-05 NOTE — PROGRESS NOTES
Occupational Therapy:    Physical Therapy: Individual: 90 minutes.    Speech Language Pathology:    Signed by: Zahra Mclean PTA

## 2022-11-05 NOTE — PROGRESS NOTES
Case Management  Inpatient Rehabilitation Team Conference    Conference Date/Time: 11/3/2022 9:02:45 AM    Team Conference Attendees:  MD Tea Stock SW Travis Smith, RN Leah Burgin, PT  Michaelle Hicks OT    Demographics            Age: 67Y            Gender: Male    Admission Date: 11/1/2022 2:43:00 PM  Rehabilitation Diagnosis:  right CVA  Comorbidities:      Plan of Care  Anticipated Discharge Date/Estimated Length of Stay: 14 days  Anticipated Discharge Destination: Community discharge with assistance  Discharge Plan : Pt plans to return home alone at discharge if possible.  Son  will check on pt daily.  Friend will also check on pt.  Pt will not have a  full-time caregiver.  Medical Necessity Expected Level Rationale: good  Intensity and Duration: an average of 3 hours/5 days per week  Medical Supervision and 24 Hour Rehab Nursing: x  Physical Therapy: x  PT Intensity/Duration: PT 1.5 hours per day/5 days per week  Occupational Therapy: x  OT Intensity/Duration: OT 1.5 hours per day/5 days per week  Social Work: x  Therapeutic Recreation: x  Updated (if changes indicated)    Anticipated Discharge Date/Estimated Length of Stay:   11-16-22      Discharge Plan of Care:    Based on the patient's medical and functional status, their prognosis and  expected level of functional improvement is: good      Interdisciplinary Problem/Goals/Status  Body Systems    [RN] Integumentary(Active)  Current Status(11/01/2022): At risk for skin breakdown  Weekly Goal(11/08/2022): No skin breakdown this week  Discharge Goal: No skin breakdown during admission        Mobility    [PT] Bed/Chair/Wheelchair(Active)  Current Status(11/02/2022): CGA  Weekly Goal(11/09/2022): MI  Discharge Goal: MI    [PT] Walk(Active)  Current Status(11/02/2022): amb 150' PRW min A  Weekly Goal(11/09/2022): amb 300' AAD Sup  Discharge Goal: amb 300' AAD Sup        Safety    [RN] Potential for Injury(Active)  Current  Status(11/01/2022): At risk for falls  Weekly Goal(11/08/2022): No falls this week  Discharge Goal: No falls during admission        Self Care    [OT] Dressing (Lower)(Active)  Current Status(11/02/2022): max  Weekly Goal(11/09/2022): mod  Discharge Goal: sup/set up    Comments: Pt plans to return home alone at discharge.  Son will check on pt  daily.  Pt has a friend that will check on him too.    Signed by: YUNIER Gillette    Physician CoSigned By: Odell Beyer 11/05/2022 09:59:07

## 2022-11-05 NOTE — PLAN OF CARE
Goal Outcome Evaluation:  Plan of Care Reviewed With: patient        Progress: improving     Problem: Rehabilitation (IRF) Plan of Care  Goal: Plan of Care Review  Outcome: Ongoing, Progressing  Flowsheets (Taken 11/5/2022 1213)  Progress: improving  Plan of Care Reviewed With: patient  Goal: Patient-Specific Goal (Individualized)  Outcome: Ongoing, Progressing  Goal: Absence of New-Onset Illness or Injury  Outcome: Ongoing, Progressing  Intervention: Prevent Fall and Fall Injury  Recent Flowsheet Documentation  Taken 11/5/2022 1600 by Pramod Fonseca, RN  Safety Promotion/Fall Prevention: safety round/check completed  Taken 11/5/2022 1400 by Pramod Fonseca, RN  Safety Promotion/Fall Prevention: safety round/check completed  Taken 11/5/2022 1200 by Pramod Fonseca RN  Safety Promotion/Fall Prevention: safety round/check completed  Taken 11/5/2022 1000 by Pramod Fonseca RN  Safety Promotion/Fall Prevention: safety round/check completed  Taken 11/5/2022 0815 by Pramod Fonseca, RN  Safety Promotion/Fall Prevention: safety round/check completed  Intervention: Prevent VTE (Venous Thromboembolism)  Recent Flowsheet Documentation  Taken 11/5/2022 0815 by Pramod Fonseca, RN  VTE Prevention/Management: (lovenox SQ & coumadin) other (see comments)  Goal: Optimal Comfort and Wellbeing  Outcome: Ongoing, Progressing  Goal: Home and Community Transition Plan Established  Outcome: Ongoing, Progressing     Problem: Fall Injury Risk  Goal: Absence of Fall and Fall-Related Injury  Outcome: Ongoing, Progressing  Intervention: Identify and Manage Contributors  Recent Flowsheet Documentation  Taken 11/5/2022 0815 by Pramod Fonseca, RN  Medication Review/Management: medications reviewed  Intervention: Promote Injury-Free Environment  Recent Flowsheet Documentation  Taken 11/5/2022 1600 by Pramod Fonseca, RN  Safety Promotion/Fall Prevention: safety round/check completed  Taken 11/5/2022 1400 by Pramod Fonseca,  RN  Safety Promotion/Fall Prevention: safety round/check completed  Taken 11/5/2022 1200 by Pramod Fonseca RN  Safety Promotion/Fall Prevention: safety round/check completed  Taken 11/5/2022 1000 by Pramod Fonseca RN  Safety Promotion/Fall Prevention: safety round/check completed  Taken 11/5/2022 0815 by Pramod Fonseca RN  Safety Promotion/Fall Prevention: safety round/check completed     Problem: Adjustment to Stroke (Stroke Rehabilitation)  Goal: Optimal Adjustment to Stroke  Outcome: Ongoing, Progressing     Problem: BADL (Basic Activities of Daily Living) Impairment (Stroke Rehabilitation)  Goal: Optimal Safe BADL Performance  Outcome: Ongoing, Progressing     Problem: Bowel Elimination Management (Stroke Rehabilitation)  Goal: Effective Bowel Elimination/Continence  Outcome: Ongoing, Progressing     Problem: Cognitive Impairment (Stroke Rehabilitation)  Goal: Optimal Cognitive Function  Outcome: Ongoing, Progressing     Problem: Communication Impairment (Stroke Rehabilitation)  Goal: Effective Communication Skills  Outcome: Ongoing, Progressing  Intervention: Optimize Communication Skills  Recent Flowsheet Documentation  Taken 11/5/2022 0815 by Pramod Fonseca, RN  Communication Enhancement Strategies: call light answered in person     Problem: IADL (Instrumental Activities of Daily Living) Impairment (Stroke Rehabilitation)  Goal: Optimal Safe IADL Performance  Outcome: Ongoing, Progressing     Problem: Mobility Impairment (Stroke Rehabilitation)  Goal: Optimal Mobility [place] and Safety  Outcome: Ongoing, Progressing     Problem: Movement and Motor Control Impairment (Stroke Rehabilitation)  Goal: Optimal Movement and Motor Control  Outcome: Ongoing, Progressing     Problem: Sensory Perceptual Impairment (Stroke Rehabilitation)  Goal: Optimal Sensory Perceptual Status  Outcome: Ongoing, Progressing     Problem: Sexuality and Intimacy (Stroke Rehabilitation)  Goal: Maintains Intimacy/Sexual  Expression  Outcome: Ongoing, Progressing     Problem: Spasticity (Stroke Rehabilitation)  Goal: Effective Spasticity Management  Outcome: Ongoing, Progressing     Problem: Swallowing Impairment (Stroke Rehabilitation)  Goal: Optimal Oral Motor and Swallow Ability  Outcome: Ongoing, Progressing     Problem: Urinary Elimination Management (Stroke Rehabilitation)  Goal: Effective Urinary Elimination/Continence  Outcome: Ongoing, Progressing     Problem: Skin Injury Risk Increased  Goal: Skin Health and Integrity  Outcome: Ongoing, Progressing  Intervention: Promote and Optimize Oral Intake  Recent Flowsheet Documentation  Taken 11/5/2022 0815 by Pramod Fonseca, RN  Oral Nutrition Promotion: physical activity promoted  Intervention: Optimize Skin Protection  Recent Flowsheet Documentation  Taken 11/5/2022 0815 by Pramod Fonseca, RN  Pressure Reduction Techniques:   pressure points protected   frequent weight shift encouraged   heels elevated off bed  Pressure Reduction Devices: pressure-redistributing mattress utilized  Skin Protection: incontinence pads utilized

## 2022-11-06 LAB
GLUCOSE BLDC GLUCOMTR-MCNC: 135 MG/DL (ref 70–130)
GLUCOSE BLDC GLUCOMTR-MCNC: 191 MG/DL (ref 70–130)
GLUCOSE BLDC GLUCOMTR-MCNC: 221 MG/DL (ref 70–130)
GLUCOSE BLDC GLUCOMTR-MCNC: 242 MG/DL (ref 70–130)
INR PPP: 3.28 (ref 0.9–1.1)
PROTHROMBIN TIME: 34.4 SECONDS (ref 12.1–14.7)

## 2022-11-06 PROCEDURE — 25010000002 ENOXAPARIN PER 10 MG: Performed by: FAMILY MEDICINE

## 2022-11-06 PROCEDURE — 63710000001 INSULIN ASPART PER 5 UNITS: Performed by: FAMILY MEDICINE

## 2022-11-06 PROCEDURE — 94660 CPAP INITIATION&MGMT: CPT

## 2022-11-06 PROCEDURE — 94799 UNLISTED PULMONARY SVC/PX: CPT

## 2022-11-06 PROCEDURE — 99231 SBSQ HOSP IP/OBS SF/LOW 25: CPT | Performed by: FAMILY MEDICINE

## 2022-11-06 PROCEDURE — 85610 PROTHROMBIN TIME: CPT | Performed by: FAMILY MEDICINE

## 2022-11-06 PROCEDURE — 82962 GLUCOSE BLOOD TEST: CPT

## 2022-11-06 PROCEDURE — 63710000001 INSULIN DETEMIR PER 5 UNITS: Performed by: FAMILY MEDICINE

## 2022-11-06 RX ORDER — WARFARIN SODIUM 7.5 MG/1
7.5 TABLET ORAL
Status: COMPLETED | OUTPATIENT
Start: 2022-11-06 | End: 2022-11-06

## 2022-11-06 RX ADMIN — Medication 5 MG: at 20:51

## 2022-11-06 RX ADMIN — ATORVASTATIN CALCIUM 80 MG: 40 TABLET, FILM COATED ORAL at 20:51

## 2022-11-06 RX ADMIN — ENOXAPARIN SODIUM 120 MG: 60 INJECTION SUBCUTANEOUS at 02:33

## 2022-11-06 RX ADMIN — INSULIN ASPART 2 UNITS: 100 INJECTION, SOLUTION INTRAVENOUS; SUBCUTANEOUS at 09:04

## 2022-11-06 RX ADMIN — INSULIN ASPART 3 UNITS: 100 INJECTION, SOLUTION INTRAVENOUS; SUBCUTANEOUS at 20:51

## 2022-11-06 RX ADMIN — INSULIN ASPART 3 UNITS: 100 INJECTION, SOLUTION INTRAVENOUS; SUBCUTANEOUS at 12:10

## 2022-11-06 RX ADMIN — INSULIN DETEMIR 20 UNITS: 100 INJECTION, SOLUTION SUBCUTANEOUS at 20:51

## 2022-11-06 RX ADMIN — INSULIN DETEMIR 20 UNITS: 100 INJECTION, SOLUTION SUBCUTANEOUS at 09:06

## 2022-11-06 RX ADMIN — DICLOFENAC 2 G: 10 GEL TOPICAL at 12:10

## 2022-11-06 RX ADMIN — SERTRALINE 150 MG: 50 TABLET, FILM COATED ORAL at 09:05

## 2022-11-06 RX ADMIN — DICLOFENAC 2 G: 10 GEL TOPICAL at 17:25

## 2022-11-06 RX ADMIN — DICLOFENAC 2 G: 10 GEL TOPICAL at 20:52

## 2022-11-06 RX ADMIN — ASPIRIN 81 MG: 81 TABLET, COATED ORAL at 09:04

## 2022-11-06 RX ADMIN — DICLOFENAC 2 G: 10 GEL TOPICAL at 09:04

## 2022-11-06 RX ADMIN — WARFARIN 7.5 MG: 7.5 TABLET ORAL at 17:25

## 2022-11-06 NOTE — PROGRESS NOTES
Rehabilitation Nursing  Inpatient Rehabilitation Plan of Care Note    Plan of Care  Copy from POCSafety    Potential for Injury (Active)  Current Status (11/1/2022 3:00:00 PM): At risk for falls  Weekly Goal: No falls this week  Discharge Goal: No falls during admission    Body Systems    Integumentary (Active)  Current Status (11/1/2022 3:00:00 PM): At risk for skin breakdown  Weekly Goal: No skin breakdown this week  Discharge Goal: No skin breakdown during admission    Signed by: Estefani Robertson, Nurse

## 2022-11-06 NOTE — PLAN OF CARE
Goal Outcome Evaluation:   Progressing medically and physically with therapies.  Continue current POC.

## 2022-11-06 NOTE — PROGRESS NOTES
[facility]  PROGRESS NOTE     Patient Identification:  Name:  Benitez Miranda  Age:  67 y.o.  Sex:  male  :  1955  MRN:  [id number]  Visit Number:  [id number]  ROOM: [facility]     Primary Care Provider:  Mesha Christina MD    Length of stay in inpatient status:  5    Subjective     Chief Compliant:  No chief complaint on file.      History of Presenting Illness: 67-year-old gentleman who is status post right MCA distribution CVA.  Has history of mechanical aortic valve.  Patient states his left shoulder pain is much improved.    Objective     Current Hospital Meds:aspirin, 81 mg, Oral, Daily  atorvastatin, 80 mg, Oral, Nightly  Diclofenac Sodium, 2 g, Topical, 4x Daily  Insulin Aspart, 0-7 Units, Subcutaneous, 4x Daily PC & at Bedtime  insulin detemir, 20 Units, Subcutaneous, Q12H  melatonin, 5 mg, Oral, Nightly  sertraline, 150 mg, Oral, Daily  warfarin, 7.5 mg, Oral, Once    Pharmacy to dose warfarin,       ----------------------------------------------------------------------------------------------------------------------  Vital Signs:  Temp:  [97.9 °F (36.6 °C)] 97.9 °F (36.6 °C)  Heart Rate:  [67-77] 77  Resp:  [15-22] 15  BP: (138)/(82) 138/82  SpO2:  [92 %-98 %] 94 %  on   ;   Device (Oxygen Therapy): room air  Body mass index is 34.58 kg/m².    Wt Readings from Last 3 Encounters:   22 116 kg (255 lb)   22 116 kg (255 lb 1.6 oz)   10/27/22 116 kg (255 lb 3.2 oz)     Intake & Output (last 3 days)        07 07 07    P.O. 1200 720 600 240    Total Intake(mL/kg) 1200 (10.3) 720 (6.2) 600 (5.2) 240 (2.1)    Net +1200 +720 +600 +240            Urine Unmeasured Occurrence 5 x 5 x 5 x     Stool Unmeasured Occurrence  1 x          Diet Regular; Cardiac, Consistent  Carbohydrate  ----------------------------------------------------------------------------------------------------------------------  Physical exam:  Constitutional:   No acute distress  HEENT: Normocephalic atraumatic  Neck: Supple   Cardiovascular: Regular rate and rhythm  Pulmonary/Chest: Clear to auscultation  Abdominal: Positive bowel sounds soft.   Musculoskeletal: Has decreased range of motion the left shoulder but improved.  Now able to abduct the shoulder to 90 degrees with minimal discomfort and this is marked improvement from time of presentation  Neurological: Left upper extremity weakness  Skin: No rash  Peripheral vascular:  Genitourinary:  ----------------------------------------------------------------------------------------------------------------------    Last echocardiogram:  Results for orders placed during the hospital encounter of 10/28/22    Adult Transthoracic Echo Complete W/ Cont if Necessary Per Protocol (With Agitated Saline)    Interpretation Summary  •  The left ventricular cavity is mildly dilated.  •  Left ventricular wall thickness is consistent with mild concentric hypertrophy.  •  Left ventricular systolic function is normal. Left ventricular ejection fraction appears to be 56 - 60%.  •  Left ventricular diastolic function is consistent with (grade I) impaired relaxation.  •  There is a mechanical aortic valve prosthesis present. The aortic valve peak and mean gradients are within defined limits. The prosthetic aortic valve is grossly normal.  •  Moderate calcific  mitral valve stenosis is present. ( Peak PG= 17 mm Hg and mean PG = 8 mm Hg)).  Unchanged since the study done 10/5/2021.  •  Moderate mitral valve regurgitation is present.  •  Mild pulmonary hypertension is present.  •  Saline test is negative for the evidence of shunt at interatrial septum.  •  There is no evidence of pericardial  effusion.    ----------------------------------------------------------------------------------------------------------------------  Results from last 7 days   Lab Units 11/06/22  0021 11/05/22  0328 11/04/22  0135 11/03/22  0035 11/02/22  0115 11/01/22  0256 10/31/22  0207   WBC 10*3/mm3  --  9.55  --   --  10.51 9.27 9.03   HEMOGLOBIN g/dL  --  12.5*  --   --  13.4 13.1 13.1   HEMATOCRIT %  --  39.2  --   --  41.4 39.6 40.1   MCV fL  --  87.3  --   --  85.7 85.7 86.1   MCHC g/dL  --  31.9  --   --  32.4 33.1 32.7   PLATELETS 10*3/mm3  --  136*  --   --  113* 113* 111*   INR  3.28* 2.88* 2.58* 1.97* 2.45* 3.11* 3.13*         Results from last 7 days   Lab Units 11/05/22  0328 11/03/22  0035 11/02/22  0115 11/01/22  0256 10/31/22  0207   SODIUM mmol/L 137 137 136 135* 137   POTASSIUM mmol/L 4.2 4.2 4.2 4.0 4.0   MAGNESIUM mg/dL  --   --  1.7  --   --    CHLORIDE mmol/L 105 103 103 103 107   CO2 mmol/L 22.0 21.7* 20.1* 19.9* 19.5*   BUN mg/dL 25* 24* 17 16 18   CREATININE mg/dL 1.36* 1.44* 1.43* 1.29* 1.24   CALCIUM mg/dL 8.7 8.8 8.6 8.4* 8.6   GLUCOSE mg/dL 107* 201* 221* 149* 186*   ALBUMIN g/dL  --   --   --  3.23* 3.28*   BILIRUBIN mg/dL  --   --   --  0.4 0.4   ALK PHOS U/L  --   --   --  55 55   AST (SGOT) U/L  --   --   --  25 26   ALT (SGPT) U/L  --   --   --  27 27   Estimated Creatinine Clearance: 67.4 mL/min (A) (by C-G formula based on SCr of 1.36 mg/dL (H)).  No results found for: AMMONIA              Glucose   Date/Time Value Ref Range Status   11/06/2022 1058 221 (H) 70 - 130 mg/dL Final     Comment:     Meter: [id number] : [id number] toñito agrawal   11/06/2022 0559 191 (H) 70 - 130 mg/dL Final     Comment:     Meter: [id number] : [id number] SERGIO JAYCEE   11/05/2022 1948 224 (H) 70 - 130 mg/dL Final     Comment:     Meter: [id number] : [id number] SERGIO JAYCEE   11/05/2022 1645 144 (H) 70 - 130 mg/dL Final     Comment:     Meter: [id number] : [id number] ROSALINDA BENITES   11/05/2022  1106 180 (H) 70 - 130 mg/dL Final     Comment:     Meter: [id number] : [id number] TELLEZ KAMARI   11/05/2022 0605 136 (H) 70 - 130 mg/dL Final     Comment:     Meter: [id number] : [id number] JOSELYN RENEE   11/04/2022 2004 243 (H) 70 - 130 mg/dL Final     Comment:     Meter: [id number] : [id number] SERGIO CHAMPION   11/04/2022 1635 168 (H) 70 - 130 mg/dL Final     Comment:     Meter: [id number] : [id number] TELLEZ KAMARI     Lab Results   Component Value Date    TSH 4.680 (H) 10/25/2022    FREET4 1.07 10/25/2022     No results found for: PREGTESTUR, PREGSERUM, HCG, HCGQUANT  Pain Management Panel     Pain Management Panel Latest Ref Rng & Units 6/29/2020 10/25/2019    CREATININE UR mg/dL 87.9 154.7        Brief Urine Lab Results  (Last result in the past 365 days)      Color   Clarity   Blood   Leuk Est   Nitrite   Protein   CREAT   Urine HCG        10/25/22 1734 Yellow   Clear   Negative   Negative   Negative   Negative               No results found for: BLOODCX      No results found for: URINECX  No results found for: WOUNDCX  No results found for: STOOLCX        I have personally looked at the labs and they are summarized above.  ----------------------------------------------------------------------------------------------------------------------  Detailed radiology reports for the last 24 hours:    Imaging Results (Last 24 Hours)     ** No results found for the last 24 hours. **        Final impressions for the last 30 days of radiology reports:    CT Head Without Contrast    Result Date: 10/25/2022    No acute findings in the head/brain.  Findings were communicated.  This report was finalized on 10/25/2022 11:40 AM by Dr. Leno Du MD.      CT Angiogram Neck    Result Date: 10/28/2022  No hemodynamically significant stenosis is seen at the carotid bifurcations or in the posterior circulation. No large vessel occlusions are noted. There is evidence of mild to moderate diffuse small vessel  disease. There is asymmetric filling of distal MCA branches, greater on the left than on the right. An acute occlusion of a mid to distal MCA branch on the right could account for this finding. This could also reflect chronic atherosclerotic disease. Signer Name: Ortega Waldrop MD  Signed: 10/28/2022 7:35 AM  Workstation Name: [id number]  [facility]    CT Angiogram Neck    Result Date: 10/25/2022    Mild bilateral proximal internal carotid artery stenosis secondary to atherosclerotic vascular calcifications.  This report was finalized on 10/25/2022 12:44 PM by Dr. Leno Du MD.      MRI Brain Without Contrast    Result Date: 10/28/2022    Grossly stable appearance of right posterior parietal acute infarct.  This report was finalized on 10/28/2022 11:44 AM by Dr. Leno Du MD.      MRI Brain Without Contrast    Result Date: 10/27/2022    Focal 1.9 cm acute infarct in the right posterior parietal white matter.  This report was finalized on 10/27/2022 11:05 AM by Dr. Leno Du MD.      XR Chest 1 View    Result Date: 10/28/2022    Cardiomegaly.  This report was finalized on 10/28/2022 8:46 AM by Dr. Leno Du MD.      CT Angiogram Head    Result Date: 10/25/2022    No acute findings in the arteries of the head/brain.  This report was finalized on 10/25/2022 12:43 PM by Dr. Leno Du MD.      CT Head Without Contrast Stroke Protocol    Result Date: 10/28/2022  New 14 mm area of decreased density in the right corona radiata consistent with a subacute infarct. There is no hemorrhage. Signer Name: Nate Lake MD  Signed: 10/28/2022 6:17 AM  Workstation Name: [id number]  [facility]    CT Angiogram Head w AI Analysis of LVO    Result Date: 10/28/2022  No hemodynamically significant stenosis is seen at the carotid bifurcations or in the posterior circulation. No large vessel occlusions are noted. There is evidence of mild to moderate diffuse small vessel  disease. There is asymmetric filling of distal MCA branches, greater on the left than on the right. An acute occlusion of a mid to distal MCA branch on the right could account for this finding. This could also reflect chronic atherosclerotic disease. Signer Name: Ortega Waldrop MD  Signed: 10/28/2022 7:35 AM  Workstation Name: [id number]  [facility]    CT CEREBRAL PERFUSION WITH & WITHOUT CONTRAST    Result Date: 10/25/2022  No core infarct detected.  This report was finalized on 10/25/2022 12:45 PM by Dr. Leno Du MD.      I have personally looked at the radiology images and read the final radiology report.    Assessment & Plan    Status post right MCA distribution CVA with left upper extremity paresis.  The paresis may be partially secondary to what I suspect is a rotator cuff tendinitis versus bursitis.  Patient has improved with Voltaren cream as far as pain issues are concerned.  Patient now better able to abduct the left upper extremity.  Patient currently on Coumadin, aspirin and statin therapy.  Yesterday patient worked on motor skills with occupational therapy to improve ADLs and functional mobility.  Patient has moderate impairment of the left upper extremity.  Requiring standby assistance for help with transfers from bed to chair and chair to bed.  Required contact-guard for sit to stand and stand to sit transfers.  Ambulated 320 feet with a platform walker and contact-guard to standby assist.  Patient is anxious to go home.    History of mechanical aortic valve--INR is 3.28 which is therapeutic.  Will discontinue Lovenox.    Diabetes mellitus continue current treatment    Neuropathic pain continue gabapentin    Obstructive sleep apnea BiPAP at night    Hypertension continue current treatment well-controlled    CKD stage III    COPD compensated    CAD with history of stent placement continue medical management.    Left upper extremity shoulder pain--suspect may have some  bursitis versus tendinitis.  Has responded well to Voltaren cream.  Continue therapy    VTE Prophylaxis:   Mechanical Order History:     None      Pharmalogical Order History:      Ordered     Dose Route Frequency Stop    11/06/22 0907  warfarin (COUMADIN) tablet 7.5 mg        Question:  Target INR  Answer:  3 - 3.5    7.5 mg PO Once (Warfarin) --    11/05/22 0924  warfarin (COUMADIN) tablet 8 mg        Question:  Target INR  Answer:  3 - 3.5    8 mg PO Once (Warfarin) 11/05/22 1729    11/04/22 1140  warfarin (COUMADIN) tablet 10 mg        Question:  Target INR  Answer:  3 - 3.5    10 mg PO Once (Warfarin) 11/04/22 1725    11/03/22 1207  warfarin (COUMADIN) tablet 10 mg        Question:  Target INR  Answer:  3 - 3.5    10 mg PO Once (Warfarin) 11/03/22 1716    11/03/22 1213  Enoxaparin Sodium (LOVENOX) syringe 120 mg  Status:  Discontinued         1 mg/kg SC Every 12 Hours 11/06/22 0821    11/02/22 1119  warfarin (COUMADIN) tablet 10 mg        Question:  Target INR  Answer:  3 - 3.5    10 mg PO Once (Warfarin) 11/02/22 1808    11/01/22 1450  warfarin (COUMADIN) tablet 7 mg        Question:  Target INR  Answer:  3 - 3.5    7 mg PO Once (Warfarin) 11/01/22 2105    11/01/22 1450  Pharmacy to dose warfarin        Question:  Target INR  Answer:  3 - 3.5    -- XX Continuous PRN --                    Odell Beyer MD  [facility]  11/06/22  11:24 EST

## 2022-11-07 LAB
GLUCOSE BLDC GLUCOMTR-MCNC: 148 MG/DL (ref 70–130)
GLUCOSE BLDC GLUCOMTR-MCNC: 150 MG/DL (ref 70–130)
GLUCOSE BLDC GLUCOMTR-MCNC: 171 MG/DL (ref 70–130)
GLUCOSE BLDC GLUCOMTR-MCNC: 269 MG/DL (ref 70–130)
INR PPP: 2.92 (ref 0.9–1.1)
PROTHROMBIN TIME: 31.4 SECONDS (ref 12.1–14.7)

## 2022-11-07 PROCEDURE — 82962 GLUCOSE BLOOD TEST: CPT

## 2022-11-07 PROCEDURE — 97530 THERAPEUTIC ACTIVITIES: CPT | Performed by: OCCUPATIONAL THERAPIST

## 2022-11-07 PROCEDURE — 94660 CPAP INITIATION&MGMT: CPT

## 2022-11-07 PROCEDURE — 99232 SBSQ HOSP IP/OBS MODERATE 35: CPT | Performed by: INTERNAL MEDICINE

## 2022-11-07 PROCEDURE — 97530 THERAPEUTIC ACTIVITIES: CPT

## 2022-11-07 PROCEDURE — 94799 UNLISTED PULMONARY SVC/PX: CPT

## 2022-11-07 PROCEDURE — 97112 NEUROMUSCULAR REEDUCATION: CPT | Performed by: OCCUPATIONAL THERAPIST

## 2022-11-07 PROCEDURE — 97112 NEUROMUSCULAR REEDUCATION: CPT

## 2022-11-07 PROCEDURE — 97116 GAIT TRAINING THERAPY: CPT

## 2022-11-07 PROCEDURE — 63710000001 INSULIN ASPART PER 5 UNITS: Performed by: FAMILY MEDICINE

## 2022-11-07 PROCEDURE — 97535 SELF CARE MNGMENT TRAINING: CPT | Performed by: OCCUPATIONAL THERAPIST

## 2022-11-07 PROCEDURE — 97110 THERAPEUTIC EXERCISES: CPT

## 2022-11-07 PROCEDURE — 63710000001 INSULIN DETEMIR PER 5 UNITS: Performed by: FAMILY MEDICINE

## 2022-11-07 PROCEDURE — 85610 PROTHROMBIN TIME: CPT | Performed by: FAMILY MEDICINE

## 2022-11-07 RX ORDER — WARFARIN SODIUM 3 MG/1
9 TABLET ORAL
Status: COMPLETED | OUTPATIENT
Start: 2022-11-07 | End: 2022-11-07

## 2022-11-07 RX ADMIN — INSULIN DETEMIR 20 UNITS: 100 INJECTION, SOLUTION SUBCUTANEOUS at 08:20

## 2022-11-07 RX ADMIN — Medication 5 MG: at 21:07

## 2022-11-07 RX ADMIN — INSULIN ASPART 4 UNITS: 100 INJECTION, SOLUTION INTRAVENOUS; SUBCUTANEOUS at 21:07

## 2022-11-07 RX ADMIN — ASPIRIN 81 MG: 81 TABLET, COATED ORAL at 08:20

## 2022-11-07 RX ADMIN — DICLOFENAC 2 G: 10 GEL TOPICAL at 21:08

## 2022-11-07 RX ADMIN — ATORVASTATIN CALCIUM 80 MG: 40 TABLET, FILM COATED ORAL at 21:07

## 2022-11-07 RX ADMIN — WARFARIN 9 MG: 3 TABLET ORAL at 17:10

## 2022-11-07 RX ADMIN — INSULIN DETEMIR 20 UNITS: 100 INJECTION, SOLUTION SUBCUTANEOUS at 21:08

## 2022-11-07 RX ADMIN — DICLOFENAC 2 G: 10 GEL TOPICAL at 17:10

## 2022-11-07 RX ADMIN — INSULIN ASPART 2 UNITS: 100 INJECTION, SOLUTION INTRAVENOUS; SUBCUTANEOUS at 08:20

## 2022-11-07 RX ADMIN — SERTRALINE 150 MG: 50 TABLET, FILM COATED ORAL at 08:20

## 2022-11-07 RX ADMIN — DICLOFENAC 2 G: 10 GEL TOPICAL at 08:20

## 2022-11-07 RX ADMIN — DICLOFENAC 2 G: 10 GEL TOPICAL at 11:56

## 2022-11-07 RX ADMIN — INSULIN ASPART 2 UNITS: 100 INJECTION, SOLUTION INTRAVENOUS; SUBCUTANEOUS at 18:09

## 2022-11-07 NOTE — THERAPY TREATMENT NOTE
Inpatient Rehabilitation - Physical Therapy Treatment Note       NORBERT Parkinson     Patient Name: Benitez Miranda  : 1955  MRN: [id number]    Today's Date: 2022                    Admit Date: 2022      Visit Dx:   No diagnosis found.    Patient Active Problem List   Diagnosis   • Uncontrolled type 2 diabetes with neuropathy   • COPD (chronic obstructive pulmonary disease) ([facility])   • Chronic diastolic congestive heart failure ([facility])   • Essential hypertension   • Tobacco abuse   • Coronary artery disease involving native coronary artery of native heart without angina pectoris   • Low back pain   • Hyperlipidemia LDL goal <70   • H/O mechanical aortic valve replacement   • Depression   • CKD (chronic kidney disease), stage III ([facility])   • Rheumatic mitral stenosis   • Morbidly obese ([facility])   • Recurrent major depressive disorder, in full remission ([facility])   • Ischemic stroke ([facility])   • CVA (cerebral vascular accident) ([facility])       Past Medical History:   Diagnosis Date   • Anxiety    • Arthritis    • CAD (coronary artery disease)     x1 stent; pulmonary HTN; EF 50-55%; rheumatic valve; MV stenosis   • CHF (congestive heart failure) ([facility])    • CKD (chronic kidney disease), stage III ([facility])    • COPD (chronic obstructive pulmonary disease) ([facility])    • Depression    • Diabetes mellitus ([facility])    • H/O mechanical aortic valve replacement    • History of tobacco abuse    • Hyperlipidemia    • Hypertension    • Ischemic heart disease    • Kidney failure     third stage   • Low back pain    • Obesity     BMI 36.   • Stroke ([facility])    • Valvular heart disease        Past Surgical History:   Procedure Laterality Date   • AORTIC VALVE REPAIR/REPLACEMENT     • CARDIAC CATHETERIZATION     • CARDIAC SURGERY      valve on aortic   • CARDIAC SURGERY      stent    • CATARACT EXTRACTION     • COLONOSCOPY     • CORONARY STENT PLACEMENT     • CYSTOSCOPY URETEROSCOPY LASER LITHOTRIPSY Left 2020    Procedure:  CYSTOSCOPY URETEROSCOPY LASER LITHOTRIPSY WITH STENT PLACEMENT;  Surgeon: Jonah Montgomery MD;  Location: [facility];  Service: Urology;  Laterality: Left;   • KIDNEY STONE SURGERY         PT ASSESSMENT (last 12 hours)     IRF PT Evaluation and Treatment     Row Name 11/07/22 1320          PT Time and Intention    Document Type daily treatment  -HR     Mode of Treatment individual therapy;physical therapy  -HR     Patient/Family/Caregiver Comments/Observations Pt and RN in agreement for PT. Pt walked 160' x 2 with no AD CGA. Sitting exercises with added WT and resistance for strengthening. // bars on FOAM mat to challenege balance.  -HR     Row Name 11/07/22 1320          General Information    Existing Precautions/Restrictions fall  L shoulder pain-needs sling or support  -HR     Row Name 11/07/22 1320          Pain Scale: FACES Pre/Post-Treatment    Pain: FACES Scale, Pretreatment 6-->hurts even more  -HR     Posttreatment Pain Rating 6-->hurts even more  -HR     Row Name 11/07/22 1320          Cognition/Psychosocial    Affect/Mental Status (Cognition) WFL  -HR     Follows Commands (Cognition) WFL  -HR     Personal Safety Interventions fall prevention program maintained;gait belt;supervised activity;nonskid shoes/slippers when out of bed  -HR     Row Name 11/07/22 1320          Bed Mobility    Supine-Sit [place] (Bed Mobility) standby assist;verbal cues  -HR     Assistive Device (Bed Mobility) bed rails  -HR     Row Name 11/07/22 1320          Bed-Chair Transfer    Bed-Chair [place] (Transfers) verbal cues;nonverbal cues (demo/gesture);standby assist  -HR     Assistive Device (Bed-Chair Transfers) wheelchair  -HR     Row Name 11/07/22 1320          Chair-Bed Transfer    Chair-Bed [place] (Transfers) standby assist  -HR     Assistive Device (Chair-Bed Transfers) wheelchair  -HR     Row Name 11/07/22 1320          Sit-Stand Transfer    Sit-Stand [place] (Transfers) contact guard;verbal  cues;nonverbal cues (demo/gesture)  -HR     Assistive Device (Sit-Stand Transfers) wheelchair;parallel bars;walker, platform  -HR     Row Name 11/07/22 1320          Stand-Sit Transfer    Stand-Sit [place] (Transfers) contact guard;verbal cues;nonverbal cues (demo/gesture)  -HR     Assistive Device (Stand-Sit Transfers) wheelchair;parallel bars;walker, platform  -HR     Row Name 11/07/22 1320          Gait/Stairs (Locomotion)    Gait/Stairs Locomotion gait/ambulation independence;distance ambulated;gait deviations  -HR     [place] Level (Gait) contact guard  -HR     Assistive Device (Gait) other (see comments)  no AD  -HR     Distance in Feet (Gait) 160' x 2  -HR     Deviations/Abnormal Patterns (Gait) candi decreased;gait speed decreased;stride length decreased  -HR     Bilateral Gait Deviations foot drop/toe drag  he scoots his left foot forward on the forefoot, it drags more with fatigue  -HR     Row Name 11/07/22 1320          Balance    Comment, Balance Sitting 3#: AP, LAQ, march, ball sq. BTB: HS curls, hip abd. // bars: FOAM: March, hip abd, calf raise, step up and down, Squats, HS curls. Gastroc S Sitting  -HR     Row Name 11/07/22 1320          Hip (Therapeutic Exercise)    Hip Strengthening (Therapeutic Exercise) bilateral;flexion;extension;aBduction;aDduction;marching while seated;marching while standing;sitting;standing;3 lb free weight;resistance band;blue  -HR     Row Name 11/07/22 1320          Knee (Therapeutic Exercise)    Knee Strengthening (Therapeutic Exercise) bilateral;flexion;extension;marching while seated;marching while standing;LAQ (long arc quad);hamstring curls;sitting;standing;3 lb free weight;resistance band;blue  -HR     Row Name 11/07/22 1320          Ankle (Therapeutic Exercise)    Ankle Strengthening (Therapeutic Exercise) bilateral;dorsiflexion;plantarflexion;sitting;standing;3 lb free weight  -HR     Row Name 11/07/22 1320          Positioning and Restraints     Pre-Treatment Position in bed  -HR     Post Treatment Position wheelchair  -HR     In Wheelchair with OT  -HR     Row Name 11/07/22 1320          Therapy Assessment/Plan (PT)    Patient's Goals For Discharge return home  maximize recovery  -HR     Row Name 11/07/22 1320          Therapy Assessment/Plan (PT)    Rehab Potential/Prognosis (PT) adequate, monitor progress closely  -HR     Frequency of Treatment (PT) 5 times per week  -HR     Estimated Duration of Therapy (PT) 2 weeks  -HR     Problem List (PT) balance;coordination;hemiparesis/hemiplegia;mobility;motor control;muscle tone;strength;pain  -HR     Activity Limitations Related to Problem List (PT) unable to ambulate safely;unable to transfer safely  -HR     Row Name 11/07/22 1320          Therapy Plan Review/Discharge Plan (PT)    Anticipated Equipment Needs at Discharge (PT Eval) --  tbd  -HR     Anticipated Discharge Disposition (PT) home with home health;home with assist  -HR     Row Name 11/07/22 1320          IRF PT Goals    Bed Mobility Goal Selection (PT-IRF) bed mobility, PT goal 1  -HR     Transfer Goal Selection (PT-IRF) transfers, PT goal 1  -HR     Gait (Walking Locomotion) Goal Selection (PT-IRF) gait, PT goal 1  -HR     Row Name 11/07/22 1320          Bed Mobility Goal 1 (PT-IRF)    Activity/Assistive Device (Bed Mobility Goal 1, PT-IRF) sit to supine/supine to sit  -HR     [place] Level (Bed Mobility Goal 1, PT-IRF) independent  -HR     Time Frame (Bed Mobility Goal 1, PT-IRF) by discharge  -HR     Row Name 11/07/22 1320          Transfer Goal 1 (PT-IRF)    Activity/Assistive Device (Transfer Goal 1, PT-IRF) sit-to-stand/stand-to-sit;bed-to-chair/chair-to-bed  -HR     [place] Level (Transfer Goal 1, PT-IRF) modified independence  -HR     Time Frame (Transfer Goal 1, PT-IRF) by discharge  -HR     Row Name 11/07/22 1320          Gait/Walking Locomotion Goal 1 (PT-IRF)    Activity/Assistive Device (Gait/Walking Locomotion Goal 1, PT-IRF)  --  AAD  -HR     Gait/Walking Locomotion Distance Goal 1 (PT-IRF) 300'  -HR     [place] Level (Gait/Walking Locomotion Goal 1, PT-IRF) supervision required  -HR     Time Frame (Gait/Walking Locomotion Goal 1, PT-IRF) by discharge  -HR           User Key  (r) = Recorded By, (t) = Taken By, (c) = Cosigned By    Initials Name Provider Type    HR Zahra Mclean PTA Physical Therapist Assistant                 Physical Therapy Education     Title: PT OT SLP Therapies (Done)     Topic: Physical Therapy (Done)     Point: Mobility training (Done)     Learning Progress Summary           Patient Acceptance, E,TB, VU,DU by HR at 11/7/2022 1329    Acceptance, E,TB, VU,DU by HR at 11/5/2022 1211    Acceptance, E,TB, VU,DU by HR at 11/4/2022 1423    Acceptance, E,TB, VU,DU by HR at 11/3/2022 1458    Acceptance, E, VU,NR by LB at 11/2/2022 1602                   Point: Home exercise program (Done)     Learning Progress Summary           Patient Acceptance, E,TB, VU,DU by HR at 11/7/2022 1329    Acceptance, E,TB, VU,DU by HR at 11/5/2022 1211    Acceptance, E,TB, VU,DU by HR at 11/4/2022 1423    Acceptance, E,TB, VU,DU by HR at 11/3/2022 1458    Acceptance, E, VU,NR by LB at 11/2/2022 1602                   Point: Body mechanics (Done)     Learning Progress Summary           Patient Acceptance, E,TB, VU,DU by HR at 11/7/2022 1329    Acceptance, E,TB, VU,DU by HR at 11/5/2022 1211    Acceptance, E,TB, VU,DU by HR at 11/4/2022 1423    Acceptance, E,TB, VU,DU by HR at 11/3/2022 1458    Acceptance, E, VU,NR by LB at 11/2/2022 1602                   Point: Precautions (Done)     Learning Progress Summary           Patient Acceptance, E,TB, VU,DU by HR at 11/7/2022 1329    Acceptance, E,TB, VU,DU by HR at 11/5/2022 1211    Acceptance, E,TB, VU,DU by HR at 11/4/2022 1423    Acceptance, E,TB, VU,DU by HR at 11/3/2022 1458    Acceptance, E, VU,NR by LB at 11/2/2022 1602                               User Key     Initials Effective Dates  Name Provider Type Discipline    LB 06/16/21 -  Yulia Cabrera, PT Physical Therapist PT    HR 01/14/22 -  Zahra Mclean PTA Physical Therapist Assistant PT                PT Recommendation and Plan    Frequency of Treatment (PT): 5 times per week  Anticipated Equipment Needs at Discharge (PT Eval):  (tbd)                  Time Calculation:      PT Charges     Row Name 11/07/22 1329             Time Calculation    Start Time 0915  -HR      Stop Time 1045  -HR      Time Calculation (min) 90 min  -HR      PT Received On 11/07/22  -HR         Time Calculation- PT    Total Timed Code Minutes- PT 90 minute(s)  -HR            User Key  (r) = Recorded By, (t) = Taken By, (c) = Cosigned By    Initials Name Provider Type    HR Zahra Mclean PTA Physical Therapist Assistant                Therapy Charges for Today     Code Description Service Date Service Provider Modifiers Qty    89199828588 HC GAIT TRAINING EA 15 MIN 11/7/2022 Zahra Mclean PTA GP, CQ 1    39385265302 HC PT THER PROC EA 15 MIN 11/7/2022 Zahra Mclean PTA GP, CQ 2    73059006261 HC PT THERAPEUTIC ACT EA 15 MIN 11/7/2022 Zahra Mclean PTA GP, CQ 2    59586092694 HC PT NEUROMUSC RE EDUCATION EA 15 MIN 11/7/2022 Zahra Mclean PTA GP, CQ 1                   Zahra Mclean PTA  11/7/2022

## 2022-11-07 NOTE — PROGRESS NOTES
Inpatient Rehabilitation Functional Measures Assessment and Plan of Care    Plan of Care  Self Care    [OT] Dressing (Lower)(Active)  Current Status(11/07/2022): mod  Weekly Goal(11/16/2022): min  Discharge Goal: sup/set up    Functional Measures  JAMAR Eating:  JAMAR Grooming:  JAMAR Bathing:  JAMAR Upper Body Dressing:  JAMAR Lower Body Dressing:  JAMAR Toileting:    JAMAR Bladder Management  Level of Assistance:  Frequency/Number of Accidents this Shift:    JAMAR Bowel Management  Level of Assistance:  Frequency/Number of Accidents this Shift:    JAMAR Bed/Chair/Wheelchair Transfer:  JAMAR Toilet Transfer:  JAMAR Tub/Shower Transfer:    Previously Documented Mode of Locomotion at Discharge:  [facility] Expected Mode of Locomotion at Discharge:  JAMAR Walk/Wheelchair:  JAMAR Stairs:    JAMAR Comprehension:  JAMAR Expression:  [facility] Social Interaction:  [facility] Problem Solving:  JAMAR Memory:    Therapy Mode Minutes  Occupational Therapy: Individual: 90 minutes.  Physical Therapy:  Speech Language Pathology:    Discharge Functional Goals:    Signed by: Michaelle Hicks, Occupational Therapist

## 2022-11-07 NOTE — THERAPY TREATMENT NOTE
Inpatient Rehabilitation - Occupational Therapy Treatment Note    NORBERT Parkinson     Patient Name: Benitez iMranda  : 1955  MRN: [id number]    Today's Date: 2022                 Admit Date: 2022       No diagnosis found.    Patient Active Problem List   Diagnosis   • Uncontrolled type 2 diabetes with neuropathy   • COPD (chronic obstructive pulmonary disease) ([facility])   • Chronic diastolic congestive heart failure ([facility])   • Essential hypertension   • Tobacco abuse   • Coronary artery disease involving native coronary artery of native heart without angina pectoris   • Low back pain   • Hyperlipidemia LDL goal <70   • H/O mechanical aortic valve replacement   • Depression   • CKD (chronic kidney disease), stage III ([facility])   • Rheumatic mitral stenosis   • Morbidly obese ([facility])   • Recurrent major depressive disorder, in full remission ([facility])   • Ischemic stroke ([facility])   • CVA (cerebral vascular accident) ([facility])       Past Medical History:   Diagnosis Date   • Anxiety    • Arthritis    • CAD (coronary artery disease)     x1 stent; pulmonary HTN; EF 50-55%; rheumatic valve; MV stenosis   • CHF (congestive heart failure) ([facility])    • CKD (chronic kidney disease), stage III ([facility])    • COPD (chronic obstructive pulmonary disease) ([facility])    • Depression    • Diabetes mellitus ([facility])    • H/O mechanical aortic valve replacement    • History of tobacco abuse    • Hyperlipidemia    • Hypertension    • Ischemic heart disease    • Kidney failure     third stage   • Low back pain    • Obesity     BMI 36.   • Stroke ([facility])    • Valvular heart disease        Past Surgical History:   Procedure Laterality Date   • AORTIC VALVE REPAIR/REPLACEMENT     • CARDIAC CATHETERIZATION     • CARDIAC SURGERY      valve on aortic   • CARDIAC SURGERY      stent    • CATARACT EXTRACTION     • COLONOSCOPY     • CORONARY STENT PLACEMENT     • CYSTOSCOPY URETEROSCOPY LASER LITHOTRIPSY Left 2020    Procedure: CYSTOSCOPY  URETEROSCOPY LASER LITHOTRIPSY WITH STENT PLACEMENT;  Surgeon: Jonah Montgomery MD;  Location: [facility];  Service: Urology;  Laterality: Left;   • KIDNEY STONE SURGERY               IRF OT ASSESSMENT FLOWSHEET (last 12 hours)     IRF OT Evaluation and Treatment     Row Name 11/07/22 1400          OT Time and Intention    Document Type daily treatment  -     Mode of Treatment individual therapy;occupational therapy  -     Patient Effort good  -     Row Name 11/07/22 1400          General Information    Patient/Family/Caregiver Comments/Observations patient agreeable to therapy. patient tolerated therapy well . patient demonstrates increased left UE ROM today.  -     Existing Precautions/Restrictions fall  -     Row Name 11/07/22 1400          Cognition/Psychosocial    Affect/Mental Status (Cognition) WFL  -     Row Name 11/07/22 1400          Bed-Chair Transfer    Bed-Chair [place] (Transfers) minimum assist (75% patient effort);contact guard  -     Row Name 11/07/22 1400          Chair-Bed Transfer    Chair-Bed [place] (Transfers) contact guard;minimum assist (75% patient effort)  -     Row Name 11/07/22 1400          Motor Skills    Motor Skills coordination;functional endurance;neuro-muscular function  -     Therapeutic Exercise --  LUE ROM, BUE ROM, LUE gmc,gross grasp, rickshaw, UE bike,  strength  -     Row Name 11/07/22 1400          Positioning and Restraints    Post Treatment Position bed  -     In Bed supine;encouraged to call for assist;call light within reach  -           User Key  (r) = Recorded By, (t) = Taken By, (c) = Cosigned By    Initials Name Effective Dates     Luda Hicks, OT 06/16/21 -                  Occupational Therapy Education     Title: PT OT SLP Therapies (Done)     Topic: Occupational Therapy (Done)     Point: ADL training (Done)     Description:   Instruct learner(s) on proper safety adaptation and remediation techniques during  self care or transfers.   Instruct in proper use of assistive devices.              Learning Progress Summary           Patient Acceptance, E,TB,D, VU,DU by LA at 11/5/2022 1535                   Point: Home exercise program (Done)     Description:   Instruct learner(s) on appropriate technique for monitoring, assisting and/or progressing therapeutic exercises/activities.              Learning Progress Summary           Patient Acceptance, E,TB,D, VU,DU by LA at 11/5/2022 1535                   Point: Precautions (Done)     Description:   Instruct learner(s) on prescribed precautions during self-care and functional transfers.              Learning Progress Summary           Patient Acceptance, E,TB,D, VU,DU by LA at 11/5/2022 1535                   Point: Body mechanics (Done)     Description:   Instruct learner(s) on proper positioning and spine alignment during self-care, functional mobility activities and/or exercises.              Learning Progress Summary           Patient Acceptance, E,TB,D, VU,DU by LA at 11/5/2022 1535                               User Key     Initials Effective Dates Name Provider Type [facility] 02/14/22 -  Leilani Sanders, ANGELA Occupational Therapist OT                    OT Recommendation and Plan    Planned Therapy Interventions (OT): activity tolerance training, adaptive equipment training, BADL retraining, neuromuscular control/coordination retraining, passive ROM/stretching, ROM/therapeutic exercise, strengthening exercise, transfer/mobility retraining                    Time Calculation:      Time Calculation- OT     Row Name 11/07/22 1454 11/07/22 1453          Time Calculation- OT    OT Start Time 1245  - 1045  -     OT Stop Time 1330  - 1130  -     OT Time Calculation (min) 45 min  - 45 min  -           User Key  (r) = Recorded By, (t) = Taken By, (c) = Cosigned By    Initials Name Provider Type     Luda Hicks, OT Occupational Therapist               Therapy Charges for Today     Code Description Service Date Service Provider Modifiers Qty    72440609319 HC OT THERAPEUTIC ACT EA 15 MIN 11/7/2022 Luda Hicks, OT GO 2    84365514324 HC OT SELF CARE/MGMT/TRAIN EA 15 MIN 11/7/2022 Luda Hicks, OT GO 1    37258680527 HC OT NEUROMUSC RE EDUCATION EA 15 MIN 11/7/2022 Luda Hicks, OT GO 3                   Luda Hicks OT  11/7/2022

## 2022-11-07 NOTE — PLAN OF CARE
Goal Outcome Evaluation:              Outcome Evaluation: patient has rested tonight with bipap. denies any complaints.

## 2022-11-07 NOTE — PROGRESS NOTES
Assisted By: Kimi SARABIA    CC: Follow-up on CVA    Interview History/HPI: Patient thinks he is doing better.  Seems to be tolerating his breakfast, he is eating as we talked.  He denies any pain and states he thinks his strength is improving, he has no shortness of breath          Current Hospital Meds:  aspirin, 81 mg, Oral, Daily  atorvastatin, 80 mg, Oral, Nightly  Diclofenac Sodium, 2 g, Topical, 4x Daily  Insulin Aspart, 0-7 Units, Subcutaneous, 4x Daily PC & at Bedtime  insulin detemir, 20 Units, Subcutaneous, Q12H  melatonin, 5 mg, Oral, Nightly  sertraline, 150 mg, Oral, Daily  warfarin, 9 mg, Oral, Once    Pharmacy to dose warfarin,         Vitals:    11/07/22 0843   BP:    Pulse:    Resp:    Temp:    SpO2: 95%         Intake/Output Summary (Last 24 hours) at 11/7/2022 1349  Last data filed at 11/7/2022 0900  Gross per 24 hour   Intake 720 ml   Output --   Net 720 ml       EXAM: 158/76, 18, 60, 97.6, room air saturation 98%.  Lungs have bilateral breath sounds that are clear, heart regular rate and rhythm.  He could  with his left hand but this was weaker than his right hand.  He could move his left arm.  Mood is good.      Diet Regular; Cardiac, Consistent Carbohydrate        LABS:     Lab Results (last 48 hours)     Procedure Component Value Units Date/Time    POC Glucose Once [[id number]]  (Abnormal) Collected: 11/07/22 1117    Specimen: Blood Updated: 11/07/22 1136     Glucose 148 mg/dL      Comment: Meter: [id number] : [id number] Debora Wright       POC Glucose Once [[id number]]  (Abnormal) Collected: 11/07/22 0631    Specimen: Blood Updated: 11/07/22 0637     Glucose 171 mg/dL      Comment: Meter: [id number] : [id number] ISAEL WANG       Protime-INR [[id number]]  (Abnormal) Collected: 11/07/22 0122    Specimen: Blood Updated: 11/07/22 0151     Protime 31.4 Seconds      INR 2.92    Narrative:      Suggested INR therapeutic range for stable oral anticoagulant therapy:    Low Intensity  therapy:   1.5-2.0  Moderate Intensity therapy:   2.0-3.0  High Intensity therapy:   2.5-4.0    POC Glucose Once [[id number]]  (Abnormal) Collected: 11/06/22 1942    Specimen: Blood Updated: 11/06/22 1948     Glucose 242 mg/dL      Comment: Meter: [id number] : [id number] SERGIO CHAMPION       POC Glucose Once [[id number]]  (Abnormal) Collected: 11/06/22 1603    Specimen: Blood Updated: 11/06/22 1615     Glucose 135 mg/dL      Comment: Meter: [id number] : [id number] toñito nghia       POC Glucose Once [[id number]]  (Abnormal) Collected: 11/06/22 1058    Specimen: Blood Updated: 11/06/22 1117     Glucose 221 mg/dL      Comment: Meter: [id number] : [id number] toñito nghia       POC Glucose Once [[id number]]  (Abnormal) Collected: 11/06/22 0559    Specimen: Blood Updated: 11/06/22 0606     Glucose 191 mg/dL      Comment: Meter: [id number] : [id number] SERGIO CHAMPION       Protime-INR [[id number]]  (Abnormal) Collected: 11/06/22 0021    Specimen: Blood Updated: 11/06/22 0205     Protime 34.4 Seconds      INR 3.28    Narrative:      Suggested INR therapeutic range for stable oral anticoagulant therapy:    Low Intensity therapy:   1.5-2.0  Moderate Intensity therapy:   2.0-3.0  High Intensity therapy:   2.5-4.0    POC Glucose Once [[id number]]  (Abnormal) Collected: 11/05/22 1948    Specimen: Blood Updated: 11/05/22 1955     Glucose 224 mg/dL      Comment: Meter: [id number] : [id number] SERGIO CHAMPION       POC Glucose Once [[id number]]  (Abnormal) Collected: 11/05/22 1645    Specimen: Blood Updated: 11/05/22 1651     Glucose 144 mg/dL      Comment: Meter: [id number] : [id number] ROSALINDA BENITES                  Radiology:    Imaging Results (Last 72 Hours)     ** No results found for the last 72 hours. **          Results for orders placed during the hospital encounter of 10/28/22    Adult Transthoracic Echo Complete W/ Cont if Necessary Per Protocol (With Agitated Saline)    Interpretation Summary  •   The left ventricular cavity is mildly dilated.  •  Left ventricular wall thickness is consistent with mild concentric hypertrophy.  •  Left ventricular systolic function is normal. Left ventricular ejection fraction appears to be 56 - 60%.  •  Left ventricular diastolic function is consistent with (grade I) impaired relaxation.  •  There is a mechanical aortic valve prosthesis present. The aortic valve peak and mean gradients are within defined limits. The prosthetic aortic valve is grossly normal.  •  Moderate calcific  mitral valve stenosis is present. ( Peak PG= 17 mm Hg and mean PG = 8 mm Hg)).  Unchanged since the study done 10/5/2021.  •  Moderate mitral valve regurgitation is present.  •  Mild pulmonary hypertension is present.  •  Saline test is negative for the evidence of shunt at interatrial septum.  •  There is no evidence of pericardial effusion.      Assessment/Plan:   Status post right MCA CVA with associated left upper extremity weakness.  Patient has had an aortic valve replacement and is on Coumadin.  INR is therapeutic at 2.9 currently.  Patient is participating with therapy.  With physical therapy patient was standby assist for chair to bed and bed to chair, contact-guard sit to stand and stand to sit.  Patient walked 160 feet x 2 with contact-guard And no assistive devices.  Patient continue to work on strengthening exercises, range of motion, balance and posture.    Status post right MCA CVA, continue Coumadin, pharmacy is assisting with dosing.  Patient is also on aspirin and statin    Diabetes, overall glucoses controlled on current insulin of low-dose sliding scale and Levemir at 20.    Mild hypertension on last check, follow, adjust medications if needed.    DVT prophylaxis,'s Coumadin will serve for this as well    Reported left shoulder pain, he did not report this to me but will follow, I reviewed his last chest x-ray image from October 28 and certainly his shoulder appear to be intact  at that time    Status post aortic valve replacement, on Coumadin as above    Coronary artery disease status post stenting, asymptomatic, continue aspirin and statin    Flynn An MD

## 2022-11-08 LAB
ALBUMIN SERPL-MCNC: 3.25 G/DL (ref 3.5–5.2)
ALBUMIN/GLOB SERPL: 0.9 G/DL
ALP SERPL-CCNC: 55 U/L (ref 39–117)
ALT SERPL W P-5'-P-CCNC: 27 U/L (ref 1–41)
ANION GAP SERPL CALCULATED.3IONS-SCNC: 10.3 MMOL/L (ref 5–15)
AST SERPL-CCNC: 23 U/L (ref 1–40)
BASOPHILS # BLD AUTO: 0.07 10*3/MM3 (ref 0–0.2)
BASOPHILS NFR BLD AUTO: 0.7 % (ref 0–1.5)
BILIRUB SERPL-MCNC: 0.4 MG/DL (ref 0–1.2)
BUN SERPL-MCNC: 23 MG/DL (ref 8–23)
BUN/CREAT SERPL: 17.2 (ref 7–25)
CALCIUM SPEC-SCNC: 8.9 MG/DL (ref 8.6–10.5)
CHLORIDE SERPL-SCNC: 105 MMOL/L (ref 98–107)
CO2 SERPL-SCNC: 20.7 MMOL/L (ref 22–29)
CREAT SERPL-MCNC: 1.34 MG/DL (ref 0.76–1.27)
DEPRECATED RDW RBC AUTO: 45.8 FL (ref 37–54)
EGFRCR SERPLBLD CKD-EPI 2021: 57.3 ML/MIN/1.73
EOSINOPHIL # BLD AUTO: 0.38 10*3/MM3 (ref 0–0.4)
EOSINOPHIL NFR BLD AUTO: 3.8 % (ref 0.3–6.2)
ERYTHROCYTE [DISTWIDTH] IN BLOOD BY AUTOMATED COUNT: 14.6 % (ref 12.3–15.4)
GLOBULIN UR ELPH-MCNC: 3.7 GM/DL
GLUCOSE BLDC GLUCOMTR-MCNC: 139 MG/DL (ref 70–130)
GLUCOSE BLDC GLUCOMTR-MCNC: 184 MG/DL (ref 70–130)
GLUCOSE BLDC GLUCOMTR-MCNC: 227 MG/DL (ref 70–130)
GLUCOSE BLDC GLUCOMTR-MCNC: 234 MG/DL (ref 70–130)
GLUCOSE SERPL-MCNC: 210 MG/DL (ref 65–99)
HCT VFR BLD AUTO: 39.4 % (ref 37.5–51)
HGB BLD-MCNC: 12.4 G/DL (ref 13–17.7)
IMM GRANULOCYTES # BLD AUTO: 0.09 10*3/MM3 (ref 0–0.05)
IMM GRANULOCYTES NFR BLD AUTO: 0.9 % (ref 0–0.5)
INR PPP: 2.77 (ref 0.9–1.1)
LYMPHOCYTES # BLD AUTO: 1.56 10*3/MM3 (ref 0.7–3.1)
LYMPHOCYTES NFR BLD AUTO: 15.7 % (ref 19.6–45.3)
MAGNESIUM SERPL-MCNC: 2.1 MG/DL (ref 1.6–2.4)
MCH RBC QN AUTO: 27.3 PG (ref 26.6–33)
MCHC RBC AUTO-ENTMCNC: 31.5 G/DL (ref 31.5–35.7)
MCV RBC AUTO: 86.8 FL (ref 79–97)
MONOCYTES # BLD AUTO: 0.65 10*3/MM3 (ref 0.1–0.9)
MONOCYTES NFR BLD AUTO: 6.5 % (ref 5–12)
NEUTROPHILS NFR BLD AUTO: 7.2 10*3/MM3 (ref 1.7–7)
NEUTROPHILS NFR BLD AUTO: 72.4 % (ref 42.7–76)
NRBC BLD AUTO-RTO: 0 /100 WBC (ref 0–0.2)
PLATELET # BLD AUTO: 135 10*3/MM3 (ref 140–450)
PMV BLD AUTO: 11.9 FL (ref 6–12)
POTASSIUM SERPL-SCNC: 4.2 MMOL/L (ref 3.5–5.2)
PROT SERPL-MCNC: 6.9 G/DL (ref 6–8.5)
PROTHROMBIN TIME: 30.1 SECONDS (ref 12.1–14.7)
RBC # BLD AUTO: 4.54 10*6/MM3 (ref 4.14–5.8)
SODIUM SERPL-SCNC: 136 MMOL/L (ref 136–145)
WBC NRBC COR # BLD: 9.95 10*3/MM3 (ref 3.4–10.8)

## 2022-11-08 PROCEDURE — 97530 THERAPEUTIC ACTIVITIES: CPT | Performed by: OCCUPATIONAL THERAPIST

## 2022-11-08 PROCEDURE — 85610 PROTHROMBIN TIME: CPT | Performed by: FAMILY MEDICINE

## 2022-11-08 PROCEDURE — 97116 GAIT TRAINING THERAPY: CPT

## 2022-11-08 PROCEDURE — 97530 THERAPEUTIC ACTIVITIES: CPT

## 2022-11-08 PROCEDURE — 97110 THERAPEUTIC EXERCISES: CPT

## 2022-11-08 PROCEDURE — 80053 COMPREHEN METABOLIC PANEL: CPT | Performed by: INTERNAL MEDICINE

## 2022-11-08 PROCEDURE — 25010000002 ENOXAPARIN PER 10 MG: Performed by: INTERNAL MEDICINE

## 2022-11-08 PROCEDURE — 63710000001 INSULIN ASPART PER 5 UNITS: Performed by: FAMILY MEDICINE

## 2022-11-08 PROCEDURE — 94799 UNLISTED PULMONARY SVC/PX: CPT

## 2022-11-08 PROCEDURE — 97535 SELF CARE MNGMENT TRAINING: CPT | Performed by: OCCUPATIONAL THERAPIST

## 2022-11-08 PROCEDURE — 83735 ASSAY OF MAGNESIUM: CPT | Performed by: INTERNAL MEDICINE

## 2022-11-08 PROCEDURE — 94660 CPAP INITIATION&MGMT: CPT

## 2022-11-08 PROCEDURE — 97112 NEUROMUSCULAR REEDUCATION: CPT | Performed by: OCCUPATIONAL THERAPIST

## 2022-11-08 PROCEDURE — 97112 NEUROMUSCULAR REEDUCATION: CPT

## 2022-11-08 PROCEDURE — 82962 GLUCOSE BLOOD TEST: CPT

## 2022-11-08 PROCEDURE — 85025 COMPLETE CBC W/AUTO DIFF WBC: CPT | Performed by: INTERNAL MEDICINE

## 2022-11-08 PROCEDURE — 63710000001 INSULIN DETEMIR PER 5 UNITS: Performed by: FAMILY MEDICINE

## 2022-11-08 PROCEDURE — 99231 SBSQ HOSP IP/OBS SF/LOW 25: CPT | Performed by: INTERNAL MEDICINE

## 2022-11-08 RX ORDER — WARFARIN SODIUM 5 MG/1
10 TABLET ORAL
Status: COMPLETED | OUTPATIENT
Start: 2022-11-08 | End: 2022-11-08

## 2022-11-08 RX ORDER — ENOXAPARIN SODIUM 100 MG/ML
1 INJECTION SUBCUTANEOUS EVERY 12 HOURS
Status: DISCONTINUED | OUTPATIENT
Start: 2022-11-08 | End: 2022-11-09

## 2022-11-08 RX ADMIN — DICLOFENAC 2 G: 10 GEL TOPICAL at 17:14

## 2022-11-08 RX ADMIN — DICLOFENAC 2 G: 10 GEL TOPICAL at 21:49

## 2022-11-08 RX ADMIN — DICLOFENAC 2 G: 10 GEL TOPICAL at 12:12

## 2022-11-08 RX ADMIN — ASPIRIN 81 MG: 81 TABLET, COATED ORAL at 09:54

## 2022-11-08 RX ADMIN — ENOXAPARIN SODIUM 120 MG: 60 INJECTION SUBCUTANEOUS at 09:54

## 2022-11-08 RX ADMIN — SERTRALINE 150 MG: 50 TABLET, FILM COATED ORAL at 09:54

## 2022-11-08 RX ADMIN — INSULIN ASPART 3 UNITS: 100 INJECTION, SOLUTION INTRAVENOUS; SUBCUTANEOUS at 12:11

## 2022-11-08 RX ADMIN — ENOXAPARIN SODIUM 120 MG: 60 INJECTION SUBCUTANEOUS at 20:00

## 2022-11-08 RX ADMIN — DICLOFENAC 2 G: 10 GEL TOPICAL at 09:56

## 2022-11-08 RX ADMIN — WARFARIN 10 MG: 5 TABLET ORAL at 17:12

## 2022-11-08 RX ADMIN — INSULIN DETEMIR 20 UNITS: 100 INJECTION, SOLUTION SUBCUTANEOUS at 09:52

## 2022-11-08 RX ADMIN — Medication 5 MG: at 21:49

## 2022-11-08 RX ADMIN — INSULIN DETEMIR 20 UNITS: 100 INJECTION, SOLUTION SUBCUTANEOUS at 21:51

## 2022-11-08 RX ADMIN — INSULIN ASPART 2 UNITS: 100 INJECTION, SOLUTION INTRAVENOUS; SUBCUTANEOUS at 09:52

## 2022-11-08 RX ADMIN — INSULIN ASPART 3 UNITS: 100 INJECTION, SOLUTION INTRAVENOUS; SUBCUTANEOUS at 21:50

## 2022-11-08 RX ADMIN — ATORVASTATIN CALCIUM 80 MG: 40 TABLET, FILM COATED ORAL at 21:49

## 2022-11-08 NOTE — THERAPY TREATMENT NOTE
Inpatient Rehabilitation - Physical Therapy Treatment Note        Iggy     Patient Name: Benitez Miranda  : 1955  MRN: [id number]    Today's Date: 2022                    Admit Date: 2022      Visit Dx:   No diagnosis found.    Patient Active Problem List   Diagnosis   • Uncontrolled type 2 diabetes with neuropathy   • COPD (chronic obstructive pulmonary disease) ([facility])   • Chronic diastolic congestive heart failure ([facility])   • Essential hypertension   • Tobacco abuse   • Coronary artery disease involving native coronary artery of native heart without angina pectoris   • Low back pain   • Hyperlipidemia LDL goal <70   • H/O mechanical aortic valve replacement   • Depression   • CKD (chronic kidney disease), stage III ([facility])   • Rheumatic mitral stenosis   • Morbidly obese ([facility])   • Recurrent major depressive disorder, in full remission ([facility])   • Ischemic stroke ([facility])   • CVA (cerebral vascular accident) ([facility])       Past Medical History:   Diagnosis Date   • Anxiety    • Arthritis    • CAD (coronary artery disease)     x1 stent; pulmonary HTN; EF 50-55%; rheumatic valve; MV stenosis   • CHF (congestive heart failure) ([facility])    • CKD (chronic kidney disease), stage III ([facility])    • COPD (chronic obstructive pulmonary disease) ([facility])    • Depression    • Diabetes mellitus ([facility])    • H/O mechanical aortic valve replacement    • History of tobacco abuse    • Hyperlipidemia    • Hypertension    • Ischemic heart disease    • Kidney failure     third stage   • Low back pain    • Obesity     BMI 36.   • Stroke ([facility])    • Valvular heart disease        Past Surgical History:   Procedure Laterality Date   • AORTIC VALVE REPAIR/REPLACEMENT     • CARDIAC CATHETERIZATION     • CARDIAC SURGERY      valve on aortic   • CARDIAC SURGERY      stent    • CATARACT EXTRACTION     • COLONOSCOPY     • CORONARY STENT PLACEMENT     • CYSTOSCOPY URETEROSCOPY LASER LITHOTRIPSY Left 2020    Procedure:  CYSTOSCOPY URETEROSCOPY LASER LITHOTRIPSY WITH STENT PLACEMENT;  Surgeon: Jonah Montgomery MD;  Location: [facility];  Service: Urology;  Laterality: Left;   • KIDNEY STONE SURGERY         PT ASSESSMENT (last 12 hours)     IRF PT Evaluation and Treatment     Row Name 11/08/22 1434          PT Time and Intention    Document Type daily treatment  -HR     Mode of Treatment individual therapy;physical therapy  -HR     Patient/Family/Caregiver Comments/Observations Pt and RN in agreement for PT in both AM and PM. Pt completed sitting exercises in AM and standing exercises in PM for increased strethening and mobility. Pt is SBA for transfers and walked 320' with no AD CGA.  -HR     Row Name 11/08/22 1434          General Information    Existing Precautions/Restrictions fall  L shoulder pain-needs sling or support  -HR     Row Name 11/08/22 1434          Pain Scale: FACES Pre/Post-Treatment    Pain: FACES Scale, Pretreatment 6-->hurts even more  -HR     Posttreatment Pain Rating 6-->hurts even more  -HR     Row Name 11/08/22 1434          Cognition/Psychosocial    Affect/Mental Status (Cognition) WFL  -HR     Follows Commands (Cognition) WFL  -HR     Personal Safety Interventions fall prevention program maintained;gait belt;supervised activity;nonskid shoes/slippers when out of bed  -HR     Row Name 11/08/22 1434          Bed Mobility    Supine-Sit [place] (Bed Mobility) standby assist;verbal cues  -HR     Assistive Device (Bed Mobility) bed rails  -HR     Row Name 11/08/22 1434          Bed-Chair Transfer    Bed-Chair [place] (Transfers) standby assist  -HR     Assistive Device (Bed-Chair Transfers) wheelchair  -HR     Row Name 11/08/22 1434          Chair-Bed Transfer    Chair-Bed [place] (Transfers) standby assist  -HR     Assistive Device (Chair-Bed Transfers) wheelchair  -HR     Row Name 11/08/22 1434          Sit-Stand Transfer    Sit-Stand [place] (Transfers) standby assist  -HR      Assistive Device (Sit-Stand Transfers) wheelchair;parallel bars  -HR     Row Name 11/08/22 1434          Stand-Sit Transfer    Stand-Sit [place] (Transfers) standby assist  -HR     Assistive Device (Stand-Sit Transfers) wheelchair;parallel bars  -HR     Row Name 11/08/22 1434          Toilet Transfer    Type (Toilet Transfer) stand pivot/stand step  -HR     [place] Level (Toilet Transfer) standby assist  -HR     Row Name 11/08/22 1434          Gait/Stairs (Locomotion)    Gait/Stairs Locomotion gait/ambulation independence;distance ambulated;gait deviations  -HR     [place] Level (Gait) contact guard  -HR     Assistive Device (Gait) other (see comments)  no AD  -HR     Deviations/Abnormal Patterns (Gait) candi decreased;gait speed decreased;stride length decreased  -HR     Bilateral Gait Deviations foot drop/toe drag  he scoots his left foot forward on the forefoot, it drags more with fatigue  -HR     Row Name 11/08/22 1434          Balance    Comment, Balance Sitting: 3#: AP, LAQ, March, Ball sq, BTB: HS curls, hip abd. // Bars: FOAM: March, hip abd, calf raise, step ups, squats, HS Curls. Gastroc S  -HR     Row Name 11/08/22 1434          Hip (Therapeutic Exercise)    Hip Strengthening (Therapeutic Exercise) bilateral;extension;flexion;aBduction;aDduction;marching while seated;marching while standing;mini squats;sitting;standing;3 lb free weight;resistance band;blue  -HR     Row Name 11/08/22 1434          Knee (Therapeutic Exercise)    Knee Strengthening (Therapeutic Exercise) bilateral;flexion;extension;marching while seated;marching while standing;LAQ (long arc quad);hamstring curls;standing;sitting;3 lb free weight;resistance band;blue  -HR     Row Name 11/08/22 1434          Ankle (Therapeutic Exercise)    Ankle Strengthening (Therapeutic Exercise) bilateral;dorsiflexion;plantarflexion;3 lb free weight;sitting;standing  -HR     Row Name 11/08/22 1434          Positioning and Restraints     Pre-Treatment Position in bed  AM and PM  -HR     Post Treatment Position other  AM WC with OT. PM BED  -HR     In Bed fowlers;call light within reach;encouraged to call for assist  -HR     In Wheelchair with OT  -HR     Row Name 11/08/22 1434          Therapy Assessment/Plan (PT)    Patient's Goals For Discharge return home  maximize recovery  -HR     Row Name 11/08/22 1434          Therapy Assessment/Plan (PT)    Rehab Potential/Prognosis (PT) adequate, monitor progress closely  -HR     Frequency of Treatment (PT) 5 times per week  -HR     Estimated Duration of Therapy (PT) 2 weeks  -HR     Problem List (PT) balance;coordination;hemiparesis/hemiplegia;mobility;motor control;muscle tone;strength;pain  -HR     Activity Limitations Related to Problem List (PT) unable to ambulate safely;unable to transfer safely  -HR     Row Name 11/08/22 1434          Therapy Plan Review/Discharge Plan (PT)    Anticipated Equipment Needs at Discharge (PT Eval) --  tbd  -HR     Anticipated Discharge Disposition (PT) home with home health;home with assist  -HR     Row Name 11/08/22 1434          IRF PT Goals    Bed Mobility Goal Selection (PT-IRF) bed mobility, PT goal 1  -HR     Transfer Goal Selection (PT-IRF) transfers, PT goal 1  -HR     Gait (Walking Locomotion) Goal Selection (PT-IRF) gait, PT goal 1  -HR     Row Name 11/08/22 1434          Bed Mobility Goal 1 (PT-IRF)    Activity/Assistive Device (Bed Mobility Goal 1, PT-IRF) sit to supine/supine to sit  -HR     [place] Level (Bed Mobility Goal 1, PT-IRF) independent  -HR     Time Frame (Bed Mobility Goal 1, PT-IRF) by discharge  -HR     Row Name 11/08/22 1434          Transfer Goal 1 (PT-IRF)    Activity/Assistive Device (Transfer Goal 1, PT-IRF) sit-to-stand/stand-to-sit;bed-to-chair/chair-to-bed  -HR     [place] Level (Transfer Goal 1, PT-IRF) modified independence  -HR     Time Frame (Transfer Goal 1, PT-IRF) by discharge  -HR     Row Name 11/08/22 1434           Gait/Walking Locomotion Goal 1 (PT-IRF)    Activity/Assistive Device (Gait/Walking Locomotion Goal 1, PT-IRF) --  AAD  -HR     Gait/Walking Locomotion Distance Goal 1 (PT-IRF) 300'  -HR     [place] Level (Gait/Walking Locomotion Goal 1, PT-IRF) supervision required  -HR     Time Frame (Gait/Walking Locomotion Goal 1, PT-IRF) by discharge  -HR           User Key  (r) = Recorded By, (t) = Taken By, (c) = Cosigned By    Initials Name Provider Type    HR Zahra Mclean PTA Physical Therapist Assistant                 Physical Therapy Education     Title: PT OT SLP Therapies (Done)     Topic: Physical Therapy (Done)     Point: Mobility training (Done)     Learning Progress Summary           Patient Acceptance, E,TB, VU,DU by HR at 11/8/2022 1445    Acceptance, E,TB, VU by DG at 11/8/2022 0037    Acceptance, E,TB, VU,DU by HR at 11/7/2022 1329    Acceptance, E,TB, VU,DU by HR at 11/5/2022 1211    Acceptance, E,TB, VU,DU by HR at 11/4/2022 1423    Acceptance, E,TB, VU,DU by HR at 11/3/2022 1458    Acceptance, E, VU,NR by LB at 11/2/2022 1602                   Point: Home exercise program (Done)     Learning Progress Summary           Patient Acceptance, E,TB, VU,DU by HR at 11/8/2022 1445    Acceptance, E,TB, VU by DG at 11/8/2022 0037    Acceptance, E,TB, VU,DU by HR at 11/7/2022 1329    Acceptance, E,TB, VU,DU by HR at 11/5/2022 1211    Acceptance, E,TB, VU,DU by HR at 11/4/2022 1423    Acceptance, E,TB, VU,DU by HR at 11/3/2022 1458    Acceptance, E, VU,NR by LB at 11/2/2022 1602                   Point: Body mechanics (Done)     Learning Progress Summary           Patient Acceptance, E,TB, VU,DU by HR at 11/8/2022 1445    Acceptance, E,TB, VU by DG at 11/8/2022 0037    Acceptance, E,TB, VU,DU by HR at 11/7/2022 1329    Acceptance, E,TB, VU,DU by HR at 11/5/2022 1211    Acceptance, E,TB, VU,DU by HR at 11/4/2022 1423    Acceptance, E,TB, VU,DU by HR at 11/3/2022 1458    Acceptance, E, VU,NR by LB at 11/2/2022 1602                    Point: Precautions (Done)     Learning Progress Summary           Patient Acceptance, E,TB, VU,DU by HR at 11/8/2022 1445    Acceptance, E,TB, VU by DG at 11/8/2022 0037    Acceptance, E,TB, VU,DU by HR at 11/7/2022 1329    Acceptance, E,TB, VU,DU by HR at 11/5/2022 1211    Acceptance, E,TB, VU,DU by HR at 11/4/2022 1423    Acceptance, E,TB, VU,DU by HR at 11/3/2022 1458    Acceptance, E, VU,NR by LB at 11/2/2022 1602                               User Key     Initials Effective Dates Name Provider Type Discipline    DG 06/16/21 -  Brooke Garcia RN Registered Nurse Nurse    LB 06/16/21 -  Yulia Cabrera, PT Physical Therapist PT    HR 01/14/22 -  Zahra Mclean PTA Physical Therapist Assistant PT                PT Recommendation and Plan    Frequency of Treatment (PT): 5 times per week  Anticipated Equipment Needs at Discharge (PT Eval):  (tbd)                  Time Calculation:      PT Charges     Row Name 11/08/22 1446 11/08/22 1445          Time Calculation    Start Time 1330  -HR 0735  -HR     Stop Time 1415  -HR 0830  -HR     Time Calculation (min) 45 min  -HR 55 min  -HR     PT Received On 11/08/22  -HR 11/08/22  -HR        Time Calculation- PT    Total Timed Code Minutes- PT 45 minute(s)  -HR 55 minute(s)  -HR           User Key  (r) = Recorded By, (t) = Taken By, (c) = Cosigned By    Initials Name Provider Type    HR Zahra Mclean PTA Physical Therapist Assistant                Therapy Charges for Today     Code Description Service Date Service Provider Modifiers Qty    12247090087 HC GAIT TRAINING EA 15 MIN 11/7/2022 Zahra Mclean PTA GP, CQ 1    59917276222 HC PT THER PROC EA 15 MIN 11/7/2022 Zahra Mclean PTA GP, CQ 2    26760708210 HC PT THERAPEUTIC ACT EA 15 MIN 11/7/2022 Zahra Mclean PTA GP, CQ 2    49322207626 HC PT NEUROMUSC RE EDUCATION EA 15 MIN 11/7/2022 Zahra Mclean PTA GP, CQ 1    58131780878 HC GAIT TRAINING EA 15 MIN 11/8/2022 Zahra Mclean PTA GP, CQ 1     11841706432  PT THER PROC EA 15 MIN 11/8/2022 Zahra Mclean, XU GP, CQ 2    32324637529 HC PT THERAPEUTIC ACT EA 15 MIN 11/8/2022 Zahra Mclean, XU GP, CQ 2    86884022029 HC PT NEUROMUSC RE EDUCATION EA 15 MIN 11/8/2022 Zahra Mclean PTA GP, CQ 2                   Zahra Mclean, XU  11/8/2022

## 2022-11-08 NOTE — PROGRESS NOTES
Case Management  Inpatient Rehabilitation Team Conference    Conference Date/Time: 11/8/2022 9:20:53 AM    Team Conference Attendees:  MD Tea Schuler SW Jessica Bill, RN,   Estefani Robertson, NO Cabrera, PT  Michaelle Hicks OT    Demographics            Age: 67Y            Gender: Male    Admission Date: 11/1/2022 2:43:00 PM  Rehabilitation Diagnosis:  right CVA  Comorbidities: I13.0 Hypertensive heart and chronic kidney disease with heart  failure and stage 1 through stage 4 chronic kidney disease, or unspecified  chronic kidney disease  E11.22 Type 2 diabetes mellitus with diabetic chronic kidney disease  I50.9 Heart failure, unspecified  I27.20 Pulmonary hypertension, unspecified  I69.354 Hemiplegia and hemiparesis following cerebral infarction affecting left  non-dominant side  J44.9 Chronic obstructive pulmonary disease, unspecified  I05.0 Rheumatic mitral stenosis  E78.5 Hyperlipidemia, unspecified  F17.290 Nicotine dependence, other tobacco product, uncomplicated  G47.33 Obstructive sleep apnea (adult) (pediatric)  I25.10 Atherosclerotic heart disease of native coronary artery without angina  pectoris  Z80.9 Family history of malignant neoplasm, unspecified  Z82.49 Family history of ischemic heart disease and other diseases of the  circulatory system  Z82.5 Family history of asthma and other chronic lower respiratory diseases  Z83.3 Family history of diabetes mellitus  Z91.81 History of falling  Z95.2 Presence of prosthetic heart valve  Z95.5 Presence of coronary angioplasty implant and graft      Plan of Care  Anticipated Discharge Date/Estimated Length of Stay: 11-16-22  Anticipated Discharge Destination: Community discharge with assistance  Discharge Plan : Pt plans to return home alone at discharge if possible.  Son  will check on pt daily.  Friend will also check on pt.  Pt will not have a  full-time caregiver.  Medical Necessity Expected Level Rationale:  good  Intensity and Duration: an average of 3 hours/5 days per week  Medical Supervision and 24 Hour Rehab Nursing: x  Physical Therapy: x  PT Intensity/Duration: PT 1.5 hours per day/5 days per week  Occupational Therapy: x  OT Intensity/Duration: OT 1.5 hours per day/5 days per week  Social Work: x  Therapeutic Recreation: x  Updated (if changes indicated)    Anticipated Discharge Date/Estimated Length of Stay:   11-16-22      Discharge Plan of Care:    Based on the patient's medical and functional status, their prognosis and  expected level of functional improvement is: good      Interdisciplinary Problem/Goals/Status  Body Systems    [RN] Integumentary(Active)  Current Status(11/01/2022): At risk for skin breakdown  Weekly Goal(11/08/2022): No skin breakdown this week  Discharge Goal: No skin breakdown during admission        Mobility    [PT] Bed/Chair/Wheelchair(Active)  Current Status(11/02/2022): CGA  Weekly Goal(11/09/2022): MI  Discharge Goal: MI    [PT] Walk(Active)  Current Status(11/02/2022): amb 150' PRW min A  Weekly Goal(11/09/2022): amb 300' AAD Sup  Discharge Goal: amb 300' AAD Sup        Safety    [RN] Potential for Injury(Active)  Current Status(11/01/2022): At risk for falls  Weekly Goal(11/08/2022): No falls this week  Discharge Goal: No falls during admission        Self Care    [OT] Dressing (Lower)(Active)  Current Status(11/07/2022): mod  Weekly Goal(11/16/2022): min  Discharge Goal: sup/set up    Comments: Pt plans to return home alone at discharge.  Son will check on pt  daily.  Pt has a friend that will check on him too.    Signed by: YUNIER Gillette    Physician CoSigned By: Flynn An 11/08/2022 18:02:36

## 2022-11-08 NOTE — THERAPY TREATMENT NOTE
Inpatient Rehabilitation - Occupational Therapy Treatment Note    NORBERT Parkinson     Patient Name: Benitez Miranda  : 1955  MRN: [id number]    Today's Date: 2022                 Admit Date: 2022       No diagnosis found.    Patient Active Problem List   Diagnosis   • Uncontrolled type 2 diabetes with neuropathy   • COPD (chronic obstructive pulmonary disease) ([facility])   • Chronic diastolic congestive heart failure ([facility])   • Essential hypertension   • Tobacco abuse   • Coronary artery disease involving native coronary artery of native heart without angina pectoris   • Low back pain   • Hyperlipidemia LDL goal <70   • H/O mechanical aortic valve replacement   • Depression   • CKD (chronic kidney disease), stage III ([facility])   • Rheumatic mitral stenosis   • Morbidly obese ([facility])   • Recurrent major depressive disorder, in full remission ([facility])   • Ischemic stroke ([facility])   • CVA (cerebral vascular accident) ([facility])       Past Medical History:   Diagnosis Date   • Anxiety    • Arthritis    • CAD (coronary artery disease)     x1 stent; pulmonary HTN; EF 50-55%; rheumatic valve; MV stenosis   • CHF (congestive heart failure) ([facility])    • CKD (chronic kidney disease), stage III ([facility])    • COPD (chronic obstructive pulmonary disease) ([facility])    • Depression    • Diabetes mellitus ([facility])    • H/O mechanical aortic valve replacement    • History of tobacco abuse    • Hyperlipidemia    • Hypertension    • Ischemic heart disease    • Kidney failure     third stage   • Low back pain    • Obesity     BMI 36.   • Stroke ([facility])    • Valvular heart disease        Past Surgical History:   Procedure Laterality Date   • AORTIC VALVE REPAIR/REPLACEMENT     • CARDIAC CATHETERIZATION     • CARDIAC SURGERY      valve on aortic   • CARDIAC SURGERY      stent    • CATARACT EXTRACTION     • COLONOSCOPY     • CORONARY STENT PLACEMENT     • CYSTOSCOPY URETEROSCOPY LASER LITHOTRIPSY Left 2020    Procedure: CYSTOSCOPY  URETEROSCOPY LASER LITHOTRIPSY WITH STENT PLACEMENT;  Surgeon: Jonah Montgomery MD;  Location: [facility];  Service: Urology;  Laterality: Left;   • KIDNEY STONE SURGERY               IRF OT ASSESSMENT FLOWSHEET (last 12 hours)     IRF OT Evaluation and Treatment     Row Name 11/08/22 1400          OT Time and Intention    Document Type daily treatment  -     Mode of Treatment individual therapy;occupational therapy  -     Patient Effort good  -     Row Name 11/08/22 1400          General Information    Patient/Family/Caregiver Comments/Observations patient agreeable to therapy. patient continues to show improvement with left  UE function. patient tolerated therapy well  -     Existing Precautions/Restrictions fall  -     Row Name 11/08/22 1400          Cognition/Psychosocial    Affect/Mental Status (Cognition) WFL  -     Row Name 11/08/22 1400          Chair-Bed Transfer    Chair-Bed Reddick (Transfers) contact guard;verbal cues  -     Row Name 11/08/22 1400          Motor Skills    Motor Skills coordination;functional endurance;neuro-muscular function  -     Therapeutic Exercise --  LUE ROM,  strenght, gmc,fmc,UE bike, dowel ex, bilateral coordination  -     Row Name 11/08/22 1400          Positioning and Restraints    Post Treatment Position bed  -     In Bed supine;call light within reach;encouraged to call for assist  -           User Key  (r) = Recorded By, (t) = Taken By, (c) = Cosigned By    Initials Name Effective Dates     Luda Hicks, OT 06/16/21 -                  Occupational Therapy Education     Title: PT OT SLP Therapies (Done)     Topic: Occupational Therapy (Done)     Point: ADL training (Done)     Description:   Instruct learner(s) on proper safety adaptation and remediation techniques during self care or transfers.   Instruct in proper use of assistive devices.              Learning Progress Summary           Patient Acceptance, E,TB, VU by DG at  11/8/2022 0037    Acceptance, E,TB,D, VU,DU by LA at 11/5/2022 1535                   Point: Home exercise program (Done)     Description:   Instruct learner(s) on appropriate technique for monitoring, assisting and/or progressing therapeutic exercises/activities.              Learning Progress Summary           Patient Acceptance, E,TB, VU by  at 11/8/2022 0037    Acceptance, E,TB,D, VU,DU by LA at 11/5/2022 1535                   Point: Precautions (Done)     Description:   Instruct learner(s) on prescribed precautions during self-care and functional transfers.              Learning Progress Summary           Patient Acceptance, E,TB, VU by  at 11/8/2022 0037    Acceptance, E,TB,D, VU,DU by LA at 11/5/2022 1535                   Point: Body mechanics (Done)     Description:   Instruct learner(s) on proper positioning and spine alignment during self-care, functional mobility activities and/or exercises.              Learning Progress Summary           Patient Acceptance, E,TB, VU by  at 11/8/2022 0037    Acceptance, E,TB,D, VU,DU by LA at 11/5/2022 1535                               User Key     Initials Effective Dates Name Provider Type Discipline     06/16/21 -  Brooke Garcia, RN Registered Nurse Nurse    LA 02/14/22 -  Leilani Sanders OT Occupational Therapist OT                    OT Recommendation and Plan    Planned Therapy Interventions (OT): activity tolerance training, adaptive equipment training, BADL retraining, neuromuscular control/coordination retraining, passive ROM/stretching, ROM/therapeutic exercise, strengthening exercise, transfer/mobility retraining                    Time Calculation:      Time Calculation- OT     Row Name 11/08/22 1431             Time Calculation- OT    OT Start Time 0830  -      OT Stop Time 1000  -      OT Time Calculation (min) 90 min  -            User Key  (r) = Recorded By, (t) = Taken By, (c) = Cosigned By    Initials Name Provider Type     Kurt  Luda Braswell, OT Occupational Therapist              Therapy Charges for Today     Code Description Service Date Service Provider Modifiers Qty    66687584448 HC OT THERAPEUTIC ACT EA 15 MIN 11/7/2022 Luda Hicks, OT GO 2    96502105350 HC OT SELF CARE/MGMT/TRAIN EA 15 MIN 11/7/2022 Ldua Hicks, OT GO 1    43096011226 HC OT NEUROMUSC RE EDUCATION EA 15 MIN 11/7/2022 Luda Hicks, OT GO 3    22343341958 HC OT THERAPEUTIC ACT EA 15 MIN 11/8/2022 Luda Hicks, OT GO 2    00168434585 HC OT SELF CARE/MGMT/TRAIN EA 15 MIN 11/8/2022 Luda Hicks, OT GO 1    65513282403 HC OT NEUROMUSC RE EDUCATION EA 15 MIN 11/8/2022 Luda Hicks, OT GO 3                   Luda Hicks OT  11/8/2022

## 2022-11-08 NOTE — PROGRESS NOTES
CC: Follow-up right MCA CVA with left upper extremity weakness    Interview History/HPI: Patient states he is doing better, he is participating with therapy.  He has had no bleeding, no fever no chills no chest pain no shortness of breath and he is tolerating his diet.          Current Hospital Meds:  aspirin, 81 mg, Oral, Daily  atorvastatin, 80 mg, Oral, Nightly  Diclofenac Sodium, 2 g, Topical, 4x Daily  enoxaparin, 1 mg/kg, Subcutaneous, Q12H  Insulin Aspart, 0-7 Units, Subcutaneous, 4x Daily PC & at Bedtime  insulin detemir, 20 Units, Subcutaneous, Q12H  melatonin, 5 mg, Oral, Nightly  sertraline, 150 mg, Oral, Daily  warfarin, 10 mg, Oral, Once    Pharmacy to dose warfarin,         Vitals:    11/08/22 0815   BP: 144/66   Pulse: 61   Resp: 20   Temp: 97.9 °F (36.6 °C)   SpO2: 98%         Intake/Output Summary (Last 24 hours) at 11/8/2022 1641  Last data filed at 11/8/2022 1300  Gross per 24 hour   Intake 1080 ml   Output --   Net 1080 ml       EXAM:       Diet Regular; Cardiac, Consistent Carbohydrate        LABS:     Lab Results (last 48 hours)     Procedure Component Value Units Date/Time    POC Glucose Once [[id number]]  (Abnormal) Collected: 11/08/22 1051    Specimen: Blood Updated: 11/08/22 1057     Glucose 227 mg/dL      Comment: Meter: [id number] : [id number] Nat Sommer       POC Glucose Once [[id number]]  (Abnormal) Collected: 11/08/22 0530    Specimen: Blood Updated: 11/08/22 0536     Glucose 184 mg/dL      Comment: Meter: [id number] : [id number] SERGIO CHAMPION       Comprehensive Metabolic Panel [[id number]]  (Abnormal) Collected: 11/08/22 0120    Specimen: Blood Updated: 11/08/22 0212     Glucose 210 mg/dL      BUN 23 mg/dL      Creatinine 1.34 mg/dL      Sodium 136 mmol/L      Potassium 4.2 mmol/L      Comment: Slight hemolysis detected by analyzer. Results may be affected.        Chloride 105 mmol/L      CO2 20.7 mmol/L      Calcium 8.9 mg/dL      Total Protein 6.9 g/dL      Albumin  3.25 g/dL      ALT (SGPT) 27 U/L      AST (SGOT) 23 U/L      Alkaline Phosphatase 55 U/L      Total Bilirubin 0.4 mg/dL      Globulin 3.7 gm/dL      A/G Ratio 0.9 g/dL      BUN/Creatinine Ratio 17.2     Anion Gap 10.3 mmol/L      eGFR 57.3 mL/min/1.73      Comment: National Kidney Foundation and American Society of Nephrology (ASN) Task Force recommended calculation based on the Chronic Kidney Disease Epidemiology Collaboration (CKD-EPI) equation refit without adjustment for race.       Narrative:      GFR Normal >60  Chronic Kidney Disease <60  Kidney Failure <15      Magnesium [[id number]]  (Normal) Collected: 11/08/22 0120    Specimen: Blood Updated: 11/08/22 0212     Magnesium 2.1 mg/dL     Protime-INR [[id number]]  (Abnormal) Collected: 11/08/22 0120    Specimen: Blood Updated: 11/08/22 0201     Protime 30.1 Seconds      INR 2.77    Narrative:      Suggested INR therapeutic range for stable oral anticoagulant therapy:    Low Intensity therapy:   1.5-2.0  Moderate Intensity therapy:   2.0-3.0  High Intensity therapy:   2.5-4.0    CBC & Differential [[id number]]  (Abnormal) Collected: 11/08/22 0120    Specimen: Blood Updated: 11/08/22 0156    Narrative:      The following orders were created for panel order CBC & Differential.  Procedure                               Abnormality         Status                     ---------                               -----------         ------                     CBC Auto Differential[[id number]]        Abnormal            Final result               Scan Slide[[id number]]                                                                    Please view results for these tests on the individual orders.    CBC Auto Differential [[id number]]  (Abnormal) Collected: 11/08/22 0120    Specimen: Blood Updated: 11/08/22 0156     WBC 9.95 10*3/mm3      RBC 4.54 10*6/mm3      Hemoglobin 12.4 g/dL      Hematocrit 39.4 %      MCV 86.8 fL      MCH 27.3 pg      MCHC 31.5 g/dL      RDW 14.6 %       RDW-SD 45.8 fl      MPV 11.9 fL      Platelets 135 10*3/mm3      Neutrophil % 72.4 %      Lymphocyte % 15.7 %      Monocyte % 6.5 %      Eosinophil % 3.8 %      Basophil % 0.7 %      Immature Grans % 0.9 %      Neutrophils, Absolute 7.20 10*3/mm3      Lymphocytes, Absolute 1.56 10*3/mm3      Monocytes, Absolute 0.65 10*3/mm3      Eosinophils, Absolute 0.38 10*3/mm3      Basophils, Absolute 0.07 10*3/mm3      Immature Grans, Absolute 0.09 10*3/mm3      nRBC 0.0 /100 WBC     POC Glucose Once [[id number]]  (Abnormal) Collected: 11/07/22 1943    Specimen: Blood Updated: 11/07/22 1959     Glucose 269 mg/dL      Comment: Meter: [id number] : [id number] Manning Crystal       POC Glucose Once [[id number]]  (Abnormal) Collected: 11/07/22 1618    Specimen: Blood Updated: 11/07/22 1630     Glucose 150 mg/dL      Comment: Meter: [id number] : [id number] Cazares Adriana       POC Glucose Once [[id number]]  (Abnormal) Collected: 11/07/22 1117    Specimen: Blood Updated: 11/07/22 1136     Glucose 148 mg/dL      Comment: Meter: [id number] : [id number] Cazares Adriana       POC Glucose Once [[id number]]  (Abnormal) Collected: 11/07/22 0631    Specimen: Blood Updated: 11/07/22 0637     Glucose 171 mg/dL      Comment: Meter: [id number] : [id number] ISAEL WANG       Protime-INR [[id number]]  (Abnormal) Collected: 11/07/22 0122    Specimen: Blood Updated: 11/07/22 0151     Protime 31.4 Seconds      INR 2.92    Narrative:      Suggested INR therapeutic range for stable oral anticoagulant therapy:    Low Intensity therapy:   1.5-2.0  Moderate Intensity therapy:   2.0-3.0  High Intensity therapy:   2.5-4.0    POC Glucose Once [[id number]]  (Abnormal) Collected: 11/06/22 1942    Specimen: Blood Updated: 11/06/22 1948     Glucose 242 mg/dL      Comment: Meter: [id number] : [id number] SERGIO CHAMPION                  Radiology:    Imaging Results (Last 72 Hours)     ** No results found for the last 72 hours. **          Results for  orders placed during the hospital encounter of 10/28/22    Adult Transthoracic Echo Complete W/ Cont if Necessary Per Protocol (With Agitated Saline)    Interpretation Summary  •  The left ventricular cavity is mildly dilated.  •  Left ventricular wall thickness is consistent with mild concentric hypertrophy.  •  Left ventricular systolic function is normal. Left ventricular ejection fraction appears to be 56 - 60%.  •  Left ventricular diastolic function is consistent with (grade I) impaired relaxation.  •  There is a mechanical aortic valve prosthesis present. The aortic valve peak and mean gradients are within defined limits. The prosthetic aortic valve is grossly normal.  •  Moderate calcific  mitral valve stenosis is present. ( Peak PG= 17 mm Hg and mean PG = 8 mm Hg)).  Unchanged since the study done 10/5/2021.  •  Moderate mitral valve regurgitation is present.  •  Mild pulmonary hypertension is present.  •  Saline test is negative for the evidence of shunt at interatrial septum.  •  There is no evidence of pericardial effusion.      Assessment/Plan:   Right MCA CVA.  Left arm strength is improving, discussed with OT and weekly team meeting.  Discussed with PT, patient is ambulating 160 feet without assistive device with contact-guard.  Patient is requiring only minimal assistance for transfers.  Patient continues to show slow improvement towards goals and left upper extremity function.  Patient is planning to return home alone on discharge.  Continue aspirin and statin.    Status post aortic valve replacement, patient's INR was subtherapeutic, Dr. Weldon recommended INR 3-3.5.  INR is 2.77 this morning, for clotting and stroke prevention as placed the patient on full dose Lovenox, discussed with pharmacy who is dosing Coumadin as well    Coronary artery disease status post stenting in the past, continue aspirin and statin    Diabetes, continue to monitor closely and adjust insulin as needed, he is on basal  bolus regimen, he is basal insulin is 20 and he is on low-dose sliding scale.    Depression, he appears to be doing well on Zoloft, even with the most recent stroke event    DVT prophylaxis, full anticoagulation will serve for this as well    Flynn An MD

## 2022-11-08 NOTE — SIGNIFICANT NOTE
11/08/22 6485   Plan   Plan Team conference held today.  Spoke to pt about plans for discharge on 11-16-22 and how he is progressing in therapy.  Pt plans to return to his home alone at discharge.  Son Jose Angel will check on pt and friend Xavi Zaldivar will also check on him.  Will follow.   Patient/Family in Agreement with Plan yes

## 2022-11-09 LAB
ANION GAP SERPL CALCULATED.3IONS-SCNC: 10.9 MMOL/L (ref 5–15)
BASOPHILS # BLD AUTO: 0.09 10*3/MM3 (ref 0–0.2)
BASOPHILS NFR BLD AUTO: 0.8 % (ref 0–1.5)
BUN SERPL-MCNC: 21 MG/DL (ref 8–23)
BUN/CREAT SERPL: 16.3 (ref 7–25)
CALCIUM SPEC-SCNC: 8.9 MG/DL (ref 8.6–10.5)
CHLORIDE SERPL-SCNC: 103 MMOL/L (ref 98–107)
CO2 SERPL-SCNC: 22.1 MMOL/L (ref 22–29)
CREAT SERPL-MCNC: 1.29 MG/DL (ref 0.76–1.27)
DEPRECATED RDW RBC AUTO: 46 FL (ref 37–54)
EGFRCR SERPLBLD CKD-EPI 2021: 60 ML/MIN/1.73
EOSINOPHIL # BLD AUTO: 0.46 10*3/MM3 (ref 0–0.4)
EOSINOPHIL NFR BLD AUTO: 4.2 % (ref 0.3–6.2)
ERYTHROCYTE [DISTWIDTH] IN BLOOD BY AUTOMATED COUNT: 14.6 % (ref 12.3–15.4)
GLUCOSE BLDC GLUCOMTR-MCNC: 145 MG/DL (ref 70–130)
GLUCOSE BLDC GLUCOMTR-MCNC: 147 MG/DL (ref 70–130)
GLUCOSE BLDC GLUCOMTR-MCNC: 200 MG/DL (ref 70–130)
GLUCOSE BLDC GLUCOMTR-MCNC: 274 MG/DL (ref 70–130)
GLUCOSE SERPL-MCNC: 170 MG/DL (ref 65–99)
HCT VFR BLD AUTO: 40.7 % (ref 37.5–51)
HGB BLD-MCNC: 13.2 G/DL (ref 13–17.7)
IMM GRANULOCYTES # BLD AUTO: 0.13 10*3/MM3 (ref 0–0.05)
IMM GRANULOCYTES NFR BLD AUTO: 1.2 % (ref 0–0.5)
INR PPP: 3.49 (ref 0.9–1.1)
LYMPHOCYTES # BLD AUTO: 1.98 10*3/MM3 (ref 0.7–3.1)
LYMPHOCYTES NFR BLD AUTO: 17.9 % (ref 19.6–45.3)
MCH RBC QN AUTO: 28.1 PG (ref 26.6–33)
MCHC RBC AUTO-ENTMCNC: 32.4 G/DL (ref 31.5–35.7)
MCV RBC AUTO: 86.8 FL (ref 79–97)
MONOCYTES # BLD AUTO: 0.72 10*3/MM3 (ref 0.1–0.9)
MONOCYTES NFR BLD AUTO: 6.5 % (ref 5–12)
NEUTROPHILS NFR BLD AUTO: 69.4 % (ref 42.7–76)
NEUTROPHILS NFR BLD AUTO: 7.66 10*3/MM3 (ref 1.7–7)
NRBC BLD AUTO-RTO: 0 /100 WBC (ref 0–0.2)
PLATELET # BLD AUTO: 155 10*3/MM3 (ref 140–450)
PMV BLD AUTO: 11.7 FL (ref 6–12)
POTASSIUM SERPL-SCNC: 4.3 MMOL/L (ref 3.5–5.2)
PROTHROMBIN TIME: 36.1 SECONDS (ref 12.1–14.7)
RBC # BLD AUTO: 4.69 10*6/MM3 (ref 4.14–5.8)
SODIUM SERPL-SCNC: 136 MMOL/L (ref 136–145)
WBC NRBC COR # BLD: 11.04 10*3/MM3 (ref 3.4–10.8)

## 2022-11-09 PROCEDURE — 97535 SELF CARE MNGMENT TRAINING: CPT | Performed by: OCCUPATIONAL THERAPIST

## 2022-11-09 PROCEDURE — 85025 COMPLETE CBC W/AUTO DIFF WBC: CPT | Performed by: INTERNAL MEDICINE

## 2022-11-09 PROCEDURE — 97112 NEUROMUSCULAR REEDUCATION: CPT | Performed by: OCCUPATIONAL THERAPIST

## 2022-11-09 PROCEDURE — 97110 THERAPEUTIC EXERCISES: CPT

## 2022-11-09 PROCEDURE — 94799 UNLISTED PULMONARY SVC/PX: CPT

## 2022-11-09 PROCEDURE — 63710000001 INSULIN DETEMIR PER 5 UNITS: Performed by: FAMILY MEDICINE

## 2022-11-09 PROCEDURE — 97116 GAIT TRAINING THERAPY: CPT

## 2022-11-09 PROCEDURE — 63710000001 INSULIN ASPART PER 5 UNITS: Performed by: FAMILY MEDICINE

## 2022-11-09 PROCEDURE — 97530 THERAPEUTIC ACTIVITIES: CPT | Performed by: OCCUPATIONAL THERAPIST

## 2022-11-09 PROCEDURE — 82962 GLUCOSE BLOOD TEST: CPT

## 2022-11-09 PROCEDURE — 80048 BASIC METABOLIC PNL TOTAL CA: CPT | Performed by: INTERNAL MEDICINE

## 2022-11-09 PROCEDURE — 97530 THERAPEUTIC ACTIVITIES: CPT

## 2022-11-09 PROCEDURE — 85610 PROTHROMBIN TIME: CPT | Performed by: FAMILY MEDICINE

## 2022-11-09 PROCEDURE — 97112 NEUROMUSCULAR REEDUCATION: CPT

## 2022-11-09 PROCEDURE — 94660 CPAP INITIATION&MGMT: CPT

## 2022-11-09 RX ORDER — WARFARIN SODIUM 3 MG/1
9 TABLET ORAL
Status: COMPLETED | OUTPATIENT
Start: 2022-11-09 | End: 2022-11-09

## 2022-11-09 RX ADMIN — INSULIN DETEMIR 20 UNITS: 100 INJECTION, SOLUTION SUBCUTANEOUS at 21:08

## 2022-11-09 RX ADMIN — ATORVASTATIN CALCIUM 80 MG: 40 TABLET, FILM COATED ORAL at 20:59

## 2022-11-09 RX ADMIN — INSULIN ASPART 3 UNITS: 100 INJECTION, SOLUTION INTRAVENOUS; SUBCUTANEOUS at 21:08

## 2022-11-09 RX ADMIN — WARFARIN 9 MG: 3 TABLET ORAL at 17:04

## 2022-11-09 RX ADMIN — ASPIRIN 81 MG: 81 TABLET, COATED ORAL at 08:36

## 2022-11-09 RX ADMIN — DICLOFENAC 2 G: 10 GEL TOPICAL at 11:15

## 2022-11-09 RX ADMIN — INSULIN DETEMIR 20 UNITS: 100 INJECTION, SOLUTION SUBCUTANEOUS at 08:40

## 2022-11-09 RX ADMIN — DICLOFENAC 2 G: 10 GEL TOPICAL at 21:22

## 2022-11-09 RX ADMIN — INSULIN ASPART 4 UNITS: 100 INJECTION, SOLUTION INTRAVENOUS; SUBCUTANEOUS at 08:36

## 2022-11-09 RX ADMIN — SERTRALINE 150 MG: 50 TABLET, FILM COATED ORAL at 08:36

## 2022-11-09 RX ADMIN — DICLOFENAC 2 G: 10 GEL TOPICAL at 17:04

## 2022-11-09 RX ADMIN — DICLOFENAC 2 G: 10 GEL TOPICAL at 08:38

## 2022-11-09 RX ADMIN — Medication 5 MG: at 20:59

## 2022-11-09 NOTE — PLAN OF CARE
Goal Outcome Evaluation:  Plan of Care Reviewed With: patient        Progress: improving  Outcome Evaluation: Patient particpating with therapies this morning. Making good progress. Continue with plan of care.

## 2022-11-09 NOTE — THERAPY PROGRESS REPORT/RE-CERT
Inpatient Rehabilitation - Physical Therapy Progress Note        Iggy     Patient Name: Benitez Miranda  : 1955  MRN: [id number]    Today's Date: 2022                    Admit Date: 2022      Visit Dx:   No diagnosis found.    Patient Active Problem List   Diagnosis   • Uncontrolled type 2 diabetes with neuropathy   • COPD (chronic obstructive pulmonary disease) ([facility])   • Chronic diastolic congestive heart failure ([facility])   • Essential hypertension   • Tobacco abuse   • Coronary artery disease involving native coronary artery of native heart without angina pectoris   • Low back pain   • Hyperlipidemia LDL goal <70   • H/O mechanical aortic valve replacement   • Depression   • CKD (chronic kidney disease), stage III ([facility])   • Rheumatic mitral stenosis   • Morbidly obese ([facility])   • Recurrent major depressive disorder, in full remission ([facility])   • Ischemic stroke ([facility])   • CVA (cerebral vascular accident) ([facility])       Past Medical History:   Diagnosis Date   • Anxiety    • Arthritis    • CAD (coronary artery disease)     x1 stent; pulmonary HTN; EF 50-55%; rheumatic valve; MV stenosis   • CHF (congestive heart failure) ([facility])    • CKD (chronic kidney disease), stage III ([facility])    • COPD (chronic obstructive pulmonary disease) ([facility])    • Depression    • Diabetes mellitus ([facility])    • H/O mechanical aortic valve replacement    • History of tobacco abuse    • Hyperlipidemia    • Hypertension    • Ischemic heart disease    • Kidney failure     third stage   • Low back pain    • Obesity     BMI 36.   • Stroke ([facility])    • Valvular heart disease        Past Surgical History:   Procedure Laterality Date   • AORTIC VALVE REPAIR/REPLACEMENT     • CARDIAC CATHETERIZATION     • CARDIAC SURGERY      valve on aortic   • CARDIAC SURGERY      stent    • CATARACT EXTRACTION     • COLONOSCOPY     • CORONARY STENT PLACEMENT     • CYSTOSCOPY URETEROSCOPY LASER LITHOTRIPSY Left 2020    Procedure:  CYSTOSCOPY URETEROSCOPY LASER LITHOTRIPSY WITH STENT PLACEMENT;  Surgeon: Jonah Montgomery MD;  Location: [facility];  Service: Urology;  Laterality: Left;   • KIDNEY STONE SURGERY         PT ASSESSMENT (last 12 hours)     IRF PT Evaluation and Treatment     Row Name 11/09/22 1335          PT Time and Intention    Document Type daily treatment;progress note  -RG     Mode of Treatment individual therapy;physical therapy  -RG     Patient/Family/Caregiver Comments/Observations Pt and nursing in agreement for skilled PT on this date.  -RG     Row Name 11/09/22 1335          General Information    Existing Precautions/Restrictions fall  L shoulder pain-needs sling or support  -RG     Row Name 11/09/22 1335          Pain Scale: FACES Pre/Post-Treatment    Pain: FACES Scale, Pretreatment 6-->hurts even more  -RG     Posttreatment Pain Rating 6-->hurts even more  -RG     Row Name 11/09/22 1335          Cognition/Psychosocial    Affect/Mental Status (Cognition) WFL  -RG     Follows Commands (Cognition) WFL  -RG     Personal Safety Interventions gait belt;fall prevention program maintained;nonskid shoes/slippers when out of bed  -RG     Row Name 11/09/22 1335          Bed Mobility    Supine-Sit [place] (Bed Mobility) standby assist;verbal cues  -RG     Assistive Device (Bed Mobility) bed rails  -RG     Row Name 11/09/22 1335          Bed-Chair Transfer    Bed-Chair [place] (Transfers) standby assist  -RG     Assistive Device (Bed-Chair Transfers) wheelchair  -RG     Row Name 11/09/22 1335          Chair-Bed Transfer    Chair-Bed [place] (Transfers) standby assist  -RG     Assistive Device (Chair-Bed Transfers) wheelchair  -RG     Row Name 11/09/22 1335          Sit-Stand Transfer    Sit-Stand [place] (Transfers) standby assist  -RG     Assistive Device (Sit-Stand Transfers) wheelchair;parallel bars  -RG     Row Name 11/09/22 1335          Stand-Sit Transfer    Stand-Sit [place] (Transfers)  standby assist  -RG     Assistive Device (Stand-Sit Transfers) wheelchair;parallel bars  -RG     Row Name 11/09/22 1335          Toilet Transfer    Type (Toilet Transfer) stand pivot/stand step  -RG     [place] Level (Toilet Transfer) standby assist  -RG     Row Name 11/09/22 1335          Gait/Stairs (Locomotion)    Gait/Stairs Locomotion gait/ambulation independence;distance ambulated;gait deviations  -RG     [place] Level (Gait) contact guard  -RG     Assistive Device (Gait) other (see comments)  no AD  -RG     Deviations/Abnormal Patterns (Gait) candi decreased;gait speed decreased;stride length decreased  -RG     Bilateral Gait Deviations foot drop/toe drag  he scoots his left foot forward on the forefoot, it drags more with fatigue  -RG     [place] Level (Stairs) contact guard;verbal cues;nonverbal cues (demo/gesture)  -RG     Handrail Location (Stairs) both sides  -RG     Number of Steps (Stairs) 15  -RG     Ascending Technique (Stairs) step-to-step  -RG     Descending Technique (Stairs) step-to-step  -RG     Row Name 11/09/22 1335          Balance    Dynamic Standing Balance verbal cues;non-verbal cues (demo/gesture);contact guard  -RG     Comment, Balance standing bean bag toss  -RG     Row Name 11/09/22 1335          Hip (Therapeutic Exercise)    Hip Strengthening (Therapeutic Exercise) bilateral;extension;aBduction;aDduction;marching while seated;sitting;3 lb free weight;resistance band;green;10 repetitions;2 sets  -     Row Name 11/09/22 1335          Knee (Therapeutic Exercise)    Knee Strengthening (Therapeutic Exercise) bilateral;flexion;extension;marching while seated;LAQ (long arc quad);sitting;3 lb free weight;resistance band;green;10 repetitions;2 sets  -RG     Row Name 11/09/22 1335          Ankle (Therapeutic Exercise)    Ankle Strengthening (Therapeutic Exercise) bilateral;dorsiflexion;plantarflexion;sitting;10 repetitions;2 sets;3 lb free weight  -RG     Row Name 11/09/22  1334          Positioning and Restraints    Pre-Treatment Position in bed  -RG     Post Treatment Position bed  -RG     In Bed notified nsg;supine;call light within reach;encouraged to call for assist;legs elevated  -RG     Row Name 11/09/22 1335          Therapy Assessment/Plan (PT)    Patient's Goals For Discharge return home  maximize recovery  -RG     Row Name 11/09/22 1335          Therapy Assessment/Plan (PT)    Rehab Potential/Prognosis (PT) adequate, monitor progress closely  -RG     Frequency of Treatment (PT) 5 times per week  -RG     Estimated Duration of Therapy (PT) 2 weeks  -RG     Problem List (PT) balance;coordination;hemiparesis/hemiplegia;mobility;motor control;muscle tone;strength;pain  -RG     Activity Limitations Related to Problem List (PT) unable to ambulate safely;unable to transfer safely  -RG     Row Name 11/09/22 1335          Therapy Plan Review/Discharge Plan (PT)    Anticipated Equipment Needs at Discharge (PT Eval) --  tbd  -RG     Anticipated Discharge Disposition (PT) home with home health;home with assist  -RG     Row Name 11/09/22 1335          IRF PT Goals    Bed Mobility Goal Selection (PT-IRF) bed mobility, PT goal 1  -RG     Transfer Goal Selection (PT-IRF) transfers, PT goal 1  -RG     Gait (Walking Locomotion) Goal Selection (PT-IRF) gait, PT goal 1  -     Row Name 11/09/22 1335          Bed Mobility Goal 1 (PT-IRF)    Activity/Assistive Device (Bed Mobility Goal 1, PT-IRF) sit to supine/supine to sit  -RG     [place] Level (Bed Mobility Goal 1, PT-IRF) independent  -RG     Time Frame (Bed Mobility Goal 1, PT-IRF) by discharge  -RG     Progress/Outcomes (Bed Mobility Goal 1, PT-IRF) goal ongoing  -RG     Row Name 11/09/22 1333          Transfer Goal 1 (PT-IRF)    Activity/Assistive Device (Transfer Goal 1, PT-IRF) sit-to-stand/stand-to-sit;bed-to-chair/chair-to-bed  -RG     [place] Level (Transfer Goal 1, PT-IRF) modified independence  -RG     Time Frame (Transfer  Goal 1, PT-IRF) by discharge  -RG     Progress/Outcomes (Transfer Goal 1, PT-IRF) goal ongoing  -RG     Row Name 11/09/22 1335          Gait/Walking Locomotion Goal 1 (PT-IRF)    Activity/Assistive Device (Gait/Walking Locomotion Goal 1, PT-IRF) --  AAD  -RG     Gait/Walking Locomotion Distance Goal 1 (PT-IRF) 300'  -RG     [place] Level (Gait/Walking Locomotion Goal 1, PT-IRF) supervision required  -RG     Time Frame (Gait/Walking Locomotion Goal 1, PT-IRF) by discharge  -RG     Progress/Outcomes (Gait/Walking Locomotion Goal 1, PT-IRF) goal ongoing  -RG           User Key  (r) = Recorded By, (t) = Taken By, (c) = Cosigned By    Initials Name Provider Type    Devin Huertas PTA Physical Therapist Assistant                 Physical Therapy Education     Title: PT OT SLP Therapies (Done)     Topic: Physical Therapy (Done)     Point: Mobility training (Done)     Learning Progress Summary           Patient Acceptance, E,D, VU,NR by RG at 11/9/2022 1340    Acceptance, E,TB, VU by DG at 11/9/2022 0027    Acceptance, E,TB, VU,DU by HR at 11/8/2022 1445    Acceptance, E,TB, VU by DG at 11/8/2022 0037    Acceptance, E,TB, VU,DU by HR at 11/7/2022 1329    Acceptance, E,TB, VU,DU by HR at 11/5/2022 1211    Acceptance, E,TB, VU,DU by HR at 11/4/2022 1423    Acceptance, E,TB, VU,DU by HR at 11/3/2022 1458    Acceptance, E, VU,NR by LB at 11/2/2022 1602                   Point: Home exercise program (Done)     Learning Progress Summary           Patient Acceptance, E,D, VU,NR by RG at 11/9/2022 1340    Acceptance, E,TB, VU by DG at 11/9/2022 0027    Acceptance, E,TB, VU,DU by HR at 11/8/2022 1445    Acceptance, E,TB, VU by DG at 11/8/2022 0037    Acceptance, E,TB, VU,DU by HR at 11/7/2022 1329    Acceptance, E,TB, VU,DU by HR at 11/5/2022 1211    Acceptance, E,TB, VU,DU by HR at 11/4/2022 1423    Acceptance, E,TB, VU,DU by HR at 11/3/2022 1458    Acceptance, E, VU,NR by LB at 11/2/2022 1602                   Point: Body  mechanics (Done)     Learning Progress Summary           Patient Acceptance, E,D, VU,NR by RG at 11/9/2022 1340    Acceptance, E,TB, VU by DG at 11/9/2022 0027    Acceptance, E,TB, VU,DU by HR at 11/8/2022 1445    Acceptance, E,TB, VU by DG at 11/8/2022 0037    Acceptance, E,TB, VU,DU by HR at 11/7/2022 1329    Acceptance, E,TB, VU,DU by HR at 11/5/2022 1211    Acceptance, E,TB, VU,DU by HR at 11/4/2022 1423    Acceptance, E,TB, VU,DU by HR at 11/3/2022 1458    Acceptance, E, VU,NR by LB at 11/2/2022 1602                   Point: Precautions (Done)     Learning Progress Summary           Patient Acceptance, E,D, VU,NR by RG at 11/9/2022 1340    Acceptance, E,TB, VU by DG at 11/9/2022 0027    Acceptance, E,TB, VU,DU by HR at 11/8/2022 1445    Acceptance, E,TB, VU by DG at 11/8/2022 0037    Acceptance, E,TB, VU,DU by HR at 11/7/2022 1329    Acceptance, E,TB, VU,DU by HR at 11/5/2022 1211    Acceptance, E,TB, VU,DU by HR at 11/4/2022 1423    Acceptance, E,TB, VU,DU by HR at 11/3/2022 1458    Acceptance, E, VU,NR by LB at 11/2/2022 1602                               User Key     Initials Effective Dates Name Provider Type Discipline    DG 06/16/21 -  Brooke Garcia RN Registered Nurse Nurse    LB 06/16/21 -  Yulia Cabrera, PT Physical Therapist PT    RG 06/16/21 -  Devin Nazario PTA Physical Therapist Assistant PT    HR 01/14/22 -  Zahra Mclean PTA Physical Therapist Assistant PT                PT Recommendation and Plan    Frequency of Treatment (PT): 5 times per week  Anticipated Equipment Needs at Discharge (PT Eval):  (tbd)                  Time Calculation:      PT Charges     Row Name 11/09/22 1340             Time Calculation    Start Time 1245  -RG      Stop Time 1330  -RG      Time Calculation (min) 45 min  -RG      PT Received On 11/09/22  -RG         Time Calculation- PT    Total Timed Code Minutes- PT 45 minute(s)  -RG            User Key  (r) = Recorded By, (t) = Taken By, (c) = Cosigned By     Initials Name Provider Type    Devin Huertas PTA Physical Therapist Assistant                Therapy Charges for Today     Code Description Service Date Service Provider Modifiers Qty    30772924760 HC PT THERAPEUTIC ACT EA 15 MIN 11/9/2022 Devin Nazario PTA GP, CQ 1    94786448452 HC PT THER PROC EA 15 MIN 11/9/2022 Devin Nazario PTA GP, CQ 1    22135394385 HC PT NEUROMUSC RE EDUCATION EA 15 MIN 11/9/2022 Devin Nazario PTA GP, CQ 1                   Devin Nazario PTA  11/9/2022

## 2022-11-09 NOTE — SIGNIFICANT NOTE
11/09/22 8031   Plan   Plan SS received call from son Jose Angel.  Reviewed with son how pt is progressing in therapy and plans for discharge on 11-16-22.  Son will check on pt at home and provide transportation at discharge.  Son says if he is unable to transport pt his spouse or son should be able to.  Son wants pt to continue rehab stay to continue making improvements since he will be living alone.  Will follow.   Patient/Family in Agreement with Plan yes

## 2022-11-09 NOTE — PLAN OF CARE
Goal Outcome Evaluation:               Resting quietly in bed with no complaints at this time. Continue current plan of care.

## 2022-11-09 NOTE — THERAPY PROGRESS REPORT/RE-CERT
Inpatient Rehabilitation - Occupational Therapy Progress Note     Iggy     Patient Name: Benitez Miranda  : 1955  MRN: [id number]    Today's Date: 2022                 Admit Date: 2022       No diagnosis found.    Patient Active Problem List   Diagnosis   • Uncontrolled type 2 diabetes with neuropathy   • COPD (chronic obstructive pulmonary disease) ([facility])   • Chronic diastolic congestive heart failure ([facility])   • Essential hypertension   • Tobacco abuse   • Coronary artery disease involving native coronary artery of native heart without angina pectoris   • Low back pain   • Hyperlipidemia LDL goal <70   • H/O mechanical aortic valve replacement   • Depression   • CKD (chronic kidney disease), stage III ([facility])   • Rheumatic mitral stenosis   • Morbidly obese ([facility])   • Recurrent major depressive disorder, in full remission ([facility])   • Ischemic stroke ([facility])   • CVA (cerebral vascular accident) ([facility])       Past Medical History:   Diagnosis Date   • Anxiety    • Arthritis    • CAD (coronary artery disease)     x1 stent; pulmonary HTN; EF 50-55%; rheumatic valve; MV stenosis   • CHF (congestive heart failure) ([facility])    • CKD (chronic kidney disease), stage III ([facility])    • COPD (chronic obstructive pulmonary disease) ([facility])    • Depression    • Diabetes mellitus ([facility])    • H/O mechanical aortic valve replacement    • History of tobacco abuse    • Hyperlipidemia    • Hypertension    • Ischemic heart disease    • Kidney failure     third stage   • Low back pain    • Obesity     BMI 36.   • Stroke ([facility])    • Valvular heart disease        Past Surgical History:   Procedure Laterality Date   • AORTIC VALVE REPAIR/REPLACEMENT     • CARDIAC CATHETERIZATION     • CARDIAC SURGERY      valve on aortic   • CARDIAC SURGERY      stent    • CATARACT EXTRACTION     • COLONOSCOPY     • CORONARY STENT PLACEMENT     • CYSTOSCOPY URETEROSCOPY LASER LITHOTRIPSY Left 2020    Procedure: CYSTOSCOPY  URETEROSCOPY LASER LITHOTRIPSY WITH STENT PLACEMENT;  Surgeon: Jonah Montgomery MD;  Location: [facility];  Service: Urology;  Laterality: Left;   • KIDNEY STONE SURGERY               IRF OT ASSESSMENT FLOWSHEET (last 12 hours)     IRF OT Evaluation and Treatment     Row Name 11/09/22 1400          OT Time and Intention    Document Type daily treatment;progress note  -     Mode of Treatment individual therapy;occupational therapy  -     Patient Effort good  -     Row Name 11/09/22 1400          General Information    Patient/Family/Caregiver Comments/Observations patient agreeable to therapy  -     Existing Precautions/Restrictions fall  -     Row Name 11/09/22 1400          Cognition/Psychosocial    Affect/Mental Status (Cognition) WFL  -     Row Name 11/09/22 1400          Bathing    [place] Level (Bathing) minimum assist (75% patient effort);contact guard assist  -[facility] Name 11/09/22 1400          Upper Body Dressing    [place] Level (Upper Body Dressing) set up assistance  -     Row Name 11/09/22 1400          Lower Body Dressing    [place] Level (Lower Body Dressing) minimum assist (75% patient effort);contact guard assist  -     Row Name 11/09/22 1400          Grooming    [place] Level (Grooming) supervision;set up  -     Row Name 11/09/22 1400          Toileting    [place] Level (Toileting) minimum assist (75% patient effort);contact guard assist  -[facility] Name 11/09/22 1400          Bed-Chair Transfer    Bed-Chair [place] (Transfers) contact guard;verbal cues  -[facility] Name 11/09/22 1400          Toilet Transfer    Type (Toilet Transfer) stand pivot/stand step  -     [place] Level (Toilet Transfer) contact guard;verbal cues  -     Row Name 11/09/22 1400          Motor Skills    Motor Skills coordination;functional endurance;neuro-muscular function  -     Therapeutic Exercise --  BUE ROM, left UE gross grasp, gmc,  strength,  bilateral coordination, UE bichao,smiley bUE  -     Row Name 11/09/22 1400          Positioning and Restraints    Post Treatment Position bed  -     In Bed supine;encouraged to call for assist;call light within reach  -     Row Name 11/09/22 1400          LB Dressing Goal 1 (OT-IRF)    Progress/Outcomes (LB Dressing Goal 1, OT-IRF) goal met  -     Row Name 11/09/22 1400          LB Dressing Goal 2 (OT-IRF)    Progress/Outcomes (LB Dressing Goal 2, OT-IRF) goal ongoing  -     Row Name 11/09/22 1400          Toileting Goal 1 (OT-IRF)    Progress/Outcomes (Toileting Goal 1, OT-IRF) goal met  -     Row Name 11/09/22 1400          Toileting Goal 2 (OT-IRF)    Progress/Outcomes (Toileting Goal 2, OT-IRF) goal ongoing  -           User Key  (r) = Recorded By, (t) = Taken By, (c) = Cosigned By    Initials Name Effective Dates    Luda Hartman, OT 06/16/21 -                  Occupational Therapy Education     Title: PT OT SLP Therapies (Done)     Topic: Occupational Therapy (Done)     Point: ADL training (Done)     Description:   Instruct learner(s) on proper safety adaptation and remediation techniques during self care or transfers.   Instruct in proper use of assistive devices.              Learning Progress Summary           Patient Acceptance, E,TB, VU by ERNST at 11/9/2022 0027    Acceptance, E,TB, VU by DG at 11/8/2022 0037    Acceptance, E,TB,D, VU,DU by LA at 11/5/2022 1535                   Point: Home exercise program (Done)     Description:   Instruct learner(s) on appropriate technique for monitoring, assisting and/or progressing therapeutic exercises/activities.              Learning Progress Summary           Patient Acceptance, E,TB, VU by DG at 11/9/2022 0027    Acceptance, E,TB, VU by ERNST at 11/8/2022 0037    Acceptance, E,TB,D, VU,DU by LA at 11/5/2022 1535                   Point: Precautions (Done)     Description:   Instruct learner(s) on prescribed precautions during self-care and  functional transfers.              Learning Progress Summary           Patient Acceptance, E,TB, VU by  at 11/9/2022 0027    Acceptance, E,TB, VU by  at 11/8/2022 0037    Acceptance, E,TB,D, VU,DU by LA at 11/5/2022 1535                   Point: Body mechanics (Done)     Description:   Instruct learner(s) on proper positioning and spine alignment during self-care, functional mobility activities and/or exercises.              Learning Progress Summary           Patient Acceptance, E,TB, VU by  at 11/9/2022 0027    Acceptance, E,TB, VU by  at 11/8/2022 0037    Acceptance, E,TB,D, VU,DU by LA at 11/5/2022 1535                               User Key     Initials Effective Dates Name Provider Type Discipline     06/16/21 -  Brooke Garcia RN Registered Nurse Nurse    LA 02/14/22 -  Leilani Sanders OT Occupational Therapist OT                    OT Recommendation and Plan    Planned Therapy Interventions (OT): activity tolerance training, adaptive equipment training, BADL retraining, neuromuscular control/coordination retraining, passive ROM/stretching, ROM/therapeutic exercise, strengthening exercise, transfer/mobility retraining                    Time Calculation:      Time Calculation- OT     Row Name 11/09/22 1445             Time Calculation-     OT Start Time 0745  -      OT Stop Time 0915  -      OT Time Calculation (min) 90 min  -            User Key  (r) = Recorded By, (t) = Taken By, (c) = Cosigned By    Initials Name Provider Type     Luda Hicks OT Occupational Therapist              Therapy Charges for Today     Code Description Service Date Service Provider Modifiers Qty    68719848426 HC OT THERAPEUTIC ACT EA 15 MIN 11/8/2022 Luda Hicks OT GO 2    32984260201 HC OT SELF CARE/MGMT/TRAIN EA 15 MIN 11/8/2022 Luda Hicks OT GO 1    88221772223 HC OT NEUROMUSC RE EDUCATION EA 15 MIN 11/8/2022 Luda Hicks OT GO 3    [id number]  OT THERAPEUTIC  ACT EA 15 MIN 11/9/2022 Luda Hicks, OT GO 1    18605205710 HC OT SELF CARE/MGMT/TRAIN EA 15 MIN 11/9/2022 Luda Hicks OT GO 2    76145278961 HC OT NEUROMUSC RE EDUCATION EA 15 MIN 11/9/2022 Luda Hicks OT GO 3                   Luda Hicks OT  11/9/2022

## 2022-11-09 NOTE — PROGRESS NOTES
Rehabilitation Nursing  Inpatient Rehabilitation Plan of Care Note    Plan of Care  Copy from [facility]    Potential for Injury (Active)  Current Status (11/1/2022 3:00:00 PM): At risk for falls  Weekly Goal: No falls this week  Discharge Goal: No falls during admission    Body Systems    Integumentary (Active)  Current Status (11/1/2022 3:00:00 PM): At risk for skin breakdown  Weekly Goal: No skin breakdown this week  Discharge Goal: No skin breakdown during admission    Signed by: Brooke Garcia, Nurse

## 2022-11-09 NOTE — THERAPY TREATMENT NOTE
Inpatient Rehabilitation - Physical Therapy Treatment Note        Iggy     Patient Name: Benitez Miranda  : 1955  MRN: [id number]    Today's Date: 2022                    Admit Date: 2022      Visit Dx:   No diagnosis found.    Patient Active Problem List   Diagnosis   • Uncontrolled type 2 diabetes with neuropathy   • COPD (chronic obstructive pulmonary disease) (Formerly [facility])   • Chronic diastolic congestive heart failure (Formerly [facility])   • Essential hypertension   • Tobacco abuse   • Coronary artery disease involving native coronary artery of native heart without angina pectoris   • Low back pain   • Hyperlipidemia LDL goal <70   • H/O mechanical aortic valve replacement   • Depression   • CKD (chronic kidney disease), stage III (Formerly [facility])   • Rheumatic mitral stenosis   • Morbidly obese (Formerly [facility])   • Recurrent major depressive disorder, in full remission (Formerly [facility])   • Ischemic stroke (Formerly [facility])   • CVA (cerebral vascular accident) (Formerly [facility])       Past Medical History:   Diagnosis Date   • Anxiety    • Arthritis    • CAD (coronary artery disease)     x1 stent; pulmonary HTN; EF 50-55%; rheumatic valve; MV stenosis   • CHF (congestive heart failure) (Formerly [facility])    • CKD (chronic kidney disease), stage III (Formerly [facility])    • COPD (chronic obstructive pulmonary disease) (Formerly [facility])    • Depression    • Diabetes mellitus (Formerly [facility])    • H/O mechanical aortic valve replacement    • History of tobacco abuse    • Hyperlipidemia    • Hypertension    • Ischemic heart disease    • Kidney failure     third stage   • Low back pain    • Obesity     BMI 36.   • Stroke (Formerly [facility])    • Valvular heart disease        Past Surgical History:   Procedure Laterality Date   • AORTIC VALVE REPAIR/REPLACEMENT     • CARDIAC CATHETERIZATION     • CARDIAC SURGERY      valve on aortic   • CARDIAC SURGERY      stent    • CATARACT EXTRACTION     • COLONOSCOPY     • CORONARY STENT PLACEMENT     • CYSTOSCOPY URETEROSCOPY LASER LITHOTRIPSY Left 2020    Procedure:  CYSTOSCOPY URETEROSCOPY LASER LITHOTRIPSY WITH STENT PLACEMENT;  Surgeon: Jonah Montgomery MD;  Location: [facility];  Service: Urology;  Laterality: Left;   • KIDNEY STONE SURGERY         PT ASSESSMENT (last 12 hours)     IRF PT Evaluation and Treatment     Row Name 11/09/22 1627 11/09/22 1335       PT Time and Intention    Document Type daily treatment  -RF daily treatment;progress note  -RG    Mode of Treatment individual therapy;physical therapy  -RF individual therapy;physical therapy  -RG    Patient/Family/Caregiver Comments/Observations No significant c/o noted this date.  -RF Pt and nursing in agreement for skilled PT on this date.  -RG    Row Name 11/09/22 1627 11/09/22 1335       General Information    Existing Precautions/Restrictions fall  L shoulder pain-needs sling or support  -RF fall  L shoulder pain-needs sling or support  -RG    Row Name 11/09/22 1627 11/09/22 1335       Pain Scale: FACES Pre/Post-Treatment    Pain: FACES Scale, Pretreatment 6-->hurts even more  -RF 6-->hurts even more  -RG    Posttreatment Pain Rating 6-->hurts even more  -RF 6-->hurts even more  -RG    Row Name 11/09/22 1627 11/09/22 1335       Cognition/Psychosocial    Affect/Mental Status (Cognition) WFL  -RF WFL  -RG    Follows Commands (Cognition) WFL  -RF WFL  -RG    Personal Safety Interventions -- gait belt;fall prevention program maintained;nonskid shoes/slippers when out of bed  -RG    Row Name 11/09/22 1627 11/09/22 1335       Bed Mobility    Supine-Sit [place] (Bed Mobility) verbal cues;supervision;modified independence  -RF standby assist;verbal cues  -RG    Sit-Supine [place] (Bed Mobility) supervision;modified independence  -RF --    Assistive Device (Bed Mobility) bed rails  -RF bed rails  -RG    Row Name 11/09/22 1627 11/09/22 1335       Bed-Chair Transfer    Bed-Chair [place] (Transfers) standby assist;supervision  -RF standby assist  -RG    Assistive Device (Bed-Chair Transfers) wheelchair   -RF wheelchair  -RG    Row Name 11/09/22 1627 11/09/22 1335       Chair-Bed Transfer    Chair-Bed [place] (Transfers) standby assist;supervision  -RF standby assist  -RG    Assistive Device (Chair-Bed Transfers) wheelchair  -RF wheelchair  -RG    Row Name 11/09/22 1627 11/09/22 1335       Sit-Stand Transfer    Sit-Stand [place] (Transfers) standby assist;supervision  -RF standby assist  -RG    Assistive Device (Sit-Stand Transfers) wheelchair;parallel bars  -RF wheelchair;parallel bars  -RG    Row Name 11/09/22 1627 11/09/22 1335       Stand-Sit Transfer    Stand-Sit [place] (Transfers) standby assist;supervision  -RF standby assist  -RG    Assistive Device (Stand-Sit Transfers) wheelchair;parallel bars  -RF wheelchair;parallel bars  -RG    Row Name 11/09/22 1335          Toilet Transfer    Type (Toilet Transfer) stand pivot/stand step  -RG     [place] Level (Toilet Transfer) standby assist  -RG     Row Name 11/09/22 1627 11/09/22 1335       Gait/Stairs (Locomotion)    Gait/Stairs Locomotion gait/ambulation independence;distance ambulated;gait deviations  -RF gait/ambulation independence;distance ambulated;gait deviations  -RG    [place] Level (Gait) contact guard;standby assist  -RF contact guard  -RG    Assistive Device (Gait) other (see comments)  no AD  -RF other (see comments)  no AD  -RG    Distance in Feet (Gait) 320  -RF --    Deviations/Abnormal Patterns (Gait) candi decreased;gait speed decreased;stride length decreased  -RF candi decreased;gait speed decreased;stride length decreased  -RG    Bilateral Gait Deviations foot drop/toe drag  he scoots his left foot forward on the forefoot, it drags more with fatigue  -RF foot drop/toe drag  he scoots his left foot forward on the forefoot, it drags more with fatigue  -RG    [place] Level (Stairs) -- contact guard;verbal cues;nonverbal cues (demo/gesture)  -RG    Handrail Location (Stairs) -- both sides  -RG    Number of Steps  (Stairs) -- 15  -RG    Ascending Technique (Stairs) -- step-to-step  -RG    Descending Technique (Stairs) -- step-to-step  -RG    Comment, (Gait/Stairs) Cues provided for increased HS and quad activation on LLE as knee buckling is noted; knee buckling increases with fatigue.  -RF --    Row Name 11/09/22 1627 11/09/22 1335       Balance    Static Standing Balance contact guard  unilateral stance with opp LE on step; minimal unsteadiness noted.  -RF --    Dynamic Standing Balance -- verbal cues;non-verbal cues (demo/gesture);contact guard  -RG    Comment, Balance -- standing bean bag toss  -RG    Row Name 11/09/22 1627 11/09/22 1335       Hip (Therapeutic Exercise)    Hip Strengthening (Therapeutic Exercise) bilateral;flexion;extension;aBduction;aDduction;marching while standing;mini squats;standing;2 sets;10 repetitions  Fatigue noted with standing TE; rest required frequently.  -RF bilateral;extension;aBduction;aDduction;marching while seated;sitting;3 lb free weight;resistance band;green;10 repetitions;2 sets  -RG    Row Name 11/09/22 1627 11/09/22 1335       Knee (Therapeutic Exercise)    Knee Strengthening (Therapeutic Exercise) bilateral;flexion;extension;marching while standing;hamstring curls;standing;2 sets;10 repetitions  -RF bilateral;flexion;extension;marching while seated;LAQ (long arc quad);sitting;3 lb free weight;resistance band;green;10 repetitions;2 sets  -RG    Row Name 11/09/22 1627 11/09/22 1335       Ankle (Therapeutic Exercise)    Ankle Strengthening (Therapeutic Exercise) bilateral;dorsiflexion;plantarflexion;standing;2 sets;10 repetitions  -RF bilateral;dorsiflexion;plantarflexion;sitting;10 repetitions;2 sets;3 lb free weight  -RG    Row Name 11/09/22 1627 11/09/22 1335       Positioning and Restraints    Pre-Treatment Position in bed  -RF in bed  -RG    Post Treatment Position bed  -RF bed  -RG    In Bed supine;call light within reach;encouraged to call for assist  -RF notified  nsg;supine;call light within reach;encouraged to call for assist;legs elevated  -RG    Row Name 11/09/22 1627 11/09/22 1335       Therapy Assessment/Plan (PT)    Patient's Goals For Discharge return home  maximize recovery  -RF return home  maximize recovery  -RG    Row Name 11/09/22 1627 11/09/22 1335       Therapy Assessment/Plan (PT)    Rehab Potential/Prognosis (PT) adequate, monitor progress closely  -RF adequate, monitor progress closely  -RG    Frequency of Treatment (PT) 5 times per week  -RF 5 times per week  -RG    Estimated Duration of Therapy (PT) 2 weeks  -RF 2 weeks  -RG    Problem List (PT) balance;coordination;hemiparesis/hemiplegia;mobility;motor control;muscle tone;strength;pain  -RF balance;coordination;hemiparesis/hemiplegia;mobility;motor control;muscle tone;strength;pain  -RG    Activity Limitations Related to Problem List (PT) unable to ambulate safely;unable to transfer safely  -RF unable to ambulate safely;unable to transfer safely  -RG    Row Name 11/09/22 1627          Daily Progress Summary (PT)    Functional Goal Overall Progress (PT) progressing toward functional goals as expected  -RF     Daily Progress Summary (PT) Good functional mobility and ambulation quality noted this date. Slight LE strength and balance deficits observed with activity. Slight neuromuscular deficits observed on LLE as well. Continued skilled care required for further improvements to ensure maximum safe function upon D/C.  -RF     Impairments Still Limiting Function (PT) balance impairment;functional activity tolerance impairment;motor control impaired;strength deficit  -RF     Recommendations (PT) Continue per current POC  -RF     Row Name 11/09/22 1627 11/09/22 1335       Therapy Plan Review/Discharge Plan (PT)    Anticipated Equipment Needs at Discharge (PT Eval) --  tbd  -RF --  tbd  -RG    Anticipated Discharge Disposition (PT) home with home health;home with assist  -RF home with home health;home with assist   -RG    Row Name 11/09/22 1627 11/09/22 1335       IRF PT Goals    Bed Mobility Goal Selection (PT-IRF) bed mobility, PT goal 1  -RF bed mobility, PT goal 1  -RG    Transfer Goal Selection (PT-IRF) transfers, PT goal 1  -RF transfers, PT goal 1  -RG    Gait (Walking Locomotion) Goal Selection (PT-IRF) gait, PT goal 1  -RF gait, PT goal 1  -RG    Row Name 11/09/22 1627 11/09/22 1335       Bed Mobility Goal 1 (PT-IRF)    Activity/Assistive Device (Bed Mobility Goal 1, PT-IRF) sit to supine/supine to sit  -RF sit to supine/supine to sit  -RG    [place] Level (Bed Mobility Goal 1, PT-IRF) independent  -RF independent  -RG    Time Frame (Bed Mobility Goal 1, PT-IRF) by discharge  -RF by discharge  -RG    Progress/Outcomes (Bed Mobility Goal 1, PT-IRF) goal ongoing  -RF goal ongoing  -RG    Row Name 11/09/22 1627 11/09/22 1335       Transfer Goal 1 (PT-IRF)    Activity/Assistive Device (Transfer Goal 1, PT-IRF) sit-to-stand/stand-to-sit;bed-to-chair/chair-to-bed  -RF sit-to-stand/stand-to-sit;bed-to-chair/chair-to-bed  -RG    [place] Level (Transfer Goal 1, PT-IRF) modified independence  -RF modified independence  -RG    Time Frame (Transfer Goal 1, PT-IRF) by discharge  -RF by discharge  -RG    Progress/Outcomes (Transfer Goal 1, PT-IRF) goal ongoing  -RF goal ongoing  -RG    Row Name 11/09/22 1627 11/09/22 1335       Gait/Walking Locomotion Goal 1 (PT-IRF)    Activity/Assistive Device (Gait/Walking Locomotion Goal 1, PT-IRF) --  AAD  -RF --  AAD  -RG    Gait/Walking Locomotion Distance Goal 1 (PT-IRF) 300'  -'  -RG    [place] Level (Gait/Walking Locomotion Goal 1, PT-IRF) supervision required  -RF supervision required  -RG    Time Frame (Gait/Walking Locomotion Goal 1, PT-IRF) by discharge  -RF by discharge  -RG    Progress/Outcomes (Gait/Walking Locomotion Goal 1, PT-IRF) goal ongoing  -RF goal ongoing  -RG          User Key  (r) = Recorded By, (t) = Taken By, (c) = Cosigned By    Initials Name  Provider Type     Katie Hanks PTA Physical Therapist Assistant    Devin Huertas PTA Physical Therapist Assistant                 Physical Therapy Education     Title: PT OT SLP Therapies (Done)     Topic: Physical Therapy (Done)     Point: Mobility training (Done)     Learning Progress Summary           Patient Acceptance, E,TB, VU by RF at 11/9/2022 1634    Acceptance, E,D, VU,NR by RG at 11/9/2022 1340    Acceptance, E,TB, VU by DG at 11/9/2022 0027    Acceptance, E,TB, VU,DU by HR at 11/8/2022 1445    Acceptance, E,TB, VU by DG at 11/8/2022 0037    Acceptance, E,TB, VU,DU by HR at 11/7/2022 1329    Acceptance, E,TB, VU,DU by HR at 11/5/2022 1211    Acceptance, E,TB, VU,DU by HR at 11/4/2022 1423    Acceptance, E,TB, VU,DU by HR at 11/3/2022 1458    Acceptance, E, VU,NR by LB at 11/2/2022 1602                   Point: Home exercise program (Done)     Learning Progress Summary           Patient Acceptance, E,TB, VU by RF at 11/9/2022 1634    Acceptance, E,D, VU,NR by RG at 11/9/2022 1340    Acceptance, E,TB, VU by DG at 11/9/2022 0027    Acceptance, E,TB, VU,DU by HR at 11/8/2022 1445    Acceptance, E,TB, VU by DG at 11/8/2022 0037    Acceptance, E,TB, VU,DU by HR at 11/7/2022 1329    Acceptance, E,TB, VU,DU by HR at 11/5/2022 1211    Acceptance, E,TB, VU,DU by HR at 11/4/2022 1423    Acceptance, E,TB, VU,DU by HR at 11/3/2022 1458    Acceptance, E, VU,NR by LB at 11/2/2022 1602                   Point: Body mechanics (Done)     Learning Progress Summary           Patient Acceptance, E,TB, VU by RF at 11/9/2022 1634    Acceptance, E,D, VU,NR by RG at 11/9/2022 1340    Acceptance, E,TB, VU by DG at 11/9/2022 0027    Acceptance, E,TB, VU,DU by HR at 11/8/2022 1445    Acceptance, E,TB, VU by DG at 11/8/2022 0037    Acceptance, E,TB, VU,DU by HR at 11/7/2022 1329    Acceptance, E,TB, VU,DU by HR at 11/5/2022 1211    Acceptance, E,TB, VU,DU by HR at 11/4/2022 1423    Acceptance, E,TB, VU,DU by HR at  11/3/2022 1458    Acceptance, E, VU,NR by LB at 11/2/2022 1602                   Point: Precautions (Done)     Learning Progress Summary           Patient Acceptance, E,TB, VU by RF at 11/9/2022 1634    Acceptance, E,D, VU,NR by RG at 11/9/2022 1340    Acceptance, E,TB, VU by DG at 11/9/2022 0027    Acceptance, E,TB, VU,DU by HR at 11/8/2022 1445    Acceptance, E,TB, VU by DG at 11/8/2022 0037    Acceptance, E,TB, VU,DU by HR at 11/7/2022 1329    Acceptance, E,TB, VU,DU by HR at 11/5/2022 1211    Acceptance, E,TB, VU,DU by HR at 11/4/2022 1423    Acceptance, E,TB, VU,DU by HR at 11/3/2022 1458    Acceptance, E, VU,NR by LB at 11/2/2022 1602                               User Key     Initials Effective Dates Name Provider Type Discipline     06/16/21 -  Brooke Garcia RN Registered Nurse Nurse    LB 06/16/21 -  Yulia Cabrera, PT Physical Therapist PT    RF 06/16/21 -  Katie Hanks PTA Physical Therapist Assistant PT    RG 06/16/21 -  Devin Nazario, PTA Physical Therapist Assistant PT    HR 01/14/22 -  Zahra Mclean, XU Physical Therapist Assistant PT                PT Recommendation and Plan    Frequency of Treatment (PT): 5 times per week  Anticipated Equipment Needs at Discharge (PT Eval):  (tbd)  Daily Progress Summary (PT)  Functional Goal Overall Progress (PT): progressing toward functional goals as expected  Daily Progress Summary (PT): Good functional mobility and ambulation quality noted this date. Slight LE strength and balance deficits observed with activity. Slight neuromuscular deficits observed on LLE as well. Continued skilled care required for further improvements to ensure maximum safe function upon D/C.  Impairments Still Limiting Function (PT): balance impairment, functional activity tolerance impairment, motor control impaired, strength deficit  Recommendations (PT): Continue per current POC               Time Calculation:      PT Charges     Row Name 11/09/22 1634 11/09/22 1340           Time Calculation    Start Time 1000  -RF 1245  -RG     Stop Time 1045  -RF 1330  -RG     Time Calculation (min) 45 min  -RF 45 min  -RG     PT Received On 11/09/22  -RF 11/09/22  -RG     PT - Next Appointment 11/10/22  -RF --     PT Goal Re-Cert Due Date 11/16/22  -RF --        Time Calculation- PT    Total Timed Code Minutes- PT 45 minute(s)  -RF 45 minute(s)  -RG           User Key  (r) = Recorded By, (t) = Taken By, (c) = Cosigned By    Initials Name Provider Type    RF Katie Hanks PTA Physical Therapist Assistant    Devin Huertas PTA Physical Therapist Assistant                Therapy Charges for Today     Code Description Service Date Service Provider Modifiers Qty    47296099010 HC GAIT TRAINING EA 15 MIN 11/9/2022 Katie Hanks, PTA GP, CQ 1    78942529395 HC PT THER PROC EA 15 MIN 11/9/2022 Katie Hanks, PTA GP, CQ 1    51724197826 HC PT NEUROMUSC RE EDUCATION EA 15 MIN 11/9/2022 Katie Hanks, PTA GP, CQ 1                   Katie Hanks PTA  11/9/2022

## 2022-11-09 NOTE — PROGRESS NOTES
INR was therapeutic therefore Lovenox was stopped this morning.  Continue Coumadin dosing by pharmacy.  Vital signs are good.  Glucose have overall been acceptable besides one hypoglycemic outlier to 74.  Creatinine 1.29 which is slightly improved, globin and platelets acceptable.  Patient did participate with OT and PT today.

## 2022-11-10 LAB
GLUCOSE BLDC GLUCOMTR-MCNC: 184 MG/DL (ref 70–130)
GLUCOSE BLDC GLUCOMTR-MCNC: 184 MG/DL (ref 70–130)
GLUCOSE BLDC GLUCOMTR-MCNC: 188 MG/DL (ref 70–130)
GLUCOSE BLDC GLUCOMTR-MCNC: 322 MG/DL (ref 70–130)
INR PPP: 3.22 (ref 0.9–1.1)
PROTHROMBIN TIME: 33.9 SECONDS (ref 12.1–14.7)

## 2022-11-10 PROCEDURE — 94799 UNLISTED PULMONARY SVC/PX: CPT

## 2022-11-10 PROCEDURE — 63710000001 INSULIN ASPART PER 5 UNITS: Performed by: FAMILY MEDICINE

## 2022-11-10 PROCEDURE — 97535 SELF CARE MNGMENT TRAINING: CPT | Performed by: OCCUPATIONAL THERAPIST

## 2022-11-10 PROCEDURE — 97116 GAIT TRAINING THERAPY: CPT

## 2022-11-10 PROCEDURE — 99231 SBSQ HOSP IP/OBS SF/LOW 25: CPT | Performed by: INTERNAL MEDICINE

## 2022-11-10 PROCEDURE — 82962 GLUCOSE BLOOD TEST: CPT

## 2022-11-10 PROCEDURE — 97110 THERAPEUTIC EXERCISES: CPT

## 2022-11-10 PROCEDURE — 85610 PROTHROMBIN TIME: CPT | Performed by: FAMILY MEDICINE

## 2022-11-10 PROCEDURE — 94760 N-INVAS EAR/PLS OXIMETRY 1: CPT

## 2022-11-10 PROCEDURE — 97112 NEUROMUSCULAR REEDUCATION: CPT | Performed by: OCCUPATIONAL THERAPIST

## 2022-11-10 PROCEDURE — 97530 THERAPEUTIC ACTIVITIES: CPT | Performed by: OCCUPATIONAL THERAPIST

## 2022-11-10 PROCEDURE — 63710000001 INSULIN DETEMIR PER 5 UNITS: Performed by: FAMILY MEDICINE

## 2022-11-10 PROCEDURE — 99232 SBSQ HOSP IP/OBS MODERATE 35: CPT | Performed by: INTERNAL MEDICINE

## 2022-11-10 PROCEDURE — 97112 NEUROMUSCULAR REEDUCATION: CPT

## 2022-11-10 RX ORDER — ASPIRIN 81 MG/1
81 TABLET ORAL DAILY
Qty: 3 TABLET | Refills: 0 | Status: CANCELLED | OUTPATIENT
Start: 2022-11-11

## 2022-11-10 RX ORDER — INSULIN GLARGINE 100 [IU]/ML
20 INJECTION, SOLUTION SUBCUTANEOUS NIGHTLY
Status: CANCELLED
Start: 2022-11-10

## 2022-11-10 RX ORDER — ATORVASTATIN CALCIUM 80 MG/1
80 TABLET, FILM COATED ORAL NIGHTLY
Qty: 30 TABLET | Refills: 0 | Status: CANCELLED | OUTPATIENT
Start: 2022-11-10

## 2022-11-10 RX ADMIN — INSULIN ASPART 2 UNITS: 100 INJECTION, SOLUTION INTRAVENOUS; SUBCUTANEOUS at 11:15

## 2022-11-10 RX ADMIN — ATORVASTATIN CALCIUM 80 MG: 40 TABLET, FILM COATED ORAL at 20:57

## 2022-11-10 RX ADMIN — SERTRALINE 150 MG: 50 TABLET, FILM COATED ORAL at 09:16

## 2022-11-10 RX ADMIN — INSULIN ASPART 2 UNITS: 100 INJECTION, SOLUTION INTRAVENOUS; SUBCUTANEOUS at 17:07

## 2022-11-10 RX ADMIN — ASPIRIN 81 MG: 81 TABLET, COATED ORAL at 09:16

## 2022-11-10 RX ADMIN — INSULIN DETEMIR 20 UNITS: 100 INJECTION, SOLUTION SUBCUTANEOUS at 08:49

## 2022-11-10 RX ADMIN — Medication 5 MG: at 20:57

## 2022-11-10 RX ADMIN — INSULIN ASPART 5 UNITS: 100 INJECTION, SOLUTION INTRAVENOUS; SUBCUTANEOUS at 20:57

## 2022-11-10 RX ADMIN — DICLOFENAC 2 G: 10 GEL TOPICAL at 11:16

## 2022-11-10 RX ADMIN — WARFARIN 9.5 MG: 7.5 TABLET ORAL at 17:07

## 2022-11-10 RX ADMIN — INSULIN DETEMIR 20 UNITS: 100 INJECTION, SOLUTION SUBCUTANEOUS at 20:58

## 2022-11-10 RX ADMIN — DICLOFENAC 2 G: 10 GEL TOPICAL at 20:57

## 2022-11-10 RX ADMIN — DICLOFENAC 2 G: 10 GEL TOPICAL at 09:17

## 2022-11-10 RX ADMIN — INSULIN ASPART 2 UNITS: 100 INJECTION, SOLUTION INTRAVENOUS; SUBCUTANEOUS at 09:16

## 2022-11-10 RX ADMIN — DICLOFENAC 2 G: 10 GEL TOPICAL at 17:07

## 2022-11-10 NOTE — THERAPY TREATMENT NOTE
Inpatient Rehabilitation - Occupational Therapy Treatment Note    NORBERT Parkinson     Patient Name: Benitez Miranda  : 1955  MRN: [id number]    Today's Date: 11/10/2022                 Admit Date: 2022       No diagnosis found.    Patient Active Problem List   Diagnosis   • Uncontrolled type 2 diabetes with neuropathy   • COPD (chronic obstructive pulmonary disease) ([facility])   • Chronic diastolic congestive heart failure ([facility])   • Essential hypertension   • Tobacco abuse   • Coronary artery disease involving native coronary artery of native heart without angina pectoris   • Low back pain   • Hyperlipidemia LDL goal <70   • H/O mechanical aortic valve replacement   • Depression   • CKD (chronic kidney disease), stage III ([facility])   • Rheumatic mitral stenosis   • Morbidly obese ([facility])   • Recurrent major depressive disorder, in full remission ([facility])   • Ischemic stroke ([facility])   • CVA (cerebral vascular accident) ([facility])       Past Medical History:   Diagnosis Date   • Anxiety    • Arthritis    • CAD (coronary artery disease)     x1 stent; pulmonary HTN; EF 50-55%; rheumatic valve; MV stenosis   • CHF (congestive heart failure) ([facility])    • CKD (chronic kidney disease), stage III ([facility])    • COPD (chronic obstructive pulmonary disease) ([facility])    • Depression    • Diabetes mellitus ([facility])    • H/O mechanical aortic valve replacement    • History of tobacco abuse    • Hyperlipidemia    • Hypertension    • Ischemic heart disease    • Kidney failure     third stage   • Low back pain    • Obesity     BMI 36.   • Stroke ([facility])    • Valvular heart disease        Past Surgical History:   Procedure Laterality Date   • AORTIC VALVE REPAIR/REPLACEMENT     • CARDIAC CATHETERIZATION     • CARDIAC SURGERY      valve on aortic   • CARDIAC SURGERY      stent    • CATARACT EXTRACTION     • COLONOSCOPY     • CORONARY STENT PLACEMENT     • CYSTOSCOPY URETEROSCOPY LASER LITHOTRIPSY Left 2020    Procedure: CYSTOSCOPY  URETEROSCOPY LASER LITHOTRIPSY WITH STENT PLACEMENT;  Surgeon: Jonah Montgomery MD;  Location: [facility];  Service: Urology;  Laterality: Left;   • KIDNEY STONE SURGERY               IRF OT ASSESSMENT FLOWSHEET (last 12 hours)     IRF OT Evaluation and Treatment     Row Name 11/10/22 1500          OT Time and Intention    Document Type daily treatment  -     Mode of Treatment individual therapy;occupational therapy  -     Patient Effort good  -     Row Name 11/10/22 1500          General Information    Patient/Family/Caregiver Comments/Observations patient agreeable to therapy. patient continues to tolerate well. patient completing self care and ther ex/act to improve strength, endurance and safety with ADL. patient has requested to be discharged tomorrow. patient instructed this am on safe tub/shower transfer with transfer tub bench. patient agreeable to purchase for home use. patient states he has hand held shower currently. patient referred to outpatient  therapy as well as recommedation of BSC to be used in bathroom for additional safety with self care.  patient agreeable  -     Existing Precautions/Restrictions fall  -     Row Name 11/10/22 1500          Cognition/Psychosocial    Affect/Mental Status (Cognition) WFL  -[facility] Name 11/10/22 1500          Bathing    [place] Level (Bathing) set up;standby assist  -[facility] Name 11/10/22 1500          Upper Body Dressing    [place] Level (Upper Body Dressing) modified independence  -[facility] Name 11/10/22 1500          Lower Body Dressing    [place] Level (Lower Body Dressing) modified independence;set up  -     Assistive Device Use (Lower Body Dressing) reacher  -     Row Name 11/10/22 1500          Grooming    [place] Level (Grooming) modified independence  -[facility] Name 11/10/22 1500          Toileting    [place] Level (Toileting) set up assistance;supervision  -[facility] Name 11/10/22 1500           Self-Feeding    [place] Level (Self-Feeding) modified independence  -     Row Name 11/10/22 1500          Bed-Chair Transfer    Bed-Chair [place] (Transfers) modified independence;verbal cues  -     Row Name 11/10/22 1500          Toilet Transfer    [place] Level (Toilet Transfer) modified independence;supervision;verbal cues  -     Row Name 11/10/22 1500          Bathtub Transfer    [place] Level (Bathtub Transfer) modified independence;supervision;verbal cues  -     Assistive Device (Bathtub Transfer) tub bench  -     Row Name 11/10/22 1500          Motor Skills    Motor Skills coordination;functional endurance  -     Therapeutic Exercise --  LUE ROM,  strength, gmc,fmc,UE bike,rickshaw  -     Row Name 11/10/22 1500          Positioning and Restraints    In Wheelchair sitting;with PT  -           User Key  (r) = Recorded By, (t) = Taken By, (c) = Cosigned By    Initials Name Effective Dates    Luda Hartman, OT 06/16/21 -                  Occupational Therapy Education     Title: PT OT SLP Therapies (Done)     Topic: Occupational Therapy (Done)     Point: ADL training (Done)     Description:   Instruct learner(s) on proper safety adaptation and remediation techniques during self care or transfers.   Instruct in proper use of assistive devices.              Learning Progress Summary           Patient Acceptance, E,TB, VU by ERNST at 11/9/2022 0027    Acceptance, E,TB, VU by ERNST at 11/8/2022 0037    Acceptance, E,TB,D, VU,DU by LA at 11/5/2022 1535                   Point: Home exercise program (Done)     Description:   Instruct learner(s) on appropriate technique for monitoring, assisting and/or progressing therapeutic exercises/activities.              Learning Progress Summary           Patient Acceptance, E,TB, VU by DG at 11/9/2022 0027    Acceptance, E,TB, VU by ERNST at 11/8/2022 0037    Acceptance, E,TB,D, VU,DU by LA at 11/5/2022 1535                   Point:  Precautions (Done)     Description:   Instruct learner(s) on prescribed precautions during self-care and functional transfers.              Learning Progress Summary           Patient Acceptance, E,TB, VU by  at 11/9/2022 0027    Acceptance, E,TB, VU by  at 11/8/2022 0037    Acceptance, E,TB,D, VU,DU by LA at 11/5/2022 1535                   Point: Body mechanics (Done)     Description:   Instruct learner(s) on proper positioning and spine alignment during self-care, functional mobility activities and/or exercises.              Learning Progress Summary           Patient Acceptance, E,TB, VU by  at 11/9/2022 0027    Acceptance, E,TB, VU by  at 11/8/2022 0037    Acceptance, E,TB,D, VU,DU by LA at 11/5/2022 1535                               User Key     Initials Effective Dates Name Provider Type [facility] 06/16/21 -  Brooke Garcia RN Registered Nurse Nurse    LA 02/14/22 -  Leilani Sanders OT Occupational Therapist OT                    OT Recommendation and Plan    Planned Therapy Interventions (OT): activity tolerance training, adaptive equipment training, BADL retraining, neuromuscular control/coordination retraining, passive ROM/stretching, ROM/therapeutic exercise, strengthening exercise, transfer/mobility retraining                    Time Calculation:      Time Calculation- OT     Row Name 11/10/22 1516 11/10/22 1514          Time Calculation- OT    OT Start Time 0745  - 0745  -     OT Stop Time 0915  - --     OT Time Calculation (min) 90 min  - --           User Key  (r) = Recorded By, (t) = Taken By, (c) = Cosigned By    Initials Name Provider Type     Luda Hicks OT Occupational Therapist              Therapy Charges for Today     Code Description Service Date Service Provider Modifiers Qty    26696721983  OT THERAPEUTIC ACT EA 15 MIN 11/9/2022 Luda Hicks OT GO 1    [id number]  OT SELF CARE/MGMT/TRAIN EA 15 MIN 11/9/2022 Luda Hicks OT GO 2     90058394408 HC OT NEUROMUSC RE EDUCATION EA 15 MIN 11/9/2022 Luda Hicks, OT GO 3    88134506689 HC OT THERAPEUTIC ACT EA 15 MIN 11/10/2022 Luda Hicks OT GO 1    37688304919 HC OT SELF CARE/MGMT/TRAIN EA 15 MIN 11/10/2022 Luda Hicks, OT GO 2    28867664366 HC OT NEUROMUSC RE EDUCATION EA 15 MIN 11/10/2022 Luda Hicks OT GO 3                   Luda Hicks OT  11/10/2022

## 2022-11-10 NOTE — THERAPY TREATMENT NOTE
Inpatient Rehabilitation - Physical Therapy Treatment Note       NORBERT Parkinson     Patient Name: Benitez Miranad  : 1955  MRN: [id number]    Today's Date: 11/10/2022                    Admit Date: 2022      Visit Dx:   No diagnosis found.    Patient Active Problem List   Diagnosis   • Uncontrolled type 2 diabetes with neuropathy   • COPD (chronic obstructive pulmonary disease) ([facility])   • Chronic diastolic congestive heart failure ([facility])   • Essential hypertension   • Tobacco abuse   • Coronary artery disease involving native coronary artery of native heart without angina pectoris   • Low back pain   • Hyperlipidemia LDL goal <70   • H/O mechanical aortic valve replacement   • Depression   • CKD (chronic kidney disease), stage III ([facility])   • Rheumatic mitral stenosis   • Morbidly obese ([facility])   • Recurrent major depressive disorder, in full remission ([facility])   • Ischemic stroke ([facility])   • CVA (cerebral vascular accident) ([facility])       Past Medical History:   Diagnosis Date   • Anxiety    • Arthritis    • CAD (coronary artery disease)     x1 stent; pulmonary HTN; EF 50-55%; rheumatic valve; MV stenosis   • CHF (congestive heart failure) ([facility])    • CKD (chronic kidney disease), stage III ([facility])    • COPD (chronic obstructive pulmonary disease) ([facility])    • Depression    • Diabetes mellitus ([facility])    • H/O mechanical aortic valve replacement    • History of tobacco abuse    • Hyperlipidemia    • Hypertension    • Ischemic heart disease    • Kidney failure     third stage   • Low back pain    • Obesity     BMI 36.   • Stroke ([facility])    • Valvular heart disease        Past Surgical History:   Procedure Laterality Date   • AORTIC VALVE REPAIR/REPLACEMENT     • CARDIAC CATHETERIZATION     • CARDIAC SURGERY      valve on aortic   • CARDIAC SURGERY      stent    • CATARACT EXTRACTION     • COLONOSCOPY     • CORONARY STENT PLACEMENT     • CYSTOSCOPY URETEROSCOPY LASER LITHOTRIPSY Left 2020    Procedure:  CYSTOSCOPY URETEROSCOPY LASER LITHOTRIPSY WITH STENT PLACEMENT;  Surgeon: Jonah Montgomery MD;  Location: [facility];  Service: Urology;  Laterality: Left;   • KIDNEY STONE SURGERY         PT ASSESSMENT (last 12 hours)     IRF PT Evaluation and Treatment     Row Name 11/10/22 1446          PT Time and Intention    Document Type daily treatment  BID treatment session  -LL     Mode of Treatment individual therapy;physical therapy  -LL     Patient/Family/Caregiver Comments/Observations Patient had no new c/o this date and is agreeable to PT session.  -LL     Row Name 11/10/22 1446          General Information    Existing Precautions/Restrictions fall  L shoulder pain-needs sling or support  -LL     Row Name 11/10/22 1446          Pain Scale: FACES Pre/Post-Treatment    Pain: FACES Scale, Pretreatment 6-->hurts even more  -LL     Posttreatment Pain Rating 6-->hurts even more  -LL     Row Name 11/10/22 1446          Cognition/Psychosocial    Affect/Mental Status (Cognition) WFL  -     Orientation Status (Cognition) oriented x 4  -LL     Follows Commands (Cognition) WFL  -LL     Personal Safety Interventions gait belt;nonskid shoes/slippers when out of bed  -LL     Cognitive Function WFL  -LL     Row Name 11/10/22 1446          Bed Mobility    Supine-Sit [place] (Bed Mobility) supervision;modified independence  -LL     Sit-Supine [place] (Bed Mobility) supervision;modified independence  -LL     Assistive Device (Bed Mobility) bed rails  -     Row Name 11/10/22 1446          Transfer Assessment/Treatment    Transfers car transfer  -     Row Name 11/10/22 1446          Bed-Chair Transfer    Bed-Chair [place] (Transfers) supervision  -     Assistive Device (Bed-Chair Transfers) wheelchair  -LL     Row Name 11/10/22 1446          Chair-Bed Transfer    Chair-Bed [place] (Transfers) supervision  -     Assistive Device (Chair-Bed Transfers) wheelchair  -LL     Row Name 11/10/22 1446           Sit-Stand Transfer    Sit-Stand [place] (Transfers) supervision  -LL     Assistive Device (Sit-Stand Transfers) wheelchair  -LL     Row Name 11/10/22 1446          Stand-Sit Transfer    Stand-Sit [place] (Transfers) supervision  -LL     Assistive Device (Stand-Sit Transfers) wheelchair  -LL     Row Name 11/10/22 1446          Car Transfer    Type (Car Transfer) sit-stand;stand-sit  -LL     [place] Level (Car Transfer) modified independence  -LL     Row Name 11/10/22 1446          Gait/Stairs (Locomotion)    Gait/Stairs Locomotion gait/ambulation independence;distance ambulated;gait deviations  -LL     [place] Level (Gait) contact guard;standby assist  -LL     Assistive Device (Gait) other (see comments)  no AD  -LL     Distance in Feet (Gait) 320' BID  -LL     Deviations/Abnormal Patterns (Gait) candi decreased;gait speed decreased;stride length decreased  -LL     Bilateral Gait Deviations foot drop/toe drag  -LL     [place] Level (Stairs) contact guard;verbal cues;nonverbal cues (demo/gesture)  -LL     Handrail Location (Stairs) both sides  -LL     Number of Steps (Stairs) 15  -LL     Ascending Technique (Stairs) step-to-step  -LL     Descending Technique (Stairs) step-to-step  -LL     Comment, (Gait/Stairs) Cues provided for heel strike on L LE  -LL     Row Name 11/10/22 1446          Balance    Comment, Balance Weaving in/out of cones; standing w/ 2# ball: chest press, biceps curl & overhead press; standing on foam with NBOS with arms crossed over chest; standing ex's on foam pad  -LL     Row Name 11/10/22 1446          Motor Skills    Therapeutic Exercise --  Sitting marching & LAQ w/ 3#; standing HR & marching on foam pad  -LL     Row Name 11/10/22 1446          Positioning and Restraints    Pre-Treatment Position --  WC w/ OT in AM; bed in PM  -LL     Post Treatment Position wheelchair  -LL     In Wheelchair sitting;with other staff  In PM  -LL     Row Name 11/10/22 1446           Therapy Assessment/Plan (PT)    Patient's Goals For Discharge return home  maximize recovery  -     Row Name 11/10/22 1446          Therapy Assessment/Plan (PT)    Rehab Potential/Prognosis (PT) adequate, monitor progress closely  -     Frequency of Treatment (PT) 5 times per week  -LL     Estimated Duration of Therapy (PT) 2 weeks  -     Problem List (PT) balance;coordination;hemiparesis/hemiplegia;mobility;motor control;muscle tone;strength;pain  -LL     Activity Limitations Related to Problem List (PT) unable to ambulate safely;unable to transfer safely  -     Row Name 11/10/22 1446          Daily Progress Summary (PT)    Impairments Still Limiting Function (PT) balance impairment;functional activity tolerance impairment;motor control impaired;strength deficit  -     Row Name 11/10/22 1446          Therapy Plan Review/Discharge Plan (PT)    Anticipated Equipment Needs at Discharge (PT Eval) --  tbd  -LL     Anticipated Discharge Disposition (PT) home with home health;home with assist  -     Row Name 11/10/22 1446          IRF PT Goals    Bed Mobility Goal Selection (PT-IRF) bed mobility, PT goal 1  -LL     Transfer Goal Selection (PT-IRF) transfers, PT goal 1  -LL     Gait (Walking Locomotion) Goal Selection (PT-IRF) gait, PT goal 1  -LL     Row Name 11/10/22 1446          Bed Mobility Goal 1 (PT-IRF)    Activity/Assistive Device (Bed Mobility Goal 1, PT-IRF) sit to supine/supine to sit  -LL     [place] Level (Bed Mobility Goal 1, PT-IRF) independent  -LL     Time Frame (Bed Mobility Goal 1, PT-IRF) by discharge  -     Progress/Outcomes (Bed Mobility Goal 1, PT-IRF) goal ongoing  -     Row Name 11/10/22 1446          Transfer Goal 1 (PT-IRF)    Activity/Assistive Device (Transfer Goal 1, PT-IRF) sit-to-stand/stand-to-sit;bed-to-chair/chair-to-bed  -LL     [place] Level (Transfer Goal 1, PT-IRF) modified independence  -LL     Time Frame (Transfer Goal 1, PT-IRF) by discharge  -      Progress/Outcomes (Transfer Goal 1, PT-IRF) goal ongoing  -LL     Row Name 11/10/22 1446          Gait/Walking Locomotion Goal 1 (PT-IRF)    Activity/Assistive Device (Gait/Walking Locomotion Goal 1, PT-IRF) --  AAD  -LL     Gait/Walking Locomotion Distance Goal 1 (PT-IRF) 300'  -LL     [place] Level (Gait/Walking Locomotion Goal 1, PT-IRF) supervision required  -LL     Time Frame (Gait/Walking Locomotion Goal 1, PT-IRF) by discharge  -LL     Progress/Outcomes (Gait/Walking Locomotion Goal 1, PT-IRF) goal ongoing  -LL           User Key  (r) = Recorded By, (t) = Taken By, (c) = Cosigned By    Initials Name Provider Type    LL Julia Eubanks PTA Physical Therapist Assistant                 Physical Therapy Education     Title: PT OT SLP Therapies (Done)     Topic: Physical Therapy (Done)     Point: Mobility training (Done)     Learning Progress Summary           Patient Acceptance, E,D, VU,NR by LL at 11/10/2022 1457    Acceptance, E,TB, VU by RF at 11/9/2022 1634    Acceptance, E,D, VU,NR by RG at 11/9/2022 1340    Acceptance, E,TB, VU by DG at 11/9/2022 0027    Acceptance, E,TB, VU,DU by HR at 11/8/2022 1445    Acceptance, E,TB, VU by DG at 11/8/2022 0037    Acceptance, E,TB, VU,DU by HR at 11/7/2022 1329    Acceptance, E,TB, VU,DU by HR at 11/5/2022 1211    Acceptance, E,TB, VU,DU by HR at 11/4/2022 1423    Acceptance, E,TB, VU,DU by HR at 11/3/2022 1458    Acceptance, E, VU,NR by LB at 11/2/2022 1602                   Point: Home exercise program (Done)     Learning Progress Summary           Patient Acceptance, E,D, VU,NR by LL at 11/10/2022 1457    Acceptance, E,TB, VU by RF at 11/9/2022 1634    Acceptance, E,D, VU,NR by RG at 11/9/2022 1340    Acceptance, E,TB, VU by DG at 11/9/2022 0027    Acceptance, E,TB, VU,DU by HR at 11/8/2022 1445    Acceptance, E,TB, VU by DG at 11/8/2022 0037    Acceptance, E,TB, VU,DU by HR at 11/7/2022 1329    Acceptance, E,TB, VU,DU by HR at 11/5/2022 1211    Acceptance,  E,TB, VU,DU by HR at 11/4/2022 1423    Acceptance, E,TB, VU,DU by HR at 11/3/2022 1458    Acceptance, E, VU,NR by LB at 11/2/2022 1602                   Point: Body mechanics (Done)     Learning Progress Summary           Patient Acceptance, E,D, VU,NR by LL at 11/10/2022 1457    Acceptance, E,TB, VU by RF at 11/9/2022 1634    Acceptance, E,D, VU,NR by RG at 11/9/2022 1340    Acceptance, E,TB, VU by DG at 11/9/2022 0027    Acceptance, E,TB, VU,DU by HR at 11/8/2022 1445    Acceptance, E,TB, VU by DG at 11/8/2022 0037    Acceptance, E,TB, VU,DU by HR at 11/7/2022 1329    Acceptance, E,TB, VU,DU by HR at 11/5/2022 1211    Acceptance, E,TB, VU,DU by HR at 11/4/2022 1423    Acceptance, E,TB, VU,DU by HR at 11/3/2022 1458    Acceptance, E, VU,NR by LB at 11/2/2022 1602                   Point: Precautions (Done)     Learning Progress Summary           Patient Acceptance, E,D, VU,NR by LL at 11/10/2022 1457    Acceptance, E,TB, VU by RF at 11/9/2022 1634    Acceptance, E,D, VU,NR by RG at 11/9/2022 1340    Acceptance, E,TB, VU by DG at 11/9/2022 0027    Acceptance, E,TB, VU,DU by HR at 11/8/2022 1445    Acceptance, E,TB, VU by DG at 11/8/2022 0037    Acceptance, E,TB, VU,DU by HR at 11/7/2022 1329    Acceptance, E,TB, VU,DU by HR at 11/5/2022 1211    Acceptance, E,TB, VU,DU by HR at 11/4/2022 1423    Acceptance, E,TB, VU,DU by HR at 11/3/2022 1458    Acceptance, E, VU,NR by LB at 11/2/2022 1602                               User Key     Initials Effective Dates Name Provider Type Discipline    DG 06/16/21 -  Brooke Garcia, RN Registered Nurse Nurse    LB 06/16/21 -  Yulia Cabrera, PT Physical Therapist PT    LL 05/02/16 -  Julia Eubanks, PTA Physical Therapist Assistant PT    RF 06/16/21 -  Katie Hanks, PTA Physical Therapist Assistant PT    RG 06/16/21 -  Devin Nazario, XU Physical Therapist Assistant PT    HR 01/14/22 -  Zahra Mclean, PTA Physical Therapist Assistant PT                PT  Recommendation and Plan    Frequency of Treatment (PT): 5 times per week  Anticipated Equipment Needs at Discharge (PT Eval):  (tbd)  Daily Progress Summary (PT)  Impairments Still Limiting Function (PT): balance impairment, functional activity tolerance impairment, motor control impaired, strength deficit               Time Calculation:      PT Charges     Row Name 11/10/22 1458 11/10/22 1457          Time Calculation    Start Time 1330  -LL 0915  -LL     Stop Time 1415  -LL 1000  -LL     Time Calculation (min) 45 min  -LL 45 min  -LL     PT Received On -- 11/10/22  -LL        Time Calculation- PT    Total Timed Code Minutes- PT 45 minute(s)  -LL 45 minute(s)  -LL           User Key  (r) = Recorded By, (t) = Taken By, (c) = Cosigned By    Initials Name Provider Type    LL Julia Eubanks PTA Physical Therapist Assistant                Therapy Charges for Today     Code Description Service Date Service Provider Modifiers Qty    21350456209 HC GAIT TRAINING EA 15 MIN 11/10/2022 Julia Eubanks PTA GP, CQ 2    31891387915 HC PT NEUROMUSC RE EDUCATION EA 15 MIN 11/10/2022 Julia Eubanks PTA GP, CQ 3    76573543807 HC PT THER PROC EA 15 MIN 11/10/2022 Julia Eubanks PTA GP, CQ 1                   Julia. XU Eubanks  11/10/2022

## 2022-11-10 NOTE — SIGNIFICANT NOTE
11/10/22 0900   Plan   Plan Pt is requesting to be discharged home tomorrow 11-11-22 and MD is agreeable to discharge.  Spoke to pt who says he does not want to stay until 11-16-22 and wants to go home on 11-11-22.  Pt is agreeable to complete therapy sessions and go home in the afternoon.  Spoke to son Jose Angel [phone] about pt wanting to go home tomorrow after therapy and MD agreeable to discharge.  Son is sick and will make arrangements to get pt's house key to him and for transportation at discharge.  Pt is recommended for outpatient PT/OT 2 x wk x 4 wks.  Pt is agreeable to go to outpatient therapy at [facility] and wants to drive.  MD does not recommend driving at discharge and says pt can discuss with PCP when he is safe to drive again.  Pt says his brother Taye Abreu can provide transportation to outpatient therapy.  Pt says he has two sisters and they can help with transportation too.  Pt needs INR draw on 11-14-22 at PCP office.  Pt says he can get a sibling to provide transportation to PCP office.  Pt is recommended for rolling walker, bedside commode and transfer tub bench.  Explained transfer tub bench is not covered by insurance.  Pt will purchase this item from [employer].  [place] is preferred for DME.  Pt will return to his home alone at discharge.  Son will check on pt and friend Xavi will check on him at home.   Patient/Family in Agreement with Plan yes

## 2022-11-10 NOTE — SIGNIFICANT NOTE
11/10/22 1030   Plan   Plan Informed pt to follow-up with PCP about new order for Bi-PAP machine.  Pt says his friend Xavi will provide transportation at discharge.  Pt will inform son about transportation when he talks to him this afternoon.  Informed pt about outpatient PT/OT appointment on 11-17-22 at 10:00 am and 11:00 am at [facility]; arrive 10 mins early for paperwork.   Patient/Family in Agreement with Plan yes

## 2022-11-10 NOTE — DISCHARGE INSTR - OTHER ORDERS
Pt will receive outpatient PT/OT 2 x wk x 4 weeks at [facility] [phone].  Pt has an appointment on 11-17-22 at 9:30 am for OT evaluation and 10:30 am for PT evaluation.  Arrive 10 minutes early for paperwork.    [facility] [phone] for rolling walker and bedside commode.

## 2022-11-10 NOTE — PROGRESS NOTES
CC: Follow-up on right MCA CVA    Interview History/HPI: Patient is without complaints, he thinks he is improving with therapy, tolerating his diet, no new events overnight, he actually would like to go home this week.          Current Hospital Meds:  aspirin, 81 mg, Oral, Daily  atorvastatin, 80 mg, Oral, Nightly  Diclofenac Sodium, 2 g, Topical, 4x Daily  Insulin Aspart, 0-7 Units, Subcutaneous, 4x Daily PC & at Bedtime  insulin detemir, 20 Units, Subcutaneous, Q12H  melatonin, 5 mg, Oral, Nightly  sertraline, 150 mg, Oral, Daily    Pharmacy to dose warfarin,         Vitals:    11/10/22 0736   BP:    Pulse:    Resp:    Temp:    SpO2: 97%         Intake/Output Summary (Last 24 hours) at 11/10/2022 1015  Last data filed at 11/10/2022 0900  Gross per 24 hour   Intake 1320 ml   Output --   Net 1320 ml       EXAM: 125/77, 98.1, 66, 18.  He is awake alert no distress speech is good.  He still has some weakness in his left hand compared to the right.  His lungs are clear, heart regular rate and rhythm without murmur.  No edema is noted.      Diet Regular; Cardiac, Consistent Carbohydrate        LABS:     Lab Results (last 48 hours)     Procedure Component Value Units Date/Time    POC Glucose Once [[id number]]  (Abnormal) Collected: 11/10/22 0552    Specimen: Blood Updated: 11/10/22 0616     Glucose 188 mg/dL      Comment: Meter: [id number] : [id number] Kurt Prajapati       Protime-INR [[id number]]  (Abnormal) Collected: 11/10/22 0057    Specimen: Blood Updated: 11/10/22 0134     Protime 33.9 Seconds      INR 3.22    Narrative:      Suggested INR therapeutic range for stable oral anticoagulant therapy:    Low Intensity therapy:   1.5-2.0  Moderate Intensity therapy:   2.0-3.0  High Intensity therapy:   2.5-4.0    POC Glucose Once [[id number]]  (Abnormal) Collected: 11/09/22 1929    Specimen: Blood Updated: 11/09/22 1939     Glucose 200 mg/dL      Comment: Meter: [id number] : [id number] Shon Lares       POC  Glucose Once [[id number]]  (Abnormal) Collected: 11/09/22 1552    Specimen: Blood Updated: 11/09/22 1558     Glucose 145 mg/dL      Comment: Meter: [id number] : [id number] TELLE[employer]Chat [employer]       POC Glucose Once [[id number]]  (Abnormal) Collected: 11/09/22 1054    Specimen: Blood Updated: 11/09/22 1100     Glucose 147 mg/dL      Comment: Meter: [id number] : [id number] TELLEZ KAMARI       POC Glucose Once [[id number]]  (Abnormal) Collected: 11/09/22 0741    Specimen: Blood Updated: 11/09/22 0747     Glucose 274 mg/dL      Comment: Meter: [id number] : [id number] [employer] [employer]       Basic Metabolic Panel [[id number]]  (Abnormal) Collected: 11/09/22 0142    Specimen: Blood Updated: 11/09/22 0218     Glucose 170 mg/dL      BUN 21 mg/dL      Creatinine 1.29 mg/dL      Sodium 136 mmol/L      Potassium 4.3 mmol/L      Chloride 103 mmol/L      CO2 22.1 mmol/L      Calcium 8.9 mg/dL      BUN/Creatinine Ratio 16.3     Anion Gap 10.9 mmol/L      eGFR 60.0 mL/min/1.73      Comment: National Kidney Foundation and American Society of Nephrology (ASN) Task Force recommended calculation based on the Chronic Kidney Disease Epidemiology Collaboration (CKD-EPI) equation refit without adjustment for race.       Narrative:      GFR Normal >60  Chronic Kidney Disease <60  Kidney Failure <15      Protime-INR [[id number]]  (Abnormal) Collected: 11/09/22 0142    Specimen: Blood Updated: 11/09/22 0214     Protime 36.1 Seconds      INR 3.49    Narrative:      Suggested INR therapeutic range for stable oral anticoagulant therapy:    Low Intensity therapy:   1.5-2.0  Moderate Intensity therapy:   2.0-3.0  High Intensity therapy:   2.5-4.0    CBC & Differential [[id number]]  (Abnormal) Collected: 11/09/22 0142    Specimen: Blood Updated: 11/09/22 0203    Narrative:      The following orders were created for panel order CBC & Differential.  Procedure                               Abnormality         Status                     ---------                                -----------         ------                     CBC Auto Differential[[id number]]        Abnormal            Final result                 Please view results for these tests on the individual orders.    CBC Auto Differential [[id number]]  (Abnormal) Collected: 11/09/22 0142    Specimen: Blood Updated: 11/09/22 0203     WBC 11.04 10*3/mm3      RBC 4.69 10*6/mm3      Hemoglobin 13.2 g/dL      Hematocrit 40.7 %      MCV 86.8 fL      MCH 28.1 pg      MCHC 32.4 g/dL      RDW 14.6 %      RDW-SD 46.0 fl      MPV 11.7 fL      Platelets 155 10*3/mm3      Neutrophil % 69.4 %      Lymphocyte % 17.9 %      Monocyte % 6.5 %      Eosinophil % 4.2 %      Basophil % 0.8 %      Immature Grans % 1.2 %      Neutrophils, Absolute 7.66 10*3/mm3      Lymphocytes, Absolute 1.98 10*3/mm3      Monocytes, Absolute 0.72 10*3/mm3      Eosinophils, Absolute 0.46 10*3/mm3      Basophils, Absolute 0.09 10*3/mm3      Immature Grans, Absolute 0.13 10*3/mm3      nRBC 0.0 /100 WBC     POC Glucose Once [[id number]]  (Abnormal) Collected: 11/08/22 2000    Specimen: Blood Updated: 11/08/22 2023     Glucose 234 mg/dL      Comment: Meter: [id number] : [id number] Shon Lares       POC Glucose Once [[id number]]  (Abnormal) Collected: 11/08/22 1618    Specimen: Blood Updated: 11/08/22 1647     Glucose 139 mg/dL      Comment: Meter: [id number] : [id number] Nat Sommer       POC Glucose Once [[id number]]  (Abnormal) Collected: 11/08/22 1051    Specimen: Blood Updated: 11/08/22 1057     Glucose 227 mg/dL      Comment: Meter: [id number] : [id number] Sommer                  Radiology:    Imaging Results (Last 72 Hours)     ** No results found for the last 72 hours. **          Results for orders placed during the hospital encounter of 10/28/22    Adult Transthoracic Echo Complete W/ Cont if Necessary Per Protocol (With Agitated Saline)    Interpretation Summary  •  The left ventricular cavity is mildly dilated.  •   Left ventricular wall thickness is consistent with mild concentric hypertrophy.  •  Left ventricular systolic function is normal. Left ventricular ejection fraction appears to be 56 - 60%.  •  Left ventricular diastolic function is consistent with (grade I) impaired relaxation.  •  There is a mechanical aortic valve prosthesis present. The aortic valve peak and mean gradients are within defined limits. The prosthetic aortic valve is grossly normal.  •  Moderate calcific  mitral valve stenosis is present. ( Peak PG= 17 mm Hg and mean PG = 8 mm Hg)).  Unchanged since the study done 10/5/2021.  •  Moderate mitral valve regurgitation is present.  •  Mild pulmonary hypertension is present.  •  Saline test is negative for the evidence of shunt at interatrial septum.  •  There is no evidence of pericardial effusion.      Assessment/Plan:   Right MCA CVA secondary to ischemia.  Patient is on aspirin and statin.  Patient has progressed with therapy very well.  Patient is standby assist with bed mobility, standby assist with bed chair and chair to bed transfers, standby assist with sit to stand and stand to sit.  Patient is stand pivot standby assist toilet transfer.  Patient yesterday morning walked 320 feet contact-guard/standby assist without assistive devices.  He does scoot his left foot on the forefoot and does drag this secondary to fatigue.  As far as disposition, he is going home alone, his son apparently has possible COVID, he will not be able to see him however the patient himself still wants to go home.  I have talked to physical and Occupational Therapy and  and I think this is reasonable, will plan for discharge tomorrow.  He will need INR check Monday with his PCP.    Status post aortic valve replacement, INR remains therapeutic, pharmacy dosing input appreciated, Lovenox stopped yesterday, follow-up    Coronary artery disease status post stenting in the past, continue aspirin and statin,  asymptomatic    Diabetes, excellent control, continue insulin and sliding scale, patient was on insulin at home as well.    Depression, doing excellent on Zoloft currently.    Flynn An MD

## 2022-11-10 NOTE — SIGNIFICANT NOTE
11/10/22 1000   Plan   Plan Contacted [facility] [phone] per Keesha with discharge on 11-11-22 and need for outpatient PT 2 x wk x 4 wks evaluate and treat for strengthening, endurance, gait training, transfer training, balance, therapeutic exercise, neuro re-education, home safety, coordination and OT 2 xwk x 4 wks evaluate and treat for home safety, strengthening, ROM, coordination, neuro re-education related to CVA, left sided weakness.  Pt is scheduled for an appointment on 11-17-22 at 9:30 am for OT evaluation and 10:30 am for PT evaluation; arrive 10 mins for paperwork.  Pt's records to be accessed via [employer] by  Outpatient Rehab. Ambulatory referral for outpatient PT/OT to be accessed via [employer] at discharge.  Contacted [facility] [phone] per preference per Gemma about discharge and need for rolling walker and bedside commode.  RW will be delivered to rehab on 11-11-22 and pt to call [facility] after he arrives home for delivery of BSC.

## 2022-11-10 NOTE — SIGNIFICANT NOTE
11/10/22 1000   Plan   Plan Contacted [facility] [phone] per Keesha with discharge on 11-11-22 and need for outpatient PT 2 x wk x 4 wks evaluate and treat for strengthening, endurance, gait training, transfer training, balance, therapeutic exercise, neuro re-education, home safety, coordination and OT 2 xwk x 4 wks evaluate and treat for home safety, strengthening, ROM, coordination, neuro re-education related to CVA, left sided weakness.  Pt is scheduled for an appointment on 11-17-22 at 9:30 am for OT evaluation and 10:30 am for PT evaluation; arrive 10 mins for paperwork.  Pt's records to be accessed via [employer] by  Outpatient Rehab. Ambulatory referral for outpatient PT/OT to be accessed via [employer] at discharge.  Contacted [facility] [phone] per preference per Gemma about discharge and need for rolling walker and bedside commode.  RW will be delivered to rehab on 11-11-22 and pt to call [facility] after he arrives home for delivery of BSC.  Faxed face sheet, H&P, PT/OT notes/evaluations, and MD progress notes to [facility] via [employer].  DME orders to be faxed at discharge.  SS asked Gemma about pt getting a new Bi-PAP machine and she says he will need order for Bi-PAP machine, supplies and settings 22/10 CM.  MD is aware and recommends pt follow-up with PCP for new Bi-PAP machine.

## 2022-11-10 NOTE — DISCHARGE SUMMARY
Date of admission: 11/1/2022  Date of discharge: 11/11/22    Principal diagnosis: Status post right MCA distribution secondary to ischemia, CVA with left upper extremity paresis  Secondary diagnoses:  Diabetes type 2 insulin requiring  Obstructive sleep apnea  Chronic kidney disease unknown subtype  Coronary artery disease status post stent placement in the past  Status post mechanical aortic valve with INR target 3-3.5  Depression  Moderate mitral regurg echocardiogram  HFpEF, compensated grade 1 by echo    Procedures:  None    Consultants:  None    Exam: Patient is pleasant, on room air, mood is good skin warm and dry his strength in his left arm is diminished as compared to the right but otherwise strength is good, he denies chest pain or shortness of breath no new neurologic symptoms, vital signs: 155/86, 18, 62, 97.7.  Room air saturation 97%    Hospital course: Patient was admitted to the inpatient rehab facility status post right MCA CVA stroke with associated left especially upper extremity paresis.  Patient had been seen by speech therapy while on the acute unit there were no signs or symptoms indicative of oral pharyngeal dysphagia.  On initial evaluation by Occupational Therapy, left shoulder only had 1 of 4 range of motion.  Upper extremity deficit, left upper extremity was 2/5.  Patient was maximum assistance with bathing, minimal assistance with upper body dressing, maximal assistance with lower body dressing contact-guard with grooming moderate assistance with toileting.  Patient initially walked 50 feet in the a.m. with contact-guard 150 feet in the p.m. with minimal assist to steer.  Patient is now supervision only for bed mobility supervision for transfers.  Modified independent for car transfer.  Patient walked 320 feet twice today independence no assistive device needed.  Patient now only requires minimal assistance for bathing, minimal assistance for lower body dressing set up for upper body  dressing.  Toileting he only requires minimal assistance.  He has made significant progress and he wishes to go home.  I think this is a reasonable option, he does live alone however, he has a son that checks on him but the son thinks he has had recent COVID.  We did offer to continue to work with the patient until his son is improved however patient was fairly adamant he wanted to go home therefore proceed with discharge.  He does have a history of hypertension but his blood pressure is controlled off medications, we will monitor this while here.  We have monitored his glucose and made adjustments to insulin as needed.  Discharge INR good at 3.14    Condition: Stable/improved    Disposition: Home with outpatient physical and occupational therapy    Follow-up:  PCP next week with pro time on 11/14/2022 patient is aware of this and states he would get a ride to go there on that day.  Dr. Velazquez 2 weeks    Diet: Cardiac constant carbohydrate regular texture thin liquid    Activity: Weight-bear as tolerated    Medication:  Aspirin 81 mg a day  Lipitor 80 mg a day  Lantus 25 units twice daily  Sliding scale insulin lispro 0-7 AC  Coumadin 9 mg daily (discussed with Fuad and pharmacy)  Zoloft 100 mg tablet to take 1.5 tablets daily    Greater than 30-minute discharge      Greater than 30 minutes

## 2022-11-10 NOTE — PROGRESS NOTES
Occupational Therapy:    Physical Therapy: Individual: 90 minutes.    Speech Language Pathology:    Signed by: Julia Eubanks PTA

## 2022-11-11 VITALS
SYSTOLIC BLOOD PRESSURE: 155 MMHG | RESPIRATION RATE: 18 BRPM | BODY MASS INDEX: 34.54 KG/M2 | DIASTOLIC BLOOD PRESSURE: 86 MMHG | HEART RATE: 62 BPM | TEMPERATURE: 97.7 F | OXYGEN SATURATION: 97 % | WEIGHT: 255 LBS | HEIGHT: 72 IN

## 2022-11-11 LAB
ALBUMIN SERPL-MCNC: 3.37 G/DL (ref 3.5–5.2)
ALBUMIN/GLOB SERPL: 1.1 G/DL
ALP SERPL-CCNC: 57 U/L (ref 39–117)
ALT SERPL W P-5'-P-CCNC: 23 U/L (ref 1–41)
ANION GAP SERPL CALCULATED.3IONS-SCNC: 10.6 MMOL/L (ref 5–15)
AST SERPL-CCNC: 17 U/L (ref 1–40)
BASOPHILS # BLD AUTO: 0.07 10*3/MM3 (ref 0–0.2)
BASOPHILS NFR BLD AUTO: 0.8 % (ref 0–1.5)
BILIRUB SERPL-MCNC: 0.4 MG/DL (ref 0–1.2)
BUN SERPL-MCNC: 26 MG/DL (ref 8–23)
BUN/CREAT SERPL: 19 (ref 7–25)
CALCIUM SPEC-SCNC: 8.6 MG/DL (ref 8.6–10.5)
CHLORIDE SERPL-SCNC: 105 MMOL/L (ref 98–107)
CO2 SERPL-SCNC: 21.4 MMOL/L (ref 22–29)
CREAT SERPL-MCNC: 1.37 MG/DL (ref 0.76–1.27)
DEPRECATED RDW RBC AUTO: 44.9 FL (ref 37–54)
EGFRCR SERPLBLD CKD-EPI 2021: 55.8 ML/MIN/1.73
EOSINOPHIL # BLD AUTO: 0.3 10*3/MM3 (ref 0–0.4)
EOSINOPHIL NFR BLD AUTO: 3.3 % (ref 0.3–6.2)
ERYTHROCYTE [DISTWIDTH] IN BLOOD BY AUTOMATED COUNT: 14.7 % (ref 12.3–15.4)
GLOBULIN UR ELPH-MCNC: 3.1 GM/DL
GLUCOSE BLDC GLUCOMTR-MCNC: 228 MG/DL (ref 70–130)
GLUCOSE SERPL-MCNC: 202 MG/DL (ref 65–99)
HCT VFR BLD AUTO: 39.5 % (ref 37.5–51)
HGB BLD-MCNC: 12.7 G/DL (ref 13–17.7)
IMM GRANULOCYTES # BLD AUTO: 0.12 10*3/MM3 (ref 0–0.05)
IMM GRANULOCYTES NFR BLD AUTO: 1.3 % (ref 0–0.5)
INR PPP: 3.14 (ref 0.9–1.1)
LYMPHOCYTES # BLD AUTO: 1.7 10*3/MM3 (ref 0.7–3.1)
LYMPHOCYTES NFR BLD AUTO: 18.5 % (ref 19.6–45.3)
MCH RBC QN AUTO: 27.3 PG (ref 26.6–33)
MCHC RBC AUTO-ENTMCNC: 32.2 G/DL (ref 31.5–35.7)
MCV RBC AUTO: 84.9 FL (ref 79–97)
MONOCYTES # BLD AUTO: 0.72 10*3/MM3 (ref 0.1–0.9)
MONOCYTES NFR BLD AUTO: 7.8 % (ref 5–12)
NEUTROPHILS NFR BLD AUTO: 6.28 10*3/MM3 (ref 1.7–7)
NEUTROPHILS NFR BLD AUTO: 68.3 % (ref 42.7–76)
NRBC BLD AUTO-RTO: 0 /100 WBC (ref 0–0.2)
PLATELET # BLD AUTO: 170 10*3/MM3 (ref 140–450)
PMV BLD AUTO: 11.9 FL (ref 6–12)
POTASSIUM SERPL-SCNC: 4.3 MMOL/L (ref 3.5–5.2)
PROT SERPL-MCNC: 6.5 G/DL (ref 6–8.5)
PROTHROMBIN TIME: 33.2 SECONDS (ref 12.1–14.7)
RBC # BLD AUTO: 4.65 10*6/MM3 (ref 4.14–5.8)
SODIUM SERPL-SCNC: 137 MMOL/L (ref 136–145)
WBC NRBC COR # BLD: 9.19 10*3/MM3 (ref 3.4–10.8)

## 2022-11-11 PROCEDURE — 97535 SELF CARE MNGMENT TRAINING: CPT | Performed by: OCCUPATIONAL THERAPIST

## 2022-11-11 PROCEDURE — 94799 UNLISTED PULMONARY SVC/PX: CPT

## 2022-11-11 PROCEDURE — 99239 HOSP IP/OBS DSCHRG MGMT >30: CPT | Performed by: INTERNAL MEDICINE

## 2022-11-11 PROCEDURE — 97116 GAIT TRAINING THERAPY: CPT

## 2022-11-11 PROCEDURE — 97110 THERAPEUTIC EXERCISES: CPT

## 2022-11-11 PROCEDURE — 80053 COMPREHEN METABOLIC PANEL: CPT | Performed by: INTERNAL MEDICINE

## 2022-11-11 PROCEDURE — 82962 GLUCOSE BLOOD TEST: CPT

## 2022-11-11 PROCEDURE — 85610 PROTHROMBIN TIME: CPT | Performed by: FAMILY MEDICINE

## 2022-11-11 PROCEDURE — 63710000001 INSULIN ASPART PER 5 UNITS: Performed by: FAMILY MEDICINE

## 2022-11-11 PROCEDURE — 63710000001 INSULIN DETEMIR PER 5 UNITS: Performed by: FAMILY MEDICINE

## 2022-11-11 PROCEDURE — 97530 THERAPEUTIC ACTIVITIES: CPT | Performed by: OCCUPATIONAL THERAPIST

## 2022-11-11 PROCEDURE — 97530 THERAPEUTIC ACTIVITIES: CPT

## 2022-11-11 PROCEDURE — 97112 NEUROMUSCULAR REEDUCATION: CPT

## 2022-11-11 PROCEDURE — 85025 COMPLETE CBC W/AUTO DIFF WBC: CPT | Performed by: INTERNAL MEDICINE

## 2022-11-11 PROCEDURE — 97112 NEUROMUSCULAR REEDUCATION: CPT | Performed by: OCCUPATIONAL THERAPIST

## 2022-11-11 RX ORDER — WARFARIN SODIUM 6 MG/1
9 TABLET ORAL NIGHTLY
Qty: 45 TABLET | Refills: 0 | Status: SHIPPED | OUTPATIENT
Start: 2022-11-11 | End: 2022-11-14 | Stop reason: SDUPTHER

## 2022-11-11 RX ORDER — ASPIRIN 81 MG/1
81 TABLET ORAL DAILY
Qty: 30 TABLET | Refills: 0 | Status: SHIPPED | OUTPATIENT
Start: 2022-11-11

## 2022-11-11 RX ORDER — ATORVASTATIN CALCIUM 80 MG/1
80 TABLET, FILM COATED ORAL NIGHTLY
Qty: 30 TABLET | Refills: 0 | Status: SHIPPED | OUTPATIENT
Start: 2022-11-11 | End: 2022-12-12 | Stop reason: SDUPTHER

## 2022-11-11 RX ORDER — INSULIN GLARGINE 100 [IU]/ML
25 INJECTION, SOLUTION SUBCUTANEOUS 2 TIMES DAILY
Start: 2022-11-11

## 2022-11-11 RX ADMIN — INSULIN DETEMIR 20 UNITS: 100 INJECTION, SOLUTION SUBCUTANEOUS at 09:15

## 2022-11-11 RX ADMIN — SERTRALINE 150 MG: 50 TABLET, FILM COATED ORAL at 09:06

## 2022-11-11 RX ADMIN — DICLOFENAC 2 G: 10 GEL TOPICAL at 09:07

## 2022-11-11 RX ADMIN — INSULIN ASPART 3 UNITS: 100 INJECTION, SOLUTION INTRAVENOUS; SUBCUTANEOUS at 09:06

## 2022-11-11 RX ADMIN — ASPIRIN 81 MG: 81 TABLET, COATED ORAL at 09:06

## 2022-11-11 NOTE — PROGRESS NOTES
Occupational Therapy:    Physical Therapy: Individual: 90 minutes.    Speech Language Pathology:    Signed by: Maddie Rodney, Supervisor

## 2022-11-11 NOTE — PROGRESS NOTES
Patient Assessment Instrument  Quality Indicators - Discharge FY 2023    Section A. Transportation      Section A. Medication List        Section B. Health Literacy      Section C. BIMS      Section C. Signs and Symptoms of Delirium (from CAM)      Section D. Mood      Section D. Social Isolation      Section GG. Self-Care Performance      Section GG. Mobility Performance     Roll Left and Right: Patient completed the activities by themself with no  assistance from a helper.   Sit to Lying: Patient completed the activities by themself with no assistance  from a helper.   Lying to Sitting on Side of Bed: Patient completed the activities by themself  with no assistance from a helper.   Sit to Stand: Patient completed the activities by themself with no assistance  from a helper.   Chair/Bed to Chair Transfer: Patient completed the activities by themself with  no assistance from a helper.   Toilet Transfer Patient completed the activities by themself with no assistance  from a helper.   Car Transfer: Patient completed the activities by themself with no assistance  from a helper.   Walk 10 Feet:   [place] provides verbal cues and/or touching/steadying and/or  contact guard assistance as patient completes activity. Assistance may be  provided throughout the activity or intermittently.  Walk 50 Feet with 2 Turns:   [place] provides verbal cues and/or  touching/steadying and/or contact guard assistance as patient completes  activity. Assistance may be provided throughout the activity or intermittently.  Walk 150 Feet:   [place] provides verbal cues and/or touching/steadying and/or  contact guard assistance as patient completes activity. Assistance may be  provided throughout the activity or intermittently.  Walking 10 Feet on Uneven Surfaces:   Not attempted due to medical or safety  concerns.  1 Step Over Curb or Up/Down Stair:   [place] provides verbal cues and/or  touching/steadying and/or contact guard assistance as patient  completes  activity. Assistance may be provided throughout the activity or intermittently.  4 Steps Up and Down, With/Without Rail:   [place] provides verbal cues and/or  touching/steadying and/or contact guard assistance as patient completes  activity. Assistance may be provided throughout the activity or intermittently.  12 Steps Up and Down, With/Without Rail:   [place] provides verbal cues and/or  touching/steadying and/or contact guard assistance as patient completes  activity. Assistance may be provided throughout the activity or intermittently.  Picking up an Object:   [place] provides verbal cues and/or touching/steadying  and/or contact guard assistance as patient completes activity. Assistance may be  provided throughout the activity or intermittently. Uses Wheelchair and/or  Scooter: No    Section J. Health Conditions Discharge      Section J. Health Conditions (Pain)      Section K. Swallowing/Nutritional Status  Nutritional Approaches Past 7 Days:  Nutritional Approaches at Discharge:    Section M. Skin Conditions Discharge      . Current Number of Unhealed Pressure Ulcers      Section N. Medication        Section O. Special Treatments, Procedures, and Programs    Signed by: Julia Eubanks PTA

## 2022-11-11 NOTE — THERAPY DISCHARGE NOTE
Inpatient Rehabilitation - Physical Therapy Treatment Note/Discharge  NORBERT Parkinson     Patient Name: Benitez Miranda  : 1955  MRN: [id number]  Today's Date: 2022                Admit Date: 2022    Visit Dx:    ICD-10-CM ICD-9-CM   1. Cerebrovascular accident (CVA) due to thrombosis of right middle cerebral artery ([facility])  I63.311 434.01     Patient Active Problem List   Diagnosis   • Uncontrolled type 2 diabetes with neuropathy   • COPD (chronic obstructive pulmonary disease) ([facility])   • Chronic diastolic congestive heart failure ([facility])   • Essential hypertension   • Tobacco abuse   • Coronary artery disease involving native coronary artery of native heart without angina pectoris   • Low back pain   • Hyperlipidemia LDL goal <70   • H/O mechanical aortic valve replacement   • Depression   • CKD (chronic kidney disease), stage III ([facility])   • Rheumatic mitral stenosis   • Morbidly obese ([facility])   • Recurrent major depressive disorder, in full remission ([facility])   • Ischemic stroke ([facility])   • CVA (cerebral vascular accident) ([facility])     Past Medical History:   Diagnosis Date   • Anxiety    • Arthritis    • CAD (coronary artery disease)     x1 stent; pulmonary HTN; EF 50-55%; rheumatic valve; MV stenosis   • CHF (congestive heart failure) ([facility])    • CKD (chronic kidney disease), stage III ([facility])    • COPD (chronic obstructive pulmonary disease) ([facility])    • Depression    • Diabetes mellitus ([facility])    • H/O mechanical aortic valve replacement    • History of tobacco abuse    • Hyperlipidemia    • Hypertension    • Ischemic heart disease    • Kidney failure     third stage   • Low back pain    • Obesity     BMI 36.   • Stroke ([facility])    • Valvular heart disease      Past Surgical History:   Procedure Laterality Date   • AORTIC VALVE REPAIR/REPLACEMENT     • CARDIAC CATHETERIZATION     • CARDIAC SURGERY      valve on aortic   • CARDIAC SURGERY      stent    • CATARACT EXTRACTION     • COLONOSCOPY     • CORONARY  STENT PLACEMENT  2005   • CYSTOSCOPY URETEROSCOPY LASER LITHOTRIPSY Left 11/20/2020    Procedure: CYSTOSCOPY URETEROSCOPY LASER LITHOTRIPSY WITH STENT PLACEMENT;  Surgeon: Jonah Montgomery MD;  Location: [facility];  Service: Urology;  Laterality: Left;   • KIDNEY STONE SURGERY         PT ASSESSMENT (last 12 hours)     IRF PT Evaluation and Treatment     Row Name 11/11/22 1442          PT Time and Intention    Document Type --  Discharge treatment  -LL     Mode of Treatment individual therapy;physical therapy  -LL     Patient/Family/Caregiver Comments/Observations Patient discharged from IRF to home th is date with OP PT referral.  Education with patient regarding home safety, patient & CG safety, HEP, adjustment of AD.  Written materials issued & no questions/concerns voiced.  -LL     Row Name 11/11/22 1442          General Information    Existing Precautions/Restrictions fall  L shoulder pain-needs sling or support  -LL     Row Name 11/11/22 1442          Pain Scale: FACES Pre/Post-Treatment    Pain: FACES Scale, Pretreatment 6-->hurts even more  -LL     Posttreatment Pain Rating 6-->hurts even more  -LL     Row Name 11/11/22 1442          Cognition/Psychosocial    Affect/Mental Status (Cognition) WFL  -LL     Orientation Status (Cognition) oriented x 4  -LL     Follows Commands (Cognition) WFL  -LL     Personal Safety Interventions gait belt;nonskid shoes/slippers when out of bed  -LL     Cognitive Function WFL  -LL     Row Name 11/11/22 1442          Bed Mobility    Supine-Sit [place] (Bed Mobility) modified independence  -LL     Sit-Supine [place] (Bed Mobility) modified independence  -     Assistive Device (Bed Mobility) bed rails  -LL     Row Name 11/11/22 1442          Transfer Assessment/Treatment    Transfers car transfer  -LL     Row Name 11/11/22 1442          Bed-Chair Transfer    Bed-Chair [place] (Transfers) modified independence  -LL     Row Name 11/11/22 1442           Chair-Bed Transfer    Chair-Bed [place] (Transfers) modified independence  -LL     Row Name 11/11/22 1442          Sit-Stand Transfer    Sit-Stand [place] (Transfers) modified independence  -LL     Row Name 11/11/22 1442          Stand-Sit Transfer    Stand-Sit [place] (Transfers) modified independence  -LL     Row Name 11/11/22 1442          Toilet Transfer    [place] Level (Toilet Transfer) modified independence  -LL     Row Name 11/11/22 1442          Car Transfer    Type (Car Transfer) sit-stand;stand-sit  -LL     [place] Level (Car Transfer) modified independence  -LL     Row Name 11/11/22 1442          Gait/Stairs (Locomotion)    Gait/Stairs Locomotion gait/ambulation independence;distance ambulated;gait deviations  -LL     [place] Level (Gait) supervision  -LL     Assistive Device (Gait) other (see comments)  no AD  -LL     Distance in Feet (Gait) 340'  -LL     Pattern (Gait) step-to;step-through  -LL     Deviations/Abnormal Patterns (Gait) candi decreased;gait speed decreased;stride length decreased  -LL     Bilateral Gait Deviations foot drop/toe drag  -LL     [place] Level (Stairs) contact guard;verbal cues;nonverbal cues (demo/gesture)  -LL     Handrail Location (Stairs) both sides  -LL     Number of Steps (Stairs) 15  -LL     Ascending Technique (Stairs) step-to-step  -LL     Descending Technique (Stairs) step-to-step  -LL     Row Name 11/11/22 1442          Balance    Comment, Balance Standing bean bag toss and pickup with reacher crossing midline and reaching outside [place]  -LL     Row Name 11/11/22 1442          Hip (Therapeutic Exercise)    Hip Strengthening (Therapeutic Exercise) bilateral;flexion;extension;aBduction;aDduction;marching while seated;marching while standing;mini squats;sitting;standing;resistance band;blue;green  3# with sitting ex's  -LL     Row Name 11/11/22 1442          Knee (Therapeutic Exercise)    Knee Strengthening (Therapeutic Exercise)  bilateral;flexion;extension;marching while seated;marching while standing;LAQ (long arc quad);hamstring curls;sitting;standing;resistance band;green;blue  3# w/ sitting ex's  -LL     [facility] Name 11/11/22 1442          Ankle (Therapeutic Exercise)    Ankle Strengthening (Therapeutic Exercise) bilateral;dorsiflexion;plantarflexion;sitting;standing  -LL     Row Name 11/11/22 1442          Positioning and Restraints    Pre-Treatment Position --  WC w OT  -LL     Post Treatment Position bed  -LL     In Bed sitting EOB;call light within reach;encouraged to call for assist  -[facility] Name 11/11/22 1442          Therapy Assessment/Plan (PT)    Patient's Goals For Discharge return home  maximize recovery  -LL     Row Name 11/11/22 1442          Therapy Assessment/Plan (PT)    Rehab Potential/Prognosis (PT) adequate, monitor progress closely  -     Frequency of Treatment (PT) 5 times per week  -     Estimated Duration of Therapy (PT) 2 weeks  -     Problem List (PT) balance;coordination;hemiparesis/hemiplegia;mobility;motor control;muscle tone;strength;pain  -     Activity Limitations Related to Problem List (PT) unable to ambulate safely;unable to transfer safely  -[facility] Name 11/11/22 1442          Daily Progress Summary (PT)    Impairments Still Limiting Function (PT) balance impairment;functional activity tolerance impairment;motor control impaired;strength deficit  -LL     Row Name 11/11/22 1442          Therapy Plan Review/Discharge Plan (PT)    Anticipated Equipment Needs at Discharge (PT Eval) --  tbd  -LL     Anticipated Discharge Disposition (PT) home with home health;home with assist  -LL     Row Name 11/11/22 1442          IRF PT Goals    Bed Mobility Goal Selection (PT-IRF) bed mobility, PT goal 1  -LL     Transfer Goal Selection (PT-IRF) transfers, PT goal 1  -LL     Gait (Walking Locomotion) Goal Selection (PT-IRF) gait, PT goal 1  -LL     Row Name 11/11/22 1442          Bed Mobility Goal 1 (PT-IRF)     Activity/Assistive Device (Bed Mobility Goal 1, PT-IRF) sit to supine/supine to sit  -LL     [place] (Bed Mobility Goal 1, PT-IRF) independent  -LL     Time Frame (Bed Mobility Goal 1, PT-IRF) by discharge  -LL     Progress/Outcomes (Bed Mobility Goal 1, PT-IRF) goal met  -LL     Row Name 11/11/22 1442          Transfer Goal 1 (PT-IRF)    Activity/Assistive Device (Transfer Goal 1, PT-IRF) sit-to-stand/stand-to-sit;bed-to-chair/chair-to-bed  -LL     [place] (Transfer Goal 1, PT-IRF) modified independence  -LL     Time Frame (Transfer Goal 1, PT-IRF) by discharge  -LL     Progress/Outcomes (Transfer Goal 1, PT-IRF) goal met  -LL     Row Name 11/11/22 1442          Gait/Walking Locomotion Goal 1 (PT-IRF)    Activity/Assistive Device (Gait/Walking Locomotion Goal 1, PT-IRF) --  AAD  -LL     Gait/Walking Locomotion Distance Goal 1 (PT-IRF) 300'  -LL     [place] (Gait/Walking Locomotion Goal 1, PT-IRF) supervision required  -LL     Time Frame (Gait/Walking Locomotion Goal 1, PT-IRF) by discharge  -LL     Progress/Outcomes (Gait/Walking Locomotion Goal 1, PT-IRF) goal met  -LL           User Key  (r) = Recorded By, (t) = Taken By, (c) = Cosigned By    Initials Name Provider Type    Julia Main PTA Physical Therapist Assistant                Physical Therapy Education     Title: PT OT SLP Therapies (Resolved)     Topic: Physical Therapy (Resolved)     Point: Mobility training (Resolved)     Learning Progress Summary           Patient Acceptance, E,D, VU,NR by LL at 11/10/2022 1457    Acceptance, E,TB, VU by RF at 11/9/2022 1634    Acceptance, E,D, VU,NR by RG at 11/9/2022 1340    Acceptance, E,TB, VU by DG at 11/9/2022 0027    Acceptance, E,TB, VU,DU by HR at 11/8/2022 1445    Acceptance, E,TB, VU by DG at 11/8/2022 0037    Acceptance, E,TB, VU,DU by HR at 11/7/2022 1329    Acceptance, E,TB, VU,DU by HR at 11/5/2022 1211    Acceptance, E,TB, VU,DU by HR at 11/4/2022 1428     Acceptance, E,TB, VU,DU by HR at 11/3/2022 1458    Acceptance, E, VU,NR by LB at 11/2/2022 1602                   Point: Home exercise program (Resolved)     Learning Progress Summary           Patient Acceptance, E,D, VU,NR by LL at 11/10/2022 1457    Acceptance, E,TB, VU by RF at 11/9/2022 1634    Acceptance, E,D, VU,NR by RG at 11/9/2022 1340    Acceptance, E,TB, VU by DG at 11/9/2022 0027    Acceptance, E,TB, VU,DU by HR at 11/8/2022 1445    Acceptance, E,TB, VU by DG at 11/8/2022 0037    Acceptance, E,TB, VU,DU by HR at 11/7/2022 1329    Acceptance, E,TB, VU,DU by HR at 11/5/2022 1211    Acceptance, E,TB, VU,DU by HR at 11/4/2022 1423    Acceptance, E,TB, VU,DU by HR at 11/3/2022 1458    Acceptance, E, VU,NR by LB at 11/2/2022 1602                   Point: Body mechanics (Resolved)     Learning Progress Summary           Patient Acceptance, E,D, VU,NR by LL at 11/10/2022 1457    Acceptance, E,TB, VU by RF at 11/9/2022 1634    Acceptance, E,D, VU,NR by RG at 11/9/2022 1340    Acceptance, E,TB, VU by DG at 11/9/2022 0027    Acceptance, E,TB, VU,DU by HR at 11/8/2022 1445    Acceptance, E,TB, VU by DG at 11/8/2022 0037    Acceptance, E,TB, VU,DU by HR at 11/7/2022 1329    Acceptance, E,TB, VU,DU by HR at 11/5/2022 1211    Acceptance, E,TB, VU,DU by HR at 11/4/2022 1423    Acceptance, E,TB, VU,DU by HR at 11/3/2022 1458    Acceptance, E, VU,NR by LB at 11/2/2022 1602                   Point: Precautions (Resolved)     Learning Progress Summary           Patient Acceptance, E,D, VU,NR by LL at 11/10/2022 1457    Acceptance, E,TB, VU by RF at 11/9/2022 1634    Acceptance, E,D, VU,NR by RG at 11/9/2022 1340    Acceptance, E,TB, VU by DG at 11/9/2022 0027    Acceptance, E,TB, VU,DU by HR at 11/8/2022 1445    Acceptance, E,TB, VU by DG at 11/8/2022 0037    Acceptance, E,TB, VU,DU by HR at 11/7/2022 1329    Acceptance, E,TB, VU,DU by HR at 11/5/2022 1211    Acceptance, E,TB, VU,DU by HR at 11/4/2022 1423    Acceptance,  E,TB, VU,DU by HR at 11/3/2022 1458    Acceptance, E, VU,NR by LB at 11/2/2022 1602                               User Key     Initials Effective Dates Name Provider Type Discipline    DG 06/16/21 -  Brooke Garcia, RN Registered Nurse Nurse    LB 06/16/21 -  Yulia Cabrrea, PT Physical Therapist PT    LL 05/02/16 -  Julia Eubanks, XU Physical Therapist Assistant PT    RF 06/16/21 -  Katie Hanks PTA Physical Therapist Assistant PT    RG 06/16/21 -  Devin Nazario, UX Physical Therapist Assistant PT    HR 01/14/22 -  Zahra Mclean, XU Physical Therapist Assistant PT                PT Recommendation and Plan  Frequency of Treatment (PT): 5 times per week  Anticipated Equipment Needs at Discharge (PT Eval):  (tbd)  Daily Progress Summary (PT)  Impairments Still Limiting Function (PT): balance impairment, functional activity tolerance impairment, motor control impaired, strength deficit         Time Calculation:    PT Charges     Row Name 11/11/22 1454             Time Calculation    Start Time 0915  -LL      Stop Time 1045  -LL      Time Calculation (min) 90 min  -LL      PT Received On 11/11/22  -LL         Time Calculation- PT    Total Timed Code Minutes- PT 90 minute(s)  -LL            User Key  (r) = Recorded By, (t) = Taken By, (c) = Cosigned By    Initials Name Provider Type    LL Julia Eubanks, XU Physical Therapist Assistant                Therapy Charges for Today     Code Description Service Date Service Provider Modifiers Qty    18274639427 HC GAIT TRAINING EA 15 MIN 11/10/2022 Julia Eubanks PTA GP, CQ 2    56777937331 HC PT NEUROMUSC RE EDUCATION EA 15 MIN 11/10/2022 Julia Eubanks, PTA GP, CQ 3    25771172513 HC PT THER PROC EA 15 MIN 11/10/2022 Julia Eubanks, PTA GP, CQ 1    25181098774 HC GAIT TRAINING EA 15 MIN 11/11/2022 Julia Eubanks PTA GP, CQ 1    12321168822 HC PT NEUROMUSC RE EDUCATION EA 15 MIN 11/11/2022 Julia Eubanks, PTA GP, CQ 2    89413688387 HC PT THERAPEUTIC  ACT EA 15 MIN 11/11/2022 Julia Eubanks PTA GP, CQ 1    68115424924 HC PT THER PROC EA 15 MIN 11/11/2022 Julia Eubanks PTA GP, CQ 2               PT Discharge Summary  Reason for Discharge: Discharge from facility  Outcomes Achieved: Able to achieve all goals within established timeline  Discharge Destination: Home with outpatient services    Miki Eubanks PTA  11/11/2022

## 2022-11-11 NOTE — THERAPY DISCHARGE NOTE
Inpatient Rehabilitation - [facility] Occupational Therapy Treatment Note/Discharge  NORBERT Parkinson     Patient Name: Benitez Miranda  : 1955  MRN: [id number]  Today's Date: 2022               Admit Date: 2022       ICD-10-CM ICD-9-CM   1. Cerebrovascular accident (CVA) due to thrombosis of right middle cerebral artery ([facility])  I63.311 434.01     Patient Active Problem List   Diagnosis   • Uncontrolled type 2 diabetes with neuropathy   • COPD (chronic obstructive pulmonary disease) ([facility])   • Chronic diastolic congestive heart failure ([facility])   • Essential hypertension   • Tobacco abuse   • Coronary artery disease involving native coronary artery of native heart without angina pectoris   • Low back pain   • Hyperlipidemia LDL goal <70   • H/O mechanical aortic valve replacement   • Depression   • CKD (chronic kidney disease), stage III ([facility])   • Rheumatic mitral stenosis   • Morbidly obese ([facility])   • Recurrent major depressive disorder, in full remission ([facility])   • Ischemic stroke ([facility])   • CVA (cerebral vascular accident) ([facility])     Past Medical History:   Diagnosis Date   • Anxiety    • Arthritis    • CAD (coronary artery disease)     x1 stent; pulmonary HTN; EF 50-55%; rheumatic valve; MV stenosis   • CHF (congestive heart failure) ([facility])    • CKD (chronic kidney disease), stage III ([facility])    • COPD (chronic obstructive pulmonary disease) ([facility])    • Depression    • Diabetes mellitus ([facility])    • H/O mechanical aortic valve replacement    • History of tobacco abuse    • Hyperlipidemia    • Hypertension    • Ischemic heart disease    • Kidney failure     third stage   • Low back pain    • Obesity     BMI 36.   • Stroke ([facility])    • Valvular heart disease      Past Surgical History:   Procedure Laterality Date   • AORTIC VALVE REPAIR/REPLACEMENT     • CARDIAC CATHETERIZATION     • CARDIAC SURGERY      valve on aortic   • CARDIAC SURGERY      stent    • CATARACT EXTRACTION     • COLONOSCOPY     • CORONARY  STENT PLACEMENT  2005   • CYSTOSCOPY URETEROSCOPY LASER LITHOTRIPSY Left 11/20/2020    Procedure: CYSTOSCOPY URETEROSCOPY LASER LITHOTRIPSY WITH STENT PLACEMENT;  Surgeon: Jonah Montgomery MD;  Location: [facility];  Service: Urology;  Laterality: Left;   • KIDNEY STONE SURGERY         IRF OT ASSESSMENT FLOWSHEET (last 12 hours)     IRF OT Evaluation and Treatment     Row Name 11/11/22 1200          OT Time and Intention    Document Type discharge evaluation;daily treatment  -     Mode of Treatment individual therapy;occupational therapy  -     Patient Effort good  -     Row Name 11/11/22 1200          General Information    Patient/Family/Caregiver Comments/Observations patient seen this am for therapy. patient tolerated well. patient discharged home today with assist of friend. patient will have assist of friend until son is well, son will assist at that time. patient currently sup/set up/mod ind with most self care. patient instructed on safe tub tranfer with tub bench, patient encouraged to have friend or family there initially for showering/bathing for safety. patient encouraged to take time and ask for assistance as necessary. patient voiced no concerns at this time. patient provided with reacher for use with self care/safety with home activities.  see previous noted 11/10/22 for current status. patient to receive outpatient OT as well.  -     Row Name 11/11/22 1200          Cognition/Psychosocial    Affect/Mental Status (Cognition) WFL  -[facility] Name 11/11/22 1200          Bed-Chair Transfer    Bed-Chair [place] (Transfers) supervision;modified independence;verbal cues  -     Row Name 11/11/22 1200          Motor Skills    Motor Skills coordination;functional endurance;neuro-muscular function  -     Therapeutic Exercise --  LUE ROM, gross grasp,  strengthening, reaching UE bike  -     Row Name 11/11/22 1300          Positioning and Restraints    In Wheelchair sitting;with PT   -           User Key  (r) = Recorded By, (t) = Taken By, (c) = Cosigned By    Initials Name Effective Dates     HicksLuda, OT 06/16/21 -                    Occupational Therapy Education     Title: PT OT SLP Therapies (Resolved)     Topic: Occupational Therapy (Resolved)     Point: ADL training (Resolved)     Description:   Instruct learner(s) on proper safety adaptation and remediation techniques during self care or transfers.   Instruct in proper use of assistive devices.              Learning Progress Summary           Patient Acceptance, E,TB, VU by DG at 11/9/2022 0027    Acceptance, E,TB, VU by DG at 11/8/2022 0037    Acceptance, E,TB,D, VU,DU by LA at 11/5/2022 1535                   Point: Home exercise program (Resolved)     Description:   Instruct learner(s) on appropriate technique for monitoring, assisting and/or progressing therapeutic exercises/activities.              Learning Progress Summary           Patient Acceptance, E,TB, VU by DG at 11/9/2022 0027    Acceptance, E,TB, VU by DG at 11/8/2022 0037    Acceptance, E,TB,D, VU,DU by LA at 11/5/2022 1535                   Point: Precautions (Resolved)     Description:   Instruct learner(s) on prescribed precautions during self-care and functional transfers.              Learning Progress Summary           Patient Acceptance, E,TB, VU by DG at 11/9/2022 0027    Acceptance, E,TB, VU by DG at 11/8/2022 0037    Acceptance, E,TB,D, VU,DU by LA at 11/5/2022 1535                   Point: Body mechanics (Resolved)     Description:   Instruct learner(s) on proper positioning and spine alignment during self-care, functional mobility activities and/or exercises.              Learning Progress Summary           Patient Acceptance, E,TB, VU by DG at 11/9/2022 0027    Acceptance, E,TB, VU by DG at 11/8/2022 0037    Acceptance, E,TB,D, VU,DU by LA at 11/5/2022 1535                               User Key     Initials Effective Dates Name Provider Type  Discipline    DG 06/16/21 -  Brooke Garcia RN Registered Nurse Nurse    LA 02/14/22 -  Leilani Sanders OT Occupational Therapist OT                OT Recommendation and Plan  Planned Therapy Interventions (OT): activity tolerance training, adaptive equipment training, BADL retraining, neuromuscular control/coordination retraining, passive ROM/stretching, ROM/therapeutic exercise, strengthening exercise, transfer/mobility retraining           OT IRF GOALS     Row Name 11/11/22 1300 11/09/22 1400 11/02/22 1400       LB Dressing Goal 1 (OT-IRF)    Activity/Device (LB Dressing Goal 1, OT-IRF) -- -- lower body dressing  -AH    [place] (LB Dressing Goal 1, OT-IRF) -- -- moderate assist (50-74% patient effort)  -AH    Progress/Outcomes (LB Dressing Goal 1, OT-IRF) goal met  -AH goal met  -AH --       LB Dressing Goal 2 (OT-IRF)    Activity/Device (LB Dressing Goal 2, OT-IRF) -- -- lower body dressing  -AH    [place] (LB Dressing Goal 2, OT-IRF) -- -- set-up required;supervision required  -AH    Progress/Outcomes (LB Dressing Goal 2, OT-IRF) goal met  - goal ongoing  -AH --       Toileting Goal 1 (OT-IRF)    Activity/Device (Toileting Goal 1, OT-IRF) -- -- toileting skills, all  -AH    [place] Level (Toileting Goal 1, OT-IRF) -- -- moderate assist (50-74% patient effort)  -AH    Progress/Outcomes (Toileting Goal 1, OT-IRF) goal met  -AH goal met  -AH --       Toileting Goal 2 (OT-IRF)    Activity/Device (Toileting Goal 2, OT-IRF) -- -- toileting skills, all  -AH    [place] Level (Toileting Goal 2, OT-IRF) -- -- supervision required;set-up required  -AH    Progress/Outcomes (Toileting Goal 2, OT-IRF) goal met  - goal ongoing  -AH --          User Key  (r) = Recorded By, (t) = Taken By, (c) = Cosigned By    Initials Name Provider Type     Luda Hicks, OT Occupational Therapist                    Time Calculation:    Time Calculation- OT     Row Name 11/11/22 1304             Time  Calculation- OT    OT Start Time 0745  -      OT Stop Time 0915  -      OT Time Calculation (min) 90 min  -            User Key  (r) = Recorded By, (t) = Taken By, (c) = Cosigned By    Initials Name Provider Type     Luda Hicks OT Occupational Therapist                Therapy Charges for Today     Code Description Service Date Service Provider Modifiers Qty    03453891225 HC OT THERAPEUTIC ACT EA 15 MIN 11/10/2022 Luda Hicks OT GO 1    59621823722 HC OT SELF CARE/MGMT/TRAIN EA 15 MIN 11/10/2022 Luda Hicks, OT GO 2    14450458674 HC OT NEUROMUSC RE EDUCATION EA 15 MIN 11/10/2022 Luda Hicks, OT GO 3    51967817589 HC OT SELF CARE/MGMT/TRAIN EA 15 MIN 11/11/2022 Luda Hicks, OT GO 1    40177339761 HC OT THERAPEUTIC ACT EA 15 MIN 11/11/2022 Luda Hicks, OT GO 2    25923623241 HC OT NEUROMUSC RE EDUCATION EA 15 MIN 11/11/2022 Luda Hicks OT GO 3                    Luda Hicks OT  11/11/2022

## 2022-11-11 NOTE — PROGRESS NOTES
Patient Assessment Instrument  Quality Indicators - Discharge FY 2023    Section A. Transportation  Issues Due to Lack of Transportation:  No    Section A. Medication List        Section B. Health Literacy      Section C. BIMS      Section C. Signs and Symptoms of Delirium (from CAM)      Section D. Mood      Section D. Social Isolation      Section GG. Self-Care Performance      Section GG. Mobility Performance      Section J. Health Conditions Discharge      Section J. Health Conditions (Pain)      Section K. Swallowing/Nutritional Status  Nutritional Approaches Past 7 Days:  Nutritional Approaches at Discharge:    Section M. Skin Conditions Discharge      . Current Number of Unhealed Pressure Ulcers      Section N. Medication        Section O. Special Treatments, Procedures, and Programs    Signed by: YUNIER Gillette

## 2022-11-11 NOTE — SIGNIFICANT NOTE
11/11/22 0810   Plan   Plan Faxed discharge summary and DME orders to [place] via [employer].  [facility] will access outpatient PT/OT therapy orders via [employer].  Spoke to pt about discharge and appointment on 11-17-22 at [facility] at 9:30 am for OT and 10:30 am for PT; arrive 10 mins early for paperwork.  Pt will go to PCP on 11-14-22 for Protime and family will transport him to these appointments.  Pt is going home alone.  Friend Xavi is providing transportation home and she will be checking on pt.  Son Jose Angel will be checking on him but not visiting him until he is well.  RW was delivered to rehab.  Pt will call [facility] when he gets home for delivery of bedside commode.   Final Discharge Disposition Code 01 - home or self-care

## 2022-11-11 NOTE — PROGRESS NOTES
"Patient Assessment Instrument  Quality Indicators - Discharge FY 2023    Section A. Transportation      Section A. Medication List  Medication List to Subsequent Provider:  Not applicable.  Patient was not  discharged to a subsequent provider.  Discharge Location:  01 - Home  Medication List to Patient at Discharge:  Yes - Current reconciled medication  list provided to the patient, family and/or caregiver  Route(s) of Medication List Transmission to Patient:  Paper-based (e.g., fax,  copies, printouts)    Section B. Health Literacy  Frequency of Needing Assistance Reading:  Never    Section C. BIMS  Brief Interview for Mental Status (BIMS) was conducted.  Repetition of Three Words: Three words  Able to report correct year: Correct  Able to report correct month: Accurate within 5 days  Able to report correct day of the week: Correct  Able to recall \"sock\": Yes, no cue required  Able to recall \"blue\": Yes, no cue required  Able to recall \"bed\": Yes, no cue required    BIMS SUMMARY SCORE: 15 Cognitively intact    Section C. Signs and Symptoms of Delirium (from CAM)  Acute Change in Mental Status:   No  Inattention:   Behavior not present  Disorganized Thinking:   Behavior not present  Altered Level of Consciousness:   Behavior not present    Section D. Mood  Presence of little interest or pleasure in doing things:   No  Frequency of having little interest or pleasure in doing things:   Never or 1  day  Presence of feeling down, depressed, or hopeless:   No  Frequency of feeling down, depressed, or hopeless:   Never or 1 day   Interview Ended. Above responses do not meet criteria to continue  Total Severity Score:   00    Section D. Social Isolation  Frequency of Feeling Lonely or Isolated:  Never    Section GG. Self-Care Performance      Section GG. Mobility Performance      Section J. Health Conditions Discharge      Section J. Health Conditions (Pain)  Pain Effect on Sleep:   Occasionally  Pain Interference with " Therapy Activities:   Rarely or not at all  Pain Interference with Day-to-Day Activities:   Rarely or not at all    Section K. Swallowing/Nutritional Status  Nutritional Approaches Past 7 Days:  Nutritional Approaches at Discharge:    Section M. Skin Conditions Discharge  Unhealed Pressure Ulcer(s)/Injurie(s) at Stage 1 or Higher:  No    . Current Number of Unhealed Pressure Ulcers      Section N. Medication    Medication Intervention: Not applicable - There were no potential clinically  significant medication issues identified since admission or patient is not  taking any medications.      Section O. Special Treatments, Procedures, and Programs  None    Signed by: Gian Herrera RN

## 2022-11-11 NOTE — PROGRESS NOTES
Patient Assessment Instrument  Quality Indicators - Discharge FY 2023    Section A. Transportation      Section A. Medication List        Section B. Health Literacy      Section C. BIMS      Section C. Signs and Symptoms of Delirium (from CAM)      Section D. Mood      Section D. Social Isolation      Section GG. Self-Care Performance     Eating: Patient completed the activities by themself with no assistance from a  helper.   Oral Hygiene: Patient completed the activities by themself with no assistance  from a helper.   Toileting Hygiene: : [place] sets up or cleans up; patient completes activity.  [place] assists only prior to or following the activity.   Shower/Bathe Self: [place] sets up or cleans up; patient completes activity.  [place] assists only prior to or following the activity.   Upper Body Dressing: Patient completed the activities by themself with no  assistance from a helper.   Lower Body Dressing: Patient completed the activities by themself with no  assistance from a helper.   Putting On/Taking Off Footwear: Patient completed the activities by themself  with no assistance from a helper.    Section GG. Mobility Performance      Section J. Health Conditions Discharge      Section J. Health Conditions (Pain)      Section K. Swallowing/Nutritional Status  Nutritional Approaches Past 7 Days:  Nutritional Approaches at Discharge:    Section M. Skin Conditions Discharge      . Current Number of Unhealed Pressure Ulcers      Section N. Medication        Section O. Special Treatments, Procedures, and Programs    Signed by: Michaelle Hicks, Occupational Therapist

## 2022-11-11 NOTE — PROGRESS NOTES
Patient Assessment Instrument  Quality Indicators - Discharge FY 2023    Section A. Transportation      Section A. Medication List        Section B. Health Literacy      Section C. BIMS      Section C. Signs and Symptoms of Delirium (from CAM)      Section D. Mood      Section D. Social Isolation      Section GG. Self-Care Performance      Section GG. Mobility Performance      Section J. Health Conditions Discharge  Fall(s) Since Admission:  No    Section J. Health Conditions (Pain)      Section K. Swallowing/Nutritional Status  Nutritional Approaches Past 7 Days:  Therapeutic diet (e.g., low salt, diabetic,  low cholesterol)  Nutritional Approaches at Discharge:  Therapeutic diet (e.g., low salt,  diabetic, low cholesterol)    Section M. Skin Conditions Discharge      . Current Number of Unhealed Pressure Ulcers      Section N. Medication    Patient is taking medications in the following pharmacological classification:  I. Antiplatelet An indication is NOT noted for all medications in the  Antiplatelet drug class. J. Hypoglycemic (including insulin) An indication is  noted for all medications in the Hypoglycemic drug class. E. Anticoagulant An  indication is NOT noted for all medications in the Anticoagulant drug class.    Section O. Special Treatments, Procedures, and Programs    Signed by: Maddie Rodney, Supervisor

## 2022-11-11 NOTE — SIGNIFICANT NOTE
11/11/22 1140   Plan   Plan Contacted son Jose Angel [phone] about pt's discharge and outpatient PT/OT appointment at [facility] on 11-17-22 at 9:30 am for OT and 10:30 am for PT; arrive 10 mins early for paperwork.  Informed son pt to go to PCP office on 11-14-22 to have Protime drawn.  Family will have family to transport him to appointments.   Patient/Family in Agreement with Plan yes

## 2022-11-11 NOTE — SIGNIFICANT NOTE
11/11/22 1454   PT Discharge Summary   Reason for Discharge Discharge from facility   Outcomes Achieved Able to achieve all goals within established timeline   Discharge Destination Home with outpatient services

## 2022-11-12 ENCOUNTER — READMISSION MANAGEMENT (OUTPATIENT)
Dept: CALL CENTER | Facility: HOSPITAL | Age: 67
End: 2022-11-12

## 2022-11-12 NOTE — OUTREACH NOTE
Prep Survey    Flowsheet Row Responses   [facility] patient discharged from? [place]   Is LACE score < 7 ? No   Emergency Room discharge w/ pulse ox? 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 Stroke   Does the patient have Home health ordered? No   Is there a DME ordered? No   Comments regarding appointments plan outpatient PT/OT at [facility]    Prep survey completed? Yes          CHAI IBARRA - Registered Nurse

## 2022-11-14 ENCOUNTER — TRANSITIONAL CARE MANAGEMENT TELEPHONE ENCOUNTER (OUTPATIENT)
Dept: CALL CENTER | Facility: HOSPITAL | Age: 67
End: 2022-11-14

## 2022-11-14 ENCOUNTER — ANTICOAGULATION VISIT (OUTPATIENT)
Dept: FAMILY MEDICINE CLINIC | Facility: CLINIC | Age: 67
End: 2022-11-14

## 2022-11-14 ENCOUNTER — TELEPHONE (OUTPATIENT)
Dept: FAMILY MEDICINE CLINIC | Facility: CLINIC | Age: 67
End: 2022-11-14

## 2022-11-14 DIAGNOSIS — Z51.81 ANTICOAGULATION GOAL OF INR 2.5 TO 3.5: Primary | ICD-10-CM

## 2022-11-14 DIAGNOSIS — I63.9 CEREBROVASCULAR ACCIDENT (CVA), UNSPECIFIED MECHANISM: Primary | ICD-10-CM

## 2022-11-14 DIAGNOSIS — Z79.01 ANTICOAGULATION GOAL OF INR 2.5 TO 3.5: Primary | ICD-10-CM

## 2022-11-14 LAB — INR PPP: 4.4 (ref 2.5–3.5)

## 2022-11-14 PROCEDURE — 85610 PROTHROMBIN TIME: CPT | Performed by: FAMILY MEDICINE

## 2022-11-14 PROCEDURE — 36416 COLLJ CAPILLARY BLOOD SPEC: CPT | Performed by: FAMILY MEDICINE

## 2022-11-14 RX ORDER — WARFARIN SODIUM 1 MG/1
TABLET ORAL
Qty: 30 TABLET | Refills: 0 | Status: SHIPPED | OUTPATIENT
Start: 2022-11-14 | End: 2023-11-29 | Stop reason: SDUPTHER

## 2022-11-14 RX ORDER — WARFARIN SODIUM 6 MG/1
6 TABLET ORAL NIGHTLY
Qty: 30 TABLET | Refills: 0 | Status: SHIPPED | OUTPATIENT
Start: 2022-11-14 | End: 2023-01-24 | Stop reason: SDUPTHER

## 2022-11-14 NOTE — OUTREACH NOTE
Call Center TCM Note    Flowsheet Row Responses   [facility] patient discharged from? [place]   Does the patient have one of the following disease processes/diagnoses(primary or secondary)? Stroke   TCM attempt successful? 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 Yes   Is the patient taking all medications as directed (includes completed medication regime)? Yes   Comments hospital f/u with RED Harper on 11/16   Does the patient have an appointment with their PCP within 7 days of discharge? Yes   Has home health visited the patient within 72 hours of discharge? N/A   Psychosocial issues? No   Does the patient require any assistance with activities of daily living such as eating, bathing, dressing, walking, etc.? Yes   Does the patient have any residual symptoms from stroke/TIA? Yes   Does the patient understand the diet ordered at discharge? Yes   Comments outpatient PT appt on 11/17   Did the patient receive a copy of their discharge instructions? Yes   Nursing interventions Reviewed instructions with patient   What is the patient's perception of their health status since discharge? Improving   Nursing interventions Nurse provided patient education   Is the patient/caregiver able to teach back the risk factors for a stroke? Smoking, Diabetes, High blood pressure-goal below 120/80   Is the patient/caregiver able to teach back signs and symptoms related to disease process for when to call PCP? Yes   Is the patient/caregiver able to teach back signs and symptoms related to disease process for when to call 911? Yes   Is the patient/caregiver able to teach back the hierarchy of who to call/visit for symptoms/problems? PCP, Specialist, Home health nurse, Urgent Care, ED, 911 Yes   Is the patient able to teach back FAST for Stroke? B alance: Watch for sudden loss of balance, E yes:  Check for vision loss, F ace: Look for an uneven smile, A rm: Check if one arm is weak, S peech: Listen for slurred speech, T aleksander: Call 9-1-1 right away   TCM call completed? Yes   Wrap up additional comments Doing well, no questions, confirmed appt with PCP for 11/16.   Call end time 1123   Would this patient benefit from a Referral to [facility] Social Work? No   Is the patient interested in additional calls from an ambulatory ?  NOTE:  applies to high risk patients requiring additional follow-up. No          Yue Menendez RN    11/14/2022, 11:24 EST

## 2022-11-14 NOTE — PROGRESS NOTES
Benitezlana Miranda     VITALS: Blood pressure 130/87, weight 117 kg (258 lb 9.6 oz).    Subjective  Chief Complaint  Left-sided weakness    Subjective          History of Present Illness:  Patient is a 67 y.o.  male with medical conditions significant for CAD, CHF, and CKD stage III who presents to clinic secondary to medical followup.  Patient is having a hard time speaking.  He states that he woke up today like this.  He states that he woke up and noticed that his left side was weak.  He got ready and came to see us.    No complaints about any of the medications.    The following portions of the patient's history were reviewed and updated as appropriate: allergies, current medications, past family history, past medical history, past social history, past surgical history and problem list.    Past Medical History  Past Medical History:   Diagnosis Date   • Anxiety    • Arthritis    • CAD (coronary artery disease)     x1 stent; pulmonary HTN; EF 50-55%; rheumatic valve; MV stenosis   • CHF (congestive heart failure) ([facility])    • CKD (chronic kidney disease), stage III ([facility])    • COPD (chronic obstructive pulmonary disease) ([facility])    • Depression    • Diabetes mellitus ([facility])    • H/O mechanical aortic valve replacement    • History of tobacco abuse    • Hyperlipidemia    • Hypertension    • Ischemic heart disease    • Kidney failure     third stage   • Low back pain    • Obesity     BMI 36.   • Stroke ([facility])    • Valvular heart disease        Surgical History  Past Surgical History:   Procedure Laterality Date   • AORTIC VALVE REPAIR/REPLACEMENT  1991   • CARDIAC CATHETERIZATION     • CARDIAC SURGERY  1991    valve on aortic   • CARDIAC SURGERY  2001    stent    • CATARACT EXTRACTION     • COLONOSCOPY  2001   • CORONARY STENT PLACEMENT  2005   • CYSTOSCOPY URETEROSCOPY LASER LITHOTRIPSY Left 11/20/2020    Procedure: CYSTOSCOPY URETEROSCOPY LASER LITHOTRIPSY WITH STENT PLACEMENT;  Surgeon: Jonah Montgomery MD;   Location: [facility];  Service: Urology;  Laterality: Left;   • KIDNEY STONE SURGERY         Family History  Family History   Problem Relation Age of Onset   • Cancer Mother         breast   • COPD Father    • Heart attack Father 74   • Diabetes Paternal Grandmother         both legs removed       Social History  Social History     Socioeconomic History   • Marital status:    • Number of children: 2   Tobacco Use   • Smoking status: Some Days     Packs/day: 1.00     Years: 30.00     Pack years: 30.00     Types: Pipe, Cigars, Cigarettes     Last attempt to quit: 2016     Years since quittin.3   • Smokeless tobacco: Never   • Tobacco comments:     currently smokes a pipe   Vaping Use   • Vaping Use: Never used   Substance and Sexual Activity   • Alcohol use: No   • Drug use: No   • Sexual activity: Defer       Objective   Vital Signs:   /87 (BP Location: Right arm, Patient Position: Sitting, Cuff Size: Large Adult)   Wt 117 kg (258 lb 9.6 oz)   BMI 35.07 kg/m²     Physical Exam     Gen: Patient in some distress.    Skin: Warm and dry with normal turgor. No purpura, rashes, or unusual pigmentation noted. Hair is normal in appearance and distribution.    HEENT: NC/AT. No lesions noted. Conjunctiva clear, sclera nonicteric. PERRL. EOMI without nystagmus or strabismus. Fundi appear benign. No hemorrhages or exudates of eyes. Auditory canals are patent bilaterally without lesions. TMs intact,  nonerythematous, nonbulging without lesions. Nasal mucosa pink, nonerythematous, and nonedematous. Frontal and maxillary sinuses are nontender. O/P nonerythematous and moist without exudate.    Neck: Supple without lymph nodes palpated. FROM.     Lungs: CTA B/L without rales, rhonchi, crackles, or wheezes.    Heart: RRR. S1 and S2 normal. No S3 or S4. No MRGT.    Abd: Soft, nontender,nondistended. (+)BSx4 quadrants.     Extrem: No CCE.  Left side is much weaker than right side.  Radial pulses 2+/4 and equal  bilaterally.    Neuro: Speech and expression decreased.  Muscle strength 5/5 on the right side.  Muscle strength 2/5 on the left side.  Deep tendon reflexes decreased in the upper and lower extremities on the left side.  No involuntary movement noted.    Procedures    Result Review :   The following data was reviewed by: Mesha Christina MD on 10/25/2022:                Assessment and Plan    Benitez Miranda is a 67 y.o. here for medical followup.    Problem List Items Addressed This Visit        Endocrine and Metabolic    Morbidly obese (HCC)    Overview     BMI 36.            Neuro    CVA (cerebral vascular accident) (HCC) - Primary  Ambulance called.  Will send directly to the ER.  ER report called.         BMI is >= 30 and <35. (Class 1 Obesity). The following options were offered after discussion;: exercise counseling/recommendations and nutrition counseling/recommendations       Benitez Miranda  reports that he has been smoking pipe, cigars, and cigarettes. He has a 30.00 pack-year smoking history. He has never used smokeless tobacco.. I have educated him on the risk of diseases from using tobacco products such as cancer, COPD and heart disease.     I advised him to quit and he is not willing to quit.    I spent 3  minutes counseling the patient.              I spent 35 minutes caring for Benitez on this date of service. This time includes time spent by me in the following activities:performing a medically appropriate examination and/or evaluation  and referring and communicating with other health care professionals   Follow Up   Return in about 2 weeks (around 11/8/2022).  Findings and plans discussed with patient who verbalizes understanding and agreement. Will followup with patient once results are in. Patient was given instructions and counseling regarding his condition or for health maintenance advice. Please see specific information pulled into the AVS if appropriate.       Mesha Christina MD

## 2022-11-16 ENCOUNTER — OFFICE VISIT (OUTPATIENT)
Dept: FAMILY MEDICINE CLINIC | Facility: CLINIC | Age: 67
End: 2022-11-16

## 2022-11-16 VITALS
BODY MASS INDEX: 35.05 KG/M2 | DIASTOLIC BLOOD PRESSURE: 62 MMHG | HEIGHT: 72 IN | HEART RATE: 62 BPM | TEMPERATURE: 97.7 F | SYSTOLIC BLOOD PRESSURE: 124 MMHG | OXYGEN SATURATION: 97 % | RESPIRATION RATE: 16 BRPM | WEIGHT: 258.8 LBS

## 2022-11-16 DIAGNOSIS — E66.9 OBESITY (BMI 30-39.9): ICD-10-CM

## 2022-11-16 DIAGNOSIS — G47.9 SLEEP DISTURBANCE: ICD-10-CM

## 2022-11-16 DIAGNOSIS — I63.9 CEREBROVASCULAR ACCIDENT (CVA), UNSPECIFIED MECHANISM: Primary | ICD-10-CM

## 2022-11-16 PROCEDURE — 99496 TRANSJ CARE MGMT HIGH F2F 7D: CPT | Performed by: NURSE PRACTITIONER

## 2022-11-16 PROCEDURE — 1111F DSCHRG MED/CURRENT MED MERGE: CPT | Performed by: NURSE PRACTITIONER

## 2022-11-16 RX ORDER — METOPROLOL TARTRATE 50 MG/1
50 TABLET, FILM COATED ORAL 2 TIMES DAILY
COMMUNITY
End: 2022-12-20

## 2022-11-16 RX ORDER — CHOLECALCIFEROL (VITAMIN D3) 125 MCG
5 CAPSULE ORAL NIGHTLY PRN
Qty: 30 TABLET | Refills: 0 | Status: SHIPPED | OUTPATIENT
Start: 2022-11-16 | End: 2022-12-12 | Stop reason: SDUPTHER

## 2022-11-16 RX ORDER — PRAVASTATIN SODIUM 40 MG
40 TABLET ORAL DAILY
COMMUNITY
End: 2022-11-16

## 2022-11-16 NOTE — PROGRESS NOTES
Transitional Care Follow Up Visit  Subjective     Benitez Miranda is a 67 y.o. male who presents for a transitional care management visit.    Within 48 business hours after discharge our office contacted him via telephone to coordinate his care and needs.      I reviewed and discussed the details of that call along with the discharge summary, hospital problems, inpatient lab results, inpatient diagnostic studies, and consultation reports with Benitez.    Date of TCM Phone Call 11/21/2020 10/27/2022 11/12/2022   [facility]   Date of Admission 11/18/2020 10/25/2022 11/1/2022   Date of Discharge 11/21/2020 10/27/2022 11/11/2022   Discharge Disposition Home or Self Care Home or Self Care Home or Self Care       History of Present Illness   Course During Hospital Stay:      Patient presents today for TCM visit. Patient presented to ER on 10/31/2022 for CVA.  Patient left AMA.  Presented to the ER again on 11/1/2022 for complaint of CVA and worsening left-sided weakness. CVA was thought to be due to subtherapeutic INR.  Patient was bridged with Lovenox until target INR was met.  Once INR levels were therapeutic, patient was admitted for inpatient rehab.  After it was felt patient had reached maximal medical benefit, he was discharged home in stable condition.     Today patient reports he is feeling much better and doing well.  Denies any issues or concerns at this time.  No medication complaints. Currently taking warfarin 7 mg daily, managed by PCP, Dr. Christina. On 11/14/22 INR  was 4.40 and was addressed per Dr. Grande.  He is to follow-up for recheck level on 11/21/2022.  Has follow-up with neurology on 12/2/2022.  Has follow-up with cardiology on 11/29/2022.     Is requesting melatonin to help with sleep.  Reports he received this in the hospital and it worked well.  No other issues or concerns at this time.        The following portions of the patient's history were  reviewed and updated as appropriate: allergies, current medications, past family history, past medical history, past social history, past surgical history and problem list.    Review of Systems   Neurological: Positive for weakness. Negative for facial asymmetry and numbness.   Hematological: Does not bruise/bleed easily.       Objective   Physical Exam  Vitals and nursing note reviewed.   Constitutional:       General: He is not in acute distress.     Appearance: Normal appearance. He is not ill-appearing or toxic-appearing.   HENT:      Head: Normocephalic.   Eyes:      Pupils: Pupils are equal, round, and reactive to light.   Cardiovascular:      Rate and Rhythm: Normal rate and regular rhythm.      Pulses: Normal pulses.      Heart sounds: Normal heart sounds.   Pulmonary:      Effort: Pulmonary effort is normal. No respiratory distress.      Breath sounds: Normal breath sounds.   Skin:     General: Skin is warm and dry.      Capillary Refill: Capillary refill takes less than 2 seconds.      Coloration: Skin is not pale.   Neurological:      Mental Status: He is alert and oriented to person, place, and time.      Sensory: No sensory deficit.      Motor: Weakness (4/5 LUE, LLE) present.   Psychiatric:         Mood and Affect: Mood normal.         Behavior: Behavior normal.         Thought Content: Thought content normal.         Judgment: Judgment normal.         Assessment & Plan   Diagnoses and all orders for this visit:    1. Cerebrovascular accident (CVA), unspecified mechanism (HCC) (Primary)    2. Obesity (BMI 30-39.9)    3. Sleep disturbance  -     melatonin 5 MG tablet tablet; Take 1 tablet by mouth At Night As Needed (sleep disturbance).  Dispense: 30 tablet; Refill: 0    1.  Keep follow-ups as scheduled  2.  Diet and lifestyle modifications to promote healthy weight  3.  Regimen as above      Discussed possible differential diagnoses, testing, treatment, recommended non-pharmacological interventions  and/or lifestyle modifications, risks, warning signs to monitor for that would indicate need for follow-up in clinic or ER. If no improvement with these regimens or you have new or worsening symptoms follow-up. Patient verbalizes understanding and agreement with plan of care. Denies further needs or concerns.    I spent 30 minutes caring for patient on this date of service. This time includes time spent by me in the following activities: preparing for the visit, reviewing tests, obtaining and/or reviewing a separately obtained history, performing a medically appropriate examination and/or evaluation, counseling and educating the patient/family/caregiver, ordering medications, tests, or procedures and documenting information in the medical record.     Return for Next scheduled follow up, Sooner if needed.  F/u with PCP in 1 month

## 2022-11-16 NOTE — PROGRESS NOTES
PPS CMG Coordinator  Inpatient Rehabilitation Discharge    Mode of Locomotion: Walking.    Discharge Against Medical Advice:  No.  Discharge Information  Patient Discharged Alive:  Yes  Discharge Destination/Living Setting: Home.  At discharge, the patient was discharged to live (with) (01)  Alone  Diagnosis for Interruption/Death:    Impairment Group: Stroke: 01.1 Left Body Involvement (Right Brain)    Comorbidities: Rank Code      Description      1    I13.0     Hypertensive heart and chronic kidney disease                 with heart failure and stage 1 through stage 4                 chronic kidney disease, or unspecified chronic                 kidney disease  2    E11.22    Type 2 diabetes mellitus with diabetic chronic                 kidney disease  3    I50.9     Heart failure, unspecified  4    I27.20    Pulmonary hypertension, unspecified  5    I69.354   Hemiplegia and hemiparesis following cerebral                 infarction affecting left non-dominant side  6    J44.9     Chronic obstructive pulmonary disease,                 unspecified  7    I05.0     Rheumatic mitral stenosis  8    E78.5     Hyperlipidemia, unspecified  9    F17.290   Nicotine dependence, other tobacco product,                 uncomplicated  10   G47.33    Obstructive sleep apnea (adult) (pediatric)  11   I25.10    Atherosclerotic heart disease of native                 coronary artery without angina pectoris  12   Z80.9     Family history of malignant neoplasm,                 unspecified  13   Z82.49    Family history of ischemic heart disease and                 other diseases of the circulatory system  14   Z82.5     Family history of asthma and other chronic                 lower respiratory diseases  15   Z83.3     Family history of diabetes mellitus  16   Z91.81    History of falling  17   Z95.2     Presence of prosthetic heart valve  18   Z95.5     Presence of coronary angioplasty implant and                  graft    Complications:      JAMAR Bladder Accidents:   - Accidents.    JAMAR Bowel Accident:   - Accidents.    Signed by: Luda Herrera Nurse

## 2022-11-17 ENCOUNTER — TREATMENT (OUTPATIENT)
Dept: PHYSICAL THERAPY | Facility: CLINIC | Age: 67
End: 2022-11-17

## 2022-11-17 DIAGNOSIS — R27.0 ATAXIA: ICD-10-CM

## 2022-11-17 DIAGNOSIS — R29.898 LEFT ARM WEAKNESS: ICD-10-CM

## 2022-11-17 DIAGNOSIS — G81.94 LEFT HEMIPARESIS: ICD-10-CM

## 2022-11-17 DIAGNOSIS — I63.9 CEREBROVASCULAR ACCIDENT (CVA), UNSPECIFIED MECHANISM: Primary | ICD-10-CM

## 2022-11-17 DIAGNOSIS — R27.9 LACK OF COORDINATION DUE TO ACUTE CEREBROVASCULAR ACCIDENT (CVA): ICD-10-CM

## 2022-11-17 DIAGNOSIS — I63.311 CEREBRAL INFARCTION DUE TO THROMBOSIS OF RIGHT MIDDLE CEREBRAL ARTERY: Primary | ICD-10-CM

## 2022-11-17 DIAGNOSIS — I63.9 LACK OF COORDINATION DUE TO ACUTE CEREBROVASCULAR ACCIDENT (CVA): ICD-10-CM

## 2022-11-17 PROCEDURE — 97166 OT EVAL MOD COMPLEX 45 MIN: CPT | Performed by: OCCUPATIONAL THERAPIST

## 2022-11-17 PROCEDURE — 97162 PT EVAL MOD COMPLEX 30 MIN: CPT | Performed by: PHYSICAL THERAPIST

## 2022-11-17 NOTE — PROGRESS NOTES
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 Left arm weakness  R29.898 729.89   3. Lack of coordination due to acute cerebrovascular accident (CVA) (HCC)  I63.9 434.91    R27.9 781.3           Subjective Evaluation    History of Present Illness  Date of onset: 10/25/2022  Mechanism of injury: Pt states that he have two strokes. Pt was apparently hospitalized on 10/25/22 to 10/27/22 and d/c home but suffered another stoke the next day and returned to ER resulting in admission to hospital from 10/28/22 - 22 then transferred to inpatient rehab and eventually d/c home on 2022. Pt Dx includes right MCA CVA.     Medical Hx includes DM, chronic kidney disease, CAD s/p stenting, s/p mechanical aortic valve    Subjective comment: Pt states he had a stoke that affected his left sidePain  No pain reported    Social Support  Lives in: trailer  Lives with: alone    Hand dominance: right    Treatments  Discharged from (in last 30 days): inpatient hospitalization  Patient Goals  Patient goals for therapy: increased motion, increased strength and independence with ADLs/IADLs             Objective          Active Range of Motion   Left Shoulder   Flexion: WFL  Extension: WFL  Abduction: WFL  Adduction: WFL    Right Shoulder   Normal active range of motion    Left Elbow   Normal active range of motion    Right Elbow   Normal active range of motion    Left Wrist   Normal active range of motion    Right Wrist  "  Normal active range of motion    Additional Active Range of Motion Details  Pt with decreased left shoulder AROM vs right but WFLs however pt requires cues for proper posture as he leans to right side to reach up with left; states, \" I didn't know I was even doing that\"    Strength/Myotome Testing     Left Shoulder     Planes of Motion   Flexion: 3+   Extension: 3+   Abduction: 3+   Adduction: 3+     Right Shoulder     Planes of Motion   Flexion: 4-   Extension: 5     Left Elbow   Flexion: 4-  Extension: 3+    Right Elbow   Flexion: 5  Extension: 4+    Left Wrist/Hand   Normal wrist strength  Wrist extension: 3+  Wrist flexion: 3+     (2nd hand position)     Trial 1: 51 lbs    Trial 2: 50 lbs    Trial 3: 51 lbs    Average: 50.67 lbs    Right Wrist/Hand      (2nd hand position)     Trial 1: 63 lbs    Trial 2: 63 lbs    Trial 3: 65 lbs    Average: 63.67 lbs    Functional Assessment     Comments  Coordination    Box and Block  o RUE= 42  o LUE= 27    9 Hole Peg Test  o RUE= 24.91s  o LUE= 2m 06s    ten sense score    o L hand    Thumb = 8  IF=7  LF=7  RF= 6  SF =7  Dorsal and volar hand = 10    QuickDASH Score: 34.1 / 100 = 34.1 %    Barthel Index= RAW Score: 17/20, Slight Dependency             Assessment & Plan     Assessment  Impairments: abnormal coordination, abnormal or restricted ROM, activity intolerance, impaired physical strength, lacks appropriate home exercise program and safety issue    Assessment details: Pt referred to OT due to recent CVA affecting left side. Pt presents with left fingers specially fingertips decreased sensation. Pt demonstrates left UE AROM WFL but uncoordinated with movement and with inappropriate patterns of movement. Pt demonstrates significant deficits with fine motor skills and additional deficits with gross motor skills. Pt with poor activity tolerance including sustained grasping. Pt with decreased left UE strength. Pt reports being able to complete most daily task " with increased difficulty. Pt reports being unable to complete most dressing tasks requiring fastener use. Pt will benefit from OT treatment to improve functional abilities including strength, tolerance, and coordination for optimize ADL safety and independence. Pt repots living in a mobile home alone with checks in by son. Pt reports independent without difficult with prior level of function. Without OT treatment pt is at a greater risk of injury or additional functional decline. OT to treat 2x per week for 8 weeks until all goals or plateau with progress is achieved.  Prognosis: good  Prognosis details: Pt demonstrates good OT potential with recent CVA and good response to inpatient therapy noted.       Goals  Plan Goals:    11/17/22   OT Short Term Goals  STG 1 Pt will demonstrate good return on beginning HEP  STG 2 Pt will demonstrated improve left UE fine motor coordiantion for increased ADL independence via a 50% improvement of Nine Hole Peg Test Score  STG 3 Pt will demonstrate improve left UE strength by 1/2 MMT grade for increased ADL independence.  STG 4 Pt will demonstrated an improve QuickDASH score of 5 points for increased ADL independence and safety.  Long Term Goals  LTG 1 Pt will demonstrate good return on complete HEP  LTG 2 Pt will demonstrated improve left UE fine motor coordiantion for increased ADL independence via  improvement of Nine Hole Peg Test Score to 10 % less or great of unaffected right UE score  LTG 3 Pt will demonstrate improve left UE strength to 4/5 or greater MMT grade for increased ADL independence.  LTG 4 Pt will demonstrated an improve QuickDASH score to the minimal detectable change of 11 points or more for increased ADL independence and safety.      Plan  Planned modality interventions: ultrasound and electrical stimulation/Russian stimulation  Planned therapy interventions: ADL retraining, fine motor coordination training, functional ROM exercises, IADL retraining, joint  mobilization, home exercise program, manual therapy, motor coordination training, neuromuscular re-education, orthotic fitting/training, postural training, strengthening, stretching, therapeutic activities, soft tissue mobilization and balance/weight-bearing training  Frequency: 2x week  Duration in visits: 16  Duration in weeks: 8  Treatment plan discussed with: patient  Plan details: OT to treat 2 x per week for 8 weeks until all goals or plateau with progress is achieved.         History # of Personal Factors and/or Comorbidities: MODERATE (1-2)  Examination of Body System(s): # of elements: MODERATE (3)  Clinical Presentation: STABLE   Clinical Decision Making: MODERATE      Timed:         Manual Therapy:    0     mins  97038;     Therapeutic Exercise:    0     mins  57736;     Neuromuscular Joanie:    0    mins  19595;    Therapeutic Activity:     0     mins  51092;     Ultrasound:     0     mins  25061;    Ionto                               0    mins   07064  Self Care                       0     mins   06075  Electrical Stimulation:    0     mins  17983    Un-Timed:  Electrical Stimulation:    0     mins  55231 (MC );    Low Eval     0     Mins  06636  Mod Eval     47     Mins  93796  High Eval                       0     Mins  77739        Timed Treatment:   47   mins   Total Treatment:     47   mins          OT SIGNATURE:       Occupational Therapist, Certified Hand Therapist  [place] License #[id number]  NPI #[id number]  Electronically signed by: Kenny D. Maynes, OTR/L, CHT 11/17/2022        Certification Period: 11/17/2022 thru 2/14/2023    I certify that the therapy services are furnished while this patient is under my care.  The services outlined above are required by this patient, and will be reviewed every 90 days.         Physician Signature:__________________________________________________    PHYSICIAN: Flynn An MD  NPI: [id number]                                      DATE:      Please sign and  return via fax to [email or web address] . Thank you, [facility].

## 2022-11-17 NOTE — PROGRESS NOTES
Physical Therapy Initial Evaluation and Plan of Care    Patient: Benitez Miranda   : 1955  Diagnosis/ICD-10 Code:  Cerebrovascular accident (CVA), unspecified mechanism (HCC) [I63.9]  Referring practitioner: Flynn An MD  Date of Initial Visit: 2022  Today's Date: 2022  Patient seen for 8 session         Visit Diagnoses:    ICD-10-CM ICD-9-CM   1. Cerebrovascular accident (CVA), unspecified mechanism (HCC)  I63.9 434.91   2. Left hemiparesis (HCC)  G81.94 342.90   3. Ataxia  R27.0 781.3         Subjective Questionnaire: DONAHUE 37/56      Subjective Evaluation    History of Present Illness  Onset date: 2022.  Mechanism of injury: In 2022, the patient was shaving and he began to notice left arm weakness.  While being evaluated at his MD office, he was diagnosed with a CVA.  The patient was taken to a local hospital and he was admitted for three days and  was discharged home.  The patient suffered another stroke once he was home and was sent back to the hospital for admission.  Once he was stable, he was transferred to inpatient rehab for two weeks..  He has now been referred to out patient rehab for continued care.      Patient Occupation: retired Quality of life: good    Pain  Location: left knee and gastroc; pt instructed on DVTs and had negative Homans    Hand dominance: right    Patient Goals  Patient goals for therapy: improved balance, increased motion, increased strength, independence with ADLs/IADLs and return to sport/leisure activities             Objective          Postural Observations    Additional Postural Observation Details  Slumped posture right leaning in chair    Neurological Testing     Sensation     Lumbar   Left   Intact: light touch    Right   Intact: light touch    Reflexes   Left   Patellar (L4): normal (2+)    Right   Patellar (L4): normal (2+)    Strength/Myotome Testing     Left Hip   Planes of Motion   Flexion: 4    Right Hip   Planes of Motion    Flexion: 4    Left Knee   Flexion: 4-  Extension: 4    Right Knee   Flexion: 4+  Extension: 4+    Left Ankle/Foot   Dorsiflexion: 4    Right Ankle/Foot   Dorsiflexion: 4+    Tests     Additional Tests Details  Good visual tracking and good Periferal tracking  Good convergence  Left finger to nose eyes closed impaired  CHARMAINE impaired left  impaired left heel to shin test      Ambulation     Comments   Pt amb with decreased stance on left with decreased left hip flexion          Assessment & Plan     Assessment  Impairments: abnormal coordination, abnormal gait, abnormal muscle firing, abnormal muscle tone, abnormal or restricted ROM, activity intolerance, impaired balance, impaired physical strength, lacks appropriate home exercise program and weight-bearing intolerance  Functional Limitations: carrying objects, walking, standing and unable to perform repetitive tasks  Assessment details: Pt is a 68 y/o male referred to therapy with diagnosis of CVA.  Pt presents with impaired coordination, impaired gait and left HP.  Therapy will follow for improved balance and gain in order to improve ADLS and reduce falls at home.  Prognosis: good    Goals  Plan Goals: STG 6 weeks    1 Pt will be instructed in a HEP.  2 Pt will improve his DONAHUE to 42 or greater.  3 Pt will demonstrate 4+/5 gross LE strength.    LTG 12 weeks    1 Pt will improve his DONAHUE score to 46 or greater to demonstrate decreased risk for falls.  2 Pt will improve his DONAHUE to less than 12 seconds.  3 Pt will amb with noted improved velocity and improved wt shifting.    Plan  Therapy options: will be seen for skilled therapy services  Planned modality interventions: cryotherapy, thermotherapy (paraffin bath) and ultrasound  Planned therapy interventions: abdominal trunk stabilization, ADL retraining, balance/weight-bearing training, body mechanics training, flexibility, functional ROM exercises, gait training, home exercise program, IADL retraining, joint  mobilization, manual therapy, neuromuscular re-education, postural training, soft tissue mobilization, strengthening, stretching, therapeutic activities and transfer training  Frequency: 2x week  Duration in weeks: 12  Treatment plan discussed with: patient  Plan details: Will follow for optimal gains.  Moderate Evaluation  73349  Re-evaluation   52123  Therapeutic exercise  68563  Therapeutic activity    69606  Neuromuscular re-education   48150  Manual therapy   96481  Gait training  56024  Unattended e-stim (Medicaid/Medicare)     Moist heat/cryotherapy 10364   Ultrasound   64203              Timed:         Manual Therapy:         mins  44396;     Therapeutic Exercise:         mins  59954;     Neuromuscular Joanie:        mins  05328;    Therapeutic Activity:          mins  23777;     Gait Training:           mins  54155;     Ultrasound:          mins  23404;    Ionto                                   mins   85412  Self Care                            mins   76028  Canalith Repos         mins 81601      Un-Timed:  Electrical Stimulation:         mins  61856 ( );  Dry Needling          mins self-pay  Traction          mins 72669  Low Eval          Mins  05843  Mod Eval     50     Mins  68584  High Eval                            Mins  25408        Timed Treatment:      mins   Total Treatment:     50   mins    Addendum for visit type corrected from LBP to CVA      PT: Kashif Lea PT     License Number: [id number]  Electronically signed by Kashif Lea PT, 11/17/22, 10:43 AM EST    Certification Period: 11/17/2022 thru 2/14/2023  I certify that the therapy services are furnished while this patient is under my care.  The services outlined above are required by this patient, and will be reviewed every 90 days.         Physician Signature:__________________________________________________    PHYSICIAN: Flynn An MD  NPI: [id number]                                      DATE:      Please  sign and return via fax to [email or web address] . Thank you, [facility].

## 2022-11-21 ENCOUNTER — ANTICOAGULATION VISIT (OUTPATIENT)
Dept: FAMILY MEDICINE CLINIC | Facility: CLINIC | Age: 67
End: 2022-11-21

## 2022-11-21 DIAGNOSIS — F33.42 RECURRENT MAJOR DEPRESSIVE DISORDER, IN FULL REMISSION: ICD-10-CM

## 2022-11-21 DIAGNOSIS — I63.9 CEREBROVASCULAR ACCIDENT (CVA), UNSPECIFIED MECHANISM: Primary | ICD-10-CM

## 2022-11-21 LAB — INR PPP: 2.6 (ref 0.3–3.5)

## 2022-11-21 PROCEDURE — 85610 PROTHROMBIN TIME: CPT | Performed by: FAMILY MEDICINE

## 2022-11-21 PROCEDURE — 36416 COLLJ CAPILLARY BLOOD SPEC: CPT | Performed by: FAMILY MEDICINE

## 2022-11-21 RX ORDER — SERTRALINE HYDROCHLORIDE 100 MG/1
150 TABLET, FILM COATED ORAL DAILY
Qty: 135 TABLET | Refills: 1 | Status: SHIPPED | OUTPATIENT
Start: 2022-11-21

## 2022-11-22 ENCOUNTER — TREATMENT (OUTPATIENT)
Dept: PHYSICAL THERAPY | Facility: CLINIC | Age: 67
End: 2022-11-22

## 2022-11-22 ENCOUNTER — OFFICE VISIT (OUTPATIENT)
Dept: ENDOCRINOLOGY | Facility: CLINIC | Age: 67
End: 2022-11-22

## 2022-11-22 ENCOUNTER — READMISSION MANAGEMENT (OUTPATIENT)
Dept: CALL CENTER | Facility: HOSPITAL | Age: 67
End: 2022-11-22

## 2022-11-22 ENCOUNTER — SPECIALTY PHARMACY (OUTPATIENT)
Dept: PHARMACY | Facility: HOSPITAL | Age: 67
End: 2022-11-22

## 2022-11-22 VITALS
HEIGHT: 72 IN | WEIGHT: 261.6 LBS | HEART RATE: 73 BPM | SYSTOLIC BLOOD PRESSURE: 126 MMHG | DIASTOLIC BLOOD PRESSURE: 68 MMHG | BODY MASS INDEX: 35.43 KG/M2 | OXYGEN SATURATION: 95 %

## 2022-11-22 DIAGNOSIS — R27.0 ATAXIA: ICD-10-CM

## 2022-11-22 DIAGNOSIS — E11.65 TYPE 2 DIABETES MELLITUS WITH HYPERGLYCEMIA, WITH LONG-TERM CURRENT USE OF INSULIN: Primary | ICD-10-CM

## 2022-11-22 DIAGNOSIS — G81.94 LEFT HEMIPARESIS: ICD-10-CM

## 2022-11-22 DIAGNOSIS — Z79.4 TYPE 2 DIABETES MELLITUS WITH HYPERGLYCEMIA, WITH LONG-TERM CURRENT USE OF INSULIN: Primary | ICD-10-CM

## 2022-11-22 DIAGNOSIS — I63.311 CEREBRAL INFARCTION DUE TO THROMBOSIS OF RIGHT MIDDLE CEREBRAL ARTERY: Primary | ICD-10-CM

## 2022-11-22 DIAGNOSIS — R29.898 LEFT ARM WEAKNESS: ICD-10-CM

## 2022-11-22 DIAGNOSIS — I63.9 LACK OF COORDINATION DUE TO ACUTE CEREBROVASCULAR ACCIDENT (CVA): ICD-10-CM

## 2022-11-22 DIAGNOSIS — R27.9 LACK OF COORDINATION DUE TO ACUTE CEREBROVASCULAR ACCIDENT (CVA): ICD-10-CM

## 2022-11-22 DIAGNOSIS — G81.94 LEFT HEMIPARESIS: Primary | ICD-10-CM

## 2022-11-22 DIAGNOSIS — I63.9 CEREBROVASCULAR ACCIDENT (CVA), UNSPECIFIED MECHANISM: ICD-10-CM

## 2022-11-22 LAB — GLUCOSE BLDC GLUCOMTR-MCNC: 293 MG/DL (ref 70–130)

## 2022-11-22 PROCEDURE — 97110 THERAPEUTIC EXERCISES: CPT | Performed by: PHYSICAL THERAPIST

## 2022-11-22 PROCEDURE — 82962 GLUCOSE BLOOD TEST: CPT | Performed by: NURSE PRACTITIONER

## 2022-11-22 PROCEDURE — 99214 OFFICE O/P EST MOD 30 MIN: CPT | Performed by: NURSE PRACTITIONER

## 2022-11-22 PROCEDURE — 97530 THERAPEUTIC ACTIVITIES: CPT | Performed by: OCCUPATIONAL THERAPIST

## 2022-11-22 PROCEDURE — 97112 NEUROMUSCULAR REEDUCATION: CPT | Performed by: PHYSICAL THERAPIST

## 2022-11-22 PROCEDURE — 97110 THERAPEUTIC EXERCISES: CPT | Performed by: OCCUPATIONAL THERAPIST

## 2022-11-22 PROCEDURE — 97112 NEUROMUSCULAR REEDUCATION: CPT | Performed by: OCCUPATIONAL THERAPIST

## 2022-11-22 RX ORDER — SEMAGLUTIDE 1.34 MG/ML
0.25 INJECTION, SOLUTION SUBCUTANEOUS WEEKLY
Qty: 1.5 ML | Refills: 2 | Status: SHIPPED | OUTPATIENT
Start: 2022-11-22 | End: 2022-12-30

## 2022-11-22 NOTE — PROGRESS NOTES
"   Specialty Pharmacy Patient Management Program  Endocrinology Initial Assessment       Benitez Miranda is a 67 y.o. male with Type 2 Diabetes seen by an Endocrinology provider and enrolled in the Endocrinology Patient Management program offered by [facility].  An initial assessment was conducted, including assessment of therapy appropriateness and specialty medication education for insulin therapy. The patient was introduced to services offered by [facility], including: regular assessments, refill coordination, curbside pick-up or mail order delivery options, prior authorization maintenance, and financial assistance programs as applicable. The patient was also provided with contact information for the pharmacy team.     Patient is currently taking Humalog on a sliding scale (0-7 units three times daily with meals) and Lantus 25 units twice daily. Patient checks BG once daily with readings in the 200s. Patient denies low BG <70. He reports a history of CKD.     Patient denies personal/family history of thyroid cancer, denies history of pancreatitis, and denies issues with recurrent UTI/yeast infections.     In the past, the patient has tried:     Drug Dose Reason for Discontinuation Notes   Metformin  Kidney function    Ozempic  \"didn't work\" Was able to tolerate                 Insurance Coverage & Financial Support  Medicare    Relevant Past Medical History and Comorbidities  Relevant medical history and concomitant health conditions were discussed with the patient. The patient's chart has been reviewed for relevant past medical history and comorbid health conditions and updated as necessary.   Past Medical History:   Diagnosis Date   • Anxiety    • Arthritis    • CAD (coronary artery disease)     x1 stent; pulmonary HTN; EF 50-55%; rheumatic valve; MV stenosis   • CHF (congestive heart failure) ([facility])    • CKD (chronic kidney disease), stage III ([facility])    • COPD " (chronic obstructive pulmonary disease) (HCC)    • Depression    • Diabetes mellitus ([facility])    • H/O mechanical aortic valve replacement    • History of tobacco abuse    • Hyperlipidemia    • Hypertension    • Ischemic heart disease    • Kidney failure     third stage   • Low back pain    • Obesity     BMI 36.   • Stroke ([facility])    • Valvular heart disease      Social History     Socioeconomic History   • Marital status:    • Number of children: 2   Tobacco Use   • Smoking status: Former     Packs/day: 1.00     Years: 30.00     Pack years: 30.00     Types: Pipe, Cigars, Cigarettes     Quit date: 2016     Years since quittin.3   • Smokeless tobacco: Never   • Tobacco comments:     currently smokes a pipe   Vaping Use   • Vaping Use: Never used   Substance and Sexual Activity   • Alcohol use: No   • Drug use: No   • Sexual activity: Defer       Allergies  Known allergies and reactions were discussed with the patient. The patient's chart has been reviewed for  allergy information and updated as necessary.   Penicillins, Ace inhibitors, and Contrast dye    Current Medication List  This medication list has been reviewed with the patient and evaluated for any interactions or necessary modifications/recommendations, and updated to include all prescription medications, OTC medications, and supplements the patient is currently taking.  This list reflects what is contained in the patient's profile, which has also been marked as reviewed to communicate to other providers it is the most up to date version of the patient's current medication therapy.     Current Outpatient Medications:   •  aspirin 81 MG EC tablet, Take 1 tablet by mouth Daily., Disp: 30 tablet, Rfl: 0  •  insulin glargine (LANTUS, SEMGLEE) 100 UNIT/ML injection, Inject 25 Units under the skin into the appropriate area as directed 2 (Two) Times a Day., Disp: , Rfl:   •  insulin lispro (humaLOG) 100 UNIT/ML injection, Per [place]ae[place] 0-7 sliding scale  with each meal, Disp: , Rfl:   •  melatonin 5 MG tablet tablet, Take 1 tablet by mouth At Night As Needed (sleep disturbance)., Disp: 30 tablet, Rfl: 0  •  sertraline (ZOLOFT) 100 MG tablet, Take 1.5 tablets by mouth Daily., Disp: 135 tablet, Rfl: 1  •  warfarin (Coumadin) 1 MG tablet, Take 7 mg by mouth daily (6 mg +1 mg)., Disp: 30 tablet, Rfl: 0  •  warfarin (COUMADIN) 6 MG tablet, Take 1 tablet by mouth Every Night. New dose: 7 mg/day, Disp: 30 tablet, Rfl: 0  •  atorvastatin (LIPITOR) 80 MG tablet, Take 1 tablet by mouth Every Night., Disp: 30 tablet, Rfl: 0  •  metoprolol tartrate (LOPRESSOR) 50 MG tablet, Take 50 mg by mouth 2 (Two) Times a Day., Disp: , Rfl:   •  Semaglutide,0.25 or 0.5MG/DOS, (Ozempic, 0.25 or 0.5 MG/DOSE,) 2 MG/1.5ML solution pen-injector, Inject 0.25 mg under the skin into the appropriate area as directed 1 (One) Time Per Week., Disp: 1.5 mL, Rfl: 2  No current facility-administered medications for this visit.    Drug Interactions  · The hypoglycemic effect of Insulin may be increased or prolonged by metoprolol. Minor cardiovascular abnormalities may occur during hypoglycemic episodes.  · Aspirin + Warfarin - put patient at increased risk of bleeding. He denies any signs/symptoms of bleeding at this time.    Recommended Medications Assessment  • Aspirin - Currently Taking  • Statin - Unsure - patient stated that a new cholesterol medication was started, but is unsure of the name  • ACEi/ARB - Contraindicated (angioedema)     Current 10-Year ASCVD Risk: 25.3%    Relevant Laboratory Values  A1C Last 3 Results    HGBA1C Last 3 Results 5/6/22 7/28/22 10/25/22   Hemoglobin A1C 8.40 (A) 9.00 (A) 9.00 (A)   (A) Abnormal value            Lab Results   Component Value Date    HGBA1C 9.00 (H) 10/25/2022     Lab Results   Component Value Date    GLUCOSE 202 (H) 11/11/2022    CALCIUM 8.6 11/11/2022     11/11/2022    K 4.3 11/11/2022    CO2 21.4 (L) 11/11/2022     11/11/2022    BUN 26 (H)  11/11/2022    CREATININE 1.37 (H) 11/11/2022    EGFRIFAFRI 59 (L) 09/12/2016    EGFRIFNONA 43 (L) 01/25/2022    BCR 19.0 11/11/2022    ANIONGAP 10.6 11/11/2022     Lab Results   Component Value Date    CHOL 159 10/26/2022    CHLPL 167 07/18/2016    TRIG 136 10/26/2022    HDL 39 (L) 10/26/2022    LDL 96 10/26/2022     Microalbumin    Microalbumin 7/25/22   Microalbumin, Urine 7.3             Education Provided for DM medications:  The patient has been provided with the following education and any applicable administration techniques (i.e. self-injection) have been demonstrated for the therapies indicated. All questions and concerns have been addressed prior to the patient receiving the medication, and the patient has verbalized understanding of the education and any materials provided.  Additional patient education shall be provided and documented upon request by the patient, provider or payer.      FreeStyle Suellen 2:  Educated patient on using Freestyle Suellen 2 device to monitor BG:     Freestyle Suellen 2 has two parts: a sensor and a reader, which can be a device or [place]rence on smart phone. Patient has opted to use reader.  • Using a smart phone can allow readings to be automatically sent to the clinic using our invitation code allowing us to review for your appointment.    Sensors:  • Assemble the sensor by twisting the clear cap off the sensor applicator (gray component) and removing the plastic from the sensor pack (cup-like component). Line up the black line on the sensor applicator to the black line on the lip of the sensor pack and push together,  • Sensors are placed on back of patient's upper arm. Do not apply to the stomach.  • Educated on application process: clean area with alcohol beforehand. Sensor placement is important and you will want to change your insertion site with each sensor. Using the same site too often might not allow the skin to heal, causing scarring or skin irritation.  • Must be changed  every 14 days. One month supply will include two sensors.  • Choose a site:   o At least 3 inches from any injection site  o Away from scarring, tattoos, irritation, and bones  o Unlikely to be bumped, pushed, or laid on while sleeping  • Patient may purchase sensor covers if concerned about sensor falling off  • Sensor goes into the subcutaneous (fatty) part of the skin. This causes about a 15 minute real-time lag. If a finger stick is slightly off from your sensor reading, this is why.     [place] on Smart Phone:  • Will be used to alert you when blood sugar is low or high and to obtain blood sugar readings. Low alerts are set for 70 and high alerts for 250.   • To scan sensor: Hold phone/reader near sensor, wait for 2 beeps, and reading will be available  • When starting a new sensor, there is a 60 minute warm up period before you can scan a BG reading.  • Will show a countdown for the sensor to know when it is time to change it.     When to scan:  • Can check weekly averages, number of lows, and average time in goal, above, or below goal.  • Data is saved for 8 hours between scans, if you do not scan within 8 hours, you will lose some of your BG readings. Scan your sensor at least every 8 hours to be able to get your blood sugars 24 hours a day. Best to scan when you first wake up, breakfast, lunch, dinner, and right before bed to be sure to capture all data. Can scan more as desired, no limits.    Adherence and Self-Administration  • Barriers to Patient Adherence and/or Self-Administration: None   • Methods for Supporting Patient Adherence and/or Self-Administration: None Required    Vaccination Status:   COVID 19: first 3 doses, needed booster  Influenza: UTD  Pneumococcal: not vaccinated  Hep B: UTD    Smoker?  • Former, recently quit    Goals of Therapy  • Patient Goals of Therapy: Take medication everyday   • Clinical Goals or Therapeutic Targets, If Applicable: A1C reduction      Reassessment Plan &  Follow-Up  1. Patient's diabetes is not at goal with most recent A1C of 9%.   2. Recent Provider Changes  · Re-start Ozempic 0.25 mg SQ weekly   · Continue Humalog on sliding scale with meals (0-7 units)  · Continue Lantus 25 units twice daily   · Start using FreeStyle Suellen 2  3. FreeStyle Suellen must be submitted through [facility] pharmacy for coverage.   4. Patient denies issues with affordability or adherence at this time.   5. Patient has a history of CKD - continue to monitor kidney function and adjust medication dosages as appropriate.      Patient does not wish to use [facility] at this time due to: Loyalty to retail pharmacy    Attestation  I attest that the initiated specialty medication(s) are appropriate for the patient based on my assessment.  If the prescribed therapy is at any point deemed not appropriate based on the current or future assessments, a consultation will be initiated with the patient's specialty care provider to determine the best course of action. The revised plan of therapy will be documented along with any additional patient education provided.     Yasmine Alejandro, PharmD  11/22/2022  14:37 EST

## 2022-11-22 NOTE — PROGRESS NOTES
Chief Complaint   Patient presents with   • Diabetes     Having trouble keeping his diabetes in control.         Referring Provider  No ref. provider found     HPI   Benitez Mrianda is a 67 y.o. male had concerns including Diabetes (Having trouble keeping his diabetes in control. ).    Seen as a new patient.  T2DM.    Diabetes was diagnosed 1990's.  Complications include stroke, nephropathy-close to dialysis.  Last ophtho exam is due.  Current medications for diabetes include Lantus 25 units BID, Humalog SS with meals 0-7 units.  He checks his blood sugar one time per day.   Hypos: rare    The following portions of the patient's history were reviewed and updated as appropriate: allergies, current medications, past family history, past medical history, past social history, past surgical history and problem list.    Diet: limits his carbs and sugars where he can.      Past Medical History:   Diagnosis Date   • Anxiety    • Arthritis    • CAD (coronary artery disease)     x1 stent; pulmonary HTN; EF 50-55%; rheumatic valve; MV stenosis   • CHF (congestive heart failure) ([facility])    • CKD (chronic kidney disease), stage III ([facility])    • COPD (chronic obstructive pulmonary disease) ([facility])    • Depression    • Diabetes mellitus ([facility])    • H/O mechanical aortic valve replacement    • History of tobacco abuse    • Hyperlipidemia    • Hypertension    • Ischemic heart disease    • Kidney failure     third stage   • Low back pain    • Obesity     BMI 36.   • Stroke ([facility])    • Valvular heart disease      Past Surgical History:   Procedure Laterality Date   • AORTIC VALVE REPAIR/REPLACEMENT  1991   • CARDIAC CATHETERIZATION     • CARDIAC SURGERY  1991    valve on aortic   • CARDIAC SURGERY  2001    stent    • CATARACT EXTRACTION     • COLONOSCOPY  2001   • CORONARY STENT PLACEMENT  2005   • CYSTOSCOPY URETEROSCOPY LASER LITHOTRIPSY Left 11/20/2020    Procedure: CYSTOSCOPY URETEROSCOPY LASER LITHOTRIPSY WITH STENT PLACEMENT;  Surgeon:  Jonah Montgomery MD;  Location: [facility];  Service: Urology;  Laterality: Left;   • KIDNEY STONE SURGERY        Family History   Problem Relation Age of Onset   • Cancer Mother         breast   • COPD Father    • Heart attack Father 74   • Diabetes Paternal Grandmother         both legs removed      Social History     Socioeconomic History   • Marital status:    • Number of children: 2   Tobacco Use   • Smoking status: Former     Packs/day: 1.00     Years: 30.00     Pack years: 30.00     Types: Pipe, Cigars, Cigarettes     Quit date: 2016     Years since quittin.3   • Smokeless tobacco: Never   • Tobacco comments:     currently smokes a pipe   Vaping Use   • Vaping Use: Never used   Substance and Sexual Activity   • Alcohol use: No   • Drug use: No   • Sexual activity: Defer      Allergies   Allergen Reactions   • Penicillins Swelling     Tongue swelled   • Ace Inhibitors Angioedema   • Contrast Dye Rash      Current Outpatient Medications on File Prior to Visit   Medication Sig Dispense Refill   • aspirin 81 MG EC tablet Take 1 tablet by mouth Daily. 30 tablet 0   • atorvastatin (LIPITOR) 80 MG tablet Take 1 tablet by mouth Every Night. 30 tablet 0   • insulin glargine (LANTUS, SEMGLEE) 100 UNIT/ML injection Inject 25 Units under the skin into the appropriate area as directed 2 (Two) Times a Day.     • insulin lispro (humaLOG) 100 UNIT/ML injection Per [place] 0-7 sliding scale with each meal     • melatonin 5 MG tablet tablet Take 1 tablet by mouth At Night As Needed (sleep disturbance). 30 tablet 0   • metoprolol tartrate (LOPRESSOR) 50 MG tablet Take 50 mg by mouth 2 (Two) Times a Day.     • sertraline (ZOLOFT) 100 MG tablet Take 1.5 tablets by mouth Daily. 135 tablet 1   • warfarin (Coumadin) 1 MG tablet Take 7 mg by mouth daily (6 mg +1 mg). 30 tablet 0   • warfarin (COUMADIN) 6 MG tablet Take 1 tablet by mouth Every Night. New dose: 7 mg/day 30 tablet 0     No current  "facility-administered medications on file prior to visit.        Review of Systems   Constitutional: Positive for fatigue.   Endocrine: Positive for polydipsia. Negative for polyphagia and polyuria.   Psychiatric/Behavioral: Positive for sleep disturbance.   All other systems reviewed and are negative.     /68 (BP Location: Left arm, Patient Position: Sitting, Cuff Size: Adult)   Pulse 73   Ht 182.9 cm (72\")   Wt 119 kg (261 lb 9.6 oz)   SpO2 95%   BMI 35.48 kg/m²      Physical Exam  Vitals reviewed.   Constitutional:       Appearance: Normal appearance.   Eyes:      Extraocular Movements: Extraocular movements intact.   Cardiovascular:      Rate and Rhythm: Normal rate.   Pulmonary:      Effort: Pulmonary effort is normal.   Neurological:      General: No focal deficit present.      Mental Status: He is alert and oriented to person, place, and time.   Psychiatric:         Mood and Affect: Mood normal.         Behavior: Behavior normal.         Thought Content: Thought content normal.         Judgment: Judgment normal.       CMP:  Lab Results   Component Value Date    BUN 26 (H) 11/11/2022    CREATININE 1.37 (H) 11/11/2022    EGFRIFNONA 43 (L) 01/25/2022    EGFRIFAFRI 59 (L) 09/12/2016    BCR 19.0 11/11/2022     11/11/2022    K 4.3 11/11/2022    CO2 21.4 (L) 11/11/2022    CALCIUM 8.6 11/11/2022    PROTENTOTREF 7.6 09/12/2016    ALBUMIN 3.37 (L) 11/11/2022    LABGLOBREF 3.6 09/12/2016    LABIL2 1.1 (L) 09/12/2016    BILITOT 0.4 11/11/2022    ALKPHOS 57 11/11/2022    AST 17 11/11/2022    ALT 23 11/11/2022     Lipid Panel:  Lab Results   Component Value Date    CHOL 159 10/26/2022    TRIG 136 10/26/2022    HDL 39 (L) 10/26/2022    VLDL 24 10/26/2022    LDL 96 10/26/2022     HbA1c:  Lab Results   Component Value Date    HGBA1C 9.00 (H) 10/25/2022    HGBA1C 9.00 (H) 07/28/2022     Glucose:  Lab Results   Component Value Date    POCGLU 293 (A) 11/22/2022     Microalbumin:  Lab Results   Component Value " Date    MENDEZ 226.4 06/29/2020     TSH:  Lab Results   Component Value Date    TSH 4.680 (H) 10/25/2022       Assessment and Plan    Diagnoses and all orders for this visit:    1. Type 2 diabetes mellitus with hyperglycemia, with long-term current use of insulin (HCC) (Primary)  Assessment & Plan:  -Diabetes is above goal with A1c 9.0.  -Discussed dietary and exercise guidelines with patient and family.  -Discussed the importance of yearly eye exams.  -Discussed the importance of checking BG's regularly. He would like to start using a CGM.  Will send in RX for Freestyle Suellen.   -Continue Lantus 25 units BID.  -Continue Humalog SS with meals 0-7 units.  -Start Ozempic 0.25 mg weekly.  Patient has no personal history of pancreatitis, no family history of MEN syndrome or medullary thyroid cancer. Possible side effects including nausea, bloating, other GI upset and rarely pancreatitis were discussed. He was advised to call the office with any symptoms or concerns.   -S/S hypoglycemia reviewed with Rule of 15's advised.  -Follow-up in 1 month.    Orders:  -     POC Glucose Fingerstick    Other orders  -     Semaglutide,0.25 or 0.5MG/DOS, (Ozempic, 0.25 or 0.5 MG/DOSE,) 2 MG/1.5ML solution pen-injector; Inject 0.25 mg under the skin into the appropriate area as directed 1 (One) Time Per Week.  Dispense: 1.5 mL; Refill: 2       Return in about 4 weeks (around 12/20/2022) for Follow-up appointment. The patient was instructed to contact the clinic with any interval questions or concerns.        This document has been electronically signed by RED Mayorga  November 22, 2022 13:31 EST   Endocrinology

## 2022-11-22 NOTE — ASSESSMENT & PLAN NOTE
-Diabetes is above goal with A1c 9.0.  -Discussed dietary and exercise guidelines with patient and family.  -Discussed the importance of yearly eye exams.  -Discussed the importance of checking BG's regularly. He would like to start using a CGM.  Will send in RX for Freestyle Suellen.   -Continue Lantus 25 units BID.  -Continue Humalog SS with meals 0-7 units.  -Start Ozempic 0.25 mg weekly.  Patient has no personal history of pancreatitis, no family history of MEN syndrome or medullary thyroid cancer. Possible side effects including nausea, bloating, other GI upset and rarely pancreatitis were discussed. He was advised to call the office with any symptoms or concerns.   -S/S hypoglycemia reviewed with Rule of 15's advised.  -Follow-up in 1 month.

## 2022-11-22 NOTE — OUTREACH NOTE
Stroke Week 2 Survey    Flowsheet Row Responses   [facility] facility patient discharged from? [facility]   Does the patient have one of the following disease processes/diagnoses(primary or secondary)? Stroke   Week 2 attempt successful? No   Unsuccessful attempts Attempt 1          BUSHRA ALVAREZ - Registered Nurse

## 2022-11-22 NOTE — PROGRESS NOTES
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 Pain  No pain reported    Hand dominance: right    Patient Goals  Patient goals for therapy: increased motion, increased strength and independence with ADLs/IADLs               Objective   See Exercise, Manual, and Modality Logs for complete treatment.           Assessment & Plan     Assessment  Impairments: abnormal coordination, abnormal muscle firing, abnormal muscle tone and abnormal or restricted ROM    Assessment details: OT provided Pt with cues and instructions to complete OT modified interventions focused on left UE motor coordination, postural control with working across midline, left UE strength, and activity tolerance.   Pt reports impaired left hand sensation. Pt with increased time required to complete activity due to fatigue and impaired coordination . Pt demonstrated good effort and motivation and will benefit from continued OT treatment.     Prognosis: good  Prognosis details: Pt is making progress with OT treatment and demonstrates good remaining OT potential.     Plan  Planned modality interventions: ultrasound and electrical stimulation/Russian stimulation  Planned therapy interventions: functional ROM exercises, home exercise program, IADL retraining, joint mobilization, abdominal trunk stabilization, ADL retraining, fine motor coordination training, manual therapy, motor coordination training, neuromuscular re-education, orthotic fitting/training, soft tissue  mobilization, strengthening, therapeutic activities and balance/weight-bearing training  Frequency: 2x week  Treatment plan discussed with: patient  Plan details: Continue current OT plan of care.          Progress per Plan of Care               Timed Codes        Manual Therapy:    0     mins  39952;    Therapeutic Exercise:    15     mins  50554;     Neuromuscular Joanie:    15    mins  88220;    Therapeutic Activity:     15     mins  47280;      Ultrasound:     0     mins  51724;    Community/ work    0     mins  12240  Self Care/ José Miguel    00     mins  46083  Sensory Re-Int.             0     mins   97909  Cognitve Re-train    0     mins  59517   Electrical Stimulation:    0     mins 80200      Un-timed Codes  Electrical Stimulation:    0     mins 21700 ( );      Timed Treatment:   45   mins   Total Treatment:     45   mins    OT SIGNATURE:       Occupational Therapist, Certified Hand Therapist  [place] License #[id number]  NPI #[id number]  Electronically signed by: Kenny D. Maynes, OTR/L, CHT 11/22/2022

## 2022-11-22 NOTE — PROGRESS NOTES
Physical Therapy Daily Treatment Note      Patient: Benitez Miranda   : 1955  Referring practitioner: Flynn An MD  Date of Initial Visit: Type: THERAPY  Noted: 2022  Today's Date: 2022  Patient seen for 2 sessions       Visit Diagnoses:    ICD-10-CM ICD-9-CM   1. Left hemiparesis (HCC)  G81.94 342.90   2. Ataxia  R27.0 781.3   3. Cerebrovascular accident (CVA), unspecified mechanism (HCC)  I63.9 434.91       Subjective Evaluation    History of Present Illness    Subjective comment: Patient reports that he notices difficulty with getting up from the ground and with stepping up in his truck.Pain  No pain reported           Objective   See Exercise, Manual, and Modality Logs for complete treatment.       Assessment & Plan     Assessment    Assessment details: Vitals assessed prior to therapy session at BP: 147/85 mmHg, HR: 67 BPM.  Therapy session initiated with there ex in seated position, with exercises focusing on LE strengthening.  Patient challenged with balance activities, with patient showing greatest difficulty with semi-tandem stance.  Rest breaks were provided as necessary throughout today's session.  Patient showed good tolerance, but did report fatigue at conclusion of session.  He will continue to be progressed per his tolerance and POC.          Timed:         Manual Therapy:         mins  29090;     Therapeutic Exercise:    27     mins  05456;     Neuromuscular Joanie:  16      mins  91556;    Therapeutic Activity:          mins  20171;     Gait Training:           mins  77460;     Ultrasound:          mins  35736;    Ionto                                   mins   05698  Self Care                            mins   50486  Canalith Repos         mins 87682      Un-Timed:  Electrical Stimulation:         mins  89419 ( );  Dry Needling          mins self-pay  Traction          mins 60415      Timed Treatment:   43   mins   Total Treatment:     43   mins    Kalyani Pennington,  PT  [place] License: [id number]  Electronically signed by Kalyani Pennington, PT, 11/22/22, 4:15 PM EST.

## 2022-11-29 ENCOUNTER — TREATMENT (OUTPATIENT)
Dept: PHYSICAL THERAPY | Facility: CLINIC | Age: 67
End: 2022-11-29

## 2022-11-29 ENCOUNTER — OFFICE VISIT (OUTPATIENT)
Dept: CARDIOLOGY | Facility: CLINIC | Age: 67
End: 2022-11-29

## 2022-11-29 VITALS
BODY MASS INDEX: 34.57 KG/M2 | HEART RATE: 69 BPM | DIASTOLIC BLOOD PRESSURE: 84 MMHG | OXYGEN SATURATION: 96 % | SYSTOLIC BLOOD PRESSURE: 169 MMHG | WEIGHT: 255.2 LBS | HEIGHT: 72 IN

## 2022-11-29 DIAGNOSIS — G81.94 LEFT HEMIPARESIS: Primary | ICD-10-CM

## 2022-11-29 DIAGNOSIS — I10 ESSENTIAL HYPERTENSION: Primary | Chronic | ICD-10-CM

## 2022-11-29 DIAGNOSIS — I63.311 CEREBRAL INFARCTION DUE TO THROMBOSIS OF RIGHT MIDDLE CEREBRAL ARTERY: Primary | ICD-10-CM

## 2022-11-29 DIAGNOSIS — R27.0 ATAXIA: ICD-10-CM

## 2022-11-29 DIAGNOSIS — R27.9 LACK OF COORDINATION DUE TO ACUTE CEREBROVASCULAR ACCIDENT (CVA): ICD-10-CM

## 2022-11-29 DIAGNOSIS — I63.9 CEREBROVASCULAR ACCIDENT (CVA), UNSPECIFIED MECHANISM: ICD-10-CM

## 2022-11-29 DIAGNOSIS — I63.9 LACK OF COORDINATION DUE TO ACUTE CEREBROVASCULAR ACCIDENT (CVA): ICD-10-CM

## 2022-11-29 DIAGNOSIS — G81.94 LEFT HEMIPARESIS: ICD-10-CM

## 2022-11-29 DIAGNOSIS — E78.5 HYPERLIPIDEMIA LDL GOAL <70: Chronic | ICD-10-CM

## 2022-11-29 DIAGNOSIS — I25.10 CORONARY ARTERY DISEASE INVOLVING NATIVE CORONARY ARTERY OF NATIVE HEART WITHOUT ANGINA PECTORIS: Chronic | ICD-10-CM

## 2022-11-29 DIAGNOSIS — Z95.2 H/O MECHANICAL AORTIC VALVE REPLACEMENT: Chronic | ICD-10-CM

## 2022-11-29 DIAGNOSIS — R29.898 LEFT ARM WEAKNESS: ICD-10-CM

## 2022-11-29 DIAGNOSIS — I05.0 RHEUMATIC MITRAL STENOSIS: Chronic | ICD-10-CM

## 2022-11-29 PROCEDURE — 99214 OFFICE O/P EST MOD 30 MIN: CPT | Performed by: NURSE PRACTITIONER

## 2022-11-29 PROCEDURE — 97110 THERAPEUTIC EXERCISES: CPT | Performed by: OCCUPATIONAL THERAPIST

## 2022-11-29 PROCEDURE — 97530 THERAPEUTIC ACTIVITIES: CPT | Performed by: OCCUPATIONAL THERAPIST

## 2022-11-29 PROCEDURE — 97110 THERAPEUTIC EXERCISES: CPT | Performed by: PHYSICAL THERAPIST

## 2022-11-29 PROCEDURE — 97530 THERAPEUTIC ACTIVITIES: CPT | Performed by: PHYSICAL THERAPIST

## 2022-11-29 PROCEDURE — 97112 NEUROMUSCULAR REEDUCATION: CPT | Performed by: PHYSICAL THERAPIST

## 2022-11-29 RX ORDER — LOSARTAN POTASSIUM 25 MG/1
25 TABLET ORAL DAILY
Qty: 30 TABLET | Refills: 11 | Status: SHIPPED | OUTPATIENT
Start: 2022-11-29

## 2022-11-29 RX ORDER — AMLODIPINE BESYLATE 5 MG/1
5 TABLET ORAL DAILY
Qty: 30 TABLET | Refills: 11 | Status: SHIPPED | OUTPATIENT
Start: 2022-11-29 | End: 2022-12-12 | Stop reason: DRUGHIGH

## 2022-11-29 NOTE — PROGRESS NOTES
Physical Therapy Daily Treatment Note      Patient: Benitez Miranda   : 1955  Referring practitioner: Flynn An MD  Date of Initial Visit: Type: THERAPY  Noted: 2022  Today's Date: 2022  Patient seen for 3 sessions       Visit Diagnoses:    ICD-10-CM ICD-9-CM   1. Left hemiparesis (HCC)  G81.94 342.90   2. Ataxia  R27.0 781.3   3. Cerebrovascular accident (CVA), unspecified mechanism (HCC)  I63.9 434.91       Subjective Evaluation    History of Present Illness    Subjective comment: Pt has no complaints today.       Objective   See Exercise, Manual, and Modality Logs for complete treatment.       Assessment & Plan     Assessment    Assessment details: Tx today consisted of sitting exercises for improved leg stability; standing exercises; functional mobility training including squats to improve transfers and ended with proprioceptive training for improved balance.  Pt responded well to added balance activities, yet lost his balance with step backs posteriorly.  Pt reported increased fatigue today.    Plan  Plan details: Will follow progressing leg stability and improved balance.          Timed:         Manual Therapy:         mins  18789;     Therapeutic Exercise:    27     mins  94174;     Neuromuscular Joanie:    16    mins  27631;    Therapeutic Activity:     11     mins  35806;     Gait Training:           mins  74933;     Ultrasound:          mins  80888;    Ionto                                   mins   57332  Self Care                            mins   57087  Canalith Repos         mins 46159      Un-Timed:  Electrical Stimulation:         mins  65314 ( );  Dry Needling          mins self-pay  Traction          mins 27976      Timed Treatment:   54   mins   Total Treatment:     54   mins    Kashif Lea PT  [place] License: [id number]      Electronically signed by Kashif Lea PT, 22, 3:13 PM EST

## 2022-11-29 NOTE — PROGRESS NOTES
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  Pt reports good HEP adhereance. Pain  No pain reported    Hand dominance: right    Patient Goals  Patient goals for therapy: increased motion, increased strength and independence with ADLs/IADLs               Objective   See Exercise, Manual, and Modality Logs for complete treatment.           Assessment & Plan     Assessment  Impairments: abnormal coordination, abnormal muscle firing, abnormal muscle tone and abnormal or restricted ROM    Assessment details: OT provided Pt with cues and instructions to complete OT modified interventions focused on left UE motor coordination, postural control with working across midline, left UE strength, and activity tolerance.   Pt required extra time to complete tasks and small rest break between tasks. Pt noted to get very slightly SOB at times during activity. Pt demonstrated good effort and motivation and will benefit from continued OT treatment.     Prognosis: good  Prognosis details: Pt is making progress with OT treatment and demonstrates good remaining OT potential.     Plan  Planned modality interventions: ultrasound and electrical stimulation/Russian stimulation  Planned therapy interventions: functional ROM exercises, home exercise program, IADL retraining, joint mobilization, abdominal trunk stabilization, ADL retraining, fine motor coordination training, manual therapy, motor coordination training, neuromuscular re-education,  orthotic fitting/training, soft tissue mobilization, strengthening, therapeutic activities and balance/weight-bearing training  Frequency: 2x week  Treatment plan discussed with: patient  Plan details: Continue current OT plan of care.          Progress per Plan of Care               Timed Codes        Manual Therapy:    0     mins  15873;    Therapeutic Exercise:    23  mins  56842;     Neuromuscular Joanie:    0 mins  04809;    Therapeutic Activity:     30  mins  31634;      Ultrasound:     0     mins  24251;    Community/ work    0     mins  34640  Self Care/ José Miguel    0     mins  73042  Sensory Re-Int.             0     mins   11758  Cognitve Re-train    0     mins  57611   Electrical Stimulation:    0     mins 11205      Un-timed Codes  Electrical Stimulation:    0     mins 13235 ( );      Timed Treatment:   53 mins   Total Treatment:     53 mins    OT SIGNATURE:       Occupational Therapist, Certified Hand Therapist  [place] License #[id number]  NPI #[id number]  Electronically signed by: Kenny D. Maynes, OTR/L, CHT 11/29/2022

## 2022-12-01 ENCOUNTER — TREATMENT (OUTPATIENT)
Dept: PHYSICAL THERAPY | Facility: CLINIC | Age: 67
End: 2022-12-01

## 2022-12-01 DIAGNOSIS — R27.9 LACK OF COORDINATION DUE TO ACUTE CEREBROVASCULAR ACCIDENT (CVA): ICD-10-CM

## 2022-12-01 DIAGNOSIS — R29.898 LEFT ARM WEAKNESS: ICD-10-CM

## 2022-12-01 DIAGNOSIS — I63.9 LACK OF COORDINATION DUE TO ACUTE CEREBROVASCULAR ACCIDENT (CVA): ICD-10-CM

## 2022-12-01 DIAGNOSIS — I63.311 CEREBRAL INFARCTION DUE TO THROMBOSIS OF RIGHT MIDDLE CEREBRAL ARTERY: Primary | ICD-10-CM

## 2022-12-01 DIAGNOSIS — G81.94 LEFT HEMIPARESIS: ICD-10-CM

## 2022-12-01 DIAGNOSIS — R27.0 ATAXIA: ICD-10-CM

## 2022-12-01 PROCEDURE — 97530 THERAPEUTIC ACTIVITIES: CPT | Performed by: OCCUPATIONAL THERAPIST

## 2022-12-01 PROCEDURE — 97110 THERAPEUTIC EXERCISES: CPT | Performed by: OCCUPATIONAL THERAPIST

## 2022-12-01 PROCEDURE — 97112 NEUROMUSCULAR REEDUCATION: CPT | Performed by: PHYSICAL THERAPIST

## 2022-12-01 PROCEDURE — 97110 THERAPEUTIC EXERCISES: CPT | Performed by: PHYSICAL THERAPIST

## 2022-12-01 PROCEDURE — 97112 NEUROMUSCULAR REEDUCATION: CPT | Performed by: OCCUPATIONAL THERAPIST

## 2022-12-01 RX ORDER — AMLODIPINE BESYLATE 5 MG/1
TABLET ORAL
Qty: 90 TABLET | OUTPATIENT
Start: 2022-12-01

## 2022-12-01 NOTE — PROGRESS NOTES
Physical Therapy Daily Treatment Note      Patient: Benitez Miranda   : 1955  Referring practitioner: Flynn An MD  Date of Initial Visit: Type: THERAPY  Noted: 2022  Today's Date: 2022  Patient seen for 4 sessions       Visit Diagnoses:    ICD-10-CM ICD-9-CM   1. Cerebral infarction due to thrombosis of right middle cerebral artery ([facility])  I63.311 434.01   2. Left hemiparesis ([facility])  G81.94 342.90   3. Lack of coordination due to acute cerebrovascular accident (CVA) ([facility])  I63.9 434.91    R27.9 781.3   4. Ataxia  R27.0 781.3       Subjective Evaluation    History of Present Illness    Subjective comment: The patient reports 0/10 pain prior to today's session. He states he has not had any falls since his last session and has no concerns.Pain  Current pain ratin           Objective   See Exercise, Manual, and Modality Logs for complete treatment.       Assessment & Plan     Assessment    Assessment details: Therapeutic exercises were progressed to include 1# ankle weights as well as increased thera-band resistance with multiple exercises. The patient reported increased fatigue with progressed activities. Neuromuscular re-education activities were performed in the parallel bars to address impaired balance. CGA was provided with dynamic activities for increased safety. Rest breaks were provided as needed secondary to fatigue.    Plan  Plan details: Progress resistance and dynamic activities as indicated for improved strength and balance.          Timed:         Manual Therapy:         mins  40550;     Therapeutic Exercise:    30     mins  32527;     Neuromuscular Joanie:    26    mins  44835;    Therapeutic Activity:          mins  53597;     Gait Training:           mins  16704;     Ultrasound:          mins  09062;    Ionto                                   mins   07630  Self Care                            mins   87364  Canalith Repos         mins 37900      Un-Timed:  Electrical Stimulation:          mins  18789 ( );  Dry Needling          mins self-pay  Traction          mins 32516      Timed Treatment:   56   mins   Total Treatment:     56   mins    Ashley Claudene Dalton, PT  [place] License: [id number]

## 2022-12-01 NOTE — PROGRESS NOTES
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  Pt reports good HEP adhereance. Pt states I just cant hardly get my hand to do what I want it to. Pt states my hand is still numb but not as badPain  No pain reported    Hand dominance: right    Patient Goals  Patient goals for therapy: increased motion, increased strength and independence with ADLs/IADLs               Objective   See Exercise, Manual, and Modality Logs for complete treatment.           Assessment & Plan     Assessment  Impairments: abnormal coordination, abnormal muscle firing, abnormal muscle tone and abnormal or restricted ROM    Assessment details: OT provided Pt with cues and instructions to complete OT modified interventions focused on left UE motor coordination, postural control with working across midline, left UE strength, activity tolerance, and sensory reeducation.   Pt required extra time but improved to complete tasks and no rest break between tasks. Pt continues to have slow uncoordinated movement with left UE, specially open and closing of hand. Pt engaged in sensroy reeducation activities including mirror use and progressed with HEP to continue to work on at home.  Pt demonstrated good effort and motivation and will benefit from continued OT treatment.     Prognosis: good  Prognosis details: Pt is making progress with OT treatment and demonstrates good remaining OT potential.     Plan  Planned modality interventions: ultrasound and electrical  stimulation/Maltese stimulation  Planned therapy interventions: functional ROM exercises, home exercise program, IADL retraining, joint mobilization, abdominal trunk stabilization, ADL retraining, fine motor coordination training, manual therapy, motor coordination training, neuromuscular re-education, orthotic fitting/training, soft tissue mobilization, strengthening, therapeutic activities and balance/weight-bearing training  Frequency: 2x week  Treatment plan discussed with: patient  Plan details: Continue current OT plan of care.          Progress per Plan of Care               Timed Codes        Manual Therapy:    0     mins  06461;    Therapeutic Exercise:    15 mins  57380;     Neuromuscular Joanie:    23 mins  07590;    Therapeutic Activity:     15 mins  45553;      Ultrasound:     0     mins  87658;    Community/ work    0     mins  21685  Self Care/ José Miguel    0     mins  03129  Sensory Re-Int.             0     mins   66075  Cognitve Re-train    0     mins  85998   Electrical Stimulation:    0     mins 76129      Un-timed Codes  Electrical Stimulation:    0     mins 51250 ( );      Timed Treatment:   53 mins   Total Treatment:     53 mins    OT SIGNATURE:       Occupational Therapist, Certified Hand Therapist  [place] License #[id number]  NPI #[id number]  Electronically signed by: Kenny D. Maynes, OTR/L, CHT 12/1/2022

## 2022-12-02 ENCOUNTER — LAB (OUTPATIENT)
Dept: LAB | Facility: HOSPITAL | Age: 67
End: 2022-12-02

## 2022-12-02 ENCOUNTER — OFFICE VISIT (OUTPATIENT)
Dept: NEUROLOGY | Facility: CLINIC | Age: 67
End: 2022-12-02

## 2022-12-02 VITALS
HEIGHT: 72 IN | WEIGHT: 256.8 LBS | SYSTOLIC BLOOD PRESSURE: 138 MMHG | BODY MASS INDEX: 34.78 KG/M2 | TEMPERATURE: 97.5 F | DIASTOLIC BLOOD PRESSURE: 71 MMHG | HEART RATE: 71 BPM

## 2022-12-02 DIAGNOSIS — I10 PRIMARY HYPERTENSION: ICD-10-CM

## 2022-12-02 DIAGNOSIS — E11.649 TYPE 2 DIABETES MELLITUS WITH HYPOGLYCEMIA WITHOUT COMA, WITH LONG-TERM CURRENT USE OF INSULIN: ICD-10-CM

## 2022-12-02 DIAGNOSIS — Z86.73 HISTORY OF STROKE: Primary | ICD-10-CM

## 2022-12-02 DIAGNOSIS — Z79.4 TYPE 2 DIABETES MELLITUS WITH HYPOGLYCEMIA WITHOUT COMA, WITH LONG-TERM CURRENT USE OF INSULIN: ICD-10-CM

## 2022-12-02 DIAGNOSIS — E66.9 OBESITY WITH SERIOUS COMORBIDITY, UNSPECIFIED CLASSIFICATION, UNSPECIFIED OBESITY TYPE: ICD-10-CM

## 2022-12-02 DIAGNOSIS — I10 ESSENTIAL HYPERTENSION: Chronic | ICD-10-CM

## 2022-12-02 DIAGNOSIS — E78.2 MIXED HYPERLIPIDEMIA: ICD-10-CM

## 2022-12-02 PROCEDURE — 80048 BASIC METABOLIC PNL TOTAL CA: CPT

## 2022-12-02 PROCEDURE — 99214 OFFICE O/P EST MOD 30 MIN: CPT | Performed by: NURSE PRACTITIONER

## 2022-12-02 PROCEDURE — 36415 COLL VENOUS BLD VENIPUNCTURE: CPT

## 2022-12-02 NOTE — PROGRESS NOTES
New Patient Office Visit      Encounter Date: 2022   Patient Name: Benitez Miranda  : 1955   MRN: [id number]   PCP: Mesha Christina MD    Referring Provider: No ref. provider found     Chief Complaint:  No chief complaint on file.      History of Present Illness: Benitez Miranda is a 67 y.o. male with known medical diagnoses of AVR on warfarin, hypertension, hyperlipidemia, CAD s/p stenting, pulmonary hypertension, diabetes, COPD, CKD, depression/anxiety, and arthritis initially presented to ED on 10/25/2022 with left upper extremity weakness, subtherapeutic Coumadin.  Acute stroke imaging included CT head no acute abnormality, CTA without flow-limiting stenosis except for distal right M3/M4 occlusion, CTP showed slow flow with a mismatch volume of 20 cc.  At the time he reported his symptoms were not disabling and he would not want TNK.  Subsequent MRI showed right cerebral hemispheric stroke that was likely cardioembolic in setting of subtherapeutic INR.  He was discharged home on day 3 however returned on 10/28/2022 with persistent left-sided symptoms.  He was bridged with Lovenox at first hospitalization.  Follow-up MRI did not show any new ischemia, stable right MCA stroke.  He was then discharged to acute inpatient rehab where he spent approximately 2 weeks.  He states his symptoms have all improved significantly, reports some persistent minor discoordination in the left arm.  He continues seeing outpatient PT/OT.  He is compliant with medications and has his PT/INR checked regularly.    Stroke Risk Factors: diabetes mellitus, hyperlipidemia and hypertension      Subjective      Review of Systems:   Review of Systems   Constitutional: Negative for activity change, chills and fever.   HENT: Negative.    Eyes: Negative.    Respiratory: Negative.    Cardiovascular: Negative.    Musculoskeletal: Positive for arthralgias.   Skin: Negative.    Neurological: Negative for tremors, seizures, facial  asymmetry, speech difficulty, weakness, numbness and confusion.   Psychiatric/Behavioral: Negative.        Past Medical History:   Past Medical History:   Diagnosis Date   • Anxiety    • Arthritis    • CAD (coronary artery disease)     x1 stent; pulmonary HTN; EF 50-55%; rheumatic valve; MV stenosis   • CHF (congestive heart failure) ([facility])    • CKD (chronic kidney disease), stage III ([facility])    • COPD (chronic obstructive pulmonary disease) ([facility])    • Depression    • Diabetes mellitus ([facility])    • H/O mechanical aortic valve replacement    • History of tobacco abuse    • Hyperlipidemia    • Hypertension    • Ischemic heart disease    • Kidney failure     third stage   • Low back pain    • Obesity     BMI 36.   • Stroke ([facility])    • Valvular heart disease        Past Surgical History:   Past Surgical History:   Procedure Laterality Date   • AORTIC VALVE REPAIR/REPLACEMENT     • CARDIAC CATHETERIZATION     • CARDIAC SURGERY      valve on aortic   • CARDIAC SURGERY      stent    • CATARACT EXTRACTION     • COLONOSCOPY     • CORONARY STENT PLACEMENT     • CYSTOSCOPY URETEROSCOPY LASER LITHOTRIPSY Left 2020    Procedure: CYSTOSCOPY URETEROSCOPY LASER LITHOTRIPSY WITH STENT PLACEMENT;  Surgeon: Jonah Montgomery MD;  Location: [facility];  Service: Urology;  Laterality: Left;   • KIDNEY STONE SURGERY         Family History:   Family History   Problem Relation Age of Onset   • Cancer Mother         breast   • COPD Father    • Heart attack Father 74   • Diabetes Paternal Grandmother         both legs removed       Social History:   Social History     Socioeconomic History   • Marital status:    • Number of children: 2   Tobacco Use   • Smoking status: Former     Packs/day: 1.00     Years: 30.00     Pack years: 30.00     Types: Pipe, Cigars, Cigarettes     Quit date: 2016     Years since quittin.4   • Smokeless tobacco: Never   • Tobacco comments:     currently smokes a pipe   Vaping  Use   • Vaping Use: Never used   Substance and Sexual Activity   • Alcohol use: No   • Drug use: No   • Sexual activity: Defer       Medications:     Current Outpatient Medications:   •  amLODIPine (NORVASC) 5 MG tablet, Take 1 tablet by mouth Daily., Disp: 30 tablet, Rfl: 11  •  aspirin 81 MG EC tablet, Take 1 tablet by mouth Daily., Disp: 30 tablet, Rfl: 0  •  insulin glargine (LANTUS, SEMGLEE) 100 UNIT/ML injection, Inject 25 Units under the skin into the appropriate area as directed 2 (Two) Times a Day., Disp: , Rfl:   •  insulin lispro (humaLOG) 100 UNIT/ML injection, Per Druze 0-7 sliding scale with each meal, Disp: , Rfl:   •  losartan (Cozaar) 25 MG tablet, Take 1 tablet by mouth Daily., Disp: 30 tablet, Rfl: 11  •  melatonin 5 MG tablet tablet, Take 1 tablet by mouth At Night As Needed (sleep disturbance)., Disp: 30 tablet, Rfl: 0  •  sertraline (ZOLOFT) 100 MG tablet, Take 1.5 tablets by mouth Daily., Disp: 135 tablet, Rfl: 1  •  warfarin (Coumadin) 1 MG tablet, Take 7 mg by mouth daily (6 mg +1 mg)., Disp: 30 tablet, Rfl: 0  •  warfarin (COUMADIN) 6 MG tablet, Take 1 tablet by mouth Every Night. New dose: 7 mg/day, Disp: 30 tablet, Rfl: 0  •  atorvastatin (LIPITOR) 80 MG tablet, Take 1 tablet by mouth Every Night., Disp: 30 tablet, Rfl: 0  •  metoprolol tartrate (LOPRESSOR) 50 MG tablet, Take 50 mg by mouth 2 (Two) Times a Day., Disp: , Rfl:   •  Semaglutide,0.25 or 0.5MG/DOS, (Ozempic, 0.25 or 0.5 MG/DOSE,) 2 MG/1.5ML solution pen-injector, Inject 0.25 mg under the skin into the appropriate area as directed 1 (One) Time Per Week., Disp: 1.5 mL, Rfl: 2    Allergies:   Allergies   Allergen Reactions   • Penicillins Swelling     Tongue swelled   • Ace Inhibitors Angioedema   • Contrast Dye Rash       Objective     Physical Exam:  Vital Signs:   Vitals:    12/02/22 1107   BP: 138/71   BP Location: Right arm   Patient Position: Sitting   Pulse: 71   Temp: 97.5 °F (36.4 °C)   Weight: 116 kg (256 lb 12.8 oz)  "  Height: 182.9 cm (72.01\")     Body mass index is 34.82 kg/m².     Physical Exam  Constitutional:       General: He is awake.   Eyes:      General: Lids are normal.      Extraocular Movements: Extraocular movements intact.      Pupils: Pupils are equal, round, and reactive to light.   Neurological:      Mental Status: He is alert.      Coordination: Romberg sign negative.   Psychiatric:         Speech: Speech normal.       Neurological Exam  Mental Status  Awake and alert. Oriented to person, place and time. Recent and remote memory are intact. Speech is normal. Language is fluent with no aphasia. Attention and concentration are normal.    Cranial Nerves  CN II: Visual acuity is normal. Visual fields full to confrontation.  CN III, IV, VI: Extraocular movements intact bilaterally. Normal lids and orbits bilaterally. Pupils equal round and reactive to light bilaterally.  CN V: Facial sensation is normal.  CN VII: Full and symmetric facial movement.  CN IX, X: Palate elevates symmetrically  CN XI: Shoulder shrug strength is normal.  CN XII: Tongue midline without atrophy or fasciculations.    Motor  Normal muscle bulk throughout. Normal muscle tone. No abnormal involuntary movements.  Slight drift in left upper extremity.    Sensory  Light touch is normal in upper and lower extremities.     Coordination  Right: Finger-to-nose normal. Heel-to-shin normal.  Very slight difficulty with FMF and heel-to-shin and on the left.    Gait  Casual gait is normal including stance, stride, and arm swing. Romberg is absent. Able to rise from chair without using arms.       NIH Stroke Scale    1a  Level of consciousness: 0=alert; keenly responsive   1b. LOC questions:  0=Answers both questions correctly    1c. LOC commands: 0=Performs both tasks correctly   2.  Best Gaze: 0=normal   3. Visual: 0=No visual loss   4. Facial Palsy: 0=Normal symmetric movement   5a. Motor left arm: 1   5b.  Motor right arm: 0=No drift, limb holds 90 (or " 45) degrees for full 10 seconds   6a. Motor left le=No drift, limb holds 90 (or 45) degrees for full 10 seconds   6b  Motor right le=No drift, limb holds 90 (or 45) degrees for full 10 seconds   7. Limb Ataxia: 0=Absent   8.  Sensory: 0=Normal; no sensory loss   9. Best Language:  0=No aphasia, normal   10. Dysarthria: 0=Normal   11. Extinction and Inattention: 0=No abnormality    Total:   1         Modified [place] Score: 1        0  No Symptoms    1 No significant disability. Able to carry out all usual activities, despite some symptoms.    2 Slight disability. Able to look after own affairs without assistance, but unable to carry out all previous activities.    3 Moderate disability. Requires some help, but able to walk unassisted.    4 Moderately severe disability. Unable to attend to own bodily needs without assistance, and unable to walk unassisted.    5 Severe disability. Requires constant nursing care and attention, bedridden, incontinent.    6 Dead      PHQ-9 Depression Screening  Little interest or pleasure in doing things? 0-->not at all   Feeling down, depressed, or hopeless? 0-->not at all   Trouble falling or staying asleep, or sleeping too much?     Feeling tired or having little energy?     Poor appetite or overeating?     Feeling bad about yourself - or that you are a failure or have let yourself or your family down?     Trouble concentrating on things, such as reading the newspaper or watching television?     Moving or speaking so slowly that other people could have noticed? Or the opposite - being so fidgety or restless that you have been moving around a lot more than usual?     Thoughts that you would be better off dead, or of hurting yourself in some way?     PHQ-9 Total Score 0   If you checked off any problems, how difficult have these problems made it for you to do your work, take care of things at home, or get along with other people?        Imaging Reviewed:     Results for orders  placed during the hospital encounter of 10/28/22    Adult Transthoracic Echo Complete W/ Cont if Necessary Per Protocol (With Agitated Saline)    Interpretation Summary  •  The left ventricular cavity is mildly dilated.  •  Left ventricular wall thickness is consistent with mild concentric hypertrophy.  •  Left ventricular systolic function is normal. Left ventricular ejection fraction appears to be 56 - 60%.  •  Left ventricular diastolic function is consistent with (grade I) impaired relaxation.  •  There is a mechanical aortic valve prosthesis present. The aortic valve peak and mean gradients are within defined limits. The prosthetic aortic valve is grossly normal.  •  Moderate calcific  mitral valve stenosis is present. ( Peak PG= 17 mm Hg and mean PG = 8 mm Hg)).  Unchanged since the study done 10/5/2021.  •  Moderate mitral valve regurgitation is present.  •  Mild pulmonary hypertension is present.  •  Saline test is negative for the evidence of shunt at interatrial septum.  •  There is no evidence of pericardial effusion.    Adult Transthoracic Echo Complete W/ Cont if Necessary Per Protocol (With Agitated Saline)  •  The left ventricular cavity is mildly dilated.  •  Left ventricular wall thickness is consistent with mild concentric   hypertrophy.  •  Left ventricular systolic function is normal. Left ventricular ejection   fraction appears to be 56 - 60%.  •  Left ventricular diastolic function is consistent with (grade I)   impaired relaxation.  •  There is a mechanical aortic valve prosthesis present. The aortic valve   peak and mean gradients are within defined limits. The prosthetic aortic   valve is grossly normal.  •  Moderate calcific  mitral valve stenosis is present. ( Peak PG= 17 mm   Hg and mean PG = 8 mm Hg)).  Unchanged since the study done 10/5/2021.  •  Moderate mitral valve regurgitation is present.  •  Mild pulmonary hypertension is present.  •  Saline test is negative for the evidence of  shunt at interatrial   septum.  •  There is no evidence of pericardial effusion.  MRI Brain Without Contrast  Narrative: EXAM:    MR Head Without Intravenous Contrast     EXAM DATE:    10/28/2022 10:30 AM     CLINICAL HISTORY:    Stroke, follow up; I63.9-Cerebral infarction, unspecified     TECHNIQUE:    Magnetic resonance images of the head/brain without intravenous  contrast in multiple planes.     COMPARISON:    10/27/2022     FINDINGS:    BRAIN:  Grossly stable appearance of right posterior parietal acute  infarct.  No hemorrhage.    VENTRICLES:  Unremarkable.  No ventriculomegaly.    BONES/JOINTS:  Unremarkable.    SINUSES:  Unremarkable as visualized.  No acute sinusitis.    MASTOID AIR CELLS:  Unremarkable as visualized.  No mastoid effusion.    ORBITS:  Unremarkable as visualized.     Impression:   Grossly stable appearance of right posterior parietal acute infarct.     This report was finalized on 10/28/2022 11:44 AM by Dr. Leno Du MD.     XR Chest 1 View  Narrative: EXAM:    XR Chest, 1 View     EXAM DATE:    10/28/2022 7:21 AM     CLINICAL HISTORY:    Acute Stroke Protocol (onset < 12 hrs); I63.9-Cerebral infarction,  unspecified     TECHNIQUE:    Frontal view of the chest.     COMPARISON:    11/19/2020     FINDINGS:    LUNGS:  Coarsened interstitial markings noted throughout the lungs.    PLEURAL SPACE:  Unremarkable.  No pneumothorax.    HEART:  Cardiomegaly.    MEDIASTINUM:  Unremarkable.    BONES/JOINTS:  Median sternotomy.     Impression:   Cardiomegaly.     This report was finalized on 10/28/2022 8:46 AM by Dr. Leno Du MD.     CT Angiogram Neck  Narrative: CTA Neck, CTA  HEAD W AI ANALYSIS FOR LVO    INDICATION:   New onset of left-sided weakness    TECHNIQUE:   CT angiogram of the head and neck with 100 cc of Isovue-300 IV contrast. 3-D reconstructions were obtained and reviewed.  Evaluation for a significant carotid arterial stenosis is based on the NASCET criteria. Radiation dose  reduction techniques included  automated exposure control or exposure modulation based on body size. Count of known CT and cardiac nuc med studies performed in previous 12 months: 1.     AI analysis of LVO was utilized    COMPARISON:   None available.    FINDINGS:   CTA neck:  Extravascular structures are remarkable for mucosal thickening in the left maxillary sinus and ethmoid air cells. There is calcified plaque at the great vessel origins but there is no significant great vessel origin stenosis. Both vertebral  arteries are widely patent and approximately the same size. The distal vertebral arteries are patent and codominant into the basilar artery. In the carotid circulation there is mild atherosclerotic plaque and intimal thickening in the common carotid  arteries. Calcified plaque is seen across both carotid bifurcations. No stenosis is noted. The cervical internal carotids are both patent up through the siphons with mild calcified plaque in both carotid siphons.    CTA head:  In the intracranial circulation no large vessel occlusions are seen. Distal sylvian fissure branches of the right middle cerebral artery are less numerous than on the left which could reflect either an acute distal branch vessel occlusion or  chronic disease. The major dural venous sinuses are patent. There is mild-to-moderate generalized small vessel irregularity suggesting chronic small vessel atherosclerotic disease.  Impression: No hemodynamically significant stenosis is seen at the carotid bifurcations or in the posterior circulation. No large vessel occlusions are noted. There is evidence of mild to moderate diffuse small vessel disease. There is asymmetric filling of distal  MCA branches, greater on the left than on the right. An acute occlusion of a mid to distal MCA branch on the right could account for this finding. This could also reflect chronic atherosclerotic disease.    Signer Name: Ortega Waldrop MD   Signed: 10/28/2022  7:35 AM   Workstation Name: [id number]    [facility]  CT Angiogram Head w AI Analysis of LVO  Narrative: CTA Neck, CTA  HEAD W AI ANALYSIS FOR LVO    INDICATION:   New onset of left-sided weakness    TECHNIQUE:   CT angiogram of the head and neck with 100 cc of Isovue-300 IV contrast. 3-D reconstructions were obtained and reviewed.  Evaluation for a significant carotid arterial stenosis is based on the NASCET criteria. Radiation dose reduction techniques included  automated exposure control or exposure modulation based on body size. Count of known CT and cardiac nuc med studies performed in previous 12 months: 1.     AI analysis of LVO was utilized    COMPARISON:   None available.    FINDINGS:   CTA neck:  Extravascular structures are remarkable for mucosal thickening in the left maxillary sinus and ethmoid air cells. There is calcified plaque at the great vessel origins but there is no significant great vessel origin stenosis. Both vertebral  arteries are widely patent and approximately the same size. The distal vertebral arteries are patent and codominant into the basilar artery. In the carotid circulation there is mild atherosclerotic plaque and intimal thickening in the common carotid  arteries. Calcified plaque is seen across both carotid bifurcations. No stenosis is noted. The cervical internal carotids are both patent up through the siphons with mild calcified plaque in both carotid siphons.    CTA head:  In the intracranial circulation no large vessel occlusions are seen. Distal sylvian fissure branches of the right middle cerebral artery are less numerous than on the left which could reflect either an acute distal branch vessel occlusion or  chronic disease. The major dural venous sinuses are patent. There is mild-to-moderate generalized small vessel irregularity suggesting chronic small vessel atherosclerotic disease.  Impression: No hemodynamically significant stenosis is seen  at the carotid bifurcations or in the posterior circulation. No large vessel occlusions are noted. There is evidence of mild to moderate diffuse small vessel disease. There is asymmetric filling of distal  MCA branches, greater on the left than on the right. An acute occlusion of a mid to distal MCA branch on the right could account for this finding. This could also reflect chronic atherosclerotic disease.    Signer Name: Ortega Waldrop MD   Signed: 10/28/2022 7:35 AM   Workstation Name: [id number]    Radiology Psychiatric  CT Head Without Contrast Stroke Protocol  Narrative: CT Head WO Code Stroke    HISTORY:   Weakness, history of stroke a few days ago    TECHNIQUE:   Axial unenhanced head CT. Radiation dose reduction techniques included automated exposure control or exposure modulation based on body size. Count of known CT and cardiac nuc med studies performed in previous 12 months: 3.     Time of scan: 6:09 AM    COMPARISON:   10/25/2022    FINDINGS:   There is a new area of decreased density in the right corona radiata on image 37. This area measures up to 14 mm in diameter. The appearance suggests subacute infarct. The ventricles and subarachnoid spaces are normal. There are no masses or extra-axial  fluid collections or hemorrhage. Bones are unremarkable.  Impression: New 14 mm area of decreased density in the right corona radiata consistent with a subacute infarct. There is no hemorrhage.    Signer Name: Nate Lake MD   Signed: 10/28/2022 6:17 AM   Workstation Name: [email or web address]    CT Head Without Contrast    Result Date: 10/25/2022    No acute findings in the head/brain.  Findings were communicated.  This report was finalized on 10/25/2022 11:40 AM by Dr. Leno Du MD.      CT Angiogram Neck    Result Date: 10/28/2022  No hemodynamically significant stenosis is seen at the carotid bifurcations or in the posterior circulation. No large vessel occlusions  are noted. There is evidence of mild to moderate diffuse small vessel disease. There is asymmetric filling of distal MCA branches, greater on the left than on the right. An acute occlusion of a mid to distal MCA branch on the right could account for this finding. This could also reflect chronic atherosclerotic disease. Signer Name: Ortega Waldrop MD  Signed: 10/28/2022 7:35 AM  Workstation Name: [id number]  Radiology [facility]    CT Angiogram Neck    Result Date: 10/25/2022    Mild bilateral proximal internal carotid artery stenosis secondary to atherosclerotic vascular calcifications.  This report was finalized on 10/25/2022 12:44 PM by Dr. Leno Du MD.      MRI Brain Without Contrast    Result Date: 10/28/2022    Grossly stable appearance of right posterior parietal acute infarct.  This report was finalized on 10/28/2022 11:44 AM by Dr. Leno Du MD.      MRI Brain Without Contrast    Result Date: 10/27/2022    Focal 1.9 cm acute infarct in the right posterior parietal white matter.  This report was finalized on 10/27/2022 11:05 AM by Dr. Leno Du MD.      XR Chest 1 View    Result Date: 10/28/2022    Cardiomegaly.  This report was finalized on 10/28/2022 8:46 AM by Dr. Leno Du MD.      CT Angiogram Head    Result Date: 10/25/2022    No acute findings in the arteries of the head/brain.  This report was finalized on 10/25/2022 12:43 PM by Dr. Leno Du MD.      CT Head Without Contrast Stroke Protocol    Result Date: 10/28/2022  New 14 mm area of decreased density in the right corona radiata consistent with a subacute infarct. There is no hemorrhage. Signer Name: Nate Lake MD  Signed: 10/28/2022 6:17 AM  Workstation Name: [facility]  Radiology [facility]    CT Angiogram Head w AI Analysis of LVO    Result Date: 10/28/2022  No hemodynamically significant stenosis is seen at the carotid bifurcations or in the posterior circulation. No large vessel occlusions  are noted. There is evidence of mild to moderate diffuse small vessel disease. There is asymmetric filling of distal MCA branches, greater on the left than on the right. An acute occlusion of a mid to distal MCA branch on the right could account for this finding. This could also reflect chronic atherosclerotic disease. Signer Name: Ortega Waldrop MD  Signed: 10/28/2022 7:35 AM  Workstation Name: [email or web address]  [facility]    CT CEREBRAL PERFUSION WITH & WITHOUT CONTRAST    Result Date: 10/25/2022  No core infarct detected.  This report was finalized on 10/25/2022 12:45 PM by Dr. Leno Du MD.         Laboratory Results:    Lab Results   Component Value Date    HGBA1C 9.00 (H) 10/25/2022     Lab Results   Component Value Date    WBC 9.19 11/11/2022    HGB 12.7 (L) 11/11/2022    HCT 39.5 11/11/2022    MCV 84.9 11/11/2022     11/11/2022      Lab Results   Component Value Date    GLUCOSE 202 (H) 11/11/2022    BUN 26 (H) 11/11/2022    CREATININE 1.37 (H) 11/11/2022    EGFRIFNONA 43 (L) 01/25/2022    EGFRIFAFRI 59 (L) 09/12/2016    BCR 19.0 11/11/2022    K 4.3 11/11/2022    CO2 21.4 (L) 11/11/2022    CALCIUM 8.6 11/11/2022    PROTENTOTREF 7.6 09/12/2016    ALBUMIN 3.37 (L) 11/11/2022    LABIL2 1.1 (L) 09/12/2016    AST 17 11/11/2022    ALT 23 11/11/2022      Lab Results   Component Value Date    CHOL 159 10/26/2022    CHOL 142 05/06/2022    CHOL 157 04/22/2021    CHLPL 167 07/18/2016    CHLPL 135 06/08/2015     Lab Results   Component Value Date    TRIG 136 10/26/2022    TRIG 148 05/06/2022    TRIG 162 (H) 04/22/2021     Lab Results   Component Value Date    HDL 39 (L) 10/26/2022    HDL 32 (L) 05/06/2022    HDL 35 (L) 04/22/2021     Lab Results   Component Value Date    LDL 96 10/26/2022    LDL 84 05/06/2022    LDL 94 04/22/2021      Lab Results   Component Value Date    TSH 4.680 (H) 10/25/2022     Lab Results   Component Value Date    FOLATE 10.50 10/25/2022     Lab Results   Component  Value Date    [id number] 10/25/2022               Assessment / Plan      Assessment/Plan:   Diagnoses and all orders for this visit:    1. History of stroke (Primary)    2. Primary hypertension    3. Mixed hyperlipidemia    4. Type 2 diabetes mellitus with hypoglycemia without coma, with long-term current use of insulin (HCC)    5. Obesity with serious comorbidity, unspecified classification, unspecified obesity type       -Continue warfarin as prescribed  -Aspirin 81 mg daily and atorvastatin 80 mg nightly for secondary stroke prevention  -Spoke with patient regarding the importance of a healthy diet, gave him information on Mediterranean diet  -Blood pressure today 138/71, reports that his blood pressure has been up to 160s to 170s at home and cardiology recently increased his losartan dose.  I have asked him to keep a log of blood pressure readings and bring with him to any visits  -Stroke labs per PCP  -A1c goal < 7.0    Discussed the importance of medication compliance and lifestyle modifications (adequate blood pressure control, adequate control of hyperlipidemia, adequate glycemic control, increase physical activity, and healthy diet) to help reduce the risk of future cerebrovascular events.  Also discussed the signs symptoms that would warrant the patient return back to the emergency department including unilateral weakness, unilateral numbness, visual disturbances, loss of balance, speech difficulties, and/or a sudden severe headache.     Blood Pressure control to < 130/80  Goal LDL <70  Serum Glucose < 140  Call 911 for any stroke symptoms    Follow Up:   Return in about 6 months (around 6/2/2023).    JOVANNI Diaz-[facility] Neuro Stroke     This document has been electronically signed by RED Vasquez  December 2, 2022 14:45 EST    Please note that portions of this note were completed with a voice recognition program. Efforts were made to edit dictation, but occasionally words are  mistranscribed.

## 2022-12-03 LAB
ANION GAP SERPL CALCULATED.3IONS-SCNC: 12.2 MMOL/L (ref 5–15)
BUN SERPL-MCNC: 15 MG/DL (ref 8–23)
BUN/CREAT SERPL: 12.6 (ref 7–25)
CALCIUM SPEC-SCNC: 8.9 MG/DL (ref 8.6–10.5)
CHLORIDE SERPL-SCNC: 100 MMOL/L (ref 98–107)
CO2 SERPL-SCNC: 26.8 MMOL/L (ref 22–29)
CREAT SERPL-MCNC: 1.19 MG/DL (ref 0.76–1.27)
EGFRCR SERPLBLD CKD-EPI 2021: 66.1 ML/MIN/1.73
GLUCOSE SERPL-MCNC: 219 MG/DL (ref 65–99)
POTASSIUM SERPL-SCNC: 4.3 MMOL/L (ref 3.5–5.2)
SODIUM SERPL-SCNC: 139 MMOL/L (ref 136–145)

## 2022-12-05 ENCOUNTER — ANTICOAGULATION VISIT (OUTPATIENT)
Dept: FAMILY MEDICINE CLINIC | Facility: CLINIC | Age: 67
End: 2022-12-05

## 2022-12-05 DIAGNOSIS — Z95.2 H/O MECHANICAL AORTIC VALVE REPLACEMENT: Primary | ICD-10-CM

## 2022-12-05 LAB — INR PPP: 2.5 (ref 3–3.5)

## 2022-12-05 PROCEDURE — 36416 COLLJ CAPILLARY BLOOD SPEC: CPT | Performed by: FAMILY MEDICINE

## 2022-12-05 PROCEDURE — 85610 PROTHROMBIN TIME: CPT | Performed by: FAMILY MEDICINE

## 2022-12-06 ENCOUNTER — TREATMENT (OUTPATIENT)
Dept: PHYSICAL THERAPY | Facility: CLINIC | Age: 67
End: 2022-12-06

## 2022-12-06 DIAGNOSIS — R27.9 LACK OF COORDINATION DUE TO ACUTE CEREBROVASCULAR ACCIDENT (CVA): ICD-10-CM

## 2022-12-06 DIAGNOSIS — R29.898 LEFT ARM WEAKNESS: ICD-10-CM

## 2022-12-06 DIAGNOSIS — I63.9 LACK OF COORDINATION DUE TO ACUTE CEREBROVASCULAR ACCIDENT (CVA): ICD-10-CM

## 2022-12-06 DIAGNOSIS — R27.0 ATAXIA: ICD-10-CM

## 2022-12-06 DIAGNOSIS — G81.94 LEFT HEMIPARESIS: ICD-10-CM

## 2022-12-06 DIAGNOSIS — I63.311 CEREBRAL INFARCTION DUE TO THROMBOSIS OF RIGHT MIDDLE CEREBRAL ARTERY: Primary | ICD-10-CM

## 2022-12-06 DIAGNOSIS — I63.9 CEREBROVASCULAR ACCIDENT (CVA), UNSPECIFIED MECHANISM: Primary | ICD-10-CM

## 2022-12-06 PROCEDURE — 97530 THERAPEUTIC ACTIVITIES: CPT | Performed by: OCCUPATIONAL THERAPIST

## 2022-12-06 PROCEDURE — 97110 THERAPEUTIC EXERCISES: CPT | Performed by: OCCUPATIONAL THERAPIST

## 2022-12-06 PROCEDURE — 97110 THERAPEUTIC EXERCISES: CPT | Performed by: PHYSICAL THERAPIST

## 2022-12-06 PROCEDURE — 97112 NEUROMUSCULAR REEDUCATION: CPT | Performed by: OCCUPATIONAL THERAPIST

## 2022-12-06 PROCEDURE — 97112 NEUROMUSCULAR REEDUCATION: CPT | Performed by: PHYSICAL THERAPIST

## 2022-12-06 NOTE — PROGRESS NOTES
Physical Therapy Daily Treatment Note      Patient: Benitez Miranda   : 1955  Referring practitioner: Flynn An MD  Date of Initial Visit: Type: THERAPY  Noted: 2022  Today's Date: 2022  Patient seen for 5 sessions       Visit Diagnoses:    ICD-10-CM ICD-9-CM   1. Cerebrovascular accident (CVA), unspecified mechanism ([facility])  I63.9 434.91   2. Left hemiparesis (HCC)  G81.94 342.90   3. Ataxia  R27.0 781.3       Subjective:  Patient reports doing well today, and states he feels his balance and strength are improving with therapy.       Objective   See Exercise, Manual, and Modality Logs for complete treatment.       Assessment/Plan:  Patient responded well to today's session and was provided with rest breaks as needed due to fatigue. Treatment initiated with therex as listed for improved bilateral LE strength and functional mobility f/b neuro re-ed for improved closed chain balance and proprioception. Exercise progressed with resistance of cuff weights increased from one to two pounds, and strengthening reps advanced as tolerated. Pt noted with increased difficulty maintaining semi tandem stance balance, and eyes closed activities. Pt provided with contact guards assist to correct. Pt continues to benefit from therapy services, and will be progressed as tolerated to address goals, reduce fall risk and improve function. Continue with PT's POC.       Timed:         Manual Therapy:         mins  70302;     Therapeutic Exercise:    34     mins  16589;     Neuromuscular Joanie:   16     mins  58544;    Therapeutic Activity:          mins  87831;     Gait Training:           mins  03045;     Ultrasound:          mins  38290;    Ionto                                   mins   49790  Self Care                            mins   84392  Canalith Repos         mins 88777      Un-Timed:  Electrical Stimulation:         mins  14782 ( );  Dry Needling          mins self-pay  Traction          mins  66485      Timed Treatment:  50    mins   Total Treatment:   50     mins    Melody Nice. XU Martin  [place] License: [id number]

## 2022-12-08 ENCOUNTER — TREATMENT (OUTPATIENT)
Dept: PHYSICAL THERAPY | Facility: CLINIC | Age: 67
End: 2022-12-08

## 2022-12-08 DIAGNOSIS — I63.9 LACK OF COORDINATION DUE TO ACUTE CEREBROVASCULAR ACCIDENT (CVA): ICD-10-CM

## 2022-12-08 DIAGNOSIS — G81.94 LEFT HEMIPARESIS: ICD-10-CM

## 2022-12-08 DIAGNOSIS — I63.311 CEREBRAL INFARCTION DUE TO THROMBOSIS OF RIGHT MIDDLE CEREBRAL ARTERY: Primary | ICD-10-CM

## 2022-12-08 DIAGNOSIS — R27.9 LACK OF COORDINATION DUE TO ACUTE CEREBROVASCULAR ACCIDENT (CVA): ICD-10-CM

## 2022-12-08 DIAGNOSIS — R29.898 LEFT ARM WEAKNESS: ICD-10-CM

## 2022-12-08 DIAGNOSIS — I63.9 CEREBROVASCULAR ACCIDENT (CVA), UNSPECIFIED MECHANISM: ICD-10-CM

## 2022-12-08 DIAGNOSIS — R27.0 ATAXIA: ICD-10-CM

## 2022-12-08 PROCEDURE — 97110 THERAPEUTIC EXERCISES: CPT | Performed by: PHYSICAL THERAPIST

## 2022-12-08 PROCEDURE — 97110 THERAPEUTIC EXERCISES: CPT | Performed by: OCCUPATIONAL THERAPIST

## 2022-12-08 PROCEDURE — 97530 THERAPEUTIC ACTIVITIES: CPT | Performed by: OCCUPATIONAL THERAPIST

## 2022-12-08 PROCEDURE — 97112 NEUROMUSCULAR REEDUCATION: CPT | Performed by: PHYSICAL THERAPIST

## 2022-12-08 PROCEDURE — 97112 NEUROMUSCULAR REEDUCATION: CPT | Performed by: OCCUPATIONAL THERAPIST

## 2022-12-08 NOTE — PROGRESS NOTES
"Physical Therapy Daily Treatment Note      Patient: Benitez Miranda   : 1955  Referring practitioner: Flynn An MD  Date of Initial Visit: Type: THERAPY  Noted: 2022  Today's Date: 2022  Patient seen for 6 sessions       Visit Diagnoses:    ICD-10-CM ICD-9-CM   1. Cerebral infarction due to thrombosis of right middle cerebral artery ([facility])  I63.311 434.01   2. Lack of coordination due to acute cerebrovascular accident (CVA) ([facility])  I63.9 434.91    R27.9 781.3   3. Left hemiparesis ([facility])  G81.94 342.90   4. Cerebrovascular accident (CVA), unspecified mechanism ([facility])  I63.9 434.91   5. Ataxia  R27.0 781.3       Subjective Evaluation    History of Present Illness    Subjective comment: Pt reports \"doing good today\". He states he feels like he can tell a difference after every PT visit.       Objective   See Exercise, Manual, and Modality Logs for complete treatment.       Assessment & Plan     Assessment    Assessment details: Today's treatment session was progressed to include 1# ankle weights with standing activities for improved bilateral lower extremity strength. The patient reported fatigue but no pain with progressed activities. CGA was provided with neuromuscular re-education secondary to impaired balance. The patient demonstrated the greatest amount of instability with step-ups. Rest breaks were provided as needed secondary to fatigue.    Plan  Plan details: Progress as tolerated for improved balance, strength, and functional mobility.          Timed:         Manual Therapy:         mins  04878;     Therapeutic Exercise:    33     mins  41624;     Neuromuscular Joanie:    18    mins  02913;    Therapeutic Activity:          mins  71008;     Gait Training:           mins  86670;     Ultrasound:          mins  07320;    Ionto                                   mins   49897  Self Care                            mins   17709  Canalith Repos         mins 62680      Un-Timed:  Electrical " Stimulation:         mins  60900 ( );  Dry Needling          mins self-pay  Traction          mins 82854      Timed Treatment:   51   mins   Total Treatment:     51   mins    Ashley Claudene Dalton, PT  [place] License: [id number]

## 2022-12-08 NOTE — PROGRESS NOTES
[address] [place] [address]     Outpatient Occupational Therapy - Hand Therapy  Treatment Note        Patient: Benitez Miranda   : 1955  Diagnosis/ICD-10 Code:  Cerebral infarction due to thrombosis of right middle cerebral artery (HCC) [I63.311]  Referring practitioner: Flynn An MD  Date of Initial Visit: Type: THERAPY  Noted: 2022  Today's Date: 2022  Patient seen for 6 sessions           Subjective Evaluation    History of Present Illness  Date of onset: 10/25/2022  Mechanism of injury: Pt states that he have two strokes. Pt was apparently hospitalized on 10/25/22 to 10/27/22 and d/c home but suffered another stoke the next day and returned to ER resulting in admission to hospital from 10/28/22 - 22 then transferred to inpatient rehab and eventually d/c home on 2022. Pt Dx includes right MCA CVA.     Medical Hx includes DM, chronic kidney disease, CAD s/p stenting, s/p mechanical aortic valve    Subjective comment: Pt reports no new concerns.  Pain  No pain reported    Hand dominance: right    Patient Goals  Patient goals for therapy: increased motion, increased strength and independence with ADLs/IADLs               Objective   See Exercise, Manual, and Modality Logs for complete treatment.           Assessment & Plan     Assessment  Impairments: abnormal coordination, abnormal muscle firing, abnormal muscle tone and abnormal or restricted ROM    Assessment details: OT provided Pt with cues and instructions to complete OT modified interventions focused on left UE motor coordination, postural control with working across midline, left UE strength, activity tolerance, and sensory reeducation. Pt continues to demonstrate good progress requiring less time to complete activity and less attempts to complete tasks as left UE function improves.  Pt reports improving left hand numbness and increased tingling. Pt states that he feels like he see improvement follow  each therapy visit. Pt demonstrated good effort and motivation and will benefit from continued OT treatment.     Prognosis: good  Prognosis details: Pt is making progress with OT treatment and demonstrates good remaining OT potential.     Plan  Planned modality interventions: ultrasound and electrical stimulation/Russian stimulation  Planned therapy interventions: functional ROM exercises, home exercise program, IADL retraining, joint mobilization, abdominal trunk stabilization, ADL retraining, fine motor coordination training, manual therapy, motor coordination training, neuromuscular re-education, orthotic fitting/training, soft tissue mobilization, strengthening, therapeutic activities and balance/weight-bearing training  Frequency: 2x week  Treatment plan discussed with: patient  Plan details: Continue current OT plan of care.          Progress per Plan of Care               Timed Codes        Manual Therapy:    0     mins  44047;    Therapeutic Exercise:    15 mins  86432;     Neuromuscular Joanie:    15 mins  72732;    Therapeutic Activity:     15  mins  91481;      Ultrasound:     0     mins  11440;    Community/ work    0     mins  47752  Self Care/ José Miguel    0     mins  47334  Sensory Re-Int.             0     mins   23806  Cognitve Re-train    0     mins  58240   Electrical Stimulation:    0     mins 37262      Un-timed Codes  Electrical Stimulation:    0     mins 05194 ( );      Timed Treatment:   45  mins   Total Treatment:     45   mins    OT SIGNATURE:       Occupational Therapist, Certified Hand Therapist  [place] License #[id number]  NPI #[id number]  Electronically signed by: Kenny D. Maynes, OTR/L, MASOUD 12/8/2022

## 2022-12-09 ENCOUNTER — ANTICOAGULATION VISIT (OUTPATIENT)
Dept: FAMILY MEDICINE CLINIC | Facility: CLINIC | Age: 67
End: 2022-12-09

## 2022-12-09 ENCOUNTER — TELEPHONE (OUTPATIENT)
Dept: CARDIOLOGY | Facility: CLINIC | Age: 67
End: 2022-12-09

## 2022-12-09 DIAGNOSIS — Z95.2 H/O MECHANICAL AORTIC VALVE REPLACEMENT: Primary | ICD-10-CM

## 2022-12-09 LAB — INR PPP: 3.4 (ref 3–3.5)

## 2022-12-09 PROCEDURE — 85610 PROTHROMBIN TIME: CPT | Performed by: FAMILY MEDICINE

## 2022-12-09 PROCEDURE — 36416 COLLJ CAPILLARY BLOOD SPEC: CPT | Performed by: FAMILY MEDICINE

## 2022-12-09 NOTE — TELEPHONE ENCOUNTER
Patient is aware of the test results per Dayan, suggested he f/u with PCP or Endocrinologist for blood sugars.        ----- Message from RED Christensen sent at 12/7/2022  4:39 PM EST -----  Please call patient about their result.    Labs show that your kidney function has recovered.  Your glucose was elevated at 219.  Please speak to your primary care provider or endocrinologist about controlling your blood sugars better.    ThanksDayan

## 2022-12-10 RX ORDER — PRAVASTATIN SODIUM 40 MG
TABLET ORAL
Qty: 90 TABLET | OUTPATIENT
Start: 2022-12-10

## 2022-12-12 ENCOUNTER — OFFICE VISIT (OUTPATIENT)
Dept: FAMILY MEDICINE CLINIC | Facility: CLINIC | Age: 67
End: 2022-12-12

## 2022-12-12 VITALS
TEMPERATURE: 96.9 F | OXYGEN SATURATION: 96 % | BODY MASS INDEX: 34.43 KG/M2 | HEART RATE: 83 BPM | DIASTOLIC BLOOD PRESSURE: 72 MMHG | HEIGHT: 72 IN | SYSTOLIC BLOOD PRESSURE: 142 MMHG | WEIGHT: 254.2 LBS

## 2022-12-12 DIAGNOSIS — G47.9 SLEEP DISTURBANCE: ICD-10-CM

## 2022-12-12 DIAGNOSIS — N28.89 KIDNEY MASS: ICD-10-CM

## 2022-12-12 DIAGNOSIS — E11.69 HYPERLIPIDEMIA DUE TO TYPE 2 DIABETES MELLITUS: ICD-10-CM

## 2022-12-12 DIAGNOSIS — E11.59 HYPERTENSION ASSOCIATED WITH DIABETES: ICD-10-CM

## 2022-12-12 DIAGNOSIS — I15.2 HYPERTENSION ASSOCIATED WITH DIABETES: ICD-10-CM

## 2022-12-12 DIAGNOSIS — N18.30 STAGE 3 CHRONIC KIDNEY DISEASE, UNSPECIFIED WHETHER STAGE 3A OR 3B CKD: Primary | ICD-10-CM

## 2022-12-12 DIAGNOSIS — E78.5 HYPERLIPIDEMIA DUE TO TYPE 2 DIABETES MELLITUS: ICD-10-CM

## 2022-12-12 DIAGNOSIS — F33.42 RECURRENT MAJOR DEPRESSIVE DISORDER, IN FULL REMISSION: ICD-10-CM

## 2022-12-12 PROCEDURE — 99214 OFFICE O/P EST MOD 30 MIN: CPT | Performed by: FAMILY MEDICINE

## 2022-12-12 RX ORDER — ATORVASTATIN CALCIUM 80 MG/1
80 TABLET, FILM COATED ORAL NIGHTLY
Qty: 90 TABLET | Refills: 1 | Status: SHIPPED | OUTPATIENT
Start: 2022-12-12

## 2022-12-12 RX ORDER — AMLODIPINE BESYLATE 10 MG/1
10 TABLET ORAL DAILY
Qty: 90 TABLET | Refills: 1 | Status: SHIPPED | OUTPATIENT
Start: 2022-12-12

## 2022-12-12 RX ORDER — AMLODIPINE BESYLATE 5 MG/1
5 TABLET ORAL DAILY
Qty: 30 TABLET | Refills: 11 | Status: CANCELLED | OUTPATIENT
Start: 2022-12-12

## 2022-12-12 RX ORDER — CHOLECALCIFEROL (VITAMIN D3) 125 MCG
7.5 CAPSULE ORAL NIGHTLY PRN
Qty: 45 TABLET | Refills: 2 | Status: SHIPPED | OUTPATIENT
Start: 2022-12-12 | End: 2023-02-27

## 2022-12-12 NOTE — PROGRESS NOTES
"Benitez Miranda     VITALS: Blood pressure 142/72, pulse 83, temperature 96.9 °F (36.1 °C), height 182.9 cm (72.01\"), weight 115 kg (254 lb 3.2 oz), SpO2 96 %.    Subjective  Chief Complaint  Cerebrovascular Accident    Subjective          History of Present Illness:  Patient is a 67 y.o.  male with medical conditions significant for CVA, type 2 diabetes, hypertension, and hyperlipidemia who presents to clinic secondary to medical follow-up. He recently was discharged from the hospital secondary to a second stroke.    The patient states that his [employer] bilevel positive airway pressure machine is outdated. He reports that he pulled it off of his face and broke it the other night and the machine is out of warranty. He reports that he needs a mask, tubing, and supplies in addition to a new machine.  Usually when he is utilizing the pressure machine, it alleviates his fatigue.  He gives more energy during the day.  He reports that he needs refills on his atorvastatin, amlodipine, and melatonin and would like for his melatonin dose to be increased. The patient notes that his blood pressure levels at home have been the same without any recent improvement.    He reports that has been taking Ozempic for his history of diabetes mellitus. The patient notes that the medication is expensive but he has been able to get some samples from the office so he currently has a 2-month supply approximately. He reports that his blood glucose levels will go up after he eats. He reports that he gives himself Humalog and it comes down. He reports that he has a [employer] blood glucose level monitor that keeps track of his blood glucose levels. He reports that his blood glucose is 157 mg/dL in the office today. He reports that he is in a program to make his insulin more affordable and is looking into other programs.     The patient has a history of a recent cerebrovascular accident. He states he was not told how or why this second accident " occurred. The patient notes since the cerebrovascular accident, his memory and speech are normal, but notes some motor changes on his left side. He states that he has been going to rehabilitation twice weekly for this. The patient is requesting to have paperwork filled out so he can receive a handicap decal for his car.    No complaints about any of the medications.    The following portions of the patient's history were reviewed and updated as appropriate: allergies, current medications, past family history, past medical history, past social history, past surgical history and problem list.    Past Medical History  Past Medical History:   Diagnosis Date   • Anxiety    • Arthritis    • CAD (coronary artery disease)     x1 stent; pulmonary HTN; EF 50-55%; rheumatic valve; MV stenosis   • CHF (congestive heart failure) ([facility])    • CKD (chronic kidney disease), stage III ([facility])    • COPD (chronic obstructive pulmonary disease) ([facility])    • Depression    • Diabetes mellitus ([facility])    • H/O mechanical aortic valve replacement    • History of tobacco abuse    • Hyperlipidemia    • Hypertension    • Ischemic heart disease    • Kidney failure     third stage   • Low back pain    • Obesity     BMI 36.   • Stroke ([facility])    • Valvular heart disease        Surgical History  Past Surgical History:   Procedure Laterality Date   • AORTIC VALVE REPAIR/REPLACEMENT  1991   • CARDIAC CATHETERIZATION     • CARDIAC SURGERY  1991    valve on aortic   • CARDIAC SURGERY  2001    stent    • CATARACT EXTRACTION     • COLONOSCOPY  2001   • CORONARY STENT PLACEMENT  2005   • CYSTOSCOPY URETEROSCOPY LASER LITHOTRIPSY Left 11/20/2020    Procedure: CYSTOSCOPY URETEROSCOPY LASER LITHOTRIPSY WITH STENT PLACEMENT;  Surgeon: Jonah Montgomery MD;  Location: [facility];  Service: Urology;  Laterality: Left;   • KIDNEY STONE SURGERY         Family History  Family History   Problem Relation Age of Onset   • Cancer Mother         breast   • COPD Father   "  • Heart attack Father 74   • Diabetes Paternal Grandmother         both legs removed       Social History  Social History     Socioeconomic History   • Marital status:    • Number of children: 2   Tobacco Use   • Smoking status: Former     Packs/day: 1.00     Years: 30.00     Pack years: 30.00     Types: Pipe, Cigars, Cigarettes     Quit date: 2016     Years since quittin.4   • Smokeless tobacco: Never   • Tobacco comments:     currently smokes a pipe   Vaping Use   • Vaping Use: Never used   Substance and Sexual Activity   • Alcohol use: No   • Drug use: No   • Sexual activity: Defer       Objective   Vital Signs:   /72 (BP Location: Right arm, Patient Position: Sitting, Cuff Size: Adult)   Pulse 83   Temp 96.9 °F (36.1 °C)   Ht 182.9 cm (72.01\")   Wt 115 kg (254 lb 3.2 oz)   SpO2 96%   BMI 34.47 kg/m²     Physical Exam     Gen: Patient in NAD. Pleasant and answers appropriately. A&Ox3.    Skin: Warm and dry with normal turgor. No purpura, rashes, or unusual pigmentation noted. Hair is normal in appearance and distribution.    HEENT: NC/AT. No lesions noted. Conjunctiva clear, sclera nonicteric. PERRL. EOMI without nystagmus or strabismus. Fundi appear benign. No hemorrhages or exudates of eyes. Auditory canals are patent bilaterally without lesions. TMs intact,  nonerythematous, nonbulging without lesions. Nasal mucosa pink, nonerythematous, and nonedematous. Frontal and maxillary sinuses are nontender. O/P nonerythematous and moist without exudate.    Neck: Supple without lymph nodes palpated. FROM.     Lungs: CTA B/L without rales, rhonchi, crackles, or wheezes.    Heart: RRR. S1 and S2 normal. No S3 or S4. No MRGT.    Abd: Soft, nontender,nondistended. (+)BSx4 quadrants.     Extrem: No CCE. Radial pulses 2+/4 and equal B/L. FROMx4 but with weakness on the left side both upper and lower.     Neuro: No new focal motor/sensory deficits.    Procedures    Result Review :   The following " data was reviewed by: Mesha Christina MD on 12/12/2022:                Assessment and Plan    Benitez Miranda is a 67 y.o. here for medical follow-up.    Problem List Items Addressed This Visit        Cardiac and Vasculature    Essential hypertension (Chronic)    Relevant Medications    amLODIPine (NORVASC) 10 MG tablet  The patient's amlodipine will be increased to 10 mg daily.       Genitourinary and Reproductive     CKD (chronic kidney disease), stage III (HCC) - Primary    Overview     a. Stage 1-2 secondary to diabetic nephropathy.  b. Baseline creatinine 1.3.              Mental Health    Depression   Other Visit Diagnoses     Sleep disturbance        Relevant Medications    melatonin 5 MG tablet tablet  The patient's melatonin will be increased to 7.5 mg daily. He was instructed to take 1.5 tablets daily.     A request for a new bilevel positive airway pressure machine and supplies was sent today.    Hyperlipidemia due to type 2 diabetes mellitus (HCC)        Relevant Medications    atorvastatin (LIPITOR) 80 MG tablet    Kidney mass        Relevant Orders    CT Abdomen Pelvis Without Contrast  A request was sent to the patient's insurance for clearance to recheck his kidney nodule.     Paperwork was filled out for the patient to receive a handicap decal for his car.         BMI is >= 30 and <35. (Class 1 Obesity). The following options were offered after discussion;: exercise counseling/recommendations and nutrition counseling/recommendations              Follow Up   Return in about 6 weeks (around 1/23/2023).  Findings and plans discussed with patient who verbalizes understanding and agreement. Will followup with patient once results are in. Patient was given instructions and counseling regarding his condition or for health maintenance advice. Please see specific information pulled into the AVS if appropriate.       Transcribed from ambient dictation for Mesha Christina MD by Kamilah Elliott.  12/12/22    13:14 EST    Patient or patient representative verbalized consent to the visit recording.  I have personally performed the services described in this document as transcribed by the above individual, and it is both accurate and complete.

## 2022-12-13 ENCOUNTER — TREATMENT (OUTPATIENT)
Dept: PHYSICAL THERAPY | Facility: CLINIC | Age: 67
End: 2022-12-13

## 2022-12-13 DIAGNOSIS — R27.0 ATAXIA: ICD-10-CM

## 2022-12-13 DIAGNOSIS — I63.311 CEREBRAL INFARCTION DUE TO THROMBOSIS OF RIGHT MIDDLE CEREBRAL ARTERY: Primary | ICD-10-CM

## 2022-12-13 DIAGNOSIS — I63.9 CEREBROVASCULAR ACCIDENT (CVA), UNSPECIFIED MECHANISM: Primary | ICD-10-CM

## 2022-12-13 DIAGNOSIS — R29.898 LEFT ARM WEAKNESS: ICD-10-CM

## 2022-12-13 DIAGNOSIS — I63.9 LACK OF COORDINATION DUE TO ACUTE CEREBROVASCULAR ACCIDENT (CVA): ICD-10-CM

## 2022-12-13 DIAGNOSIS — G81.94 LEFT HEMIPARESIS: ICD-10-CM

## 2022-12-13 DIAGNOSIS — R27.9 LACK OF COORDINATION DUE TO ACUTE CEREBROVASCULAR ACCIDENT (CVA): ICD-10-CM

## 2022-12-13 PROCEDURE — 97110 THERAPEUTIC EXERCISES: CPT | Performed by: PHYSICAL THERAPIST

## 2022-12-13 PROCEDURE — 97112 NEUROMUSCULAR REEDUCATION: CPT | Performed by: PHYSICAL THERAPIST

## 2022-12-13 PROCEDURE — 97530 THERAPEUTIC ACTIVITIES: CPT | Performed by: OCCUPATIONAL THERAPIST

## 2022-12-13 PROCEDURE — 97110 THERAPEUTIC EXERCISES: CPT | Performed by: OCCUPATIONAL THERAPIST

## 2022-12-13 NOTE — PROGRESS NOTES
[address] [place] [address]     Outpatient Occupational Therapy - Hand Therapy  Treatment Note        Patient: Benitez Miranda   : 1955  Diagnosis/ICD-10 Code:  Cerebral infarction due to thrombosis of right middle cerebral artery (HCC) [I63.311]  Referring practitioner: Flynn An MD  Date of Initial Visit: Type: THERAPY  Noted: 2022  Today's Date: 2022  Patient seen for 7 sessions    Visit Diagnoses:    ICD-10-CM ICD-9-CM   1. 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 Lack of coordination due to acute cerebrovascular accident (CVA) (HCC)  I63.9 434.91    R27.9 781.3   3. Left hemiparesis (HCC)  G81.94 342.90   4. Left arm weakness  R29.898 729.89   5. Ataxia  R27.0 781.3              Subjective Evaluation    History of Present Illness  Date of onset: 10/25/2022  Mechanism of injury: Pt states that he have two strokes. Pt was apparently hospitalized on 10/25/22 to 10/27/22 and d/c home but suffered another stoke the next day and returned to ER resulting in admission to hospital from 10/28/22 - 22 then transferred to inpatient rehab and eventually d/c home on 2022. Pt Dx includes right MCA CVA.     Medical Hx includes DM, chronic kidney disease, CAD s/p stenting, s/p mechanical aortic valve    Subjective comment: Pt states I have been using my hand more.....my numbness is getting better but it is still hard to  small items.Pain  No pain reported    Hand dominance: right    Patient Goals  Patient goals for therapy: increased motion, increased strength and independence with ADLs/IADLs               Objective   See Exercise, Manual, and Modality Logs for complete treatment.           Assessment & Plan     Assessment  Impairments: abnormal coordination, abnormal muscle firing, abnormal muscle tone and abnormal or restricted ROM    Assessment details: OT provided Pt with cues and instructions to complete  OT modified interventions focused on left UE motor coordination, postural control with working across midline, left UE strength, activity tolerance, and fine motor skills . Pt continues to demonstrate good progress requiring less and less time and visual effort to complete activities with left UE.  Pt with significant difficultly when engaged in left UE fine motor tasks this session but able to complete at a lower grade with extended time required.  Pt demonstrated good effort and motivation and will benefit from continued OT treatment.     Prognosis: good  Prognosis details: Pt is making progress with OT treatment and demonstrates good remaining OT potential.     Plan  Planned modality interventions: ultrasound and electrical stimulation/Russian stimulation  Planned therapy interventions: functional ROM exercises, home exercise program, IADL retraining, joint mobilization, abdominal trunk stabilization, ADL retraining, fine motor coordination training, manual therapy, motor coordination training, neuromuscular re-education, orthotic fitting/training, soft tissue mobilization, strengthening, therapeutic activities and balance/weight-bearing training  Frequency: 2x week  Treatment plan discussed with: patient  Plan details: Continue current OT plan of care.          Progress per Plan of Care               Timed Codes        Manual Therapy:    0     mins  06627;    Therapeutic Exercise:    15 mins  36061;     Neuromuscular Joanie:    0 mins  29175;    Therapeutic Activity:     31 mins  48414;      Ultrasound:     0     mins  55286;    Community/ work    0     mins  11386  Self Care/ José Miguel    0     mins  21549  Sensory Re-Int.             0     mins   28375  Cognitve Re-train    0     mins  67322   Electrical Stimulation:    0     mins 81790      Un-timed Codes  Electrical Stimulation:    0     mins 96036 ( );      Timed Treatment:   46  mins   Total Treatment:     46 mins    OT SIGNATURE:       Occupational  Therapist, Certified Hand Therapist  [place] License #[id number]  NPI #[id number]  Electronically signed by: Kenny D. Maynes, OTR/L, CHT 12/21/2022

## 2022-12-13 NOTE — PROGRESS NOTES
Physical Therapy Progress Note  Patient: Benitez Miranda   : 1955  Diagnosis/ICD-10 Code:  Cerebrovascular accident (CVA), unspecified mechanism (HCC) [I63.9]  Referring practitioner: Flynn An MD  Date of Initial Visit: Type: THERAPY  Noted: 2022  Today's Date: 2022  Patient seen for 7 sessions         Visit Diagnoses:    ICD-10-CM ICD-9-CM   1. Cerebrovascular accident (CVA), unspecified mechanism (HCC)  I63.9 434.91   2. Left hemiparesis (HCC)  G81.94 342.90   3. Ataxia  R27.0 781.3       Objective Measures: DONAHUE/56     TU seconds, 13 seconds  Clinical Progress: improved  Home Program Compliance: Yes      Subjective Evaluation    History of Present Illness    Subjective comment: Patient reports that he would like to continue with PT.  He continues to have concerns with his balance, but has had no recent falls.  Patient feels like his strength is improving with therapy.Pain  Current pain ratin             Objective          Postural Observations  Seated posture: fair        Strength/Myotome Testing     Left Hip   Planes of Motion   Flexion: 4    Right Hip   Planes of Motion   Flexion: 4+    Left Knee   Flexion: 4  Extension: 4    Right Knee   Flexion: 4+  Extension: 4+    Left Ankle/Foot   Dorsiflexion: 4+    Right Ankle/Foot   Dorsiflexion: 4+    Ambulation     Comments   Pt amb with decreased stance on left with decreased left hip flexion          Assessment & Plan     Assessment  Impairments: abnormal coordination, abnormal gait, abnormal muscle firing, abnormal muscle tone, abnormal or restricted ROM, activity intolerance, impaired balance, impaired physical strength, lacks appropriate home exercise program and weight-bearing intolerance  Functional Limitations: carrying objects, walking, standing and unable to perform repetitive tasks  Assessment details: Patient has been attending physical therapy rehabilitation following a CVA; he has attended therapy for a total of 7  sessions.  Patient has shown progress towards goals with skilled PT; he has met 2/3 STGs and 0/3 LTGs.  DONAHUE Balance Assessment has improved from 37/56 to 45/56.  Gait speed has also improved by an average of 2.5 seconds on the Timed Up and Go.  Patient will continue to benefit from skilled PT, so that he can achieve his maximum level of function and be at decreased risk for falls.  Prognosis: good    Goals  Plan Goals: STG 6 weeks  1 Pt will be instructed in a HEP.  Met  2 Pt will improve his DONAHUE to 42 or greater.  Met-45/56  3 Pt will demonstrate 4+/5 gross LE strength.  Ongoing, progressing    LTG 12 weeks  1 Pt will improve his DONAHUE score to 46 or greater to demonstrate decreased risk for falls.  Ongoing, progressing  2 Pt will improve his TUG to less than 12 seconds.  Ongoing, progressing-12.5 second average  3 Pt will amb with noted improved velocity and improved wt shifting.  Ongoing, progressing    Plan  Therapy options: will be seen for skilled therapy services  Planned modality interventions: cryotherapy, thermotherapy (paraffin bath) and ultrasound  Planned therapy interventions: abdominal trunk stabilization, ADL retraining, balance/weight-bearing training, body mechanics training, flexibility, functional ROM exercises, gait training, home exercise program, IADL retraining, joint mobilization, manual therapy, neuromuscular re-education, postural training, soft tissue mobilization, strengthening, stretching, therapeutic activities and transfer training  Frequency: 2x week  Duration in weeks: 8  Treatment plan discussed with: patient  Plan details: Re-evaluation   31731    Therapeutic exercise  46925  Therapeutic activity    48271  Neuromuscular re-education   27710  Manual therapy   75849  Gait training  02743    Unattended e-stim (Medicaid/Medicare)     Moist heat/cryotherapy 28817   Ultrasound   29886               Recommendations: Continue as planned  Timeframe: 2 months  Prognosis to achieve  goals: good      Timed:         Manual Therapy:         mins  80359;     Therapeutic Exercise:    26     mins  03852;     Neuromuscular Joanie:    28    mins  27701;    Therapeutic Activity:          mins  97976;     Gait Training:           mins  83540;     Ultrasound:          mins  82854;    Ionto                                   mins   75740  Self Care                            mins   88054  Canalith Repos         mins 88132      Un-Timed:  Electrical Stimulation:         mins  09403 ([facility] );  Dry Needling          mins self-pay  Traction          mins 34890  Re-Eval                               mins  37486      Timed Treatment:   54   mins   Total Treatment:     54   mins          PT: Kalyani Pennington PT     [place] License:  [id number]    Electronically signed by Kalyani Pennington PT, 12/13/22, 3:11 PM EST    Certification Period: 12/13/2022 thru 3/12/2023  I certify that the therapy services are furnished while this patient is under my care.  The services outlined above are required by this patient, and will be reviewed every 90 days.         Physician Signature:__________________________________________________    PHYSICIAN: Flynn An MD  NPI: [id number]                                      DATE:  :     Please sign and return via fax to [email or web address] . Thank you, [facility]

## 2022-12-15 ENCOUNTER — TREATMENT (OUTPATIENT)
Dept: PHYSICAL THERAPY | Facility: CLINIC | Age: 67
End: 2022-12-15

## 2022-12-15 DIAGNOSIS — G81.94 LEFT HEMIPARESIS: ICD-10-CM

## 2022-12-15 DIAGNOSIS — R27.0 ATAXIA: ICD-10-CM

## 2022-12-15 DIAGNOSIS — G81.94 LEFT HEMIPARESIS: Primary | ICD-10-CM

## 2022-12-15 DIAGNOSIS — I63.311 CEREBRAL INFARCTION DUE TO THROMBOSIS OF RIGHT MIDDLE CEREBRAL ARTERY: ICD-10-CM

## 2022-12-15 DIAGNOSIS — R29.898 LEFT ARM WEAKNESS: ICD-10-CM

## 2022-12-15 DIAGNOSIS — R27.9 LACK OF COORDINATION DUE TO ACUTE CEREBROVASCULAR ACCIDENT (CVA): ICD-10-CM

## 2022-12-15 DIAGNOSIS — I63.9 LACK OF COORDINATION DUE TO ACUTE CEREBROVASCULAR ACCIDENT (CVA): ICD-10-CM

## 2022-12-15 DIAGNOSIS — I63.9 CEREBROVASCULAR ACCIDENT (CVA), UNSPECIFIED MECHANISM: Primary | ICD-10-CM

## 2022-12-15 PROCEDURE — 97110 THERAPEUTIC EXERCISES: CPT | Performed by: OCCUPATIONAL THERAPIST

## 2022-12-15 PROCEDURE — 97530 THERAPEUTIC ACTIVITIES: CPT | Performed by: PHYSICAL THERAPIST

## 2022-12-15 PROCEDURE — 97530 THERAPEUTIC ACTIVITIES: CPT | Performed by: OCCUPATIONAL THERAPIST

## 2022-12-15 PROCEDURE — 97110 THERAPEUTIC EXERCISES: CPT | Performed by: PHYSICAL THERAPIST

## 2022-12-15 PROCEDURE — 97112 NEUROMUSCULAR REEDUCATION: CPT | Performed by: PHYSICAL THERAPIST

## 2022-12-15 NOTE — PROGRESS NOTES
Physical Therapy Daily Treatment Note      Patient: Benitez Miranda   : 1955  Referring practitioner: Flynn An MD  Date of Initial Visit: Type: THERAPY  Noted: 2022  Today's Date: 12/15/2022  Patient seen for 8 sessions       Visit Diagnoses:    ICD-10-CM ICD-9-CM   1. Cerebrovascular accident (CVA), unspecified mechanism ([facility])  I63.9 434.91   2. Ataxia  R27.0 781.3   3. Left hemiparesis ([facility])  G81.94 342.90       Subjective Evaluation    History of Present Illness    Subjective comment: Patient reports that he continues to have difficulty with standing on one leg when trying to get dressed.       Objective   See Exercise, Manual, and Modality Logs for complete treatment.       Assessment & Plan     Assessment    Assessment details: Therapy session consisted of there ex, therapeutic activities, and neuromuscular re-education.  Patient tolerated today's session well, with rest breaks provided as necessary.  He continues to give good effort at treatment session and is well motivated.  Patient showed some difficulty with cone taps with step overs and SLS.  Patient was able to perform SLS for 3 seconds bilaterally.  He will continue to be progressed per his tolerance and POC.          Timed:         Manual Therapy:         mins  03345;     Therapeutic Exercise:    26     mins  96300;     Neuromuscular Joanie:    20    mins  50755;    Therapeutic Activity:     9     mins  66103;     Gait Training:           mins  21503;     Ultrasound:          mins  86194;    Ionto                                   mins   29691  Self Care                            mins   35709  Canalith Repos         mins 65693      Un-Timed:  Electrical Stimulation:         mins  80831 ( );  Dry Needling          mins self-pay  Traction          mins 76650      Timed Treatment:   55   mins   Total Treatment:     55   mins    Kalyani Pennington, PT  [place] License: [id number]  Electronically signed by Kalyani Pennington, PT, 12/15/22,  12:45 PM EST.

## 2022-12-15 NOTE — PROGRESS NOTES
[address] [place] [address]       Outpatient Occupational Therapy   Progress Note      Patient: Benitez Miranda   : 1955  Diagnosis/ICD-10 Code:  Left hemiparesis (HCC) [G81.94]  Referring practitioner: Flynn An MD  Date of Initial Visit: 12/15/2022  Today's Date: 12/15/2022  Patient seen for 8 session         Visit Diagnoses:    ICD-10-CM ICD-9-CM   1. Left hemiparesis (HCC)  G81.94 342.90   2. 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 Lack of coordination due to acute cerebrovascular accident (CVA) (HCC)  I63.9 434.91    R27.9 781.3   4. Left arm weakness  R29.898 729.89           Subjective Evaluation    History of Present Illness  Date of onset: 10/25/2022  Mechanism of injury: Pt states that he have two strokes. Pt was apparently hospitalized on 10/25/22 to 10/27/22 and d/c home but suffered another stoke the next day and returned to ER resulting in admission to hospital from 10/28/22 - 22 then transferred to inpatient rehab and eventually d/c home on 2022. Pt Dx includes right MCA CVA.     Medical Hx includes DM, chronic kidney disease, CAD s/p stenting, s/p mechanical aortic valve    Subjective comment: Pt states I can tell I am getting betterPain  No pain reported    Social Support  Lives in: trailer  Lives with: alone    Hand dominance: right    Treatments  Discharged from (in last 30 days): inpatient hospitalization  Patient Goals  Patient goals for therapy: increased motion, increased strength and independence with ADLs/IADLs             Objective          Active Range of Motion   Left Shoulder   Flexion: WFL  Extension: WFL  Abduction: WFL  Adduction: WFL    Right Shoulder   Normal active range of motion    Left Elbow   Normal active range of motion    Right Elbow   Normal active range of motion    Left Wrist   Normal active range of motion    Right Wrist   Normal active range of motion    Additional  "Active Range of Motion Details  Pt with decreased left shoulder AROM vs right but WFLs however pt requires cues for proper posture as he leans to right side to reach up with left; states, \" I didn't know I was even doing that\"    Strength/Myotome Testing     Left Shoulder     Planes of Motion   Flexion: 4-   Extension: 4+   Abduction: 4-   Adduction: 4-     Right Shoulder   Normal muscle strength    Left Elbow   Normal strength    Right Elbow   Normal strength    Left Wrist/Hand   Normal wrist strength     (2nd hand position)     Trial 1: 56 lbs    Trial 2: 60 lbs    Trial 3: 59 lbs    Average: 58.33 lbs    Right Wrist/Hand      (2nd hand position)     Trial 1: 77 lbs    Trial 2: 75 lbs    Trial 3: 75 lbs    Average: 75.67 lbs    Functional Assessment     Comments  Coordination    Box and Block  o RUE= 42 > 37  o LUE= 27 > 29    9 Hole Peg Test  o RUE= 24.91s > 30.06  o LUE= 2m 06 s > 46.83    ten sense score    o L hand    Thumb = 8  > 8  IF=7 > 7  LF=7 > 8  RF= 6 > 8  SF =7 > 8  Dorsal and volar hand = 10 > 10    QuickDASH Score: 34.1 / 100 = 34.1 % > QuickDASH Score: 9.1 / 100 = 9.1 %    Barthel Index= RAW Score: 17/20, Slight Dependency > NT             Assessment & Plan     Assessment  Impairments: abnormal coordination, abnormal or restricted ROM, activity intolerance, impaired physical strength, lacks appropriate home exercise program and safety issue    Assessment details: OT provided Pt with cues and instructions to complete OT modified interventions focused on update progress note testing as well as improving left UE strength and coordination. Pt demonstrates excellent improve with left hand fine motor skills with a more that double improvement in 9-hole-peg testing score as well as excellent improvement demonstrated in strength and QuickDASH scoring. Pt with slight improvement noted in tens sensory left hand scoring. Pt continues to demonstrates impaired left UE function as well as decreased " activity tolerance impacted ADL independence. Pt demonstrates good effort and motivation. Pt will benefit from continued OT treatment.     Prognosis: good  Prognosis details: Patient is making excellent progress with OT treatment and demonstrates good remaining OT potential.       Plan  Planned modality interventions: ultrasound and electrical stimulation/Russian stimulation  Planned therapy interventions: ADL retraining, fine motor coordination training, functional ROM exercises, IADL retraining, joint mobilization, home exercise program, manual therapy, motor coordination training, neuromuscular re-education, orthotic fitting/training, postural training, strengthening, stretching, therapeutic activities, soft tissue mobilization and balance/weight-bearing training  Frequency: 2x week  Duration in visits: 16  Duration in weeks: 8  Treatment plan discussed with: patient  Plan details: Continue with current OT plan of care.          Timed:         Manual Therapy:    0     mins  28769;     Therapeutic Exercise:   15     mins  73195;     Neuromuscular Joanie:    0    mins  87439;    Therapeutic Activity:     30  mins  88105;     Ultrasound:     0     mins  14694;    Ionto                               0    mins   86071  Self Care                       0     mins   92255  Electrical Stimulation:    0     mins  59529    Un-Timed:  Electrical Stimulation:    0     mins  23526 ( );        Timed Treatment:   45 mins   Total Treatment:     45 mins          OT SIGNATURE:       Occupational Therapist, Certified Hand Therapist  [place] License #[id number]  NPI #[id number]  Electronically signed by: Kenny D. Maynes, OTR/L, CHT 12/15/2022

## 2022-12-16 ENCOUNTER — ANTICOAGULATION VISIT (OUTPATIENT)
Dept: FAMILY MEDICINE CLINIC | Facility: CLINIC | Age: 67
End: 2022-12-16

## 2022-12-16 ENCOUNTER — TELEPHONE (OUTPATIENT)
Dept: FAMILY MEDICINE CLINIC | Facility: CLINIC | Age: 67
End: 2022-12-16

## 2022-12-16 DIAGNOSIS — Z79.01 ANTICOAGULANT LONG-TERM USE: Primary | ICD-10-CM

## 2022-12-16 DIAGNOSIS — Z79.01 ANTICOAGULATION GOAL OF INR 2.5 TO 3.5: ICD-10-CM

## 2022-12-16 DIAGNOSIS — Z51.81 ANTICOAGULATION GOAL OF INR 2.5 TO 3.5: ICD-10-CM

## 2022-12-16 LAB — INR PPP: 2.6 (ref 3–3.5)

## 2022-12-16 PROCEDURE — 85610 PROTHROMBIN TIME: CPT | Performed by: FAMILY MEDICINE

## 2022-12-16 PROCEDURE — 36416 COLLJ CAPILLARY BLOOD SPEC: CPT | Performed by: FAMILY MEDICINE

## 2022-12-16 NOTE — TELEPHONE ENCOUNTER
Can you call [employer] and see where he is on the Bipap order? I haven't seen any return paperwork.

## 2022-12-16 NOTE — TELEPHONE ENCOUNTER
Pt was in office today and was questioning his Bipap machine. He stated that he spoke with [employer] and they said that Dr. Christina needed to send settings information in, no paperwork has been received from [employer] yet.

## 2022-12-19 NOTE — TELEPHONE ENCOUNTER
If it helps, he's recently had 2 strokes so now the O2 is more of a medical necessity not just for the ELAINE. Thanks.      Gemma notified & verbalized understanding.

## 2022-12-19 NOTE — TELEPHONE ENCOUNTER
If it helps, he's recently had 2 strokes so now the O2 is more of a medical necessity not just for the ELAINE. Thanks.

## 2022-12-19 NOTE — TELEPHONE ENCOUNTER
Can you call [employer] and see where he is on the Bipap order? I haven't seen any return paperwork.    I called [employer] she stated he is inactive in their system for a bad debt,she is working on trying to get this cleared & will call the main office again today to see what she can do.

## 2022-12-20 ENCOUNTER — TREATMENT (OUTPATIENT)
Dept: PHYSICAL THERAPY | Facility: CLINIC | Age: 67
End: 2022-12-20

## 2022-12-20 ENCOUNTER — OFFICE VISIT (OUTPATIENT)
Dept: ENDOCRINOLOGY | Facility: CLINIC | Age: 67
End: 2022-12-20

## 2022-12-20 VITALS
OXYGEN SATURATION: 97 % | HEIGHT: 72 IN | BODY MASS INDEX: 34.05 KG/M2 | WEIGHT: 251.4 LBS | DIASTOLIC BLOOD PRESSURE: 66 MMHG | HEART RATE: 75 BPM | SYSTOLIC BLOOD PRESSURE: 128 MMHG

## 2022-12-20 DIAGNOSIS — G81.94 LEFT HEMIPARESIS: ICD-10-CM

## 2022-12-20 DIAGNOSIS — I63.311 CEREBRAL INFARCTION DUE TO THROMBOSIS OF RIGHT MIDDLE CEREBRAL ARTERY: Primary | ICD-10-CM

## 2022-12-20 DIAGNOSIS — I63.9 CEREBROVASCULAR ACCIDENT (CVA), UNSPECIFIED MECHANISM: Primary | ICD-10-CM

## 2022-12-20 DIAGNOSIS — E11.65 TYPE 2 DIABETES MELLITUS WITH HYPERGLYCEMIA, WITH LONG-TERM CURRENT USE OF INSULIN: Primary | ICD-10-CM

## 2022-12-20 DIAGNOSIS — I63.9 LACK OF COORDINATION DUE TO ACUTE CEREBROVASCULAR ACCIDENT (CVA): ICD-10-CM

## 2022-12-20 DIAGNOSIS — Z79.4 TYPE 2 DIABETES MELLITUS WITH HYPERGLYCEMIA, WITH LONG-TERM CURRENT USE OF INSULIN: Primary | ICD-10-CM

## 2022-12-20 DIAGNOSIS — R27.9 LACK OF COORDINATION DUE TO ACUTE CEREBROVASCULAR ACCIDENT (CVA): ICD-10-CM

## 2022-12-20 DIAGNOSIS — R29.898 LEFT ARM WEAKNESS: ICD-10-CM

## 2022-12-20 DIAGNOSIS — R27.0 ATAXIA: ICD-10-CM

## 2022-12-20 LAB — GLUCOSE BLDC GLUCOMTR-MCNC: 183 MG/DL (ref 70–130)

## 2022-12-20 PROCEDURE — 99214 OFFICE O/P EST MOD 30 MIN: CPT | Performed by: NURSE PRACTITIONER

## 2022-12-20 PROCEDURE — 97112 NEUROMUSCULAR REEDUCATION: CPT | Performed by: PHYSICAL THERAPIST

## 2022-12-20 PROCEDURE — 97110 THERAPEUTIC EXERCISES: CPT | Performed by: PHYSICAL THERAPIST

## 2022-12-20 PROCEDURE — 97530 THERAPEUTIC ACTIVITIES: CPT | Performed by: OCCUPATIONAL THERAPIST

## 2022-12-20 PROCEDURE — 82962 GLUCOSE BLOOD TEST: CPT | Performed by: NURSE PRACTITIONER

## 2022-12-20 PROCEDURE — 97112 NEUROMUSCULAR REEDUCATION: CPT | Performed by: OCCUPATIONAL THERAPIST

## 2022-12-20 PROCEDURE — 97530 THERAPEUTIC ACTIVITIES: CPT | Performed by: PHYSICAL THERAPIST

## 2022-12-20 NOTE — ASSESSMENT & PLAN NOTE
-Diabetes remains stable with stable BG's.  -Discussed dietary and exercise guidelines with patient and family.  -Discussed the importance of yearly eye exams.  -Discussed the importance of checking BG's regularly. Continue using Freestyle Suellen CGM.  -Continue Lantus 25 units BID.  -Continue Humalog SS with meals 0-7 units.  -Increase Ozempic 0.5 mg weekly.  Patient has no personal history of pancreatitis, no family history of MEN syndrome or medullary thyroid cancer. Possible side effects including nausea, bloating, other GI upset and rarely pancreatitis were discussed. He was advised to call the office with any symptoms or concerns.   -S/S hypoglycemia reviewed with Rule of 15's advised.  -Follow-up in 2 months.

## 2022-12-20 NOTE — PROGRESS NOTES
Physical Therapy Daily Treatment Note      Patient: Benitez Miranda   : 1955  Referring practitioner: Flynn An MD  Date of Initial Visit: Type: THERAPY  Noted: 2022  Today's Date: 2022  Patient seen for 9 sessions       Visit Diagnoses:    ICD-10-CM ICD-9-CM   1. Cerebrovascular accident (CVA), unspecified mechanism ([facility])  I63.9 434.91   2. Ataxia  R27.0 781.3   3. Left hemiparesis ([facility])  G81.94 342.90       Subjective Evaluation    History of Present Illness    Subjective comment: Patient reports that he went to the store with his son and his son was commenting how much better he was doing with his walking.Pain  No pain reported           Objective   See Exercise, Manual, and Modality Logs for complete treatment.       Assessment & Plan     Assessment    Assessment details: Patient tolerated today's session well, with rest breaks provided as necessary.  Treatment consisted of there ex, therapeutic activities, and neuromuscular re-education.  Patient challenged with standing on airex to perform cone reaches, with patient requiring intermittent UE support and CGA-min A to correct balance.  Patient will continue to be progressed per his tolerance and POC.          Timed:         Manual Therapy:         mins  49825;     Therapeutic Exercise:    20     mins  05348;     Neuromuscular Joanie:  25      mins  36033;    Therapeutic Activity:     11     mins  37239;     Gait Training:           mins  06587;     Ultrasound:          mins  31614;    Ionto                                   mins   86244  Self Care                            mins   71726  Canalith Repos         mins 77627      Un-Timed:  Electrical Stimulation:         mins  13479 ( );  Dry Needling          mins self-pay  Traction          mins 76747      Timed Treatment:   56   mins   Total Treatment:     56   mins    Kalyani Pennington PT  [place] License: [id number]  Electronically signed by Kalyani Pennington PT, 22, 4:06 PM  EST.

## 2022-12-20 NOTE — PROGRESS NOTES
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 Pt was apparently hospitalized on 10/25/22 to 10/27/22 and d/c home but suffered another stoke the next day and returned to ER resulting in admission to hospital from 10/28/22 - 22 then transferred to inpatient rehab and eventually d/c home on 2022. Pt Dx includes right MCA CVA.     Medical Hx includes DM, chronic kidney disease, CAD s/p stenting, s/p mechanical aortic valve    Subjective comment: Pt states, sometimes i feel like the numbness in my hand is getting better than i goto use it and its not. Pain  No pain reported    Hand dominance: right    Patient Goals  Patient goals for therapy: increased motion, increased strength and independence with ADLs/IADLs               Objective   See Exercise, Manual, and Modality Logs for complete treatment.           Assessment & Plan     Assessment  Impairments: abnormal coordination, abnormal muscle firing, abnormal muscle tone and abnormal or restricted ROM    Assessment details: OT provided Pt with cues and instructions to complete OT modified interventions focused on left UE motor coordination, postural control with working across midline, left UE strength, activity tolerance, and fine motor skills . Patient demonstrates improved left UE coordination stating I have been working with small nuts and bolts at home like you mentioned. Pt occasionally dropping small  pegs , largely suspect due to sensory impairment.  Pt demonstrated good effort and motivation and will benefit from continued OT treatment.     Prognosis: good  Prognosis details: Pt is making progress with OT treatment and demonstrates good remaining OT potential.     Plan  Planned modality interventions: ultrasound and electrical stimulation/Russian stimulation  Planned therapy interventions: functional ROM exercises, home exercise program, IADL retraining, joint mobilization, abdominal trunk stabilization, ADL retraining, fine motor coordination training, manual therapy, motor coordination training, neuromuscular re-education, orthotic fitting/training, soft tissue mobilization, strengthening, therapeutic activities and balance/weight-bearing training  Frequency: 2x week  Treatment plan discussed with: patient  Plan details: Continue current OT plan of care.          Progress per Plan of Care               Timed Codes        Manual Therapy:    0     mins  40147;    Therapeutic Exercise:    0 mins  19096;     Neuromuscular Joanie:    15 mins  42061;    Therapeutic Activity:     30 mins  95226;      Ultrasound:     0     mins  98164;    Community/ work    0     mins  99518  Self Care/ José Miguel    0     mins  75599  Sensory Re-Int.             0     mins   00401  Cognitve Re-train    0     mins  89391   Electrical Stimulation:    0     mins 91945      Un-timed Codes  Electrical Stimulation:    0     mins 93641 ( );      Timed Treatment:   45  mins   Total Treatment:     45 mins    OT SIGNATURE:       Occupational Therapist, Certified Hand Therapist  [place] License #[id number]  NPI #[id number]  Electronically signed by: Kenny D. Maynes, OTR/L, MASOUD 12/20/2022

## 2022-12-20 NOTE — PROGRESS NOTES
Chief Complaint   Patient presents with   • Diabetes     F/u      Benitez Miranda is a 67 y.o. male had concerns including Diabetes (F/u).    T2DM.    Diabetes was diagnosed 1990's.  Complications include stroke, nephropathy-close to dialysis.  Last ophtho exam is due.  Current medications for diabetes include Lantus 25 units BID, Humalog SS with meals 0-7 units, Ozempic 0.25 mg weekly.  He checks his blood sugar with Freestyle Suellen CGM.   Hypos: rarely    He has had issues with getting his Ozempic covered and it is hard for him to afford his medication; however, he feels like this is helping his BG's.    The following portions of the patient's history were reviewed and updated as appropriate: allergies, current medications, past family history, past medical history, past social history, past surgical history and problem list.    Diet: limits his carbs and sugars where he can.    Past Medical History:   Diagnosis Date   • Anxiety    • Arthritis    • CAD (coronary artery disease)     x1 stent; pulmonary HTN; EF 50-55%; rheumatic valve; MV stenosis   • CHF (congestive heart failure) ([facility])    • CKD (chronic kidney disease), stage III ([facility])    • COPD (chronic obstructive pulmonary disease) ([facility])    • Depression    • Diabetes mellitus ([facility])    • H/O mechanical aortic valve replacement    • History of tobacco abuse    • Hyperlipidemia    • Hypertension    • Ischemic heart disease    • Kidney failure     third stage   • Low back pain    • Obesity     BMI 36.   • Stroke ([facility])    • Valvular heart disease      Past Surgical History:   Procedure Laterality Date   • AORTIC VALVE REPAIR/REPLACEMENT  1991   • CARDIAC CATHETERIZATION     • CARDIAC SURGERY  1991    valve on aortic   • CARDIAC SURGERY  2001    stent    • CATARACT EXTRACTION     • COLONOSCOPY  2001   • CORONARY STENT PLACEMENT  2005   • CYSTOSCOPY URETEROSCOPY LASER LITHOTRIPSY Left 11/20/2020    Procedure: CYSTOSCOPY URETEROSCOPY LASER LITHOTRIPSY WITH STENT  PLACEMENT;  Surgeon: Jonah Montgomery MD;  Location: [facility];  Service: Urology;  Laterality: Left;   • KIDNEY STONE SURGERY        Family History   Problem Relation Age of Onset   • Cancer Mother         breast   • COPD Father    • Heart attack Father 74   • Diabetes Paternal Grandmother         both legs removed      Social History     Socioeconomic History   • Marital status:    • Number of children: 2   Tobacco Use   • Smoking status: Former     Packs/day: 1.00     Years: 30.00     Pack years: 30.00     Types: Pipe, Cigars, Cigarettes     Quit date: 2016     Years since quittin.4   • Smokeless tobacco: Never   • Tobacco comments:     currently smokes a pipe   Vaping Use   • Vaping Use: Never used   Substance and Sexual Activity   • Alcohol use: No   • Drug use: No   • Sexual activity: Defer      Allergies   Allergen Reactions   • Penicillins Swelling     Tongue swelled   • Ace Inhibitors Angioedema   • Contrast Dye Rash      Current Outpatient Medications on File Prior to Visit   Medication Sig Dispense Refill   • amLODIPine (NORVASC) 10 MG tablet Take 1 tablet by mouth Daily. 90 tablet 1   • aspirin 81 MG EC tablet Take 1 tablet by mouth Daily. 30 tablet 0   • atorvastatin (LIPITOR) 80 MG tablet Take 1 tablet by mouth Every Night. 90 tablet 1   • insulin glargine (LANTUS, SEMGLEE) 100 UNIT/ML injection Inject 25 Units under the skin into the appropriate area as directed 2 (Two) Times a Day.     • insulin lispro (humaLOG) 100 UNIT/ML injection Per Quaker 0-7 sliding scale with each meal     • losartan (Cozaar) 25 MG tablet Take 1 tablet by mouth Daily. 30 tablet 11   • melatonin 5 MG tablet tablet Take 1.5 tablets by mouth At Night As Needed (sleep disturbance). 45 tablet 2   • Semaglutide,0.25 or 0.5MG/DOS, (Ozempic, 0.25 or 0.5 MG/DOSE,) 2 MG/1.5ML solution pen-injector Inject 0.25 mg under the skin into the appropriate area as directed 1 (One) Time Per Week. 1.5 mL 2   • sertraline  "(ZOLOFT) 100 MG tablet Take 1.5 tablets by mouth Daily. 135 tablet 1   • warfarin (Coumadin) 1 MG tablet Take 7 mg by mouth daily (6 mg +1 mg). (Patient taking differently: Take 7 mg by mouth daily (6 mg +1 mg +1 mg).) 30 tablet 0   • warfarin (COUMADIN) 6 MG tablet Take 1 tablet by mouth Every Night. New dose: 7 mg/day 30 tablet 0   • [DISCONTINUED] metoprolol tartrate (LOPRESSOR) 50 MG tablet Take 50 mg by mouth 2 (Two) Times a Day.       No current facility-administered medications on file prior to visit.        Review of Systems   Constitutional: Positive for fatigue.   Endocrine: Positive for polydipsia. Negative for polyphagia and polyuria.   Psychiatric/Behavioral: Positive for sleep disturbance.   All other systems reviewed and are negative.     /66 (BP Location: Right arm, Patient Position: Sitting, Cuff Size: Adult)   Pulse 75   Ht 182.9 cm (72\")   Wt 114 kg (251 lb 6.4 oz)   SpO2 97%   BMI 34.10 kg/m²      Physical Exam  Vitals reviewed.   Constitutional:       Appearance: Normal appearance.   Eyes:      Extraocular Movements: Extraocular movements intact.   Cardiovascular:      Rate and Rhythm: Normal rate.   Pulmonary:      Effort: Pulmonary effort is normal.   Neurological:      General: No focal deficit present.      Mental Status: He is alert and oriented to person, place, and time.   Psychiatric:         Mood and Affect: Mood normal.         Behavior: Behavior normal.         Thought Content: Thought content normal.         Judgment: Judgment normal.       CMP:  Lab Results   Component Value Date    BUN 15 12/02/2022    CREATININE 1.19 12/02/2022    EGFRIFNONA 43 (L) 01/25/2022    EGFRIFAFRI 59 (L) 09/12/2016    BCR 12.6 12/02/2022     12/02/2022    K 4.3 12/02/2022    CO2 26.8 12/02/2022    CALCIUM 8.9 12/02/2022    PROTENTOTREF 7.6 09/12/2016    ALBUMIN 3.37 (L) 11/11/2022    LABGLOBREF 3.6 09/12/2016    LABIL2 1.1 (L) 09/12/2016    BILITOT 0.4 11/11/2022    ALKPHOS 57 11/11/2022 "    AST 17 11/11/2022    ALT 23 11/11/2022     Lipid Panel:  Lab Results   Component Value Date    CHOL 159 10/26/2022    TRIG 136 10/26/2022    HDL 39 (L) 10/26/2022    VLDL 24 10/26/2022    LDL 96 10/26/2022     HbA1c:  Lab Results   Component Value Date    HGBA1C 9.00 (H) 10/25/2022    HGBA1C 9.00 (H) 07/28/2022     Glucose:  Lab Results   Component Value Date    POCGLU 183 (A) 12/20/2022     Microalbumin:  Lab Results   Component Value Date    MALBCRERATIO 226.4 06/29/2020     TSH:  Lab Results   Component Value Date    TSH 4.680 (H) 10/25/2022       Assessment and Plan    Diagnoses and all orders for this visit:    1. Type 2 diabetes mellitus with hyperglycemia, with long-term current use of insulin (HCC) (Primary)  Assessment & Plan:  -Diabetes remains stable with stable BG's.  -Discussed dietary and exercise guidelines with patient and family.  -Discussed the importance of yearly eye exams.  -Discussed the importance of checking BG's regularly. Continue using Freestyle Suellen CGM.  -Continue Lantus 25 units BID.  -Continue Humalog SS with meals 0-7 units.  -Increase Ozempic 0.5 mg weekly.  Patient has no personal history of pancreatitis, no family history of MEN syndrome or medullary thyroid cancer. Possible side effects including nausea, bloating, other GI upset and rarely pancreatitis were discussed. He was advised to call the office with any symptoms or concerns.   -S/S hypoglycemia reviewed with Rule of 15's advised.  -Follow-up in 2 months.    Orders:  -     POC Glucose Fingerstick       Return in about 2 months (around 2/20/2023) for Follow-up appointment, A1C. The patient was instructed to contact the clinic with any interval questions or concerns.        This document has been electronically signed by RED Mayorga  December 20, 2022 14:09 EST   Endocrinology

## 2022-12-22 ENCOUNTER — TELEPHONE (OUTPATIENT)
Dept: PHYSICAL THERAPY | Facility: CLINIC | Age: 67
End: 2022-12-22

## 2022-12-27 ENCOUNTER — TREATMENT (OUTPATIENT)
Dept: PHYSICAL THERAPY | Facility: CLINIC | Age: 67
End: 2022-12-27

## 2022-12-27 ENCOUNTER — ANTICOAGULATION VISIT (OUTPATIENT)
Dept: FAMILY MEDICINE CLINIC | Facility: CLINIC | Age: 67
End: 2022-12-27

## 2022-12-27 DIAGNOSIS — I63.311 CEREBRAL INFARCTION DUE TO THROMBOSIS OF RIGHT MIDDLE CEREBRAL ARTERY: Primary | ICD-10-CM

## 2022-12-27 DIAGNOSIS — G81.94 LEFT HEMIPARESIS: ICD-10-CM

## 2022-12-27 DIAGNOSIS — R27.0 ATAXIA: ICD-10-CM

## 2022-12-27 DIAGNOSIS — I63.9 CEREBROVASCULAR ACCIDENT (CVA), UNSPECIFIED MECHANISM: ICD-10-CM

## 2022-12-27 DIAGNOSIS — G81.94 LEFT HEMIPARESIS: Primary | ICD-10-CM

## 2022-12-27 DIAGNOSIS — R27.9 LACK OF COORDINATION DUE TO ACUTE CEREBROVASCULAR ACCIDENT (CVA): ICD-10-CM

## 2022-12-27 DIAGNOSIS — R29.898 LEFT ARM WEAKNESS: ICD-10-CM

## 2022-12-27 DIAGNOSIS — I63.9 LACK OF COORDINATION DUE TO ACUTE CEREBROVASCULAR ACCIDENT (CVA): ICD-10-CM

## 2022-12-27 DIAGNOSIS — Z79.01 ANTICOAGULANT LONG-TERM USE: Primary | ICD-10-CM

## 2022-12-27 LAB — INR PPP: 3.8 (ref 2–3.5)

## 2022-12-27 PROCEDURE — 97530 THERAPEUTIC ACTIVITIES: CPT | Performed by: OCCUPATIONAL THERAPIST

## 2022-12-27 PROCEDURE — 36416 COLLJ CAPILLARY BLOOD SPEC: CPT | Performed by: FAMILY MEDICINE

## 2022-12-27 PROCEDURE — 97110 THERAPEUTIC EXERCISES: CPT | Performed by: PHYSICAL THERAPIST

## 2022-12-27 PROCEDURE — 97112 NEUROMUSCULAR REEDUCATION: CPT | Performed by: PHYSICAL THERAPIST

## 2022-12-27 PROCEDURE — 97530 THERAPEUTIC ACTIVITIES: CPT | Performed by: PHYSICAL THERAPIST

## 2022-12-27 PROCEDURE — 97112 NEUROMUSCULAR REEDUCATION: CPT | Performed by: OCCUPATIONAL THERAPIST

## 2022-12-27 PROCEDURE — 85610 PROTHROMBIN TIME: CPT | Performed by: FAMILY MEDICINE

## 2022-12-27 NOTE — PROGRESS NOTES
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 Pt was apparently hospitalized on 10/25/22 to 10/27/22 and d/c home but suffered another stoke the next day and returned to ER resulting in admission to hospital from 10/28/22 - 22 then transferred to inpatient rehab and eventually d/c home on 2022. Pt Dx includes right MCA CVA.     Medical Hx includes DM, chronic kidney disease, CAD s/p stenting, s/p mechanical aortic valve    Subjective comment: Pt arrived slightly late for todays session; pt reports no new concernsPain  No pain reported    Hand dominance: right    Patient Goals  Patient goals for therapy: increased motion, increased strength and independence with ADLs/IADLs               Objective   See Exercise, Manual, and Modality Logs for complete treatment.           Assessment & Plan     Assessment  Impairments: abnormal coordination, abnormal muscle firing, abnormal muscle tone and abnormal or restricted ROM    Assessment details: OT provided Pt with cues and instructions to complete OT modified interventions focused on left UE motor coordination, postural control with working across midline, left UE strength, activity tolerance, and fine motor skills . Patient demonstrates improved left UE coordination including fine motor skills. Pt reports no change in sensation impairments. Pt occasionally dropping small pegs, but completed all but 2 small pegs  that fell between other pegs.  Pt completed reaching tasks to improve PNF pattern independence.  Pt demonstrated good effort and motivation and will benefit from continued OT treatment.     Prognosis: good  Prognosis details: Pt is making progress with OT treatment and demonstrates good remaining OT potential.     Plan  Planned modality interventions: ultrasound and electrical stimulation/Russian stimulation  Planned therapy interventions: functional ROM exercises, home exercise program, IADL retraining, joint mobilization, abdominal trunk stabilization, ADL retraining, fine motor coordination training, manual therapy, motor coordination training, neuromuscular re-education, orthotic fitting/training, soft tissue mobilization, strengthening, therapeutic activities and balance/weight-bearing training  Frequency: 2x week  Treatment plan discussed with: patient  Plan details: Continue current OT plan of care.          Progress per Plan of Care               Timed Codes        Manual Therapy:    0     mins  82477;    Therapeutic Exercise:    0 mins  33060;     Neuromuscular Joanie:    15 mins  13564;    Therapeutic Activity:     23 mins  98255;      Ultrasound:     0     mins  80607;    Community/ work    0     mins  31150  Self Care/ José Miguel    0     mins  21823  Sensory Re-Int.             0     mins   83369  Cognitve Re-train    0     mins  90693   Electrical Stimulation:    0     mins 77632      Un-timed Codes  Electrical Stimulation:    0     mins 69298 ( );      Timed Treatment:   38  mins   Total Treatment:     38 mins    OT SIGNATURE:       Occupational Therapist, Certified Hand Therapist  [place] License #[id number]  NPI #[id number]  Electronically signed by: Kenny D. Maynes, OTR/L, CHT 12/27/2022

## 2022-12-27 NOTE — PROGRESS NOTES
Physical Therapy Daily Treatment Note      Patient: Benitez Miranda   : 1955  Referring practitioner: Flynn An MD  Date of Initial Visit: Type: THERAPY  Noted: 2022  Today's Date: 2022  Patient seen for 10 sessions         Visit Diagnoses:    ICD-10-CM ICD-9-CM   1. Left hemiparesis (HCC)  G81.94 342.90   2. Cerebrovascular accident (CVA), unspecified mechanism (HCC)  I63.9 434.91   3. Ataxia  R27.0 781.3         Subjective Evaluation    History of Present Illness    Subjective comment: Patient arrives to therapy with no new complaints.       Objective   See Exercise, Manual, and Modality Logs for complete treatment.       Assessment & Plan     Assessment    Assessment details: Patient continues to tolerate therapy sessions well, with rest breaks provided as necessary.  Patient showed greatest difficulty with SLS and tandem stance on airex; patient required intermittent UE support and CGA-min A to maintain balance.  There ex and therapeutic activities continue to focus on LE strengthening and functional activities.  He will continue to be progressed per his tolerance and POC.          Timed:         Manual Therapy:         mins  65934;     Therapeutic Exercise:    24     mins  82790;     Neuromuscular Joanie:    20    mins  74558;    Therapeutic Activity:     11     mins  55371;     Gait Training:           mins  81444;     Ultrasound:          mins  99774;    Ionto                                   mins   35812  Self Care                            mins   68388  Canalith Repos         mins 17967      Un-Timed:  Electrical Stimulation:         mins  29920 ( );  Dry Needling          mins self-pay  Traction          mins 97764      Timed Treatment:   55   mins   Total Treatment:     55   mins    Kalyani Pennington PT  [place] License: [id number]    Electronically signed by Kalyani Pennington PT, 22, 4:39 PM EST.

## 2022-12-29 ENCOUNTER — TREATMENT (OUTPATIENT)
Dept: PHYSICAL THERAPY | Facility: CLINIC | Age: 67
End: 2022-12-29

## 2022-12-29 DIAGNOSIS — R27.0 ATAXIA: ICD-10-CM

## 2022-12-29 DIAGNOSIS — R29.898 LEFT ARM WEAKNESS: ICD-10-CM

## 2022-12-29 DIAGNOSIS — I63.9 LACK OF COORDINATION DUE TO ACUTE CEREBROVASCULAR ACCIDENT (CVA): ICD-10-CM

## 2022-12-29 DIAGNOSIS — I63.9 CEREBROVASCULAR ACCIDENT (CVA), UNSPECIFIED MECHANISM: Primary | ICD-10-CM

## 2022-12-29 DIAGNOSIS — I63.311 CEREBRAL INFARCTION DUE TO THROMBOSIS OF RIGHT MIDDLE CEREBRAL ARTERY: Primary | ICD-10-CM

## 2022-12-29 DIAGNOSIS — R27.9 LACK OF COORDINATION DUE TO ACUTE CEREBROVASCULAR ACCIDENT (CVA): ICD-10-CM

## 2022-12-29 DIAGNOSIS — G81.94 LEFT HEMIPARESIS: ICD-10-CM

## 2022-12-29 PROCEDURE — 97110 THERAPEUTIC EXERCISES: CPT | Performed by: OCCUPATIONAL THERAPIST

## 2022-12-29 PROCEDURE — 97530 THERAPEUTIC ACTIVITIES: CPT | Performed by: OCCUPATIONAL THERAPIST

## 2022-12-29 PROCEDURE — 97112 NEUROMUSCULAR REEDUCATION: CPT | Performed by: PHYSICAL THERAPIST

## 2022-12-29 PROCEDURE — 97110 THERAPEUTIC EXERCISES: CPT | Performed by: PHYSICAL THERAPIST

## 2022-12-29 PROCEDURE — 97530 THERAPEUTIC ACTIVITIES: CPT | Performed by: PHYSICAL THERAPIST

## 2022-12-29 NOTE — PROGRESS NOTES
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 Pt was apparently hospitalized on 10/25/22 to 10/27/22 and d/c home but suffered another stoke the next day and returned to ER resulting in admission to hospital from 10/28/22 - 22 then transferred to inpatient rehab and eventually d/c home on 2022. Pt Dx includes right MCA CVA.     Medical Hx includes DM, chronic kidney disease, CAD s/p stenting, s/p mechanical aortic valve    Subjective comment: Pt arrived early; Pt reports no new concerns. Pain  No pain reported    Hand dominance: right    Patient Goals  Patient goals for therapy: increased motion, increased strength and independence with ADLs/IADLs               Objective   See Exercise, Manual, and Modality Logs for complete treatment.           Assessment & Plan     Assessment  Impairments: abnormal coordination, abnormal muscle firing, abnormal muscle tone and abnormal or restricted ROM    Assessment details: OT provided Pt with cues and instructions to complete OT modified interventions focused on left UE motor coordination, postural control with working across midline, left UE strength, activity tolerance, and fine motor skills . Patient demonstrates improved left UE coordination including fine motor skills. Pt with occasional drops during fine motor task but less than 10 overall this session.  Pt reports no change in sensation impairments.    Pt  demonstrated good effort and motivation and will benefit from continued OT treatment.     Prognosis: good  Prognosis details: Pt is making progress with OT treatment and demonstrates good remaining OT potential.     Plan  Planned modality interventions: ultrasound and electrical stimulation/Russian stimulation  Planned therapy interventions: functional ROM exercises, home exercise program, IADL retraining, joint mobilization, abdominal trunk stabilization, ADL retraining, fine motor coordination training, manual therapy, motor coordination training, neuromuscular re-education, orthotic fitting/training, soft tissue mobilization, strengthening, therapeutic activities and balance/weight-bearing training  Frequency: 2x week  Treatment plan discussed with: patient  Plan details: Continue current OT plan of care.          Progress per Plan of Care               Timed Codes        Manual Therapy:    0     mins  84995;    Therapeutic Exercise:    15 mins  84646;     Neuromuscular Joanie:    0 mins  23619;    Therapeutic Activity:     24  mins  52009;      Ultrasound:     0     mins  09778;    Community/ work    0     mins  62459  Self Care/ José Miguel    0     mins  69598  Sensory Re-Int.             0     mins   42611  Cognitve Re-train    0     mins  35281   Electrical Stimulation:    0     mins 78072      Un-timed Codes  Electrical Stimulation:    0     mins 56355 ( );      Timed Treatment:   39  mins   Total Treatment:     39 mins    OT SIGNATURE:       Occupational Therapist, Certified Hand Therapist  [place] License #[id number]  NPI #[id number]  Electronically signed by: Kenny D. Maynes, OTR/L, RACHELLT 12/29/2022

## 2022-12-29 NOTE — PROGRESS NOTES
"Physical Therapy Daily Treatment Note      Patient: Benitez Miranda   : 1955  Referring practitioner: Flynn An MD  Date of Initial Visit: Type: THERAPY  Noted: 2022  Today's Date: 2022  Patient seen for 11 sessions       Visit Diagnoses:    ICD-10-CM ICD-9-CM   1. Cerebrovascular accident (CVA), unspecified mechanism ([facility])  I63.9 434.91   2. Ataxia  R27.0 781.3   3. Left hemiparesis (HCC)  G81.94 342.90       Subjective Evaluation    History of Present Illness    Subjective comment: Patient arrives to therapy with no new complaints.  He states that he uses a shower chair to get into his tub/shower.       Objective   See Exercise, Manual, and Modality Logs for complete treatment.       Assessment & Plan     Assessment    Assessment details: Patient tolerated today's session well, with rest breaks provided as necessary.  Therapy session consisted of there ex, therapeutic activities, and neuromuscular re-education.  Patient performed tandem balance on airex with bean bag toss, with patient requiring CGA for safety.  Patient stepped over a 16\" object to replicate stepping into a tub; patient was able to perform tasks with UE support and CGA.  He will continue to be progressed per his tolerance and POC.          Timed:         Manual Therapy:         mins  49762;     Therapeutic Exercise:    15     mins  71579;     Neuromuscular Joanie:  19      mins  50947;    Therapeutic Activity:      10    mins  69107;     Gait Training:           mins  65614;     Ultrasound:          mins  72312;    Ionto                                   mins   33425  Self Care                            mins   77974  Canalith Repos         mins 99268      Un-Timed:  Electrical Stimulation:         mins  74773 ( );  Dry Needling          mins self-pay  Traction          mins 63231      Timed Treatment:   44   mins   Total Treatment:     44   mins    Kalyani Pennington, PT  [place] License: [id number]  Electronically signed by " Kalyani Pennington, PT, 12/29/22, 11:28 AM EST.

## 2022-12-30 ENCOUNTER — SPECIALTY PHARMACY (OUTPATIENT)
Dept: PHARMACY | Facility: HOSPITAL | Age: 67
End: 2022-12-30

## 2022-12-30 DIAGNOSIS — E11.65 TYPE 2 DIABETES MELLITUS WITH HYPERGLYCEMIA, WITH LONG-TERM CURRENT USE OF INSULIN: Primary | ICD-10-CM

## 2022-12-30 DIAGNOSIS — Z79.4 TYPE 2 DIABETES MELLITUS WITH HYPERGLYCEMIA, WITH LONG-TERM CURRENT USE OF INSULIN: Primary | ICD-10-CM

## 2022-12-30 RX ORDER — SEMAGLUTIDE 1.34 MG/ML
0.5 INJECTION, SOLUTION SUBCUTANEOUS WEEKLY
Qty: 1.5 ML | Refills: 5 | Status: SHIPPED | OUTPATIENT
Start: 2022-12-30 | End: 2023-02-27

## 2022-12-30 NOTE — PROGRESS NOTES
"   Specialty Pharmacy Patient Management Program  Endocrinology Reassessment     Benitez Miranda is a 67 y.o. male with Type 2 Diabetes seen by an Endocrinology provider and enrolled in the Endocrinology Patient Management program offered by [facility].  A follow-up outreach was conducted, including assessment of continued therapy appropriateness, medication adherence, and side effect incidence and management for  Insulin therapy and Ozempic.     Patient is currently taking Humalog on a sliding scale (0-7 units three times daily with meals) and Lantus 25 units twice daily. Patient checks BG 1-2 times daily with readings averaging around 160s. Patient denies low BG <70. He reports a history of CKD. He has issues affording his Ozempic. Helping patient find assistance program.     Patient denies personal/family history of thyroid cancer, denies history of pancreatitis, and denies issues with recurrent UTI/yeast infections.     In the past, the patient has tried:     Drug Dose Reason for Discontinuation Notes   Metformin  Kidney function    Ozempic  \"didn't work\" Was able to tolerate                 Insurance Coverage & Financial Support  Medicare  Humalog and Lantus (gets through [employer] Patient Assistance Program)    Relevant Past Medical History and Comorbidities  Relevant medical history and concomitant health conditions were discussed with the patient. The patient's chart has been reviewed for relevant past medical history and comorbid health conditions and updated as necessary.   Past Medical History:   Diagnosis Date   • Anxiety    • Arthritis    • CAD (coronary artery disease)     x1 stent; pulmonary HTN; EF 50-55%; rheumatic valve; MV stenosis   • CHF (congestive heart failure) ([facility])    • CKD (chronic kidney disease), stage III ([facility])    • COPD (chronic obstructive pulmonary disease) ([facility])    • Depression    • Diabetes mellitus ([facility])    • H/O mechanical aortic valve replacement    • " History of tobacco abuse    • Hyperlipidemia    • Hypertension    • Ischemic heart disease    • Kidney failure     third stage   • Low back pain    • Obesity     BMI 36.   • Stroke (HCC)    • Valvular heart disease      Social History     Socioeconomic History   • Marital status:    • Number of children: 2   Tobacco Use   • Smoking status: Former     Packs/day: 1.00     Years: 30.00     Pack years: 30.00     Types: Pipe, Cigars, Cigarettes     Quit date: 2016     Years since quittin.4   • Smokeless tobacco: Never   • Tobacco comments:     currently smokes a pipe   Vaping Use   • Vaping Use: Never used   Substance and Sexual Activity   • Alcohol use: No   • Drug use: No   • Sexual activity: Defer       Allergies  Known allergies and reactions were discussed with the patient. The patient's chart has been reviewed for  allergy information and updated as necessary.   Penicillins, Ace inhibitors, and Contrast dye    Current Medication List  This medication list has been reviewed with the patient and evaluated for any interactions or necessary modifications/recommendations, and updated to include all prescription medications, OTC medications, and supplements the patient is currently taking.  This list reflects what is contained in the patient's profile, which has also been marked as reviewed to communicate to other providers it is the most up to date version of the patient's current medication therapy.     Current Outpatient Medications:   •  amLODIPine (NORVASC) 10 MG tablet, Take 1 tablet by mouth Daily., Disp: 90 tablet, Rfl: 1  •  aspirin 81 MG EC tablet, Take 1 tablet by mouth Daily., Disp: 30 tablet, Rfl: 0  •  atorvastatin (LIPITOR) 80 MG tablet, Take 1 tablet by mouth Every Night., Disp: 90 tablet, Rfl: 1  •  insulin glargine (LANTUS, SEMGLEE) 100 UNIT/ML injection, Inject 25 Units under the skin into the appropriate area as directed 2 (Two) Times a Day., Disp: , Rfl:   •  insulin lispro (humaLOG)  100 UNIT/ML injection, Per Sikhism 0-7 sliding scale with each meal, Disp: , Rfl:   •  losartan (Cozaar) 25 MG tablet, Take 1 tablet by mouth Daily., Disp: 30 tablet, Rfl: 11  •  melatonin 5 MG tablet tablet, Take 1.5 tablets by mouth At Night As Needed (sleep disturbance)., Disp: 45 tablet, Rfl: 2  •  Semaglutide,0.25 or 0.5MG/DOS, (Ozempic, 0.25 or 0.5 MG/DOSE,) 2 MG/1.5ML solution pen-injector, Inject 0.25 mg under the skin into the appropriate area as directed 1 (One) Time Per Week. (Patient taking differently: Inject 0.5 mg under the skin into the appropriate area as directed 1 (One) Time Per Week.), Disp: 1.5 mL, Rfl: 2  •  sertraline (ZOLOFT) 100 MG tablet, Take 1.5 tablets by mouth Daily., Disp: 135 tablet, Rfl: 1  •  warfarin (COUMADIN) 6 MG tablet, Take 1 tablet by mouth Every Night. New dose: 7 mg/day, Disp: 30 tablet, Rfl: 0  •  warfarin (Coumadin) 1 MG tablet, Take 7 mg by mouth daily (6 mg +1 mg). (Patient taking differently: Take 7 mg by mouth daily and 8 (6 mg +1 mg +1 mg).), Disp: 30 tablet, Rfl: 0    Drug Interactions  · The hypoglycemic effect of Insulin may be increased or prolonged by metoprolol. Minor cardiovascular abnormalities may occur during hypoglycemic episodes.  · Aspirin + Warfarin - put patient at increased risk of bleeding. He denies any signs/symptoms of bleeding at this time.    Recommended Medications Assessment  • Aspirin - Currently Taking  • Statin - Currently taking   • ACEi/ARB - Contraindicated (angioedema)     Current 10-Year ASCVD Risk: 25.3%    Relevant Laboratory Values  A1C Last 3 Results    HGBA1C Last 3 Results 5/6/22 7/28/22 10/25/22   Hemoglobin A1C 8.40 (A) 9.00 (A) 9.00 (A)   (A) Abnormal value            Lab Results   Component Value Date    HGBA1C 9.00 (H) 10/25/2022     Lab Results   Component Value Date    GLUCOSE 219 (H) 12/02/2022    CALCIUM 8.9 12/02/2022     12/02/2022    K 4.3 12/02/2022    CO2 26.8 12/02/2022     12/02/2022    BUN 15  12/02/2022    CREATININE 1.19 12/02/2022    EGFRIFAFRI 59 (L) 09/12/2016    EGFRIFNONA 43 (L) 01/25/2022    BCR 12.6 12/02/2022    ANIONGAP 12.2 12/02/2022     Lab Results   Component Value Date    CHOL 159 10/26/2022    CHLPL 167 07/18/2016    TRIG 136 10/26/2022    HDL 39 (L) 10/26/2022    LDL 96 10/26/2022     Microalbumin    Microalbumin 7/25/22   Microalbumin, Urine 7.3             Education Provided for DM medications:  The patient has been provided with the following education and any applicable administration techniques (i.e. self-injection) have been demonstrated for the therapies indicated. All questions and concerns have been addressed prior to the patient receiving the medication, and the patient has verbalized understanding of the education and any materials provided.  Additional patient education shall be provided and documented upon request by the patient, provider or payer.      OZEMPIC® (semaglutide)  Medication Expectations   Why am I taking this medication? You are taking Ozempic, along with diet and exercise, to lower blood sugar because you have type 2 diabetes. Diabetes is not curable but with proper medication and treatment, we can keep your blood sugar within your personalized target range. This medication may also help you lose some weight, and it helps reduce the risk of death from heart attack, and stroke in adults with type 2 diabetes and known heart disease.   What should I expect while on this medication? You should expect to see your blood sugar and A1c decrease over time and you may also lose some weight.   How does the medication work? Ozempic is a non-insulin injection that works with your body's own ability to lower blood sugar and A1c and helps your body release its own insulin in response to your blood sugar rising.  This medication also slows down food from leaving your stomach, making you feel jose for longer.   How long will I be on this medication for? The amount of time  you will be on this medication will be determined by your doctor based on blood sugar and A1c control. You will most likely be on this medication or another diabetes medication throughout your lifetime. Do not abruptly stop this medication without talking to your doctor first.    How do I take this medication? Take as directed on your prescription label. Ozempic is injected under the skin (subcutaneously) of your stomach, thigh or upper arm.  Use this medication once weekly, on the same day each week, and it can be given with or without food.  Use a different injection site each week in the same body region.     For each new prefilled pen, prime the needle before the first injection by turning the dose selector to the flow check symbol and injecting into the air (priming is not required for subsequent injections).   • Once needle is inserted, continue to press the button until the dial has returned to 0 and for an additional 6 seconds before removing.    What are some possible side effects? You may notice you don't feel as hungry, especially when you first start using Ozempic.  The most common side effects are nausea, diarrhea, vomiting, stomach pain, and constipation.      Stop using Ozempic and call your doctor immediately if you have severe pain in your stomach area that will not go away as this could be a sign of pancreatitis (inflammation of your pancreas).  Tell your doctor if you get a lump or swelling in your neck, hoarseness, difficulty swallowing, or feel short of breath (these may be symptoms of thyroid cancer).  Talk with your doctor if you have changes in vision while taking Ozempic.   What happens if I miss a dose? If you miss a dose, take it as soon as you remember as long as it is within 5 days after your missed dose.  If more than 5 days have passed, skip the missed dose and resume Ozempic on the regularly scheduled day.     Medication Safety   What are things I should warn my doctor immediately  about? Do not use Ozempic if you or a family member have ever had medullary thyroid cancer (MTC) or Multiple Endocrine Neoplasia syndrome type 2 (MEN 2).    • Tell your doctor if you get a lump or swelling in your neck, hoarseness, difficulty swallowing, or feel short of breath (these may be symptoms of thyroid cancer).    Tell your doctor if you have or have had problems with your kidneys or pancreas.   • Stop using Ozempic and get medical help right away if you have severe pain in your stomach area that will not go away as this could be a sign of pancreatitis (inflammation of your pancreas)  Tell your doctor if you have problem digesting food or slowed emptying of your stomach (gastroparesis).    Let your doctor know if you have changes in vision while taking Ozempic or have been diagnosed with diabetic retinopathy.    Talk to your doctor if you are pregnant, planning to become pregnant, or breastfeeding.     Get medical help right away if you notice any signs/symptoms of an allergic reaction (rash, hives, difficulty breathing, etc.).   What are things that I should be cautious of? Be cautious of any side effects from this medication. Talk to your doctor if any new ones develop or aren't getting better.   What are some medications that can interact with this one? Taking Ozempic with other medications that also lower your blood sugar such as insulin and glipizide/glimepiride/glyburide may increase the risk of low blood sugar.  • Your doctor may reduce the dose of these medications when you start Ozempic to minimize low blood sugars    It should not be taken with other medicines called GLP-1 receptor agonists, because these work the same way as Ozempic.      Because Ozempic slows stomach emptying, it can affect the way some medicines work.    Always tell your doctor or pharmacist immediately if you start taking any new medications, including over-the-counter medications, vitamins, and herbal supplements.      Medication Storage/Handling   How should I handle this medication? Keep this medication out of reach of pets/children and keep the pen capped when not in use.   How does this medication need to be stored? Store in the refrigerator prior to first use, but do not freeze.  After first use, you may continue to store in the refrigerator or at room temperature for 56 days.  Protect from excessive heat and sunlight.   How should I dispose of this medication? Used Ozempic pens should be thrown away after 56 days.  Place your used Ozempic pen and needle in an approved sharps container after use.  If you do not have a sharps container, you may use a household container made of heavy-duty plastic with a tight-fitting lid that is leak resistant (e.g., heavy-duty plastic laundry detergent bottle).    If your doctor decides to stop this medication, take to your local police station for proper disposal. Some pharmacies also have take-back bins for medication drop-off.      Resources/Support   How can I remind myself to take this medication? You can download reminder apps to help you manage your refills. You may also set an alarm on your phone to remind you.    Is financial support available?  [employer] can provide co-pay cards if you have commercial insurance or patient assistance if you have Medicare or no insurance.    Which vaccines are recommended for me? Talk to your doctor about these vaccines:  • Flu   • Coronavirus (COVID-19)   • Pneumococcal (pneumonia)   • Tdap   • Hepatitis B   • Zoster (shingles)        Adherence and Self-Administration  • Barriers to Patient Adherence and/or Self-Administration: None   • Methods for Supporting Patient Adherence and/or Self-Administration: None Required    Vaccination Status:   COVID 19: first 3 doses, needs booster  Influenza: UTD  Pneumococcal: not vaccinated  Hep B: UTD    Smoker?  • Former, recently quit    Goals of Therapy  • Patient Goals of Therapy: Take medication  everyday   • Clinical Goals or Therapeutic Targets, If Applicable: A1C reduction    Reassessment Plan & Follow-Up  · Patient is having issues affording co-pay of Ozempic. Helping patient find assistance program. He would like to use our pharmacy's shipping services for Ozempic.    · Recent Provider Changes  · Increase to Ozempic 0.5 mg SQ weekly   · Patient has a history of CKD - continue to monitor kidney function and adjust medication dosages as appropriate.      Patient does not wish to use [facility] at this time due to: Loyalty to retail pharmacy    Attestation  I attest that the initiated specialty medication(s) are appropriate for the patient based on my assessment.  If the prescribed therapy is at any point deemed not appropriate based on the current or future assessments, a consultation will be initiated with the patient's specialty care provider to determine the best course of action. The revised plan of therapy will be documented along with any additional patient education provided.     Yasmine Alejandro, PharmD  12/30/2022  14:27 EST

## 2023-01-03 ENCOUNTER — TREATMENT (OUTPATIENT)
Dept: PHYSICAL THERAPY | Facility: CLINIC | Age: 68
End: 2023-01-03
Payer: MEDICARE

## 2023-01-03 ENCOUNTER — ANTICOAGULATION VISIT (OUTPATIENT)
Dept: FAMILY MEDICINE CLINIC | Facility: CLINIC | Age: 68
End: 2023-01-03
Payer: MEDICARE

## 2023-01-03 DIAGNOSIS — G81.94 LEFT HEMIPARESIS: ICD-10-CM

## 2023-01-03 DIAGNOSIS — R27.0 ATAXIA: ICD-10-CM

## 2023-01-03 DIAGNOSIS — I63.9 LACK OF COORDINATION DUE TO ACUTE CEREBROVASCULAR ACCIDENT (CVA): ICD-10-CM

## 2023-01-03 DIAGNOSIS — R29.898 LEFT ARM WEAKNESS: ICD-10-CM

## 2023-01-03 DIAGNOSIS — I63.9 CEREBROVASCULAR ACCIDENT (CVA), UNSPECIFIED MECHANISM: Primary | ICD-10-CM

## 2023-01-03 DIAGNOSIS — R27.9 LACK OF COORDINATION DUE TO ACUTE CEREBROVASCULAR ACCIDENT (CVA): ICD-10-CM

## 2023-01-03 DIAGNOSIS — I63.311 CEREBRAL INFARCTION DUE TO THROMBOSIS OF RIGHT MIDDLE CEREBRAL ARTERY: Primary | ICD-10-CM

## 2023-01-03 LAB — INR PPP: 2 (ref 3–3.5)

## 2023-01-03 PROCEDURE — 85610 PROTHROMBIN TIME: CPT | Performed by: FAMILY MEDICINE

## 2023-01-03 PROCEDURE — 97530 THERAPEUTIC ACTIVITIES: CPT | Performed by: OCCUPATIONAL THERAPIST

## 2023-01-03 PROCEDURE — 97112 NEUROMUSCULAR REEDUCATION: CPT | Performed by: OCCUPATIONAL THERAPIST

## 2023-01-03 PROCEDURE — 97112 NEUROMUSCULAR REEDUCATION: CPT | Performed by: PHYSICAL THERAPIST

## 2023-01-03 PROCEDURE — 97110 THERAPEUTIC EXERCISES: CPT | Performed by: PHYSICAL THERAPIST

## 2023-01-03 PROCEDURE — 36416 COLLJ CAPILLARY BLOOD SPEC: CPT | Performed by: FAMILY MEDICINE

## 2023-01-03 NOTE — PROGRESS NOTES
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 Pt was apparently hospitalized on 10/25/22 to 10/27/22 and d/c home but suffered another stoke the next day and returned to ER resulting in admission to hospital from 10/28/22 - 22 then transferred to inpatient rehab and eventually d/c home on 2022. Pt Dx includes right MCA CVA.     Medical Hx includes DM, chronic kidney disease, CAD s/p stenting, s/p mechanical aortic valve    Subjective comment: Pt states my hand feels less numb todayPain  No pain reported    Hand dominance: right    Patient Goals  Patient goals for therapy: increased motion, increased strength and independence with ADLs/IADLs               Objective   See Exercise, Manual, and Modality Logs for complete treatment.           Assessment & Plan     Assessment  Impairments: abnormal coordination, abnormal muscle firing, abnormal muscle tone and abnormal or restricted ROM    Assessment details: OT provided Pt with cues and instructions to complete OT modified interventions focused on left UE motor coordination, postural control with working across midline, left UE strength, activity tolerance, and fine motor skills . Patient demonstrates improved left UE coordination including fine  motor skills. Pt with occasional drops during fine motor task but less than 10 overall this session.  Pt reports no change in sensation impairments.    Pt demonstrated good effort and motivation and will benefit from continued OT treatment.     Prognosis: good  Prognosis details: Pt is making progress with OT treatment and demonstrates good remaining OT potential.     Plan  Planned modality interventions: ultrasound and electrical stimulation/Russian stimulation  Planned therapy interventions: functional ROM exercises, home exercise program, IADL retraining, joint mobilization, abdominal trunk stabilization, ADL retraining, fine motor coordination training, manual therapy, motor coordination training, neuromuscular re-education, orthotic fitting/training, soft tissue mobilization, strengthening, therapeutic activities and balance/weight-bearing training  Frequency: 2x week  Treatment plan discussed with: patient  Plan details: Continue current OT plan of care.          Progress per Plan of Care               Timed Codes        Manual Therapy:    0     mins  07236;    Therapeutic Exercise:    0 mins  03046;     Neuromuscular Joanie:    15 mins  51053;    Therapeutic Activity:     29 mins  97178;      Ultrasound:     0     mins  29961;    Community/ work    0     mins  89428  Self Care/ José Miguel    0     mins  05052  Sensory Re-Int.             0     mins   14341  C[employer] Re-train    0     mins  30976   Electrical Stimulation:    0     mins 48735      Un-timed Codes  Electrical Stimulation:    0     mins 92592 ( );      Timed Treatment:   44 mins   Total Treatment:     44 mins    OT SIGNATURE:       Occupational Therapist, Certified Hand Therapist  [place] License #[id number]  NPI #[id number]  Electronically signed by: Kenny D. Maynes, OTR/L, CHT 1/3/2023

## 2023-01-03 NOTE — PROGRESS NOTES
Physical Therapy Daily Treatment Note      Patient: Benitez Miranda   : 1955  Referring practitioner: Flynn An MD  Date of Initial Visit: Type: THERAPY  Noted: 2022  Today's Date: 1/3/2023  Patient seen for 12 sessions       Visit Diagnoses:    ICD-10-CM ICD-9-CM   1. Cerebrovascular accident (CVA), unspecified mechanism ([facility])  I63.9 434.91   2. Ataxia  R27.0 781.3   3. Left hemiparesis ([facility])  G81.94 342.90       Subjective: Patient states his left knee is bothering him some today,however he wishes to continue with therapy. Pt reports his knee flares up on him from time to time.      Objective   See Exercise, Manual, and Modality Logs for complete treatment.       Assessment/Plan:  Patient demonstrated good effort during today's session and had no complaints or distress. Treatment performed including therex and neuro re-ed as listed to assist with strength deficits, improve closed chain proprioception and challenge balance by simulating outside environment. Exercise progressed with resistance of cuff weights increased from 2 to 2.5 pounds, and LE bike initiated. Pt observed with good tolerance. Pt noted with episode of LOB posteriorly while standing on fitter board with incline, however with min assist patient was able to correct. Pt continues to benefit from therapy services, and will be progressed as tolerated to address goals, improve balance, and reduce fall risk. Continue with PT's POC.       Timed:         Manual Therapy:         mins  56842;     Therapeutic Exercise:    34     mins  26041;     Neuromuscular Joanie:    23    mins  51545;    Therapeutic Activity:          mins  53316;     Gait Training:           mins  34540;     Ultrasound:          mins  59156;    Ionto                                   mins   85436  Self Care                            mins   06361  Canalith Repos         mins 03984      Un-Timed:  Electrical Stimulation:         mins  13009 ( );  Dry Needling           mins self-pay  Traction          mins 25676      Timed Treatment:   57   mins   Total Treatment:     57   mins    Melody Nice. XU Martin  [place] License: [id number]

## 2023-01-05 ENCOUNTER — TREATMENT (OUTPATIENT)
Dept: PHYSICAL THERAPY | Facility: CLINIC | Age: 68
End: 2023-01-05
Payer: MEDICARE

## 2023-01-05 DIAGNOSIS — R27.0 ATAXIA: ICD-10-CM

## 2023-01-05 DIAGNOSIS — R29.898 LEFT ARM WEAKNESS: ICD-10-CM

## 2023-01-05 DIAGNOSIS — R27.9 LACK OF COORDINATION DUE TO ACUTE CEREBROVASCULAR ACCIDENT (CVA): ICD-10-CM

## 2023-01-05 DIAGNOSIS — I63.9 CEREBROVASCULAR ACCIDENT (CVA), UNSPECIFIED MECHANISM: Primary | ICD-10-CM

## 2023-01-05 DIAGNOSIS — G81.94 LEFT HEMIPARESIS: ICD-10-CM

## 2023-01-05 DIAGNOSIS — I63.9 LACK OF COORDINATION DUE TO ACUTE CEREBROVASCULAR ACCIDENT (CVA): ICD-10-CM

## 2023-01-05 DIAGNOSIS — I63.311 CEREBRAL INFARCTION DUE TO THROMBOSIS OF RIGHT MIDDLE CEREBRAL ARTERY: Primary | ICD-10-CM

## 2023-01-05 PROCEDURE — 97530 THERAPEUTIC ACTIVITIES: CPT | Performed by: OCCUPATIONAL THERAPIST

## 2023-01-05 PROCEDURE — 97110 THERAPEUTIC EXERCISES: CPT | Performed by: PHYSICAL THERAPIST

## 2023-01-05 PROCEDURE — 97112 NEUROMUSCULAR REEDUCATION: CPT | Performed by: PHYSICAL THERAPIST

## 2023-01-05 PROCEDURE — 97110 THERAPEUTIC EXERCISES: CPT | Performed by: OCCUPATIONAL THERAPIST

## 2023-01-05 PROCEDURE — 97530 THERAPEUTIC ACTIVITIES: CPT | Performed by: PHYSICAL THERAPIST

## 2023-01-05 PROCEDURE — 97112 NEUROMUSCULAR REEDUCATION: CPT | Performed by: OCCUPATIONAL THERAPIST

## 2023-01-05 NOTE — PROGRESS NOTES
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 provider found  Date of Initial Visit: Type: THERAPY  Noted: 2022  Today's Date: 2023  Patient seen for 13 sessions           Subjective Evaluation    History of Present Illness  Date of onset: 10/25/2022  Mechanism of injury: Pt states that he have two strokes. Pt was apparently hospitalized on 10/25/22 to 10/27/22 and d/c home but suffered another stoke the next day and returned to ER resulting in admission to hospital from 10/28/22 - 22 then transferred to inpatient rehab and eventually d/c home on 2022. Pt Dx includes right MCA CVA.     Medical Hx includes DM, chronic kidney disease, CAD s/p stenting, s/p mechanical aortic valve    Subjective comment: Pt reports no new concerns.Pain  No pain reported    Hand dominance: right    Patient Goals  Patient goals for therapy: increased motion, increased strength and independence with ADLs/IADLs               Objective   See Exercise, Manual, and Modality Logs for complete treatment.           Assessment & Plan     Assessment  Impairments: abnormal coordination, abnormal muscle firing, abnormal muscle tone and abnormal or restricted ROM    Assessment details: OT provided Pt with cues and instructions to complete OT modified interventions focused on left UE motor coordination, postural control with working across midline, left UE strength, activity tolerance, fine motor skills, postural control/flexability and PNF activites . Patient demonstrates improved left UE coordination including fine motor skills. Pt continues to demonstrate improved speed and  accuracy with fine motor activities.   Pt demonstrated good effort and motivation and will benefit from continued OT treatment.     Prognosis: good  Prognosis details: Pt is making progress with OT treatment and demonstrates good remaining OT potential.     Plan  Planned modality interventions: ultrasound and electrical stimulation/Russian stimulation  Planned therapy interventions: functional ROM exercises, home exercise program, IADL retraining, joint mobilization, abdominal trunk stabilization, ADL retraining, fine motor coordination training, manual therapy, motor coordination training, neuromuscular re-education, orthotic fitting/training, soft tissue mobilization, strengthening, therapeutic activities and balance/weight-bearing training  Frequency: 2x week  Treatment plan discussed with: patient  Plan details: Continue current OT plan of care.          Progress per Plan of Care               Timed Codes        Manual Therapy:    0     mins  71242;    Therapeutic Exercise:    17 mins  04542;     Neuromuscular Joanie:    15 mins  84152;    Therapeutic Activity:     24 mins  04308;      Ultrasound:     0     mins  25433;    Community/ work    0     mins  77015  Self Care/ José Miguel    0     mins  69124  Sensory Re-Int.             0     mins   18446  Cognitve Re-train    0     mins  88449   Electrical Stimulation:    0     mins 65472      Un-timed Codes  Electrical Stimulation:    0     mins 39029 ( );      Timed Treatment:   56  mins   Total Treatment:     56  mins    OT SIGNATURE:       Occupational Therapist, Certified Hand Therapist  [place] License #[id number]  NPI #[id number]  Electronically signed by: Kenny D. Maynes, OTR/L, CHT 1/5/2023

## 2023-01-05 NOTE — PROGRESS NOTES
Physical Therapy Daily Treatment Note      Patient: Benitez Miranda   : 1955  Referring practitioner: Flynn An MD  Date of Initial Visit: Type: THERAPY  Noted: 2022  Today's Date: 2023  Patient seen for 13 sessions       Visit Diagnoses:    ICD-10-CM ICD-9-CM   1. Cerebrovascular accident (CVA), unspecified mechanism ([facility])  I63.9 434.91   2. Ataxia  R27.0 781.3   3. Left hemiparesis ([facility])  G81.94 342.90       Subjective Evaluation    History of Present Illness    Subjective comment: Patient reports he does not recognize any tasks at home that he is having difficulty with.       Objective   See Exercise, Manual, and Modality Logs for complete treatment.       Assessment & Plan     Assessment    Assessment details: Patient tolerated today's session well, with rest breaks provided as necessary.  Patient continues to give good effort with treatment session and appears well motivated.  Treatment consisted of there ex, therapeutic activities, and neuromuscular re-education.  Patient had 2 episodes of loss of balance, while performing foam step ups; however, he was able to correct balance independently.  Patient will continue to be progressed per his tolerance and POC.          Timed:         Manual Therapy:         mins  53500;     Therapeutic Exercise:    17     mins  94588;     Neuromuscular Joanie:    18    mins  41592;    Therapeutic Activity:     10     mins  10289;     Gait Training:           mins  77535;     Ultrasound:          mins  60271;    Ionto                                   mins   38681  Self Care                            mins   51528  Canalith Repos         mins 27553      Un-Timed:  Electrical Stimulation:         mins  13409 ( );  Dry Needling          mins self-pay  Traction          mins 90044      Timed Treatment:   45   mins   Total Treatment:     45   mins    Kalyani Pennington PT  [place] License: [id number]  Electronically signed by Kalyani Pennington PT, 23, 1:07  PM EST.

## 2023-01-10 ENCOUNTER — TREATMENT (OUTPATIENT)
Dept: PHYSICAL THERAPY | Facility: CLINIC | Age: 68
End: 2023-01-10
Payer: MEDICARE

## 2023-01-10 ENCOUNTER — ANTICOAGULATION VISIT (OUTPATIENT)
Dept: FAMILY MEDICINE CLINIC | Facility: CLINIC | Age: 68
End: 2023-01-10
Payer: MEDICARE

## 2023-01-10 DIAGNOSIS — G81.94 LEFT HEMIPARESIS: ICD-10-CM

## 2023-01-10 DIAGNOSIS — I63.311 CEREBRAL INFARCTION DUE TO THROMBOSIS OF RIGHT MIDDLE CEREBRAL ARTERY: Primary | ICD-10-CM

## 2023-01-10 DIAGNOSIS — R27.9 LACK OF COORDINATION DUE TO ACUTE CEREBROVASCULAR ACCIDENT (CVA): ICD-10-CM

## 2023-01-10 DIAGNOSIS — R27.0 ATAXIA: ICD-10-CM

## 2023-01-10 DIAGNOSIS — I63.9 LACK OF COORDINATION DUE TO ACUTE CEREBROVASCULAR ACCIDENT (CVA): ICD-10-CM

## 2023-01-10 DIAGNOSIS — Z79.01 ANTICOAGULANT LONG-TERM USE: Primary | ICD-10-CM

## 2023-01-10 DIAGNOSIS — R29.898 LEFT ARM WEAKNESS: ICD-10-CM

## 2023-01-10 DIAGNOSIS — I63.9 CEREBROVASCULAR ACCIDENT (CVA), UNSPECIFIED MECHANISM: Primary | ICD-10-CM

## 2023-01-10 LAB — INR PPP: 3.1 (ref 3–3.5)

## 2023-01-10 PROCEDURE — 97112 NEUROMUSCULAR REEDUCATION: CPT | Performed by: PHYSICAL THERAPIST

## 2023-01-10 PROCEDURE — 36416 COLLJ CAPILLARY BLOOD SPEC: CPT | Performed by: FAMILY MEDICINE

## 2023-01-10 PROCEDURE — 97530 THERAPEUTIC ACTIVITIES: CPT | Performed by: OCCUPATIONAL THERAPIST

## 2023-01-10 PROCEDURE — 97530 THERAPEUTIC ACTIVITIES: CPT | Performed by: PHYSICAL THERAPIST

## 2023-01-10 PROCEDURE — 85610 PROTHROMBIN TIME: CPT | Performed by: FAMILY MEDICINE

## 2023-01-10 PROCEDURE — 97110 THERAPEUTIC EXERCISES: CPT | Performed by: OCCUPATIONAL THERAPIST

## 2023-01-10 PROCEDURE — 97110 THERAPEUTIC EXERCISES: CPT | Performed by: PHYSICAL THERAPIST

## 2023-01-10 RX ORDER — SPIRONOLACTONE 25 MG/1
TABLET ORAL
Qty: 180 TABLET | Refills: 1 | OUTPATIENT
Start: 2023-01-10

## 2023-01-10 RX ORDER — LOSARTAN POTASSIUM 100 MG/1
TABLET ORAL
Qty: 90 TABLET | Refills: 1 | OUTPATIENT
Start: 2023-01-10

## 2023-01-10 NOTE — PROGRESS NOTES
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 Pt was apparently hospitalized on 10/25/22 to 10/27/22 and d/c home but suffered another stoke the next day and returned to ER resulting in admission to hospital from 10/28/22 - 22 then transferred to inpatient rehab and eventually d/c home on 2022. Pt Dx includes right MCA CVA.     Medical Hx includes DM, chronic kidney disease, CAD s/p stenting, s/p mechanical aortic valve    Subjective comment: Pt states, my hand continues to do better...sometimes I can feel things with it and sometimes I cant...My finger (RF) was locking up a little but has stopped. Pain  No pain reported    Hand dominance: right    Patient Goals  Patient goals for therapy: increased motion, increased strength and independence with ADLs/IADLs               Objective   See Exercise, Manual, and Modality Logs for complete treatment.           Assessment & Plan     Assessment  Impairments: abnormal coordination, abnormal muscle firing, abnormal muscle tone and abnormal or restricted ROM    Assessment details: OT provided Pt with cues and instructions to complete OT modified interventions focused on left UE motor coordination, postural control with working across midline, left UE strength, activity tolerance, fine motor skills, and joint  stiffness. Pt noted to be somewhat lack in left hand PIPJ and RF often going into hyperextension with extension. Patient continues to demonstrate improved left UE coordination including fine motor skills. Pt demonstrated good effort and motivation and will benefit from continued OT treatment.     Prognosis: good  Prognosis details: Pt is making progress with OT treatment and demonstrates good remaining OT potential.     Plan  Planned modality interventions: ultrasound and electrical stimulation/Russian stimulation  Planned therapy interventions: functional ROM exercises, home exercise program, IADL retraining, joint mobilization, abdominal trunk stabilization, ADL retraining, fine motor coordination training, manual therapy, motor coordination training, neuromuscular re-education, orthotic fitting/training, soft tissue mobilization, strengthening, therapeutic activities and balance/weight-bearing training  Frequency: 2x week  Treatment plan discussed with: patient  Plan details: Continue current OT plan of care.          Progress per Plan of Care               Timed Codes        Manual Therapy:    0     mins  28128;    Therapeutic Exercise:    13 mins  62366;     Neuromuscular Joanie:    0 mins  58206;    Therapeutic Activity:     30 mins  86601;      Ultrasound:     0     mins  44152;    Community/ work    0     mins  83696  Self Care/ José Miguel    0     mins  62614  Sensory Re-Int.             0     mins   21917  Cognitve Re-train    0     mins  71039   Electrical Stimulation:    0     mins 38816      Un-timed Codes  Electrical Stimulation:    0     mins 70950 ( );      Timed Treatment:   43 mins   Total Treatment:     43 mins    OT SIGNATURE:       Occupational Therapist, Certified Hand Therapist    [place] License #[id number]  NPI #[id number]  Electronically signed by: Kenny D. Maynes, FORREST/L, CHT 1/10/2023

## 2023-01-10 NOTE — PROGRESS NOTES
Physical Therapy Re Certification Of Plan of Care  Patient: Benitez Miranda   : 1955  Diagnosis/ICD-10 Code:  Cerebrovascular accident (CVA), unspecified mechanism (HCC) [I63.9]  Referring practitioner: Flynn An MD  Date of Initial Visit: Type: THERAPY  Noted: 2022  Today's Date: 1/10/2023  Patient seen for 14 sessions         Visit Diagnoses:    ICD-10-CM ICD-9-CM   1. Cerebrovascular accident (CVA), unspecified mechanism (HCC)  I63.9 434.91   2. Left hemiparesis (HCC)  G81.94 342.90   3. Ataxia  R27.0 781.3         Benitez Miranda reports:   Subjective Questionnaire: DONAHUE 52/56  Clinical Progress: improved  Home Program Compliance: Yes  Treatment has included: therapeutic exercise, neuromuscular re-education, manual therapy, therapeutic activity, gait training, electrical stimulation, moist heat and cryotherapy      Subjective Evaluation    History of Present Illness    Subjective comment: Pt reports therapy has helped with his dressing, bathing and ADLs.         Objective          Neurological Testing     Sensation     Lumbar   Left   Intact: light touch    Right   Intact: light touch    Strength/Myotome Testing     Left Hip   Planes of Motion   Flexion: 4-    Right Hip   Planes of Motion   Flexion: 4    Left Knee   Flexion: 4+  Extension: 4+    Right Knee   Flexion: 4+  Extension: 4+    Ambulation     Comments   Pt amb with increased velocity with mild decreased stance on left          Assessment & Plan     Assessment  Impairments: abnormal coordination, abnormal gait, abnormal muscle firing, abnormal muscle tone, abnormal or restricted ROM, activity intolerance, impaired balance, impaired physical strength, lacks appropriate home exercise program and weight-bearing intolerance  Functional Limitations: carrying objects, walking, standing and unable to perform repetitive tasks  Assessment details: Patient has been attending physical therapy rehabilitation following a CVA; he has attended therapy  for a total of 14 sessions.  Patient has shown progress towards goals with skilled PT; he has met 2/3 STGs and 3/3 LTGs.  DONAHUE Balance Assessment has improved from 45/56 to 52/56.  Gait speed has also improved by an average of 2.5 seconds on the Timed Up and Go.  Patient will continue to benefit from skilled PT, so that he can achieve his maximum level of function and be at decreased risk for falls.  Prognosis: good    Goals  Plan Goals: STG 6 weeks  1 Pt will be instructed in a HEP.  Met  2 Pt will improve his DONAHUE to 42 or greater.  Met-52/56  3 Pt will demonstrate 4+/5 gross LE strength.  Ongoing, progressing    LTG 12 weeks  1 Pt will improve his DONAHUE score to 46 or greater to demonstrate decreased risk for falls.  Met  2 Pt will improve his TUG to less than 12 seconds.met  10 sec  3 Pt will amb with noted improved velocity and improved wt shifting.  Met    Plan  Therapy options: will be seen for skilled therapy services  Planned modality interventions: cryotherapy, thermotherapy (paraffin bath) and ultrasound  Planned therapy interventions: abdominal trunk stabilization, ADL retraining, balance/weight-bearing training, body mechanics training, flexibility, functional ROM exercises, gait training, home exercise program, IADL retraining, joint mobilization, manual therapy, neuromuscular re-education, postural training, soft tissue mobilization, strengthening, stretching, therapeutic activities and transfer training  Frequency: 2x week  Duration in weeks: 4  Treatment plan discussed with: patient  Plan details: Re-evaluation   84903    Therapeutic exercise  43550  Therapeutic activity    44738  Neuromuscular re-education   38361  Manual therapy   73264  Gait training  19737    Unattended e-stim (Medicaid/Medicare)     Moist heat/cryotherapy 08666   Ultrasound   57198               Recommendations: Continue as planned  Timeframe: 1 month  Prognosis to achieve goals: good      Timed:         Manual Therapy:          mins  84911;     Therapeutic Exercise:    29     mins  62866;     Neuromuscular Joanie:    14    mins  21294;    Therapeutic Activity:     15     mins  91443;     Gait Training:           mins  40509;     Ultrasound:          mins  80964;    Ionto                                   mins   33596  Self Care                            mins   17853  Canalith Repos         mins 20315      Un-Timed:  Electrical Stimulation:         mins  73778 ( );  Dry Needling          mins self-pay  Traction          mins 20052  Re-Eval                              mins  02419      Timed Treatment:   58   mins   Total Treatment:     58   mins          PT: Kashif Lea PT     [place] License:  [id number]    Electronically signed by Kashif Lea PT, 01/10/23, 3:28 PM EST    Certification Period: 1/10/2023 thru 4/9/2023  I certify that the therapy services are furnished while this patient is under my care.  The services outlined above are required by this patient, and will be reviewed every 90 days.         Physician Signature:__________________________________________________    PHYSICIAN: Flynn An MD  NPI: [id number]                                      DATE:  :     Please sign and return via fax to [email or web address] . Thank you, [facility]

## 2023-01-12 ENCOUNTER — TREATMENT (OUTPATIENT)
Dept: PHYSICAL THERAPY | Facility: CLINIC | Age: 68
End: 2023-01-12
Payer: MEDICARE

## 2023-01-12 ENCOUNTER — HOSPITAL ENCOUNTER (OUTPATIENT)
Dept: CT IMAGING | Facility: HOSPITAL | Age: 68
Discharge: HOME | End: 2023-01-12
Admitting: FAMILY MEDICINE
Payer: MEDICARE

## 2023-01-12 DIAGNOSIS — I63.311 CEREBRAL INFARCTION DUE TO THROMBOSIS OF RIGHT MIDDLE CEREBRAL ARTERY: Primary | ICD-10-CM

## 2023-01-12 DIAGNOSIS — G81.94 LEFT HEMIPARESIS: ICD-10-CM

## 2023-01-12 DIAGNOSIS — I63.9 LACK OF COORDINATION DUE TO ACUTE CEREBROVASCULAR ACCIDENT (CVA): ICD-10-CM

## 2023-01-12 DIAGNOSIS — N28.89 KIDNEY MASS: ICD-10-CM

## 2023-01-12 DIAGNOSIS — R27.9 LACK OF COORDINATION DUE TO ACUTE CEREBROVASCULAR ACCIDENT (CVA): ICD-10-CM

## 2023-01-12 DIAGNOSIS — I63.9 CEREBROVASCULAR ACCIDENT (CVA), UNSPECIFIED MECHANISM: ICD-10-CM

## 2023-01-12 DIAGNOSIS — R29.898 LEFT ARM WEAKNESS: ICD-10-CM

## 2023-01-12 PROCEDURE — 74176 CT ABD & PELVIS W/O CONTRAST: CPT

## 2023-01-12 PROCEDURE — 97112 NEUROMUSCULAR REEDUCATION: CPT | Performed by: OCCUPATIONAL THERAPIST

## 2023-01-12 PROCEDURE — 97530 THERAPEUTIC ACTIVITIES: CPT | Performed by: OCCUPATIONAL THERAPIST

## 2023-01-12 PROCEDURE — 97110 THERAPEUTIC EXERCISES: CPT | Performed by: OCCUPATIONAL THERAPIST

## 2023-01-12 PROCEDURE — 74176 CT ABD & PELVIS W/O CONTRAST: CPT | Performed by: RADIOLOGY

## 2023-01-12 NOTE — PROGRESS NOTES
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 Lack of coordination due to acute cerebrovascular accident (CVA) (HCC)  I63.9 434.91    R27.9 781.3   3. Left arm weakness  R29.898 729.89   4. Cerebrovascular accident (CVA), unspecified mechanism (HCC)  I63.9 434.91   5. Left hemiparesis (HCC)  G81.94 342.90           Subjective Evaluation    History of Present Illness  Date of onset: 10/25/2022  Mechanism of injury: Pt states that he have two strokes. Pt was apparently hospitalized on 10/25/22 to 10/27/22 and d/c home but suffered another stoke the next day and returned to ER resulting in admission to hospital from 10/28/22 - 22 then transferred to inpatient rehab and eventually d/c home on 2022. Pt Dx includes right MCA CVA.     Medical Hx includes DM, chronic kidney disease, CAD s/p stenting, s/p mechanical aortic valve    Subjective comment: Pt reports no new concernsPain  No pain reported    Social Support  Lives in: trailer  Lives with: alone    Hand dominance: right    Treatments  Discharged from (in last 30 days): inpatient hospitalization  Patient Goals  Patient goals for therapy: increased motion, increased strength and independence with ADLs/IADLs             Objective          Active Range of Motion   Left Shoulder   Flexion: WFL  Extension: WFL  Abduction: WFL  Adduction:  "WFL    Right Shoulder   Normal active range of motion    Left Elbow   Normal active range of motion    Right Elbow   Normal active range of motion    Left Wrist   Normal active range of motion    Right Wrist   Normal active range of motion    Additional Active Range of Motion Details  Pt with decreased left shoulder AROM vs right but WFLs however pt requires cues for proper posture as he leans to right side to reach up with left; states, \" I didn't know I was even doing that\"    Strength/Myotome Testing     Left Shoulder     Planes of Motion   Flexion: 4-   Extension: 5   Abduction: 4-   Adduction: 4-     Right Shoulder   Normal muscle strength    Left Elbow   Normal strength    Right Elbow   Normal strength    Left Wrist/Hand   Normal wrist strength     (2nd hand position)     Trial 1: 66 lbs    Trial 2: 60 lbs    Trial 3: 59 lbs    Average: 61.67 lbs    Right Wrist/Hand      (2nd hand position)     Trial 1: 61 lbs    Trial 2: 76 lbs    Trial 3: 65 lbs    Average: 67.33 lbs    Functional Assessment     Comments  Coordination    Box and Block  o RUE= 42 > 37 > 45  o LUE= 27 > 29 >  41    9 Hole Peg Test  o RUE= 24.91s > 30.06 > 23.68  o LUE= 2m 06 s > 46.83 > 33.43     Left hand ten sense score    Thumb = 8  > 8 > 10  IF=7 > 7 > 8-10  LF=7 > 8 > 10  RF= 6 > 8 > 10  SF =7 > 8 > 8-10  Dorsal and volar hand = 10 > 10 > 10    QuickDASH Score: 34.1 / 100 = 34.1 % > 9.1 / 100 = 9.1 % >  4.5 / 100 = 4.5 %    Barthel Index= RAW Score: 17/20, Slight Dependency > NT > NT               Assessment & Plan     Assessment  Impairments: abnormal coordination, abnormal or restricted ROM, activity intolerance, impaired physical strength, lacks appropriate home exercise program and safety issue    Assessment details: OT provided Pt with cues and instructions to complete OT modified interventions focused on left UE motor coordination, postural control with working across midline, left UE strength, activity tolerance, fine motor " skills, and joint stiffness as well as progress testing. Pt demonstrates greatly improved left coordination, sensory impairment, and increasing strength. Discussed with pt decreasing OT treatment to 1x/ per week and phasing into increased HEP as patient nears goal; pt in agreement with no concerns at this time. Patient continues to demonstrate impairment in left UE function. Pt demonstrated good effort and motivation and will benefit from continued OT treatment. OT to treat 1x per week until all goals or maximal benefit of OT services are achieved.     Prognosis: good  Prognosis details: Patient is making excellent progress with OT treatment and demonstrates good remaining OT potential.       Goals  Plan Goals: OT Short Term Goals  STG 1 Pt will demonstrate good return on beginning HEP  STG 1 Progress Met  STG 2 Pt will demonstrated improve left UE fine motor coordiantion for increased ADL independence via a 50% improvement of Nine Hole Peg Test Score  STG 2 Progress Met  STG 3 Pt will demonstrate improve left UE strength by 1/2 MMT grade for increased ADL independence.  STG 3 Progress Met  STG 4 Pt will demonstrated an improve QuickDASH score of 5 points for increased ADL independence and safety.  STG 4 Progress Met  Long Term Goals  LTG 1 Pt will demonstrate good return on complete HEP  LTG 1 Progress Ongoing;Progressing  LTG 2 Pt will demonstrated improve left UE fine motor coordiantion for increased ADL independence via  improvement of Nine Hole Peg Test Score to 10 % less or great of unaffected right UE score  LTG 2 Progress Partially Met;Progressing  LTG 3 Pt will demonstrate improve left UE strength to 4/5 or greater MMT grade for increased ADL independence.  LTG 3 Progress Progressing  LTG 4 Pt will demonstrated an improve QuickDASH score to the minimal detectable change of 11 points or more for increased ADL independence and safety.  LTG 4 Progress Met      Plan  Planned modality interventions: ultrasound  and electrical stimulation/Russian stimulation  Planned therapy interventions: ADL retraining, fine motor coordination training, functional ROM exercises, IADL retraining, joint mobilization, home exercise program, manual therapy, motor coordination training, neuromuscular re-education, orthotic fitting/training, postural training, strengthening, stretching, therapeutic activities, soft tissue mobilization and balance/weight-bearing training  Frequency: 1x week  Duration in visits: 6  Duration in weeks: 6  Treatment plan discussed with: patient  Plan details: Decrease OT treatment to 1x/ per week due to excellent progress, nearing goals, and phasing off OT services with increasing in HEP; anticipate OT discharge in 4-6 weeks.          Timed:         Manual Therapy:    0     mins  56135;     Therapeutic Exercise:   15     mins  66226;     Neuromuscular Joanie:    15 mins  32930;    Therapeutic Activity:     23 mins  04882;     Ultrasound:     0     mins  83831;    Ionto                               0    mins   17859  Self Care                       0     mins   33549  Electrical Stimulation:    0     mins  01275    Un-Timed:  Electrical Stimulation:    0     mins  76070 ( );        Timed Treatment:   53 mins   Total Treatment:     53 mins          OT SIGNATURE:       Occupational Therapist, Certified Hand Therapist    [place] License #[id number]  NPI #[id number]  Electronically signed by: Kenny D. Maynes, FORREST/L, CHT 1/12/2023         Certification Period: 1/12/2023 thru 4/11/2023  I certify that the therapy services are furnished while this patient is under my care.  The services outlined above are required by this patient, and will be reviewed every 90 days.      Physician Signature:__________________________________________________      PHYSICIAN: Flynn An MD    NPI: [id number]                                       DATE: ____________    Please sign and return via fax to  [phone] . Thank you, [facility]  [facility].

## 2023-01-17 ENCOUNTER — TREATMENT (OUTPATIENT)
Dept: PHYSICAL THERAPY | Facility: CLINIC | Age: 68
End: 2023-01-17
Payer: MEDICARE

## 2023-01-17 ENCOUNTER — ANTICOAGULATION VISIT (OUTPATIENT)
Dept: FAMILY MEDICINE CLINIC | Facility: CLINIC | Age: 68
End: 2023-01-17
Payer: MEDICARE

## 2023-01-17 DIAGNOSIS — R27.0 ATAXIA: ICD-10-CM

## 2023-01-17 DIAGNOSIS — Z95.2 H/O MECHANICAL AORTIC VALVE REPLACEMENT: Primary | Chronic | ICD-10-CM

## 2023-01-17 DIAGNOSIS — R27.9 LACK OF COORDINATION DUE TO ACUTE CEREBROVASCULAR ACCIDENT (CVA): ICD-10-CM

## 2023-01-17 DIAGNOSIS — I63.9 LACK OF COORDINATION DUE TO ACUTE CEREBROVASCULAR ACCIDENT (CVA): ICD-10-CM

## 2023-01-17 DIAGNOSIS — G81.94 LEFT HEMIPARESIS: ICD-10-CM

## 2023-01-17 DIAGNOSIS — I63.311 CEREBRAL INFARCTION DUE TO THROMBOSIS OF RIGHT MIDDLE CEREBRAL ARTERY: Primary | ICD-10-CM

## 2023-01-17 DIAGNOSIS — I63.9 CEREBROVASCULAR ACCIDENT (CVA), UNSPECIFIED MECHANISM: Primary | ICD-10-CM

## 2023-01-17 DIAGNOSIS — R29.898 LEFT ARM WEAKNESS: ICD-10-CM

## 2023-01-17 LAB — INR PPP: 3.1 (ref 3–3.5)

## 2023-01-17 PROCEDURE — 97112 NEUROMUSCULAR REEDUCATION: CPT | Performed by: PHYSICAL THERAPIST

## 2023-01-17 PROCEDURE — 97530 THERAPEUTIC ACTIVITIES: CPT | Performed by: OCCUPATIONAL THERAPIST

## 2023-01-17 PROCEDURE — 97110 THERAPEUTIC EXERCISES: CPT | Performed by: OCCUPATIONAL THERAPIST

## 2023-01-17 PROCEDURE — 97110 THERAPEUTIC EXERCISES: CPT | Performed by: PHYSICAL THERAPIST

## 2023-01-17 PROCEDURE — 97112 NEUROMUSCULAR REEDUCATION: CPT | Performed by: OCCUPATIONAL THERAPIST

## 2023-01-17 PROCEDURE — 85610 PROTHROMBIN TIME: CPT | Performed by: FAMILY MEDICINE

## 2023-01-17 PROCEDURE — 36416 COLLJ CAPILLARY BLOOD SPEC: CPT | Performed by: FAMILY MEDICINE

## 2023-01-17 NOTE — PROGRESS NOTES
____________________________________________________________________      Outpatient Rehabilitation  [address] [facility]  [place] [address]  Phone: [phone] / Fax: [phone]       Occupational Therapy  Treatment Note             Patient: Benitez Miranda   : 1955  Diagnosis/ICD-10 Code:  Cerebral infarction due to thrombosis of right middle cerebral artery (HCC) [I63.311]  Referring practitioner: Flynn An MD  Date of Initial Visit: Type: THERAPY  Noted: 2022  Today's Date: 2023  Patient seen for 16 sessions           Subjective Evaluation    History of Present Illness    Subjective comment: Pt reports no new concerns. Pain  Current pain ratin    Hand dominance: right    Patient Goals  Patient goals for therapy: increased motion, increased strength and independence with ADLs/IADLs               Objective   See Exercise, Manual, and Modality Logs for complete treatment.           Assessment & Plan     Assessment  Impairments: abnormal coordination, abnormal or restricted ROM, activity intolerance and impaired physical strength    Assessment details: OT/CHT provided Pt with cues and instructions to complete therapist modified interventions focused on: left UE fine motor skills and strength. Pt demonstrates continued improvement with left UE functions. Pt c/o left hand numbness that comes and goes but greatly improved vs numbness at initial evaluation. Pt with occasional difficult picking up small items and sometimes requires 2-3 attempts. Pt demonstrates good effort and motivation. Pt will benefit from continued OT treatment.         Prognosis: good  Prognosis details: Pt is making good progress with OT treatment and demonstrates good remaining OT potential.      Plan  Planned modality interventions: ultrasound, TENS and electrical stimulation/Russian stimulation  Planned therapy interventions: ADL retraining, abdominal trunk stabilization, fine motor coordination training,  functional ROM exercises, home exercise program, IADL retraining, joint mobilization, manual therapy, motor coordination training, neuromuscular re-education, orthotic fitting/training, postural training, soft tissue mobilization, strengthening and therapeutic activities  Frequency: 1x week  Treatment plan discussed with: patient  Plan details: Continue current OT plan of care.          Progress per Plan of Care               Timed Codes        Manual Therapy:    0     mins  97109;    Therapeutic Exercise:    16     mins  73548;     Neuromuscular Joanie:    13 mins  78562;    Therapeutic Activity:     17     mins  77154;      Ultrasound:     0     mins  81133;    Community/ work    0     mins  38534  Self Care/ José Miguel    0     mins  22699  Sensory Re-Int.             0     mins   81784  Cognitve Re-train    0     mins  93218   Electrical Stimulation:    0     mins 37249      Un-timed Codes  Electrical Stimulation:    0     mins 84900 ( );      Timed Treatment:   46   mins   Total Treatment:     46   mins    OT SIGNATURE:       Occupational Therapist, Certified Hand Therapist    [place] License #[id number]  NPI #[id number]  Electronically signed by: Kenny D. Maynes, FORREST/L, CHT 1/17/2023

## 2023-01-17 NOTE — PROGRESS NOTES
Physical Therapy Daily Treatment Note      Patient: Benitez Miranda   : 1955  Referring practitioner: Flynn An MD  Date of Initial Visit: Type: THERAPY  Noted: 2022  Today's Date: 2023  Patient seen for 15 sessions       Visit Diagnoses:    ICD-10-CM ICD-9-CM   1. Cerebrovascular accident (CVA), unspecified mechanism (HCC)  I63.9 434.91   2. Left hemiparesis (HCC)  G81.94 342.90   3. Ataxia  R27.0 781.3       Subjective:  Patient reports doing well today with no complaints stated prior to tx.      Objective   See Exercise, Manual, and Modality Logs for complete treatment.       Assessment/Plan: Patient demonstrated good effort during today's session and was provided with rest breaks as needed due to fatigue. Treatment performed as per flow sheet including therex and neuro re-ed as listed to assist with strength deficits, improve closed chain balance, and to simulate activities in home and outside environment. Pt performed forward and retro walking with UE involvement with cues provided with increase step width to decrease crossing of feet. Resistance on LE bike increased from LV 3 to 3.2. Pt continues to benefit from therapy services, and will be progressed as tolerated to address goals, improve strength and functional mobility. Continue with PT's POC.       Timed:         Manual Therapy:         mins  03837;     Therapeutic Exercise:   29      mins  79448;     Neuromuscular Joanie:  18      mins  80043;    Therapeutic Activity:          mins  15075;     Gait Training:           mins  52439;     Ultrasound:          mins  59562;    Ionto                                   mins   89814  Self Care                            mins   44711  Canalith Repos         mins 64067      Un-Timed:  Electrical Stimulation:         mins  86627 ( );  Dry Needling          mins self-pay  Traction          mins 47778      Timed Treatment:  47    mins   Total Treatment:    47    mins    Melody Nice.  Mario, PTA  [place] License: [id number]

## 2023-01-24 ENCOUNTER — OFFICE VISIT (OUTPATIENT)
Dept: FAMILY MEDICINE CLINIC | Facility: CLINIC | Age: 68
End: 2023-01-24
Payer: MEDICARE

## 2023-01-24 ENCOUNTER — SPECIALTY PHARMACY (OUTPATIENT)
Dept: PHARMACY | Facility: HOSPITAL | Age: 68
End: 2023-01-24
Payer: MEDICARE

## 2023-01-24 ENCOUNTER — TELEPHONE (OUTPATIENT)
Dept: PHYSICAL THERAPY | Facility: CLINIC | Age: 68
End: 2023-01-24

## 2023-01-24 VITALS
SYSTOLIC BLOOD PRESSURE: 122 MMHG | HEIGHT: 72 IN | BODY MASS INDEX: 34.38 KG/M2 | OXYGEN SATURATION: 98 % | HEART RATE: 75 BPM | TEMPERATURE: 95.9 F | WEIGHT: 253.8 LBS | DIASTOLIC BLOOD PRESSURE: 74 MMHG

## 2023-01-24 DIAGNOSIS — E11.22 TYPE 2 DIABETES MELLITUS WITH STAGE 3 CHRONIC KIDNEY DISEASE, WITH LONG-TERM CURRENT USE OF INSULIN, UNSPECIFIED WHETHER STAGE 3A OR 3B CKD: Primary | ICD-10-CM

## 2023-01-24 DIAGNOSIS — F33.42 RECURRENT MAJOR DEPRESSIVE DISORDER, IN FULL REMISSION: ICD-10-CM

## 2023-01-24 DIAGNOSIS — N18.30 TYPE 2 DIABETES MELLITUS WITH STAGE 3 CHRONIC KIDNEY DISEASE, WITH LONG-TERM CURRENT USE OF INSULIN, UNSPECIFIED WHETHER STAGE 3A OR 3B CKD: Primary | ICD-10-CM

## 2023-01-24 DIAGNOSIS — Z79.4 TYPE 2 DIABETES MELLITUS WITH STAGE 3 CHRONIC KIDNEY DISEASE, WITH LONG-TERM CURRENT USE OF INSULIN, UNSPECIFIED WHETHER STAGE 3A OR 3B CKD: Primary | ICD-10-CM

## 2023-01-24 DIAGNOSIS — E66.01 MORBIDLY OBESE: ICD-10-CM

## 2023-01-24 DIAGNOSIS — I50.32 CHRONIC DIASTOLIC CONGESTIVE HEART FAILURE: ICD-10-CM

## 2023-01-24 DIAGNOSIS — N18.30 STAGE 3 CHRONIC KIDNEY DISEASE, UNSPECIFIED WHETHER STAGE 3A OR 3B CKD: ICD-10-CM

## 2023-01-24 DIAGNOSIS — Z79.01 ANTICOAGULATION GOAL OF INR 2.5 TO 3.5: ICD-10-CM

## 2023-01-24 DIAGNOSIS — J44.9 CHRONIC OBSTRUCTIVE PULMONARY DISEASE, UNSPECIFIED COPD TYPE: ICD-10-CM

## 2023-01-24 DIAGNOSIS — Z51.81 ANTICOAGULATION GOAL OF INR 2.5 TO 3.5: ICD-10-CM

## 2023-01-24 PROCEDURE — 80053 COMPREHEN METABOLIC PANEL: CPT | Performed by: FAMILY MEDICINE

## 2023-01-24 PROCEDURE — 83036 HEMOGLOBIN GLYCOSYLATED A1C: CPT | Performed by: FAMILY MEDICINE

## 2023-01-24 PROCEDURE — 36415 COLL VENOUS BLD VENIPUNCTURE: CPT | Performed by: FAMILY MEDICINE

## 2023-01-24 PROCEDURE — 99214 OFFICE O/P EST MOD 30 MIN: CPT | Performed by: FAMILY MEDICINE

## 2023-01-24 RX ORDER — WARFARIN SODIUM 6 MG/1
6 TABLET ORAL NIGHTLY
Qty: 30 TABLET | Refills: 5 | Status: SHIPPED | OUTPATIENT
Start: 2023-01-24 | End: 2023-01-24 | Stop reason: SDUPTHER

## 2023-01-24 RX ORDER — WARFARIN SODIUM 6 MG/1
6 TABLET ORAL NIGHTLY
Qty: 90 TABLET | Refills: 1 | Status: SHIPPED | OUTPATIENT
Start: 2023-01-24

## 2023-01-24 NOTE — PROGRESS NOTES
Patient will no longer be receiving Ozempic from [facility] due to cost. He will now be receiving through [employer] assistance program. Encouraged patient to contact clinic with any further concerns.     Yasmine Alejandro, PharmD  01/24/23  15:07 EST

## 2023-01-25 LAB
ALBUMIN SERPL-MCNC: 4 G/DL (ref 3.5–5.2)
ALBUMIN/GLOB SERPL: 1 G/DL
ALP SERPL-CCNC: 74 U/L (ref 39–117)
ALT SERPL W P-5'-P-CCNC: 16 U/L (ref 1–41)
ANION GAP SERPL CALCULATED.3IONS-SCNC: 12.8 MMOL/L (ref 5–15)
AST SERPL-CCNC: 18 U/L (ref 1–40)
BILIRUB SERPL-MCNC: 0.7 MG/DL (ref 0–1.2)
BUN SERPL-MCNC: 20 MG/DL (ref 8–23)
BUN/CREAT SERPL: 16 (ref 7–25)
CALCIUM SPEC-SCNC: 9.3 MG/DL (ref 8.6–10.5)
CHLORIDE SERPL-SCNC: 101 MMOL/L (ref 98–107)
CO2 SERPL-SCNC: 24.2 MMOL/L (ref 22–29)
CREAT SERPL-MCNC: 1.25 MG/DL (ref 0.76–1.27)
EGFRCR SERPLBLD CKD-EPI 2021: 62.3 ML/MIN/1.73
GLOBULIN UR ELPH-MCNC: 4 GM/DL
GLUCOSE SERPL-MCNC: 138 MG/DL (ref 65–99)
HBA1C MFR BLD: 7.8 % (ref 4.8–5.6)
POTASSIUM SERPL-SCNC: 4.6 MMOL/L (ref 3.5–5.2)
PROT SERPL-MCNC: 8 G/DL (ref 6–8.5)
SODIUM SERPL-SCNC: 138 MMOL/L (ref 136–145)

## 2023-01-30 ENCOUNTER — ANTICOAGULATION VISIT (OUTPATIENT)
Dept: FAMILY MEDICINE CLINIC | Facility: CLINIC | Age: 68
End: 2023-01-30
Payer: MEDICARE

## 2023-01-30 DIAGNOSIS — Z79.01 ANTICOAGULANT LONG-TERM USE: ICD-10-CM

## 2023-01-30 DIAGNOSIS — Z51.81 ANTICOAGULATION GOAL OF INR 2.5 TO 3.5: Primary | ICD-10-CM

## 2023-01-30 DIAGNOSIS — Z79.01 ANTICOAGULATION GOAL OF INR 2.5 TO 3.5: Primary | ICD-10-CM

## 2023-01-30 LAB — INR PPP: 2.9 (ref 3–3.5)

## 2023-01-30 PROCEDURE — 85610 PROTHROMBIN TIME: CPT | Performed by: FAMILY MEDICINE

## 2023-01-30 PROCEDURE — 36416 COLLJ CAPILLARY BLOOD SPEC: CPT | Performed by: FAMILY MEDICINE

## 2023-01-31 ENCOUNTER — TREATMENT (OUTPATIENT)
Dept: PHYSICAL THERAPY | Facility: CLINIC | Age: 68
End: 2023-01-31
Payer: MEDICARE

## 2023-01-31 DIAGNOSIS — R27.0 ATAXIA: ICD-10-CM

## 2023-01-31 DIAGNOSIS — I63.9 CEREBROVASCULAR ACCIDENT (CVA), UNSPECIFIED MECHANISM: Primary | ICD-10-CM

## 2023-01-31 DIAGNOSIS — I63.311 CEREBRAL INFARCTION DUE TO THROMBOSIS OF RIGHT MIDDLE CEREBRAL ARTERY: Primary | ICD-10-CM

## 2023-01-31 DIAGNOSIS — G81.94 LEFT HEMIPARESIS: ICD-10-CM

## 2023-01-31 DIAGNOSIS — R29.898 LEFT ARM WEAKNESS: ICD-10-CM

## 2023-01-31 DIAGNOSIS — I63.9 LACK OF COORDINATION DUE TO ACUTE CEREBROVASCULAR ACCIDENT (CVA): ICD-10-CM

## 2023-01-31 DIAGNOSIS — R27.9 LACK OF COORDINATION DUE TO ACUTE CEREBROVASCULAR ACCIDENT (CVA): ICD-10-CM

## 2023-01-31 PROCEDURE — 97110 THERAPEUTIC EXERCISES: CPT | Performed by: PHYSICAL THERAPIST

## 2023-01-31 PROCEDURE — 97112 NEUROMUSCULAR REEDUCATION: CPT | Performed by: PHYSICAL THERAPIST

## 2023-01-31 PROCEDURE — 97530 THERAPEUTIC ACTIVITIES: CPT | Performed by: OCCUPATIONAL THERAPIST

## 2023-01-31 PROCEDURE — 97110 THERAPEUTIC EXERCISES: CPT | Performed by: OCCUPATIONAL THERAPIST

## 2023-01-31 PROCEDURE — 97112 NEUROMUSCULAR REEDUCATION: CPT | Performed by: OCCUPATIONAL THERAPIST

## 2023-01-31 NOTE — PROGRESS NOTES
Physical Therapy Daily Treatment Note      Patient: Benitez Miranda   : 1955  Referring practitioner: Flynn An MD  Date of Initial Visit: Type: THERAPY  Noted: 2022  Today's Date: 2023  Patient seen for 16 sessions       Visit Diagnoses:    ICD-10-CM ICD-9-CM   1. Cerebrovascular accident (CVA), unspecified mechanism (HCC)  I63.9 434.91   2. Ataxia  R27.0 781.3   3. Left hemiparesis (HCC)  G81.94 342.90       Subjective: Patient reports doing well today with no new complaints stated prior to treatment. Pt states he did not feel well last week, however notes feeling better now.      Objective   See Exercise, Manual, and Modality Logs for complete treatment.       Assessment/Plan: Patient responded well to today's session with rest breaks provided as needed due to fatigue. Treatment performed including therex and neuro re-ed as listed with continued focus on improved bilateral LE strength, improved functional mobility and to address balance deficits and recovery. Pt provided with cues and demonstration during activities for max benefit and for form. Pt noted with intermittent episodes of LOB posteriorly while side stepping on foam beam; min assist provided to correct. Pt continues to benefit from therapy services and will be progressed as tolerated to address goals, improve strength and functional mobility. Continue with PT's POC.       Timed:         Manual Therapy:         mins  23153;     Therapeutic Exercise:    41     mins  29833;     Neuromuscular Joanie:    13    mins  79185;    Therapeutic Activity:          mins  04355;     Gait Training:           mins  13294;     Ultrasound:          mins  02851;    Ionto                                   mins   37297  Self Care                            mins   62445  Canalith Repos         mins 98610      Un-Timed:  Electrical Stimulation:         mins  58834 ( );  Dry Needling          mins self-pay  Traction          mins 75950      Timed  Treatment:   54   mins   Total Treatment:    54    mins    Melody Nice. XU Martin  [place] License: [id number]

## 2023-01-31 NOTE — PROGRESS NOTES
____________________________________________________________________      Outpatient Rehabilitation  [address] [facility]  [place] [address]  Phone: [phone] / Fax: [phone]       Occupational Therapy  Treatment Note             Patient: Benitez Miranda   : 1955  Diagnosis/ICD-10 Code:  Cerebral infarction due to thrombosis of right middle cerebral artery (HCC) [I63.311]  Referring practitioner: Flynn An MD  Date of Initial Visit: Type: THERAPY  Noted: 2022  Today's Date: 2023  Patient seen for 17 sessions           Subjective Evaluation    History of Present Illness    Subjective comment: Pt reports no new concerns. Pain  No pain reported    Hand dominance: right               Objective          Neurological Testing     Additional Neurological Details  Left Hand Tens Test, general distrubution:    o Median nerve-hand 8 of 10   o Ulnar/Radial- Hand 9-10 of 10      See Exercise, Manual, and Modality Logs for complete treatment.           Assessment & Plan     Assessment    Assessment details: OT/CHT provided Pt with cues and instructions to complete therapist modified interventions focused on: left UE fine motor skills and strength. Pt demonstrates continued improvement with left UE fine motor skills; Pt completing fine motor task with minimal difficulty this session. Pt c/o left hand numbness that continues to comes and goes; reports current 8-9 out of tens for left hand ten sense scale.  Pt demonstrates good effort and motivation. OT discussed with pt discharge plan with OT discharge anticipated for next visist with maximal benefit achieved; pt with no concerns or questions and in  Agreement with plan at this time,. Pt will benefit from continued OT treatment to ensure proper discharge and transition from OT services. .         Prognosis: good  Prognosis details: Pt is making good progress with OT treatment and demonstrates good remaining OT  potential.      Plan  Frequency: 1x week  Treatment plan discussed with: patient  Plan details: Continue current OT plan of care; Anticipate OT discharge next visit.          Progress per Plan of Care               Timed Codes        Manual Therapy:    0     mins  76675;    Therapeutic Exercise:    17     mins  54256;     Neuromuscular Joanie:    12 mins  40234;    Therapeutic Activity:     16     mins  84060;      Ultrasound:     0     mins  81715;    Community/ work    0     mins  52216  Self Care/ José Miguel    0     mins  39200  Sensory Re-Int.             0     mins   25231  Cognitve Re-train    0     mins  97352   Electrical Stimulation:    0     mins 59930      Un-timed Codes  Electrical Stimulation:    0     mins 94238 ( );      Timed Treatment:   45 mins   Total Treatment:     45  mins    OT SIGNATURE:       Occupational Therapist, Certified Hand Therapist    [place] License #[id number]  NPI #[id number]  Electronically signed by: Kenny D. Maynes, OTR/L, CHT 1/31/2023

## 2023-02-06 ENCOUNTER — ANTICOAGULATION VISIT (OUTPATIENT)
Dept: FAMILY MEDICINE CLINIC | Facility: CLINIC | Age: 68
End: 2023-02-06
Payer: MEDICARE

## 2023-02-06 ENCOUNTER — TREATMENT (OUTPATIENT)
Dept: PHYSICAL THERAPY | Facility: CLINIC | Age: 68
End: 2023-02-06
Payer: MEDICARE

## 2023-02-06 DIAGNOSIS — G81.94 LEFT HEMIPARESIS: ICD-10-CM

## 2023-02-06 DIAGNOSIS — R27.9 LACK OF COORDINATION DUE TO ACUTE CEREBROVASCULAR ACCIDENT (CVA): ICD-10-CM

## 2023-02-06 DIAGNOSIS — R29.898 LEFT ARM WEAKNESS: ICD-10-CM

## 2023-02-06 DIAGNOSIS — R27.0 ATAXIA: ICD-10-CM

## 2023-02-06 DIAGNOSIS — Z95.2 H/O MECHANICAL AORTIC VALVE REPLACEMENT: Primary | ICD-10-CM

## 2023-02-06 DIAGNOSIS — I63.9 LACK OF COORDINATION DUE TO ACUTE CEREBROVASCULAR ACCIDENT (CVA): ICD-10-CM

## 2023-02-06 DIAGNOSIS — I63.311 CEREBRAL INFARCTION DUE TO THROMBOSIS OF RIGHT MIDDLE CEREBRAL ARTERY: Primary | ICD-10-CM

## 2023-02-06 LAB — INR PPP: 3.6 (ref 3–3.5)

## 2023-02-06 PROCEDURE — 97530 THERAPEUTIC ACTIVITIES: CPT | Performed by: OCCUPATIONAL THERAPIST

## 2023-02-06 PROCEDURE — 85610 PROTHROMBIN TIME: CPT | Performed by: FAMILY MEDICINE

## 2023-02-06 PROCEDURE — 97110 THERAPEUTIC EXERCISES: CPT | Performed by: OCCUPATIONAL THERAPIST

## 2023-02-06 PROCEDURE — 36416 COLLJ CAPILLARY BLOOD SPEC: CPT | Performed by: FAMILY MEDICINE

## 2023-02-06 NOTE — PROGRESS NOTES
____________________________________________________________________      Outpatient Rehabilitation  [address] [place] [address]  Phone: [phone] / Fax: [phone]       Occupational Therapy - Neuro  Discharge Note              Patient: Benitez Miranda   : 1955  Diagnosis/ICD-10 Code:  Cerebral infarction due to thrombosis of right middle cerebral artery (HCC) [I63.311]  Referring practitioner: Flynn An MD  Date of Initial Visit: 2023  Today's Date: 2023  Patient seen for 18 session         Visit Diagnoses:    ICD-10-CM ICD-9-CM   1. 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 Lack of coordination due to acute cerebrovascular accident (CVA) (HCC)  I63.9 434.91    R27.9 781.3   3. Left arm weakness  R29.898 729.89   4. Left hemiparesis (HCC)  G81.94 342.90   5. Ataxia  R27.0 781.3           Subjective Evaluation    History of Present Illness  Date of onset: 10/25/2022  Mechanism of injury: Pt states that he have two strokes. Pt was apparently hospitalized on 10/25/22 to 10/27/22 and d/c home but suffered another stoke the next day and returned to ER resulting in admission to hospital from 10/28/22 - 22 then transferred to inpatient rehab and eventually d/c home on 2022. Pt Dx includes right MCA CVA.     Medical Hx includes DM, chronic kidney disease, CAD s/p stenting, s/p mechanical aortic valve    Subjective comment: Pt reports no new concernsPain  No pain reported    Social Support  Lives in: trailer  Lives with: alone    Hand dominance: right    Treatments  Discharged from (in last 30 days): inpatient hospitalization  Patient Goals  Patient goals for therapy: increased motion, increased strength and independence with ADLs/IADLs             Objective          Active Range of Motion   Left Shoulder   Normal active range of motion    Right Shoulder   Normal active range of motion    Left Elbow   Normal  active range of motion    Right Elbow   Normal active range of motion    Left Wrist   Normal active range of motion    Right Wrist   Normal active range of motion    Strength/Myotome Testing     Left Shoulder   Normal muscle strength    Right Shoulder   Normal muscle strength    Left Elbow   Normal strength    Right Elbow   Normal strength    Left Wrist/Hand   Normal wrist strength     (2nd hand position)     Trial 1: 64 lbs    Trial 2: 74 lbs    Trial 3: 65 lbs    Average: 67.67 lbs    Right Wrist/Hand   Normal wrist strength     (2nd hand position)     Trial 1: 85 lbs    Trial 2: 85 lbs    Trial 3: 80 lbs    Average: 83.33 lbs    Functional Assessment     Comments  Coordination    Box and Block  o RUE= 42 > 37 > 45 > 51  o LUE= 27 > 29 >  41 > 46    9 Hole Peg Test  o RUE= 24.91s > 30.06 > 23.68 > 24.06  o LUE= 2m 06 s > 46.83 > 33.43 > 30.98    Left hand ten sense score    Thumb = 8  > 8 > 10 > 10  IF=7 > 7 > 8-10 > 9  LF=7 > 8 > 10 >10  RF= 6 > 8 > 10 > 10  SF =7 > 8 > 8-10 > 9  Dorsal and volar hand = 10 > 10 > 10    QuickDASH Score: 34.1 / 100 = 34.1 % > 9.1 / 100 = 9.1 % >  4.5 / 100 = 4.5 % > QuickDASH Score: 2.3 / 100 = 2.3 %    Barthel Index= RAW Score: 17/20, Slight Dependency > NT > NT >20/20, Independent               Assessment & Plan     Assessment    Assessment details: OT provided Pt with cues and instructions to complete OT modified interventions focused on updated progress testing and eventually discharge HEP. Pt has achieved all goals. Pt demonstrates good improvement in UE strength, coordination, and sensory impairment. Pt does have a slight left hand sensory deficit remaining but demonstrates good awareness of difference. Pt demonstrated good motivation and effort throughout treatment and has been very pleasant.    Prognosis: poor  Prognosis details: All OT goals and maximal benefit achieved.       Goals  Plan Goals:    02/06/23 1800  OT Short Term Goals  STG 1 Pt will demonstrate good  return on beginning HEP  STG 1 Progress Met  STG 2 Pt will demonstrated improve left UE fine motor coordiantion for increased ADL independence via a 50% improvement of Nine Hole Peg Test Score  STG 2 Progress Met  STG 3 Pt will demonstrate improve left UE strength by 1/2 MMT grade for increased ADL independence.  STG 3 Progress Met  STG 4 Pt will demonstrated an improve QuickDASH score of 5 points for increased ADL independence and safety.  STG 4 Progress Met  Long Term Goals  LTG 1 Pt will demonstrate good return on complete HEP  LTG 1 Progress Met  LTG 2 Pt will demonstrated improve left UE fine motor coordiantion for increased ADL independence via  improvement of Nine Hole Peg Test Score to 10 % less or great of unaffected right UE score  LTG 2 Progress Met  LTG 3 Pt will demonstrate improve left UE strength to 4/5 or greater MMT grade for increased ADL independence.  LTG 3 Progress Met  LTG 4 Pt will demonstrated an improve QuickDASH score to the minimal detectable change of 11 points or more for increased ADL independence and safety.  LTG 4 Progress Met      Plan  Frequency: 1x week  Duration in visits: 6  Duration in weeks: 6  Treatment plan discussed with: patient  Plan details: Discharge OT treatment this date with all goals achieved.          Timed:         Manual Therapy:    0     mins  98526;     Therapeutic Exercise:   13     mins  70706;     Neuromuscular Joanie:    0 mins  17709;    Therapeutic Activity:     28  mins  84189;     Ultrasound:     0     mins  13647;    Ionto                               0    mins   06419  Self Care                       0     mins   91647  Electrical Stimulation:    0     mins  46390    Un-Timed:  Electrical Stimulation:    0     mins  41367 (MC );        Timed Treatment:   41 mins   Total Treatment:     41 mins          OT SIGNATURE:       Occupational Therapist, Certified Hand Therapist    [place] License #[id number]  NPI #[id number]  Electronically signed by: Huey CAI  Maynes, OTR/L, CHT 2/6/2023

## 2023-02-08 RX ORDER — METOPROLOL SUCCINATE 50 MG/1
TABLET, EXTENDED RELEASE ORAL
Qty: 90 TABLET | Refills: 1 | Status: SHIPPED | OUTPATIENT
Start: 2023-02-08

## 2023-02-09 ENCOUNTER — TREATMENT (OUTPATIENT)
Dept: PHYSICAL THERAPY | Facility: CLINIC | Age: 68
End: 2023-02-09
Payer: MEDICARE

## 2023-02-09 DIAGNOSIS — I63.9 CEREBROVASCULAR ACCIDENT (CVA), UNSPECIFIED MECHANISM: Primary | ICD-10-CM

## 2023-02-09 DIAGNOSIS — G81.94 LEFT HEMIPARESIS: ICD-10-CM

## 2023-02-09 DIAGNOSIS — R27.0 ATAXIA: ICD-10-CM

## 2023-02-09 PROCEDURE — 97110 THERAPEUTIC EXERCISES: CPT | Performed by: PHYSICAL THERAPIST

## 2023-02-09 PROCEDURE — 97112 NEUROMUSCULAR REEDUCATION: CPT | Performed by: PHYSICAL THERAPIST

## 2023-02-09 PROCEDURE — 97530 THERAPEUTIC ACTIVITIES: CPT | Performed by: PHYSICAL THERAPIST

## 2023-02-09 NOTE — PROGRESS NOTES
Physical Therapy Daily Treatment/Discharge      Patient: Benitez Miranda   : 1955  Referring practitioner: Flynn An MD  Date of Initial Visit: Type: THERAPY  Noted: 2022  Today's Date: 2023  Patient seen for 17 sessions       Visit Diagnoses:    ICD-10-CM ICD-9-CM   1. Cerebrovascular accident (CVA), unspecified mechanism ([facility])  I63.9 434.91   2. Left hemiparesis ([facility])  G81.94 342.90   3. Ataxia  R27.0 781.3       Subjective Evaluation    History of Present Illness    Subjective comment: Patient reports that he has been having no difficulty with his balance.  He states that he can tell that his strength and balance has improved with therapy.  Patient reports that he feels comfortable with discharge.       Objective          Strength/Myotome Testing     Left Hip   Planes of Motion   Flexion: 4  Abduction: 4+  Adduction: 4+    Right Hip   Planes of Motion   Flexion: 4+  Abduction: 4+  Adduction: 4+    Left Knee   Flexion: 4+  Extension: 4+    Right Knee   Flexion: 4+  Extension: 4+    Ambulation     Comments   Pt amb with increased velocity with mild decreased stance on left      See Exercise, Manual, and Modality Logs for complete treatment.       Assessment & Plan     Assessment    Assessment details: Patient has been attending physical therapy rehabilitation following a CVA; he has attended therapy for a total of 17 sessions.  Patient will be discharged, due to achieving maximum level of function at this time.  Patient has met 2/3 STGs and 3/3 LTGs; he has partially met 1/3 STGs.  Patient is encouraged to continue with HEP following discharge from therapy.      Goals  Plan Goals: STG 6 weeks  1 Pt will be instructed in a HEP.  Met  2 Pt will improve his DONAHUE to 42 or greater.  Met-52/56  3 Pt will demonstrate 4+/5 gross LE strength.  Partially met    LTG 12 weeks  1 Pt will improve his DONAHUE score to 46 or greater to demonstrate decreased risk for falls.  Met  2 Pt will improve his TUG to less  than 12 seconds.  Met  3 Pt will amb with noted improved velocity and improved wt shifting.  Met    Plan  Therapy options: will not be seen for skilled therapy services  Treatment plan discussed with: patient  Plan details: Patient to be discharged at conclusion of today's session.          Timed:         Manual Therapy:         mins  54522;     Therapeutic Exercise:    20     mins  82164;     Neuromuscular Joanie:    25    mins  82576;    Therapeutic Activity:     10     mins  90530;     Gait Training:           mins  61541;     Ultrasound:          mins  40773;    Ionto                                   mins   67682  Self Care                            mins   90382  Canalith Repos         mins 31830      Un-Timed:  Electrical Stimulation:         mins  94567 ( );  Dry Needling          mins self-pay  Traction          mins 44749      Timed Treatment:   55   mins   Total Treatment:     55   mins    Kalyani Pennington PT  [place] License: [id number]  Electronically signed by Kalyani Pennington PT, 02/09/23, 12:46 PM EST.

## 2023-02-20 ENCOUNTER — OFFICE VISIT (OUTPATIENT)
Dept: CARDIOLOGY | Facility: CLINIC | Age: 68
End: 2023-02-20
Payer: MEDICARE

## 2023-02-20 ENCOUNTER — ANTICOAGULATION VISIT (OUTPATIENT)
Dept: FAMILY MEDICINE CLINIC | Facility: CLINIC | Age: 68
End: 2023-02-20
Payer: MEDICARE

## 2023-02-20 VITALS
SYSTOLIC BLOOD PRESSURE: 110 MMHG | BODY MASS INDEX: 34.65 KG/M2 | HEART RATE: 73 BPM | WEIGHT: 255.8 LBS | DIASTOLIC BLOOD PRESSURE: 69 MMHG | HEIGHT: 72 IN | OXYGEN SATURATION: 99 %

## 2023-02-20 DIAGNOSIS — Z79.01 ANTICOAGULANT LONG-TERM USE: Primary | ICD-10-CM

## 2023-02-20 DIAGNOSIS — I05.0 RHEUMATIC MITRAL STENOSIS: Chronic | ICD-10-CM

## 2023-02-20 DIAGNOSIS — I10 ESSENTIAL HYPERTENSION: Chronic | ICD-10-CM

## 2023-02-20 DIAGNOSIS — I25.10 CORONARY ARTERY DISEASE INVOLVING NATIVE CORONARY ARTERY OF NATIVE HEART WITHOUT ANGINA PECTORIS: Primary | Chronic | ICD-10-CM

## 2023-02-20 DIAGNOSIS — E78.5 HYPERLIPIDEMIA LDL GOAL <70: Chronic | ICD-10-CM

## 2023-02-20 LAB — INR PPP: 3.4 (ref 0.9–1.1)

## 2023-02-20 PROCEDURE — 99214 OFFICE O/P EST MOD 30 MIN: CPT | Performed by: NURSE PRACTITIONER

## 2023-02-20 PROCEDURE — 36416 COLLJ CAPILLARY BLOOD SPEC: CPT | Performed by: FAMILY MEDICINE

## 2023-02-20 PROCEDURE — 85610 PROTHROMBIN TIME: CPT | Performed by: FAMILY MEDICINE

## 2023-02-20 NOTE — PROGRESS NOTES
[facility] CARDIOLOGY  [address] [facility] [place] [address]  Phone: [phone]  Fax: [phone]    02/20/2023    Chief Complaint   Patient presents with   • Follow-up     3 mo follow up   • Med Management     Verbally reviewed medications, patient is tolerating medications well.         History:     Benitez Miranda is a 67 y.o. male presenting for  follow-up evaluation   Clinically stable from cardiac standpoint   Symptoms:  CP no  SOB no    Orthopnea No  Lower extremity edema no   Palpitations no   Compliant with medications yes  Claudication no      Past Medical History:   Diagnosis Date   • Anxiety    • Arthritis    • CAD (coronary artery disease)     x1 stent; pulmonary HTN; EF 50-55%; rheumatic valve; MV stenosis   • CHF (congestive heart failure) ([facility])    • CKD (chronic kidney disease), stage III ([facility])    • COPD (chronic obstructive pulmonary disease) ([facility])    • Depression    • Diabetes mellitus ([facility])    • H/O mechanical aortic valve replacement    • History of tobacco abuse    • Hyperlipidemia    • Hypertension    • Ischemic heart disease    • Kidney failure     third stage   • Low back pain    • Obesity     BMI 36.   • Stroke ([facility])    • Valvular heart disease        Past Surgical History:   Procedure Laterality Date   • AORTIC VALVE REPAIR/REPLACEMENT  1991   • CARDIAC CATHETERIZATION     • CARDIAC SURGERY  1991    valve on aortic   • CARDIAC SURGERY  2001    stent    • CATARACT EXTRACTION     • COLONOSCOPY  2001   • CORONARY STENT PLACEMENT  2005   • CYSTOSCOPY URETEROSCOPY LASER LITHOTRIPSY Left 11/20/2020    Procedure: CYSTOSCOPY URETEROSCOPY LASER LITHOTRIPSY WITH STENT PLACEMENT;  Surgeon: Jonah Montgomery MD;  Location: [facility];  Service: Urology;  Laterality: Left;   • KIDNEY STONE SURGERY          Past Social History:  Social History     Socioeconomic History   • Marital status:    • Number of children: 2   Tobacco Use   • Smoking status: Former      Packs/day: 1.00     Years: 30.00     Pack years: 30.00     Types: Pipe, Cigars, Cigarettes     Quit date: 2016     Years since quittin.6   • Smokeless tobacco: Never   • Tobacco comments:     currently smokes a pipe   Vaping Use   • Vaping Use: Never used   Substance and Sexual Activity   • Alcohol use: No   • Drug use: No   • Sexual activity: Defer       Past Family History:  Family History   Problem Relation Age of Onset   • Cancer Mother         breast   • COPD Father    • Heart attack Father 74   • Diabetes Paternal Grandmother         both legs removed       Review of Systems:   ROS       Current Outpatient Medications   Medication Sig Dispense Refill   • aspirin 81 MG EC tablet Take 1 tablet by mouth Daily. 30 tablet 0   • atorvastatin (LIPITOR) 80 MG tablet Take 1 tablet by mouth Every Night. 90 tablet 1   • insulin glargine (LANTUS, SEMGLEE) 100 UNIT/ML injection Inject 25 Units under the skin into the appropriate area as directed 2 (Two) Times a Day.     • insulin lispro (humaLOG) 100 UNIT/ML injection Per Restorationism 0-7 sliding scale with each meal     • losartan (Cozaar) 25 MG tablet Take 1 tablet by mouth Daily. 30 tablet 11   • Semaglutide,0.25 or 0.5MG/DOS, (Ozempic, 0.25 or 0.5 MG/DOSE,) 2 MG/1.5ML solution pen-injector Inject 0.5 mg under the skin into the appropriate area as directed 1 (One) Time Per Week. 1.5 mL 5   • sertraline (ZOLOFT) 100 MG tablet Take 1.5 tablets by mouth Daily. 135 tablet 1   • warfarin (Coumadin) 1 MG tablet Take 7 mg by mouth daily (6 mg +1 mg). (Patient taking differently: Alternates every other day between 7mg and 8 mg) 30 tablet 0   • warfarin (COUMADIN) 6 MG tablet Take 1 tablet by mouth Every Night. New dose: 7 mg/day 90 tablet 1   • amLODIPine (NORVASC) 10 MG tablet Take 1 tablet by mouth Daily. 90 tablet 1   • melatonin 5 MG tablet tablet Take 1.5 tablets by mouth At Night As Needed (sleep disturbance). 45 tablet 2   • metoprolol succinate XL (TOPROL-XL) 50 MG  "24 hr tablet TAKE ONE TABLET BY MOUTH DAILY 90 tablet 1     No current facility-administered medications for this visit.        Allergies   Allergen Reactions   • Penicillins Swelling     Tongue swelled   • Ace Inhibitors Angioedema   • Contrast Dye (Echo Or Unknown Ct/Mr) Rash       Objective     /69 (BP Location: Left arm, Patient Position: Sitting, Cuff Size: Large Adult)   Pulse 73   Ht 182.9 cm (72\")   Wt 116 kg (255 lb 12.8 oz)   SpO2 99%   BMI 34.69 kg/m²     Physical Exam       DATA:  Results for orders placed during the hospital encounter of 20    SCANNED - TELEMETRY     Results for orders placed during the hospital encounter of 10/28/22    Adult Transthoracic Echo Complete W/ Cont if Necessary Per Protocol (With Agitated Saline)    Interpretation Summary  •  The left ventricular cavity is mildly dilated.  •  Left ventricular wall thickness is consistent with mild concentric hypertrophy.  •  Left ventricular systolic function is normal. Left ventricular ejection fraction appears to be 56 - 60%.  •  Left ventricular diastolic function is consistent with (grade I) impaired relaxation.  •  There is a mechanical aortic valve prosthesis present. The aortic valve peak and mean gradients are within defined limits. The prosthetic aortic valve is grossly normal.  •  Moderate calcific  mitral valve stenosis is present. ( Peak PG= 17 mm Hg and mean PG = 8 mm Hg)).  Unchanged since the study done 10/5/2021.  •  Moderate mitral valve regurgitation is present.  •  Mild pulmonary hypertension is present.  •  Saline test is negative for the evidence of shunt at interatrial septum.  •  There is no evidence of pericardial effusion.   Results for orders placed in visit on 14    Stress test with myocardial perfusion    Narrative  Patient:      LAURA VELAZQUEZ  [facility] Rec#:     [id number]               :          1955  Date:         2014            Age:          58y  Account#:     [id number]        "     Height:       183.1 cm / 72.1 in  Accession#:   [id number]               Sex:          M  Weight:       120.45 kg / 265.5 lbs  BSA:          2.41  Admit Date#:  01/30/2014  Loc:          exam room 2    Referring:    Fabio Walker MD  Reading:      Yoshi Weldon MD   TechnologisMCCARTNEY MILE [facility]  Nuclear Med TeBARGO,RAPHAEL [facility]  Nuclear Med TeStamper ,John CNMT  ______________________________________________________________________    Combined Nuclear/Stress Test    ICD Codes:     • 786.50 Chest pain, unspecified    Checklists:  • Patient verbally identified self  • Patient identified by ID band  • Patient consent obtained in lab  • Procedure verified and explained to patient  • History and physical performed  • Last Caffeine 01/29/2014 18:00:00  • Last Meal 01/29/2014 18:00:00    History of:   • Dyslipidemia• Lipid Therapy• Hypertension• Renal  Failure• Coronary Artery Disease (CAD)• Family History of CAD• PCN / IVP  DYE / SULFA• Diabetes, managed with oral agents• Diabetes for 11 years•  METFORMIN,LEVEMIR• Current smoker• Typical Angina• Dyspnea at rest•  Paroxysmal nocturnal dyspnea• Dyspnea with minimal exertion• Dyspnea  with normal daily activity• Dyspnea with more than normal activityNo  History of:• Reactive Airway Disease• Chronic Lung Disease• Dialysis•  CHF• Peripheral Vascular Disease• Cerebrovascular Disease• Family  History of CHF  Cardiac Events and Interventions:  Most recent stent placed in 2007. Stent in LAD. Most recent valvular  surgery performed in 1997.    Most Recent Prior Cardiac Testing :  • Pharmacologic Stress Test (date unknown)    Current Clinical Presentation:  The patient had no chest pain on presentation. The patient has no recent  history of CHF.    Medications I :  •  Amlodipine,Victoza,Levemir,Eplercrone,Cetirizine,Metformin,Meclizine,Zol  pidem,Alprazolam.  • Coumadin (Warfarin)  • Hydrochlorthiazide (HCTZ)  • Metoprolol (Lopressor, Toprol)  • Pravastatin  (Pravachol)        Discharge At Completion of Exam :  • Home    Baseline ECG:  NSR and IVCD  Pharmacologic Protocol:  A 400 mcg dose of Regadenoson was administered by intravenous bolus  injection.    Stress ECG :  Inconclusive due to baseline changes.  Recovery ECG:  3 minutes into recovery. No medications were administered during the  recovery period. No changes  Hemodynamics  Rest        Stress        Recovery  SBP         103 mmHg    145 mmHg      143 mmHg  DBP         83 mmHg     79 mmHg       84 mmHg  HR =       65 bpm      84 bpm        74 bpm  %MPHR                   52 %    Imaging Protocol:  One day stress-rest imaging protocol was followed using Tc-99m sestamibi  (Cardiolite) injected intravenously. For the rest portion of the study,  32 mCi of the radiopharmaceutical was administered at 01/30/2014  10:57:56. For the stress portion of the study, 10.7 mCi was administered  at 01/30/2014 09:00:10.    Perfusion Interpretation:  TID Ratio 1.01. There was a small, fixed defect in the apex segment(s).  The extent of this perfusion defect was mild.    Wall Motion Interpretation:  Gated imaging under rest conditions demonstrated normal wallmotion.  The patient's calculated rest LVEF was 59%. The patient's end diastolic  volume was 183ml. The patient's end systolic volume was 74ml.    Stress Test Conclusion:  There was a normal heart rate response, with a normal blood pressure  response.  The patient experienced no chest pain. Symptoms noted during  stress include: dyspnea. Stress test is inconclusive by ECG criteria due  baseline changes.  Nuclear Cardiology Conclusion:  No evidence of Lexiscan induced ishemia. A small ssized fixed defect in  apical segment is an artifact. Normal LV systolic function and wall  motion    Electronically signed by: Yoshi Weldon MD on 01/30/2014 19:01:19  Thank you for the courtesy of this referral.   Results for orders placed in visit on 01/30/14    Stress test with myocardial  perfusion    Narrative  Patient:      LAURA VELAZQUEZ  [facility] Rec#:     [id number]               :          1955  Date:         2014            Age:          58y  Account#:     [id number]            Height:       183.1 cm / 72.1 in  Accession#:   [id number]               Sex:          M  Weight:       120.45 kg / 265.5 lbs  BSA:          2.41  Admit Date#:  2014  Loc:          exam room 2    Referring:    Fabio Walker MD  Reading:      Yoshi Weldon MD   [facility]  Nuclear Med TeBARGO,RAPHAEL [facility]  Nuclear Med TeStamper ,John CNMT  ______________________________________________________________________    Combined Nuclear/Stress Test    ICD Codes:     • 786.50 Chest pain, unspecified    Checklists:  • Patient verbally identified self  • Patient identified by ID band  • Patient consent obtained in lab  • Procedure verified and explained to patient  • History and physical performed  • Last Caffeine 2014 18:00:00  • Last Meal 2014 18:00:00    History of:   • Dyslipidemia• Lipid Therapy• Hypertension• Renal  Failure• Coronary Artery Disease (CAD)• Family History of CAD• PCN / IVP  DYE / SULFA• Diabetes, managed with oral agents• Diabetes for 11 years•  METFORMIN,LEVEMIR• Current smoker• Typical Angina• Dyspnea at rest•  Paroxysmal nocturnal dyspnea• Dyspnea with minimal exertion• Dyspnea  with normal daily activity• Dyspnea with more than normal activityNo  History of:• Reactive Airway Disease• Chronic Lung Disease• Dialysis•  CHF• Peripheral Vascular Disease• Cerebrovascular Disease• Family  History of CHF  Cardiac Events and Interventions:  Most recent stent placed in . Stent in LAD. Most recent valvular  surgery performed in .    Most Recent Prior Cardiac Testing :  • Pharmacologic Stress Test (date unknown)    Current Clinical Presentation:  The patient had no chest pain on presentation. The patient has no recent  history of CHF.    Medications I  :  •  Amlodipine,Victoza,Levemir,Eplercrone,Cetirizine,Metformin,Meclizine,Zol  pidem,Alprazolam.  • Coumadin (Warfarin)  • Hydrochlorthiazide (HCTZ)  • Metoprolol (Lopressor, Toprol)  • Pravastatin (Pravachol)        Discharge At Completion of Exam :  • Home    Baseline ECG:  NSR and IVCD  Pharmacologic Protocol:  A 400 mcg dose of Regadenoson was administered by intravenous bolus  injection.    Stress ECG :  Inconclusive due to baseline changes.  Recovery ECG:  3 minutes into recovery. No medications were administered during the  recovery period. No changes  Hemodynamics  Rest        Stress        Recovery  SBP         103 mmHg    145 mmHg      143 mmHg  DBP         83 mmHg     79 mmHg       84 mmHg  HR =       65 bpm      84 bpm        74 bpm  %MPHR                   52 %    Imaging Protocol:  One day stress-rest imaging protocol was followed using Tc-99m sestamibi  (Cardiolite) injected intravenously. For the rest portion of the study,  32 mCi of the radiopharmaceutical was administered at 01/30/2014  10:57:56. For the stress portion of the study, 10.7 mCi was administered  at 01/30/2014 09:00:10.    Perfusion Interpretation:  TID Ratio 1.01. There was a small, fixed defect in the apex segment(s).  The extent of this perfusion defect was mild.    Wall Motion Interpretation:  Gated imaging under rest conditions demonstrated normal wallmotion.  The patient's calculated rest LVEF was 59%. The patient's end diastolic  volume was 183ml. The patient's end systolic volume was 74ml.    Stress Test Conclusion:  There was a normal heart rate response, with a normal blood pressure  response.  The patient experienced no chest pain. Symptoms noted during  stress include: dyspnea. Stress test is inconclusive by ECG criteria due  baseline changes.  Nuclear Cardiology Conclusion:  No evidence of Lexiscan induced ishemia. A small ssized fixed defect in  apical segment is an artifact. Normal LV systolic function and  wall  motion    Electronically signed by: Yoshi Weldon MD on 01/30/2014 19:01:19  Thank you for the courtesy of this referral.     Procedures     Lab Results   Component Value Date    CHOL 159 10/26/2022    CHOL 142 05/06/2022    CHOL 157 04/22/2021    CHLPL 167 07/18/2016    CHLPL 135 06/08/2015     Lab Results   Component Value Date    TRIG 136 10/26/2022    TRIG 148 05/06/2022    TRIG 162 (H) 04/22/2021     Lab Results   Component Value Date    HDL 39 (L) 10/26/2022    HDL 32 (L) 05/06/2022    HDL 35 (L) 04/22/2021     Lab Results   Component Value Date    LDL 96 10/26/2022    LDL 84 05/06/2022    LDL 94 04/22/2021       Lab Results   Component Value Date    TSH 4.680 (H) 10/25/2022               Invalid input(s): LABALBU, PROT        Assessment and Plan    No diagnosis found.        Recommended increase activity to 30 minutes of walking daily, most days of the week    Thank you for allowing me to participate in the care of Benitez Miranda. Feel free to contact me directly with any further questions or concerns.          Oswaldo Ahuja MD, FACC  Interventional Cardiology

## 2023-02-20 NOTE — PROGRESS NOTES
"Chief Complaint  Follow-up (3 mo follow up) and Med Management (Verbally reviewed medications, patient is tolerating medications well. )    Subjective          Benitez Miranda presents to [facility] CARDIOLOGY for follow up.    History of Present Illness    The patient was last seen in clinic on 11/29/2022.  In October 2022 patient was hospitalized with a right MCA territory stroke while on Coumadin.  He is chronically anticoagulated for mechanical AVR on 5/17/1994.  At his last visit he was doing well and reported that his left side was gaining strength daily.  No changes were made to his medications.    At today's visit Mr. Miranda reports that he has been doing well.  He has recovered full function on the left side.  He denies any chest pain or palpitations.  He states that his last INR was 3.4.    Objective     Vital Signs:   /69 (BP Location: Left arm, Patient Position: Sitting, Cuff Size: Large Adult)   Pulse 73   Ht 182.9 cm (72\")   Wt 116 kg (255 lb 12.8 oz)   SpO2 99%   BMI 34.69 kg/m²       Physical Exam  Vitals reviewed.   Constitutional:       Appearance: Normal appearance. He is well-developed.   Cardiovascular:      Rate and Rhythm: Normal rate and regular rhythm.      Heart sounds: No murmur heard.    No friction rub. No gallop.   Pulmonary:      Effort: Pulmonary effort is normal. No respiratory distress.      Breath sounds: Normal breath sounds. No wheezing or rales.   Skin:     General: Skin is warm and dry.   Neurological:      Mental Status: He is alert and oriented to person, place, and time.   Psychiatric:         Mood and Affect: Mood normal.         Behavior: Behavior normal.          Result Review :                Current Outpatient Medications   Medication Sig Dispense Refill   • aspirin 81 MG EC tablet Take 1 tablet by mouth Daily. 30 tablet 0   • atorvastatin (LIPITOR) 80 MG tablet Take 1 tablet by mouth Every Night. 90 tablet 1   • insulin glargine (LANTUS, " SEMGLEE) 100 UNIT/ML injection Inject 25 Units under the skin into the appropriate area as directed 2 (Two) Times a Day.     • insulin lispro (humaLOG) 100 UNIT/ML injection Per Yazidi 0-7 sliding scale with each meal     • losartan (Cozaar) 25 MG tablet Take 1 tablet by mouth Daily. 30 tablet 11   • Semaglutide,0.25 or 0.5MG/DOS, (Ozempic, 0.25 or 0.5 MG/DOSE,) 2 MG/1.5ML solution pen-injector Inject 0.5 mg under the skin into the appropriate area as directed 1 (One) Time Per Week. 1.5 mL 5   • sertraline (ZOLOFT) 100 MG tablet Take 1.5 tablets by mouth Daily. 135 tablet 1   • warfarin (Coumadin) 1 MG tablet Take 7 mg by mouth daily (6 mg +1 mg). (Patient taking differently: Alternates every other day between 7mg and 8 mg) 30 tablet 0   • warfarin (COUMADIN) 6 MG tablet Take 1 tablet by mouth Every Night. New dose: 7 mg/day 90 tablet 1   • amLODIPine (NORVASC) 10 MG tablet Take 1 tablet by mouth Daily. 90 tablet 1   • melatonin 5 MG tablet tablet Take 1.5 tablets by mouth At Night As Needed (sleep disturbance). 45 tablet 2   • metoprolol succinate XL (TOPROL-XL) 50 MG 24 hr tablet TAKE ONE TABLET BY MOUTH DAILY 90 tablet 1     No current facility-administered medications for this visit.            Assessment and Plan    Problem List Items Addressed This Visit        Cardiac and Vasculature    Essential hypertension (Chronic)    Coronary artery disease involving native coronary artery of native heart without angina pectoris - Primary (Chronic)    Overview     · Cardiac catheterization by Dr. Carrasco (2004): NADINE to unknown coronary artery  · Nuclear stress test (02/26/2015): Small to moderate region of inferior scar; no reversible ischemia detected; LVEF 52%.  · Echocardiogram (02/13/2015): LVEF 50% to 55%. Mild LVH. Mechanical prosthetic aortic valve functioning well; mild to moderate MR.   · Cardiac catheterization by Dr. Eduardo Torres (06/08/2015): mild nonobstructive CAD with widely patent stent; normal  functioning of mechanical aortic valve; pulmonary hypertension (PA 50/35 mmHg)  · Admission to [facility] Room with chronic obstructive pulmonary disease exacerbation/diastolic heart failure, December 2015         Hyperlipidemia LDL goal <70 (Chronic)    Overview     · 5/6/2022 total cholesterol 142, triglycerides 148, HDL 32, and LDL 84  · 10/26/2022 total cholesterol 159, triglycerides 136, HDL 39, and LDL 96         Relevant Orders    Ambulatory Referral to Specialty Pharmacy    Rheumatic mitral stenosis (Chronic)    Overview     · History of rheumatic fever.   · Echocardiogram (02/2016): Moderate to severe mitral stenosis. Rheumatic valve  · Echocardiogram (01/13/2017): Mean gradient of mitral valve 8 mmHg.  Normal functioning mechanical aortic valve  · 11/7/2021 TTE: LVEF 56 to 60%, grade II with high LAP diastolic dysfunction.  Mechanical aortic valve prosthesis present.  Mild aortic valve stenosis with mild perivalvular regurgitation.  Rheumatic mitral valvular disease moderate MAC with moderate calcification of the mitral valve anterior and posterior leaflets.  Moderate to severe mitral valve stenosis with moderate mitral valve regurgitation, peak transmitral valvular gradient of around 15 mmHg  · 10/28/2022 TTE: LVEF 56 to 60%, grade 1 diastolic dysfunction.  Mechanical aortic valve prosthesis present aortic valve peak and mean gradients are within defined limits and the prosthetic aortic valve is grossly normal.  Moderate calcific mitral valve stenosis noted peak gradient of 17 mmHg and mean gradient of 8 mmHg, unchanged since study of 10/5/2021.                  Follow Up     Medications were reviewed with the patient.    CAD is stable.  Patient is to continue aspirin, atorvastatin, metoprolol succinate, losartan.    Hypertension is stable.  Continue amlodipine.    With regard to dyslipidemia, I have counseled the patient today regarding his LDL cholesterol.  I talked to him about  limiting red meat consumption.  I also offered him LDL lowering medications including: Zetia, Repatha, and Leqvio.  I explained the risks and benefits, side effects of these medications.  The patient elected to try Leqvio.  Lipid clinic consulted.    With regard to his rheumatic mitral stenosis, will plan to check echo sometime towards the end of the year.  He had recent echo on 10/28/2022 which showed stable mitral valve stenosis    Return in about 6 months (around 8/20/2023).    Patient was given instructions and counseling regarding his condition or for health maintenance advice. Please see specific information pulled into the AVS if appropriate.

## 2023-02-22 ENCOUNTER — TELEPHONE (OUTPATIENT)
Dept: FAMILY MEDICINE CLINIC | Facility: CLINIC | Age: 68
End: 2023-02-22
Payer: MEDICARE

## 2023-02-22 NOTE — TELEPHONE ENCOUNTER
Caller: Benitez Miranda    Relationship: Self    Best call back number: [phone]    Requested Prescriptions:   Requested Prescriptions      No prescriptions requested or ordered in this encounter      sertraline (ZOLOFT) 100 MG tablet    Pharmacy where request should be sent:      [facility] [address] [place] [address] [phone] [place] [phone] FX      Does the patient have less than a 3 day supply:  [x] Yes  [] No    Would you like a call back once the refill request has been completed: [] Yes [x] No    If the office needs to give you a call back, can they leave a voicemail: [] Yes [x] No    Sharri Mallory LPN   02/22/23 12:09 EST         Patient notified that there is a refill on file,he will call his pharmacy.

## 2023-02-22 NOTE — TELEPHONE ENCOUNTER
Caller: Benitez Miranda    Relationship: Self    Best call back number: [phone]    Requested Prescriptions:   Requested Prescriptions      No prescriptions requested or ordered in this encounter      sertraline (ZOLOFT) 100 MG tablet    Pharmacy where request should be sent:      [facility] [address] [place] [address] [phone] [facility] [phone] FX      Does the patient have less than a 3 day supply:  [x] Yes  [] No    Would you like a call back once the refill request has been completed: [] Yes [x] No    If the office needs to give you a call back, can they leave a voicemail: [] Yes [x] No    Deandra Hendricks, PCT   02/22/23 12:05 EST

## 2023-02-27 ENCOUNTER — OFFICE VISIT (OUTPATIENT)
Dept: ENDOCRINOLOGY | Facility: CLINIC | Age: 68
End: 2023-02-27
Payer: MEDICARE

## 2023-02-27 ENCOUNTER — SPECIALTY PHARMACY (OUTPATIENT)
Dept: PHARMACY | Facility: HOSPITAL | Age: 68
End: 2023-02-27
Payer: MEDICARE

## 2023-02-27 VITALS
SYSTOLIC BLOOD PRESSURE: 116 MMHG | HEART RATE: 77 BPM | BODY MASS INDEX: 34.84 KG/M2 | HEIGHT: 72 IN | WEIGHT: 257.2 LBS | DIASTOLIC BLOOD PRESSURE: 68 MMHG | OXYGEN SATURATION: 96 %

## 2023-02-27 DIAGNOSIS — E11.65 TYPE 2 DIABETES MELLITUS WITH HYPERGLYCEMIA, WITH LONG-TERM CURRENT USE OF INSULIN: Primary | ICD-10-CM

## 2023-02-27 DIAGNOSIS — Z79.4 TYPE 2 DIABETES MELLITUS WITH HYPERGLYCEMIA, WITH LONG-TERM CURRENT USE OF INSULIN: Primary | ICD-10-CM

## 2023-02-27 LAB — GLUCOSE BLDC GLUCOMTR-MCNC: 157 MG/DL (ref 70–130)

## 2023-02-27 PROCEDURE — 99213 OFFICE O/P EST LOW 20 MIN: CPT | Performed by: NURSE PRACTITIONER

## 2023-02-27 PROCEDURE — 82962 GLUCOSE BLOOD TEST: CPT | Performed by: NURSE PRACTITIONER

## 2023-02-27 RX ORDER — SEMAGLUTIDE 1.34 MG/ML
0.5 INJECTION, SOLUTION SUBCUTANEOUS WEEKLY
COMMUNITY

## 2023-02-27 RX ORDER — CETIRIZINE HYDROCHLORIDE 10 MG/1
10 TABLET ORAL DAILY PRN
COMMUNITY

## 2023-02-27 NOTE — PROGRESS NOTES
"2 days left on sensor - no refills        Specialty Pharmacy Patient Management Program  Endocrinology Reassessment     Benitez Miranda is a 67 y.o. male with Type 2 Diabetes seen by an Endocrinology provider and enrolled in the Endocrinology Patient Management program offered by [facility].  A follow-up outreach was conducted, including assessment of continued therapy appropriateness, medication adherence, and side effect incidence and management for  Insulin therapy and Ozempic.     Patient is currently taking Humalog on a sliding scale (0-7 units three times daily with meals) and Lantus 25 units twice daily. Patient checks BG 1-2 times daily with readings averaging 140. Patient denies low BG <70. He reports a history of CKD. No side effects with Ozempic. Patient's PCP helped him get Ozempic approved from the .    Patient denies personal/family history of thyroid cancer, denies history of pancreatitis, and denies issues with recurrent UTI/yeast infections.     In the past, the patient has tried:     Drug Dose Reason for Discontinuation Notes   Metformin  Kidney function    Ozempic  \"didn't work\" Was able to tolerate                 Insurance Coverage & Financial Support  Medicare  Humalog and Lantus (gets through [employer] Patient Assistance Program)  Ozempic ([employer] PAP)    Relevant Past Medical History and Comorbidities  Relevant medical history and concomitant health conditions were discussed with the patient. The patient's chart has been reviewed for relevant past medical history and comorbid health conditions and updated as necessary.   Past Medical History:   Diagnosis Date   • Anxiety    • Arthritis    • CAD (coronary artery disease)     x1 stent; pulmonary HTN; EF 50-55%; rheumatic valve; MV stenosis   • CHF (congestive heart failure) ([facility])    • CKD (chronic kidney disease), stage III ([facility])    • COPD (chronic obstructive pulmonary disease) ([facility])    • Depression    • " Diabetes mellitus (HCC)    • H/O mechanical aortic valve replacement    • History of tobacco abuse    • Hyperlipidemia    • Hypertension    • Ischemic heart disease    • Kidney failure     third stage   • Low back pain    • Obesity     BMI 36.   • Stroke (HCC)    • Valvular heart disease      Social History     Socioeconomic History   • Marital status:    • Number of children: 2   Tobacco Use   • Smoking status: Former     Packs/day: 1.00     Years: 30.00     Pack years: 30.00     Types: Pipe, Cigars, Cigarettes     Quit date: 2016     Years since quittin.6   • Smokeless tobacco: Never   • Tobacco comments:     currently smokes a pipe   Vaping Use   • Vaping Use: Never used   Substance and Sexual Activity   • Alcohol use: No   • Drug use: No   • Sexual activity: Defer       Allergies  Known allergies and reactions were discussed with the patient. The patient's chart has been reviewed for  allergy information and updated as necessary.   Penicillins, Ace inhibitors, and Contrast dye (echo or unknown ct/mr)    Current Medication List  This medication list has been reviewed with the patient and evaluated for any interactions or necessary modifications/recommendations, and updated to include all prescription medications, OTC medications, and supplements the patient is currently taking.  This list reflects what is contained in the patient's profile, which has also been marked as reviewed to communicate to other providers it is the most up to date version of the patient's current medication therapy.     Current Outpatient Medications:   •  amLODIPine (NORVASC) 10 MG tablet, Take 1 tablet by mouth Daily., Disp: 90 tablet, Rfl: 1  •  aspirin 81 MG EC tablet, Take 1 tablet by mouth Daily., Disp: 30 tablet, Rfl: 0  •  atorvastatin (LIPITOR) 80 MG tablet, Take 1 tablet by mouth Every Night., Disp: 90 tablet, Rfl: 1  •  insulin glargine (LANTUS, SEMGLEE) 100 UNIT/ML injection, Inject 25 Units under the skin into  the appropriate area as directed 2 (Two) Times a Day., Disp: , Rfl:   •  insulin lispro (humaLOG) 100 UNIT/ML injection, Per Nondenominational 0-7 sliding scale with each meal, Disp: , Rfl:   •  losartan (Cozaar) 25 MG tablet, Take 1 tablet by mouth Daily., Disp: 30 tablet, Rfl: 11  •  metoprolol succinate XL (TOPROL-XL) 50 MG 24 hr tablet, TAKE ONE TABLET BY MOUTH DAILY, Disp: 90 tablet, Rfl: 1  •  Semaglutide,0.25 or 0.5MG/DOS, (Ozempic, 0.25 or 0.5 MG/DOSE,) 2 MG/1.5ML solution pen-injector, Inject 0.5 mg under the skin into the appropriate area as directed 1 (One) Time Per Week., Disp: 1.5 mL, Rfl: 5  •  sertraline (ZOLOFT) 100 MG tablet, Take 1.5 tablets by mouth Daily., Disp: 135 tablet, Rfl: 1  •  warfarin (Coumadin) 1 MG tablet, Take 7 mg by mouth daily (6 mg +1 mg). (Patient taking differently: 8 mg daily), Disp: 30 tablet, Rfl: 0  •  warfarin (COUMADIN) 6 MG tablet, Take 1 tablet by mouth Every Night. New dose: 7 mg/day (Patient taking differently: Take 6 mg by mouth Every Night. New dose: 8 mg/day), Disp: 90 tablet, Rfl: 1    Drug Interactions  · The hypoglycemic effect of Insulin may be increased or prolonged by metoprolol. Minor cardiovascular abnormalities may occur during hypoglycemic episodes.  · Aspirin + Warfarin - put patient at increased risk of bleeding. He denies any signs/symptoms of bleeding at this time.    Recommended Medications Assessment  • Aspirin - Currently Taking  • Statin - Currently taking   • ACEi/ARB - Contraindicated (angioedema)     Current 10-Year ASCVD Risk: 25.3%    Relevant Laboratory Values  A1C Last 3 Results    HGBA1C Last 3 Results 7/28/22 10/25/22 1/24/23   Hemoglobin A1C 9.00 (A) 9.00 (A) 7.80 (A)   (A) Abnormal value            Lab Results   Component Value Date    HGBA1C 7.80 (H) 01/24/2023     Lab Results   Component Value Date    GLUCOSE 138 (H) 01/24/2023    CALCIUM 9.3 01/24/2023     01/24/2023    K 4.6 01/24/2023    CO2 24.2 01/24/2023     01/24/2023    BUN  20 01/24/2023    CREATININE 1.25 01/24/2023    EGFRIFAFRI 59 (L) 09/12/2016    EGFRIFNONA 43 (L) 01/25/2022    BCR 16.0 01/24/2023    ANIONGAP 12.8 01/24/2023     Lab Results   Component Value Date    CHOL 159 10/26/2022    CHLPL 167 07/18/2016    TRIG 136 10/26/2022    HDL 39 (L) 10/26/2022    LDL 96 10/26/2022     Microalbumin    Microalbumin 7/25/22   Microalbumin, Urine 7.3             Education Provided for DM medications:  None    Adherence and Self-Administration  • Barriers to Patient Adherence and/or Self-Administration: None   • Methods for Supporting Patient Adherence and/or Self-Administration: None Required    Vaccination Status:   COVID 19: first 3 doses, needs booster  Influenza: UTD  Pneumococcal: not vaccinated  Hep B: UTD    Smoker?  • Former, recently quit    Goals of Therapy  • Patient Goals of Therapy: Take medication everyday   • Clinical Goals or Therapeutic Targets, If Applicable: A1C reduction    Reassessment Plan & Follow-Up  · Patient was having issues affording Ozempic. Patient's PCP helped him get Ozempic approved from the . No further assistance needed, will dis-enroll patient from specialty.  · Recent Provider Changes  · None discussed with patient at this time  · Patient has a history of CKD - continue to monitor kidney function and adjust medication dosages as appropriate.      Patient does not wish to use [facility] at this time due to: Loyalty to retail pharmacy    Attestation  I attest that the initiated specialty medication(s) are appropriate for the patient based on my assessment.  If the prescribed therapy is at any point deemed not appropriate based on the current or future assessments, a consultation will be initiated with the patient's specialty care provider to determine the best course of action. The revised plan of therapy will be documented along with any additional patient education provided.     Gloria Kramer [facility]  2/27/2023  14:13 EST

## 2023-02-27 NOTE — PROGRESS NOTES
Chief Complaint   Patient presents with   • Diabetes     F/U      Benitez Miranda is a 67 y.o. male had concerns including Diabetes (F/U).    T2DM.    Diabetes was diagnosed 1990's.  Complications include stroke, nephropathy-close to dialysis.  Last ophtho exam is due.  Current medications for diabetes include Lantus 25 units BID, Humalog SS with meals 0-7 units, Ozempic 0.5 mg weekly.  He checks his blood sugar with Freestyle Suellen CGM.   Hypos: rarely    He has been getting Ozempic through the  through his PCP.  Has not missed any doses of his medication.    The following portions of the patient's history were reviewed and updated as appropriate: allergies, current medications, past family history, past medical history, past social history, past surgical history and problem list.    Diet: limits his carbs and sugars where he can.    Past Medical History:   Diagnosis Date   • Anxiety    • Arthritis    • CAD (coronary artery disease)     x1 stent; pulmonary HTN; EF 50-55%; rheumatic valve; MV stenosis   • CHF (congestive heart failure) ([facility])    • CKD (chronic kidney disease), stage III ([facility])    • COPD (chronic obstructive pulmonary disease) ([facility])    • Depression    • Diabetes mellitus ([facility])    • H/O mechanical aortic valve replacement    • History of tobacco abuse    • Hyperlipidemia    • Hypertension    • Ischemic heart disease    • Kidney failure     third stage   • Low back pain    • Obesity     BMI 36.   • Stroke ([facility])    • Valvular heart disease      Past Surgical History:   Procedure Laterality Date   • AORTIC VALVE REPAIR/REPLACEMENT  1991   • CARDIAC CATHETERIZATION     • CARDIAC SURGERY  1991    valve on aortic   • CARDIAC SURGERY  2001    stent    • CATARACT EXTRACTION     • COLONOSCOPY  2001   • CORONARY STENT PLACEMENT  2005   • CYSTOSCOPY URETEROSCOPY LASER LITHOTRIPSY Left 11/20/2020    Procedure: CYSTOSCOPY URETEROSCOPY LASER LITHOTRIPSY WITH STENT PLACEMENT;  Surgeon: Jonah Montgomery  MD Tommy;  Location: [facility];  Service: Urology;  Laterality: Left;   • KIDNEY STONE SURGERY        Family History   Problem Relation Age of Onset   • Cancer Mother         breast   • COPD Father    • Heart attack Father 74   • Diabetes Paternal Grandmother         both legs removed      Social History     Socioeconomic History   • Marital status:    • Number of children: 2   Tobacco Use   • Smoking status: Former     Packs/day: 1.00     Years: 30.00     Pack years: 30.00     Types: Pipe, Cigars, Cigarettes     Quit date: 2016     Years since quittin.6   • Smokeless tobacco: Never   • Tobacco comments:     currently smokes a pipe   Vaping Use   • Vaping Use: Never used   Substance and Sexual Activity   • Alcohol use: No   • Drug use: No   • Sexual activity: Defer      Allergies   Allergen Reactions   • Penicillins Swelling     Tongue swelled   • Ace Inhibitors Angioedema   • Contrast Dye (Echo Or Unknown Ct/Mr) Rash      Current Outpatient Medications on File Prior to Visit   Medication Sig Dispense Refill   • amLODIPine (NORVASC) 10 MG tablet Take 1 tablet by mouth Daily. 90 tablet 1   • aspirin 81 MG EC tablet Take 1 tablet by mouth Daily. 30 tablet 0   • atorvastatin (LIPITOR) 80 MG tablet Take 1 tablet by mouth Every Night. 90 tablet 1   • insulin glargine (LANTUS, SEMGLEE) 100 UNIT/ML injection Inject 25 Units under the skin into the appropriate area as directed 2 (Two) Times a Day.     • insulin lispro (humaLOG) 100 UNIT/ML injection Per Spiritism 0-7 sliding scale with each meal     • losartan (Cozaar) 25 MG tablet Take 1 tablet by mouth Daily. 30 tablet 11   • metoprolol succinate XL (TOPROL-XL) 50 MG 24 hr tablet TAKE ONE TABLET BY MOUTH DAILY 90 tablet 1   • sertraline (ZOLOFT) 100 MG tablet Take 1.5 tablets by mouth Daily. 135 tablet 1   • warfarin (Coumadin) 1 MG tablet Take 7 mg by mouth daily (6 mg +1 mg). (Patient taking differently: 8 mg daily) 30 tablet 0   • warfarin (COUMADIN) 6  "MG tablet Take 1 tablet by mouth Every Night. New dose: 7 mg/day (Patient taking differently: Take 6 mg by mouth Every Night. New dose: 8 mg/day) 90 tablet 1   • [DISCONTINUED] melatonin 5 MG tablet tablet Take 1.5 tablets by mouth At Night As Needed (sleep disturbance). 45 tablet 2   • [DISCONTINUED] Semaglutide,0.25 or 0.5MG/DOS, (Ozempic, 0.25 or 0.5 MG/DOSE,) 2 MG/1.5ML solution pen-injector Inject 0.5 mg under the skin into the appropriate area as directed 1 (One) Time Per Week. 1.5 mL 5     No current facility-administered medications on file prior to visit.        Review of Systems   Constitutional: Positive for fatigue.   Endocrine: Positive for polydipsia. Negative for polyphagia and polyuria.   Psychiatric/Behavioral: Positive for sleep disturbance.   All other systems reviewed and are negative.     /68 (BP Location: Left arm, Patient Position: Sitting, Cuff Size: Adult)   Pulse 77   Ht 182.9 cm (72\")   Wt 117 kg (257 lb 3.2 oz)   SpO2 96%   BMI 34.88 kg/m²      Physical Exam  Vitals reviewed.   Constitutional:       Appearance: Normal appearance.   Eyes:      Extraocular Movements: Extraocular movements intact.   Cardiovascular:      Rate and Rhythm: Normal rate.   Pulmonary:      Effort: Pulmonary effort is normal.   Neurological:      General: No focal deficit present.      Mental Status: He is alert and oriented to person, place, and time.   Psychiatric:         Mood and Affect: Mood normal.         Behavior: Behavior normal.         Thought Content: Thought content normal.         Judgment: Judgment normal.       CMP:  Lab Results   Component Value Date    BUN 20 01/24/2023    CREATININE 1.25 01/24/2023    EGFRIFNONA 43 (L) 01/25/2022    EGFRIFAFRI 59 (L) 09/12/2016    BCR 16.0 01/24/2023     01/24/2023    K 4.6 01/24/2023    CO2 24.2 01/24/2023    CALCIUM 9.3 01/24/2023    PROTENTOTREF 7.6 09/12/2016    ALBUMIN 4.0 01/24/2023    LABGLOBREF 3.6 09/12/2016    LABIL2 1.1 (L) 09/12/2016 "    BILITOT 0.7 01/24/2023    ALKPHOS 74 01/24/2023    AST 18 01/24/2023    ALT 16 01/24/2023     Lipid Panel:  Lab Results   Component Value Date    CHOL 159 10/26/2022    TRIG 136 10/26/2022    HDL 39 (L) 10/26/2022    VLDL 24 10/26/2022    LDL 96 10/26/2022     HbA1c:  Lab Results   Component Value Date    HGBA1C 7.80 (H) 01/24/2023    HGBA1C 9.00 (H) 10/25/2022     Glucose:  Lab Results   Component Value Date    POCGLU 157 (A) 02/27/2023     Microalbumin:  Lab Results   Component Value Date    MALBCRERATIO 226.4 06/29/2020     TSH:  Lab Results   Component Value Date    TSH 4.680 (H) 10/25/2022       Assessment and Plan    Diagnoses and all orders for this visit:    1. Type 2 diabetes mellitus with hyperglycemia, with long-term current use of insulin (HCC) (Primary)  -     POC Glucose Fingerstick         Return in about 6 months (around 8/27/2023) for Follow-up appointment, A1C. The patient was instructed to contact the clinic with any interval questions or concerns.        This document has been electronically signed by RED Mayorga  February 27, 2023 14:33 EST   Endocrinology

## 2023-02-27 NOTE — ASSESSMENT & PLAN NOTE
-Diabetes is improving with A1c down to 7.8.  -Discussed dietary and exercise guidelines with patient and family.  -Discussed the importance of yearly eye exams.  -Discussed the importance of checking BG's regularly. Continue using Freestyle Suellen CGM.  -Continue Lantus 25 units BID.  -Continue Humalog SS with meals 0-7 units.  -Continue Ozempic 0.5 mg weekly.  Patient has no personal history of pancreatitis, no family history of MEN syndrome or medullary thyroid cancer. Possible side effects including nausea, bloating, other GI upset and rarely pancreatitis were discussed. He was advised to call the office with any symptoms or concerns.   -S/S hypoglycemia reviewed with Rule of 15's advised.  -Follow-up in 6 months.

## 2023-03-01 ENCOUNTER — DISEASE STATE MANAGEMENT VISIT (OUTPATIENT)
Dept: PHARMACY | Facility: HOSPITAL | Age: 68
End: 2023-03-01
Payer: MEDICARE

## 2023-03-01 ENCOUNTER — SPECIALTY PHARMACY (OUTPATIENT)
Dept: PHARMACY | Facility: HOSPITAL | Age: 68
End: 2023-03-01
Payer: MEDICARE

## 2023-03-01 DIAGNOSIS — I25.10 CORONARY ARTERY DISEASE INVOLVING NATIVE CORONARY ARTERY OF NATIVE HEART WITHOUT ANGINA PECTORIS: Primary | Chronic | ICD-10-CM

## 2023-03-01 RX ORDER — INCLISIRAN 284 MG/1.5ML
284 INJECTION, SOLUTION SUBCUTANEOUS
Qty: 1.5 ML | Refills: 1 | Status: SHIPPED | OUTPATIENT
Start: 2023-03-01 | End: 2023-04-04 | Stop reason: SDDI

## 2023-03-01 NOTE — PROGRESS NOTES
Medication Management Clinic  Lipid Management Program - Leqvio [facility]     Benitez Miranda is a 67 y.o. male referred to the Medication Management Clinic by Dayan Durán for clinical pharmacy and specialty pharmacy management of [facility].  Benitez Miranda is  treated for clinical ASCVD and currently takes atorvastatin 80mg.      Relevant Past Medical History and Comorbidities  Past Medical History:   Diagnosis Date   • Anxiety    • Arthritis    • CAD (coronary artery disease)     x1 stent; pulmonary HTN; EF 50-55%; rheumatic valve; MV stenosis   • CHF (congestive heart failure) ([facility])    • CKD (chronic kidney disease), stage III ([facility])    • COPD (chronic obstructive pulmonary disease) ([facility])    • Depression    • Diabetes mellitus ([facility])    • H/O mechanical aortic valve replacement    • History of tobacco abuse    • Hyperlipidemia    • Hypertension    • Ischemic heart disease    • Kidney failure     third stage   • Low back pain    • Obesity     BMI 36.   • Stroke ([facility])    • Valvular heart disease      Social History     Socioeconomic History   • Marital status:    • Number of children: 2   Tobacco Use   • Smoking status: Former     Packs/day: 1.00     Years: 30.00     Pack years: 30.00     Types: Pipe, Cigars, Cigarettes     Quit date: 2016     Years since quittin.6   • Smokeless tobacco: Never   • Tobacco comments:     currently smokes a pipe   Vaping Use   • Vaping Use: Never used   Substance and Sexual Activity   • Alcohol use: No   • Drug use: No   • Sexual activity: Defer       Allergies  Penicillins, Ace inhibitors, and Contrast dye (echo or unknown ct/mr)    Current Medication List    Current Outpatient Medications:   •  amLODIPine (NORVASC) 10 MG tablet, Take 1 tablet by mouth Daily., Disp: 90 tablet, Rfl: 1  •  aspirin 81 MG EC tablet, Take 1 tablet by mouth Daily., Disp: 30 tablet, Rfl: 0  •  atorvastatin (LIPITOR) 80 MG tablet, Take 1 tablet by mouth Every Night., Disp: 90 tablet,  Rfl: 1  •  cetirizine (zyrTEC) 10 MG tablet, Take 1 tablet by mouth Daily As Needed for Allergies., Disp: , Rfl:   •  Inclisiran Sodium (Leqvio) 284 MG/1.5ML solution prefilled syringe, Inject 1.5 mL under the skin into the appropriate area as directed Every 3 (Three) Months., Disp: 1.5 mL, Rfl: 1  •  insulin glargine (LANTUS, SEMGLEE) 100 UNIT/ML injection, Inject 25 Units under the skin into the appropriate area as directed 2 (Two) Times a Day., Disp: , Rfl:   •  insulin lispro (humaLOG) 100 UNIT/ML injection, Per Jehovah's witness 0-7 sliding scale with each meal, Disp: , Rfl:   •  losartan (Cozaar) 25 MG tablet, Take 1 tablet by mouth Daily., Disp: 30 tablet, Rfl: 11  •  metoprolol succinate XL (TOPROL-XL) 50 MG 24 hr tablet, TAKE ONE TABLET BY MOUTH DAILY, Disp: 90 tablet, Rfl: 1  •  Semaglutide,0.25 or 0.5MG/DOS, (Ozempic, 0.25 or 0.5 MG/DOSE,) 2 MG/1.5ML solution pen-injector, Inject 0.5 mg under the skin into the appropriate area as directed 1 (One) Time Per Week. Getting from , Disp: , Rfl:   •  sertraline (ZOLOFT) 100 MG tablet, Take 1.5 tablets by mouth Daily., Disp: 135 tablet, Rfl: 1  •  warfarin (Coumadin) 1 MG tablet, Take 7 mg by mouth daily (6 mg +1 mg). (Patient taking differently: 8 mg daily), Disp: 30 tablet, Rfl: 0  •  warfarin (COUMADIN) 6 MG tablet, Take 1 tablet by mouth Every Night. New dose: 7 mg/day (Patient taking differently: Take 1 tablet by mouth Every Night. New dose: 8 mg/day), Disp: 90 tablet, Rfl: 1    Drug Interactions  None with Leqvio    Relevant Laboratory Values  Lab Results   Component Value Date    CHOL 159 10/26/2022    CHLPL 167 07/18/2016    TRIG 136 10/26/2022    HDL 39 (L) 10/26/2022    LDL 96 10/26/2022       Medication Assessment & Plan    1. Administered Leqvio 284mg SUBQ in back of right arm.   2. Medication education provided, including:   a. Common Adverse Effects: Injection site reactions, arthralgias, UTIs, diarrhea, bronchitis, pain in extremity, and  dyspnea.  b. Potential for allergic reaction.  Go to ED/call 911 with any S/Sx of allergic reaction.   c. Must be administered by a HCP.  If a dose is missed, please call to reschedule.   d. Expect LDL reduction, to help reduce cardiovascular risk.   e. At each visit, patient will need to stay a minimum of 15 min for monitoring   3. Lipid panel will be checked 4 to 12 weeks after starting therapy, order placed.   4. Patient will continue regular follow-up with cardiology  5. Will follow up in 3 months for next injection.     Reina Garcia, PharmD  3/1/2023  10:37 EST

## 2023-03-01 NOTE — PROGRESS NOTES
[facility]ication Management Clinic  Lipid Management Program - Leq[facility]     Benitez Miranda is a 67 y.o. male referred to the Medication Management Clinic by Dayan Durán for clinical pharmacy and specialty pharmacy management of [facility].  Benitez Miranda is  treated for clinical ASCVD and currently takes atorvastatin 80mg.      Relevant Past Medical History and Comorbidities  Past Medical History:   Diagnosis Date   • Anxiety    • Arthritis    • CAD (coronary artery disease)     x1 stent; pulmonary HTN; EF 50-55%; rheumatic valve; MV stenosis   • CHF (congestive heart failure) ([facility])    • CKD (chronic kidney disease), stage III ([facility])    • COPD (chronic obstructive pulmonary disease) ([facility])    • Depression    • Diabetes mellitus ([facility])    • H/O mechanical aortic valve replacement    • History of tobacco abuse    • Hyperlipidemia    • Hypertension    • Ischemic heart disease    • Kidney failure     third stage   • Low back pain    • Obesity     BMI 36.   • Stroke ([facility])    • Valvular heart disease      Social History     Socioeconomic History   • Marital status:    • Number of children: 2   Tobacco Use   • Smoking status: Former     Packs/day: 1.00     Years: 30.00     Pack years: 30.00     Types: Pipe, Cigars, Cigarettes     Quit date: 2016     Years since quittin.6   • Smokeless tobacco: Never   • Tobacco comments:     currently smokes a pipe   Vaping Use   • Vaping Use: Never used   Substance and Sexual Activity   • Alcohol use: No   • Drug use: No   • Sexual activity: Defer       Allergies  Penicillins, Ace inhibitors, and Contrast dye (echo or unknown ct/mr)    Current Medication List    Current Outpatient Medications:   •  amLODIPine (NORVASC) 10 MG tablet, Take 1 tablet by mouth Daily., Disp: 90 tablet, Rfl: 1  •  aspirin 81 MG EC tablet, Take 1 tablet by mouth Daily., Disp: 30 tablet, Rfl: 0  •  atorvastatin (LIPITOR) 80 MG tablet, Take 1 tablet by mouth Every Night., Disp: 90 tablet,  Rfl: 1  •  cetirizine (zyrTEC) 10 MG tablet, Take 1 tablet by mouth Daily As Needed for Allergies., Disp: , Rfl:   •  insulin glargine (LANTUS, SEMGLEE) 100 UNIT/ML injection, Inject 25 Units under the skin into the appropriate area as directed 2 (Two) Times a Day., Disp: , Rfl:   •  insulin lispro (humaLOG) 100 UNIT/ML injection, Per [employer] 0-7 sliding scale with each meal, Disp: , Rfl:   •  losartan (Cozaar) 25 MG tablet, Take 1 tablet by mouth Daily., Disp: 30 tablet, Rfl: 11  •  metoprolol succinate XL (TOPROL-XL) 50 MG 24 hr tablet, TAKE ONE TABLET BY MOUTH DAILY, Disp: 90 tablet, Rfl: 1  •  Semaglutide,0.25 or 0.5MG/DOS, (Ozempic, 0.25 or 0.5 MG/DOSE,) 2 MG/1.5ML solution pen-injector, Inject 0.5 mg under the skin into the appropriate area as directed 1 (One) Time Per Week. Getting from , Disp: , Rfl:   •  sertraline (ZOLOFT) 100 MG tablet, Take 1.5 tablets by mouth Daily., Disp: 135 tablet, Rfl: 1  •  warfarin (Coumadin) 1 MG tablet, Take 7 mg by mouth daily (6 mg +1 mg). (Patient taking differently: 8 mg daily), Disp: 30 tablet, Rfl: 0  •  warfarin (COUMADIN) 6 MG tablet, Take 1 tablet by mouth Every Night. New dose: 7 mg/day (Patient taking differently: Take 1 tablet by mouth Every Night. New dose: 8 mg/day), Disp: 90 tablet, Rfl: 1  •  Inclisiran Sodium (Leqvio) 284 MG/1.5ML solution prefilled syringe, Inject 1.5 mL under the skin into the appropriate area as directed Every 3 (Three) Months., Disp: 1.5 mL, Rfl: 1    Drug Interactions  None with Leqvio    Relevant Laboratory Values  Lab Results   Component Value Date    CHOL 159 10/26/2022    CHLPL 167 07/18/2016    TRIG 136 10/26/2022    HDL 39 (L) 10/26/2022    LDL 96 10/26/2022       Medication Assessment & Plan    1. Administered Leqvio 284mg SUBQ in back of right arm.   2. Medication education provided, including:   a. Common Adverse Effects: Injection site reactions, arthralgias, UTIs, diarrhea, bronchitis, pain in extremity, and  dyspnea.  b. Potential for allergic reaction.  Go to ED/call 911 with any S/Sx of allergic reaction.   c. Must be administered by a HCP.  If a dose is missed, please call to reschedule.   d. Expect LDL reduction, to help reduce cardiovascular risk.   e. At each visit, patient will need to stay a minimum of 15 min for monitoring   3. Lipid panel will be checked 4 to 12 weeks after starting therapy, order placed.   4. Patient will continue regular follow-up with cardiology  5. Will follow up in 3 months for next injection.     Reina Garcia, PharmD  3/1/2023  10:06 EST

## 2023-03-30 ENCOUNTER — TELEPHONE (OUTPATIENT)
Dept: FAMILY MEDICINE CLINIC | Facility: CLINIC | Age: 68
End: 2023-03-30
Payer: MEDICARE

## 2023-04-03 ENCOUNTER — OFFICE VISIT (OUTPATIENT)
Dept: FAMILY MEDICINE CLINIC | Facility: CLINIC | Age: 68
End: 2023-04-03
Payer: MEDICARE

## 2023-04-03 VITALS
OXYGEN SATURATION: 95 % | DIASTOLIC BLOOD PRESSURE: 78 MMHG | WEIGHT: 255.6 LBS | TEMPERATURE: 98.6 F | SYSTOLIC BLOOD PRESSURE: 130 MMHG | BODY MASS INDEX: 34.62 KG/M2 | HEART RATE: 95 BPM | HEIGHT: 72 IN

## 2023-04-03 DIAGNOSIS — J44.1 COPD WITH EXACERBATION: Primary | ICD-10-CM

## 2023-04-03 PROCEDURE — 3051F HG A1C>EQUAL 7.0%<8.0%: CPT | Performed by: FAMILY MEDICINE

## 2023-04-03 PROCEDURE — 1160F RVW MEDS BY RX/DR IN RCRD: CPT | Performed by: FAMILY MEDICINE

## 2023-04-03 PROCEDURE — 99213 OFFICE O/P EST LOW 20 MIN: CPT | Performed by: FAMILY MEDICINE

## 2023-04-03 PROCEDURE — 3078F DIAST BP <80 MM HG: CPT | Performed by: FAMILY MEDICINE

## 2023-04-03 PROCEDURE — 96372 THER/PROPH/DIAG INJ SC/IM: CPT | Performed by: FAMILY MEDICINE

## 2023-04-03 PROCEDURE — 1159F MED LIST DOCD IN RCRD: CPT | Performed by: FAMILY MEDICINE

## 2023-04-03 PROCEDURE — 3075F SYST BP GE 130 - 139MM HG: CPT | Performed by: FAMILY MEDICINE

## 2023-04-03 RX ORDER — METHYLPREDNISOLONE SODIUM SUCCINATE 40 MG/ML
80 INJECTION, POWDER, LYOPHILIZED, FOR SOLUTION INTRAMUSCULAR; INTRAVENOUS ONCE
Status: DISCONTINUED | OUTPATIENT
Start: 2023-04-03 | End: 2023-04-03

## 2023-04-03 RX ORDER — DOXYCYCLINE HYCLATE 100 MG/1
100 CAPSULE ORAL 2 TIMES DAILY
Qty: 14 CAPSULE | Refills: 0 | Status: SHIPPED | OUTPATIENT
Start: 2023-04-03 | End: 2023-04-17

## 2023-04-03 RX ORDER — METHYLPREDNISOLONE ACETATE 40 MG/ML
80 INJECTION, SUSPENSION INTRA-ARTICULAR; INTRALESIONAL; INTRAMUSCULAR; SOFT TISSUE ONCE
Status: COMPLETED | OUTPATIENT
Start: 2023-04-03 | End: 2023-04-03

## 2023-04-03 RX ADMIN — METHYLPREDNISOLONE ACETATE 80 MG: 40 INJECTION, SUSPENSION INTRA-ARTICULAR; INTRALESIONAL; INTRAMUSCULAR; SOFT TISSUE at 17:25

## 2023-04-04 NOTE — PROGRESS NOTES
"Chief Complaint  Asthma    Subjective          Benitez Miranda presents to [facility]  Shortness of Breath  This is a new problem. The current episode started in the past 7 days. The problem has been gradually worsening. Associated symptoms include sputum production and wheezing. The symptoms are aggravated by any activity. He has tried beta agonist inhalers for the symptoms. The treatment provided mild relief. His past medical history is significant for asthma and COPD.     Counseled patient about steroids increasing his glucose. At this time the benefit out weights the risk. Patient is aware states he will monitor closely and call the office if he has any issues. If shortness of breath worsens go to ER. Patient verbalized understanding.     Review of Systems   Respiratory: Positive for sputum production, shortness of breath and wheezing.          Objective   Vital Signs:   /78 (BP Location: Right arm, Patient Position: Sitting, Cuff Size: Large Adult)   Pulse 95   Temp 98.6 °F (37 °C) (Temporal)   Ht 182.9 cm (72\")   Wt 116 kg (255 lb 9.6 oz)   SpO2 95%   BMI 34.67 kg/m²     Physical Exam  Constitutional:       General: He is not in acute distress.     Appearance: Normal appearance. He is well-developed and well-groomed. He is not ill-appearing, toxic-appearing or diaphoretic.   HENT:      Head: Normocephalic.      Right Ear: Tympanic membrane, ear canal and external ear normal.      Left Ear: Tympanic membrane, ear canal and external ear normal.      Nose: Nose normal. No congestion or rhinorrhea.      Mouth/Throat:      Mouth: Mucous membranes are moist.      Pharynx: Oropharynx is clear. No oropharyngeal exudate or posterior oropharyngeal erythema.   Eyes:      General: Lids are normal.         Right eye: No discharge.         Left eye: No discharge.      Extraocular Movements: Extraocular movements intact.      Pupils: Pupils are equal, round, and reactive to light. "   Neck:      Vascular: No carotid bruit.   Cardiovascular:      Rate and Rhythm: Normal rate and regular rhythm.      Pulses: Normal pulses.      Heart sounds: Normal heart sounds. No murmur heard.    No friction rub. No gallop.   Pulmonary:      Effort: Pulmonary effort is normal. No respiratory distress.      Breath sounds: Decreased air movement present. No stridor. Examination of the right-upper field reveals decreased breath sounds. Examination of the left-upper field reveals decreased breath sounds. Examination of the right-middle field reveals decreased breath sounds. Examination of the left-middle field reveals decreased breath sounds. Examination of the right-lower field reveals decreased breath sounds. Examination of the left-lower field reveals decreased breath sounds. Decreased breath sounds and wheezing present. No rhonchi or rales.   Chest:      Chest wall: No tenderness.   Abdominal:      General: Bowel sounds are normal. There is no distension.      Palpations: Abdomen is soft. There is no mass.      Tenderness: There is no abdominal tenderness. There is no right CVA tenderness, left CVA tenderness, guarding or rebound.      Hernia: No hernia is present.   Musculoskeletal:         General: No swelling or tenderness. Normal range of motion.      Cervical back: Normal range of motion and neck supple. No rigidity or tenderness.      Right lower leg: No edema.      Left lower leg: No edema.   Lymphadenopathy:      Cervical: No cervical adenopathy.   Skin:     General: Skin is warm.      Capillary Refill: Capillary refill takes less than 2 seconds.      Coloration: Skin is not jaundiced.      Findings: No bruising, erythema or rash.   Neurological:      General: No focal deficit present.      Mental Status: He is alert and oriented to person, place, and time.      Motor: Motor function is intact. No weakness.      Coordination: Coordination is intact.      Gait: Gait is intact. Gait normal.   Psychiatric:          Attention and Perception: Attention normal.         Mood and Affect: Mood normal.         Speech: Speech normal.         Behavior: Behavior normal.         Cognition and Memory: Cognition normal.         Judgment: Judgment normal.        Result Review :                 Assessment and Plan    Diagnoses and all orders for this visit:    1. COPD with exacerbation (Primary)  -     doxycycline (VIBRAMYCIN) 100 MG capsule; Take 1 capsule by mouth 2 (Two) Times a Day.  Dispense: 14 capsule; Refill: 0  -     methylPREDNISolone acetate (DEPO-medrol) injection 80 mg    Other orders  -     Discontinue: methylPREDNISolone sodium succinate (SOLU-Medrol) injection 80 mg    Counseled patient about steroids increasing his glucose. At this time the benefit out weights the risk. Patient is aware states he will monitor closely and call the office if he has any issues. If shortness of breath worsens go to ER. Patient verbalized understanding.       Patient's Body mass index is 34.67 kg/m². indicating that he is obese (BMI >30). Obesity-related health conditions include the following: hypertension, diabetes mellitus and dyslipidemias. Obesity is unchanged. BMI is is above average; BMI management plan is completed. We discussed low calorie, low carb based diet program, portion control and increasing exercise..    Follow Up   No follow-ups on file.  Patient was given instructions and counseling regarding his condition or for health maintenance advice. Please see specific information pulled into the AVS if appropriate.     This document has been electronically signed by RED Flores  April 4, 2023 14:42 EDT

## 2023-04-10 ENCOUNTER — ANTICOAGULATION VISIT (OUTPATIENT)
Dept: FAMILY MEDICINE CLINIC | Facility: CLINIC | Age: 68
End: 2023-04-10

## 2023-04-10 ENCOUNTER — LAB (OUTPATIENT)
Dept: LAB | Facility: HOSPITAL | Age: 68
End: 2023-04-10
Payer: MEDICARE

## 2023-04-10 ENCOUNTER — OFFICE VISIT (OUTPATIENT)
Dept: FAMILY MEDICINE CLINIC | Facility: CLINIC | Age: 68
End: 2023-04-10
Payer: MEDICARE

## 2023-04-10 ENCOUNTER — HOSPITAL ENCOUNTER (OUTPATIENT)
Dept: GENERAL RADIOLOGY | Facility: HOSPITAL | Age: 68
Discharge: HOME | End: 2023-04-10
Payer: MEDICARE

## 2023-04-10 ENCOUNTER — HOSPITAL ENCOUNTER (OUTPATIENT)
Dept: NUCLEAR MEDICINE | Facility: HOSPITAL | Age: 68
Discharge: HOME | End: 2023-04-10
Payer: MEDICARE

## 2023-04-10 VITALS
HEIGHT: 72 IN | BODY MASS INDEX: 34.13 KG/M2 | DIASTOLIC BLOOD PRESSURE: 66 MMHG | HEART RATE: 72 BPM | TEMPERATURE: 97.5 F | SYSTOLIC BLOOD PRESSURE: 138 MMHG | OXYGEN SATURATION: 93 % | WEIGHT: 252 LBS

## 2023-04-10 DIAGNOSIS — R06.02 SOB (SHORTNESS OF BREATH): ICD-10-CM

## 2023-04-10 DIAGNOSIS — Z79.01 ANTICOAGULANT LONG-TERM USE: ICD-10-CM

## 2023-04-10 DIAGNOSIS — R06.02 SOB (SHORTNESS OF BREATH): Primary | ICD-10-CM

## 2023-04-10 DIAGNOSIS — J44.9 CHRONIC OBSTRUCTIVE PULMONARY DISEASE, UNSPECIFIED COPD TYPE: ICD-10-CM

## 2023-04-10 DIAGNOSIS — I25.10 CORONARY ARTERY DISEASE INVOLVING NATIVE CORONARY ARTERY OF NATIVE HEART WITHOUT ANGINA PECTORIS: Chronic | ICD-10-CM

## 2023-04-10 LAB
EXPIRATION DATE: NORMAL
FLUAV AG UPPER RESP QL IA.RAPID: NOT DETECTED
FLUBV AG UPPER RESP QL IA.RAPID: NOT DETECTED
INR PPP: 2.4 (ref 3–3.5)
INTERNAL CONTROL: NORMAL
Lab: NORMAL
PROTHROMBIN TIME: 29.2 SECONDS
SARS-COV-2 AG UPPER RESP QL IA.RAPID: NOT DETECTED

## 2023-04-10 PROCEDURE — 71046 X-RAY EXAM CHEST 2 VIEWS: CPT | Performed by: RADIOLOGY

## 2023-04-10 PROCEDURE — 0 TECHNETIUM ALBUMIN AGGREGATED: Performed by: FAMILY MEDICINE

## 2023-04-10 PROCEDURE — A9540 TC99M MAA: HCPCS | Performed by: FAMILY MEDICINE

## 2023-04-10 PROCEDURE — 78582 LUNG VENTILAT&PERFUS IMAGING: CPT

## 2023-04-10 PROCEDURE — 71046 X-RAY EXAM CHEST 2 VIEWS: CPT

## 2023-04-10 PROCEDURE — A9567 TECHNETIUM TC-99M AEROSOL: HCPCS | Performed by: FAMILY MEDICINE

## 2023-04-10 PROCEDURE — 83880 ASSAY OF NATRIURETIC PEPTIDE: CPT

## 2023-04-10 PROCEDURE — 0 TECHNETIUM TC 99M PENTETATE INHALER: Performed by: FAMILY MEDICINE

## 2023-04-10 PROCEDURE — 36415 COLL VENOUS BLD VENIPUNCTURE: CPT

## 2023-04-10 PROCEDURE — 80053 COMPREHEN METABOLIC PANEL: CPT

## 2023-04-10 PROCEDURE — 85025 COMPLETE CBC W/AUTO DIFF WBC: CPT

## 2023-04-10 PROCEDURE — 80061 LIPID PANEL: CPT

## 2023-04-10 RX ORDER — ALBUTEROL SULFATE 1.25 MG/3ML
1 SOLUTION RESPIRATORY (INHALATION) EVERY 6 HOURS PRN
Qty: 240 ML | Refills: 11 | Status: SHIPPED | OUTPATIENT
Start: 2023-04-10 | End: 2023-04-11

## 2023-04-10 RX ADMIN — KIT FOR THE PREPARATION OF TECHNETIUM TC 99M ALBUMIN AGGREGATED 1 DOSE: 2.5 INJECTION, POWDER, FOR SOLUTION INTRAVENOUS at 17:17

## 2023-04-10 RX ADMIN — KIT FOR THE PREPARATION OF TECHNETIUM TC 99M PENTETATE 1 DOSE: 20 INJECTION, POWDER, LYOPHILIZED, FOR SOLUTION INTRAVENOUS; RESPIRATORY (INHALATION) at 16:59

## 2023-04-10 NOTE — PROGRESS NOTES
"Benitez Miranda     VITALS: Blood pressure 138/66, pulse 72, temperature 97.5 °F (36.4 °C), height 182.9 cm (72\"), weight 114 kg (252 lb), SpO2 93 %.    Subjective  Chief Complaint:   Chief Complaint   Patient presents with   • Shortness of Breath        History of Present Illness:  Patient is a 67 y.o.  male with medical conditions significant for type 2 diabetes, CAD, recent stroke, hypertension, and allergy rhinitis who presents to the clinic secondary to medical follow-up. He came approximately 1 week ago with shortness of breath and was diagnosed with COPD with exacerbation. He was given doxycycline and prednisone, which he states did not help him at all. Today, symptoms are worse including his vitals. He tested negative for COVID-19 and influenza.    The patient states that the medication did not help at all. He states that if he gets up to do something in the house, he must sit down because he cannot get his breath. He denies any swelling in his legs. He denies any dizziness. He states that he wheezes when he gets shortness of breath. He states that he has been taking Coumadin 8 mg.    No complaints about any of the medications currently prescribed.    The following portions of the patient's history were reviewed and updated as appropriate: allergies, current medications, past family history, past medical history, past social history, past surgical history and problem list.    Past Medical History  Past Medical History:   Diagnosis Date   • Anxiety    • Arthritis    • CAD (coronary artery disease)     x1 stent; pulmonary HTN; EF 50-55%; rheumatic valve; MV stenosis   • CHF (congestive heart failure)    • CKD (chronic kidney disease), stage III    • COPD (chronic obstructive pulmonary disease)    • Depression    • Diabetes mellitus    • H/O mechanical aortic valve replacement    • History of tobacco abuse    • Hyperlipidemia    • Hypertension    • Ischemic heart disease    • Kidney failure     third stage   • " Low back pain    • Obesity     BMI 36.   • Stroke    • Valvular heart disease        Surgical History  Past Surgical History:   Procedure Laterality Date   • AORTIC VALVE REPAIR/REPLACEMENT     • CARDIAC CATHETERIZATION     • CARDIAC SURGERY      valve on aortic   • CARDIAC SURGERY      stent    • CATARACT EXTRACTION     • COLONOSCOPY     • CORONARY STENT PLACEMENT     • CYSTOSCOPY URETEROSCOPY LASER LITHOTRIPSY Left 2020    Procedure: CYSTOSCOPY URETEROSCOPY LASER LITHOTRIPSY WITH STENT PLACEMENT;  Surgeon: Joanh Montgomery MD;  Location: [facility];  Service: Urology;  Laterality: Left;   • KIDNEY STONE SURGERY         Family History  Family History   Problem Relation Age of Onset   • Cancer Mother         breast   • COPD Father    • Heart attack Father 74   • Diabetes Paternal Grandmother         both legs removed       Social History  Social History     Socioeconomic History   • Marital status:    • Number of children: 2   Tobacco Use   • Smoking status: Former     Packs/day: 1.00     Years: 30.00     Pack years: 30.00     Types: Pipe, Cigars, Cigarettes     Quit date: 2016     Years since quittin.7   • Smokeless tobacco: Never   • Tobacco comments:     currently smokes a pipe   Vaping Use   • Vaping Use: Never used   Substance and Sexual Activity   • Alcohol use: No   • Drug use: No   • Sexual activity: Defer       Objective  Physical Exam  Gen: Patient in NAD. Pleasant and answers appropriately. A&Ox3.    Skin: Warm and dry with normal turgor. No purpura, rashes, or unusual pigmentation noted. Hair is normal in appearance and distribution.    HEENT: NC/AT. No lesions noted. Conjunctiva clear, sclera nonicteric. PERRL. EOMI without nystagmus or strabismus. Fundi appear benign. No hemorrhages or exudates of eyes. Auditory canals are patent bilaterally without lesions. TMs intact,  nonerythematous, nonbulging without lesions. Nasal mucosa pink, nonerythematous, and  nonedematous. Frontal and maxillary sinuses are nontender. O/P nonerythematous and moist without exudate.    Neck: Supple without lymph nodes palpated. FROM.     Lungs: Decreased B/L without rales, rhonchi, crackles, or wheezes.    Heart: RRR. S1 and S2 normal. No S3 or S4. No MRGT.    Abd: Soft, nontender,nondistended. (+)BSx4 quadrants.     Extrem: No CC.  Trace edema bilateral lower extremities.  Radial pulses 2+/4 and equal B/L. FROMx4.  Positive joint tenderness noted.    Neuro: No focal motor/sensory deficits.      ECG 12 Lead    Date/Time: 4/10/2023 1:10 PM  Performed by: Mesha Christina MD  Authorized by: Mesha Christina MD   Comparison: not compared with previous ECG   Rhythm: sinus rhythm  Rate: normal  Conduction: left bundle branch block  ST Segments: ST segments normal  T Waves: T waves normal  QRS axis: normal    Clinical impression: non-specific ECG  Comments: Normal sinus rhythm without any ST or T acute changes.  First-degree AV block.  Left bundle branch block.            Assessment/Plan  Patient is a 67-year-old male who is here for medical follow-up.     Diagnoses and all orders for this visit:    1. SOB (shortness of breath) (Primary)  -     ECG 12 Lead  -     POCT SARS-CoV-2 Antigen SERGIO + Flu  -     NM Lung Ventilation Perfusion; Future  -     CBC Auto Differential; Future  -     Comprehensive Metabolic Panel; Future  -     BNP; Future  -     XR Chest 2 View    2. Anticoagulant long-term use  -     POC Protime / INR  -     NM Lung Ventilation Perfusion; Future    3. Chronic obstructive pulmonary disease, unspecified COPD type  -     albuterol (ACCUNEB) 1.25 MG/3ML nebulizer solution; Take 3 mL by nebulization Every 6 (Six) Hours As Needed for Wheezing.  Dispense: 240 mL; Refill: 11      1. Shortness of breath.  - I will obtain a chest x-ray.  - I will obtain a CT scan of the chest.    BMI is >= 30 and <35. (Class 1 Obesity). The following options were offered after discussion;: exercise  counseling/recommendations and nutrition counseling/recommendations               EMR Dragon/Transcription Disclaimer:  Much of this encounter note is an electronic transcription/translation of spoken language to printed text.  The electronic translation of spoken language may permit erroneous, or at times, nonsensical words or phrases to be inadvertently transcribed.  Although I have reviewed the note for such errors, some may still exist.    Transcribed from ambient dictation for Mesha Christina MD by Patricia Grace.  04/10/23   15:48 EDT    Patient or patient representative verbalized consent to the visit recording.  I have personally performed the services described in this document as transcribed by the above individual, and it is both accurate and complete.

## 2023-04-11 ENCOUNTER — TELEPHONE (OUTPATIENT)
Dept: FAMILY MEDICINE CLINIC | Facility: CLINIC | Age: 68
End: 2023-04-11
Payer: MEDICARE

## 2023-04-11 LAB
ALBUMIN SERPL-MCNC: 4.1 G/DL (ref 3.5–5.2)
ALBUMIN/GLOB SERPL: 1 G/DL
ALP SERPL-CCNC: 89 U/L (ref 39–117)
ALT SERPL W P-5'-P-CCNC: 20 U/L (ref 1–41)
ANION GAP SERPL CALCULATED.3IONS-SCNC: 10 MMOL/L (ref 5–15)
AST SERPL-CCNC: 28 U/L (ref 1–40)
BASOPHILS # BLD AUTO: 0.1 10*3/MM3 (ref 0–0.2)
BASOPHILS NFR BLD AUTO: 0.9 % (ref 0–1.5)
BILIRUB SERPL-MCNC: 1.3 MG/DL (ref 0–1.2)
BUN SERPL-MCNC: 21 MG/DL (ref 8–23)
BUN/CREAT SERPL: 17.6 (ref 7–25)
CALCIUM SPEC-SCNC: 9.3 MG/DL (ref 8.6–10.5)
CHLORIDE SERPL-SCNC: 108 MMOL/L (ref 98–107)
CHOLEST SERPL-MCNC: 63 MG/DL (ref 0–200)
CO2 SERPL-SCNC: 21 MMOL/L (ref 22–29)
CREAT SERPL-MCNC: 1.19 MG/DL (ref 0.76–1.27)
DEPRECATED RDW RBC AUTO: 51.7 FL (ref 37–54)
EGFRCR SERPLBLD CKD-EPI 2021: 66.1 ML/MIN/1.73
EOSINOPHIL # BLD AUTO: 0.44 10*3/MM3 (ref 0–0.4)
EOSINOPHIL NFR BLD AUTO: 4.1 % (ref 0.3–6.2)
ERYTHROCYTE [DISTWIDTH] IN BLOOD BY AUTOMATED COUNT: 17.2 % (ref 12.3–15.4)
GLOBULIN UR ELPH-MCNC: 4 GM/DL
GLUCOSE SERPL-MCNC: 113 MG/DL (ref 65–99)
HCT VFR BLD AUTO: 37.7 % (ref 37.5–51)
HDLC SERPL-MCNC: 45 MG/DL (ref 40–60)
HGB BLD-MCNC: 11.9 G/DL (ref 13–17.7)
IMM GRANULOCYTES # BLD AUTO: 0.07 10*3/MM3 (ref 0–0.05)
IMM GRANULOCYTES NFR BLD AUTO: 0.7 % (ref 0–0.5)
LDLC SERPL CALC-MCNC: <5 MG/DL (ref 0–100)
LDLC/HDLC SERPL: 0.15 {RATIO}
LYMPHOCYTES # BLD AUTO: 1.22 10*3/MM3 (ref 0.7–3.1)
LYMPHOCYTES NFR BLD AUTO: 11.4 % (ref 19.6–45.3)
MCH RBC QN AUTO: 25.9 PG (ref 26.6–33)
MCHC RBC AUTO-ENTMCNC: 31.6 G/DL (ref 31.5–35.7)
MCV RBC AUTO: 82 FL (ref 79–97)
MONOCYTES # BLD AUTO: 0.59 10*3/MM3 (ref 0.1–0.9)
MONOCYTES NFR BLD AUTO: 5.5 % (ref 5–12)
NEUTROPHILS NFR BLD AUTO: 77.4 % (ref 42.7–76)
NEUTROPHILS NFR BLD AUTO: 8.31 10*3/MM3 (ref 1.7–7)
NRBC BLD AUTO-RTO: 0 /100 WBC (ref 0–0.2)
NT-PROBNP SERPL-MCNC: 705 PG/ML (ref 0–900)
PLATELET # BLD AUTO: 214 10*3/MM3 (ref 140–450)
PMV BLD AUTO: 11.3 FL (ref 6–12)
POTASSIUM SERPL-SCNC: 4.2 MMOL/L (ref 3.5–5.2)
PROT SERPL-MCNC: 8.1 G/DL (ref 6–8.5)
RBC # BLD AUTO: 4.6 10*6/MM3 (ref 4.14–5.8)
SODIUM SERPL-SCNC: 139 MMOL/L (ref 136–145)
TRIGL SERPL-MCNC: 57 MG/DL (ref 0–150)
VLDLC SERPL-MCNC: NORMAL MG/DL
WBC NRBC COR # BLD: 10.73 10*3/MM3 (ref 3.4–10.8)

## 2023-04-11 RX ORDER — ALBUTEROL SULFATE 1.25 MG/3ML
1 SOLUTION RESPIRATORY (INHALATION) EVERY 6 HOURS PRN
Qty: 240 ML | Refills: 11 | Status: SHIPPED | OUTPATIENT
Start: 2023-04-11

## 2023-04-11 NOTE — TELEPHONE ENCOUNTER
PA for Albuterol Nebulizers has been submitted. Currently awaiting determination from the insurance company.

## 2023-04-11 NOTE — TELEPHONE ENCOUNTER
PA for the Nebulizer treatments was denied by Part D but stated that it should be covered by Part B. I contacted the pharmacy and got everything set up for it to be processed through Part B. The pharmacy stated that the Part B covered the med and would contact the pt to inform him because she had a few questions she had to ask him any ways.

## 2023-04-11 NOTE — TELEPHONE ENCOUNTER
"----- Message from Mesha Christina MD sent at 4/11/2023  1:41 PM EDT -----  Please call the patient regarding his result.    Please let Benitez know that the V/Q scan was negative. Can he take his O2? What is it today?  The chest xray is nondefinitive. It shows that there is \"crud\" in the left lower lobe. This is nonspecific. He does have fluid on the lower bottoms of his lungs too. Both of this could be untreated pneumonia, stuff like masses, or something like a clot. It really needs a CT to define what is going on.     Would he be okay proceeding to getting a CT with contrast if we premedicate him before secondary to his dye allergy? If he is, can you call radiology and ask what the current protocol is to allergy to dye. It should be several doses of either benadryl or prednisone or sometimes both. Thanks. We could try without contrast, but I talked to radiology yesterday and they are worried about missing something.  "

## 2023-04-11 NOTE — TELEPHONE ENCOUNTER
"----- Message from Mesha Christina MD sent at 4/11/2023  1:41 PM EDT -----  Please call the patient regarding his result.    Please let Benitez know that the V/Q scan was negative. Can he take his O2? What is it today?  The chest xray is nondefinitive. It shows that there is \"crud\" in the left lower lobe. This is nonspecific. He does have fluid on the lower bottoms of his lungs too. Both of this could be untreated pneumonia, stuff like masses, or something like a clot. It really needs a CT to define what is going on.     Would he be okay proceeding to getting a CT with contrast if we premedicate him before secondary to his dye allergy? If he is, can you call radiology and ask what the current protocol is to allergy to dye. It should be several doses of either benadryl or prednisone or sometimes both. Thanks. We could try without contrast, but I talked to radiology yesterday and they are worried about missing something.      Spoke with patient he does not have a way to check his O2 sats,he is agreeable to CT With contrast & premedicate,called Radiology protocol is Prednisone 50 mg 13 hours prior to & 1 hour prior to,beandryl 50 mg 1 hour prior to.  "

## 2023-04-13 ENCOUNTER — TELEPHONE (OUTPATIENT)
Dept: FAMILY MEDICINE CLINIC | Facility: CLINIC | Age: 68
End: 2023-04-13
Payer: MEDICARE

## 2023-04-14 DIAGNOSIS — R06.02 SOB (SHORTNESS OF BREATH): Primary | ICD-10-CM

## 2023-04-14 RX ORDER — DIPHENHYDRAMINE HCL 50 MG
CAPSULE ORAL
Qty: 1 CAPSULE | Refills: 0 | Status: SHIPPED | OUTPATIENT
Start: 2023-04-14 | End: 2023-04-17

## 2023-04-14 RX ORDER — PREDNISONE 50 MG/1
TABLET ORAL
Qty: 2 TABLET | Refills: 0 | Status: SHIPPED | OUTPATIENT
Start: 2023-04-14 | End: 2023-04-17

## 2023-04-14 NOTE — PROGRESS NOTES
Referrals will let Benitez know about premedicating with prednisone 50 mg 13 hours before and again at 1 hour before. Will also utilize benadryl 50 mg 1 hour before procedure.

## 2023-04-14 NOTE — TELEPHONE ENCOUNTER
CT chest scheduled for 9 AM Sunday. Patient instructed on premedication protocol with prednisone 50 mg 13 hours before and 1 hour before and benadryl 50 mg 1 hour before.

## 2023-04-16 ENCOUNTER — HOSPITAL ENCOUNTER (OUTPATIENT)
Dept: CT IMAGING | Facility: HOSPITAL | Age: 68
Discharge: HOME | End: 2023-04-16
Admitting: FAMILY MEDICINE
Payer: MEDICARE

## 2023-04-16 DIAGNOSIS — R06.02 SOB (SHORTNESS OF BREATH): ICD-10-CM

## 2023-04-16 PROCEDURE — 71260 CT THORAX DX C+: CPT

## 2023-04-16 PROCEDURE — 25510000001 IOPAMIDOL 61 % SOLUTION: Performed by: FAMILY MEDICINE

## 2023-04-16 RX ADMIN — IOPAMIDOL 84 ML: 612 INJECTION, SOLUTION INTRAVENOUS at 08:55

## 2023-04-17 ENCOUNTER — ANTICOAGULATION VISIT (OUTPATIENT)
Dept: FAMILY MEDICINE CLINIC | Facility: CLINIC | Age: 68
End: 2023-04-17

## 2023-04-17 ENCOUNTER — OFFICE VISIT (OUTPATIENT)
Dept: FAMILY MEDICINE CLINIC | Facility: CLINIC | Age: 68
End: 2023-04-17
Payer: MEDICARE

## 2023-04-17 VITALS
SYSTOLIC BLOOD PRESSURE: 132 MMHG | HEART RATE: 65 BPM | HEIGHT: 72 IN | BODY MASS INDEX: 35.03 KG/M2 | DIASTOLIC BLOOD PRESSURE: 66 MMHG | TEMPERATURE: 96.4 F | OXYGEN SATURATION: 96 % | WEIGHT: 258.6 LBS

## 2023-04-17 DIAGNOSIS — Z00.00 ENCOUNTER FOR SUBSEQUENT ANNUAL WELLNESS VISIT IN MEDICARE PATIENT: Primary | ICD-10-CM

## 2023-04-17 DIAGNOSIS — J90 PLEURAL EFFUSION: ICD-10-CM

## 2023-04-17 DIAGNOSIS — E11.69 HYPERLIPIDEMIA DUE TO TYPE 2 DIABETES MELLITUS: ICD-10-CM

## 2023-04-17 DIAGNOSIS — Z79.01 ANTICOAGULANT LONG-TERM USE: ICD-10-CM

## 2023-04-17 DIAGNOSIS — E78.5 HYPERLIPIDEMIA DUE TO TYPE 2 DIABETES MELLITUS: ICD-10-CM

## 2023-04-17 DIAGNOSIS — R06.02 SOB (SHORTNESS OF BREATH): ICD-10-CM

## 2023-04-17 LAB — INR PPP: 3.4 (ref 3–3.5)

## 2023-04-17 RX ORDER — FUROSEMIDE 20 MG/1
20 TABLET ORAL DAILY
Qty: 5 TABLET | Refills: 0 | Status: SHIPPED | OUTPATIENT
Start: 2023-04-17 | End: 2023-05-01 | Stop reason: DRUGHIGH

## 2023-04-17 NOTE — PROGRESS NOTES
QUICK REFERENCE INFORMATION:  The ABCs of the Annual Wellness Visit    Subsequent Medicare Wellness Visit    HEALTH RISK ASSESSMENT    1955    Recent Hospitalizations:  Recently treated at [facility] in the last year.        Current Medical Providers:  Patient Care Team:  Mesha Christina MD as PCP - General (Family Medicine)  Oswaldo Ahuja MD as Cardiologist (Interventional Cardiology)        Smoking Status:  Social History     Tobacco Use   Smoking Status Former   • Packs/day: 1.00   • Years: 30.00   • Pack years: 30.00   • Types: Pipe, Cigars, Cigarettes   • Quit date: 2016   • Years since quittin.7   Smokeless Tobacco Never   Tobacco Comments    currently smokes a pipe       Alcohol Consumption:  Social History     Substance and Sexual Activity   Alcohol Use No       Depression Screen:       2023     9:13 AM   PHQ-2/PHQ-9 Depression Screening   Little Interest or Pleasure in Doing Things 0-->not at all   Feeling Down, Depressed or Hopeless 0-->not at all   PHQ-9: Brief Depression Severity Measure Score 0       Health Habits and Functional and Cognitive Screenin/17/2023     9:28 AM   Functional & Cognitive Status   Do you have difficulty preparing food and eating? No   Do you have difficulty bathing yourself, getting dressed or grooming yourself? No   Do you have difficulty using the toilet? No   Do you have difficulty moving around from place to place? No   Do you have trouble with steps or getting out of a bed or a chair? No   Current Diet Well Balanced Diet   Dental Exam Up to date   Eye Exam Up to date   Exercise (times per week) 2 times per week   Current Exercises Include Walking;House Cleaning;Yard Work   Do you need help using the phone?  No   Are you deaf or do you have serious difficulty hearing?  No   Do you need help shopping? No   Do you need help preparing meals?  No   Do you need help with housework?  No   Do you need help with laundry? No   Do you  need help taking your medications? No   Do you need help managing money? No   Do you ever drive or ride in a car without wearing a seat belt? No   Have you felt unusual stress, anger or loneliness in the last month? No   Who do you live with? Alone   Have you been bothered in the last four weeks by sexual problems? No   Do you have difficulty concentrating, remembering or making decisions? No       Fall Risk Screen:  RHIANNA Fall Risk Assessment was completed, and patient is at MODERATE risk for falls. Assessment completed on:4/17/2023    ACE III MINI        Does the patient have evidence of cognitive impairment? No    Aspirin use counseling: Taking ASA appropriately as indicated    Recent Lab Results:  CMP:  Lab Results   Component Value Date    BUN 21 04/10/2023    CREATININE 1.19 04/10/2023    EGFRIFNONA 43 (L) 01/25/2022    EGFRIFAFRI 59 (L) 09/12/2016    BCR 17.6 04/10/2023     04/10/2023    K 4.2 04/10/2023    CO2 21.0 (L) 04/10/2023    CALCIUM 9.3 04/10/2023    PROTENTOTREF 7.6 09/12/2016    ALBUMIN 4.1 04/10/2023    LABGLOBREF 3.6 09/12/2016    LABIL2 1.1 (L) 09/12/2016    BILITOT 1.3 (H) 04/10/2023    ALKPHOS 89 04/10/2023    AST 28 04/10/2023    ALT 20 04/10/2023     HbA1c:  Lab Results   Component Value Date    HGBA1C 7.80 (H) 01/24/2023    HGBA1C 9.00 (H) 10/25/2022     Microalbumin:  Lab Results   Component Value Date    MICROALBUR 7.3 07/25/2022     Lipid Panel  Lab Results   Component Value Date    CHOL 63 04/10/2023    TRIG 57 04/10/2023    HDL 45 04/10/2023    LDL <5 04/10/2023    AST 28 04/10/2023    ALT 20 04/10/2023       Visual Acuity:  Vision Screening    Right eye Left eye Both eyes   Without correction 20/30 20/30 20/20   With correction          Age-appropriate Screening Schedule:  Refer to the list below for future screening recommendations based on patient's age, sex and/or medical conditions. Orders for these recommended tests are listed in the plan section. The patient has been  "provided with a written plan.    Health Maintenance   Topic Date Due   • Pneumococcal Vaccine 65+ (1 - PCV) Never done   • TDAP/TD VACCINES (1 - Tdap) Never done   • ZOSTER VACCINE (1 of 2) Never done   • COVID-19 Vaccine (4 - Booster for Pfizer series) 11/30/2021   • DIABETIC EYE EXAM  04/17/2023 (Originally 5/13/2017)   • HEMOGLOBIN A1C  07/24/2023   • URINE MICROALBUMIN  07/25/2023   • INFLUENZA VACCINE  08/01/2023   • DIABETIC FOOT EXAM  08/15/2023   • LIPID PANEL  04/10/2024   • LUNG CANCER SCREENING  04/16/2024   • ANNUAL WELLNESS VISIT  04/17/2024   • COLORECTAL CANCER SCREENING  10/12/2026   • HEPATITIS C SCREENING  Completed   • AAA SCREEN (ONE-TIME)  Completed        Subjective   History of Present Illness    Benitez Miranda is a 67 y.o. male who presents for type 2 diabetes, CAD, recent stroke, hypertension, and allergic rhinitis who is here today for his Medicare Wellness today. He continues to have shortness of breath and was diagnosed with COPD with exacerbation. He was given doxycycline and prednisone, which he states did not help him at all. We then noticed that his INR was low. Fearing for another clot, we sent him for a VQ scan and CT chest, which were both negative. It did indicate pleural effusions still.    The patient reports that his breathing has not improved \"a whole lot\". His blood glucose levels are elevated due to the prednisone. He denies any side effects with contrast dye. The patient has not been seen by Dr. Oswaldo Ahuja, cardiologist, in a while.     The patient's last LDL was 5 mg/dL. He is inquiring if he should continue with medication injections for LDL that he receives at the hospital. He is also still taking Lipitor.    CHRONIC CONDITIONS    The following portions of the patient's history were reviewed and updated as appropriate: allergies, current medications, past family history, past medical history, past social history, past surgical history and problem " list.    Outpatient Medications Prior to Visit   Medication Sig Dispense Refill   • amLODIPine (NORVASC) 10 MG tablet Take 1 tablet by mouth Daily. 90 tablet 1   • aspirin 81 MG EC tablet Take 1 tablet by mouth Daily. 30 tablet 0   • atorvastatin (LIPITOR) 80 MG tablet Take 1 tablet by mouth Every Night. 90 tablet 1   • cetirizine (zyrTEC) 10 MG tablet Take 1 tablet by mouth Daily As Needed for Allergies.     • insulin glargine (LANTUS, SEMGLEE) 100 UNIT/ML injection Inject 25 Units under the skin into the appropriate area as directed 2 (Two) Times a Day.     • insulin lispro (humaLOG) 100 UNIT/ML injection Per Jain 0-7 sliding scale with each meal     • losartan (Cozaar) 25 MG tablet Take 1 tablet by mouth Daily. 30 tablet 11   • metoprolol succinate XL (TOPROL-XL) 50 MG 24 hr tablet TAKE ONE TABLET BY MOUTH DAILY 90 tablet 1   • Semaglutide,0.25 or 0.5MG/DOS, (Ozempic, 0.25 or 0.5 MG/DOSE,) 2 MG/1.5ML solution pen-injector Inject 0.5 mg under the skin into the appropriate area as directed 1 (One) Time Per Week. Getting from      • sertraline (ZOLOFT) 100 MG tablet Take 1.5 tablets by mouth Daily. 135 tablet 1   • warfarin (Coumadin) 1 MG tablet Take 7 mg by mouth daily (6 mg +1 mg). (Patient taking differently: 9 mg daily) 30 tablet 0   • warfarin (COUMADIN) 6 MG tablet Take 1 tablet by mouth Every Night. New dose: 7 mg/day (Patient taking differently: Take 1 tablet by mouth Every Night. New dose: 9 mg/day) 90 tablet 1   • albuterol (ACCUNEB) 1.25 MG/3ML nebulizer solution Take 3 mL by nebulization Every 6 (Six) Hours As Needed for Wheezing (For Dx: J44.9 - Chronic obstructive pulmonary disease, unspecified). (Patient not taking: Reported on 4/17/2023) 240 mL 11   • diphenhydrAMINE (BENADRYL) 50 MG capsule Take 1 tablet orally an hour prior to the scan. (Patient not taking: Reported on 4/17/2023) 1 capsule 0   • doxycycline (VIBRAMYCIN) 100 MG capsule Take 1 capsule by mouth 2 (Two) Times a Day.  (Patient not taking: Reported on 4/17/2023) 14 capsule 0   • predniSONE (DELTASONE) 50 MG tablet Take 1 tablet 13 hours prior to scan and then take the second tablet 1 hour prior to scan. (Patient not taking: Reported on 4/17/2023) 2 tablet 0     No facility-administered medications prior to visit.       Patient Active Problem List   Diagnosis   • Type 2 diabetes mellitus with hyperglycemia, with long-term current use of insulin ([facility])   • COPD (chronic obstructive pulmonary disease)   • Chronic diastolic congestive heart failure   • Essential hypertension   • Tobacco abuse   • Coronary artery disease involving native coronary artery of native heart without angina pectoris   • Low back pain   • Hyperlipidemia LDL goal <70   • H/O mechanical aortic valve replacement   • Depression   • CKD (chronic kidney disease), stage III   • Rheumatic mitral stenosis   • Morbidly obese   • Recurrent major depressive disorder, in full remission   • Ischemic stroke   • CVA (cerebral vascular accident)       Advance Care Planning:  ACP discussion was held with the patient during this visit. Patient has an advance directive in EMR which is still valid.     Identification of Risk Factors:  Risk factors include: Cardiovascular risk  Depression/Dysphoria  Obesity/Overweight   Polypharmacy.    Compared to one year ago, the patient feels his physical health is worse.  Compared to one year ago, the patient feels his mental health is the same.    Objective     Physical Exam   Gen: Patient in NAD. Pleasant and answers appropriately. A&Ox3.    Skin: Warm and dry with normal turgor. No purpura, rashes, or unusual pigmentation noted. Hair is normal in appearance and distribution.    HEENT: NC/AT. No lesions noted. Conjunctiva clear, sclera nonicteric. PERRL. EOMI without nystagmus or strabismus. Fundi appear benign. No hemorrhages or exudates of eyes. Auditory canals are patent bilaterally without lesions. TMs intact,  nonerythematous, nonbulging  "without lesions. Nasal mucosa pink, nonerythematous, and nonedematous. Frontal and maxillary sinuses are nontender. O/P nonerythematous and moist without exudate.    Neck: Supple without lymph nodes palpated. FROM.     Lungs: Decreased B/L without rales, rhonchi, crackles, or wheezes.    Heart: RRR. S1 and S2 normal. No S3 or S4. No MRGT.    Abd: Soft, nontender,nondistended. (+)BSx4 quadrants.     Extrem: No CC.  Trace edema bilateral lower extremities.  Radial pulses 2+/4 and equal B/L. FROMx4. No bone, joint, or muscle tenderness noted.    Neuro: No focal motor/sensory deficits.      Vitals:    04/17/23 0913   BP: 132/66   BP Location: Left arm   Patient Position: Sitting   Cuff Size: Adult   Pulse: 65   Temp: 96.4 °F (35.8 °C)   SpO2: 96%   Weight: 117 kg (258 lb 9.6 oz)   Height: 182.9 cm (72\")   PainSc: 0-No pain       Class 2 Severe Obesity (BMI >=35 and <=39.9). Obesity-related health conditions include the following: diabetes mellitus. Obesity is unchanged. BMI is is above average; BMI management plan is completed. We discussed portion control and increasing exercise.      Assessment & Plan   Diagnoses and all orders for this visit:    1. Encounter for subsequent annual wellness visit in Medicare patient (Primary)    2. Anticoagulant long-term use  -     POCT INR    3. SOB (shortness of breath)  A prescription for Lasix 20 mg tablets, to be taken for 5 days, was ordered.  He is then to contact the office with how he is feeling to plan further treatment.   The patient may need to be referred to pulmonology at a later date.  -     furosemide (LASIX) 20 MG tablet; Take 1 tablet by mouth Daily.  Dispense: 5 tablet; Refill: 0    4. Pleural effusion  -     furosemide (LASIX) 20 MG tablet; Take 1 tablet by mouth Daily.  Dispense: 5 tablet; Refill: 0    5.  Hyperlipidemia  The patient will call Dr. Ahuja's office to inquire if he should continue with the hyperlipidemia medication injections.  He was " instructed to hold Lipitor.    Patient Self-Management and Personalized Health Advice  The patient has been provided with information about: diet, exercise and weight management and preventive services including:   · Annual Wellness Visit (AWV).    Outpatient Encounter Medications as of 4/17/2023   Medication Sig Dispense Refill   • amLODIPine (NORVASC) 10 MG tablet Take 1 tablet by mouth Daily. 90 tablet 1   • aspirin 81 MG EC tablet Take 1 tablet by mouth Daily. 30 tablet 0   • atorvastatin (LIPITOR) 80 MG tablet Take 1 tablet by mouth Every Night. 90 tablet 1   • cetirizine (zyrTEC) 10 MG tablet Take 1 tablet by mouth Daily As Needed for Allergies.     • insulin glargine (LANTUS, SEMGLEE) 100 UNIT/ML injection Inject 25 Units under the skin into the appropriate area as directed 2 (Two) Times a Day.     • insulin lispro (humaLOG) 100 UNIT/ML injection Per [employer] 0-7 sliding scale with each meal     • losartan (Cozaar) 25 MG tablet Take 1 tablet by mouth Daily. 30 tablet 11   • metoprolol succinate XL (TOPROL-XL) 50 MG 24 hr tablet TAKE ONE TABLET BY MOUTH DAILY 90 tablet 1   • Semaglutide,0.25 or 0.5MG/DOS, (Ozempic, 0.25 or 0.5 MG/DOSE,) 2 MG/1.5ML solution pen-injector Inject 0.5 mg under the skin into the appropriate area as directed 1 (One) Time Per Week. Getting from      • sertraline (ZOLOFT) 100 MG tablet Take 1.5 tablets by mouth Daily. 135 tablet 1   • warfarin (Coumadin) 1 MG tablet Take 7 mg by mouth daily (6 mg +1 mg). (Patient taking differently: 9 mg daily) 30 tablet 0   • warfarin (COUMADIN) 6 MG tablet Take 1 tablet by mouth Every Night. New dose: 7 mg/day (Patient taking differently: Take 1 tablet by mouth Every Night. New dose: 9 mg/day) 90 tablet 1   • albuterol (ACCUNEB) 1.25 MG/3ML nebulizer solution Take 3 mL by nebulization Every 6 (Six) Hours As Needed for Wheezing (For Dx: J44.9 - Chronic obstructive pulmonary disease, unspecified). (Patient not taking: Reported on 4/17/2023)  240 mL 11   • furosemide (LASIX) 20 MG tablet Take 1 tablet by mouth Daily. 5 tablet 0   • [DISCONTINUED] diphenhydrAMINE (BENADRYL) 50 MG capsule Take 1 tablet orally an hour prior to the scan. (Patient not taking: Reported on 4/17/2023) 1 capsule 0   • [DISCONTINUED] doxycycline (VIBRAMYCIN) 100 MG capsule Take 1 capsule by mouth 2 (Two) Times a Day. (Patient not taking: Reported on 4/17/2023) 14 capsule 0   • [DISCONTINUED] predniSONE (DELTASONE) 50 MG tablet Take 1 tablet 13 hours prior to scan and then take the second tablet 1 hour prior to scan. (Patient not taking: Reported on 4/17/2023) 2 tablet 0     No facility-administered encounter medications on file as of 4/17/2023.       Reviewed use of high risk medication in the elderly: yes  Reviewed for potential of harmful drug interactions in the elderly: yes    Follow Up:  Return in about 2 weeks (around 5/1/2023).   The patient will be seen in 1 week for a repeat INR, and in 2 weeks for a follow-up.     There are no Patient Instructions on file for this visit.    An After Visit Summary and PPPS with all of these plans were given to the patient.           Mesha Christina MD    Transcribed from ambient dictation for Mesha Christina MD   by Radha Chapa.  04/17/23   11:37 EDT    Patient or patient representative verbalized consent to the visit recording.  I have personally performed the services described in this document as transcribed by the above individual, and it is both accurate and complete.

## 2023-04-24 ENCOUNTER — ANTICOAGULATION VISIT (OUTPATIENT)
Dept: FAMILY MEDICINE CLINIC | Facility: CLINIC | Age: 68
End: 2023-04-24
Payer: MEDICARE

## 2023-04-24 DIAGNOSIS — Z95.2 H/O MECHANICAL AORTIC VALVE REPLACEMENT: Primary | ICD-10-CM

## 2023-04-24 LAB — INR PPP: 2.9 (ref 3–3.5)

## 2023-04-24 PROCEDURE — 36416 COLLJ CAPILLARY BLOOD SPEC: CPT | Performed by: FAMILY MEDICINE

## 2023-04-24 PROCEDURE — 85610 PROTHROMBIN TIME: CPT | Performed by: FAMILY MEDICINE

## 2023-05-01 ENCOUNTER — OFFICE VISIT (OUTPATIENT)
Dept: FAMILY MEDICINE CLINIC | Facility: CLINIC | Age: 68
End: 2023-05-01
Payer: MEDICARE

## 2023-05-01 ENCOUNTER — ANTICOAGULATION VISIT (OUTPATIENT)
Dept: FAMILY MEDICINE CLINIC | Facility: CLINIC | Age: 68
End: 2023-05-01

## 2023-05-01 VITALS
HEIGHT: 72 IN | DIASTOLIC BLOOD PRESSURE: 74 MMHG | HEART RATE: 70 BPM | TEMPERATURE: 96.8 F | WEIGHT: 252.6 LBS | BODY MASS INDEX: 34.21 KG/M2 | SYSTOLIC BLOOD PRESSURE: 124 MMHG | OXYGEN SATURATION: 97 %

## 2023-05-01 DIAGNOSIS — Z12.5 ENCOUNTER FOR SCREENING FOR MALIGNANT NEOPLASM OF PROSTATE: ICD-10-CM

## 2023-05-01 DIAGNOSIS — Z95.2 H/O MECHANICAL AORTIC VALVE REPLACEMENT: ICD-10-CM

## 2023-05-01 DIAGNOSIS — Z79.01 ANTICOAGULANT LONG-TERM USE: ICD-10-CM

## 2023-05-01 DIAGNOSIS — N18.30 TYPE 2 DIABETES MELLITUS WITH STAGE 3 CHRONIC KIDNEY DISEASE, WITH LONG-TERM CURRENT USE OF INSULIN, UNSPECIFIED WHETHER STAGE 3A OR 3B CKD: ICD-10-CM

## 2023-05-01 DIAGNOSIS — R06.02 SOB (SHORTNESS OF BREATH): Primary | ICD-10-CM

## 2023-05-01 DIAGNOSIS — N18.30 STAGE 3 CHRONIC KIDNEY DISEASE, UNSPECIFIED WHETHER STAGE 3A OR 3B CKD: ICD-10-CM

## 2023-05-01 DIAGNOSIS — J90 PLEURAL EFFUSION: ICD-10-CM

## 2023-05-01 DIAGNOSIS — E11.22 TYPE 2 DIABETES MELLITUS WITH STAGE 3 CHRONIC KIDNEY DISEASE, WITH LONG-TERM CURRENT USE OF INSULIN, UNSPECIFIED WHETHER STAGE 3A OR 3B CKD: ICD-10-CM

## 2023-05-01 DIAGNOSIS — Z79.4 TYPE 2 DIABETES MELLITUS WITH STAGE 3 CHRONIC KIDNEY DISEASE, WITH LONG-TERM CURRENT USE OF INSULIN, UNSPECIFIED WHETHER STAGE 3A OR 3B CKD: ICD-10-CM

## 2023-05-01 LAB — INR PPP: 3.3 (ref 3–3.5)

## 2023-05-01 PROCEDURE — 83036 HEMOGLOBIN GLYCOSYLATED A1C: CPT | Performed by: FAMILY MEDICINE

## 2023-05-01 PROCEDURE — 80053 COMPREHEN METABOLIC PANEL: CPT | Performed by: FAMILY MEDICINE

## 2023-05-01 PROCEDURE — G0103 PSA SCREENING: HCPCS | Performed by: FAMILY MEDICINE

## 2023-05-01 PROCEDURE — 36415 COLL VENOUS BLD VENIPUNCTURE: CPT | Performed by: FAMILY MEDICINE

## 2023-05-01 RX ORDER — FUROSEMIDE 20 MG/1
20 TABLET ORAL DAILY
Qty: 14 TABLET | Refills: 0 | Status: SHIPPED | OUTPATIENT
Start: 2023-05-01

## 2023-05-01 NOTE — PROGRESS NOTES
"Benitez Miranda     VITALS: Blood pressure 124/74, pulse 70, temperature 96.8 °F (36 °C), height 182.9 cm (72\"), weight 115 kg (252 lb 9.6 oz), SpO2 97 %.    Subjective  Chief Complaint  Diabetes    Subjective          History of Present Illness:  The patient is a 67 y.o.  male with medical conditions significant for type 2 diabetes, hypertension, and recent stroke x2 who presents to the clinic secondary to medical follow-up. Recently in the last month or so, he has been feeling short of breath. Work-up so far has been within normal limits. At his last visit, he was given Lasix to try which has been successful.    He is no longer experiencing short of breath and confirms he has lost some weight. He started taking Lasix and it seemed to help.  He started easing off each day and he could tell the difference. He denies any shortness of breath at this time or swelling in his legs.     No complaints about any of the medications currently prescribed.      The following portions of the patient's history were reviewed and updated as appropriate: allergies, current medications, past family history, past medical history, past social history, past surgical history and problem list.    Past Medical History  Past Medical History:   Diagnosis Date   • Anxiety    • Arthritis    • CAD (coronary artery disease)     x1 stent; pulmonary HTN; EF 50-55%; rheumatic valve; MV stenosis   • CHF (congestive heart failure)    • CKD (chronic kidney disease), stage III    • COPD (chronic obstructive pulmonary disease)    • Depression    • Diabetes mellitus    • H/O mechanical aortic valve replacement    • History of tobacco abuse    • Hyperlipidemia    • Hypertension    • Ischemic heart disease    • Kidney failure     third stage   • Low back pain    • Obesity     BMI 36.   • Stroke    • Valvular heart disease        Surgical History  Past Surgical History:   Procedure Laterality Date   • AORTIC VALVE REPAIR/REPLACEMENT  1991   • CARDIAC " "CATHETERIZATION     • CARDIAC SURGERY      valve on aortic   • CARDIAC SURGERY      stent    • CATARACT EXTRACTION     • COLONOSCOPY     • CORONARY STENT PLACEMENT     • CYSTOSCOPY URETEROSCOPY LASER LITHOTRIPSY Left 2020    Procedure: CYSTOSCOPY URETEROSCOPY LASER LITHOTRIPSY WITH STENT PLACEMENT;  Surgeon: Jonah Montgomery MD;  Location: [facility];  Service: Urology;  Laterality: Left;   • KIDNEY STONE SURGERY         Family History  Family History   Problem Relation Age of Onset   • Cancer Mother         breast   • COPD Father    • Heart attack Father 74   • Diabetes Paternal Grandmother         both legs removed       Social History  Social History     Socioeconomic History   • Marital status:    • Number of children: 2   Tobacco Use   • Smoking status: Former     Packs/day: 1.00     Years: 30.00     Pack years: 30.00     Types: Pipe, Cigars, Cigarettes     Quit date: 2016     Years since quittin.8   • Smokeless tobacco: Never   • Tobacco comments:     currently smokes a pipe   Vaping Use   • Vaping Use: Never used   Substance and Sexual Activity   • Alcohol use: No   • Drug use: No   • Sexual activity: Defer       Objective   Vital Signs:   /74 (BP Location: Right arm, Patient Position: Sitting, Cuff Size: Adult)   Pulse 70   Temp 96.8 °F (36 °C)   Ht 182.9 cm (72\")   Wt 115 kg (252 lb 9.6 oz)   SpO2 97%   BMI 34.26 kg/m²     Physical Exam     Gen: Patient in NAD. Pleasant and answers appropriately. A&Ox3.    Skin: Warm and dry with normal turgor. No purpura, rashes, or unusual pigmentation noted. Hair is normal in appearance and distribution.    HEENT: NC/AT. No lesions noted. Conjunctiva clear, sclera nonicteric. PERRL. EOMI without nystagmus or strabismus. Fundi appear benign. No hemorrhages or exudates of eyes. Auditory canals are patent bilaterally without lesions. TMs intact,  nonerythematous, nonbulging without lesions. Nasal mucosa pink, " nonerythematous, and nonedematous. Frontal and maxillary sinuses are nontender. O/P nonerythematous and moist without exudate.    Neck: Supple without lymph nodes palpated. FROM.     Lungs: Slightly decreased B/L without rales, rhonchi, crackles, or wheezes.    Heart: RRR. S1 and S2 normal. No S3 or S4. No MRGT.    Abd: Soft, nontender,nondistended. (+)BSx4 quadrants.     Extrem: No CCE. Radial pulses 2+/4 and equal B/L. FROMx4. No bone, joint, or muscle tenderness noted.    Neuro: No focal motor/sensory deficits.    Procedures     Assessment and Plan    Benitez Miranda is a 67 y.o. here for medical followup.    Problem List Items Addressed This Visit        Cardiac and Vasculature    H/O mechanical aortic valve replacement (Chronic)    Overview     · Severe aortic valve stenosis.  · Aortic valve replacement: [facility], 1991, Georgi Sánchez MD.  · Currently anticoagulated with Coumadin.  INR followed by Dr. Canales's office.              Genitourinary and Reproductive     CKD (chronic kidney disease), stage III    Overview     a. Stage 1-2 secondary to diabetic nephropathy.  b. Baseline creatinine 1.3.           Relevant Medications    furosemide (LASIX) 20 MG tablet    Other Relevant Orders    Comprehensive Metabolic Panel   Other Visit Diagnoses     Anticoagulant long-term use    -  Primary    Relevant Orders    POCT INR (Completed)    Comprehensive Metabolic Panel    Encounter for screening for malignant neoplasm of prostate        Relevant Orders    PSA Screen    Type 2 diabetes mellitus with stage 3 chronic kidney disease, with long-term current use of insulin, unspecified whether stage 3a or 3b CKD        Relevant Medications    furosemide (LASIX) 20 MG tablet    Other Relevant Orders    Hemoglobin A1c    Comprehensive Metabolic Panel    SOB (shortness of breath)        Relevant Medications    furosemide (LASIX) 20 MG tablet    Pleural effusion        Relevant Medications    furosemide (LASIX) 20 MG  tablet          Plan:     1. Type 2 diabetes.  - I will order and obtain laboratory results and discuss those results with the patient for further treatment.     2. Hypertension.  - I will start the patient on Lasix 20 mg.   - I will obtain a repeat x-ray of the chest.   - She will follow-up in 1 week.       BMI is >= 30 and <35. (Class 1 Obesity). The following options were offered after discussion;: exercise counseling/recommendations and nutrition counseling/recommendations         Follow Up   Return in about 2 weeks (around 5/15/2023), or HARD 2 weeks; LABS.  Findings and plans discussed with patient who verbalizes understanding and agreement. Will followup with patient once results are in. Patient was given instructions and counseling regarding his condition or for health maintenance advice. Please see specific information pulled into the AVS if appropriate.       Transcribed from ambient dictation for Mesha Christina MD by Teresita Cabrera.  05/01/23   11:48 EDT        Mesha Christina MD

## 2023-05-02 LAB
ALBUMIN SERPL-MCNC: 3.7 G/DL (ref 3.5–5.2)
ALBUMIN/GLOB SERPL: 1 G/DL
ALP SERPL-CCNC: 79 U/L (ref 39–117)
ALT SERPL W P-5'-P-CCNC: 21 U/L (ref 1–41)
ANION GAP SERPL CALCULATED.3IONS-SCNC: 9.5 MMOL/L (ref 5–15)
AST SERPL-CCNC: 25 U/L (ref 1–40)
BILIRUB SERPL-MCNC: 0.6 MG/DL (ref 0–1.2)
BUN SERPL-MCNC: 20 MG/DL (ref 8–23)
BUN/CREAT SERPL: 13.6 (ref 7–25)
CALCIUM SPEC-SCNC: 9.5 MG/DL (ref 8.6–10.5)
CHLORIDE SERPL-SCNC: 105 MMOL/L (ref 98–107)
CO2 SERPL-SCNC: 23.5 MMOL/L (ref 22–29)
CREAT SERPL-MCNC: 1.47 MG/DL (ref 0.76–1.27)
EGFRCR SERPLBLD CKD-EPI 2021: 51.3 ML/MIN/1.73
GLOBULIN UR ELPH-MCNC: 3.8 GM/DL
GLUCOSE SERPL-MCNC: 144 MG/DL (ref 65–99)
HBA1C MFR BLD: 7.6 % (ref 4.8–5.6)
POTASSIUM SERPL-SCNC: 5.1 MMOL/L (ref 3.5–5.2)
PROT SERPL-MCNC: 7.5 G/DL (ref 6–8.5)
PSA SERPL-MCNC: 0.79 NG/ML (ref 0–4)
SODIUM SERPL-SCNC: 138 MMOL/L (ref 136–145)

## 2023-05-08 ENCOUNTER — ANTICOAGULATION VISIT (OUTPATIENT)
Dept: FAMILY MEDICINE CLINIC | Facility: CLINIC | Age: 68
End: 2023-05-08
Payer: MEDICARE

## 2023-05-08 DIAGNOSIS — Z51.81 ANTICOAGULATION GOAL OF INR 2.5 TO 3.5: Primary | ICD-10-CM

## 2023-05-08 DIAGNOSIS — Z79.01 ANTICOAGULATION GOAL OF INR 2.5 TO 3.5: Primary | ICD-10-CM

## 2023-05-08 LAB — INR PPP: 2.9 (ref 2–3)

## 2023-05-10 ENCOUNTER — TELEPHONE (OUTPATIENT)
Dept: CARDIOLOGY | Facility: CLINIC | Age: 68
End: 2023-05-10
Payer: MEDICARE

## 2023-05-10 NOTE — TELEPHONE ENCOUNTER
----- Message from RED Christensen sent at 5/10/2023  8:34 AM EDT -----  Please call patient about their normal result.    Cholesterol panel shows LDL cholesterol as being less than 5.  I have told the pharmacist in the lipid clinic that you will not be needing the Leqvio injection any longer.  Continue atorvastatin.    Thanks, Dayan

## 2023-05-10 NOTE — TELEPHONE ENCOUNTER
I have contacted the patient in regards to labs. I have informed the patient he does not need to take the Leqvio injection any longer, we have contacted the pharmacist in the Lipid Clinic, informed him he does need to take the atorvastatin. Patient is understanding  --------------------------------------------------  ----- Message from RED Christensen sent at 5/10/2023  8:34 AM EDT -----  Please call patient about their normal result.    Cholesterol panel shows LDL cholesterol as being less than 5.  I have told the pharmacist in the lipid clinic that you will not be needing the Leqvio injection any longer.  Continue atorvastatin.    Thanks, Dayan

## 2023-05-13 DIAGNOSIS — F33.42 RECURRENT MAJOR DEPRESSIVE DISORDER, IN FULL REMISSION: ICD-10-CM

## 2023-05-15 ENCOUNTER — ANTICOAGULATION VISIT (OUTPATIENT)
Dept: FAMILY MEDICINE CLINIC | Facility: CLINIC | Age: 68
End: 2023-05-15

## 2023-05-15 ENCOUNTER — OFFICE VISIT (OUTPATIENT)
Dept: FAMILY MEDICINE CLINIC | Facility: CLINIC | Age: 68
End: 2023-05-15

## 2023-05-15 VITALS
TEMPERATURE: 96.6 F | HEART RATE: 55 BPM | SYSTOLIC BLOOD PRESSURE: 98 MMHG | BODY MASS INDEX: 33.72 KG/M2 | WEIGHT: 249 LBS | OXYGEN SATURATION: 97 % | DIASTOLIC BLOOD PRESSURE: 68 MMHG | HEIGHT: 72 IN

## 2023-05-15 DIAGNOSIS — E78.5 HYPERLIPIDEMIA DUE TO TYPE 2 DIABETES MELLITUS: ICD-10-CM

## 2023-05-15 DIAGNOSIS — Z51.81 ANTICOAGULATION GOAL OF INR 2.5 TO 3.5: Primary | ICD-10-CM

## 2023-05-15 DIAGNOSIS — F33.42 RECURRENT MAJOR DEPRESSIVE DISORDER, IN FULL REMISSION: ICD-10-CM

## 2023-05-15 DIAGNOSIS — Z79.01 ANTICOAGULATION GOAL OF INR 2.5 TO 3.5: Primary | ICD-10-CM

## 2023-05-15 DIAGNOSIS — E11.69 HYPERLIPIDEMIA DUE TO TYPE 2 DIABETES MELLITUS: ICD-10-CM

## 2023-05-15 DIAGNOSIS — J44.9 CHRONIC OBSTRUCTIVE PULMONARY DISEASE, UNSPECIFIED COPD TYPE: ICD-10-CM

## 2023-05-15 LAB — INR PPP: 3.5 (ref 3–3.5)

## 2023-05-15 RX ORDER — SERTRALINE HYDROCHLORIDE 100 MG/1
150 TABLET, FILM COATED ORAL DAILY
Qty: 135 TABLET | Refills: 1 | Status: SHIPPED | OUTPATIENT
Start: 2023-05-15

## 2023-05-15 RX ORDER — WARFARIN SODIUM 3 MG/1
3 TABLET ORAL NIGHTLY
Qty: 90 TABLET | Refills: 1 | Status: SHIPPED | OUTPATIENT
Start: 2023-05-15

## 2023-05-15 RX ORDER — WARFARIN SODIUM 6 MG/1
6 TABLET ORAL NIGHTLY
Qty: 90 TABLET | Refills: 1 | Status: SHIPPED | OUTPATIENT
Start: 2023-05-15

## 2023-05-15 RX ORDER — ALBUTEROL SULFATE 1.25 MG/3ML
1 SOLUTION RESPIRATORY (INHALATION) EVERY 6 HOURS PRN
Qty: 240 ML | Refills: 11 | Status: SHIPPED | OUTPATIENT
Start: 2023-05-15

## 2023-05-15 RX ORDER — WARFARIN SODIUM 2 MG/1
2 TABLET ORAL NIGHTLY
Qty: 90 TABLET | Refills: 1 | Status: SHIPPED | OUTPATIENT
Start: 2023-05-15

## 2023-05-15 NOTE — PROGRESS NOTES
"Benitez Miranda     VITALS: Blood pressure 98/68, pulse 55, temperature 96.6 °F (35.9 °C), height 182.9 cm (72\"), weight 113 kg (249 lb), SpO2 97 %.    Subjective  Chief Complaint  Anticoagulant long-term use    Subjective          History of Present Illness:  Patient is a 67 y.o. male with a medical history significant for type 2 diabetes mellitus, hypertension, and recent cerebrovascular accident x2 who presents to clinic secondary to medical follow-up.    The patient is doing well overall. He denies any shortness of breath. His cardiologist discontinued his Ozempic injection for his LDL cholesterol. His last injection was approximately 2 to 3 months ago. He does not follow up with his cardiologist until 08/28/2023. He is still taking Lipitor. He missed his prescription on warfarin and needs refills. He has been given a quantity of 27. He is taking warfarin 3 mg, warfarin 6 mg, and warfarin 2 mg. He needs refills on Zoloft.     No complaints about any of the medications.    The following portions of the patient's history were reviewed and updated as appropriate: allergies, current medications, past family history, past medical history, past social history, past surgical history and problem list.    Past Medical History  Past Medical History:   Diagnosis Date   • Anxiety    • Arthritis    • CAD (coronary artery disease)     x1 stent; pulmonary HTN; EF 50-55%; rheumatic valve; MV stenosis   • CHF (congestive heart failure)    • CKD (chronic kidney disease), stage III    • COPD (chronic obstructive pulmonary disease)    • Depression    • Diabetes mellitus    • H/O mechanical aortic valve replacement    • History of tobacco abuse    • Hyperlipidemia    • Hypertension    • Ischemic heart disease    • Kidney failure     third stage   • Low back pain    • Obesity     BMI 36.   • Stroke    • Valvular heart disease        Surgical History  Past Surgical History:   Procedure Laterality Date   • AORTIC VALVE " "REPAIR/REPLACEMENT     • CARDIAC CATHETERIZATION     • CARDIAC SURGERY      valve on aortic   • CARDIAC SURGERY      stent    • CATARACT EXTRACTION     • COLONOSCOPY     • CORONARY STENT PLACEMENT     • CYSTOSCOPY URETEROSCOPY LASER LITHOTRIPSY Left 2020    Procedure: CYSTOSCOPY URETEROSCOPY LASER LITHOTRIPSY WITH STENT PLACEMENT;  Surgeon: Jonah Montgomery MD;  Location: [facility];  Service: Urology;  Laterality: Left;   • KIDNEY STONE SURGERY         Family History  Family History   Problem Relation Age of Onset   • Cancer Mother         breast   • COPD Father    • Heart attack Father 74   • Diabetes Paternal Grandmother         both legs removed       Social History  Social History     Socioeconomic History   • Marital status:    • Number of children: 2   Tobacco Use   • Smoking status: Former     Packs/day: 1.00     Years: 30.00     Pack years: 30.00     Types: Pipe, Cigars, Cigarettes     Quit date: 2016     Years since quittin.8   • Smokeless tobacco: Never   • Tobacco comments:     currently smokes a pipe   Vaping Use   • Vaping Use: Never used   Substance and Sexual Activity   • Alcohol use: No   • Drug use: No   • Sexual activity: Defer       Objective   Vital Signs:   BP 98/68 (BP Location: Right arm, Patient Position: Sitting, Cuff Size: Adult)   Pulse 55   Temp 96.6 °F (35.9 °C)   Ht 182.9 cm (72\")   Wt 113 kg (249 lb)   SpO2 97%   BMI 33.77 kg/m²     Physical Exam     Gen: Patient in NAD. Pleasant and answers appropriately. A&Ox3.    Skin: Warm and dry with normal turgor. No purpura, rashes, or unusual pigmentation noted. Hair is normal in appearance and distribution.    HEENT: NC/AT. No lesions noted. Conjunctiva clear, sclera nonicteric. PERRL. EOMI without nystagmus or strabismus. Fundi appear benign. No hemorrhages or exudates of eyes. Auditory canals are patent bilaterally without lesions. TMs intact,  nonerythematous, nonbulging without " lesions. Nasal mucosa pink, nonerythematous, and nonedematous. Frontal and maxillary sinuses are nontender. O/P nonerythematous and moist without exudate.    Neck: Supple without lymph nodes palpated. FROM.     Lungs: Decreased B/L without rales, rhonchi, crackles, or wheezes.    Heart: RRR. S1 and S2 normal. No S3 or S4. No MRGT.    Abd: Soft, nontender,nondistended. (+)BSx4 quadrants.     Extrem: No CCE. Radial pulses 2+/4 and equal B/L. FROMx4. No bone, joint, or muscle tenderness noted.    Neuro: No focal motor/sensory deficits.    Procedures    Result Review :   The following data was reviewed by: Mesha Christina MD on 05/15/2023:         POCT INR (05/15/2023 12:46)        Assessment and Plan      1. Cerebrovascular accident  - His INR today is 3.5. He will continue to alternate 8 mg of warfarin and 9 mg of warfarin and recheck his INR in 2 weeks.   - Warfarin 6 mg, 2 mg, and 3 mg will be refilled for a 90-day supply.   - If dyspnea or swelling occurs, he needs to be seen.     2. Hyperlipidemia.  - He will discontinue Lipitor for 2 weeks and then take 0.5 doses.  - His cholesterol will be rechecked at the end of 06/2023 or early 07/2023.    3. Depression.   - Zoloft will be refilled.       Problem List Items Addressed This Visit        Mental Health    Depression    Relevant Medications    sertraline (ZOLOFT) 100 MG tablet       Pulmonary and Pneumonias    COPD (chronic obstructive pulmonary disease)    Relevant Medications    albuterol (ACCUNEB) 1.25 MG/3ML nebulizer solution   Other Visit Diagnoses     Anticoagulation goal of INR 2.5 to 3.5    -  Primary    Relevant Medications    warfarin (Coumadin) 3 MG tablet    warfarin (Coumadin) 6 MG tablet    warfarin (Coumadin) 2 MG tablet    Other Relevant Orders    POCT INR (Completed)    Hyperlipidemia due to type 2 diabetes mellitus        Relevant Orders    Lipid Panel          BMI is >= 30 and <35. (Class 1 Obesity). The following options were offered after  discussion;: exercise counseling/recommendations and nutrition counseling/recommendations           Follow Up   Return in about 3 months (around 8/15/2023), or no labs today. will come back..  Findings and plans discussed with patient who verbalizes understanding and agreement. Will followup with patient once results are in. Patient was given instructions and counseling regarding his condition or for health maintenance advice. Please see specific information pulled into the AVS if appropriate.       Mesha Christina MD    Transcribed from ambient dictation for Mesha Christina MD by Radha Menendez.  05/15/23   15:08 EDT    Patient or patient representative verbalized consent to the visit recording.  I have personally performed the services described in this document as transcribed by the above individual, and it is both accurate and complete.

## 2023-05-18 RX ORDER — SERTRALINE HYDROCHLORIDE 100 MG/1
TABLET, FILM COATED ORAL
Qty: 135 TABLET | Refills: 0 | OUTPATIENT
Start: 2023-05-18

## 2023-05-29 NOTE — PROGRESS NOTES
Follow Up Office Visit      Encounter Date: 2023   Patient Name: Benitez Miranda  : 1955   MRN: [id number]   PCP: Mesha Christina MD    Chief Complaint:  No chief complaint on file.      History of Present Illness: Benitez Miranda is a 67 y.o. male with known medical diagnoses of mechanical AVR on warfarin, hypertension, hyperlipidemia, CAD s/p stenting, pulmonary hypertension, diabetes, COPD, CKD, depression/anxiety, and arthritis who presents today for follow up after stroke in 10/2022.    Initially presented to ED on 10/25/2022 with left upper extremity weakness, subtherapeutic Coumadin.  Acute stroke imaging included CT head no acute abnormality, CTA without flow-limiting stenosis except for distal right M3/M4 occlusion, CTP showed slow flow with a mismatch volume of 20 cc.  At the time he reported his symptoms were not disabling and he would not want TNK.  Subsequent MRI showed right cerebral hemispheric stroke that was likely cardioembolic in setting of subtherapeutic INR.  He was discharged home on day 3 however returned on 10/28/2022 with persistent left-sided symptoms.  He was bridged with Lovenox at first hospitalization.  Follow-up MRI did not show any new ischemia, stable right MCA stroke.  He was then discharged to acute inpatient rehab where he spent approximately 2 weeks.     Today he reports symptoms have mostly resolved, he has a very slight tingling in his left hand, but no incoordination in extremities, no drift.  He is compliant with his medications, and has not had any issues with affordability.  He denies any new stroke/TIA symptoms.        Subjective        I have reviewed and the following portions of the patient's history were updated as appropriate: past family history, past medical history, past social history, past surgical history and problem list.    Medications:     Current Outpatient Medications:   •  albuterol (ACCUNEB) 1.25 MG/3ML nebulizer solution, Take 3 mL by  nebulization Every 6 (Six) Hours As Needed for Wheezing (For Dx: J44.9 - Chronic obstructive pulmonary disease, unspecified)., Disp: 240 mL, Rfl: 11  •  amLODIPine (NORVASC) 10 MG tablet, Take 1 tablet by mouth Daily., Disp: 90 tablet, Rfl: 1  •  aspirin 81 MG EC tablet, Take 1 tablet by mouth Daily., Disp: 30 tablet, Rfl: 0  •  cetirizine (zyrTEC) 10 MG tablet, Take 1 tablet by mouth Daily As Needed for Allergies., Disp: , Rfl:   •  insulin glargine (LANTUS, SEMGLEE) 100 UNIT/ML injection, Inject 25 Units under the skin into the appropriate area as directed 2 (Two) Times a Day., Disp: , Rfl:   •  insulin lispro (humaLOG) 100 UNIT/ML injection, Per [employer] 0-7 sliding scale with each meal, Disp: , Rfl:   •  losartan (Cozaar) 25 MG tablet, Take 1 tablet by mouth Daily., Disp: 30 tablet, Rfl: 11  •  metoprolol succinate XL (TOPROL-XL) 50 MG 24 hr tablet, TAKE ONE TABLET BY MOUTH DAILY, Disp: 90 tablet, Rfl: 1  •  Semaglutide,0.25 or 0.5MG/DOS, (Ozempic, 0.25 or 0.5 MG/DOSE,) 2 MG/1.5ML solution pen-injector, Inject 0.5 mg under the skin into the appropriate area as directed 1 (One) Time Per Week. Getting from , Disp: , Rfl:   •  sertraline (ZOLOFT) 100 MG tablet, Take 1.5 tablets by mouth Daily., Disp: 135 tablet, Rfl: 1  •  warfarin (Coumadin) 1 MG tablet, Take 7 mg by mouth daily (6 mg +1 mg). (Patient taking differently: 9 mg daily), Disp: 30 tablet, Rfl: 0  •  atorvastatin (LIPITOR) 80 MG tablet, Take 1 tablet by mouth Every Night., Disp: 90 tablet, Rfl: 1  •  warfarin (Coumadin) 2 MG tablet, Take 1 tablet by mouth Every Night., Disp: 90 tablet, Rfl: 1  •  warfarin (Coumadin) 3 MG tablet, Take 1 tablet by mouth Every Night., Disp: 90 tablet, Rfl: 1  •  warfarin (COUMADIN) 6 MG tablet, Take 1 tablet by mouth Every Night. New dose: 7 mg/day (Patient taking differently: Take 1 tablet by mouth Every Night. New dose: 9 mg/day), Disp: 90 tablet, Rfl: 1  •  warfarin (Coumadin) 6 MG tablet, Take 1 tablet by  "mouth Every Night., Disp: 90 tablet, Rfl: 1    Allergies:   Allergies   Allergen Reactions   • Penicillins Swelling     Tongue swelled   • Ace Inhibitors Angioedema   • Contrast Dye (Echo Or Unknown Ct/Mr) Rash       Review of Systems   Constitutional: Negative for chills and fever.   Respiratory: Negative.    Cardiovascular: Negative.    Musculoskeletal: Positive for arthralgias.   Skin: Negative.    Neurological: Negative for facial asymmetry, speech difficulty, weakness and numbness.   Psychiatric/Behavioral: Negative.         Objective     Physical Exam:   Vital Signs:   Vitals:    06/02/23 0847   BP: 128/75   BP Location: Right arm   Patient Position: Sitting   Pulse: 60   Temp: 97.8 °F (36.6 °C)   SpO2: 96%   Weight: 113 kg (249 lb)   Height: 182.9 cm (72.01\")     Body mass index is 33.76 kg/m².    Physical Exam  Constitutional:       General: He is awake. He is not in acute distress.     Appearance: He is obese.   HENT:      Head: Normocephalic.      Mouth/Throat:      Mouth: Mucous membranes are moist.      Pharynx: Oropharynx is clear.   Eyes:      General: Lids are normal.      Extraocular Movements: Extraocular movements intact.      Pupils: Pupils are equal, round, and reactive to light.   Cardiovascular:      Rate and Rhythm: Normal rate.   Pulmonary:      Effort: Pulmonary effort is normal. No respiratory distress.   Skin:     General: Skin is warm and dry.   Neurological:      Mental Status: He is alert.      Motor: Motor strength is normal.   Psychiatric:         Mood and Affect: Mood normal.         Speech: Speech normal.         Behavior: Behavior normal.       Neurological Exam  Mental Status  Awake and alert. Oriented to person, place and time. Recent and remote memory are intact. Speech is normal. Language is fluent with no aphasia. Attention and concentration are normal.    Cranial Nerves  CN II: Visual acuity is normal. Visual fields full to confrontation.  CN III, IV, VI: Extraocular " movements intact bilaterally. Normal lids and orbits bilaterally. Pupils equal round and reactive to light bilaterally.  CN V: Facial sensation is normal.  CN VII: Full and symmetric facial movement.  CN IX, X: Palate elevates symmetrically  CN XI: Shoulder shrug strength is normal.  CN XII: Tongue midline without atrophy or fasciculations.    Motor  Normal muscle bulk throughout. Normal muscle tone. No abnormal involuntary movements. Strength is 5/5 throughout all four extremities.    Sensory  Light touch is normal in upper and lower extremities.     Coordination  Right: Finger-to-nose normal. Heel-to-shin normal.Left: Finger-to-nose normal. Heel-to-shin normal.    Gait  Casual gait is normal including stance, stride, and arm swing.       Procedures    NIH Stroke Scale    1a  Level of consciousness: 0=alert; keenly responsive   1b. LOC questions:  0=Answers both questions correctly    1c. LOC commands: 0=Performs both tasks correctly   2.  Best Gaze: 0=normal   3. Visual: 0=No visual loss   4. Facial Palsy: 0=Normal symmetric movement   5a. Motor left arm: 0=No drift, limb holds 90 (or 45) degrees for full 10 seconds   5b.  Motor right arm: 0=No drift, limb holds 90 (or 45) degrees for full 10 seconds   6a. Motor left le=No drift, limb holds 90 (or 45) degrees for full 10 seconds   6b  Motor right le=No drift, limb holds 90 (or 45) degrees for full 10 seconds   7. Limb Ataxia: 0=Absent   8.  Sensory: 0=Normal; no sensory loss   9. Best Language:  0=No aphasia, normal   10. Dysarthria: 0=Normal   11. Extinction and Inattention: 0=No abnormality    Total:   0         Modified [place] Scale: 1          0  No Symptoms    1 No significant disability. Able to carry out all usual activities, despite some symptoms.    2 Slight disability. Able to look after own affairs without assistance, but unable to carry out all previous activities.    3 Moderate disability. Requires some help, but able to walk unassisted.    4  Moderately severe disability. Unable to attend to own bodily needs without assistance, and unable to walk unassisted.    5 Severe disability. Requires constant nursing care and attention, bedridden, incontinent.    6 Dead          PHQ-9 Depression Screening  Little interest or pleasure in doing things? 0-->not at all   Feeling down, depressed, or hopeless? 0-->not at all   Trouble falling or staying asleep, or sleeping too much?     Feeling tired or having little energy?     Poor appetite or overeating?     Feeling bad about yourself - or that you are a failure or have let yourself or your family down?     Trouble concentrating on things, such as reading the newspaper or watching television?     Moving or speaking so slowly that other people could have noticed? Or the opposite - being so fidgety or restless that you have been moving around a lot more than usual?     Thoughts that you would be better off dead, or of hurting yourself in some way?     PHQ-9 Total Score 0   If you checked off any problems, how difficult have these problems made it for you to do your work, take care of things at home, or get along with other people?        Imaging Resluts:     XR Chest 2 View    Result Date: 4/10/2023  1.  Tiny bilateral pleural effusions. 2.  Cardiomegaly. 3.  Probably left minimal lower lobe airspace disease. 4.  Coarsened interstitial markings noted throughout the lungs.  This report was finalized on 4/10/2023 3:18 PM by Dr. Leno Du MD.      CT Chest With Contrast Diagnostic    Result Date: 4/16/2023  1.  Small bilateral pleural effusions, right side greater than left. 2.  Hyperinflated lungs with parenchymal changes of centrilobular emphysema. No acute infiltrates. 3.  Borderline cardiomegaly. Atherosclerotic change of the coronary arteries and thoracic aorta. Signer Name: Harjinder Markham MD  Signed: 4/16/2023 9:40 AM  Workstation Name: [id number]  [facility] Lung Ventilation  Perfusion    Result Date: 4/10/2023  Low probability ventilation/perfusion scan for pulmonary embolus. Signer Name: Gautam Markham MD  Signed: 4/10/2023 5:34 PM  Workstation Name: [email or web address]  [facility] [place]       Laboratory Results:    Lab Results   Component Value Date    HGBA1C 7.60 (H) 05/01/2023     Lab Results   Component Value Date    WBC 10.73 04/10/2023    HGB 11.9 (L) 04/10/2023    HCT 37.7 04/10/2023    MCV 82.0 04/10/2023     04/10/2023      Lab Results   Component Value Date    GLUCOSE 144 (H) 05/01/2023    BUN 20 05/01/2023    CREATININE 1.47 (H) 05/01/2023    EGFRIFNONA 43 (L) 01/25/2022    EGFRIFAFRI 59 (L) 09/12/2016    BCR 13.6 05/01/2023    K 5.1 05/01/2023    CO2 23.5 05/01/2023    CALCIUM 9.5 05/01/2023    PROTENTOTREF 7.6 09/12/2016    ALBUMIN 3.7 05/01/2023    LABIL2 1.1 (L) 09/12/2016    AST 25 05/01/2023    ALT 21 05/01/2023      Lab Results   Component Value Date    CHOL 63 04/10/2023    CHOL 159 10/26/2022    CHOL 142 05/06/2022    CHLPL 167 07/18/2016    CHLPL 135 06/08/2015     Lab Results   Component Value Date    TRIG 57 04/10/2023    TRIG 136 10/26/2022    TRIG 148 05/06/2022     Lab Results   Component Value Date    HDL 45 04/10/2023    HDL 39 (L) 10/26/2022    HDL 32 (L) 05/06/2022     Lab Results   Component Value Date    LDL <5 04/10/2023    LDL 96 10/26/2022    LDL 84 05/06/2022      Lab Results   Component Value Date    HVWAHPBU96 427 10/25/2022     Lab Results   Component Value Date    FOLATE 10.50 10/25/2022     Lab Results   Component Value Date    TSH 4.680 (H) 10/25/2022       Assessment / Plan      Assessment/Plan:   Diagnoses and all orders for this visit:    1. History of stroke (Primary)    2. Mixed hyperlipidemia    3. Obesity with serious comorbidity, unspecified classification, unspecified obesity type    4. Primary hypertension    5. Type 2 diabetes mellitus with hypoglycemia without coma, with long-term current use of insulin    6.  Hyperlipidemia due to type 2 diabetes mellitus       -Right cerebral hemispheric stroke in October/2022, likely cardioembolism in the setting of subtherapeutic INR  -Continue warfarin as directed  -Continue aspirin 81 mg daily  -Patient reports his lipid lowering medications have been adjusted due to a very low LDL  -Reinforced diet/exercise  -BP today 128/75, similar readings at home, except diastolic is usually little lower  -Labs per PCP, his A1c continues to improve, now 7.6 compared to 8.4 1 year ago      Discussed the importance of medication compliance and lifestyle modifications (adequate blood pressure control, adequate control of hyperlipidemia, adequate glycemic control, increase physical activity, and healthy diet) to help reduce the risk of future cerebrovascular events.  Also discussed the signs symptoms that would warrant the patient return back to the emergency department including unilateral weakness, unilateral numbness, visual disturbances, loss of balance, speech difficulties, and/or a sudden severe headache.      Blood Pressure control to < 130/80  Goal LDL <70-recommend high dose statins  Serum Glucose < 140  Call 911 for any stroke symptoms    Follow Up:   Return in about 1 year (around 6/2/2024).      [facility]    This document has been electronically signed by RED Vasquez  June 2, 2023 09:12 EDT    Please note that portions of this note were completed with a voice recognition program. Efforts were made to edit dictation, but occasionally words are mistranscribed.

## 2023-05-30 ENCOUNTER — TELEPHONE (OUTPATIENT)
Dept: FAMILY MEDICINE CLINIC | Facility: CLINIC | Age: 68
End: 2023-05-30

## 2023-05-30 NOTE — TELEPHONE ENCOUNTER
Called patient in regards to overdue lab work. Patient said he was told to do it at the end of June.

## 2023-06-01 ENCOUNTER — ANTICOAGULATION VISIT (OUTPATIENT)
Dept: FAMILY MEDICINE CLINIC | Facility: CLINIC | Age: 68
End: 2023-06-01

## 2023-06-01 DIAGNOSIS — Z95.2 H/O MECHANICAL AORTIC VALVE REPLACEMENT: Primary | ICD-10-CM

## 2023-06-01 LAB — INR PPP: 2.4 (ref 3–3.5)

## 2023-06-01 PROCEDURE — 85610 PROTHROMBIN TIME: CPT | Performed by: FAMILY MEDICINE

## 2023-06-01 PROCEDURE — 36416 COLLJ CAPILLARY BLOOD SPEC: CPT | Performed by: FAMILY MEDICINE

## 2023-06-02 ENCOUNTER — OFFICE VISIT (OUTPATIENT)
Dept: NEUROLOGY | Facility: CLINIC | Age: 68
End: 2023-06-02

## 2023-06-02 VITALS
HEART RATE: 60 BPM | WEIGHT: 249 LBS | SYSTOLIC BLOOD PRESSURE: 128 MMHG | HEIGHT: 72 IN | OXYGEN SATURATION: 96 % | TEMPERATURE: 97.8 F | DIASTOLIC BLOOD PRESSURE: 75 MMHG | BODY MASS INDEX: 33.72 KG/M2

## 2023-06-02 DIAGNOSIS — Z86.73 HISTORY OF STROKE: Primary | ICD-10-CM

## 2023-06-02 DIAGNOSIS — E11.69 HYPERLIPIDEMIA DUE TO TYPE 2 DIABETES MELLITUS: ICD-10-CM

## 2023-06-02 DIAGNOSIS — I10 PRIMARY HYPERTENSION: ICD-10-CM

## 2023-06-02 DIAGNOSIS — E66.9 OBESITY WITH SERIOUS COMORBIDITY, UNSPECIFIED CLASSIFICATION, UNSPECIFIED OBESITY TYPE: ICD-10-CM

## 2023-06-02 DIAGNOSIS — E78.2 MIXED HYPERLIPIDEMIA: ICD-10-CM

## 2023-06-02 DIAGNOSIS — E11.649 TYPE 2 DIABETES MELLITUS WITH HYPOGLYCEMIA WITHOUT COMA, WITH LONG-TERM CURRENT USE OF INSULIN: ICD-10-CM

## 2023-06-02 DIAGNOSIS — E78.5 HYPERLIPIDEMIA DUE TO TYPE 2 DIABETES MELLITUS: ICD-10-CM

## 2023-06-02 DIAGNOSIS — Z79.4 TYPE 2 DIABETES MELLITUS WITH HYPOGLYCEMIA WITHOUT COMA, WITH LONG-TERM CURRENT USE OF INSULIN: ICD-10-CM

## 2023-06-08 ENCOUNTER — ANTICOAGULATION VISIT (OUTPATIENT)
Dept: FAMILY MEDICINE CLINIC | Facility: CLINIC | Age: 68
End: 2023-06-08
Payer: MEDICARE

## 2023-06-08 DIAGNOSIS — Z95.2 H/O MECHANICAL AORTIC VALVE REPLACEMENT: Primary | ICD-10-CM

## 2023-06-08 LAB — INR PPP: 3.4 (ref 3–3.5)

## 2023-06-08 PROCEDURE — 85610 PROTHROMBIN TIME: CPT | Performed by: FAMILY MEDICINE

## 2023-06-08 PROCEDURE — 36416 COLLJ CAPILLARY BLOOD SPEC: CPT | Performed by: FAMILY MEDICINE

## 2023-06-08 NOTE — TELEPHONE ENCOUNTER
Pt needs a script wrote for his Free[employer] Suellen 2 sensors. States that he only has 2 days left and the doctor he had at the hospital is gone. The pt forgot where it comes from and will call back with the pharmacy.

## 2023-06-12 ENCOUNTER — ANTICOAGULATION VISIT (OUTPATIENT)
Dept: FAMILY MEDICINE CLINIC | Facility: CLINIC | Age: 68
End: 2023-06-12
Payer: MEDICARE

## 2023-06-12 DIAGNOSIS — Z95.2 H/O MECHANICAL AORTIC VALVE REPLACEMENT: Primary | ICD-10-CM

## 2023-06-12 LAB — INR PPP: 1.6 (ref 3–3.5)

## 2023-06-12 PROCEDURE — 36416 COLLJ CAPILLARY BLOOD SPEC: CPT | Performed by: FAMILY MEDICINE

## 2023-06-12 PROCEDURE — 85610 PROTHROMBIN TIME: CPT | Performed by: FAMILY MEDICINE

## 2023-06-13 NOTE — TELEPHONE ENCOUNTER
Benitez said they just told him they weren't going to be doing the refill on them anymore. If you know what office it is I'll call to find out.

## 2023-06-15 ENCOUNTER — ANTICOAGULATION VISIT (OUTPATIENT)
Dept: FAMILY MEDICINE CLINIC | Facility: CLINIC | Age: 68
End: 2023-06-15
Payer: MEDICARE

## 2023-06-15 DIAGNOSIS — Z95.2 H/O MECHANICAL AORTIC VALVE REPLACEMENT: Primary | ICD-10-CM

## 2023-06-15 LAB — INR PPP: 2.3 (ref 3–3.5)

## 2023-06-15 PROCEDURE — 36416 COLLJ CAPILLARY BLOOD SPEC: CPT | Performed by: FAMILY MEDICINE

## 2023-06-15 PROCEDURE — 85610 PROTHROMBIN TIME: CPT | Performed by: FAMILY MEDICINE

## 2023-06-16 NOTE — TELEPHONE ENCOUNTER
When I spoke with him yesterday he said he had gotten notification that his supply was being shipped but he said that they told him at endocrinology that he was to see his PCP from now on for his supply.

## 2023-06-16 NOTE — TELEPHONE ENCOUNTER
Saskia Farris, the NP at Endocrinology. But it looks like someone refilled it last week, hopefully whoever is covering her. I would have Benitez call [facility] and see if it's been refilled.

## 2023-06-19 ENCOUNTER — ANTICOAGULATION VISIT (OUTPATIENT)
Dept: FAMILY MEDICINE CLINIC | Facility: CLINIC | Age: 68
End: 2023-06-19
Payer: MEDICARE

## 2023-06-19 DIAGNOSIS — Z95.2 H/O MECHANICAL AORTIC VALVE REPLACEMENT: Primary | ICD-10-CM

## 2023-06-19 LAB — INR PPP: 3.1 (ref 3–3.5)

## 2023-06-19 PROCEDURE — 36416 COLLJ CAPILLARY BLOOD SPEC: CPT | Performed by: FAMILY MEDICINE

## 2023-06-19 PROCEDURE — 85610 PROTHROMBIN TIME: CPT | Performed by: FAMILY MEDICINE

## 2023-07-26 ENCOUNTER — ANTICOAGULATION VISIT (OUTPATIENT)
Dept: FAMILY MEDICINE CLINIC | Facility: CLINIC | Age: 68
End: 2023-07-26
Payer: MEDICARE

## 2023-07-26 DIAGNOSIS — Z95.2 H/O MECHANICAL AORTIC VALVE REPLACEMENT: Primary | ICD-10-CM

## 2023-07-26 LAB — INR PPP: 3.3 (ref 3–3.5)

## 2023-07-26 PROCEDURE — 85610 PROTHROMBIN TIME: CPT | Performed by: FAMILY MEDICINE

## 2023-07-26 PROCEDURE — 36416 COLLJ CAPILLARY BLOOD SPEC: CPT | Performed by: FAMILY MEDICINE

## 2023-08-10 ENCOUNTER — OFFICE VISIT (OUTPATIENT)
Dept: CARDIOLOGY | Facility: CLINIC | Age: 68
End: 2023-08-10
Payer: MEDICARE

## 2023-08-10 ENCOUNTER — ANTICOAGULATION VISIT (OUTPATIENT)
Dept: FAMILY MEDICINE CLINIC | Facility: CLINIC | Age: 68
End: 2023-08-10
Payer: MEDICARE

## 2023-08-10 VITALS
HEART RATE: 60 BPM | BODY MASS INDEX: 34.27 KG/M2 | HEIGHT: 72 IN | OXYGEN SATURATION: 97 % | DIASTOLIC BLOOD PRESSURE: 74 MMHG | WEIGHT: 253 LBS | SYSTOLIC BLOOD PRESSURE: 122 MMHG

## 2023-08-10 DIAGNOSIS — Z95.2 H/O MECHANICAL AORTIC VALVE REPLACEMENT: Chronic | ICD-10-CM

## 2023-08-10 DIAGNOSIS — E11.65 TYPE 2 DIABETES MELLITUS WITH HYPERGLYCEMIA, WITH LONG-TERM CURRENT USE OF INSULIN: ICD-10-CM

## 2023-08-10 DIAGNOSIS — Z79.4 TYPE 2 DIABETES MELLITUS WITH HYPERGLYCEMIA, WITH LONG-TERM CURRENT USE OF INSULIN: ICD-10-CM

## 2023-08-10 DIAGNOSIS — E78.5 HYPERLIPIDEMIA LDL GOAL <70: Chronic | ICD-10-CM

## 2023-08-10 DIAGNOSIS — Z95.2 H/O MECHANICAL AORTIC VALVE REPLACEMENT: Primary | ICD-10-CM

## 2023-08-10 DIAGNOSIS — I25.10 CORONARY ARTERY DISEASE INVOLVING NATIVE CORONARY ARTERY OF NATIVE HEART WITHOUT ANGINA PECTORIS: Primary | Chronic | ICD-10-CM

## 2023-08-10 DIAGNOSIS — I10 ESSENTIAL HYPERTENSION: Chronic | ICD-10-CM

## 2023-08-10 DIAGNOSIS — I50.32 CHRONIC DIASTOLIC CONGESTIVE HEART FAILURE: ICD-10-CM

## 2023-08-10 LAB — INR PPP: 5.3 (ref 2–3)

## 2023-08-10 NOTE — PROGRESS NOTES
[facility] CARDIOLOGY  [address] [address]  [place] [address]  Phone: [phone]  Fax: [phone]    08/10/2023    Chief Complaint   Patient presents with    Coronary Artery Disease        History:     Benitez Miranda is a 67 y.o. male presenting for  follow-up evaluation   Clinically stable from cardiac standpoint   Symptoms:  CP no  SOB no    Orthopnea No  Lower extremity edema no   Palpitations no   Compliant with medications yes  Claudication no      Past Medical History:   Diagnosis Date    Anxiety     Arthritis     CAD (coronary artery disease)     x1 stent; pulmonary HTN; EF 50-55%; rheumatic valve; MV stenosis    CHF (congestive heart failure)     CKD (chronic kidney disease), stage III     COPD (chronic obstructive pulmonary disease)     Depression     Diabetes mellitus     H/O mechanical aortic valve replacement     History of tobacco abuse     Hyperlipidemia     Hypertension     Ischemic heart disease     Kidney failure     third stage    Low back pain     Obesity     BMI 36.    Stroke     Valvular heart disease        Past Surgical History:   Procedure Laterality Date    AORTIC VALVE REPAIR/REPLACEMENT      CARDIAC CATHETERIZATION      CARDIAC SURGERY      valve on aortic    CARDIAC SURGERY      stent     CATARACT EXTRACTION      COLONOSCOPY      CORONARY STENT PLACEMENT      CYSTOSCOPY URETEROSCOPY LASER LITHOTRIPSY Left 2020    Procedure: CYSTOSCOPY URETEROSCOPY LASER LITHOTRIPSY WITH STENT PLACEMENT;  Surgeon: Jonah Montgomery MD;  Location: [facility];  Service: Urology;  Laterality: Left;    KIDNEY STONE SURGERY          Past Social History:  Social History     Socioeconomic History    Marital status:     Number of children: 2   Tobacco Use    Smoking status: Former     Packs/day: 1.00     Years: 30.00     Pack years: 30.00     Types: Pipe, Cigars, Cigarettes     Quit date: 2016     Years since quittin.1    Smokeless  tobacco: Never    Tobacco comments:     currently smokes a pipe   Vaping Use    Vaping Use: Never used   Substance and Sexual Activity    Alcohol use: No    Drug use: No    Sexual activity: Defer       Past Family History:  Family History   Problem Relation Age of Onset    Cancer Mother         breast    COPD Father     Heart attack Father 74    Diabetes Paternal Grandmother         both legs removed       Review of Systems:   ROS       Current Outpatient Medications   Medication Sig Dispense Refill    albuterol (ACCUNEB) 1.25 MG/3ML nebulizer solution Take 3 mL by nebulization Every 6 (Six) Hours As Needed for Wheezing (For Dx: J44.9 - Chronic obstructive pulmonary disease, unspecified). 240 mL 11    amLODIPine (NORVASC) 10 MG tablet Take 1 tablet by mouth Daily. 90 tablet 1    aspirin 81 MG EC tablet Take 1 tablet by mouth Daily. 30 tablet 0    atorvastatin (LIPITOR) 80 MG tablet Take 1 tablet by mouth Every Night. 90 tablet 1    cetirizine (zyrTEC) 10 MG tablet Take 1 tablet by mouth Daily As Needed for Allergies.      Continuous Blood Gluc Sensor (FreeStyle Suellen 2 Sensor) misc 1 Device Every 14 (Fourteen) Days.      insulin glargine (LANTUS, SEMGLEE) 100 UNIT/ML injection Inject 25 Units under the skin into the appropriate area as directed 2 (Two) Times a Day.      insulin lispro (humaLOG) 100 UNIT/ML injection Per Gnosticism 0-7 sliding scale with each meal      losartan (Cozaar) 25 MG tablet Take 1 tablet by mouth Daily. 30 tablet 11    metoprolol succinate XL (TOPROL-XL) 50 MG 24 hr tablet TAKE ONE TABLET BY MOUTH DAILY 90 tablet 1    Semaglutide,0.25 or 0.5MG/DOS, (Ozempic, 0.25 or 0.5 MG/DOSE,) 2 MG/1.5ML solution pen-injector Inject 0.5 mg under the skin into the appropriate area as directed 1 (One) Time Per Week. Getting from       sertraline (ZOLOFT) 100 MG tablet Take 1.5 tablets by mouth Daily. 135 tablet 1    warfarin (Coumadin) 1 MG tablet Take 7 mg by mouth daily (6 mg +1 mg). (Patient  "taking differently: 9 mg daily) 30 tablet 0    warfarin (Coumadin) 2 MG tablet Take 1 tablet by mouth Every Night. 90 tablet 1    warfarin (Coumadin) 3 MG tablet Take 1 tablet by mouth Every Night. 90 tablet 1    warfarin (COUMADIN) 6 MG tablet Take 1 tablet by mouth Every Night. New dose: 7 mg/day (Patient taking differently: Take 1 tablet by mouth Every Night. New dose: 9 mg/day) 90 tablet 1    warfarin (Coumadin) 6 MG tablet Take 1 tablet by mouth Every Night. 90 tablet 1     No current facility-administered medications for this visit.        Allergies   Allergen Reactions    Penicillins Swelling     Tongue swelled    Ace Inhibitors Angioedema    Contrast Dye (Echo Or Unknown Ct/Mr) Rash       Objective     /74 (BP Location: Left arm, Patient Position: Sitting, Cuff Size: Adult)   Pulse 60   Ht 182.9 cm (72\")   Wt 115 kg (253 lb)   SpO2 97%   BMI 34.31 kg/mý     Physical Exam       DATA:  Results for orders placed during the hospital encounter of 11/18/20    SCANNED - TELEMETRY     Results for orders placed during the hospital encounter of 10/28/22    Adult Transthoracic Echo Complete W/ Cont if Necessary Per Protocol (With Agitated Saline)    Interpretation Summary    The left ventricular cavity is mildly dilated.    Left ventricular wall thickness is consistent with mild concentric hypertrophy.    Left ventricular systolic function is normal. Left ventricular ejection fraction appears to be 56 - 60%.    Left ventricular diastolic function is consistent with (grade I) impaired relaxation.    There is a mechanical aortic valve prosthesis present. The aortic valve peak and mean gradients are within defined limits. The prosthetic aortic valve is grossly normal.    Moderate calcific  mitral valve stenosis is present. ( Peak PG= 17 mm Hg and mean PG = 8 mm Hg)).  Unchanged since the study done 10/5/2021.    Moderate mitral valve regurgitation is present.    Mild pulmonary hypertension is present.    Saline " test is negative for the evidence of shunt at interatrial septum.    There is no evidence of pericardial effusion.   Results for orders placed in visit on 14    Stress test with myocardial perfusion    Narrative  Patient:      LAURA VELAZQUEZ  [facility] Rec#:     [id number]               :          1955  Date:         2014            Age:          58y  Account#:     [id number]            Height:       183.1 cm / 72.1 in  Accession#:   [id number]               Sex:          M  Weight:       120.45 kg / 265.5 lbs  BSA:          2.41  Admit Date#:  2014  Loc:          exam room 2    Referring:    Fabio Walker MD  Reading:      Yoshi Weldon MD  CV Techn[facility]  Nuclear Med TeBARRAPHAEL ALVAREZ [facility]  Nuclear Med [facility]am[facility]  ______________________________________________________________________    Combined Nuclear/Stress Test    ICD Codes:      786.50 Chest pain, unspecified    Checklists:   Patient verbally identified self   Patient identified by ID band   Patient consent obtained in lab   Procedure verified and explained to patient   History and physical performed   Last Caffeine 2014 18:00:00   Last Meal 2014 18:00:00    History of:    Dyslipidemia Lipid Therapy Hypertension Renal  Failure Coronary Artery Disease (CAD) Family History of CAD PCN / IVP  DYE / SULFA Diabetes, managed with oral agents Diabetes for 11 years  METFORMIN,LEVEMIR Current smoker Typical Angina Dyspnea at rest  Paroxysmal nocturnal dyspnea Dyspnea with minimal exertion Dyspnea  with normal daily activity Dyspnea with more than normal activityNo  History of: Reactive Airway Disease Chronic Lung Disease Dialysis  CHF Peripheral Vascular Disease Cerebrovascular Disease Family  History of CHF  Cardiac Events and Interventions:  Most recent stent placed in . Stent in LAD. Most recent valvular  surgery performed in .    Most Recent Prior Cardiac Testing :   Pharmacologic Stress Test (date  unknown)    Current Clinical Presentation:  The patient had no chest pain on presentation. The patient has no recent  history of CHF.    Medications I :    Amlodipine,Victoza,Levemir,Eplercrone,Cetirizine,Metformin,Meclizine,Zol  pidem,Alprazolam.   Coumadin (Warfarin)   Hydrochlorthiazide (HCTZ)   Metoprolol (Lopressor, Toprol)   Pravastatin (Pravachol)        Discharge At Completion of Exam :   Home    Baseline ECG:  NSR and IVCD  Pharmacologic Protocol:  A 400 mcg dose of Regadenoson was administered by intravenous bolus  injection.    Stress ECG :  Inconclusive due to baseline changes.  Recovery ECG:  3 minutes into recovery. No medications were administered during the  recovery period. No changes  Hemodynamics  Rest        Stress        Recovery  SBP         103 mmHg    145 mmHg      143 mmHg  DBP         83 mmHg     79 mmHg       84 mmHg  HR =       65 bpm      84 bpm        74 bpm  %MPHR                   52 %    Imaging Protocol:  One day stress-rest imaging protocol was followed using Tc-99m sestamibi  (Cardiolite) injected intravenously. For the rest portion of the study,  32 mCi of the radiopharmaceutical was administered at 01/30/2014  10:57:56. For the stress portion of the study, 10.7 mCi was administered  at 01/30/2014 09:00:10.    Perfusion Interpretation:  TID Ratio 1.01. There was a small, fixed defect in the apex segment(s).  The extent of this perfusion defect was mild.    Wall Motion Interpretation:  Gated imaging under rest conditions demonstrated normal wallmotion.  The patient's calculated rest LVEF was 59%. The patient's end diastolic  volume was 183ml. The patient's end systolic volume was 74ml.    Stress Test Conclusion:  There was a normal heart rate response, with a normal blood pressure  response.  The patient experienced no chest pain. Symptoms noted during  stress include: dyspnea. Stress test is inconclusive by ECG criteria due  baseline changes.  Nuclear Cardiology Conclusion:  No  evidence of Lexiscan induced ishemia. A small ssized fixed defect in  apical segment is an artifact. Normal LV systolic function and wall  motion    Electronically signed by: Yoshi Weldon MD on 2014 19:01:19  Thank you for the courtesy of this referral.   Results for orders placed in visit on 14    Stress test with myocardial perfusion    Narrative  Patient:      LAURA VELAZQUEZ  [facility] Rec#:     [id number]               :          1955  Date:         2014            Age:          58y  Account#:     [id number]            Height:       183.1 cm / 72.1 in  Accession#:   [id number]               Sex:          M  Weight:       120.45 kg / 265.5 lbs  BSA:          2.41  Admit Date#:  2014  Loc:          exam room 2    Referring:    Fabio Walker MD  Reading:      Yoshi Weldon MD   Technolog[facility]  Nuclear Med TeBARRAPHAEL ALVAREZ [facility]  Nuclear Med [facility]amper [facility]  ______________________________________________________________________    Combined Nuclear/Stress Test    ICD Codes:      786.50 Chest pain, unspecified    Checklists:   Patient verbally identified self   Patient identified by ID band   Patient consent obtained in lab   Procedure verified and explained to patient   History and physical performed   Last Caffeine 2014 18:00:00   Last Meal 2014 18:00:00    History of:    Dyslipidemia Lipid Therapy Hypertension Renal  Failure Coronary Artery Disease (CAD) Family History of CAD PCN / IVP  DYE / SULFA Diabetes, managed with oral agents Diabetes for 11 years  METFORMIN,LEVEMIR Current smoker Typical Angina Dyspnea at rest  Paroxysmal nocturnal dyspnea Dyspnea with minimal exertion Dyspnea  with normal daily activity Dyspnea with more than normal activityNo  History of: Reactive Airway Disease Chronic Lung Disease Dialysis  CHF Peripheral Vascular Disease Cerebrovascular Disease Family  History of CHF  Cardiac Events and Interventions:  Most recent stent  placed in 2007. Stent in LAD. Most recent valvular  surgery performed in 1997.    Most Recent Prior Cardiac Testing :   Pharmacologic Stress Test (date unknown)    Current Clinical Presentation:  The patient had no chest pain on presentation. The patient has no recent  history of CHF.    Medications I :    Amlodipine,Victoza,Levemir,Eplercrone,Cetirizine,Metformin,Meclizine,Zol  pidem,Alprazolam.   Coumadin (Warfarin)   Hydrochlorthiazide (HCTZ)   Metoprolol (Lopressor, Toprol)   Pravastatin (Pravachol)        Discharge At Completion of Exam :   Home    Baseline ECG:  NSR and IVCD  Pharmacologic Protocol:  A 400 mcg dose of Regadenoson was administered by intravenous bolus  injection.    Stress ECG :  Inconclusive due to baseline changes.  Recovery ECG:  3 minutes into recovery. No medications were administered during the  recovery period. No changes  Hemodynamics  Rest        Stress        Recovery  SBP         103 mmHg    145 mmHg      143 mmHg  DBP         83 mmHg     79 mmHg       84 mmHg  HR =       65 bpm      84 bpm        74 bpm  %MPHR                   52 %    Imaging Protocol:  One day stress-rest imaging protocol was followed using Tc-99m sestamibi  (Cardiolite) injected intravenously. For the rest portion of the study,  32 mCi of the radiopharmaceutical was administered at 01/30/2014  10:57:56. For the stress portion of the study, 10.7 mCi was administered  at 01/30/2014 09:00:10.    Perfusion Interpretation:  TID Ratio 1.01. There was a small, fixed defect in the apex segment(s).  The extent of this perfusion defect was mild.    Wall Motion Interpretation:  Gated imaging under rest conditions demonstrated normal wallmotion.  The patient's calculated rest LVEF was 59%. The patient's end diastolic  volume was 183ml. The patient's end systolic volume was 74ml.    Stress Test Conclusion:  There was a normal heart rate response, with a normal blood pressure  response.  The patient experienced no chest pain.  Symptoms noted during  stress include: dyspnea. Stress test is inconclusive by ECG criteria due  baseline changes.  Nuclear Cardiology Conclusion:  No evidence of Lexiscan induced ishemia. A small ssized fixed defect in  apical segment is an artifact. Normal LV systolic function and wall  motion    Electronically signed by: Yoshi Weldon MD on 01/30/2014 19:01:19  Thank you for the courtesy of this referral.     Procedures     Lab Results   Component Value Date    CHOL 68 06/23/2023    CHOL 63 04/10/2023    CHOL 159 10/26/2022    CHLPL 167 07/18/2016    CHLPL 135 06/08/2015     Lab Results   Component Value Date    TRIG 105 06/23/2023    TRIG 57 04/10/2023    TRIG 136 10/26/2022     Lab Results   Component Value Date    HDL 39 (L) 06/23/2023    HDL 45 04/10/2023    HDL 39 (L) 10/26/2022     Lab Results   Component Value Date    LDL 9 06/23/2023    LDL <5 04/10/2023    LDL 96 10/26/2022       Lab Results   Component Value Date    TSH 4.680 (H) 10/25/2022               Invalid input(s): LABALBU, PROT        Assessment and Plan    Assessment and Plan    Problem List Items Addressed This Visit    None          Recommended increase activity to 30 minutes of walking daily, most days of the week    Thank you for allowing me to participate in the care of Benitez Miranda. Feel free to contact me directly with any further questions or concerns.          Oswaldo Ahuja MD, FACC  Interventional Cardiology

## 2023-08-14 ENCOUNTER — ANTICOAGULATION VISIT (OUTPATIENT)
Dept: FAMILY MEDICINE CLINIC | Facility: CLINIC | Age: 68
End: 2023-08-14
Payer: MEDICARE

## 2023-08-14 DIAGNOSIS — Z79.01 ANTICOAGULANT LONG-TERM USE: ICD-10-CM

## 2023-08-14 DIAGNOSIS — Z95.2 H/O MECHANICAL AORTIC VALVE REPLACEMENT: Primary | ICD-10-CM

## 2023-08-14 DIAGNOSIS — Z51.81 ANTICOAGULATION GOAL OF INR 2.5 TO 3.5: ICD-10-CM

## 2023-08-14 DIAGNOSIS — Z79.01 ANTICOAGULATION GOAL OF INR 2.5 TO 3.5: ICD-10-CM

## 2023-08-14 LAB — INR PPP: 1.8 (ref 3–3.5)

## 2023-08-14 PROCEDURE — 85610 PROTHROMBIN TIME: CPT | Performed by: FAMILY MEDICINE

## 2023-08-14 PROCEDURE — 36416 COLLJ CAPILLARY BLOOD SPEC: CPT | Performed by: FAMILY MEDICINE

## 2023-08-15 ENCOUNTER — OFFICE VISIT (OUTPATIENT)
Dept: FAMILY MEDICINE CLINIC | Facility: CLINIC | Age: 68
End: 2023-08-15
Payer: MEDICARE

## 2023-08-15 VITALS
DIASTOLIC BLOOD PRESSURE: 72 MMHG | OXYGEN SATURATION: 97 % | BODY MASS INDEX: 34.13 KG/M2 | HEART RATE: 65 BPM | SYSTOLIC BLOOD PRESSURE: 126 MMHG | TEMPERATURE: 97.1 F | HEIGHT: 72 IN | WEIGHT: 252 LBS

## 2023-08-15 DIAGNOSIS — L30.9 DERMATITIS: Primary | ICD-10-CM

## 2023-08-15 DIAGNOSIS — Z79.4 TYPE 2 DIABETES MELLITUS WITH STAGE 3 CHRONIC KIDNEY DISEASE, WITH LONG-TERM CURRENT USE OF INSULIN, UNSPECIFIED WHETHER STAGE 3A OR 3B CKD: ICD-10-CM

## 2023-08-15 DIAGNOSIS — E11.22 TYPE 2 DIABETES MELLITUS WITH STAGE 3 CHRONIC KIDNEY DISEASE, WITH LONG-TERM CURRENT USE OF INSULIN, UNSPECIFIED WHETHER STAGE 3A OR 3B CKD: ICD-10-CM

## 2023-08-15 DIAGNOSIS — I15.2 HYPERTENSION ASSOCIATED WITH DIABETES: ICD-10-CM

## 2023-08-15 DIAGNOSIS — N18.30 TYPE 2 DIABETES MELLITUS WITH STAGE 3 CHRONIC KIDNEY DISEASE, WITH LONG-TERM CURRENT USE OF INSULIN, UNSPECIFIED WHETHER STAGE 3A OR 3B CKD: ICD-10-CM

## 2023-08-15 DIAGNOSIS — N18.30 STAGE 3 CHRONIC KIDNEY DISEASE, UNSPECIFIED WHETHER STAGE 3A OR 3B CKD: ICD-10-CM

## 2023-08-15 DIAGNOSIS — E11.59 HYPERTENSION ASSOCIATED WITH DIABETES: ICD-10-CM

## 2023-08-15 PROCEDURE — 36415 COLL VENOUS BLD VENIPUNCTURE: CPT | Performed by: FAMILY MEDICINE

## 2023-08-15 PROCEDURE — 83036 HEMOGLOBIN GLYCOSYLATED A1C: CPT | Performed by: FAMILY MEDICINE

## 2023-08-15 PROCEDURE — 80053 COMPREHEN METABOLIC PANEL: CPT | Performed by: FAMILY MEDICINE

## 2023-08-15 RX ORDER — CLOBETASOL PROPIONATE 0.5 MG/G
1 CREAM TOPICAL 2 TIMES DAILY
Qty: 60 G | Refills: 2 | Status: SHIPPED | OUTPATIENT
Start: 2023-08-15

## 2023-08-15 RX ORDER — METOPROLOL SUCCINATE 50 MG/1
50 TABLET, EXTENDED RELEASE ORAL DAILY
Qty: 90 TABLET | Refills: 1 | Status: SHIPPED | OUTPATIENT
Start: 2023-08-15

## 2023-08-15 RX ORDER — PEN NEEDLE, DIABETIC 30 GX3/16"
5 NEEDLE, DISPOSABLE MISCELLANEOUS DAILY
Qty: 500 EACH | Refills: 3 | Status: SHIPPED | OUTPATIENT
Start: 2023-08-15

## 2023-08-15 NOTE — PROGRESS NOTES
"Chief Complaint  Hyperlipidemia (/)    Subjective        Benitez Miranda presents to [facility]  History of Present Illness    The patient is a 68-year-old male with medical conditions significant for type 2 diabetes, hypertension, and recent stroke x2 who presents to clinic today for follow-up.    The patient's blood pressure was low yesterday.    He would like a refill on his clobetasol cream. He needs a refill on his metoprolol. He denies any problems getting his medications. He has a 1.5 year supply of insulin.    He does not use diabetic shoes.    Objective   Vital Signs:  /72 (BP Location: Right arm, Patient Position: Sitting, Cuff Size: Adult)   Pulse 65   Temp 97.1 øF (36.2 øC) (Temporal)   Ht 182.9 cm (72\")   Wt 114 kg (252 lb)   SpO2 97%   BMI 34.18 kg/mý   Estimated body mass index is 34.18 kg/mý as calculated from the following:    Height as of this encounter: 182.9 cm (72\").    Weight as of this encounter: 114 kg (252 lb).           Physical Exam  Cardiovascular:      Pulses:           Dorsalis pedis pulses are 1+ on the right side and 1+ on the left side.        Posterior tibial pulses are 1+ on the right side and 1+ on the left side.   Feet:      Right foot:      Protective Sensation: 5 sites tested.  4 sites sensed.      Skin integrity: Callus and dry skin present.      Toenail Condition: Right toenails are normal.      Left foot:      Protective Sensation: 5 sites tested.  4 sites sensed.      Skin integrity: Callus and dry skin present.      Toenail Condition: Left toenails are normal.      Comments: Diabetic Foot Exam Performed and Monofilament Test Performed        Gen: Patient in NAD. Pleasant and answers appropriately. A&Ox3.    Skin: Warm and dry with normal turgor. No purpura, rashes, or unusual pigmentation noted. Hair is normal in appearance and distribution.    HEENT: NC/AT. No lesions noted. Conjunctiva clear, sclera non-icteric. PERRL. EOMI " without nystagmus or strabismus. Fundi appear benign. No hemorrhages or exudates of eyes. Auditory canals are patent bilaterally without lesions. TMs intact,  non-erythematous, non-bulging without lesions. Nasal mucosa pink, non-erythematous, and non-edematous. Frontal and maxillary sinuses are nontender. O/P non-erythematous and moist without exudate.    Neck: Supple without lymph nodes palpated. FROM.     Lungs: CTA B/L without rales, rhonchi, crackles, or wheezes.    Heart: RRR. S1 and S2 normal. No S3 or S4. No MRGT.    Abd: Soft, non-tender, non-distended. (+)BSx4 quadrants.     Extrem: No CCE. Radial pulses 2+/4 and equal B/L. FROMx4.  Positive joint tenderness noted.    Neuro: No focal motor/sensory deficits.         Assessment and Plan   Patient is a 68-year-old male who presents to clinic for medical follow-up.    Diagnoses and all orders for this visit:    1. Dermatitis (Primary)  -     clobetasol (TEMOVATE) 0.05 % cream; Apply 1 application  topically to the appropriate area as directed 2 (Two) Times a Day.  Dispense: 60 g; Refill: 2    2. Stage 3 chronic kidney disease, unspecified whether stage 3a or 3b CKD  -     Comprehensive Metabolic Panel; Future  -     Comprehensive Metabolic Panel    3. Type 2 diabetes mellitus with stage 3 chronic kidney disease, with long-term current use of insulin, unspecified whether stage 3a or 3b CKD  -     Insulin Pen Needle (Pen Needles) 32G X 4 MM misc; 5 application Daily. Patient uses lantus twice a day, humalog three times a day, and ozempic weekly. Needs pen needles for all.  Dispense: 500 each; Refill: 3  -     Comprehensive Metabolic Panel; Future  -     Hemoglobin A1c; Future  -     MicroAlbumin, Urine, Random - Urine, Clean Catch; Future  -     Comprehensive Metabolic Panel  -     Hemoglobin A1c    4. Hypertension associated with diabetes  -     metoprolol succinate XL (TOPROL-XL) 50 MG 24 hr tablet; Take 1 tablet by mouth Daily.  Dispense: 90 tablet; Refill:  1  -     Comprehensive Metabolic Panel; Future  -     Comprehensive Metabolic Panel    1. Type 2 diabetes mellitus.  - The patient's diabetes is well controlled at this time. He will continue with his current medication regimen. He will receive a prescription for needles to cover his Lantus and Ozempic.     2. Lower extremity pruritis.  - Refill for clobetasol was ordered.         Follow Up   Return in about 3 months (around 11/15/2023), or LABS.  Patient was given instructions and counseling regarding his condition or for health maintenance advice. Please see specific information pulled into the AVS if appropriate.     Transcribed from ambient dictation for Mesha Christina MD by Roscoe Hawk.  08/15/23   15:11 EDT    Patient or patient representative verbalized consent to the visit recording.  I have personally performed the services described in this document as transcribed by the above individual, and it is both accurate and complete.

## 2023-08-16 LAB
ALBUMIN SERPL-MCNC: 4.2 G/DL (ref 3.5–5.2)
ALBUMIN/GLOB SERPL: 1.3 G/DL
ALP SERPL-CCNC: 71 U/L (ref 39–117)
ALT SERPL W P-5'-P-CCNC: 21 U/L (ref 1–41)
ANION GAP SERPL CALCULATED.3IONS-SCNC: 9.4 MMOL/L (ref 5–15)
AST SERPL-CCNC: 22 U/L (ref 1–40)
BILIRUB SERPL-MCNC: 0.7 MG/DL (ref 0–1.2)
BUN SERPL-MCNC: 23 MG/DL (ref 8–23)
BUN/CREAT SERPL: 15.6 (ref 7–25)
CALCIUM SPEC-SCNC: 9.6 MG/DL (ref 8.6–10.5)
CHLORIDE SERPL-SCNC: 104 MMOL/L (ref 98–107)
CO2 SERPL-SCNC: 23.6 MMOL/L (ref 22–29)
CREAT SERPL-MCNC: 1.47 MG/DL (ref 0.76–1.27)
EGFRCR SERPLBLD CKD-EPI 2021: 51 ML/MIN/1.73
GLOBULIN UR ELPH-MCNC: 3.2 GM/DL
GLUCOSE SERPL-MCNC: 147 MG/DL (ref 65–99)
HBA1C MFR BLD: 8.5 % (ref 4.8–5.6)
POTASSIUM SERPL-SCNC: 5 MMOL/L (ref 3.5–5.2)
PROT SERPL-MCNC: 7.4 G/DL (ref 6–8.5)
SODIUM SERPL-SCNC: 137 MMOL/L (ref 136–145)

## 2023-08-17 ENCOUNTER — TELEPHONE (OUTPATIENT)
Dept: FAMILY MEDICINE CLINIC | Facility: CLINIC | Age: 68
End: 2023-08-17

## 2023-08-17 ENCOUNTER — ANTICOAGULATION VISIT (OUTPATIENT)
Dept: FAMILY MEDICINE CLINIC | Facility: CLINIC | Age: 68
End: 2023-08-17
Payer: MEDICARE

## 2023-08-17 DIAGNOSIS — Z79.01 ANTICOAGULANT LONG-TERM USE: Primary | ICD-10-CM

## 2023-08-17 LAB — INR PPP: 1.9 (ref 3–3.5)

## 2023-08-17 NOTE — TELEPHONE ENCOUNTER
Caller would like to get the appt notes for 719 appt. Writer advised caller of callback within 24-72 hours.    Patient Name: Francy Quiroz  Caller Name: Maryuri  Name of Facility: [facility]  Callback Number: [phone]  Best Availability: any  Fax Number: [phone]  ATTN Maryuri  Additional Info: she would like the appt notes faxed from 7/19 appt. With Dr Soriano  Did you confirm the message with the caller?: yes    Thank you,  Delmis Carmona   PA for Clobetasol has been submitted. Currently awaiting determination from the insurance company.

## 2023-08-21 ENCOUNTER — ANTICOAGULATION VISIT (OUTPATIENT)
Dept: FAMILY MEDICINE CLINIC | Facility: CLINIC | Age: 68
End: 2023-08-21
Payer: MEDICARE

## 2023-08-21 DIAGNOSIS — Z95.2 H/O MECHANICAL AORTIC VALVE REPLACEMENT: ICD-10-CM

## 2023-08-21 DIAGNOSIS — Z79.01 ANTICOAGULANT LONG-TERM USE: Primary | ICD-10-CM

## 2023-08-21 LAB — INR PPP: 4 (ref 3–3.5)

## 2023-08-21 PROCEDURE — 36416 COLLJ CAPILLARY BLOOD SPEC: CPT | Performed by: FAMILY MEDICINE

## 2023-08-21 PROCEDURE — 85610 PROTHROMBIN TIME: CPT | Performed by: FAMILY MEDICINE

## 2023-08-23 ENCOUNTER — ANTICOAGULATION VISIT (OUTPATIENT)
Dept: FAMILY MEDICINE CLINIC | Facility: CLINIC | Age: 68
End: 2023-08-23
Payer: MEDICARE

## 2023-08-23 LAB — INR PPP: 2.9 (ref 3–3.5)

## 2023-08-23 PROCEDURE — 36416 COLLJ CAPILLARY BLOOD SPEC: CPT | Performed by: FAMILY MEDICINE

## 2023-08-23 PROCEDURE — 85610 PROTHROMBIN TIME: CPT | Performed by: FAMILY MEDICINE

## 2023-08-24 NOTE — TELEPHONE ENCOUNTER
"Anesthesia Transfer of Care Note    Patient: Daksha Marie    Procedure(s) Performed: Procedure(s) (LRB):  EGD (ESOPHAGOGASTRODUODENOSCOPY) (N/A)    Patient location: PACU    Anesthesia Type: general    Transport from OR: Transported from OR on 2-3 L/min O2 by NC with adequate spontaneous ventilation    Post pain: adequate analgesia    Post assessment: no apparent anesthetic complications    Post vital signs: stable    Level of consciousness: awake    Nausea/Vomiting: no nausea/vomiting    Complications: none    Transfer of care protocol was followed      Last vitals:   Visit Vitals  /66 (BP Location: Left arm, Patient Position: Lying)   Pulse 61   Temp 36.2 °C (97.2 °F)   Resp 16   Ht 5' 5" (1.651 m)   Wt 99.8 kg (220 lb)   SpO2 96%   Breastfeeding No   BMI 36.61 kg/m²     " PA for Clobetasol Cream has been approved through 08/16/2024. I contacted the pt and informed him of the approval and informed him to contact us back with any further problems. The pt verbalized understanding.

## 2023-08-31 ENCOUNTER — ANTICOAGULATION VISIT (OUTPATIENT)
Dept: FAMILY MEDICINE CLINIC | Facility: CLINIC | Age: 68
End: 2023-08-31
Payer: MEDICARE

## 2023-08-31 DIAGNOSIS — Z79.01 ANTICOAGULANT LONG-TERM USE: Primary | ICD-10-CM

## 2023-08-31 LAB — INR PPP: 3.3 (ref 0.2–0.3)

## 2023-09-14 ENCOUNTER — ANTICOAGULATION VISIT (OUTPATIENT)
Dept: FAMILY MEDICINE CLINIC | Facility: CLINIC | Age: 68
End: 2023-09-14
Payer: MEDICARE

## 2023-09-14 DIAGNOSIS — Z95.2 H/O MECHANICAL AORTIC VALVE REPLACEMENT: Primary | ICD-10-CM

## 2023-09-14 DIAGNOSIS — Z79.01 ANTICOAGULANT LONG-TERM USE: ICD-10-CM

## 2023-09-14 LAB — INR PPP: 5.5 (ref 3–3.5)

## 2023-09-18 ENCOUNTER — ANTICOAGULATION VISIT (OUTPATIENT)
Dept: FAMILY MEDICINE CLINIC | Facility: CLINIC | Age: 68
End: 2023-09-18
Payer: MEDICARE

## 2023-09-18 DIAGNOSIS — Z79.01 ANTICOAGULANT LONG-TERM USE: Primary | ICD-10-CM

## 2023-09-18 LAB
INR PPP: 2.3 (ref 3–3.5)
INR PPP: 2.3 (ref 3–3.5)

## 2023-09-18 PROCEDURE — 85610 PROTHROMBIN TIME: CPT | Performed by: FAMILY MEDICINE

## 2023-09-18 PROCEDURE — 36416 COLLJ CAPILLARY BLOOD SPEC: CPT | Performed by: FAMILY MEDICINE

## 2023-09-26 ENCOUNTER — ANTICOAGULATION VISIT (OUTPATIENT)
Dept: FAMILY MEDICINE CLINIC | Facility: CLINIC | Age: 68
End: 2023-09-26
Payer: MEDICARE

## 2023-09-26 DIAGNOSIS — Z95.2 H/O MECHANICAL AORTIC VALVE REPLACEMENT: Primary | ICD-10-CM

## 2023-09-26 LAB — INR PPP: 3 (ref 3–3.5)

## 2023-09-26 PROCEDURE — 85610 PROTHROMBIN TIME: CPT | Performed by: FAMILY MEDICINE

## 2023-09-26 PROCEDURE — 36416 COLLJ CAPILLARY BLOOD SPEC: CPT | Performed by: FAMILY MEDICINE

## 2023-09-29 ENCOUNTER — TELEPHONE (OUTPATIENT)
Dept: FAMILY MEDICINE CLINIC | Facility: CLINIC | Age: 68
End: 2023-09-29
Payer: MEDICARE

## 2023-09-29 NOTE — TELEPHONE ENCOUNTER
09/29/2023 I called patient to let him know his Ozempic (patient assistance medication) ( 5 boxes of Ozempic LOT # [id number] EXP 06/30/2025) was ready for him to  at [facility]. Patient verbalized understanding.

## 2023-10-03 ENCOUNTER — ANTICOAGULATION VISIT (OUTPATIENT)
Dept: FAMILY MEDICINE CLINIC | Facility: CLINIC | Age: 68
End: 2023-10-03
Payer: MEDICARE

## 2023-10-03 DIAGNOSIS — Z79.01 ANTICOAGULANT LONG-TERM USE: Primary | ICD-10-CM

## 2023-10-03 LAB — INR PPP: 3.4 (ref 3–3.5)

## 2023-10-10 ENCOUNTER — ANTICOAGULATION VISIT (OUTPATIENT)
Dept: FAMILY MEDICINE CLINIC | Facility: CLINIC | Age: 68
End: 2023-10-10
Payer: MEDICARE

## 2023-10-10 DIAGNOSIS — Z95.2 H/O MECHANICAL AORTIC VALVE REPLACEMENT: Primary | ICD-10-CM

## 2023-10-10 LAB — INR PPP: 3.1 (ref 3–3.5)

## 2023-10-10 PROCEDURE — 85610 PROTHROMBIN TIME: CPT | Performed by: FAMILY MEDICINE

## 2023-10-10 PROCEDURE — 36416 COLLJ CAPILLARY BLOOD SPEC: CPT | Performed by: FAMILY MEDICINE

## 2023-10-17 ENCOUNTER — ANTICOAGULATION VISIT (OUTPATIENT)
Dept: FAMILY MEDICINE CLINIC | Facility: CLINIC | Age: 68
End: 2023-10-17
Payer: MEDICARE

## 2023-10-17 DIAGNOSIS — Z95.2 H/O MECHANICAL AORTIC VALVE REPLACEMENT: Primary | ICD-10-CM

## 2023-10-17 LAB — INR PPP: 4 (ref 3–3.5)

## 2023-10-17 PROCEDURE — 36416 COLLJ CAPILLARY BLOOD SPEC: CPT | Performed by: FAMILY MEDICINE

## 2023-10-17 PROCEDURE — 85610 PROTHROMBIN TIME: CPT | Performed by: FAMILY MEDICINE

## 2023-10-27 ENCOUNTER — ANTICOAGULATION VISIT (OUTPATIENT)
Dept: FAMILY MEDICINE CLINIC | Facility: CLINIC | Age: 68
End: 2023-10-27
Payer: MEDICARE

## 2023-10-27 ENCOUNTER — TELEPHONE (OUTPATIENT)
Dept: FAMILY MEDICINE CLINIC | Facility: CLINIC | Age: 68
End: 2023-10-27
Payer: MEDICARE

## 2023-10-27 DIAGNOSIS — Z79.01 ANTICOAGULANT LONG-TERM USE: Primary | ICD-10-CM

## 2023-10-27 DIAGNOSIS — Z23 NEED FOR VACCINATION: ICD-10-CM

## 2023-10-27 LAB — INR PPP: 2.8 (ref 3–3.5)

## 2023-11-01 RX ORDER — INSULIN GLARGINE 100 [IU]/ML
INJECTION, SOLUTION SUBCUTANEOUS
Qty: 120 ML | Refills: 0 | Status: SHIPPED | OUTPATIENT
Start: 2023-11-01

## 2023-11-03 ENCOUNTER — ANTICOAGULATION VISIT (OUTPATIENT)
Dept: FAMILY MEDICINE CLINIC | Facility: CLINIC | Age: 68
End: 2023-11-03
Payer: MEDICARE

## 2023-11-03 DIAGNOSIS — F33.42 RECURRENT MAJOR DEPRESSIVE DISORDER, IN FULL REMISSION: ICD-10-CM

## 2023-11-03 DIAGNOSIS — Z79.01 ANTICOAGULANT LONG-TERM USE: Primary | ICD-10-CM

## 2023-11-03 LAB — INR PPP: 3.2 (ref 3–3.5)

## 2023-11-03 PROCEDURE — 85610 PROTHROMBIN TIME: CPT | Performed by: FAMILY MEDICINE

## 2023-11-03 PROCEDURE — 36416 COLLJ CAPILLARY BLOOD SPEC: CPT | Performed by: FAMILY MEDICINE

## 2023-11-03 RX ORDER — SERTRALINE HYDROCHLORIDE 100 MG/1
150 TABLET, FILM COATED ORAL DAILY
Qty: 135 TABLET | Refills: 0 | Status: SHIPPED | OUTPATIENT
Start: 2023-11-03

## 2023-11-17 ENCOUNTER — ANTICOAGULATION VISIT (OUTPATIENT)
Dept: FAMILY MEDICINE CLINIC | Facility: CLINIC | Age: 68
End: 2023-11-17
Payer: MEDICARE

## 2023-11-17 DIAGNOSIS — Z79.01 ANTICOAGULANT LONG-TERM USE: Primary | ICD-10-CM

## 2023-11-17 LAB — INR PPP: 3.6 (ref 3–3.5)

## 2023-11-29 ENCOUNTER — ANTICOAGULATION VISIT (OUTPATIENT)
Dept: FAMILY MEDICINE CLINIC | Facility: CLINIC | Age: 68
End: 2023-11-29

## 2023-11-29 ENCOUNTER — OFFICE VISIT (OUTPATIENT)
Dept: FAMILY MEDICINE CLINIC | Facility: CLINIC | Age: 68
End: 2023-11-29
Payer: MEDICARE

## 2023-11-29 VITALS
SYSTOLIC BLOOD PRESSURE: 122 MMHG | DIASTOLIC BLOOD PRESSURE: 74 MMHG | HEIGHT: 72 IN | WEIGHT: 256.6 LBS | HEART RATE: 69 BPM | OXYGEN SATURATION: 97 % | TEMPERATURE: 96.4 F | BODY MASS INDEX: 34.75 KG/M2

## 2023-11-29 DIAGNOSIS — E11.22 TYPE 2 DIABETES MELLITUS WITH STAGE 3 CHRONIC KIDNEY DISEASE, WITH LONG-TERM CURRENT USE OF INSULIN, UNSPECIFIED WHETHER STAGE 3A OR 3B CKD: ICD-10-CM

## 2023-11-29 DIAGNOSIS — N18.30 TYPE 2 DIABETES MELLITUS WITH STAGE 3 CHRONIC KIDNEY DISEASE, WITH LONG-TERM CURRENT USE OF INSULIN, UNSPECIFIED WHETHER STAGE 3A OR 3B CKD: ICD-10-CM

## 2023-11-29 DIAGNOSIS — Z51.81 ANTICOAGULATION GOAL OF INR 2.5 TO 3.5: ICD-10-CM

## 2023-11-29 DIAGNOSIS — N18.30 STAGE 3 CHRONIC KIDNEY DISEASE, UNSPECIFIED WHETHER STAGE 3A OR 3B CKD: ICD-10-CM

## 2023-11-29 DIAGNOSIS — Z23 IMMUNIZATION DUE: ICD-10-CM

## 2023-11-29 DIAGNOSIS — Z79.01 ANTICOAGULATION GOAL OF INR 2.5 TO 3.5: ICD-10-CM

## 2023-11-29 DIAGNOSIS — Z79.4 TYPE 2 DIABETES MELLITUS WITH STAGE 3 CHRONIC KIDNEY DISEASE, WITH LONG-TERM CURRENT USE OF INSULIN, UNSPECIFIED WHETHER STAGE 3A OR 3B CKD: ICD-10-CM

## 2023-11-29 DIAGNOSIS — Z95.2 H/O MECHANICAL AORTIC VALVE REPLACEMENT: Primary | ICD-10-CM

## 2023-11-29 LAB — INR PPP: 2.5 (ref 3–3.5)

## 2023-11-29 PROCEDURE — 83036 HEMOGLOBIN GLYCOSYLATED A1C: CPT | Performed by: FAMILY MEDICINE

## 2023-11-29 PROCEDURE — 80053 COMPREHEN METABOLIC PANEL: CPT | Performed by: FAMILY MEDICINE

## 2023-11-29 PROCEDURE — 36415 COLL VENOUS BLD VENIPUNCTURE: CPT | Performed by: FAMILY MEDICINE

## 2023-11-29 PROCEDURE — 82043 UR ALBUMIN QUANTITATIVE: CPT | Performed by: FAMILY MEDICINE

## 2023-11-29 RX ORDER — WARFARIN SODIUM 6 MG/1
6 TABLET ORAL NIGHTLY
Qty: 90 TABLET | Refills: 1 | Status: SHIPPED | OUTPATIENT
Start: 2023-11-29

## 2023-11-29 RX ORDER — WARFARIN SODIUM 1 MG/1
TABLET ORAL
Qty: 90 TABLET | Refills: 1 | Status: SHIPPED | OUTPATIENT
Start: 2023-11-29

## 2023-11-29 NOTE — PROGRESS NOTES
"Benitez Miranda     VITALS: Blood pressure 122/74, pulse 69, temperature 96.4 °F (35.8 °C), temperature source Temporal, height 182.9 cm (72\"), weight 116 kg (256 lb 9.6 oz), SpO2 97%.    Subjective  Chief Complaint  Anticoagulation goal of INR 2.5 to 3.5    Subjective          History of Present Illness:  The patient is a 68-year-old male with medical conditions significant for type 2 diabetes, hypertension, and recent stroke x2 who presents to clinic for medical follow-up.    He is currently taking Humalog, Basaglar and Ozempic which is covered by his insurance.     He is due for a pneumococcal vaccine, influenza vaccine and the COVID-19 vaccine.    No complaints about any of the medications.    The following portions of the patient's history were reviewed and updated as appropriate: allergies, current medications, past family history, past medical history, past social history, past surgical history and problem list.    Past Medical History  Past Medical History:   Diagnosis Date    Anxiety     Arthritis     CAD (coronary artery disease)     x1 stent; pulmonary HTN; EF 50-55%; rheumatic valve; MV stenosis    CHF (congestive heart failure)     CKD (chronic kidney disease), stage III     COPD (chronic obstructive pulmonary disease)     Depression     Diabetes mellitus     H/O mechanical aortic valve replacement     History of tobacco abuse     Hyperlipidemia     Hypertension     Ischemic heart disease     Kidney failure     third stage    Low back pain     Obesity     BMI 36.    Stroke     Valvular heart disease        Surgical History  Past Surgical History:   Procedure Laterality Date    AORTIC VALVE REPAIR/REPLACEMENT  1991    CARDIAC CATHETERIZATION      CARDIAC SURGERY  1991    valve on aortic    CARDIAC SURGERY  2001    stent     CATARACT EXTRACTION      COLONOSCOPY  2001    CORONARY STENT PLACEMENT  2005    CYSTOSCOPY URETEROSCOPY LASER LITHOTRIPSY Left 11/20/2020    Procedure: CYSTOSCOPY URETEROSCOPY LASER " "LITHOTRIPSY WITH STENT PLACEMENT;  Surgeon: Jonah Montgomery MD;  Location: [facility];  Service: Urology;  Laterality: Left;    KIDNEY STONE SURGERY         Family History  Family History   Problem Relation Age of Onset    Cancer Mother         breast    COPD Father     Heart attack Father 74    Diabetes Paternal Grandmother         both legs removed       Social History  Social History     Socioeconomic History    Marital status:     Number of children: 2   Tobacco Use    Smoking status: Former     Packs/day: 1.00     Years: 30.00     Additional pack years: 0.00     Total pack years: 30.00     Types: Pipe, Cigars, Cigarettes     Quit date: 2016     Years since quittin.4    Smokeless tobacco: Never    Tobacco comments:     currently smokes a pipe   Vaping Use    Vaping Use: Never used   Substance and Sexual Activity    Alcohol use: No    Drug use: No    Sexual activity: Defer       Objective   Vital Signs:   /74 (BP Location: Right arm, Patient Position: Sitting, Cuff Size: Adult)   Pulse 69   Temp 96.4 °F (35.8 °C) (Temporal)   Ht 182.9 cm (72\")   Wt 116 kg (256 lb 9.6 oz)   SpO2 97%   BMI 34.80 kg/m²     Physical Exam     Gen: Patient in NAD. Pleasant and answers appropriately. A&Ox3.    Skin: Warm and dry with normal turgor. No purpura, rashes, or unusual pigmentation noted. Hair is normal in appearance and distribution.    HEENT: NC/AT. No lesions noted. Conjunctiva clear, sclera nonicteric. PERRL. EOMI without nystagmus or strabismus. Fundi appear benign. No hemorrhages or exudates of eyes. Auditory canals are patent bilaterally without lesions. TMs intact,  nonerythematous, nonbulging without lesions. Nasal mucosa pink, nonerythematous, and nonedematous. Frontal and maxillary sinuses are nontender. O/P nonerythematous and moist without exudate.    Neck: Supple without lymph nodes palpated. FROM.     Lungs: Slight decreased B/L without rales, rhonchi, crackles, or " wheezes.    Heart: RRR. S1 and S2 normal. No S3 or S4. No MRGT.    Abd: Soft, nontender,nondistended. (+)BSx4 quadrants.     Extrem: No CCE. Radial pulses 2+/4 and equal B/L. FROMx4. No bone, joint, or muscle tenderness noted.    Neuro: No focal motor/sensory deficits.    Procedures     Assessment and Plan    Benitez Miranda is a 68 y.o. here for medical followup.    Diagnoses and all orders for this visit:    1. H/O mechanical aortic valve replacement (Primary)  -     Comprehensive Metabolic Panel; Future  -     Comprehensive Metabolic Panel  Need for anticoagulation long-term.    2. Anticoagulation goal of INR 2.5 to 3.5  -     warfarin (COUMADIN) 6 MG tablet; Take 1 tablet by mouth Every Night. New dose: 7 mg/day  Dispense: 90 tablet; Refill: 1  -     warfarin (Coumadin) 1 MG tablet; Take 1 mg by mouth daily (6 mg +1 mg).  Dispense: 90 tablet; Refill: 1  -     POCT INR  -     Comprehensive Metabolic Panel; Future  -     Comprehensive Metabolic Panel  Continue alternating 7 mg and 8 mg orally daily.    3. Stage 3 chronic kidney disease, unspecified whether stage 3a or 3b CKD  -     Comprehensive Metabolic Panel; Future  -     Comprehensive Metabolic Panel  Continue to monitor.    4. Type 2 diabetes mellitus with stage 3 chronic kidney disease, with long-term current use of insulin, unspecified whether stage 3a or 3b CKD  -     Hemoglobin A1c; Future  -     MicroAlbumin, Urine, Random - Urine, Clean Catch  -     Hemoglobin A1c    5.  Immunization due  -     Fluzone High-Dose 65+yrs ([id number])          Problem List Items Addressed This Visit          Cardiac and Vasculature    H/O mechanical aortic valve replacement - Primary (Chronic)    Overview     Severe aortic valve stenosis.  Aortic valve replacement: [facility], 1991, Georgi Sánchez MD.  Currently anticoagulated with Coumadin.  INR followed by Dr. Canales's office.           Relevant Orders    Comprehensive Metabolic Panel       Genitourinary and  Reproductive     CKD (chronic kidney disease), stage III    Overview     Stage 1-2 secondary to diabetic nephropathy.  Baseline creatinine 1.3.           Relevant Orders    Comprehensive Metabolic Panel     Other Visit Diagnoses       Anticoagulation goal of INR 2.5 to 3.5        Relevant Medications    warfarin (COUMADIN) 6 MG tablet    warfarin (Coumadin) 1 MG tablet    Other Relevant Orders    POCT INR (Completed)    Anticoagulant long-term use        Relevant Medications    warfarin (COUMADIN) 6 MG tablet    warfarin (Coumadin) 1 MG tablet    Other Relevant Orders    Comprehensive Metabolic Panel    Type 2 diabetes mellitus with stage 3 chronic kidney disease, with long-term current use of insulin, unspecified whether stage 3a or 3b CKD        Relevant Orders    Hemoglobin A1c                  Follow Up   Return in about 3 months (around 2/29/2024), or LABS.  Findings and plans discussed with patient who verbalizes understanding and agreement. Will followup with patient once results are in. Patient was given instructions and counseling regarding his condition or for health maintenance advice. Please see specific information pulled into the AVS if appropriate.       Mesha Christina MD    Transcribed from ambient dictation for Mesha Christina MD by Oliva Enriquez.  11/29/23   16:28 EST    Patient or patient representative verbalized consent to the visit recording.  I have personally performed the services described in this document as transcribed by the above individual, and it is both accurate and complete.

## 2023-11-30 LAB
ALBUMIN SERPL-MCNC: 3.9 G/DL (ref 3.5–5.2)
ALBUMIN UR-MCNC: 27.8 MG/DL
ALBUMIN/GLOB SERPL: 1.2 G/DL
ALP SERPL-CCNC: 71 U/L (ref 39–117)
ALT SERPL W P-5'-P-CCNC: 27 U/L (ref 1–41)
ANION GAP SERPL CALCULATED.3IONS-SCNC: 12.2 MMOL/L (ref 5–15)
AST SERPL-CCNC: 26 U/L (ref 1–40)
BILIRUB SERPL-MCNC: 0.6 MG/DL (ref 0–1.2)
BUN SERPL-MCNC: 21 MG/DL (ref 8–23)
BUN/CREAT SERPL: 15.3 (ref 7–25)
CALCIUM SPEC-SCNC: 9.2 MG/DL (ref 8.6–10.5)
CHLORIDE SERPL-SCNC: 102 MMOL/L (ref 98–107)
CO2 SERPL-SCNC: 21.8 MMOL/L (ref 22–29)
CREAT SERPL-MCNC: 1.37 MG/DL (ref 0.76–1.27)
EGFRCR SERPLBLD CKD-EPI 2021: 55.5 ML/MIN/1.73
GLOBULIN UR ELPH-MCNC: 3.2 GM/DL
GLUCOSE SERPL-MCNC: 198 MG/DL (ref 65–99)
HBA1C MFR BLD: 9.2 % (ref 4.8–5.6)
POTASSIUM SERPL-SCNC: 4.7 MMOL/L (ref 3.5–5.2)
PROT SERPL-MCNC: 7.1 G/DL (ref 6–8.5)
SODIUM SERPL-SCNC: 136 MMOL/L (ref 136–145)

## 2023-12-07 ENCOUNTER — ANTICOAGULATION VISIT (OUTPATIENT)
Dept: FAMILY MEDICINE CLINIC | Facility: CLINIC | Age: 68
End: 2023-12-07
Payer: MEDICARE

## 2023-12-07 DIAGNOSIS — Z95.2 H/O MECHANICAL AORTIC VALVE REPLACEMENT: Primary | ICD-10-CM

## 2023-12-07 LAB — INR PPP: 3.2 (ref 3–3.5)

## 2023-12-07 PROCEDURE — 85610 PROTHROMBIN TIME: CPT | Performed by: FAMILY MEDICINE

## 2023-12-07 PROCEDURE — 36416 COLLJ CAPILLARY BLOOD SPEC: CPT | Performed by: FAMILY MEDICINE

## 2023-12-07 NOTE — TELEPHONE ENCOUNTER
Caller: Benitez Miranda    Relationship: Self    Best call back number: [phone]     Requested Prescriptions:   Requested Prescriptions     Pending Prescriptions Disp Refills    Continuous Blood Gluc Sensor (FreeStyle Suellen 2 Sensor) misc       Sig: Use 1 Device Every 14 (Fourteen) Days.        Pharmacy where request should be sent: [facility] [place] [id number] - [phone]  - [phone]     Last office visit with prescribing clinician: 11/29/2023   Last telemedicine visit with prescribing clinician: Visit date not found   Next office visit with prescribing clinician: 3/1/2024     Additional details provided by patient:     Does the patient have less than a 3 day supply:  [] Yes  [x] No    Would you like a call back once the refill request has been completed: [] Yes [x] No    If the office needs to give you a call back, can they leave a voicemail: [] Yes [x] No    Francesco Wells Rep   12/07/23 13:24 EST

## 2023-12-17 DIAGNOSIS — I10 ESSENTIAL HYPERTENSION: Chronic | ICD-10-CM

## 2023-12-18 RX ORDER — LOSARTAN POTASSIUM 25 MG/1
25 TABLET ORAL DAILY
Qty: 90 TABLET | Refills: 3 | Status: SHIPPED | OUTPATIENT
Start: 2023-12-18

## 2024-01-02 ENCOUNTER — ANTICOAGULATION VISIT (OUTPATIENT)
Dept: FAMILY MEDICINE CLINIC | Facility: CLINIC | Age: 69
End: 2024-01-02
Payer: MEDICARE

## 2024-01-02 DIAGNOSIS — Z51.81 ANTICOAGULATION GOAL OF INR 2.5 TO 3.5: Primary | ICD-10-CM

## 2024-01-02 DIAGNOSIS — Z79.01 ANTICOAGULATION GOAL OF INR 2.5 TO 3.5: Primary | ICD-10-CM

## 2024-01-02 LAB — INR PPP: 4.3 (ref 2.5–3)

## 2024-01-02 PROCEDURE — 36416 COLLJ CAPILLARY BLOOD SPEC: CPT | Performed by: FAMILY MEDICINE

## 2024-01-02 PROCEDURE — 85610 PROTHROMBIN TIME: CPT | Performed by: FAMILY MEDICINE

## 2024-01-04 ENCOUNTER — ANTICOAGULATION VISIT (OUTPATIENT)
Dept: FAMILY MEDICINE CLINIC | Facility: CLINIC | Age: 69
End: 2024-01-04
Payer: MEDICARE

## 2024-01-04 DIAGNOSIS — Z95.2 H/O MECHANICAL AORTIC VALVE REPLACEMENT: Primary | ICD-10-CM

## 2024-01-04 LAB — INR PPP: 1.7 (ref 3–3.5)

## 2024-01-04 PROCEDURE — 36416 COLLJ CAPILLARY BLOOD SPEC: CPT | Performed by: FAMILY MEDICINE

## 2024-01-04 PROCEDURE — 85610 PROTHROMBIN TIME: CPT | Performed by: FAMILY MEDICINE

## 2024-01-08 ENCOUNTER — ANTICOAGULATION VISIT (OUTPATIENT)
Dept: FAMILY MEDICINE CLINIC | Facility: CLINIC | Age: 69
End: 2024-01-08
Payer: MEDICARE

## 2024-01-08 DIAGNOSIS — Z51.81 ANTICOAGULATION GOAL OF INR 2.5 TO 3.5: Primary | ICD-10-CM

## 2024-01-08 DIAGNOSIS — Z79.01 ANTICOAGULATION GOAL OF INR 2.5 TO 3.5: Primary | ICD-10-CM

## 2024-01-08 LAB — INR PPP: 2.8 (ref 3–3.5)

## 2024-01-08 PROCEDURE — 85610 PROTHROMBIN TIME: CPT | Performed by: FAMILY MEDICINE

## 2024-01-08 PROCEDURE — 36416 COLLJ CAPILLARY BLOOD SPEC: CPT | Performed by: FAMILY MEDICINE

## 2024-01-12 ENCOUNTER — ANTICOAGULATION VISIT (OUTPATIENT)
Dept: FAMILY MEDICINE CLINIC | Facility: CLINIC | Age: 69
End: 2024-01-12
Payer: MEDICARE

## 2024-01-12 DIAGNOSIS — Z79.01 ANTICOAGULANT LONG-TERM USE: ICD-10-CM

## 2024-01-12 DIAGNOSIS — F33.42 RECURRENT MAJOR DEPRESSIVE DISORDER, IN FULL REMISSION: Primary | ICD-10-CM

## 2024-01-12 LAB — INR PPP: 2.3 (ref 3–3.5)

## 2024-01-12 PROCEDURE — 36416 COLLJ CAPILLARY BLOOD SPEC: CPT | Performed by: FAMILY MEDICINE

## 2024-01-12 PROCEDURE — 85610 PROTHROMBIN TIME: CPT | Performed by: FAMILY MEDICINE

## 2024-01-15 ENCOUNTER — ANTICOAGULATION VISIT (OUTPATIENT)
Dept: FAMILY MEDICINE CLINIC | Facility: CLINIC | Age: 69
End: 2024-01-15
Payer: MEDICARE

## 2024-01-15 DIAGNOSIS — E78.5 HYPERLIPIDEMIA DUE TO TYPE 2 DIABETES MELLITUS: ICD-10-CM

## 2024-01-15 DIAGNOSIS — Z51.81 ANTICOAGULATION GOAL OF INR 2.5 TO 3.5: Primary | ICD-10-CM

## 2024-01-15 DIAGNOSIS — Z79.01 ANTICOAGULATION GOAL OF INR 2.5 TO 3.5: Primary | ICD-10-CM

## 2024-01-15 DIAGNOSIS — E11.69 HYPERLIPIDEMIA DUE TO TYPE 2 DIABETES MELLITUS: ICD-10-CM

## 2024-01-15 LAB — INR PPP: 2.7 (ref 2.5–3.5)

## 2024-01-15 PROCEDURE — 85610 PROTHROMBIN TIME: CPT | Performed by: FAMILY MEDICINE

## 2024-01-15 PROCEDURE — 36416 COLLJ CAPILLARY BLOOD SPEC: CPT | Performed by: FAMILY MEDICINE

## 2024-01-15 RX ORDER — ATORVASTATIN CALCIUM 80 MG/1
80 TABLET, FILM COATED ORAL NIGHTLY
Qty: 90 TABLET | Refills: 0 | Status: SHIPPED | OUTPATIENT
Start: 2024-01-15

## 2024-01-18 ENCOUNTER — ANTICOAGULATION VISIT (OUTPATIENT)
Dept: FAMILY MEDICINE CLINIC | Facility: CLINIC | Age: 69
End: 2024-01-18
Payer: MEDICARE

## 2024-01-18 DIAGNOSIS — Z95.2 H/O MECHANICAL AORTIC VALVE REPLACEMENT: Primary | ICD-10-CM

## 2024-01-18 LAB — INR PPP: 4.3 (ref 3–3.5)

## 2024-01-18 PROCEDURE — 85610 PROTHROMBIN TIME: CPT | Performed by: FAMILY MEDICINE

## 2024-01-18 PROCEDURE — 36416 COLLJ CAPILLARY BLOOD SPEC: CPT | Performed by: FAMILY MEDICINE

## 2024-01-25 ENCOUNTER — ANTICOAGULATION VISIT (OUTPATIENT)
Dept: FAMILY MEDICINE CLINIC | Facility: CLINIC | Age: 69
End: 2024-01-25
Payer: MEDICARE

## 2024-01-25 DIAGNOSIS — Z51.81 ANTICOAGULATION GOAL OF INR 2.5 TO 3.5: Primary | ICD-10-CM

## 2024-01-25 DIAGNOSIS — Z79.01 ANTICOAGULATION GOAL OF INR 2.5 TO 3.5: Primary | ICD-10-CM

## 2024-01-25 LAB — INR PPP: 3.2 (ref 0.9–1.1)

## 2024-01-25 PROCEDURE — 36416 COLLJ CAPILLARY BLOOD SPEC: CPT | Performed by: FAMILY MEDICINE

## 2024-01-25 PROCEDURE — 85610 PROTHROMBIN TIME: CPT | Performed by: FAMILY MEDICINE

## 2024-01-29 DIAGNOSIS — F33.42 RECURRENT MAJOR DEPRESSIVE DISORDER, IN FULL REMISSION: ICD-10-CM

## 2024-01-29 RX ORDER — SERTRALINE HYDROCHLORIDE 100 MG/1
150 TABLET, FILM COATED ORAL DAILY
Qty: 135 TABLET | Refills: 0 | Status: SHIPPED | OUTPATIENT
Start: 2024-01-29

## 2024-02-01 ENCOUNTER — ANTICOAGULATION VISIT (OUTPATIENT)
Dept: FAMILY MEDICINE CLINIC | Facility: CLINIC | Age: 69
End: 2024-02-01
Payer: MEDICARE

## 2024-02-01 LAB — INR PPP: 3.2 (ref 0.9–1.1)

## 2024-02-06 DIAGNOSIS — E11.59 HYPERTENSION ASSOCIATED WITH DIABETES: ICD-10-CM

## 2024-02-06 DIAGNOSIS — I15.2 HYPERTENSION ASSOCIATED WITH DIABETES: ICD-10-CM

## 2024-02-06 RX ORDER — METOPROLOL SUCCINATE 50 MG/1
50 TABLET, EXTENDED RELEASE ORAL DAILY
Qty: 90 TABLET | Refills: 0 | Status: SHIPPED | OUTPATIENT
Start: 2024-02-06

## 2024-02-15 ENCOUNTER — ANTICOAGULATION VISIT (OUTPATIENT)
Dept: FAMILY MEDICINE CLINIC | Facility: CLINIC | Age: 69
End: 2024-02-15
Payer: MEDICARE

## 2024-02-15 DIAGNOSIS — Z51.81 ANTICOAGULATION GOAL OF INR 2.5 TO 3.5: ICD-10-CM

## 2024-02-15 DIAGNOSIS — Z79.01 ANTICOAGULATION GOAL OF INR 2.5 TO 3.5: ICD-10-CM

## 2024-02-15 DIAGNOSIS — Z95.2 H/O MECHANICAL AORTIC VALVE REPLACEMENT: Primary | ICD-10-CM

## 2024-02-15 LAB — INR PPP: 3.3 (ref 3–3.5)

## 2024-02-15 PROCEDURE — 36416 COLLJ CAPILLARY BLOOD SPEC: CPT | Performed by: FAMILY MEDICINE

## 2024-02-15 PROCEDURE — 85610 PROTHROMBIN TIME: CPT | Performed by: FAMILY MEDICINE

## 2024-03-01 ENCOUNTER — OFFICE VISIT (OUTPATIENT)
Dept: FAMILY MEDICINE CLINIC | Facility: CLINIC | Age: 69
End: 2024-03-01
Payer: MEDICARE

## 2024-03-01 ENCOUNTER — ANTICOAGULATION VISIT (OUTPATIENT)
Dept: FAMILY MEDICINE CLINIC | Facility: CLINIC | Age: 69
End: 2024-03-01

## 2024-03-01 VITALS
HEART RATE: 60 BPM | OXYGEN SATURATION: 97 % | HEIGHT: 72 IN | TEMPERATURE: 96 F | DIASTOLIC BLOOD PRESSURE: 72 MMHG | BODY MASS INDEX: 35.03 KG/M2 | SYSTOLIC BLOOD PRESSURE: 124 MMHG | WEIGHT: 258.6 LBS

## 2024-03-01 DIAGNOSIS — Z51.81 ANTICOAGULATION GOAL OF INR 2.5 TO 3.5: ICD-10-CM

## 2024-03-01 DIAGNOSIS — E11.59 HYPERTENSION ASSOCIATED WITH DIABETES: ICD-10-CM

## 2024-03-01 DIAGNOSIS — Z79.01 ANTICOAGULATION GOAL OF INR 2.5 TO 3.5: ICD-10-CM

## 2024-03-01 DIAGNOSIS — N18.30 STAGE 3 CHRONIC KIDNEY DISEASE, UNSPECIFIED WHETHER STAGE 3A OR 3B CKD: ICD-10-CM

## 2024-03-01 DIAGNOSIS — E11.22 TYPE 2 DIABETES MELLITUS WITH STAGE 3 CHRONIC KIDNEY DISEASE, WITH LONG-TERM CURRENT USE OF INSULIN, UNSPECIFIED WHETHER STAGE 3A OR 3B CKD: Primary | ICD-10-CM

## 2024-03-01 DIAGNOSIS — N18.30 TYPE 2 DIABETES MELLITUS WITH STAGE 3 CHRONIC KIDNEY DISEASE, WITH LONG-TERM CURRENT USE OF INSULIN, UNSPECIFIED WHETHER STAGE 3A OR 3B CKD: Primary | ICD-10-CM

## 2024-03-01 DIAGNOSIS — Z95.2 H/O MECHANICAL AORTIC VALVE REPLACEMENT: ICD-10-CM

## 2024-03-01 DIAGNOSIS — I15.2 HYPERTENSION ASSOCIATED WITH DIABETES: ICD-10-CM

## 2024-03-01 DIAGNOSIS — Z79.4 TYPE 2 DIABETES MELLITUS WITH STAGE 3 CHRONIC KIDNEY DISEASE, WITH LONG-TERM CURRENT USE OF INSULIN, UNSPECIFIED WHETHER STAGE 3A OR 3B CKD: Primary | ICD-10-CM

## 2024-03-01 LAB — INR PPP: 3.3 (ref 3–3.5)

## 2024-03-01 PROCEDURE — 83036 HEMOGLOBIN GLYCOSYLATED A1C: CPT | Performed by: FAMILY MEDICINE

## 2024-03-01 PROCEDURE — 80053 COMPREHEN METABOLIC PANEL: CPT | Performed by: FAMILY MEDICINE

## 2024-03-01 PROCEDURE — 36415 COLL VENOUS BLD VENIPUNCTURE: CPT | Performed by: FAMILY MEDICINE

## 2024-03-01 PROCEDURE — 85025 COMPLETE CBC W/AUTO DIFF WBC: CPT | Performed by: FAMILY MEDICINE

## 2024-03-01 RX ORDER — SPIRONOLACTONE 25 MG/1
25 TABLET ORAL DAILY
Qty: 90 TABLET | Refills: 1 | Status: SHIPPED | OUTPATIENT
Start: 2024-03-01

## 2024-03-01 RX ORDER — SPIRONOLACTONE 25 MG/1
25 TABLET ORAL DAILY
COMMUNITY
End: 2024-03-01 | Stop reason: SDUPTHER

## 2024-03-01 NOTE — PROGRESS NOTES
"Benitez GUARDADO Ruben     VITALS: Blood pressure 124/72, pulse 60, temperature 96 °F (35.6 °C), temperature source Temporal, height 182.9 cm (72\"), weight 117 kg (258 lb 9.6 oz), SpO2 97%.    Subjective  Chief Complaint  Diabetes    Subjective          History of Present Illness:  Patient is a 68 y.o. male with medical conditions significant for type 2 diabetes, hypertension, and recent stroke x 2 who presents to clinic for medical follow-up.    He is getting his insulin for free. He is getting low on the fast-acting insulin. He received a letter stating it was quitting the short-acting insulin. He has 2 samples of Ozempic. He has enough insulin.    The following portions of the patient's history were reviewed and updated as appropriate: allergies, current medications, past family history, past medical history, past social history, past surgical history and problem list.    Past Medical History  Past Medical History:   Diagnosis Date    Anxiety     Arthritis     CAD (coronary artery disease)     x1 stent; pulmonary HTN; EF 50-55%; rheumatic valve; MV stenosis    CHF (congestive heart failure)     CKD (chronic kidney disease), stage III     COPD (chronic obstructive pulmonary disease)     Depression     Diabetes mellitus     H/O mechanical aortic valve replacement     History of tobacco abuse     Hyperlipidemia     Hypertension     Ischemic heart disease     Kidney failure     third stage    Low back pain     Obesity     BMI 36.    Stroke     Valvular heart disease        Surgical History  Past Surgical History:   Procedure Laterality Date    AORTIC VALVE REPAIR/REPLACEMENT  1991    CARDIAC CATHETERIZATION      CARDIAC SURGERY  1991    valve on aortic    CARDIAC SURGERY  2001    stent     CATARACT EXTRACTION      COLONOSCOPY  2001    CORONARY STENT PLACEMENT  2005    CYSTOSCOPY URETEROSCOPY LASER LITHOTRIPSY Left 11/20/2020    Procedure: CYSTOSCOPY URETEROSCOPY LASER LITHOTRIPSY WITH STENT PLACEMENT;  Surgeon: Ulises" "Jonah Sanders MD;  Location: [facility] OR;  Service: Urology;  Laterality: Left;    KIDNEY STONE SURGERY         Family History  Family History   Problem Relation Age of Onset    Cancer Mother         breast    COPD Father     Heart attack Father 74    Diabetes Paternal Grandmother         both legs removed       Social History  Social History     Socioeconomic History    Marital status:     Number of children: 2   Tobacco Use    Smoking status: Former     Packs/day: 1.00     Years: 30.00     Additional pack years: 0.00     Total pack years: 30.00     Types: Pipe, Cigars, Cigarettes     Quit date: 2016     Years since quittin.6    Smokeless tobacco: Never    Tobacco comments:     currently smokes a pipe   Vaping Use    Vaping Use: Never used   Substance and Sexual Activity    Alcohol use: No    Drug use: No    Sexual activity: Defer       Objective   Vital Signs:   /72 (BP Location: Right arm, Patient Position: Sitting, Cuff Size: Adult)   Pulse 60   Temp 96 °F (35.6 °C) (Temporal)   Ht 182.9 cm (72\")   Wt 117 kg (258 lb 9.6 oz)   SpO2 97%   BMI 35.07 kg/m²     Physical Exam     Gen: Patient in NAD. Pleasant and answers appropriately. A&Ox3.    Skin: Warm and dry with normal turgor. No purpura, rashes, or unusual pigmentation noted. Hair is normal in appearance and distribution.    HEENT: NC/AT. No lesions noted. Conjunctiva clear, sclera nonicteric. PERRL. EOMI without nystagmus or strabismus. Fundi appear benign. No hemorrhages or exudates of eyes. Auditory canals are patent bilaterally without lesions. TMs intact,  nonerythematous, nonbulging without lesions. Nasal mucosa pink, nonerythematous, and nonedematous. Frontal and maxillary sinuses are nontender. O/P nonerythematous and moist without exudate.    Neck: Supple without lymph nodes palpated. FROM.     Lungs: Slightly decreased B/L without rales, rhonchi, crackles, or wheezes.    Heart: RRR. S1 and S2 normal. No S3 or S4. No RGT.  " Positive 2/6 systolic murmur.    Abd: Soft, nontender,nondistended. (+)BSx4 quadrants.     Extrem: No CC.  Trace edema bilateral lower extremities.  Radial pulses 2+/4 and equal B/L. FROMx4.  Positive joint tenderness noted.    Neuro: No focal motor/sensory deficits.    Procedures         Assessment and Plan      This is a 68-year-old male who presents to clinic for medical follow-up.    Diagnoses and all orders for this visit:    1. Type 2 diabetes mellitus with stage 3 chronic kidney disease, with long-term current use of insulin, unspecified whether stage 3a or 3b CKD (Primary)  Comments:  His blood glucose and blood pressure are excellent. I will refill his Ozempic. I will refill his Basaglar.  Orders:  -     Hemoglobin A1c; Future  -     Hemoglobin A1c    2. Anticoagulation goal of INR 2.5 to 3.5  -     POCT INR  -     CBC Auto Differential; Future  -     Comprehensive Metabolic Panel; Future  -     CBC Auto Differential  -     Comprehensive Metabolic Panel  -     Cancel: Manual Differential    3. Hypertension associated with diabetes  -     spironolactone (ALDACTONE) 25 MG tablet; Take 1 tablet by mouth Daily.  Dispense: 90 tablet; Refill: 1  -     CBC Auto Differential; Future  -     Comprehensive Metabolic Panel; Future  -     CBC Auto Differential  -     Comprehensive Metabolic Panel  -     Cancel: Manual Differential    4. H/O mechanical aortic valve replacement  -     spironolactone (ALDACTONE) 25 MG tablet; Take 1 tablet by mouth Daily.  Dispense: 90 tablet; Refill: 1  -     CBC Auto Differential; Future  -     Comprehensive Metabolic Panel; Future  -     CBC Auto Differential  -     Comprehensive Metabolic Panel  -     Cancel: Manual Differential    5. Stage 3 chronic kidney disease, unspecified whether stage 3a or 3b CKD        Problem List Items Addressed This Visit          Cardiac and Vasculature    H/O mechanical aortic valve replacement (Chronic)    Overview     Severe aortic valve  stenosis.  Aortic valve replacement: [facility], 1991, Georgi Sánchez MD.  Currently anticoagulated with Coumadin.  INR followed by Dr. Canales's office.           Relevant Medications    spironolactone (ALDACTONE) 25 MG tablet    Other Relevant Orders    CBC Auto Differential    Comprehensive Metabolic Panel       Genitourinary and Reproductive     CKD (chronic kidney disease), stage III    Overview     Stage 1-2 secondary to diabetic nephropathy.  Baseline creatinine 1.3.           Relevant Medications    spironolactone (ALDACTONE) 25 MG tablet     Other Visit Diagnoses       Type 2 diabetes mellitus with stage 3 chronic kidney disease, with long-term current use of insulin, unspecified whether stage 3a or 3b CKD    -  Primary    His blood glucose and blood pressure are excellent. I will refill his Ozempic. I will refill his Basaglar.    Relevant Medications    spironolactone (ALDACTONE) 25 MG tablet    Other Relevant Orders    Hemoglobin A1c    Anticoagulation goal of INR 2.5 to 3.5        Relevant Orders    POCT INR (Completed)    CBC Auto Differential    Comprehensive Metabolic Panel    Hypertension associated with diabetes        Relevant Medications    spironolactone (ALDACTONE) 25 MG tablet    Other Relevant Orders    CBC Auto Differential    Comprehensive Metabolic Panel              Follow Up   Return in about 3 months (around 6/1/2024), or LABS.  Findings and plans discussed with patient who verbalizes understanding and agreement. Will followup with patient once results are in. Patient was given instructions and counseling regarding his condition or for health maintenance advice. Please see specific information pulled into the AVS if appropriate.     Transcribed from ambient dictation for Mesha Christina MD by Michelle Corral.   03/01/24   12:27 EST    Patient or patient representative verbalized consent to the visit recording.  I have personally performed the services described in this  document as transcribed by the above individual, and it is both accurate and complete.

## 2024-03-02 LAB
ALBUMIN SERPL-MCNC: 3.9 G/DL (ref 3.5–5.2)
ALBUMIN/GLOB SERPL: 1.2 G/DL
ALP SERPL-CCNC: 68 U/L (ref 39–117)
ALT SERPL W P-5'-P-CCNC: 23 U/L (ref 1–41)
ANION GAP SERPL CALCULATED.3IONS-SCNC: 9 MMOL/L (ref 5–15)
AST SERPL-CCNC: 21 U/L (ref 1–40)
BASOPHILS # BLD AUTO: 0.08 10*3/MM3 (ref 0–0.2)
BASOPHILS NFR BLD AUTO: 0.8 % (ref 0–1.5)
BILIRUB SERPL-MCNC: 0.6 MG/DL (ref 0–1.2)
BUN SERPL-MCNC: 22 MG/DL (ref 8–23)
BUN/CREAT SERPL: 15.1 (ref 7–25)
CALCIUM SPEC-SCNC: 8.6 MG/DL (ref 8.6–10.5)
CHLORIDE SERPL-SCNC: 104 MMOL/L (ref 98–107)
CO2 SERPL-SCNC: 24 MMOL/L (ref 22–29)
CREAT SERPL-MCNC: 1.46 MG/DL (ref 0.76–1.27)
DEPRECATED RDW RBC AUTO: 45.7 FL (ref 37–54)
EGFRCR SERPLBLD CKD-EPI 2021: 51.4 ML/MIN/1.73
EOSINOPHIL # BLD AUTO: 0.35 10*3/MM3 (ref 0–0.4)
EOSINOPHIL NFR BLD AUTO: 3.6 % (ref 0.3–6.2)
ERYTHROCYTE [DISTWIDTH] IN BLOOD BY AUTOMATED COUNT: 14.6 % (ref 12.3–15.4)
GLOBULIN UR ELPH-MCNC: 3.2 GM/DL
GLUCOSE SERPL-MCNC: 165 MG/DL (ref 65–99)
HBA1C MFR BLD: 8.4 % (ref 4.8–5.6)
HCT VFR BLD AUTO: 42.3 % (ref 37.5–51)
HGB BLD-MCNC: 13.6 G/DL (ref 13–17.7)
LYMPHOCYTES # BLD AUTO: 1.23 10*3/MM3 (ref 0.7–3.1)
LYMPHOCYTES NFR BLD AUTO: 12.6 % (ref 19.6–45.3)
MCH RBC QN AUTO: 27.3 PG (ref 26.6–33)
MCHC RBC AUTO-ENTMCNC: 32.2 G/DL (ref 31.5–35.7)
MCV RBC AUTO: 84.9 FL (ref 79–97)
MONOCYTES # BLD AUTO: 0.69 10*3/MM3 (ref 0.1–0.9)
MONOCYTES NFR BLD AUTO: 7.1 % (ref 5–12)
NEUTROPHILS NFR BLD AUTO: 7.36 10*3/MM3 (ref 1.7–7)
NEUTROPHILS NFR BLD AUTO: 75.2 % (ref 42.7–76)
PLATELET # BLD AUTO: 149 10*3/MM3 (ref 140–450)
PMV BLD AUTO: 13.4 FL (ref 6–12)
POTASSIUM SERPL-SCNC: 5.3 MMOL/L (ref 3.5–5.2)
PROT SERPL-MCNC: 7.1 G/DL (ref 6–8.5)
RBC # BLD AUTO: 4.98 10*6/MM3 (ref 4.14–5.8)
SODIUM SERPL-SCNC: 137 MMOL/L (ref 136–145)
WBC NRBC COR # BLD AUTO: 9.78 10*3/MM3 (ref 3.4–10.8)

## 2024-03-18 ENCOUNTER — TELEPHONE (OUTPATIENT)
Dept: FAMILY MEDICINE CLINIC | Facility: CLINIC | Age: 69
End: 2024-03-18
Payer: MEDICARE

## 2024-03-18 NOTE — TELEPHONE ENCOUNTER
Caller: Benitez Miranda    Relationship: Self    Best call back number: [phone]     What form or medical record are you requesting: OFFICE NOTES DOS [id number]    Who is requesting this form or medical record from you: [facility]     How would you like to receive the form or medical records (pick-up, mail, fax): DESTINEE TO [facility] LIKE YOU DID BEFORE TO FILL THE ORDER FOR THE SENSORS.  HE RUNS OUT [id number]

## 2024-03-18 NOTE — TELEPHONE ENCOUNTER
Spoke with Benitez he gave me the phone number to call them to get their fax number,I called & got the fax number to fax the office note to & faxed as requested.

## 2024-03-26 ENCOUNTER — PATIENT OUTREACH (OUTPATIENT)
Dept: CASE MANAGEMENT | Facility: OTHER | Age: 69
End: 2024-03-26
Payer: MEDICARE

## 2024-03-26 NOTE — OUTREACH NOTE
AMBULATORY CASE MANAGEMENT NOTE    Name and Relationship of Patient/Support Person: Benitez Miranda R - Self    Patient Outreach    RN-ACM outreach with patient proactively identified for HRCM.  Patient agreeable to engagement and occasional outreach.      Initial Outreach for Episode  Purpose of outreach  Initiate Assessment  Establish Prachi of Outreach  Create Care Plan, Goals, Targets, and Education  Update episode status      Patient denied specific needs and questions for RN-ACM to address this date.  Excellent control of blood glucose and hypertension is noted by PCP 03/01/24.      Kat BAPTISTE  Ambulatory Case Management    3/26/2024, 11:10 EDT

## 2024-04-02 ENCOUNTER — TELEPHONE (OUTPATIENT)
Dept: FAMILY MEDICINE CLINIC | Facility: CLINIC | Age: 69
End: 2024-04-02
Payer: MEDICARE

## 2024-04-02 NOTE — TELEPHONE ENCOUNTER
Caller: ALONDRA WITH [facility]    Relationship:     Best call back number: [phone]     What orders are you requesting (i.e. lab or imaging): DIABETIC SUPPLIES FAX -[phone] what timeframe would the patient need to come in: ASAP    Where will you receive your lab/imaging services: [facility]    Additional notes: FAXED REQUEST  03.20.24

## 2024-04-11 ENCOUNTER — ANTICOAGULATION VISIT (OUTPATIENT)
Dept: FAMILY MEDICINE CLINIC | Facility: CLINIC | Age: 69
End: 2024-04-11
Payer: MEDICARE

## 2024-04-11 DIAGNOSIS — Z95.2 H/O MECHANICAL AORTIC VALVE REPLACEMENT: Primary | ICD-10-CM

## 2024-04-11 LAB — INR PPP: 3.9 (ref 3–3.5)

## 2024-04-11 PROCEDURE — 85610 PROTHROMBIN TIME: CPT | Performed by: FAMILY MEDICINE

## 2024-04-11 PROCEDURE — 36416 COLLJ CAPILLARY BLOOD SPEC: CPT | Performed by: FAMILY MEDICINE

## 2024-04-18 ENCOUNTER — ANTICOAGULATION VISIT (OUTPATIENT)
Dept: FAMILY MEDICINE CLINIC | Facility: CLINIC | Age: 69
End: 2024-04-18
Payer: MEDICARE

## 2024-04-18 DIAGNOSIS — Z51.81 ANTICOAGULATION GOAL OF INR 2.5 TO 3.5: ICD-10-CM

## 2024-04-18 DIAGNOSIS — Z79.01 ANTICOAGULATION GOAL OF INR 2.5 TO 3.5: ICD-10-CM

## 2024-04-18 DIAGNOSIS — Z95.2 H/O MECHANICAL AORTIC VALVE REPLACEMENT: Primary | ICD-10-CM

## 2024-04-18 LAB — INR PPP: 3.3 (ref 3–3.5)

## 2024-04-18 PROCEDURE — 36416 COLLJ CAPILLARY BLOOD SPEC: CPT | Performed by: FAMILY MEDICINE

## 2024-04-18 PROCEDURE — 85610 PROTHROMBIN TIME: CPT | Performed by: FAMILY MEDICINE

## 2024-04-18 RX ORDER — INSULIN GLARGINE 100 [IU]/ML
25 INJECTION, SOLUTION SUBCUTANEOUS 2 TIMES DAILY
Qty: 15 ML | Refills: 2 | Status: SHIPPED | OUTPATIENT
Start: 2024-04-18

## 2024-04-25 ENCOUNTER — TELEPHONE (OUTPATIENT)
Dept: FAMILY MEDICINE CLINIC | Facility: CLINIC | Age: 69
End: 2024-04-25
Payer: MEDICARE

## 2024-04-25 NOTE — TELEPHONE ENCOUNTER
Pt came in requesting a refill on his fast acting insulin humalog in the pen form sent to [facility] in [place]

## 2024-04-26 RX ORDER — INSULIN LISPRO 100 [IU]/ML
INJECTION, SOLUTION INTRAVENOUS; SUBCUTANEOUS
Qty: 21 ML | Refills: 1 | Status: SHIPPED | OUTPATIENT
Start: 2024-04-26

## 2024-04-27 DIAGNOSIS — F33.42 RECURRENT MAJOR DEPRESSIVE DISORDER, IN FULL REMISSION: ICD-10-CM

## 2024-04-29 ENCOUNTER — TELEPHONE (OUTPATIENT)
Dept: FAMILY MEDICINE CLINIC | Facility: CLINIC | Age: 69
End: 2024-04-29
Payer: MEDICARE

## 2024-04-29 RX ORDER — SERTRALINE HYDROCHLORIDE 100 MG/1
150 TABLET, FILM COATED ORAL DAILY
Qty: 135 TABLET | Refills: 0 | Status: SHIPPED | OUTPATIENT
Start: 2024-04-29

## 2024-05-03 DIAGNOSIS — E11.59 HYPERTENSION ASSOCIATED WITH DIABETES: ICD-10-CM

## 2024-05-03 DIAGNOSIS — I15.2 HYPERTENSION ASSOCIATED WITH DIABETES: ICD-10-CM

## 2024-05-03 RX ORDER — METOPROLOL SUCCINATE 50 MG/1
50 TABLET, EXTENDED RELEASE ORAL DAILY
Qty: 90 TABLET | Refills: 0 | Status: SHIPPED | OUTPATIENT
Start: 2024-05-03

## 2024-05-07 ENCOUNTER — ANTICOAGULATION VISIT (OUTPATIENT)
Dept: FAMILY MEDICINE CLINIC | Facility: CLINIC | Age: 69
End: 2024-05-07
Payer: MEDICARE

## 2024-05-07 DIAGNOSIS — Z51.81 ANTICOAGULATION GOAL OF INR 2.5 TO 3.5: Primary | ICD-10-CM

## 2024-05-07 DIAGNOSIS — Z79.01 ANTICOAGULATION GOAL OF INR 2.5 TO 3.5: Primary | ICD-10-CM

## 2024-05-07 LAB — INR PPP: 3.8 (ref 0.9–1.1)

## 2024-05-07 PROCEDURE — 36416 COLLJ CAPILLARY BLOOD SPEC: CPT | Performed by: FAMILY MEDICINE

## 2024-05-07 PROCEDURE — 85610 PROTHROMBIN TIME: CPT | Performed by: FAMILY MEDICINE

## 2024-05-15 NOTE — TELEPHONE ENCOUNTER
Patient notified Ozempic has been received here at the clinic.  Advised to  as soon as possible.     What Type Of Note Output Would You Prefer (Optional)?: Standard Output Is The Patient Presenting As Previously Scheduled?: Yes How Severe Is Your Rash?: moderate Is This A New Presentation, Or A Follow-Up?: Rash Additional History: Patient has used ’s triamcinolone to spot treat areas of concern but it has not helped resolve itching for patient who presents in office today with  for evaluation and treatment

## 2024-05-24 DIAGNOSIS — Z79.01 ANTICOAGULATION GOAL OF INR 2.5 TO 3.5: ICD-10-CM

## 2024-05-24 DIAGNOSIS — Z51.81 ANTICOAGULATION GOAL OF INR 2.5 TO 3.5: ICD-10-CM

## 2024-05-24 RX ORDER — WARFARIN SODIUM 6 MG/1
TABLET ORAL
Qty: 90 TABLET | Refills: 3 | Status: SHIPPED | OUTPATIENT
Start: 2024-05-24

## 2024-05-29 ENCOUNTER — PATIENT OUTREACH (OUTPATIENT)
Dept: CASE MANAGEMENT | Facility: OTHER | Age: 69
End: 2024-05-29
Payer: MEDICARE

## 2024-05-29 NOTE — OUTREACH NOTE
AMBULATORY CASE MANAGEMENT NOTE    Names and Relationships of Patient/Support Persons: Caller: Benitez Miranda; Relationship: Self -     Patient Outreach    RN-ACM outreach with patient engaged in HRCM for eduction and support with management of chronic disease.       Goals Addressed                   This Visit's Progress     Self-Management Plan Developed        05/29/24  Patient reports doing very well this date.  He is enjoying the pleasant weather and spending time with family and friends.  Patient is following regularly with primary care and reports compliance with medications and recommendations.              Future Appointments         Provider Department Center    6/6/2024 11:00 AM Mesha Christina MD [facility] [place] [facility]  Ambulatory Case Management    5/29/2024, 14:57 EDT

## 2024-06-06 ENCOUNTER — LAB (OUTPATIENT)
Dept: FAMILY MEDICINE CLINIC | Facility: CLINIC | Age: 69
End: 2024-06-06
Payer: MEDICARE

## 2024-06-06 ENCOUNTER — ANTICOAGULATION VISIT (OUTPATIENT)
Dept: FAMILY MEDICINE CLINIC | Facility: CLINIC | Age: 69
End: 2024-06-06

## 2024-06-06 ENCOUNTER — OFFICE VISIT (OUTPATIENT)
Dept: FAMILY MEDICINE CLINIC | Facility: CLINIC | Age: 69
End: 2024-06-06
Payer: MEDICARE

## 2024-06-06 VITALS
DIASTOLIC BLOOD PRESSURE: 58 MMHG | SYSTOLIC BLOOD PRESSURE: 126 MMHG | HEART RATE: 67 BPM | BODY MASS INDEX: 35.05 KG/M2 | TEMPERATURE: 97.1 F | WEIGHT: 258.8 LBS | OXYGEN SATURATION: 97 % | HEIGHT: 72 IN

## 2024-06-06 DIAGNOSIS — Z12.5 ENCOUNTER FOR SCREENING FOR MALIGNANT NEOPLASM OF PROSTATE: ICD-10-CM

## 2024-06-06 DIAGNOSIS — Z00.00 ENCOUNTER FOR SUBSEQUENT ANNUAL WELLNESS VISIT (AWV) IN MEDICARE PATIENT: Primary | ICD-10-CM

## 2024-06-06 DIAGNOSIS — Z95.2 H/O MECHANICAL AORTIC VALVE REPLACEMENT: ICD-10-CM

## 2024-06-06 DIAGNOSIS — E11.59 HYPERTENSION ASSOCIATED WITH DIABETES: ICD-10-CM

## 2024-06-06 DIAGNOSIS — Z79.01 ANTICOAGULATION GOAL OF INR 2.5 TO 3.5: ICD-10-CM

## 2024-06-06 DIAGNOSIS — I15.2 HYPERTENSION ASSOCIATED WITH DIABETES: ICD-10-CM

## 2024-06-06 DIAGNOSIS — Z79.4 TYPE 2 DIABETES MELLITUS WITH STAGE 3 CHRONIC KIDNEY DISEASE, WITH LONG-TERM CURRENT USE OF INSULIN, UNSPECIFIED WHETHER STAGE 3A OR 3B CKD: ICD-10-CM

## 2024-06-06 DIAGNOSIS — E11.22 TYPE 2 DIABETES MELLITUS WITH STAGE 3 CHRONIC KIDNEY DISEASE, WITH LONG-TERM CURRENT USE OF INSULIN, UNSPECIFIED WHETHER STAGE 3A OR 3B CKD: ICD-10-CM

## 2024-06-06 DIAGNOSIS — Z51.81 ANTICOAGULATION GOAL OF INR 2.5 TO 3.5: ICD-10-CM

## 2024-06-06 DIAGNOSIS — N18.30 STAGE 3 CHRONIC KIDNEY DISEASE, UNSPECIFIED WHETHER STAGE 3A OR 3B CKD: ICD-10-CM

## 2024-06-06 DIAGNOSIS — N18.30 TYPE 2 DIABETES MELLITUS WITH STAGE 3 CHRONIC KIDNEY DISEASE, WITH LONG-TERM CURRENT USE OF INSULIN, UNSPECIFIED WHETHER STAGE 3A OR 3B CKD: ICD-10-CM

## 2024-06-06 LAB — INR PPP: 5.1 (ref 3–3.5)

## 2024-06-06 PROCEDURE — 3052F HG A1C>EQUAL 8.0%<EQUAL 9.0%: CPT | Performed by: FAMILY MEDICINE

## 2024-06-06 PROCEDURE — G0439 PPPS, SUBSEQ VISIT: HCPCS | Performed by: FAMILY MEDICINE

## 2024-06-06 PROCEDURE — 3078F DIAST BP <80 MM HG: CPT | Performed by: FAMILY MEDICINE

## 2024-06-06 PROCEDURE — 1160F RVW MEDS BY RX/DR IN RCRD: CPT | Performed by: FAMILY MEDICINE

## 2024-06-06 PROCEDURE — 83036 HEMOGLOBIN GLYCOSYLATED A1C: CPT | Performed by: FAMILY MEDICINE

## 2024-06-06 PROCEDURE — 36416 COLLJ CAPILLARY BLOOD SPEC: CPT | Performed by: FAMILY MEDICINE

## 2024-06-06 PROCEDURE — 80053 COMPREHEN METABOLIC PANEL: CPT | Performed by: FAMILY MEDICINE

## 2024-06-06 PROCEDURE — G0103 PSA SCREENING: HCPCS | Performed by: FAMILY MEDICINE

## 2024-06-06 PROCEDURE — 1126F AMNT PAIN NOTED NONE PRSNT: CPT | Performed by: FAMILY MEDICINE

## 2024-06-06 PROCEDURE — 3074F SYST BP LT 130 MM HG: CPT | Performed by: FAMILY MEDICINE

## 2024-06-06 PROCEDURE — 1159F MED LIST DOCD IN RCRD: CPT | Performed by: FAMILY MEDICINE

## 2024-06-06 PROCEDURE — 85610 PROTHROMBIN TIME: CPT | Performed by: FAMILY MEDICINE

## 2024-06-06 PROCEDURE — 36415 COLL VENOUS BLD VENIPUNCTURE: CPT

## 2024-06-07 LAB
ALBUMIN SERPL-MCNC: 3.7 G/DL (ref 3.5–5.2)
ALBUMIN/GLOB SERPL: 1.1 G/DL
ALP SERPL-CCNC: 68 U/L (ref 39–117)
ALT SERPL W P-5'-P-CCNC: 23 U/L (ref 1–41)
ANION GAP SERPL CALCULATED.3IONS-SCNC: 9 MMOL/L (ref 5–15)
AST SERPL-CCNC: 21 U/L (ref 1–40)
BILIRUB SERPL-MCNC: 0.4 MG/DL (ref 0–1.2)
BUN SERPL-MCNC: 21 MG/DL (ref 8–23)
BUN/CREAT SERPL: 14.2 (ref 7–25)
CALCIUM SPEC-SCNC: 8.7 MG/DL (ref 8.6–10.5)
CHLORIDE SERPL-SCNC: 103 MMOL/L (ref 98–107)
CO2 SERPL-SCNC: 23 MMOL/L (ref 22–29)
CREAT SERPL-MCNC: 1.48 MG/DL (ref 0.76–1.27)
EGFRCR SERPLBLD CKD-EPI 2021: 50.6 ML/MIN/1.73
GLOBULIN UR ELPH-MCNC: 3.4 GM/DL
GLUCOSE SERPL-MCNC: 301 MG/DL (ref 65–99)
HBA1C MFR BLD: 8.3 % (ref 4.8–5.6)
POTASSIUM SERPL-SCNC: 4.7 MMOL/L (ref 3.5–5.2)
PROT SERPL-MCNC: 7.1 G/DL (ref 6–8.5)
PSA SERPL-MCNC: 0.77 NG/ML (ref 0–4)
SODIUM SERPL-SCNC: 135 MMOL/L (ref 136–145)

## 2024-06-14 ENCOUNTER — ANTICOAGULATION VISIT (OUTPATIENT)
Dept: FAMILY MEDICINE CLINIC | Facility: CLINIC | Age: 69
End: 2024-06-14
Payer: MEDICARE

## 2024-06-14 DIAGNOSIS — Z95.2 H/O MECHANICAL AORTIC VALVE REPLACEMENT: Primary | ICD-10-CM

## 2024-06-14 LAB — INR PPP: 2.3 (ref 3–3.5)

## 2024-06-14 PROCEDURE — 36416 COLLJ CAPILLARY BLOOD SPEC: CPT | Performed by: NURSE PRACTITIONER

## 2024-06-14 PROCEDURE — 85610 PROTHROMBIN TIME: CPT | Performed by: NURSE PRACTITIONER

## 2024-06-17 ENCOUNTER — ANTICOAGULATION VISIT (OUTPATIENT)
Dept: FAMILY MEDICINE CLINIC | Facility: CLINIC | Age: 69
End: 2024-06-17
Payer: MEDICARE

## 2024-06-17 DIAGNOSIS — Z79.01 ANTICOAGULANT LONG-TERM USE: Primary | ICD-10-CM

## 2024-06-17 LAB — INR PPP: 3.2 (ref 3–3.5)

## 2024-06-17 PROCEDURE — 85610 PROTHROMBIN TIME: CPT | Performed by: FAMILY MEDICINE

## 2024-06-17 PROCEDURE — 36416 COLLJ CAPILLARY BLOOD SPEC: CPT | Performed by: FAMILY MEDICINE

## 2024-06-21 ENCOUNTER — ANTICOAGULATION VISIT (OUTPATIENT)
Dept: FAMILY MEDICINE CLINIC | Facility: CLINIC | Age: 69
End: 2024-06-21
Payer: MEDICARE

## 2024-06-21 LAB — INR PPP: 2.8 (ref 0.9–1.1)

## 2024-06-21 PROCEDURE — 85610 PROTHROMBIN TIME: CPT | Performed by: FAMILY MEDICINE

## 2024-06-21 PROCEDURE — 36416 COLLJ CAPILLARY BLOOD SPEC: CPT | Performed by: FAMILY MEDICINE

## 2024-06-21 NOTE — PROGRESS NOTES
The ABCs of the Annual Wellness Visit  Subsequent Medicare Wellness Visit    Subjective      Benitez Miranda is a 68 y.o. male with medical conditions significant for type 2 diabetes, hypertension, and recent stroke x 2 who presents for a Subsequent Medicare Wellness Visit.    The following portions of the patient's history were reviewed and   updated as appropriate: allergies, current medications, past family history, past medical history, past social history, past surgical history, and problem list.    Compared to one year ago, the patient feels his physical   health is better.    Compared to one year ago, the patient feels his mental   health is better.    Recent Hospitalizations:  He was admitted within the past 365 days at [facility].       Current Medical Providers:  Patient Care Team:  Mesha Christina MD as PCP - General (Family Medicine)  Oswaldo Ahuja MD as Cardiologist (Interventional Cardiology)  Kat Moreira RN as Ambulatory  (Population Health)    Outpatient Medications Prior to Visit   Medication Sig Dispense Refill    albuterol (ACCUNEB) 1.25 MG/3ML nebulizer solution Take 3 mL by nebulization Every 6 (Six) Hours As Needed for Wheezing (For Dx: J44.9 - Chronic obstructive pulmonary disease, unspecified). 240 mL 11    amLODIPine (NORVASC) 10 MG tablet Take 1 tablet by mouth Daily. 90 tablet 1    aspirin 81 MG EC tablet Take 1 tablet by mouth Daily. 30 tablet 0    atorvastatin (LIPITOR) 80 MG tablet TAKE 1 TABLET BY MOUTH ONCE DAILY AT NIGHT 90 tablet 0    cetirizine (zyrTEC) 10 MG tablet Take 1 tablet by mouth Daily As Needed for Allergies.      clobetasol (TEMOVATE) 0.05 % cream Apply 1 application  topically to the appropriate area as directed 2 (Two) Times a Day. 60 g 2    Continuous Blood Gluc Sensor (FreeStyle Suellen 2 Sensor) misc Use 1 Device Every 14 (Fourteen) Days. 6 each 3    Insulin Glargine (BASAGLAR KWIKPEN) 100 UNIT/ML injection pen Inject  25 Units under the skin into the appropriate area as directed 2 (Two) Times a Day. (Patient taking differently: Inject 30 Units under the skin into the appropriate area as directed 2 (Two) Times a Day.) 15 mL 2    Insulin Lispro, 1 Unit Dial, (HUMALOG) 100 UNIT/ML solution pen-injector Use up to 7 units TID PRN with each meal. (Patient taking differently: Use up to 10-15 units TID PRN with each meal.) 21 mL 1    Insulin Pen Needle (Pen Needles) 32G X 4 MM misc 5 application Daily. Patient uses lantus twice a day, humalog three times a day, and ozempic weekly. Needs pen needles for all. 500 each 3    losartan (COZAAR) 25 MG tablet Take 1 tablet by mouth once daily 90 tablet 3    metoprolol succinate XL (TOPROL-XL) 50 MG 24 hr tablet Take 1 tablet by mouth once daily 90 tablet 0    Semaglutide,0.25 or 0.5MG/DOS, (Ozempic, 0.25 or 0.5 MG/DOSE,) 2 MG/1.5ML solution pen-injector Inject 0.5 mg under the skin into the appropriate area as directed 1 (One) Time Per Week. Getting from       sertraline (ZOLOFT) 100 MG tablet TAKE 1 & 1/2 (ONE & ONE-HALF) TABLETS BY MOUTH ONCE DAILY 135 tablet 0    spironolactone (ALDACTONE) 25 MG tablet Take 1 tablet by mouth Daily. 90 tablet 1    warfarin (Coumadin) 1 MG tablet Take 1 mg by mouth daily (6 mg +1 mg). 90 tablet 1    warfarin (Coumadin) 2 MG tablet Take 1 tablet by mouth Every Night. 90 tablet 1    warfarin (Coumadin) 3 MG tablet Take 1 tablet by mouth Every Night. 90 tablet 1    warfarin (COUMADIN) 6 MG tablet TAKE 1 TABLET BY MOUTH DAILY EVERY NIGHT. NEW DOSE 7MG/DAY 90 tablet 3    warfarin (Coumadin) 6 MG tablet Take 1 tablet by mouth Every Night. (Patient not taking: Reported on 11/29/2023) 90 tablet 1     No facility-administered medications prior to visit.       No opioid medication identified on active medication list. I have reviewed chart for other potential  high risk medication/s and harmful drug interactions in the elderly.        Aspirin is on active  "medication list. Aspirin use is indicated based on review of current medical condition/s. Pros and cons of this therapy have been discussed today. Benefits of this medication outweigh potential harm.  Patient has been encouraged to continue taking this medication.  .      Patient Active Problem List   Diagnosis    Type 2 diabetes mellitus with hyperglycemia, with long-term current use of insulin    COPD (chronic obstructive pulmonary disease)    Chronic diastolic congestive heart failure    Essential hypertension    Tobacco abuse    Coronary artery disease involving native coronary artery of native heart without angina pectoris    Low back pain    Hyperlipidemia LDL goal <70    H/O mechanical aortic valve replacement    Depression    CKD (chronic kidney disease), stage III    Rheumatic mitral stenosis    Morbidly obese    Recurrent major depressive disorder, in full remission    Ischemic stroke    CVA (cerebral vascular accident)     Advance Care Planning   Advance Care Planning     Advance Directive is on file.  ACP discussion was held with the patient during this visit. Patient has an advance directive in EMR which is still valid.      Objective    Vitals:    06/06/24 1037   BP: 126/58   BP Location: Right arm   Patient Position: Sitting   Cuff Size: Adult   Pulse: 67   Temp: 97.1 °F (36.2 °C)   TempSrc: Temporal   SpO2: 97%   Weight: 117 kg (258 lb 12.8 oz)   Height: 182.9 cm (72\")   PainSc: 0-No pain     Estimated body mass index is 35.1 kg/m² as calculated from the following:    Height as of this encounter: 182.9 cm (72\").    Weight as of this encounter: 117 kg (258 lb 12.8 oz).    Class 2 Severe Obesity (BMI >=35 and <=39.9). Obesity-related health conditions include the following: diabetes mellitus. Obesity is unchanged. BMI is is above average; BMI management plan is completed. We discussed portion control and increasing exercise.      Does the patient have evidence of cognitive impairment?   No    Lab " Results   Component Value Date    HGBA1C 8.30 (H) 2024        Gen: Patient in NAD. Pleasant and answers appropriately. A&Ox3.    Skin: Warm and dry with normal turgor. No purpura, rashes, or unusual pigmentation noted. Hair is normal in appearance and distribution.    HEENT: NC/AT. No lesions noted. Conjunctiva clear, sclera nonicteric. PERRL. EOMI without nystagmus or strabismus. Fundi appear benign. No hemorrhages or exudates of eyes. Auditory canals are patent bilaterally without lesions. TMs intact,  nonerythematous, nonbulging without lesions. Nasal mucosa pink, nonerythematous, and nonedematous. Frontal and maxillary sinuses are nontender. O/P nonerythematous and moist without exudate.    Neck: Supple without lymph nodes palpated. FROM.     Lungs: Slightly decreased B/L without rales, rhonchi, crackles, or wheezes.    Heart: RRR. S1 and S2 normal. No S3 or S4. No MRGT.    Abd: Soft, nontender,nondistended. (+)BSx4 quadrants.     Extrem: No CCE. Radial pulses 2+/4 and equal B/L. FROMx4.  Positive joint tenderness noted.    Neuro: No focal motor/sensory deficits.    HEALTH RISK ASSESSMENT    Smoking Status:  Social History     Tobacco Use   Smoking Status Former    Current packs/day: 0.00    Average packs/day: 1 pack/day for 30.0 years (30.0 ttl pk-yrs)    Types: Pipe, Cigars, Cigarettes    Start date: 1986    Quit date: 2016    Years since quittin.9   Smokeless Tobacco Never   Tobacco Comments    currently smokes a pipe     Alcohol Consumption:  Social History     Substance and Sexual Activity   Alcohol Use No     Fall Risk Screen:    HILTONADI Fall Risk Assessment was completed, and patient is at HIGH risk for falls. Assessment completed on:2024    Depression Screenin/6/2024    10:34 AM   PHQ-2/PHQ-9 Depression Screening   Little Interest or Pleasure in Doing Things 0-->not at all   Feeling Down, Depressed or Hopeless 0-->not at all   PHQ-9: Brief Depression Severity Measure Score 0        Health Habits and Functional and Cognitive Screenin/6/2024    10:35 AM   Functional & Cognitive Status   Do you have difficulty preparing food and eating? No   Do you have difficulty bathing yourself, getting dressed or grooming yourself? No   Do you have difficulty using the toilet? No   Do you have difficulty moving around from place to place? No   Do you have trouble with steps or getting out of a bed or a chair? No   Current Diet Well Balanced Diet   Dental Exam Not up to date   Eye Exam Not up to date   Exercise (times per week) 2 times per week   Current Exercises Include Yard Work;Walking   Do you need help using the phone?  No   Are you deaf or do you have serious difficulty hearing?  No   Do you need help to go to places out of walking distance? No   Do you need help shopping? No   Do you need help preparing meals?  No   Do you need help with housework?  No   Do you need help with laundry? No   Do you need help taking your medications? No   Do you need help managing money? No   Do you ever drive or ride in a car without wearing a seat belt? Yes   Have you felt unusual stress, anger or loneliness in the last month? No   Who do you live with? Alone   If you need help, do you have trouble finding someone available to you? No   Have you been bothered in the last four weeks by sexual problems? No   Do you have difficulty concentrating, remembering or making decisions? No       Age-appropriate Screening Schedule:  Refer to the list below for future screening recommendations based on patient's age, sex and/or medical conditions. Orders for these recommended tests are listed in the plan section. The patient has been provided with a written plan.    Health Maintenance   Topic Date Due    TDAP/TD VACCINES (1 - Tdap) Never done    ZOSTER VACCINE (1 of 2) Never done    DIABETIC EYE EXAM  2017    LUNG CANCER SCREENING  2024    BMI FOLLOWUP  05/15/2024    COVID-19 Vaccine (2023-24 season)  05/18/2024    LIPID PANEL  06/23/2024    Pneumococcal Vaccine 65+ (1 of 2 - PCV) 10/27/2024 (Originally 8/15/1961)    RSV Vaccine - Adults (1 - 1-dose 60+ series) 06/06/2025 (Originally 8/15/2013)    INFLUENZA VACCINE  08/01/2024    DIABETIC FOOT EXAM  08/15/2024    URINE MICROALBUMIN  11/29/2024    HEMOGLOBIN A1C  12/06/2024    ANNUAL WELLNESS VISIT  06/06/2025    COLORECTAL CANCER SCREENING  10/12/2026    HEPATITIS C SCREENING  Completed    AAA SCREEN (ONE-TIME)  Completed                  CMS Preventative Services Quick Reference  Risk Factors Identified During Encounter:    Fall Risk-High or Moderate: Discussed Fall Prevention in the home  Immunizations Discussed/Encouraged: Prevnar 20 (Pneumococcal 20-valent conjugate)    The above risks/problems have been discussed with the patient.  Pertinent information has been shared with the patient in the After Visit Summary.    Diagnoses and all orders for this visit:    1. Encounter for subsequent annual wellness visit (AWV) in Medicare patient (Primary)    2. Anticoagulation goal of INR 2.5 to 3.5  -     POCT INR  -     Comprehensive Metabolic Panel; Future    3. Hypertension associated with diabetes  -     Comprehensive Metabolic Panel; Future    4. H/O mechanical aortic valve replacement  -     Comprehensive Metabolic Panel; Future    5. Stage 3 chronic kidney disease, unspecified whether stage 3a or 3b CKD  -     Comprehensive Metabolic Panel; Future    6. Type 2 diabetes mellitus with stage 3 chronic kidney disease, with long-term current use of insulin, unspecified whether stage 3a or 3b CKD  -     Comprehensive Metabolic Panel; Future  -     Hemoglobin A1c; Future    7. Encounter for screening for malignant neoplasm of prostate  -     PSA Screen; Future        Follow Up:   Next Medicare Wellness visit to be scheduled in 1 year.      An After Visit Summary and PPPS were made available to the patient.

## 2024-06-28 ENCOUNTER — ANTICOAGULATION VISIT (OUTPATIENT)
Dept: FAMILY MEDICINE CLINIC | Facility: CLINIC | Age: 69
End: 2024-06-28
Payer: MEDICARE

## 2024-06-28 DIAGNOSIS — Z79.01 ANTICOAGULANT LONG-TERM USE: Primary | ICD-10-CM

## 2024-06-28 LAB — INR PPP: 3.1 (ref 2–3)

## 2024-06-28 PROCEDURE — 85610 PROTHROMBIN TIME: CPT | Performed by: FAMILY MEDICINE

## 2024-06-28 PROCEDURE — 36416 COLLJ CAPILLARY BLOOD SPEC: CPT | Performed by: FAMILY MEDICINE

## 2024-07-03 ENCOUNTER — ANTICOAGULATION VISIT (OUTPATIENT)
Dept: FAMILY MEDICINE CLINIC | Facility: CLINIC | Age: 69
End: 2024-07-03
Payer: MEDICARE

## 2024-07-03 DIAGNOSIS — Z95.2 H/O MECHANICAL AORTIC VALVE REPLACEMENT: Primary | ICD-10-CM

## 2024-07-03 LAB — INR PPP: 3 (ref 3–3.5)

## 2024-07-03 PROCEDURE — 85610 PROTHROMBIN TIME: CPT | Performed by: FAMILY MEDICINE

## 2024-07-03 PROCEDURE — 36416 COLLJ CAPILLARY BLOOD SPEC: CPT | Performed by: FAMILY MEDICINE

## 2024-07-09 DIAGNOSIS — Z51.81 ANTICOAGULATION GOAL OF INR 2.5 TO 3.5: ICD-10-CM

## 2024-07-09 DIAGNOSIS — Z79.01 ANTICOAGULATION GOAL OF INR 2.5 TO 3.5: ICD-10-CM

## 2024-07-10 ENCOUNTER — TELEPHONE (OUTPATIENT)
Dept: FAMILY MEDICINE CLINIC | Facility: CLINIC | Age: 69
End: 2024-07-10
Payer: MEDICARE

## 2024-07-10 NOTE — TELEPHONE ENCOUNTER
Caller: Benitez Miranda    Relationship: Self    Best call back number:      [phone] (Home)     Requested Prescriptions:   Requested Prescriptions      No prescriptions requested or ordered in this encounter      GABAPENTIN 600 MG     Pharmacy where request should be sent: [facility] [address] [place] [address] [phone] [facility] [phone]      Last office visit with prescribing clinician: 6/6/2024   Last telemedicine visit with prescribing clinician: Visit date not found   Next office visit with prescribing clinician: 9/6/2024       Does the patient have less than a 3 day supply:  [x] Yes  [] No    Would you like a call back once the refill request has been completed: [] Yes [x] No    If the office needs to give you a call back, can they leave a voicemail: [] Yes [x] No

## 2024-07-11 ENCOUNTER — PATIENT OUTREACH (OUTPATIENT)
Dept: CASE MANAGEMENT | Facility: OTHER | Age: 69
End: 2024-07-11
Payer: MEDICARE

## 2024-07-11 NOTE — OUTREACH NOTE
AMBULATORY CASE MANAGEMENT NOTE    Names and Relationships of Patient/Support Persons: Contact: Benitez Miranda; Relationship: Self -     Patient Outreach    RN-ACM outreach with patient that was proactively identified for participation in HRCM.  Patient reports doing well at this time with no unmet needs, questions, or concerns for RN-ACM to address.  Per review of chart notes, patient was seen in clinic in for his Medicare Annual Wellness Exam on 06/06/24.  Patient continues to present tfor anticoagulation management.     HRCM advanced to monitoring with this encounter.  Patient is encouraged to call as needed.       Kat BAPTISTE  Ambulatory Case Management    7/11/2024, 14:37 EDT

## 2024-07-12 RX ORDER — WARFARIN SODIUM 1 MG/1
TABLET ORAL
Qty: 90 TABLET | Refills: 1 | Status: SHIPPED | OUTPATIENT
Start: 2024-07-12

## 2024-07-15 DIAGNOSIS — G62.9 NEUROPATHY: Primary | ICD-10-CM

## 2024-07-15 RX ORDER — GABAPENTIN 600 MG/1
600 TABLET ORAL DAILY PRN
Qty: 30 TABLET | Refills: 0 | Status: SHIPPED | OUTPATIENT
Start: 2024-07-15

## 2024-07-15 NOTE — TELEPHONE ENCOUNTER
Let him know it's been sent please. He takes it very very very rarely for neuropathy.      Levi # in epic  Reviewed and is consistent.  UDS  reviewed and is consistent.  Patient comfort assessment guide reviewed and updated today.    As part of the patient's treatment plan they are being prescribed a controlled substance/ substances with potential for abuse.  This patient has been made aware of the appropriate use of such medications, including potential risk of somnolence, limited ability to drive and/or work safely, and potential for overdose.  It has also been made clear these medications are for use by the patient only, without concomitant use of alcohol or other substances unless prescribed/advised by medical provider.    Patient has completed prescribing agreement detailing terms of continued prescribing of controlled substances including monitoring LEVI reports, urine drug screens, and pill counts.  The patient is aware LEVI reports are reviewed on a regular basis and scanned into the chart.    History and physical exam exhibit continued safe and appropriate use of controlled substances.

## 2024-07-19 ENCOUNTER — ANTICOAGULATION VISIT (OUTPATIENT)
Dept: FAMILY MEDICINE CLINIC | Facility: CLINIC | Age: 69
End: 2024-07-19
Payer: MEDICARE

## 2024-07-19 DIAGNOSIS — Z79.01 ANTICOAGULATION GOAL OF INR 2.5 TO 3.5: Primary | ICD-10-CM

## 2024-07-19 DIAGNOSIS — Z51.81 ANTICOAGULATION GOAL OF INR 2.5 TO 3.5: Primary | ICD-10-CM

## 2024-07-19 LAB — INR PPP: 4 (ref 3–3.5)

## 2024-07-19 PROCEDURE — 36416 COLLJ CAPILLARY BLOOD SPEC: CPT | Performed by: FAMILY MEDICINE

## 2024-07-19 PROCEDURE — 85610 PROTHROMBIN TIME: CPT | Performed by: FAMILY MEDICINE

## 2024-07-23 ENCOUNTER — ANTICOAGULATION VISIT (OUTPATIENT)
Dept: FAMILY MEDICINE CLINIC | Facility: CLINIC | Age: 69
End: 2024-07-23
Payer: MEDICARE

## 2024-07-23 DIAGNOSIS — Z51.81 ANTICOAGULATION GOAL OF INR 2.5 TO 3.5: Primary | ICD-10-CM

## 2024-07-23 DIAGNOSIS — Z79.01 ANTICOAGULATION GOAL OF INR 2.5 TO 3.5: Primary | ICD-10-CM

## 2024-07-23 LAB — INR PPP: 2.2 (ref 2–3)

## 2024-07-23 PROCEDURE — 36416 COLLJ CAPILLARY BLOOD SPEC: CPT | Performed by: FAMILY MEDICINE

## 2024-07-23 PROCEDURE — 85610 PROTHROMBIN TIME: CPT | Performed by: FAMILY MEDICINE

## 2024-07-26 ENCOUNTER — ANTICOAGULATION VISIT (OUTPATIENT)
Dept: FAMILY MEDICINE CLINIC | Facility: CLINIC | Age: 69
End: 2024-07-26
Payer: MEDICARE

## 2024-07-26 DIAGNOSIS — Z51.81 ANTICOAGULATION GOAL OF INR 2.5 TO 3.5: Primary | ICD-10-CM

## 2024-07-26 DIAGNOSIS — Z79.01 ANTICOAGULATION GOAL OF INR 2.5 TO 3.5: Primary | ICD-10-CM

## 2024-07-26 LAB — INR PPP: 2 (ref 3–3.5)

## 2024-07-26 PROCEDURE — 85610 PROTHROMBIN TIME: CPT | Performed by: FAMILY MEDICINE

## 2024-07-26 PROCEDURE — 36416 COLLJ CAPILLARY BLOOD SPEC: CPT | Performed by: FAMILY MEDICINE

## 2024-07-27 DIAGNOSIS — I15.2 HYPERTENSION ASSOCIATED WITH DIABETES: ICD-10-CM

## 2024-07-27 DIAGNOSIS — F33.42 RECURRENT MAJOR DEPRESSIVE DISORDER, IN FULL REMISSION: ICD-10-CM

## 2024-07-27 DIAGNOSIS — E11.59 HYPERTENSION ASSOCIATED WITH DIABETES: ICD-10-CM

## 2024-07-29 RX ORDER — METOPROLOL SUCCINATE 50 MG/1
50 TABLET, EXTENDED RELEASE ORAL DAILY
Qty: 90 TABLET | Refills: 0 | Status: SHIPPED | OUTPATIENT
Start: 2024-07-29

## 2024-07-29 RX ORDER — SERTRALINE HYDROCHLORIDE 100 MG/1
150 TABLET, FILM COATED ORAL DAILY
Qty: 135 TABLET | Refills: 0 | Status: SHIPPED | OUTPATIENT
Start: 2024-07-29

## 2024-07-30 ENCOUNTER — ANTICOAGULATION VISIT (OUTPATIENT)
Dept: FAMILY MEDICINE CLINIC | Facility: CLINIC | Age: 69
End: 2024-07-30
Payer: MEDICARE

## 2024-07-30 DIAGNOSIS — Z95.2 H/O MECHANICAL AORTIC VALVE REPLACEMENT: Primary | ICD-10-CM

## 2024-07-30 LAB — INR PPP: 3.7 (ref 3–3.5)

## 2024-07-30 PROCEDURE — 36416 COLLJ CAPILLARY BLOOD SPEC: CPT | Performed by: FAMILY MEDICINE

## 2024-07-30 PROCEDURE — 85610 PROTHROMBIN TIME: CPT | Performed by: FAMILY MEDICINE

## 2024-08-06 ENCOUNTER — ANTICOAGULATION VISIT (OUTPATIENT)
Dept: FAMILY MEDICINE CLINIC | Facility: CLINIC | Age: 69
End: 2024-08-06
Payer: MEDICARE

## 2024-08-06 LAB
INR PPP: 5
INR PPP: 5 (ref 0.9–1.1)

## 2024-08-06 PROCEDURE — 36416 COLLJ CAPILLARY BLOOD SPEC: CPT | Performed by: FAMILY MEDICINE

## 2024-08-06 PROCEDURE — 85610 PROTHROMBIN TIME: CPT | Performed by: FAMILY MEDICINE

## 2024-08-14 ENCOUNTER — ANTICOAGULATION VISIT (OUTPATIENT)
Dept: FAMILY MEDICINE CLINIC | Facility: CLINIC | Age: 69
End: 2024-08-14
Payer: MEDICARE

## 2024-08-14 DIAGNOSIS — Z51.81 ANTICOAGULATION GOAL OF INR 2.5 TO 3.5: Primary | ICD-10-CM

## 2024-08-14 DIAGNOSIS — Z79.01 ANTICOAGULATION GOAL OF INR 2.5 TO 3.5: Primary | ICD-10-CM

## 2024-08-14 LAB — INR PPP: 3.2 (ref 3–3.5)

## 2024-08-14 PROCEDURE — 36416 COLLJ CAPILLARY BLOOD SPEC: CPT | Performed by: FAMILY MEDICINE

## 2024-08-14 PROCEDURE — 85610 PROTHROMBIN TIME: CPT | Performed by: FAMILY MEDICINE

## 2024-08-19 DIAGNOSIS — Z95.2 H/O MECHANICAL AORTIC VALVE REPLACEMENT: ICD-10-CM

## 2024-08-19 DIAGNOSIS — E11.59 HYPERTENSION ASSOCIATED WITH DIABETES: ICD-10-CM

## 2024-08-19 DIAGNOSIS — I15.2 HYPERTENSION ASSOCIATED WITH DIABETES: ICD-10-CM

## 2024-08-23 ENCOUNTER — ANTICOAGULATION VISIT (OUTPATIENT)
Dept: FAMILY MEDICINE CLINIC | Facility: CLINIC | Age: 69
End: 2024-08-23
Payer: MEDICARE

## 2024-08-23 DIAGNOSIS — Z79.01 ANTICOAGULANT LONG-TERM USE: Primary | ICD-10-CM

## 2024-08-23 LAB — INR PPP: 3.4 (ref 3–3.5)

## 2024-08-23 PROCEDURE — 85610 PROTHROMBIN TIME: CPT | Performed by: STUDENT IN AN ORGANIZED HEALTH CARE EDUCATION/TRAINING PROGRAM

## 2024-08-23 PROCEDURE — 36416 COLLJ CAPILLARY BLOOD SPEC: CPT | Performed by: STUDENT IN AN ORGANIZED HEALTH CARE EDUCATION/TRAINING PROGRAM

## 2024-08-23 RX ORDER — SPIRONOLACTONE 25 MG/1
25 TABLET ORAL DAILY
Qty: 90 TABLET | Refills: 1 | Status: SHIPPED | OUTPATIENT
Start: 2024-08-23

## 2024-09-06 ENCOUNTER — OFFICE VISIT (OUTPATIENT)
Dept: FAMILY MEDICINE CLINIC | Facility: CLINIC | Age: 69
End: 2024-09-06
Payer: MEDICARE

## 2024-09-06 VITALS
RESPIRATION RATE: 16 BRPM | HEART RATE: 54 BPM | WEIGHT: 255.2 LBS | OXYGEN SATURATION: 97 % | TEMPERATURE: 96.6 F | SYSTOLIC BLOOD PRESSURE: 126 MMHG | BODY MASS INDEX: 34.57 KG/M2 | DIASTOLIC BLOOD PRESSURE: 64 MMHG | HEIGHT: 72 IN

## 2024-09-06 DIAGNOSIS — E11.22 TYPE 2 DIABETES MELLITUS WITH STAGE 3 CHRONIC KIDNEY DISEASE, WITH LONG-TERM CURRENT USE OF INSULIN, UNSPECIFIED WHETHER STAGE 3A OR 3B CKD: Primary | ICD-10-CM

## 2024-09-06 DIAGNOSIS — E11.59 HYPERTENSION ASSOCIATED WITH DIABETES: ICD-10-CM

## 2024-09-06 DIAGNOSIS — E11.69 HYPERLIPIDEMIA DUE TO TYPE 2 DIABETES MELLITUS: ICD-10-CM

## 2024-09-06 DIAGNOSIS — Z79.4 TYPE 2 DIABETES MELLITUS WITH STAGE 3 CHRONIC KIDNEY DISEASE, WITH LONG-TERM CURRENT USE OF INSULIN, UNSPECIFIED WHETHER STAGE 3A OR 3B CKD: Primary | ICD-10-CM

## 2024-09-06 DIAGNOSIS — I15.2 HYPERTENSION ASSOCIATED WITH DIABETES: ICD-10-CM

## 2024-09-06 DIAGNOSIS — E78.5 HYPERLIPIDEMIA DUE TO TYPE 2 DIABETES MELLITUS: ICD-10-CM

## 2024-09-06 DIAGNOSIS — Z79.01 ANTICOAGULANT LONG-TERM USE: ICD-10-CM

## 2024-09-06 DIAGNOSIS — G62.9 NEUROPATHY: ICD-10-CM

## 2024-09-06 DIAGNOSIS — N18.30 TYPE 2 DIABETES MELLITUS WITH STAGE 3 CHRONIC KIDNEY DISEASE, WITH LONG-TERM CURRENT USE OF INSULIN, UNSPECIFIED WHETHER STAGE 3A OR 3B CKD: Primary | ICD-10-CM

## 2024-09-06 LAB — INR PPP: 3.5 (ref 3–3.5)

## 2024-09-06 RX ORDER — METOPROLOL SUCCINATE 50 MG/1
50 TABLET, EXTENDED RELEASE ORAL DAILY
Qty: 90 TABLET | Refills: 1 | Status: SHIPPED | OUTPATIENT
Start: 2024-09-06

## 2024-09-06 RX ORDER — ATORVASTATIN CALCIUM 80 MG/1
80 TABLET, FILM COATED ORAL NIGHTLY
Qty: 90 TABLET | Refills: 1 | Status: SHIPPED | OUTPATIENT
Start: 2024-09-06

## 2024-09-06 RX ORDER — AMLODIPINE BESYLATE 10 MG/1
10 TABLET ORAL DAILY
Qty: 90 TABLET | Refills: 1 | Status: SHIPPED | OUTPATIENT
Start: 2024-09-06

## 2024-09-06 RX ORDER — SEMAGLUTIDE 1.34 MG/ML
0.5 INJECTION, SOLUTION SUBCUTANEOUS WEEKLY
Qty: 4.5 ML | Refills: 1 | Status: SHIPPED | OUTPATIENT
Start: 2024-09-06

## 2024-09-06 RX ORDER — GABAPENTIN 600 MG/1
600 TABLET ORAL DAILY PRN
Qty: 30 TABLET | Refills: 0 | Status: SHIPPED | OUTPATIENT
Start: 2024-09-06

## 2024-09-13 ENCOUNTER — ANTICOAGULATION VISIT (OUTPATIENT)
Dept: FAMILY MEDICINE CLINIC | Facility: CLINIC | Age: 69
End: 2024-09-13
Payer: MEDICARE

## 2024-09-13 ENCOUNTER — CLINICAL SUPPORT (OUTPATIENT)
Dept: FAMILY MEDICINE CLINIC | Facility: CLINIC | Age: 69
End: 2024-09-13
Payer: MEDICARE

## 2024-09-13 ENCOUNTER — LAB (OUTPATIENT)
Dept: FAMILY MEDICINE CLINIC | Facility: CLINIC | Age: 69
End: 2024-09-13
Payer: MEDICARE

## 2024-09-13 DIAGNOSIS — Z79.01 ANTICOAGULANT LONG-TERM USE: Primary | ICD-10-CM

## 2024-09-13 DIAGNOSIS — K92.1 BLOOD IN STOOL: Primary | ICD-10-CM

## 2024-09-13 DIAGNOSIS — E11.59 HYPERTENSION ASSOCIATED WITH DIABETES: ICD-10-CM

## 2024-09-13 DIAGNOSIS — N18.30 TYPE 2 DIABETES MELLITUS WITH STAGE 3 CHRONIC KIDNEY DISEASE, WITH LONG-TERM CURRENT USE OF INSULIN, UNSPECIFIED WHETHER STAGE 3A OR 3B CKD: ICD-10-CM

## 2024-09-13 DIAGNOSIS — G62.9 NEUROPATHY: ICD-10-CM

## 2024-09-13 DIAGNOSIS — E11.22 TYPE 2 DIABETES MELLITUS WITH STAGE 3 CHRONIC KIDNEY DISEASE, WITH LONG-TERM CURRENT USE OF INSULIN, UNSPECIFIED WHETHER STAGE 3A OR 3B CKD: ICD-10-CM

## 2024-09-13 DIAGNOSIS — Z79.4 TYPE 2 DIABETES MELLITUS WITH STAGE 3 CHRONIC KIDNEY DISEASE, WITH LONG-TERM CURRENT USE OF INSULIN, UNSPECIFIED WHETHER STAGE 3A OR 3B CKD: ICD-10-CM

## 2024-09-13 DIAGNOSIS — I15.2 HYPERTENSION ASSOCIATED WITH DIABETES: ICD-10-CM

## 2024-09-13 DIAGNOSIS — E11.69 HYPERLIPIDEMIA DUE TO TYPE 2 DIABETES MELLITUS: ICD-10-CM

## 2024-09-13 DIAGNOSIS — E78.5 HYPERLIPIDEMIA DUE TO TYPE 2 DIABETES MELLITUS: ICD-10-CM

## 2024-09-13 LAB
ALBUMIN SERPL-MCNC: 3.9 G/DL (ref 3.5–5.2)
ALBUMIN/GLOB SERPL: 1.1 G/DL
ALP SERPL-CCNC: 73 U/L (ref 39–117)
ALT SERPL W P-5'-P-CCNC: 23 U/L (ref 1–41)
ANION GAP SERPL CALCULATED.3IONS-SCNC: 12.1 MMOL/L (ref 5–15)
AST SERPL-CCNC: 27 U/L (ref 1–40)
BASOPHILS # BLD AUTO: 0.1 10*3/MM3 (ref 0–0.2)
BASOPHILS NFR BLD AUTO: 0.9 % (ref 0–1.5)
BILIRUB SERPL-MCNC: 0.5 MG/DL (ref 0–1.2)
BUN SERPL-MCNC: 22 MG/DL (ref 8–23)
BUN/CREAT SERPL: 15.8 (ref 7–25)
CALCIUM SPEC-SCNC: 9.3 MG/DL (ref 8.6–10.5)
CHLORIDE SERPL-SCNC: 100 MMOL/L (ref 98–107)
CHOLEST SERPL-MCNC: 71 MG/DL (ref 0–200)
CO2 SERPL-SCNC: 26.9 MMOL/L (ref 22–29)
CREAT SERPL-MCNC: 1.39 MG/DL (ref 0.76–1.27)
DEPRECATED RDW RBC AUTO: 43.7 FL (ref 37–54)
EGFRCR SERPLBLD CKD-EPI 2021: 54.2 ML/MIN/1.73
EOSINOPHIL # BLD AUTO: 0.73 10*3/MM3 (ref 0–0.4)
EOSINOPHIL NFR BLD AUTO: 6.9 % (ref 0.3–6.2)
ERYTHROCYTE [DISTWIDTH] IN BLOOD BY AUTOMATED COUNT: 14.1 % (ref 12.3–15.4)
EXPIRATION DATE 2: ABNORMAL
EXPIRATION DATE 3: ABNORMAL
EXPIRATION DATE: ABNORMAL
GASTROCULT GAST QL: POSITIVE
GLOBULIN UR ELPH-MCNC: 3.5 GM/DL
GLUCOSE SERPL-MCNC: 129 MG/DL (ref 65–99)
HBA1C MFR BLD: 8.7 % (ref 4.8–5.6)
HCT VFR BLD AUTO: 45.8 % (ref 37.5–51)
HDLC SERPL-MCNC: 40 MG/DL (ref 40–60)
HEMOCCULT SP2 STL QL: POSITIVE
HEMOCCULT SP3 STL QL: POSITIVE
HGB BLD-MCNC: 15 G/DL (ref 13–17.7)
IMM GRANULOCYTES # BLD AUTO: 0.08 10*3/MM3 (ref 0–0.05)
IMM GRANULOCYTES NFR BLD AUTO: 0.8 % (ref 0–0.5)
INR PPP: 4.2 (ref 0.9–1.1)
LDLC SERPL CALC-MCNC: 12 MG/DL (ref 0–100)
LDLC/HDLC SERPL: 0.27 {RATIO}
LYMPHOCYTES # BLD AUTO: 1.64 10*3/MM3 (ref 0.7–3.1)
LYMPHOCYTES NFR BLD AUTO: 15.4 % (ref 19.6–45.3)
Lab: 1132
MCH RBC QN AUTO: 28.1 PG (ref 26.6–33)
MCHC RBC AUTO-ENTMCNC: 32.8 G/DL (ref 31.5–35.7)
MCV RBC AUTO: 85.9 FL (ref 79–97)
MONOCYTES # BLD AUTO: 0.69 10*3/MM3 (ref 0.1–0.9)
MONOCYTES NFR BLD AUTO: 6.5 % (ref 5–12)
NEUTROPHILS NFR BLD AUTO: 69.5 % (ref 42.7–76)
NEUTROPHILS NFR BLD AUTO: 7.39 10*3/MM3 (ref 1.7–7)
NRBC BLD AUTO-RTO: 0 /100 WBC (ref 0–0.2)
PLATELET # BLD AUTO: 171 10*3/MM3 (ref 140–450)
PMV BLD AUTO: 12.8 FL (ref 6–12)
POTASSIUM SERPL-SCNC: 4.2 MMOL/L (ref 3.5–5.2)
PROT SERPL-MCNC: 7.4 G/DL (ref 6–8.5)
RBC # BLD AUTO: 5.33 10*6/MM3 (ref 4.14–5.8)
SODIUM SERPL-SCNC: 139 MMOL/L (ref 136–145)
T4 FREE SERPL-MCNC: 1.14 NG/DL (ref 0.92–1.68)
TRIGL SERPL-MCNC: 102 MG/DL (ref 0–150)
TSH SERPL DL<=0.05 MIU/L-ACNC: 3.69 UIU/ML (ref 0.27–4.2)
VLDLC SERPL-MCNC: 19 MG/DL (ref 5–40)
WBC NRBC COR # BLD AUTO: 10.63 10*3/MM3 (ref 3.4–10.8)

## 2024-09-13 PROCEDURE — 84443 ASSAY THYROID STIM HORMONE: CPT | Performed by: FAMILY MEDICINE

## 2024-09-13 PROCEDURE — 80061 LIPID PANEL: CPT | Performed by: FAMILY MEDICINE

## 2024-09-13 PROCEDURE — 85610 PROTHROMBIN TIME: CPT | Performed by: FAMILY MEDICINE

## 2024-09-13 PROCEDURE — 36415 COLL VENOUS BLD VENIPUNCTURE: CPT

## 2024-09-13 PROCEDURE — 84439 ASSAY OF FREE THYROXINE: CPT | Performed by: FAMILY MEDICINE

## 2024-09-13 PROCEDURE — 36416 COLLJ CAPILLARY BLOOD SPEC: CPT | Performed by: FAMILY MEDICINE

## 2024-09-13 PROCEDURE — 83036 HEMOGLOBIN GLYCOSYLATED A1C: CPT | Performed by: FAMILY MEDICINE

## 2024-09-13 PROCEDURE — 82274 ASSAY TEST FOR BLOOD FECAL: CPT | Performed by: FAMILY MEDICINE

## 2024-09-13 PROCEDURE — 80053 COMPREHEN METABOLIC PANEL: CPT | Performed by: FAMILY MEDICINE

## 2024-09-13 PROCEDURE — 85025 COMPLETE CBC W/AUTO DIFF WBC: CPT | Performed by: FAMILY MEDICINE

## 2024-09-20 ENCOUNTER — ANTICOAGULATION VISIT (OUTPATIENT)
Dept: FAMILY MEDICINE CLINIC | Facility: CLINIC | Age: 69
End: 2024-09-20
Payer: MEDICARE

## 2024-09-20 DIAGNOSIS — Z79.01 ANTICOAGULANT LONG-TERM USE: Primary | ICD-10-CM

## 2024-09-20 LAB — INR PPP: 1.8 (ref 3–3.5)

## 2024-09-20 PROCEDURE — 85610 PROTHROMBIN TIME: CPT | Performed by: FAMILY MEDICINE

## 2024-09-20 PROCEDURE — 36416 COLLJ CAPILLARY BLOOD SPEC: CPT | Performed by: FAMILY MEDICINE

## 2024-09-20 RX ORDER — INSULIN LISPRO 100 [IU]/ML
INJECTION, SOLUTION INTRAVENOUS; SUBCUTANEOUS
Qty: 21 ML | Refills: 0 | Status: SHIPPED | OUTPATIENT
Start: 2024-09-20

## 2024-09-24 RX ORDER — INSULIN LISPRO 100 [IU]/ML
INJECTION, SOLUTION INTRAVENOUS; SUBCUTANEOUS
Qty: 21 ML | Refills: 0 | OUTPATIENT
Start: 2024-09-24

## 2024-09-26 ENCOUNTER — ANTICOAGULATION VISIT (OUTPATIENT)
Dept: FAMILY MEDICINE CLINIC | Facility: CLINIC | Age: 69
End: 2024-09-26
Payer: MEDICARE

## 2024-09-26 ENCOUNTER — TELEPHONE (OUTPATIENT)
Dept: FAMILY MEDICINE CLINIC | Facility: CLINIC | Age: 69
End: 2024-09-26

## 2024-09-26 ENCOUNTER — LAB (OUTPATIENT)
Dept: FAMILY MEDICINE CLINIC | Facility: CLINIC | Age: 69
End: 2024-09-26
Payer: MEDICARE

## 2024-09-26 DIAGNOSIS — Z51.81 ANTICOAGULATION GOAL OF INR 2.5 TO 3.5: Primary | ICD-10-CM

## 2024-09-26 DIAGNOSIS — Z79.01 ANTICOAGULATION GOAL OF INR 2.5 TO 3.5: Primary | ICD-10-CM

## 2024-09-26 LAB — INR PPP: 3 (ref 1–3)

## 2024-09-26 PROCEDURE — 36416 COLLJ CAPILLARY BLOOD SPEC: CPT | Performed by: FAMILY MEDICINE

## 2024-09-26 PROCEDURE — 85610 PROTHROMBIN TIME: CPT | Performed by: FAMILY MEDICINE

## 2024-09-26 RX ORDER — INSULIN LISPRO 100 [IU]/ML
INJECTION, SOLUTION INTRAVENOUS; SUBCUTANEOUS
Qty: 21 ML | Refills: 0 | OUTPATIENT
Start: 2024-09-26

## 2024-09-27 RX ORDER — INSULIN LISPRO 100 [IU]/ML
INJECTION, SOLUTION INTRAVENOUS; SUBCUTANEOUS
Qty: 42 ML | Refills: 1 | Status: SHIPPED | OUTPATIENT
Start: 2024-09-27

## 2024-10-04 ENCOUNTER — ANTICOAGULATION VISIT (OUTPATIENT)
Dept: FAMILY MEDICINE CLINIC | Facility: CLINIC | Age: 69
End: 2024-10-04
Payer: MEDICARE

## 2024-10-04 DIAGNOSIS — Z79.01 ANTICOAGULANT LONG-TERM USE: Primary | ICD-10-CM

## 2024-10-04 LAB — INR PPP: 3.2 (ref 3–3.5)

## 2024-10-04 PROCEDURE — 85610 PROTHROMBIN TIME: CPT | Performed by: FAMILY MEDICINE

## 2024-10-04 PROCEDURE — 36416 COLLJ CAPILLARY BLOOD SPEC: CPT | Performed by: FAMILY MEDICINE

## 2024-10-27 DIAGNOSIS — F33.42 RECURRENT MAJOR DEPRESSIVE DISORDER, IN FULL REMISSION: ICD-10-CM

## 2024-10-28 RX ORDER — SERTRALINE HYDROCHLORIDE 100 MG/1
150 TABLET, FILM COATED ORAL DAILY
Qty: 135 TABLET | Refills: 0 | Status: SHIPPED | OUTPATIENT
Start: 2024-10-28

## 2024-10-30 ENCOUNTER — ANTICOAGULATION VISIT (OUTPATIENT)
Dept: FAMILY MEDICINE CLINIC | Facility: CLINIC | Age: 69
End: 2024-10-30
Payer: MEDICARE

## 2024-10-30 DIAGNOSIS — Z51.81 ANTICOAGULATION GOAL OF INR 2.5 TO 3.5: Primary | ICD-10-CM

## 2024-10-30 DIAGNOSIS — Z79.01 ANTICOAGULATION GOAL OF INR 2.5 TO 3.5: Primary | ICD-10-CM

## 2024-10-30 DIAGNOSIS — Z79.01 ANTICOAGULANT LONG-TERM USE: ICD-10-CM

## 2024-10-30 LAB — INR PPP: 3.2 (ref 3–3.5)

## 2024-10-30 PROCEDURE — 85610 PROTHROMBIN TIME: CPT | Performed by: STUDENT IN AN ORGANIZED HEALTH CARE EDUCATION/TRAINING PROGRAM

## 2024-10-30 PROCEDURE — 36416 COLLJ CAPILLARY BLOOD SPEC: CPT | Performed by: STUDENT IN AN ORGANIZED HEALTH CARE EDUCATION/TRAINING PROGRAM

## 2024-11-25 ENCOUNTER — ANTICOAGULATION VISIT (OUTPATIENT)
Dept: FAMILY MEDICINE CLINIC | Facility: CLINIC | Age: 69
End: 2024-11-25
Payer: MEDICARE

## 2024-11-25 DIAGNOSIS — Z95.2 H/O MECHANICAL AORTIC VALVE REPLACEMENT: Primary | Chronic | ICD-10-CM

## 2024-11-25 DIAGNOSIS — Z79.01 ANTICOAGULANT LONG-TERM USE: ICD-10-CM

## 2024-11-25 LAB — INR PPP: 4 (ref 0.9–1.1)

## 2024-11-25 PROCEDURE — 36416 COLLJ CAPILLARY BLOOD SPEC: CPT | Performed by: FAMILY MEDICINE

## 2024-11-25 PROCEDURE — 85610 PROTHROMBIN TIME: CPT | Performed by: FAMILY MEDICINE

## 2024-12-02 DIAGNOSIS — I10 ESSENTIAL HYPERTENSION: Chronic | ICD-10-CM

## 2024-12-06 DIAGNOSIS — I10 ESSENTIAL HYPERTENSION: Chronic | ICD-10-CM

## 2024-12-06 RX ORDER — LOSARTAN POTASSIUM 25 MG/1
25 TABLET ORAL DAILY
Qty: 90 TABLET | Refills: 0 | OUTPATIENT
Start: 2024-12-06

## 2024-12-06 RX ORDER — LOSARTAN POTASSIUM 25 MG/1
25 TABLET ORAL DAILY
Qty: 30 TABLET | Refills: 1 | Status: SHIPPED | OUTPATIENT
Start: 2024-12-06

## 2024-12-17 ENCOUNTER — LAB (OUTPATIENT)
Dept: FAMILY MEDICINE CLINIC | Facility: CLINIC | Age: 69
End: 2024-12-17
Payer: MEDICARE

## 2024-12-17 ENCOUNTER — OFFICE VISIT (OUTPATIENT)
Dept: FAMILY MEDICINE CLINIC | Facility: CLINIC | Age: 69
End: 2024-12-17
Payer: MEDICARE

## 2024-12-17 VITALS
SYSTOLIC BLOOD PRESSURE: 104 MMHG | BODY MASS INDEX: 34.65 KG/M2 | HEIGHT: 72 IN | TEMPERATURE: 96.8 F | HEART RATE: 58 BPM | OXYGEN SATURATION: 98 % | DIASTOLIC BLOOD PRESSURE: 60 MMHG | RESPIRATION RATE: 16 BRPM | WEIGHT: 255.8 LBS

## 2024-12-17 DIAGNOSIS — Z23 IMMUNIZATION DUE: ICD-10-CM

## 2024-12-17 DIAGNOSIS — N18.30 TYPE 2 DIABETES MELLITUS WITH STAGE 3 CHRONIC KIDNEY DISEASE, WITH LONG-TERM CURRENT USE OF INSULIN, UNSPECIFIED WHETHER STAGE 3A OR 3B CKD: ICD-10-CM

## 2024-12-17 DIAGNOSIS — N18.30 TYPE 2 DIABETES MELLITUS WITH STAGE 3 CHRONIC KIDNEY DISEASE, WITH LONG-TERM CURRENT USE OF INSULIN, UNSPECIFIED WHETHER STAGE 3A OR 3B CKD: Primary | ICD-10-CM

## 2024-12-17 DIAGNOSIS — F33.42 RECURRENT MAJOR DEPRESSIVE DISORDER, IN FULL REMISSION: ICD-10-CM

## 2024-12-17 DIAGNOSIS — E11.22 TYPE 2 DIABETES MELLITUS WITH STAGE 3 CHRONIC KIDNEY DISEASE, WITH LONG-TERM CURRENT USE OF INSULIN, UNSPECIFIED WHETHER STAGE 3A OR 3B CKD: Primary | ICD-10-CM

## 2024-12-17 DIAGNOSIS — I15.2 HYPERTENSION ASSOCIATED WITH DIABETES: ICD-10-CM

## 2024-12-17 DIAGNOSIS — Z79.4 TYPE 2 DIABETES MELLITUS WITH STAGE 3 CHRONIC KIDNEY DISEASE, WITH LONG-TERM CURRENT USE OF INSULIN, UNSPECIFIED WHETHER STAGE 3A OR 3B CKD: ICD-10-CM

## 2024-12-17 DIAGNOSIS — E11.59 HYPERTENSION ASSOCIATED WITH DIABETES: ICD-10-CM

## 2024-12-17 DIAGNOSIS — N28.89 LEFT KIDNEY MASS: ICD-10-CM

## 2024-12-17 DIAGNOSIS — K08.9 POOR DENTITION: ICD-10-CM

## 2024-12-17 DIAGNOSIS — Z79.4 TYPE 2 DIABETES MELLITUS WITH STAGE 3 CHRONIC KIDNEY DISEASE, WITH LONG-TERM CURRENT USE OF INSULIN, UNSPECIFIED WHETHER STAGE 3A OR 3B CKD: Primary | ICD-10-CM

## 2024-12-17 DIAGNOSIS — E11.22 TYPE 2 DIABETES MELLITUS WITH STAGE 3 CHRONIC KIDNEY DISEASE, WITH LONG-TERM CURRENT USE OF INSULIN, UNSPECIFIED WHETHER STAGE 3A OR 3B CKD: ICD-10-CM

## 2024-12-17 LAB
ALBUMIN SERPL-MCNC: 3.8 G/DL (ref 3.5–5.2)
ALBUMIN/GLOB SERPL: 0.9 G/DL
ALP SERPL-CCNC: 82 U/L (ref 39–117)
ALT SERPL W P-5'-P-CCNC: 27 U/L (ref 1–41)
ANION GAP SERPL CALCULATED.3IONS-SCNC: 10.7 MMOL/L (ref 5–15)
AST SERPL-CCNC: 30 U/L (ref 1–40)
BILIRUB SERPL-MCNC: 0.6 MG/DL (ref 0–1.2)
BUN SERPL-MCNC: 21 MG/DL (ref 8–23)
BUN/CREAT SERPL: 13.4 (ref 7–25)
CALCIUM SPEC-SCNC: 9.6 MG/DL (ref 8.6–10.5)
CHLORIDE SERPL-SCNC: 102 MMOL/L (ref 98–107)
CO2 SERPL-SCNC: 26.3 MMOL/L (ref 22–29)
CREAT SERPL-MCNC: 1.57 MG/DL (ref 0.76–1.27)
EGFRCR SERPLBLD CKD-EPI 2021: 46.8 ML/MIN/1.73
GLOBULIN UR ELPH-MCNC: 4.1 GM/DL
GLUCOSE SERPL-MCNC: 91 MG/DL (ref 65–99)
HBA1C MFR BLD: 8 % (ref 4.8–5.6)
INR PPP: 4.3 (ref 3–3.5)
POTASSIUM SERPL-SCNC: 4.5 MMOL/L (ref 3.5–5.2)
PROT SERPL-MCNC: 7.9 G/DL (ref 6–8.5)
SODIUM SERPL-SCNC: 139 MMOL/L (ref 136–145)

## 2024-12-17 PROCEDURE — 80053 COMPREHEN METABOLIC PANEL: CPT | Performed by: FAMILY MEDICINE

## 2024-12-17 PROCEDURE — 83036 HEMOGLOBIN GLYCOSYLATED A1C: CPT | Performed by: FAMILY MEDICINE

## 2024-12-17 PROCEDURE — 36415 COLL VENOUS BLD VENIPUNCTURE: CPT

## 2024-12-17 PROCEDURE — 82043 UR ALBUMIN QUANTITATIVE: CPT | Performed by: FAMILY MEDICINE

## 2024-12-17 RX ORDER — MULTIPLE VITAMINS W/ MINERALS TAB 9MG-400MCG
1 TAB ORAL DAILY
COMMUNITY

## 2024-12-17 RX ORDER — SERTRALINE HYDROCHLORIDE 100 MG/1
150 TABLET, FILM COATED ORAL DAILY
Qty: 135 TABLET | Refills: 1 | Status: SHIPPED | OUTPATIENT
Start: 2024-12-17

## 2024-12-17 RX ORDER — INSULIN GLARGINE 100 [IU]/ML
30 INJECTION, SOLUTION SUBCUTANEOUS 2 TIMES DAILY
Qty: 54 ML | Refills: 1 | Status: SHIPPED | OUTPATIENT
Start: 2024-12-17

## 2024-12-17 RX ORDER — LOSARTAN POTASSIUM 25 MG/1
25 TABLET ORAL DAILY
Qty: 90 TABLET | Refills: 1 | Status: SHIPPED | OUTPATIENT
Start: 2024-12-17

## 2024-12-17 RX ORDER — SEMAGLUTIDE 0.68 MG/ML
0.5 INJECTION, SOLUTION SUBCUTANEOUS WEEKLY
COMMUNITY
Start: 2024-11-27

## 2024-12-18 ENCOUNTER — REFERRAL TRIAGE (OUTPATIENT)
Age: 69
End: 2024-12-18
Payer: MEDICARE

## 2024-12-18 ENCOUNTER — PATIENT OUTREACH (OUTPATIENT)
Age: 69
End: 2024-12-18
Payer: MEDICARE

## 2024-12-18 LAB — ALBUMIN UR-MCNC: 23.4 MG/DL

## 2024-12-18 NOTE — OUTREACH NOTE
Social Work Assessment  Questions/Answers      Flowsheet Row Most Recent Value   Referral Source physician   Reason for Consult community resources, financial concerns   Preferred Language English   People in Home alone   Current Living Arrangements home   Potentially Unsafe Housing Conditions none   In the past 12 months has the electric, gas, oil, or water company threatened to shut off services in your home? Pt Declined   Primary Care Provided by self   Provides Primary Care For no one, unable/limited ability to care for self   Family Caregiver if Needed sibling(s)   Quality of Family Relationships supportive   Source of Income social security   Financial/Environmental Concerns other (see comments)   Application for Public Assistance obtained public assistance pending number   Q1: How often do you have a drink containing alcohol? Never   Q2: How many drinks containing alcohol do you have on a typical day when you are drinking? None   Q3: How often do you have six or more drinks on one occasion? Never   Audit-C Score 0        SDOH updated and reviewed with the patient during this program:  --     Alcohol Use: Not At Risk (12/18/2024)    AUDIT-C     Frequency of Alcohol Consumption: Never     Average Number of Drinks: Patient does not drink     Frequency of Binge Drinking: Never      --     Depression: Not at risk (6/6/2024)    PHQ-2     PHQ-2 Score: 0      --     Disabilities: Not At Risk (11/1/2022)    Disabilities     Concentrating, Remembering, or Making Decisions Difficulty: no     Doing Errands Independently Difficulty: no        Financial Resource Strain: Medium Risk (12/18/2024)    Overall Financial Resource Strain (CARDIA)     Difficulty of Paying Living Expenses: Somewhat hard      --     Food Insecurity: No Food Insecurity (12/18/2024)    Hunger Vital Sign     Worried About Running Out of Food in the Last Year: Never true     Ran Out of Food in the Last Year: Never true      --     Health Literacy:  Unknown (12/18/2024)    Education     Preferred Language: English      --     Housing Stability: Unknown (12/18/2024)    Housing Stability Vital Sign     Unable to Pay for Housing in the Last Year: No     Homeless in the Last Year: No      --     Interpersonal Safety: Not At Risk (12/18/2024)    Humiliation, Afraid, Rape, and Kick questionnaire     Fear of Current or Ex-Partner: No     Emotionally Abused: No     Physically Abused: No     Sexually Abused: No      --     Physical Activity: Patient Declined (12/18/2024)    Exercise Vital Sign     Days of Exercise per Week: Patient declined     Minutes of Exercise per Session: Patient declined        --     Stress: Stress Concern Present (12/18/2024)    [employer] - Occupational Stress Questionnaire     Feeling of Stress : To some extent      --     Tobacco Use: Medium Risk (12/17/2024)    Patient History     Smoking Tobacco Use: Former     Smokeless Tobacco Use: Never      --     Transportation Needs: No Transportation Needs (12/18/2024)    PRAPARE - Transportation     Lack of Transportation (Medical): No     Lack of Transportation (Non-Medical): No      --     Utilities: Patient Declined (12/18/2024)    [facility] Utilities     Threatened with loss of utilities: Patient declined      Continuing Care   [facility]   DEPARTMENT FOR MEDICAID SERVICES    [facility] [address] [address]    Phone: [phone]    Request Status: Pending - No Request Sent    Services: Financial Assistance    Resource for: Financial Resource Strain, Utilities   Patient Outreach    SW spoke with pt re his referral for dental resources. YUNIER discussed sending pt a MAP-205 for apply for Medicaid and also Medicare Savings program at the same time. Pt supportive of this offer. YUNIER also discussed that after the status of filing for the Medicaid and Medicare Savings Programs to see if he is eligible, they would discuss [facility] sliding scale dentistry possibilities as  well as  [employer] being the most affordable options for people who are PP. Pt is agreeable to this plan. SW will F/u in a week and a half to make sure pt has received the mailed application and continue to assist.     Harpreet YODER -   Ambulatory Case Management    12/18/2024, 10:46 EST

## 2024-12-30 ENCOUNTER — ANTICOAGULATION VISIT (OUTPATIENT)
Dept: FAMILY MEDICINE CLINIC | Facility: CLINIC | Age: 69
End: 2024-12-30
Payer: MEDICARE

## 2024-12-30 DIAGNOSIS — Z95.2 H/O MECHANICAL AORTIC VALVE REPLACEMENT: Primary | ICD-10-CM

## 2024-12-30 LAB — INR PPP: 2.5 (ref 3–3.5)

## 2024-12-30 PROCEDURE — 36416 COLLJ CAPILLARY BLOOD SPEC: CPT | Performed by: FAMILY MEDICINE

## 2024-12-30 PROCEDURE — 85610 PROTHROMBIN TIME: CPT | Performed by: FAMILY MEDICINE

## 2024-12-31 ENCOUNTER — PATIENT OUTREACH (OUTPATIENT)
Age: 69
End: 2024-12-31
Payer: MEDICARE

## 2024-12-31 ENCOUNTER — TELEPHONE (OUTPATIENT)
Dept: FAMILY MEDICINE CLINIC | Facility: CLINIC | Age: 69
End: 2024-12-31
Payer: MEDICARE

## 2024-12-31 NOTE — OUTREACH NOTE
Patient Outreach    YUNIER F/u with pt who has received the MAP-205 and stated he will be turning that in. YUNIER also discussed [employer] as a PP option for dentistry, which has a sliding scale fee service. Pt expressed thanks for the resource information. YUNIER will continue to monitor.     Harpreet YODER -   Ambulatory Case Management    12/31/2024, 11:52 EST

## 2024-12-31 NOTE — TELEPHONE ENCOUNTER
12/31/2024 DAISY Degroot 100UNIT/ML pen-injectors submitted and approved from 12/01/2024 to 12/31/2025.

## 2025-01-02 ENCOUNTER — HOSPITAL ENCOUNTER (OUTPATIENT)
Dept: ULTRASOUND IMAGING | Facility: HOSPITAL | Age: 70
Discharge: HOME | End: 2025-01-02
Admitting: FAMILY MEDICINE
Payer: MEDICARE

## 2025-01-02 DIAGNOSIS — N28.89 LEFT KIDNEY MASS: ICD-10-CM

## 2025-01-02 PROCEDURE — 76775 US EXAM ABDO BACK WALL LIM: CPT

## 2025-01-04 DIAGNOSIS — Z79.01 ANTICOAGULATION GOAL OF INR 2.5 TO 3.5: ICD-10-CM

## 2025-01-04 DIAGNOSIS — Z51.81 ANTICOAGULATION GOAL OF INR 2.5 TO 3.5: ICD-10-CM

## 2025-01-06 RX ORDER — WARFARIN SODIUM 1 MG/1
TABLET ORAL
Qty: 90 TABLET | Refills: 1 | Status: SHIPPED | OUTPATIENT
Start: 2025-01-06

## 2025-01-06 NOTE — PROGRESS NOTES
"Benitez Miranda     VITALS: Blood pressure 104/60, pulse 58, temperature 96.8 °F (36 °C), temperature source Temporal, resp. rate 16, height 182.9 cm (72\"), weight 116 kg (255 lb 12.8 oz), SpO2 98%.    Subjective  Chief Complaint  Diabetes, Hypertension, and Chronic Kidney Disease    Subjective          History of Present Illness:    History of Present Illness  The patient is a 69-year-old male with medical conditions significant for type 2 diabetes, hypertension, and recent stroke x 2 who presents to the clinic for a medical follow-up.    He has been managing his diabetes with insulin and Ozempic, which he reports as being effective. He has a sufficient supply of these medications at home. He has been making efforts to monitor his condition more closely.    He has been experiencing dental issues, which have necessitated an increased use of gabapentin. He is seeking information about potential programs that could assist with his dental care.    He has been obtaining loratadine from a hospital source and is inquiring about the possibility of refilling this prescription through our clinic.      No complaints regarding medications.     The following portions of the patient's history were reviewed and updated as appropriate: allergies, current medications, past family history, past medical history, past social history, past surgical history and problem list.    Past Medical History  Past Medical History:   Diagnosis Date    Anxiety     Arthritis     CAD (coronary artery disease)     x1 stent; pulmonary HTN; EF 50-55%; rheumatic valve; MV stenosis    CHF (congestive heart failure)     CKD (chronic kidney disease), stage III     COPD (chronic obstructive pulmonary disease)     Depression     Diabetes mellitus     H/O mechanical aortic valve replacement     History of tobacco abuse     Hyperlipidemia     Hypertension     Ischemic heart disease     Kidney failure     third stage    Low back pain     Obesity     BMI 36.    " "Stroke     Valvular heart disease        Surgical History  Past Surgical History:   Procedure Laterality Date    AORTIC VALVE REPAIR/REPLACEMENT      CARDIAC CATHETERIZATION      CARDIAC SURGERY      valve on aortic    CARDIAC SURGERY      stent     CATARACT EXTRACTION      COLONOSCOPY      CORONARY STENT PLACEMENT      CYSTOSCOPY URETEROSCOPY LASER LITHOTRIPSY Left 2020    Procedure: CYSTOSCOPY URETEROSCOPY LASER LITHOTRIPSY WITH STENT PLACEMENT;  Surgeon: Jonah Montgomery MD;  Location: [facility];  Service: Urology;  Laterality: Left;    KIDNEY STONE SURGERY         Family History  Family History   Problem Relation Age of Onset    Cancer Mother         breast    COPD Father     Heart attack Father 74    Diabetes Paternal Grandmother         both legs removed       Social History  Social History     Socioeconomic History    Marital status:     Number of children: 2   Tobacco Use    Smoking status: Former     Current packs/day: 0.00     Average packs/day: 1 pack/day for 30.0 years (30.0 ttl pk-yrs)     Types: Pipe, Cigars, Cigarettes     Start date: 1986     Quit date: 2016     Years since quittin.5    Smokeless tobacco: Never    Tobacco comments:     currently smokes a pipe   Vaping Use    Vaping status: Never Used   Substance and Sexual Activity    Alcohol use: No    Drug use: No    Sexual activity: Defer       Objective   Vital Signs:   /60 (BP Location: Right arm, Patient Position: Sitting, Cuff Size: Adult)   Pulse 58   Temp 96.8 °F (36 °C) (Temporal)   Resp 16   Ht 182.9 cm (72\")   Wt 116 kg (255 lb 12.8 oz)   SpO2 98%   BMI 34.69 kg/m²       Physical Exam     Physical Exam  Lungs were auscultated.    Gen: Patient in NAD. Pleasant and answers appropriately. A&Ox3.    Skin: Warm and dry with normal turgor. No purpura, rashes, or unusual pigmentation noted. Hair is normal in appearance and distribution.    HEENT: NC/AT. No lesions noted. " Conjunctiva clear, sclera nonicteric. PERRL. EOMI without nystagmus or strabismus. Fundi appear benign. No hemorrhages or exudates of eyes. Auditory canals are patent bilaterally without lesions. TMs intact,  nonerythematous, bulging without lesions. Nasal mucosa pink, nonerythematous, and nonedematous. Frontal and maxillary sinuses are nontender. O/P erythematous and moist without exudate.    Neck: Supple without lymph nodes palpated. FROM. No carotid bruits appreciated bilaterally.    Lungs: Slightly decreased B/L without rales, rhonchi, crackles, or wheezes.    Heart: RRR. S1 and S2 normal. No S3 or S4. No RGT.  Positive 3/6 systolic murmur best heard at the upper left sternal border.    Abd: Soft, nontender,nondistended. (+)BSx4 quadrants.     Extrem: No CC.  Trace edema bilateral lower extremities.  Radial pulses 2+/4 and equal B/L. FROMx4.  Positive joint tenderness noted.    Neuro: No focal motor/sensory deficits.    Procedures    Result Review :   The following data was reviewed by: Mesha Christina MD on 12/17/2024:       Results             Assessment and Plan      Benitez Miranda is a 69 y.o. here for medical followup.    Diagnoses and all orders for this visit:    1. Type 2 diabetes mellitus with stage 3 chronic kidney disease, with long-term current use of insulin, unspecified whether stage 3a or 3b CKD (Primary)  -     Insulin Glargine (BASAGLAR KWIKPEN) 100 UNIT/ML injection pen; Inject 30 Units under the skin into the appropriate area as directed 2 (Two) Times a Day.  Dispense: 54 mL; Refill: 1  -     Comprehensive Metabolic Panel; Future  -     Hemoglobin A1c; Future  -     MicroAlbumin, Urine, Random - Urine, Clean Catch; Future    2. Recurrent major depressive disorder, in full remission  -     sertraline (ZOLOFT) 100 MG tablet; Take 1.5 tablets by mouth Daily.  Dispense: 135 tablet; Refill: 1  -     POC INR  -     Comprehensive Metabolic Panel; Future    3. Poor dentition  -     Ambulatory  Referral to Social Care Services (Amb Case Mgmt)    4. Hypertension associated with diabetes  -     losartan (COZAAR) 25 MG tablet; Take 1 tablet by mouth Daily.  Dispense: 90 tablet; Refill: 1  -     Comprehensive Metabolic Panel; Future    5. Left kidney mass  -     US Renal Bilateral; Future    6. Immunization due  -     Pneumococcal Conjugate Vaccine 20-Valent (PCV20)  -     Fluzone High-Dose 65+yrs ([id number])    Other orders  -     LABS SCANNED        Assessment & Plan  1. Type 2 diabetes mellitus.  His laboratory results from the previous visit were within normal limits. He has been managing his diabetes with insulin and Ozempic, which he reports as being effective. He has a sufficient supply of these medications at home. A microalbumin test will be conducted today.    2. Hypertension.  His hypertension is currently under control with his existing medication regimen.    3. Recent INR.  He will return next week for an INR check.    4. Medication management.  He is currently taking Zoloft and reports no issues. He uses gabapentin as needed and has been taking it more frequently. A drug screen will be conducted today due to the controlled nature of gabapentin. Refills for his medications, including loratadine, have [facility] to [facility].    5. Dental issues.  He reports soreness on the side of his face and a tooth that is bothering him. A referral to social work will be made to explore potential programs that could assist with his dental care. If he does not receive a response from social work within 1 to 2 weeks, he should contact the office.    6. Kidney mass.  An ultrasound of the left kidney will be ordered to monitor the existing kidney mass.    7. Health maintenance.  He will receive influenza and pneumonia vaccines today.                 Patient or patient representative verbalized consent for the use of Ambient Listening during the visit with  Mesha Christina MD for chart documentation. 1/5/2025  23:25  EST        Follow Up   Return in about 3 months (around 3/17/2025), or LABS.  Findings and plans discussed with patient who verbalizes understanding and agreement. Will followup with patient once results are in. Patient was given instructions and counseling regarding his condition or for health maintenance advice. Please see specific information pulled into the AVS if appropriate.       Mesha Christina MD

## 2025-01-15 ENCOUNTER — ANTICOAGULATION VISIT (OUTPATIENT)
Dept: FAMILY MEDICINE CLINIC | Facility: CLINIC | Age: 70
End: 2025-01-15
Payer: MEDICARE

## 2025-01-15 DIAGNOSIS — Z51.81 ANTICOAGULATION GOAL OF INR 2.5 TO 3.5: Primary | ICD-10-CM

## 2025-01-15 DIAGNOSIS — Z79.01 ANTICOAGULATION GOAL OF INR 2.5 TO 3.5: Primary | ICD-10-CM

## 2025-01-15 LAB — INR PPP: 3.8 (ref 3–3.5)

## 2025-01-15 PROCEDURE — 85610 PROTHROMBIN TIME: CPT | Performed by: FAMILY MEDICINE

## 2025-01-15 PROCEDURE — 36416 COLLJ CAPILLARY BLOOD SPEC: CPT | Performed by: FAMILY MEDICINE

## 2025-01-24 ENCOUNTER — ANTICOAGULATION VISIT (OUTPATIENT)
Dept: FAMILY MEDICINE CLINIC | Facility: CLINIC | Age: 70
End: 2025-01-24
Payer: MEDICARE

## 2025-01-24 DIAGNOSIS — Z95.2 H/O MECHANICAL AORTIC VALVE REPLACEMENT: Primary | ICD-10-CM

## 2025-01-24 LAB — INR PPP: 3.2 (ref 3–3.5)

## 2025-01-24 PROCEDURE — 85610 PROTHROMBIN TIME: CPT | Performed by: FAMILY MEDICINE

## 2025-01-24 PROCEDURE — 36416 COLLJ CAPILLARY BLOOD SPEC: CPT | Performed by: FAMILY MEDICINE

## 2025-02-03 ENCOUNTER — PATIENT OUTREACH (OUTPATIENT)
Age: 70
End: 2025-02-03
Payer: MEDICARE

## 2025-02-03 NOTE — OUTREACH NOTE
SW to D/c pt due to no new needs surfacing during monitoring period.    Harpreet YODER -   Ambulatory Case Management    2/3/2025, 15:49 EST

## 2025-02-13 ENCOUNTER — ANTICOAGULATION VISIT (OUTPATIENT)
Dept: FAMILY MEDICINE CLINIC | Facility: CLINIC | Age: 70
End: 2025-02-13
Payer: MEDICARE

## 2025-02-13 LAB — INR PPP: 4.1 (ref 3–3.5)

## 2025-02-13 PROCEDURE — 36416 COLLJ CAPILLARY BLOOD SPEC: CPT | Performed by: FAMILY MEDICINE

## 2025-02-13 PROCEDURE — 85610 PROTHROMBIN TIME: CPT | Performed by: FAMILY MEDICINE

## 2025-02-16 DIAGNOSIS — I15.2 HYPERTENSION ASSOCIATED WITH DIABETES: ICD-10-CM

## 2025-02-16 DIAGNOSIS — Z95.2 H/O MECHANICAL AORTIC VALVE REPLACEMENT: ICD-10-CM

## 2025-02-16 DIAGNOSIS — E11.59 HYPERTENSION ASSOCIATED WITH DIABETES: ICD-10-CM

## 2025-02-17 RX ORDER — SPIRONOLACTONE 25 MG/1
25 TABLET ORAL DAILY
Qty: 90 TABLET | Refills: 0 | Status: SHIPPED | OUTPATIENT
Start: 2025-02-17

## 2025-02-20 ENCOUNTER — ANTICOAGULATION VISIT (OUTPATIENT)
Dept: FAMILY MEDICINE CLINIC | Facility: CLINIC | Age: 70
End: 2025-02-20
Payer: MEDICARE

## 2025-02-20 DIAGNOSIS — Z79.01 ANTICOAGULATION GOAL OF INR 2.5 TO 3.5: Primary | ICD-10-CM

## 2025-02-20 DIAGNOSIS — Z51.81 ANTICOAGULATION GOAL OF INR 2.5 TO 3.5: Primary | ICD-10-CM

## 2025-02-20 LAB — INR PPP: 3 (ref 3–3.5)

## 2025-02-20 PROCEDURE — 36416 COLLJ CAPILLARY BLOOD SPEC: CPT | Performed by: FAMILY MEDICINE

## 2025-02-20 PROCEDURE — 85610 PROTHROMBIN TIME: CPT | Performed by: FAMILY MEDICINE

## 2025-02-22 DIAGNOSIS — E11.22 TYPE 2 DIABETES MELLITUS WITH STAGE 3 CHRONIC KIDNEY DISEASE, WITH LONG-TERM CURRENT USE OF INSULIN, UNSPECIFIED WHETHER STAGE 3A OR 3B CKD: ICD-10-CM

## 2025-02-22 DIAGNOSIS — Z79.4 TYPE 2 DIABETES MELLITUS WITH STAGE 3 CHRONIC KIDNEY DISEASE, WITH LONG-TERM CURRENT USE OF INSULIN, UNSPECIFIED WHETHER STAGE 3A OR 3B CKD: ICD-10-CM

## 2025-02-22 DIAGNOSIS — N18.30 TYPE 2 DIABETES MELLITUS WITH STAGE 3 CHRONIC KIDNEY DISEASE, WITH LONG-TERM CURRENT USE OF INSULIN, UNSPECIFIED WHETHER STAGE 3A OR 3B CKD: ICD-10-CM

## 2025-02-25 RX ORDER — SEMAGLUTIDE 0.68 MG/ML
INJECTION, SOLUTION SUBCUTANEOUS
Qty: 9 ML | Refills: 0 | Status: ON HOLD | OUTPATIENT
Start: 2025-02-25 | End: 2025-03-01

## 2025-02-28 ENCOUNTER — APPOINTMENT (OUTPATIENT)
Dept: GENERAL RADIOLOGY | Facility: HOSPITAL | Age: 70
DRG: 291 | End: 2025-02-28
Payer: MEDICARE

## 2025-02-28 ENCOUNTER — ANTICOAGULATION VISIT (OUTPATIENT)
Dept: FAMILY MEDICINE CLINIC | Facility: CLINIC | Age: 70
End: 2025-02-28
Payer: MEDICARE

## 2025-02-28 ENCOUNTER — HOSPITAL ENCOUNTER (INPATIENT)
Facility: HOSPITAL | Age: 70
LOS: 1 days | Discharge: HOME | DRG: 291 | End: 2025-03-02
Attending: STUDENT IN AN ORGANIZED HEALTH CARE EDUCATION/TRAINING PROGRAM | Admitting: INTERNAL MEDICINE
Payer: MEDICARE

## 2025-02-28 DIAGNOSIS — I50.9 CONGESTIVE HEART FAILURE, UNSPECIFIED HF CHRONICITY, UNSPECIFIED HEART FAILURE TYPE: ICD-10-CM

## 2025-02-28 DIAGNOSIS — Z51.81 ANTICOAGULATION GOAL OF INR 2.5 TO 3.5: Primary | ICD-10-CM

## 2025-02-28 DIAGNOSIS — I50.23 ACUTE ON CHRONIC SYSTOLIC CONGESTIVE HEART FAILURE: Primary | ICD-10-CM

## 2025-02-28 DIAGNOSIS — Z95.2 H/O MECHANICAL AORTIC VALVE REPLACEMENT: Chronic | ICD-10-CM

## 2025-02-28 DIAGNOSIS — Z79.01 ANTICOAGULATION GOAL OF INR 2.5 TO 3.5: Primary | ICD-10-CM

## 2025-02-28 DIAGNOSIS — J96.01 ACUTE RESPIRATORY FAILURE WITH HYPOXIA: ICD-10-CM

## 2025-02-28 LAB
A-A DO2: 47.7 MMHG (ref 0–300)
ALBUMIN SERPL-MCNC: 3.7 G/DL (ref 3.5–5.2)
ALBUMIN/GLOB SERPL: 1.1 G/DL
ALP SERPL-CCNC: 74 U/L (ref 39–117)
ALT SERPL W P-5'-P-CCNC: 23 U/L (ref 1–41)
ANION GAP SERPL CALCULATED.3IONS-SCNC: 11.1 MMOL/L (ref 5–15)
ARTERIAL PATENCY WRIST A: ABNORMAL
AST SERPL-CCNC: 23 U/L (ref 1–40)
ATMOSPHERIC PRESS: 720 MMHG
BASE EXCESS BLDA CALC-SCNC: -1.3 MMOL/L (ref 0–2)
BASOPHILS # BLD AUTO: 0.06 10*3/MM3 (ref 0–0.2)
BASOPHILS NFR BLD AUTO: 0.5 % (ref 0–1.5)
BDY SITE: ABNORMAL
BILIRUB SERPL-MCNC: 0.8 MG/DL (ref 0–1.2)
BUN SERPL-MCNC: 23 MG/DL (ref 8–23)
BUN/CREAT SERPL: 17.4 (ref 7–25)
CALCIUM SPEC-SCNC: 8.5 MG/DL (ref 8.6–10.5)
CHLORIDE SERPL-SCNC: 106 MMOL/L (ref 98–107)
CO2 BLDA-SCNC: 23.7 MMOL/L (ref 22–33)
CO2 SERPL-SCNC: 21.9 MMOL/L (ref 22–29)
COHGB MFR BLD: 1.4 % (ref 0–5)
CREAT SERPL-MCNC: 1.32 MG/DL (ref 0.76–1.27)
CRP SERPL-MCNC: 1.77 MG/DL (ref 0–0.5)
D-LACTATE SERPL-SCNC: 1.4 MMOL/L (ref 0.5–2)
DEPRECATED RDW RBC AUTO: 48.7 FL (ref 37–54)
EGFRCR SERPLBLD CKD-EPI 2021: 57.7 ML/MIN/1.73
EOSINOPHIL # BLD AUTO: 0.58 10*3/MM3 (ref 0–0.4)
EOSINOPHIL NFR BLD AUTO: 5.3 % (ref 0.3–6.2)
ERYTHROCYTE [DISTWIDTH] IN BLOOD BY AUTOMATED COUNT: 14.5 % (ref 12.3–15.4)
ERYTHROCYTE [SEDIMENTATION RATE] IN BLOOD: 37 MM/HR (ref 0–20)
FLUAV RNA RESP QL NAA+PROBE: NOT DETECTED
FLUBV RNA RESP QL NAA+PROBE: NOT DETECTED
GLOBULIN UR ELPH-MCNC: 3.5 GM/DL
GLUCOSE SERPL-MCNC: 209 MG/DL (ref 65–99)
HCO3 BLDA-SCNC: 22.6 MMOL/L (ref 20–26)
HCT VFR BLD AUTO: 38.2 % (ref 37.5–51)
HCT VFR BLD CALC: 37 % (ref 38–51)
HGB BLD-MCNC: 12.1 G/DL (ref 13–17.7)
HGB BLDA-MCNC: 12.1 G/DL (ref 14–18)
HOLD SPECIMEN: NORMAL
HOLD SPECIMEN: NORMAL
IMM GRANULOCYTES # BLD AUTO: 0.08 10*3/MM3 (ref 0–0.05)
IMM GRANULOCYTES NFR BLD AUTO: 0.7 % (ref 0–0.5)
INHALED O2 CONCENTRATION: 21 %
INR PPP: 5.4 (ref 3–3.5)
LYMPHOCYTES # BLD AUTO: 0.88 10*3/MM3 (ref 0.7–3.1)
LYMPHOCYTES NFR BLD AUTO: 8 % (ref 19.6–45.3)
Lab: ABNORMAL
Lab: ABNORMAL
MCH RBC QN AUTO: 28.8 PG (ref 26.6–33)
MCHC RBC AUTO-ENTMCNC: 31.7 G/DL (ref 31.5–35.7)
MCV RBC AUTO: 91 FL (ref 79–97)
METHGB BLD QL: 0.3 % (ref 0–3)
MODALITY: ABNORMAL
MONOCYTES # BLD AUTO: 0.5 10*3/MM3 (ref 0.1–0.9)
MONOCYTES NFR BLD AUTO: 4.6 % (ref 5–12)
NEUTROPHILS NFR BLD AUTO: 8.86 10*3/MM3 (ref 1.7–7)
NEUTROPHILS NFR BLD AUTO: 80.9 % (ref 42.7–76)
NOTIFIED BY: ABNORMAL
NOTIFIED WHO: ABNORMAL
NRBC BLD AUTO-RTO: 0 /100 WBC (ref 0–0.2)
NT-PROBNP SERPL-MCNC: 1179 PG/ML (ref 0–900)
OXYHGB MFR BLDV: 87.7 % (ref 94–99)
PCO2 BLDA: 34.4 MM HG (ref 35–45)
PCO2 TEMP ADJ BLD: ABNORMAL MM[HG]
PH BLDA: 7.43 PH UNITS (ref 7.35–7.45)
PH, TEMP CORRECTED: ABNORMAL
PLATELET # BLD AUTO: 155 10*3/MM3 (ref 140–450)
PMV BLD AUTO: 11.5 FL (ref 6–12)
PO2 BLDA: 54.7 MM HG (ref 83–108)
PO2 TEMP ADJ BLD: ABNORMAL MM[HG]
POTASSIUM SERPL-SCNC: 4.4 MMOL/L (ref 3.5–5.2)
PROCALCITONIN SERPL-MCNC: 0.03 NG/ML (ref 0–0.25)
PROT SERPL-MCNC: 7.2 G/DL (ref 6–8.5)
RBC # BLD AUTO: 4.2 10*6/MM3 (ref 4.14–5.8)
SAO2 % BLDCOA: 89.2 % (ref 94–99)
SARS-COV-2 RNA RESP QL NAA+PROBE: NOT DETECTED
SODIUM SERPL-SCNC: 139 MMOL/L (ref 136–145)
TROPONIN T SERPL HS-MCNC: 31 NG/L
VENTILATOR MODE: ABNORMAL
WBC NRBC COR # BLD AUTO: 10.96 10*3/MM3 (ref 3.4–10.8)
WHOLE BLOOD HOLD COAG: NORMAL
WHOLE BLOOD HOLD SPECIMEN: NORMAL

## 2025-02-28 PROCEDURE — 86140 C-REACTIVE PROTEIN: CPT | Performed by: NURSE PRACTITIONER

## 2025-02-28 PROCEDURE — 85610 PROTHROMBIN TIME: CPT | Performed by: NURSE PRACTITIONER

## 2025-02-28 PROCEDURE — 87040 BLOOD CULTURE FOR BACTERIA: CPT | Performed by: NURSE PRACTITIONER

## 2025-02-28 PROCEDURE — 93005 ELECTROCARDIOGRAM TRACING: CPT | Performed by: STUDENT IN AN ORGANIZED HEALTH CARE EDUCATION/TRAINING PROGRAM

## 2025-02-28 PROCEDURE — 84484 ASSAY OF TROPONIN QUANT: CPT | Performed by: NURSE PRACTITIONER

## 2025-02-28 PROCEDURE — 83880 ASSAY OF NATRIURETIC PEPTIDE: CPT | Performed by: NURSE PRACTITIONER

## 2025-02-28 PROCEDURE — 80053 COMPREHEN METABOLIC PANEL: CPT | Performed by: NURSE PRACTITIONER

## 2025-02-28 PROCEDURE — 84145 PROCALCITONIN (PCT): CPT | Performed by: NURSE PRACTITIONER

## 2025-02-28 PROCEDURE — 82375 ASSAY CARBOXYHB QUANT: CPT

## 2025-02-28 PROCEDURE — 99285 EMERGENCY DEPT VISIT HI MDM: CPT

## 2025-02-28 PROCEDURE — 85025 COMPLETE CBC W/AUTO DIFF WBC: CPT | Performed by: NURSE PRACTITIONER

## 2025-02-28 PROCEDURE — 93010 ELECTROCARDIOGRAM REPORT: CPT | Performed by: INTERNAL MEDICINE

## 2025-02-28 PROCEDURE — 36600 WITHDRAWAL OF ARTERIAL BLOOD: CPT

## 2025-02-28 PROCEDURE — 87636 SARSCOV2 & INF A&B AMP PRB: CPT | Performed by: NURSE PRACTITIONER

## 2025-02-28 PROCEDURE — 82805 BLOOD GASES W/O2 SATURATION: CPT

## 2025-02-28 PROCEDURE — 36415 COLL VENOUS BLD VENIPUNCTURE: CPT

## 2025-02-28 PROCEDURE — 85652 RBC SED RATE AUTOMATED: CPT | Performed by: NURSE PRACTITIONER

## 2025-02-28 PROCEDURE — 83605 ASSAY OF LACTIC ACID: CPT | Performed by: NURSE PRACTITIONER

## 2025-02-28 PROCEDURE — 71045 X-RAY EXAM CHEST 1 VIEW: CPT

## 2025-02-28 PROCEDURE — 83050 HGB METHEMOGLOBIN QUAN: CPT

## 2025-02-28 RX ORDER — IPRATROPIUM BROMIDE AND ALBUTEROL SULFATE 2.5; .5 MG/3ML; MG/3ML
3 SOLUTION RESPIRATORY (INHALATION) ONCE
Status: COMPLETED | OUTPATIENT
Start: 2025-02-28 | End: 2025-03-01

## 2025-02-28 RX ORDER — SODIUM CHLORIDE 0.9 % (FLUSH) 0.9 %
10 SYRINGE (ML) INJECTION AS NEEDED
Status: DISCONTINUED | OUTPATIENT
Start: 2025-02-28 | End: 2025-03-02 | Stop reason: HOSPADM

## 2025-02-28 RX ORDER — FUROSEMIDE 10 MG/ML
80 INJECTION INTRAMUSCULAR; INTRAVENOUS ONCE
Status: COMPLETED | OUTPATIENT
Start: 2025-02-28 | End: 2025-03-01

## 2025-02-28 RX ORDER — IPRATROPIUM BROMIDE AND ALBUTEROL SULFATE 2.5; .5 MG/3ML; MG/3ML
3 SOLUTION RESPIRATORY (INHALATION) ONCE
Status: DISCONTINUED | OUTPATIENT
Start: 2025-02-28 | End: 2025-03-01

## 2025-03-01 ENCOUNTER — APPOINTMENT (OUTPATIENT)
Dept: CARDIOLOGY | Facility: HOSPITAL | Age: 70
DRG: 291 | End: 2025-03-01
Payer: MEDICARE

## 2025-03-01 ENCOUNTER — APPOINTMENT (OUTPATIENT)
Dept: CT IMAGING | Facility: HOSPITAL | Age: 70
DRG: 291 | End: 2025-03-01
Payer: MEDICARE

## 2025-03-01 PROBLEM — I50.9 CHF EXACERBATION: Status: ACTIVE | Noted: 2025-03-01

## 2025-03-01 LAB
A-A DO2: 93.8 MMHG (ref 0–300)
ANION GAP SERPL CALCULATED.3IONS-SCNC: 10.3 MMOL/L (ref 5–15)
ARTERIAL PATENCY WRIST A: ABNORMAL
ATMOSPHERIC PRESS: 720 MMHG
BACTERIA UR QL AUTO: ABNORMAL /HPF
BASE EXCESS BLDA CALC-SCNC: 0.9 MMOL/L (ref 0–2)
BASOPHILS # BLD AUTO: 0.11 10*3/MM3 (ref 0–0.2)
BASOPHILS NFR BLD AUTO: 0.9 % (ref 0–1.5)
BDY SITE: ABNORMAL
BILIRUB UR QL STRIP: NEGATIVE
BUN SERPL-MCNC: 21 MG/DL (ref 8–23)
BUN/CREAT SERPL: 16.8 (ref 7–25)
CALCIUM SPEC-SCNC: 8.8 MG/DL (ref 8.6–10.5)
CHLORIDE SERPL-SCNC: 101 MMOL/L (ref 98–107)
CLARITY UR: CLEAR
CO2 BLDA-SCNC: 25.9 MMOL/L (ref 22–33)
CO2 SERPL-SCNC: 25.7 MMOL/L (ref 22–29)
COHGB MFR BLD: 1.3 % (ref 0–5)
COLOR UR: YELLOW
CREAT SERPL-MCNC: 1.25 MG/DL (ref 0.76–1.27)
DEPRECATED RDW RBC AUTO: 49.1 FL (ref 37–54)
EGFRCR SERPLBLD CKD-EPI 2021: 61.6 ML/MIN/1.73
EOSINOPHIL # BLD AUTO: 0.49 10*3/MM3 (ref 0–0.4)
EOSINOPHIL NFR BLD AUTO: 4.2 % (ref 0.3–6.2)
ERYTHROCYTE [DISTWIDTH] IN BLOOD BY AUTOMATED COUNT: 14.7 % (ref 12.3–15.4)
GAS FLOW AIRWAY: 2 LPM
GEN 5 1HR TROPONIN T REFLEX: 31 NG/L
GLUCOSE BLDC GLUCOMTR-MCNC: 192 MG/DL (ref 70–130)
GLUCOSE BLDC GLUCOMTR-MCNC: 196 MG/DL (ref 70–130)
GLUCOSE BLDC GLUCOMTR-MCNC: 206 MG/DL (ref 70–130)
GLUCOSE BLDC GLUCOMTR-MCNC: 243 MG/DL (ref 70–130)
GLUCOSE SERPL-MCNC: 203 MG/DL (ref 65–99)
GLUCOSE UR STRIP-MCNC: NEGATIVE MG/DL
HBA1C MFR BLD: 8.5 % (ref 4.8–5.6)
HCO3 BLDA-SCNC: 24.8 MMOL/L (ref 20–26)
HCT VFR BLD AUTO: 37.9 % (ref 37.5–51)
HCT VFR BLD CALC: 38.8 % (ref 38–51)
HGB BLD-MCNC: 12.4 G/DL (ref 13–17.7)
HGB BLDA-MCNC: 12.7 G/DL (ref 14–18)
HGB UR QL STRIP.AUTO: ABNORMAL
HYALINE CASTS UR QL AUTO: ABNORMAL /LPF
IMM GRANULOCYTES # BLD AUTO: 0.05 10*3/MM3 (ref 0–0.05)
IMM GRANULOCYTES NFR BLD AUTO: 0.4 % (ref 0–0.5)
INHALED O2 CONCENTRATION: 28 %
INR PPP: 4.07 (ref 0.9–1.1)
KETONES UR QL STRIP: NEGATIVE
LEUKOCYTE ESTERASE UR QL STRIP.AUTO: NEGATIVE
LYMPHOCYTES # BLD AUTO: 1.34 10*3/MM3 (ref 0.7–3.1)
LYMPHOCYTES NFR BLD AUTO: 11.5 % (ref 19.6–45.3)
Lab: ABNORMAL
Lab: ABNORMAL
MAGNESIUM SERPL-MCNC: 1.8 MG/DL (ref 1.6–2.4)
MCH RBC QN AUTO: 29.5 PG (ref 26.6–33)
MCHC RBC AUTO-ENTMCNC: 32.7 G/DL (ref 31.5–35.7)
MCV RBC AUTO: 90 FL (ref 79–97)
METHGB BLD QL: 0.2 % (ref 0–3)
MODALITY: ABNORMAL
MONOCYTES # BLD AUTO: 0.74 10*3/MM3 (ref 0.1–0.9)
MONOCYTES NFR BLD AUTO: 6.3 % (ref 5–12)
NEUTROPHILS NFR BLD AUTO: 76.7 % (ref 42.7–76)
NEUTROPHILS NFR BLD AUTO: 8.95 10*3/MM3 (ref 1.7–7)
NITRITE UR QL STRIP: NEGATIVE
NOTIFIED BY: ABNORMAL
NOTIFIED WHO: ABNORMAL
NRBC BLD AUTO-RTO: 0 /100 WBC (ref 0–0.2)
OXYHGB MFR BLDV: 87.8 % (ref 94–99)
PCO2 BLDA: 36.3 MM HG (ref 35–45)
PCO2 TEMP ADJ BLD: ABNORMAL MM[HG]
PH BLDA: 7.44 PH UNITS (ref 7.35–7.45)
PH UR STRIP.AUTO: 6 [PH] (ref 5–8)
PH, TEMP CORRECTED: ABNORMAL
PLATELET # BLD AUTO: 156 10*3/MM3 (ref 140–450)
PMV BLD AUTO: 11.1 FL (ref 6–12)
PO2 BLDA: 54.1 MM HG (ref 83–108)
PO2 TEMP ADJ BLD: ABNORMAL MM[HG]
POTASSIUM SERPL-SCNC: 4.2 MMOL/L (ref 3.5–5.2)
PROT UR QL STRIP: ABNORMAL
PROTHROMBIN TIME: 40.2 SECONDS (ref 11.6–15.1)
RBC # BLD AUTO: 4.21 10*6/MM3 (ref 4.14–5.8)
RBC # UR STRIP: ABNORMAL /HPF
REF LAB TEST METHOD: ABNORMAL
SAO2 % BLDCOA: 89.1 % (ref 94–99)
SODIUM SERPL-SCNC: 137 MMOL/L (ref 136–145)
SP GR UR STRIP: 1.02 (ref 1–1.03)
SQUAMOUS #/AREA URNS HPF: ABNORMAL /HPF
TROPONIN T % DELTA: 0
TROPONIN T NUMERIC DELTA: 0 NG/L
UROBILINOGEN UR QL STRIP: ABNORMAL
VENTILATOR MODE: ABNORMAL
WBC # UR STRIP: ABNORMAL /HPF
WBC NRBC COR # BLD AUTO: 11.68 10*3/MM3 (ref 3.4–10.8)

## 2025-03-01 PROCEDURE — 25010000002 ENOXAPARIN PER 10 MG: Performed by: INTERNAL MEDICINE

## 2025-03-01 PROCEDURE — 63710000001 INSULIN LISPRO (HUMAN) PER 5 UNITS: Performed by: INTERNAL MEDICINE

## 2025-03-01 PROCEDURE — 94799 UNLISTED PULMONARY SVC/PX: CPT

## 2025-03-01 PROCEDURE — 83050 HGB METHEMOGLOBIN QUAN: CPT

## 2025-03-01 PROCEDURE — 36415 COLL VENOUS BLD VENIPUNCTURE: CPT | Performed by: INTERNAL MEDICINE

## 2025-03-01 PROCEDURE — 94660 CPAP INITIATION&MGMT: CPT

## 2025-03-01 PROCEDURE — 94640 AIRWAY INHALATION TREATMENT: CPT

## 2025-03-01 PROCEDURE — 84484 ASSAY OF TROPONIN QUANT: CPT | Performed by: NURSE PRACTITIONER

## 2025-03-01 PROCEDURE — 81001 URINALYSIS AUTO W/SCOPE: CPT | Performed by: NURSE PRACTITIONER

## 2025-03-01 PROCEDURE — 85025 COMPLETE CBC W/AUTO DIFF WBC: CPT | Performed by: INTERNAL MEDICINE

## 2025-03-01 PROCEDURE — 99223 1ST HOSP IP/OBS HIGH 75: CPT | Performed by: INTERNAL MEDICINE

## 2025-03-01 PROCEDURE — 94761 N-INVAS EAR/PLS OXIMETRY MLT: CPT

## 2025-03-01 PROCEDURE — 80048 BASIC METABOLIC PNL TOTAL CA: CPT | Performed by: INTERNAL MEDICINE

## 2025-03-01 PROCEDURE — 82805 BLOOD GASES W/O2 SATURATION: CPT

## 2025-03-01 PROCEDURE — 82375 ASSAY CARBOXYHB QUANT: CPT

## 2025-03-01 PROCEDURE — 83735 ASSAY OF MAGNESIUM: CPT | Performed by: INTERNAL MEDICINE

## 2025-03-01 PROCEDURE — 94664 DEMO&/EVAL PT USE INHALER: CPT

## 2025-03-01 PROCEDURE — 63710000001 INSULIN GLARGINE PER 5 UNITS: Performed by: INTERNAL MEDICINE

## 2025-03-01 PROCEDURE — 25010000002 FUROSEMIDE PER 20 MG: Performed by: NURSE PRACTITIONER

## 2025-03-01 PROCEDURE — 83036 HEMOGLOBIN GLYCOSYLATED A1C: CPT | Performed by: STUDENT IN AN ORGANIZED HEALTH CARE EDUCATION/TRAINING PROGRAM

## 2025-03-01 PROCEDURE — 71250 CT THORAX DX C-: CPT | Performed by: RADIOLOGY

## 2025-03-01 PROCEDURE — 71045 X-RAY EXAM CHEST 1 VIEW: CPT | Performed by: RADIOLOGY

## 2025-03-01 PROCEDURE — 93306 TTE W/DOPPLER COMPLETE: CPT

## 2025-03-01 PROCEDURE — 71250 CT THORAX DX C-: CPT

## 2025-03-01 PROCEDURE — 82948 REAGENT STRIP/BLOOD GLUCOSE: CPT

## 2025-03-01 PROCEDURE — 25010000002 FUROSEMIDE PER 20 MG: Performed by: INTERNAL MEDICINE

## 2025-03-01 PROCEDURE — 36600 WITHDRAWAL OF ARTERIAL BLOOD: CPT

## 2025-03-01 RX ORDER — SPIRONOLACTONE 25 MG/1
25 TABLET ORAL DAILY
Status: DISCONTINUED | OUTPATIENT
Start: 2025-03-01 | End: 2025-03-02 | Stop reason: HOSPADM

## 2025-03-01 RX ORDER — IPRATROPIUM BROMIDE AND ALBUTEROL SULFATE 2.5; .5 MG/3ML; MG/3ML
3 SOLUTION RESPIRATORY (INHALATION) ONCE
Status: COMPLETED | OUTPATIENT
Start: 2025-03-01 | End: 2025-03-01

## 2025-03-01 RX ORDER — SODIUM CHLORIDE 9 MG/ML
40 INJECTION, SOLUTION INTRAVENOUS AS NEEDED
Status: DISCONTINUED | OUTPATIENT
Start: 2025-03-01 | End: 2025-03-02 | Stop reason: HOSPADM

## 2025-03-01 RX ORDER — WARFARIN SODIUM 1 MG/1
1 TABLET ORAL NIGHTLY
Status: CANCELLED | OUTPATIENT
Start: 2025-03-01

## 2025-03-01 RX ORDER — SODIUM CHLORIDE 0.9 % (FLUSH) 0.9 %
10 SYRINGE (ML) INJECTION EVERY 12 HOURS SCHEDULED
Status: DISCONTINUED | OUTPATIENT
Start: 2025-03-01 | End: 2025-03-02 | Stop reason: HOSPADM

## 2025-03-01 RX ORDER — ENOXAPARIN SODIUM 100 MG/ML
40 INJECTION SUBCUTANEOUS DAILY
Status: DISCONTINUED | OUTPATIENT
Start: 2025-03-01 | End: 2025-03-02

## 2025-03-01 RX ORDER — LOSARTAN POTASSIUM 25 MG/1
25 TABLET ORAL DAILY
Status: CANCELLED | OUTPATIENT
Start: 2025-03-01

## 2025-03-01 RX ORDER — METOPROLOL SUCCINATE 50 MG/1
50 TABLET, EXTENDED RELEASE ORAL DAILY
Status: CANCELLED | OUTPATIENT
Start: 2025-03-01

## 2025-03-01 RX ORDER — TRAMADOL HYDROCHLORIDE 50 MG/1
50 TABLET ORAL EVERY 6 HOURS PRN
Status: DISCONTINUED | OUTPATIENT
Start: 2025-03-01 | End: 2025-03-02 | Stop reason: HOSPADM

## 2025-03-01 RX ORDER — CETIRIZINE HYDROCHLORIDE 10 MG/1
10 TABLET ORAL DAILY PRN
Status: CANCELLED | OUTPATIENT
Start: 2025-03-01

## 2025-03-01 RX ORDER — GABAPENTIN 600 MG/1
600 TABLET ORAL DAILY PRN
COMMUNITY

## 2025-03-01 RX ORDER — ATORVASTATIN CALCIUM 40 MG/1
80 TABLET, FILM COATED ORAL NIGHTLY
Status: DISCONTINUED | OUTPATIENT
Start: 2025-03-01 | End: 2025-03-02 | Stop reason: HOSPADM

## 2025-03-01 RX ORDER — DEXTROSE MONOHYDRATE 25 G/50ML
10-50 INJECTION, SOLUTION INTRAVENOUS
Status: DISCONTINUED | OUTPATIENT
Start: 2025-03-01 | End: 2025-03-02 | Stop reason: HOSPADM

## 2025-03-01 RX ORDER — GABAPENTIN 300 MG/1
600 CAPSULE ORAL DAILY
Status: CANCELLED | OUTPATIENT
Start: 2025-03-01

## 2025-03-01 RX ORDER — METOPROLOL SUCCINATE 50 MG/1
50 TABLET, EXTENDED RELEASE ORAL DAILY
Status: DISCONTINUED | OUTPATIENT
Start: 2025-03-01 | End: 2025-03-02 | Stop reason: HOSPADM

## 2025-03-01 RX ORDER — INSULIN LISPRO 100 [IU]/ML
15 INJECTION, SOLUTION INTRAVENOUS; SUBCUTANEOUS 3 TIMES DAILY PRN
COMMUNITY

## 2025-03-01 RX ORDER — ACETAMINOPHEN 650 MG/1
650 SUPPOSITORY RECTAL EVERY 4 HOURS PRN
Status: DISCONTINUED | OUTPATIENT
Start: 2025-03-01 | End: 2025-03-02 | Stop reason: HOSPADM

## 2025-03-01 RX ORDER — SODIUM CHLORIDE 0.9 % (FLUSH) 0.9 %
10 SYRINGE (ML) INJECTION AS NEEDED
Status: DISCONTINUED | OUTPATIENT
Start: 2025-03-01 | End: 2025-03-02 | Stop reason: HOSPADM

## 2025-03-01 RX ORDER — AMLODIPINE BESYLATE 10 MG/1
10 TABLET ORAL DAILY
Status: CANCELLED | OUTPATIENT
Start: 2025-03-01

## 2025-03-01 RX ORDER — NICOTINE POLACRILEX 4 MG
15 LOZENGE BUCCAL
Status: DISCONTINUED | OUTPATIENT
Start: 2025-03-01 | End: 2025-03-02 | Stop reason: HOSPADM

## 2025-03-01 RX ORDER — WARFARIN SODIUM 2 MG/1
2 TABLET ORAL NIGHTLY
Status: CANCELLED | OUTPATIENT
Start: 2025-03-01

## 2025-03-01 RX ORDER — GABAPENTIN 300 MG/1
600 CAPSULE ORAL DAILY PRN
Status: CANCELLED | OUTPATIENT
Start: 2025-03-01

## 2025-03-01 RX ORDER — ACETAMINOPHEN 160 MG/5ML
650 SOLUTION ORAL EVERY 4 HOURS PRN
Status: DISCONTINUED | OUTPATIENT
Start: 2025-03-01 | End: 2025-03-02 | Stop reason: HOSPADM

## 2025-03-01 RX ORDER — ACETAMINOPHEN 325 MG/1
650 TABLET ORAL EVERY 4 HOURS PRN
Status: DISCONTINUED | OUTPATIENT
Start: 2025-03-01 | End: 2025-03-02 | Stop reason: HOSPADM

## 2025-03-01 RX ORDER — ASPIRIN 81 MG/1
81 TABLET ORAL DAILY
Status: DISCONTINUED | OUTPATIENT
Start: 2025-03-01 | End: 2025-03-02 | Stop reason: HOSPADM

## 2025-03-01 RX ORDER — LOSARTAN POTASSIUM 25 MG/1
25 TABLET ORAL DAILY
Status: DISCONTINUED | OUTPATIENT
Start: 2025-03-01 | End: 2025-03-02 | Stop reason: HOSPADM

## 2025-03-01 RX ORDER — WARFARIN SODIUM 3 MG/1
6 TABLET ORAL NIGHTLY
Status: CANCELLED | OUTPATIENT
Start: 2025-03-01

## 2025-03-01 RX ORDER — INSULIN LISPRO 100 [IU]/ML
3-14 INJECTION, SOLUTION INTRAVENOUS; SUBCUTANEOUS
Status: DISCONTINUED | OUTPATIENT
Start: 2025-03-01 | End: 2025-03-02 | Stop reason: HOSPADM

## 2025-03-01 RX ORDER — DEXTROSE MONOHYDRATE 25 G/50ML
25 INJECTION, SOLUTION INTRAVENOUS
Status: DISCONTINUED | OUTPATIENT
Start: 2025-03-01 | End: 2025-03-02 | Stop reason: HOSPADM

## 2025-03-01 RX ORDER — SPIRONOLACTONE 25 MG/1
25 TABLET ORAL DAILY
Status: CANCELLED | OUTPATIENT
Start: 2025-03-01

## 2025-03-01 RX ORDER — IPRATROPIUM BROMIDE AND ALBUTEROL SULFATE 2.5; .5 MG/3ML; MG/3ML
3 SOLUTION RESPIRATORY (INHALATION)
Status: DISCONTINUED | OUTPATIENT
Start: 2025-03-01 | End: 2025-03-02 | Stop reason: HOSPADM

## 2025-03-01 RX ORDER — FUROSEMIDE 10 MG/ML
40 INJECTION INTRAMUSCULAR; INTRAVENOUS
Status: DISCONTINUED | OUTPATIENT
Start: 2025-03-01 | End: 2025-03-02

## 2025-03-01 RX ORDER — ASPIRIN 81 MG/1
81 TABLET ORAL DAILY
Status: CANCELLED | OUTPATIENT
Start: 2025-03-01

## 2025-03-01 RX ORDER — INSULIN LISPRO 100 [IU]/ML
1-200 INJECTION, SOLUTION INTRAVENOUS; SUBCUTANEOUS AS NEEDED
Status: DISCONTINUED | OUTPATIENT
Start: 2025-03-01 | End: 2025-03-01

## 2025-03-01 RX ORDER — MULTIPLE VITAMINS W/ MINERALS TAB 9MG-400MCG
1 TAB ORAL DAILY
Status: CANCELLED | OUTPATIENT
Start: 2025-03-01

## 2025-03-01 RX ORDER — WARFARIN SODIUM 6 MG/1
6 TABLET ORAL TAKE AS DIRECTED
Status: ON HOLD | COMMUNITY
End: 2025-03-02

## 2025-03-01 RX ORDER — GLUCAGON 1 MG/ML
1 KIT INJECTION
Status: DISCONTINUED | OUTPATIENT
Start: 2025-03-01 | End: 2025-03-02 | Stop reason: HOSPADM

## 2025-03-01 RX ORDER — GLUCAGON 1 MG/ML
1 KIT INJECTION
Status: DISCONTINUED | OUTPATIENT
Start: 2025-03-01 | End: 2025-03-01

## 2025-03-01 RX ORDER — POLYETHYLENE GLYCOL 3350 17 G/17G
17 POWDER, FOR SOLUTION ORAL DAILY PRN
Status: DISCONTINUED | OUTPATIENT
Start: 2025-03-01 | End: 2025-03-02 | Stop reason: HOSPADM

## 2025-03-01 RX ORDER — NITROGLYCERIN 0.4 MG/1
0.4 TABLET SUBLINGUAL
Status: DISCONTINUED | OUTPATIENT
Start: 2025-03-01 | End: 2025-03-02 | Stop reason: HOSPADM

## 2025-03-01 RX ORDER — MULTIPLE VITAMINS W/ MINERALS TAB 9MG-400MCG
1 TAB ORAL DAILY
Status: DISCONTINUED | OUTPATIENT
Start: 2025-03-01 | End: 2025-03-02 | Stop reason: HOSPADM

## 2025-03-01 RX ORDER — INSULIN LISPRO 100 [IU]/ML
15 INJECTION, SOLUTION INTRAVENOUS; SUBCUTANEOUS 3 TIMES DAILY PRN
Status: CANCELLED | OUTPATIENT
Start: 2025-03-01

## 2025-03-01 RX ORDER — BISACODYL 5 MG/1
5 TABLET, DELAYED RELEASE ORAL DAILY PRN
Status: DISCONTINUED | OUTPATIENT
Start: 2025-03-01 | End: 2025-03-02 | Stop reason: HOSPADM

## 2025-03-01 RX ORDER — AMLODIPINE BESYLATE 10 MG/1
10 TABLET ORAL DAILY
Status: DISCONTINUED | OUTPATIENT
Start: 2025-03-01 | End: 2025-03-02

## 2025-03-01 RX ORDER — CETIRIZINE HYDROCHLORIDE 10 MG/1
10 TABLET ORAL DAILY PRN
Status: DISCONTINUED | OUTPATIENT
Start: 2025-03-01 | End: 2025-03-02 | Stop reason: HOSPADM

## 2025-03-01 RX ORDER — ATORVASTATIN CALCIUM 40 MG/1
80 TABLET, FILM COATED ORAL NIGHTLY
Status: CANCELLED | OUTPATIENT
Start: 2025-03-01

## 2025-03-01 RX ORDER — WARFARIN SODIUM 3 MG/1
6 TABLET ORAL ONCE
Status: DISCONTINUED | OUTPATIENT
Start: 2025-03-01 | End: 2025-03-02

## 2025-03-01 RX ORDER — AMOXICILLIN 250 MG
2 CAPSULE ORAL 2 TIMES DAILY
Status: DISCONTINUED | OUTPATIENT
Start: 2025-03-01 | End: 2025-03-02 | Stop reason: HOSPADM

## 2025-03-01 RX ORDER — INSULIN LISPRO 100 [IU]/ML
1-200 INJECTION, SOLUTION INTRAVENOUS; SUBCUTANEOUS
Status: DISCONTINUED | OUTPATIENT
Start: 2025-03-01 | End: 2025-03-01

## 2025-03-01 RX ORDER — WARFARIN SODIUM 1 MG/1
1 TABLET ORAL TAKE AS DIRECTED
COMMUNITY
End: 2025-03-02

## 2025-03-01 RX ORDER — NICOTINE POLACRILEX 4 MG
15 LOZENGE BUCCAL
Status: DISCONTINUED | OUTPATIENT
Start: 2025-03-01 | End: 2025-03-01

## 2025-03-01 RX ORDER — BISACODYL 10 MG
10 SUPPOSITORY, RECTAL RECTAL DAILY PRN
Status: DISCONTINUED | OUTPATIENT
Start: 2025-03-01 | End: 2025-03-02 | Stop reason: HOSPADM

## 2025-03-01 RX ADMIN — SPIRONOLACTONE 25 MG: 25 TABLET ORAL at 18:10

## 2025-03-01 RX ADMIN — Medication 10 ML: at 20:23

## 2025-03-01 RX ADMIN — INSULIN GLARGINE 30 UNITS: 100 INJECTION, SOLUTION SUBCUTANEOUS at 20:24

## 2025-03-01 RX ADMIN — INSULIN LISPRO 3 UNITS: 100 INJECTION, SOLUTION INTRAVENOUS; SUBCUTANEOUS at 17:19

## 2025-03-01 RX ADMIN — Medication 1 TABLET: at 18:10

## 2025-03-01 RX ADMIN — INSULIN LISPRO 5 UNITS: 100 INJECTION, SOLUTION INTRAVENOUS; SUBCUTANEOUS at 12:59

## 2025-03-01 RX ADMIN — Medication 10 ML: at 09:11

## 2025-03-01 RX ADMIN — FUROSEMIDE 40 MG: 10 INJECTION, SOLUTION INTRAMUSCULAR; INTRAVENOUS at 17:19

## 2025-03-01 RX ADMIN — ENOXAPARIN SODIUM 40 MG: 40 INJECTION SUBCUTANEOUS at 09:43

## 2025-03-01 RX ADMIN — IPRATROPIUM BROMIDE AND ALBUTEROL SULFATE 3 ML: .5; 2.5 SOLUTION RESPIRATORY (INHALATION) at 00:03

## 2025-03-01 RX ADMIN — FUROSEMIDE 40 MG: 10 INJECTION, SOLUTION INTRAMUSCULAR; INTRAVENOUS at 09:43

## 2025-03-01 RX ADMIN — LOSARTAN POTASSIUM 25 MG: 25 TABLET, FILM COATED ORAL at 18:10

## 2025-03-01 RX ADMIN — IPRATROPIUM BROMIDE AND ALBUTEROL SULFATE 3 ML: .5; 2.5 SOLUTION RESPIRATORY (INHALATION) at 11:46

## 2025-03-01 RX ADMIN — INSULIN LISPRO 5 UNITS: 100 INJECTION, SOLUTION INTRAVENOUS; SUBCUTANEOUS at 20:23

## 2025-03-01 RX ADMIN — IPRATROPIUM BROMIDE AND ALBUTEROL SULFATE 3 ML: .5; 2.5 SOLUTION RESPIRATORY (INHALATION) at 19:11

## 2025-03-01 RX ADMIN — ATORVASTATIN CALCIUM 80 MG: 40 TABLET, FILM COATED ORAL at 20:24

## 2025-03-01 RX ADMIN — METOPROLOL SUCCINATE 50 MG: 50 TABLET, EXTENDED RELEASE ORAL at 18:10

## 2025-03-01 RX ADMIN — SERTRALINE 150 MG: 50 TABLET, FILM COATED ORAL at 18:09

## 2025-03-01 RX ADMIN — IPRATROPIUM BROMIDE AND ALBUTEROL SULFATE 3 ML: .5; 2.5 SOLUTION RESPIRATORY (INHALATION) at 04:38

## 2025-03-01 RX ADMIN — AMLODIPINE BESYLATE 10 MG: 10 TABLET ORAL at 18:10

## 2025-03-01 RX ADMIN — FUROSEMIDE 80 MG: 10 INJECTION, SOLUTION INTRAMUSCULAR; INTRAVENOUS at 00:19

## 2025-03-01 RX ADMIN — ASPIRIN 81 MG: 81 TABLET, COATED ORAL at 18:10

## 2025-03-01 NOTE — CASE MANAGEMENT/SOCIAL WORK
Discharge Planning Assessment  [facility]     Patient Name: Benitez Miranda  MRN: [id number]  Today's Date: 3/1/2025    Admit Date: 2/28/2025    Plan: Pt lives at home alone and plans to return home at discharge states he provides own transportation but has family if needed. Pcp is Leticia Christina, he uses [facility] in [place] and has Medicare a+b and [place]  sup. Pt is independent with adl's and uses nebs and cpap he thinks is from [employer]. He does not use home health.   Discharge Needs Assessment       Row Name 03/01/25 1319       Living Environment    People in Home alone    Current Living Arrangements home    Potentially Unsafe Housing Conditions none    Primary Care Provided by self    Quality of Family Relationships helpful;involved;supportive    Able to Return to Prior Arrangements yes       Resource/Environmental Concerns    Transportation Concerns none       Transition Planning    Patient/Family Anticipates Transition to home    Patient/Family Anticipated Services at Transition none    Transportation Anticipated car, drives self;family or friend will provide       Discharge Needs Assessment    Readmission Within the Last 30 Days no previous admission in last 30 days    Equipment Currently Used at Home nebulizer;cpap    Concerns to be Addressed no discharge needs identified;denies needs/concerns at this time    Equipment Needed After Discharge none                   Discharge Plan       Row Name 03/01/25 1319       Plan    Plan Pt lives at home alone and plans to return home at discharge states he provides own transportation but has family if needed. Pcp is Leticia Christina, he uses [facility] in [place] and has Medicare a+b and [place]  sup. Pt is independent with adl's and uses nebs and cpap he thinks is from [employer]. He does not use home health.    Patient/Family in Agreement with Plan yes                  Continued Care and Services - Admitted Since 2/28/2025    No active coordination exists for  this encounter.       Selected Continued Care - Episodes Includes continued care and service providers with selected services from the active episodes listed below      High Risk Care Management Episode start date: 1/14/2025   There are no active outsourced providers for this episode.                 Expected Discharge Date and Time       Expected Discharge Date Expected Discharge Time    Mar 3, 2025            Demographic Summary       Row Name 03/01/25 1318       General Information    Admission Type inpatient    Arrived From home    Referral Source emergency department    Reason for Consult discharge planning                    Elvira Pierce RN

## 2025-03-01 NOTE — ED PROVIDER NOTES
Subjective   History of Present Illness  Patient is a 69-year-old male with significant past medical history positive for anxiety, arthritis, CAD, CHF, CKD, COPD not oxygen dependent, type 2 diabetes, hyperlipidemia, hypertension, presenting to the ER complaints of swelling and shortness of breath.  Patient does spirolactone and has not missed any doses.  Patient denies chest pain.  Patient reports endorses worsening shortness of air x 4 days.  Patient denies any known sick contacts or recent foreign travel.  Patient denies any additional symptoms at this time.    History provided by:  Patient   used: No        Review of Systems   Constitutional: Negative.  Negative for fever.   HENT: Negative.     Respiratory:  Positive for shortness of breath.    Cardiovascular: Negative.  Negative for chest pain.   Gastrointestinal: Negative.  Negative for abdominal pain.   Endocrine: Negative.    Genitourinary: Negative.  Negative for dysuria.   Skin: Negative.    Neurological: Negative.    Psychiatric/Behavioral: Negative.     All other systems reviewed and are negative.      Past Medical History:   Diagnosis Date    Anxiety     Arthritis     CAD (coronary artery disease)     x1 stent; pulmonary HTN; EF 50-55%; rheumatic valve; MV stenosis    CHF (congestive heart failure)     CKD (chronic kidney disease), stage III     COPD (chronic obstructive pulmonary disease)     Depression     Diabetes mellitus     H/O mechanical aortic valve replacement     History of tobacco abuse     Hyperlipidemia     Hypertension     Ischemic heart disease     Kidney failure     third stage    Low back pain     Obesity     BMI 36.    Stroke     Valvular heart disease        Allergies   Allergen Reactions    Penicillins Swelling     Tongue swelled    Ace Inhibitors Angioedema    Contrast Dye (Echo Or Unknown Ct/Mr) Rash       Past Surgical History:   Procedure Laterality Date    AORTIC VALVE REPAIR/REPLACEMENT  1991    CARDIAC  CATHETERIZATION      CARDIAC SURGERY      valve on aortic    CARDIAC SURGERY      stent     CATARACT EXTRACTION      COLONOSCOPY      CORONARY STENT PLACEMENT  2005    CYSTOSCOPY URETEROSCOPY LASER LITHOTRIPSY Left 2020    Procedure: CYSTOSCOPY URETEROSCOPY LASER LITHOTRIPSY WITH STENT PLACEMENT;  Surgeon: Jonah Montgomery MD;  Location: [facility];  Service: Urology;  Laterality: Left;    KIDNEY STONE SURGERY         Family History   Problem Relation Age of Onset    Cancer Mother         breast    COPD Father     Heart attack Father 74    Diabetes Paternal Grandmother         both legs removed       Social History     Socioeconomic History    Marital status:     Number of children: 2   Tobacco Use    Smoking status: Former     Current packs/day: 0.00     Average packs/day: 1 pack/day for 30.0 years (30.0 ttl pk-yrs)     Types: Pipe, Cigars, Cigarettes     Start date: 1986     Quit date: 2016     Years since quittin.6    Smokeless tobacco: Never    Tobacco comments:     currently smokes a pipe   Vaping Use    Vaping status: Never Used   Substance and Sexual Activity    Alcohol use: No    Drug use: No    Sexual activity: Defer           Objective   Physical Exam  Vitals and nursing note reviewed.   Constitutional:       General: He is not in acute distress.     Appearance: He is well-developed. He is ill-appearing. He is not diaphoretic.   HENT:      Head: Normocephalic and atraumatic.      Right Ear: External ear normal.      Left Ear: External ear normal.      Nose: Nose normal.   Eyes:      Conjunctiva/sclera: Conjunctivae normal.      Pupils: Pupils are equal, round, and reactive to light.   Neck:      Vascular: No JVD.      Trachea: No tracheal deviation.   Cardiovascular:      Rate and Rhythm: Normal rate and regular rhythm.      Heart sounds: Normal heart sounds. No murmur heard.  Pulmonary:      Effort: Pulmonary effort is normal. Tachypnea present. No respiratory  distress.      Breath sounds: Rhonchi present. No wheezing.   Abdominal:      General: Bowel sounds are normal.      Palpations: Abdomen is soft.      Tenderness: There is no abdominal tenderness.   Musculoskeletal:         General: No deformity. Normal range of motion.      Cervical back: Normal range of motion and neck supple.      Right lower leg: Edema present.      Left lower leg: Edema present.   Skin:     General: Skin is warm and dry.      Capillary Refill: Capillary refill takes 2 to 3 seconds.      Coloration: Skin is pale.      Findings: No erythema or rash.   Neurological:      Mental Status: He is alert and oriented to person, place, and time.      Cranial Nerves: No cranial nerve deficit.   Psychiatric:         Behavior: Behavior normal.         Thought Content: Thought content normal.         Procedures       Results for orders placed or performed during the hospital encounter of 02/28/25   ECG 12 Lead Dyspnea    Collection Time: 02/28/25 10:14 PM   Result Value Ref Range    QT Interval 478 ms    QTC Interval 519 ms   COVID-19 and FLU A/B PCR, 1 HR TAT - Swab, Nasopharynx    Collection Time: 02/28/25 11:04 PM    Specimen: Nasopharynx; Swab   Result Value Ref Range    COVID19 Not Detected Not Detected - Ref. Range    Influenza A PCR Not Detected Not Detected    Influenza B PCR Not Detected Not Detected   Comprehensive Metabolic Panel    Collection Time: 02/28/25 11:04 PM    Specimen: Blood   Result Value Ref Range    Glucose 209 (H) 65 - 99 mg/dL    BUN 23 8 - 23 mg/dL    Creatinine 1.32 (H) 0.76 - 1.27 mg/dL    Sodium 139 136 - 145 mmol/L    Potassium 4.4 3.5 - 5.2 mmol/L    Chloride 106 98 - 107 mmol/L    CO2 21.9 (L) 22.0 - 29.0 mmol/L    Calcium 8.5 (L) 8.6 - 10.5 mg/dL    Total Protein 7.2 6.0 - 8.5 g/dL    Albumin 3.7 3.5 - 5.2 g/dL    ALT (SGPT) 23 1 - 41 U/L    AST (SGOT) 23 1 - 40 U/L    Alkaline Phosphatase 74 39 - 117 U/L    Total Bilirubin 0.8 0.0 - 1.2 mg/dL    Globulin 3.5 gm/dL    A/G  Ratio 1.1 g/dL    BUN/Creatinine Ratio 17.4 7.0 - 25.0    Anion Gap 11.1 5.0 - 15.0 mmol/L    eGFR 57.7 (L) >60.0 mL/min/1.73   BNP    Collection Time: 02/28/25 11:04 PM    Specimen: Blood   Result Value Ref Range    proBNP 1,179.0 (H) 0.0 - 900.0 pg/mL   High Sensitivity Troponin T    Collection Time: 02/28/25 11:04 PM    Specimen: Blood   Result Value Ref Range    HS Troponin T 31 (H) <22 ng/L   Lactic Acid, Plasma    Collection Time: 02/28/25 11:04 PM    Specimen: Blood   Result Value Ref Range    Lactate 1.4 0.5 - 2.0 mmol/L   Procalcitonin    Collection Time: 02/28/25 11:04 PM    Specimen: Blood   Result Value Ref Range    Procalcitonin 0.03 0.00 - 0.25 ng/mL   C-reactive Protein    Collection Time: 02/28/25 11:04 PM    Specimen: Blood   Result Value Ref Range    C-Reactive Protein 1.77 (H) 0.00 - 0.50 mg/dL   Sedimentation Rate    Collection Time: 02/28/25 11:04 PM    Specimen: Blood   Result Value Ref Range    Sed Rate 37 (H) 0 - 20 mm/hr   Protime-INR    Collection Time: 02/28/25 11:04 PM    Specimen: Blood   Result Value Ref Range    Protime 40.2 (H) 11.6 - 15.1 Seconds    INR 4.07 (H) 0.90 - 1.10   CBC Auto Differential    Collection Time: 02/28/25 11:04 PM    Specimen: Blood   Result Value Ref Range    WBC 10.96 (H) 3.40 - 10.80 10*3/mm3    RBC 4.20 4.14 - 5.80 10*6/mm3    Hemoglobin 12.1 (L) 13.0 - 17.7 g/dL    Hematocrit 38.2 37.5 - 51.0 %    MCV 91.0 79.0 - 97.0 fL    MCH 28.8 26.6 - 33.0 pg    MCHC 31.7 31.5 - 35.7 g/dL    RDW 14.5 12.3 - 15.4 %    RDW-SD 48.7 37.0 - 54.0 fl    MPV 11.5 6.0 - 12.0 fL    Platelets 155 140 - 450 10*3/mm3    Neutrophil % 80.9 (H) 42.7 - 76.0 %    Lymphocyte % 8.0 (L) 19.6 - 45.3 %    Monocyte % 4.6 (L) 5.0 - 12.0 %    Eosinophil % 5.3 0.3 - 6.2 %    Basophil % 0.5 0.0 - 1.5 %    Immature Grans % 0.7 (H) 0.0 - 0.5 %    Neutrophils, Absolute 8.86 (H) 1.70 - 7.00 10*3/mm3    Lymphocytes, Absolute 0.88 0.70 - 3.10 10*3/mm3    Monocytes, Absolute 0.50 0.10 - 0.90 10*3/mm3     Eosinophils, Absolute 0.58 (H) 0.00 - 0.40 10*3/mm3    Basophils, Absolute 0.06 0.00 - 0.20 10*3/mm3    Immature Grans, Absolute 0.08 (H) 0.00 - 0.05 10*3/mm3    nRBC 0.0 0.0 - 0.2 /100 WBC   Green Top (Gel)    Collection Time: 02/28/25 11:04 PM   Result Value Ref Range    Extra Tube Hold for add-ons.    Lavender Top    Collection Time: 02/28/25 11:04 PM   Result Value Ref Range    Extra Tube hold for add-on    Gold Top - SST    Collection Time: 02/28/25 11:04 PM   Result Value Ref Range    Extra Tube Hold for add-ons.    Light Blue Top    Collection Time: 02/28/25 11:04 PM   Result Value Ref Range    Extra Tube Hold for add-ons.    Blood Gas, Arterial With Co-Ox    Collection Time: 02/28/25 11:24 PM    Specimen: Arterial Blood   Result Value Ref Range    Site Right Brachial     Tommy's Test N/A     pH, Arterial 7.426 7.350 - 7.450 pH units    pCO2, Arterial 34.4 (L) 35.0 - 45.0 mm Hg    pO2, Arterial 54.7 (C) 83.0 - 108.0 mm Hg    HCO3, Arterial 22.6 20.0 - 26.0 mmol/L    Base Excess, Arterial -1.3 (L) 0.0 - 2.0 mmol/L    O2 Saturation, Arterial 89.2 (L) 94.0 - 99.0 %    Hemoglobin, Blood Gas 12.1 (L) 14 - 18 g/dL    Hematocrit, Blood Gas 37.0 (L) 38.0 - 51.0 %    Oxyhemoglobin 87.7 (L) 94 - 99 %    Methemoglobin 0.30 0.00 - 3.00 %    Carboxyhemoglobin 1.4 0 - 5 %    A-a DO2 47.7 0.0 - 300.0 mmHg    CO2 Content 23.7 22 - 33 mmol/L    Barometric Pressure for Blood Gas 720 mmHg    Modality Room Air     FIO2 21 %    Ventilator Mode NA     Notified Who S SEARS APRN     Notified By [id number]     Notified Time 02/28/2025 23:28     Collected by [id number]     pH, Temp Corrected      pCO2, Temperature Corrected      pO2, Temperature Corrected     High Sensitivity Troponin T 1Hr    Collection Time: 03/01/25 12:06 AM    Specimen: Blood   Result Value Ref Range    HS Troponin T 31 (H) <22 ng/L    Troponin T Numeric Delta 0 ng/L    Troponin T % Delta 0 Abnormal if >/= 20%   Urinalysis With Microscopic If Indicated (No Culture) -  Urine, Clean Catch    Collection Time: 03/01/25 12:59 AM    Specimen: Urine, Clean Catch   Result Value Ref Range    Color, UA Yellow Yellow, Straw    Appearance, UA Clear Clear    pH, UA 6.0 5.0 - 8.0    Specific Gravity, UA 1.017 1.005 - 1.030    Glucose, UA Negative Negative    Ketones, UA Negative Negative    Bilirubin, UA Negative Negative    Blood, UA Trace (A) Negative    Protein,  mg/dL (2+) (A) Negative    Leuk Esterase, UA Negative Negative    Nitrite, UA Negative Negative    Urobilinogen, UA 1.0 E.U./dL 0.2 - 1.0 E.U./dL   Urinalysis, Microscopic Only - Urine, Clean Catch    Collection Time: 03/01/25 12:59 AM    Specimen: Urine, Clean Catch   Result Value Ref Range    RBC, UA 3-5 (A) None Seen, 0-2 /HPF    WBC, UA 3-5 (A) None Seen, 0-2 /HPF    Bacteria, UA None Seen None Seen /HPF    Squamous Epithelial Cells, UA 3-6 (A) None Seen, 0-2 /HPF    Hyaline Casts, UA None Seen None Seen /LPF    Methodology Manual Light Microscopy    Blood Gas, Arterial With Co-Ox    Collection Time: 03/01/25  4:43 AM    Specimen: Arterial Blood   Result Value Ref Range    Site Right Brachial     Tommy's Test N/A     pH, Arterial 7.442 7.350 - 7.450 pH units    pCO2, Arterial 36.3 35.0 - 45.0 mm Hg    pO2, Arterial 54.1 (C) 83.0 - 108.0 mm Hg    HCO3, Arterial 24.8 20.0 - 26.0 mmol/L    Base Excess, Arterial 0.9 0.0 - 2.0 mmol/L    O2 Saturation, Arterial 89.1 (L) 94.0 - 99.0 %    Hemoglobin, Blood Gas 12.7 (L) 14 - 18 g/dL    Hematocrit, Blood Gas 38.8 38.0 - 51.0 %    Oxyhemoglobin 87.8 (L) 94 - 99 %    Methemoglobin 0.20 0.00 - 3.00 %    Carboxyhemoglobin 1.3 0 - 5 %    A-a DO2 93.8 0.0 - 300.0 mmHg    CO2 Content 25.9 22 - 33 mmol/L    Barometric Pressure for Blood Gas 720 mmHg    Modality Nasal Cannula     FIO2 28 %    Flow Rate 2.0 lpm    Ventilator Mode NA     Notified [facility] S PAIGE MOON     Notified By [id number]     Notified Time 03/01/2025 04:47     Collected by [id number]     pH, Temp Corrected      pCO2, Temperature  Corrected      pO2, Temperature Corrected     Basic Metabolic Panel    Collection Time: 03/01/25  8:35 AM    Specimen: Blood   Result Value Ref Range    Glucose 203 (H) 65 - 99 mg/dL    BUN 21 8 - 23 mg/dL    Creatinine 1.25 0.76 - 1.27 mg/dL    Sodium 137 136 - 145 mmol/L    Potassium 4.2 3.5 - 5.2 mmol/L    Chloride 101 98 - 107 mmol/L    CO2 25.7 22.0 - 29.0 mmol/L    Calcium 8.8 8.6 - 10.5 mg/dL    BUN/Creatinine Ratio 16.8 7.0 - 25.0    Anion Gap 10.3 5.0 - 15.0 mmol/L    eGFR 61.6 >60.0 mL/min/1.73   Magnesium    Collection Time: 03/01/25  8:35 AM    Specimen: Blood   Result Value Ref Range    Magnesium 1.8 1.6 - 2.4 mg/dL   CBC Auto Differential    Collection Time: 03/01/25  8:35 AM    Specimen: Blood   Result Value Ref Range    WBC 11.68 (H) 3.40 - 10.80 10*3/mm3    RBC 4.21 4.14 - 5.80 10*6/mm3    Hemoglobin 12.4 (L) 13.0 - 17.7 g/dL    Hematocrit 37.9 37.5 - 51.0 %    MCV 90.0 79.0 - 97.0 fL    MCH 29.5 26.6 - 33.0 pg    MCHC 32.7 31.5 - 35.7 g/dL    RDW 14.7 12.3 - 15.4 %    RDW-SD 49.1 37.0 - 54.0 fl    MPV 11.1 6.0 - 12.0 fL    Platelets 156 140 - 450 10*3/mm3    Neutrophil % 76.7 (H) 42.7 - 76.0 %    Lymphocyte % 11.5 (L) 19.6 - 45.3 %    Monocyte % 6.3 5.0 - 12.0 %    Eosinophil % 4.2 0.3 - 6.2 %    Basophil % 0.9 0.0 - 1.5 %    Immature Grans % 0.4 0.0 - 0.5 %    Neutrophils, Absolute 8.95 (H) 1.70 - 7.00 10*3/mm3    Lymphocytes, Absolute 1.34 0.70 - 3.10 10*3/mm3    Monocytes, Absolute 0.74 0.10 - 0.90 10*3/mm3    Eosinophils, Absolute 0.49 (H) 0.00 - 0.40 10*3/mm3    Basophils, Absolute 0.11 0.00 - 0.20 10*3/mm3    Immature Grans, Absolute 0.05 0.00 - 0.05 10*3/mm3    nRBC 0.0 0.0 - 0.2 /100 WBC   Hemoglobin A1c    Collection Time: 03/01/25  8:35 AM    Specimen: Blood   Result Value Ref Range    Hemoglobin A1C 8.50 (H) 4.80 - 5.60 %   POC Glucose Once    Collection Time: 03/01/25  9:51 AM    Specimen: Blood   Result Value Ref Range    Glucose 192 (H) 70 - 130 mg/dL   POC Glucose Once    Collection  Time: 03/01/25 12:44 PM    Specimen: Blood   Result Value Ref Range    Glucose 206 (H) 70 - 130 mg/dL      CT Chest Without Contrast Diagnostic   Final Result       PROBABLE CHF WITH MILD CARDIOMEGALY, PLEURAL EFFUSIONS (MODERATE RIGHT   AND SMALL LEFT), INTERSTITIAL AND MILD ALVEOLAR PULMONARY EDEMA.       This report was finalized on 3/1/2025 4:33 AM by Keren Lemus MD.          XR Chest 1 View   Final Result       Enlarged heart size.   Coarsened bronchovascular pattern to the lungs   Sternotomy.   No lobar consolidation or edema.    No pleural effusion or pneumothorax           This report was finalized on 3/1/2025 4:20 AM by Ortega Sheridan MD.               ED Course  ED Course as of 03/01/25 1310   Fri Feb 28, 2025   2329 pO2, Arterial(!!): 54.7  Patient placed on 2LNC [SS]   Sat Mar 01, 2025   0515 XR Chest 1 View [SS]   0515 CT Chest Without Contrast Diagnostic [SS]   0515 Paged the hospitialist at this time.  [SS]   0772 Discussed case with hospitalist agrees to admit at this time. [SS]      ED Course User Index  [SS] Estefani Servin, RED                                                       Medical Decision Making  Patient is a 69-year-old male with significant past medical history positive for anxiety, arthritis, CAD, CHF, CKD, COPD not oxygen dependent, type 2 diabetes, hyperlipidemia, hypertension, presenting to the ER complaints of swelling and shortness of breath.  Patient does spirolactone and has not missed any doses.  Patient denies chest pain.  Patient reports endorses worsening shortness of air x 4 days.  Patient denies any known sick contacts or recent foreign travel.  Patient denies any additional symptoms at this time.    Problems Addressed:  Acute respiratory failure with hypoxia: complicated acute illness or injury  Congestive heart failure, unspecified HF chronicity, unspecified heart failure type: complicated acute illness or injury    Amount and/or Complexity of Data Reviewed  Labs:  ordered. Decision-making details documented in ED Course.  Radiology: ordered. Decision-making details documented in ED Course.  ECG/medicine tests: ordered.    Risk  Prescription drug management.  Decision regarding hospitalization.        Final diagnoses:   Congestive heart failure, unspecified HF chronicity, unspecified heart failure type   Acute respiratory failure with hypoxia       ED Disposition  ED Disposition       ED Disposition   Decision to Admit    Condition   --    Comment   Level of Care: Telemetry [5]   Diagnosis: CHF exacerbation [467141]   Admitting Physician: JAMES NAVARRO [[id number]]   Attending Physician: JAMES NAVARRO [[id number]]   Isolate for COVID?: No [0]   Certification: I Certify That Inpatient Hospital Services Are Medically Necessary For Greater Than 2 Midnights                 No follow-up provider specified.       Medication List      No changes were made to your prescriptions during this visit.            Jose Contreras PA  03/01/25 1318

## 2025-03-01 NOTE — H&P
[facility] HOSPITALIST HISTORY AND PHYSICAL      Admit Date: 2/28/2025       Chief complaint shortness of breath    Subjective     Benitez Miranda is a 69 y.o. male presents with shortness of breath. Onset of symptoms was a week ago.    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  Patient presented with progressive shortness of breath for the last week associated with swelling in the lower extremities and cough.  He reports no fever and no chest pain and no chills.  Patient reports no changes in his medications regiment.    History  Past Medical History:   Diagnosis Date    Anxiety     Arthritis     CAD (coronary artery disease)     x1 stent; pulmonary HTN; EF 50-55%; rheumatic valve; MV stenosis    CHF (congestive heart failure)     CKD (chronic kidney disease), stage III     COPD (chronic obstructive pulmonary disease)     Depression     Diabetes mellitus     H/O mechanical aortic valve replacement     History of tobacco abuse     Hyperlipidemia     Hypertension     Ischemic heart disease     Kidney failure     third stage    Low back pain     Obesity     BMI 36.    Stroke     Valvular heart disease      Past Surgical History:   Procedure Laterality Date    AORTIC VALVE REPAIR/REPLACEMENT  1991    CARDIAC CATHETERIZATION      CARDIAC SURGERY  1991    valve on aortic    CARDIAC SURGERY  2001    stent     CATARACT EXTRACTION      COLONOSCOPY  2001    CORONARY STENT PLACEMENT  2005    CYSTOSCOPY URETEROSCOPY LASER LITHOTRIPSY Left 11/20/2020    Procedure: CYSTOSCOPY URETEROSCOPY LASER LITHOTRIPSY WITH STENT PLACEMENT;  Surgeon: Jonah Montgomery MD;  Location: [facility];  Service: Urology;  Laterality: Left;    KIDNEY STONE SURGERY       Family History   Problem Relation Age of Onset    Cancer Mother         breast    COPD Father     Heart attack Father 74    Diabetes Paternal Grandmother         both legs  removed     Social History     Tobacco Use    Smoking status: Former     Current packs/day: 0.00     Average packs/day: 1 pack/day for 30.0 years (30.0 ttl pk-yrs)     Types: Pipe, Cigars, Cigarettes     Start date: 1986     Quit date: 2016     Years since quittin.6    Smokeless tobacco: Never    Tobacco comments:     currently smokes a pipe   Vaping Use    Vaping status: Never Used   Substance Use Topics    Alcohol use: No    Drug use: No     Medications Prior to Admission   Medication Sig Dispense Refill Last Dose/Taking    amLODIPine (NORVASC) 10 MG tablet Take 1 tablet by mouth Daily. 90 tablet 1 2025    aspirin 81 MG EC tablet Take 1 tablet by mouth Daily. 30 tablet 0 2025 Morning    cetirizine (zyrTEC) 10 MG tablet Take 1 tablet by mouth Daily As Needed for Allergies.   2025    gabapentin (NEURONTIN) 600 MG tablet Take 1 tablet by mouth Daily As Needed (back pain).   Past Week    Insulin Glargine (BASAGLAR KWIKPEN) 100 UNIT/ML injection pen Inject 30 Units under the skin into the appropriate area as directed 2 (Two) Times a Day. 54 mL 1 2025    Insulin Lispro, 1 Unit Dial, (HUMALOG) 100 UNIT/ML solution pen-injector Inject 15 Units under the skin into the appropriate area as directed 3 (Three) Times a Day As Needed (high blood sugar).   2025    losartan (COZAAR) 25 MG tablet Take 1 tablet by mouth Daily. 90 tablet 1 2025    metoprolol succinate XL (TOPROL-XL) 50 MG 24 hr tablet Take 1 tablet by mouth Daily. 90 tablet 1 2025    multivitamin with minerals tablet tablet Take 1 tablet by mouth Daily.   2025    Semaglutide, 2 MG/DOSE, (OZEMPIC) 8 MG/3ML solution pen-injector Inject 2 mg under the skin into the appropriate area as directed 1 (One) Time Per Week.   Past Week    sertraline (ZOLOFT) 100 MG tablet Take 1.5 tablets by mouth Daily. 135 tablet 1 2025    spironolactone (ALDACTONE) 25 MG tablet Take 1 tablet by mouth once daily 90 tablet 0 2025     warfarin (COUMADIN) 1 MG tablet Take 1 tablet by mouth Take As Directed.   2/28/2025    warfarin (COUMADIN) 6 MG tablet Take 1 tablet by mouth Take As Directed.   2/28/2025    atorvastatin (LIPITOR) 80 MG tablet Take 1 tablet by mouth Every Night. 90 tablet 1 2/27/2025    warfarin (Coumadin) 2 MG tablet Take 1 tablet by mouth Every Night. (Patient taking differently: Take 1 tablet by mouth Take As Directed.) 90 tablet 1 2/27/2025     Allergies:  Penicillins, Ace inhibitors, and Contrast dye (echo or unknown ct/mr)      Review of Systems    12 point review of systems is negative unless stated above in the history of present illness.  Objective     Vital Signs  Temp:  [98 °F (36.7 °C)-98.3 °F (36.8 °C)] 98 °F (36.7 °C)  Heart Rate:  [58-78] 64  Resp:  [16-20] 20  BP: (124-176)/(55-86) 125/55    Physical Exam:  General:    Awake, alert, in no acute distress   Head:    Normocephalic, atraumatic   Eyes:           PERRLA, EOMI. Conjunctivae and sclerae normal. No icterus or pallor.   Ears:    Ears appear intact with no abnormalities noted.   Throat:   No oral lesions or thrush. Oral mucosa moist   Heart:      Normal S1 and S2. Regular rate and rhythm. No significant murmur, rubs or gallops appreciated.   Lungs:   Bilateral crackles and rales.  No wheezing   Abdomen:   Soft and nontender. No guarding, rebound tenderness or  organomegaly noted. Bowel sounds present x 4.   MS:   Muscular strength intact and equal B/L. No joint swelling, deformity, or tenderness.   Extremities: Pitting edema 2+ and swelling.       Skin:   No bleeding, bruising or rash.   Lymph nodes:   No palpable adenopathy   Neurologic:   Cranial nerves 2 - 12 grossly intact. Sensation intact. DTR        present and equal bilaterally       Results Review:     I reviewed the patient's new imaging results and agree with the interpretation.  I reviewed the patient's other test results and agree with the interpretation.    Results from last 7 days   Lab Units  03/01/25  0835 02/28/25  2304   WBC 10*3/mm3 11.68* 10.96*   HEMOGLOBIN g/dL 12.4* 12.1*   PLATELETS 10*3/mm3 156 155     Results from last 7 days   Lab Units 03/01/25  0835 02/28/25  2304   SODIUM mmol/L 137 139   POTASSIUM mmol/L 4.2 4.4   CHLORIDE mmol/L 101 106   CO2 mmol/L 25.7 21.9*   BUN mg/dL 21 23   CREATININE mg/dL 1.25 1.32*   CALCIUM mg/dL 8.8 8.5*   GLUCOSE mg/dL 203* 209*     Results from last 7 days   Lab Units 02/28/25  2304   BILIRUBIN mg/dL 0.8   ALK PHOS U/L 74   AST (SGOT) U/L 23   ALT (SGPT) U/L 23     Results from last 7 days   Lab Units 03/01/25  0835   MAGNESIUM mg/dL 1.8     Results from last 7 days   Lab Units 02/28/25  2304 02/28/25  1333   INR  4.07* 5.40*     Results from last 7 days   Lab Units 03/01/25  0006 02/28/25  2304   HSTROP T ng/L 31* 31*       Imaging Results (Last 24 Hours)       Procedure Component Value Units Date/Time    CT Chest Without Contrast Diagnostic [[id number]] Collected: 03/01/25 0426     Updated: 03/01/25 0436    Narrative:      CLINICAL HISTORY: Cough, dyspnea.     COMPARISON: 4/16/2023.     TECHNIQUE: Noncontrast CT of the chest was obtained.  Multiplanar  reformats were generated.  Limited exposure control, adjustment of the  mA and/or KV according to patient size or use of iterative  reconstruction technique was utilized.     FINDINGS:       Cardiac and mediastinal structures: Mild cardiomegaly. Coronary artery  calcifications are present.  Status post aortic valve replacement.   Normal pericardium. Pulmonary arteries are normal.  Ascending aortic  aneurysm measures 4.1 x 4.2 cm.  esophagus and mediastinal / hilar nodes  are normal.  Lungs and airways: Dependent bibasilar atelectasis.  Mild interstitial  thickening at the lung apices with mild groundglass opacities in the  dependent right lower and right upper lobes..  Pleura: Moderate right and small left pleural effusions.  Musculoskeletal and chest wall: no acute abnormality.  Lower neck and upper abdomen:  Cholelithiasis       Impression:         PROBABLE CHF WITH MILD CARDIOMEGALY, PLEURAL EFFUSIONS (MODERATE RIGHT  AND SMALL LEFT), INTERSTITIAL AND MILD ALVEOLAR PULMONARY EDEMA.     This report was finalized on 3/1/2025 4:33 AM by Keren Lemus MD.       XR Chest 1 View [[id number]] Collected: 03/01/25 0419     Updated: 03/01/25 0422    Narrative:      PROCEDURE: Portable chest x-ray examination performed on February 28, 2025. Single view.     HISTORY: Shortness of breath. Difficulty breathing.     COMPARISON: None.     FINDINGS:     Enlarged heart size with prior sternotomy.  Coarsened bronchovascular pattern to the lungs  Very small bands of scar versus atelectasis at the lung bases.  No pleural effusion or pneumothorax  No free air in the upper abdomen.       Impression:         Enlarged heart size.  Coarsened bronchovascular pattern to the lungs  Sternotomy.  No lobar consolidation or edema.   No pleural effusion or pneumothorax        This report was finalized on 3/1/2025 4:20 AM by Ortega Sheridan MD.                   Assessment & Plan   -Acute CHF exacerbation.  - Shortness of breath  -History of aortic valve replacement in 1999.   - CAD s/p PCI x 1  - Insulin-dependent diabetes mellitus  - Peripheral neuropathy  - History of CVA with right side residual weakness  - Essential hypertension    Admit patient to telemetry bed.  Strict intake and output, daily weight and fluid restriction diet.  Ordered echocardiogram and resume spironolactone.  Added Lasix 40 mg IV twice daily.   Continue bronchodilators.  Continue antiplatelet medications and other home meds.   Consult cardiology CHEM code.    Medical VTE prophylaxis        I discussed the patient's findings and my recommendations with patient      Mulu Casarez MD  03/01/25  17:23 EST

## 2025-03-01 NOTE — PLAN OF CARE
Goal Outcome Evaluation:                        Pt currently resting in bed. No s/s of acute distress noted at this time. No complaints verbalized at this time. Plan of care ongoing.

## 2025-03-02 ENCOUNTER — READMISSION MANAGEMENT (OUTPATIENT)
Dept: CALL CENTER | Facility: HOSPITAL | Age: 70
End: 2025-03-02
Payer: MEDICARE

## 2025-03-02 VITALS
RESPIRATION RATE: 18 BRPM | OXYGEN SATURATION: 95 % | TEMPERATURE: 98.5 F | HEIGHT: 72 IN | DIASTOLIC BLOOD PRESSURE: 71 MMHG | WEIGHT: 254.7 LBS | HEART RATE: 62 BPM | SYSTOLIC BLOOD PRESSURE: 113 MMHG | BODY MASS INDEX: 34.5 KG/M2

## 2025-03-02 LAB
ANION GAP SERPL CALCULATED.3IONS-SCNC: 9.9 MMOL/L (ref 5–15)
BASOPHILS # BLD AUTO: 0.06 10*3/MM3 (ref 0–0.2)
BASOPHILS NFR BLD AUTO: 0.6 % (ref 0–1.5)
BUN SERPL-MCNC: 24 MG/DL (ref 8–23)
BUN/CREAT SERPL: 16.1 (ref 7–25)
CALCIUM SPEC-SCNC: 9.3 MG/DL (ref 8.6–10.5)
CHLORIDE SERPL-SCNC: 98 MMOL/L (ref 98–107)
CO2 SERPL-SCNC: 27.1 MMOL/L (ref 22–29)
CREAT SERPL-MCNC: 1.49 MG/DL (ref 0.76–1.27)
DEPRECATED RDW RBC AUTO: 47.4 FL (ref 37–54)
EGFRCR SERPLBLD CKD-EPI 2021: 49.9 ML/MIN/1.73
EOSINOPHIL # BLD AUTO: 0.44 10*3/MM3 (ref 0–0.4)
EOSINOPHIL NFR BLD AUTO: 4.7 % (ref 0.3–6.2)
ERYTHROCYTE [DISTWIDTH] IN BLOOD BY AUTOMATED COUNT: 14.6 % (ref 12.3–15.4)
GLUCOSE BLDC GLUCOMTR-MCNC: 204 MG/DL (ref 70–130)
GLUCOSE BLDC GLUCOMTR-MCNC: 204 MG/DL (ref 70–130)
GLUCOSE BLDC GLUCOMTR-MCNC: 213 MG/DL (ref 70–130)
GLUCOSE BLDC GLUCOMTR-MCNC: 289 MG/DL (ref 70–130)
GLUCOSE SERPL-MCNC: 170 MG/DL (ref 65–99)
HCT VFR BLD AUTO: 36.7 % (ref 37.5–51)
HGB BLD-MCNC: 12 G/DL (ref 13–17.7)
IMM GRANULOCYTES # BLD AUTO: 0.07 10*3/MM3 (ref 0–0.05)
IMM GRANULOCYTES NFR BLD AUTO: 0.7 % (ref 0–0.5)
INR PPP: 3.69 (ref 0.9–1.1)
LYMPHOCYTES # BLD AUTO: 1.22 10*3/MM3 (ref 0.7–3.1)
LYMPHOCYTES NFR BLD AUTO: 13 % (ref 19.6–45.3)
MAGNESIUM SERPL-MCNC: 1.8 MG/DL (ref 1.6–2.4)
MCH RBC QN AUTO: 29.3 PG (ref 26.6–33)
MCHC RBC AUTO-ENTMCNC: 32.7 G/DL (ref 31.5–35.7)
MCV RBC AUTO: 89.7 FL (ref 79–97)
MONOCYTES # BLD AUTO: 0.59 10*3/MM3 (ref 0.1–0.9)
MONOCYTES NFR BLD AUTO: 6.3 % (ref 5–12)
NEUTROPHILS NFR BLD AUTO: 7.04 10*3/MM3 (ref 1.7–7)
NEUTROPHILS NFR BLD AUTO: 74.7 % (ref 42.7–76)
NRBC BLD AUTO-RTO: 0 /100 WBC (ref 0–0.2)
PLATELET # BLD AUTO: 163 10*3/MM3 (ref 140–450)
PMV BLD AUTO: 11.3 FL (ref 6–12)
POTASSIUM SERPL-SCNC: 3.7 MMOL/L (ref 3.5–5.2)
PROTHROMBIN TIME: 37.2 SECONDS (ref 11.6–15.1)
RBC # BLD AUTO: 4.09 10*6/MM3 (ref 4.14–5.8)
SODIUM SERPL-SCNC: 135 MMOL/L (ref 136–145)
WBC NRBC COR # BLD AUTO: 9.42 10*3/MM3 (ref 3.4–10.8)

## 2025-03-02 PROCEDURE — 99239 HOSP IP/OBS DSCHRG MGMT >30: CPT | Performed by: INTERNAL MEDICINE

## 2025-03-02 PROCEDURE — 36415 COLL VENOUS BLD VENIPUNCTURE: CPT | Performed by: INTERNAL MEDICINE

## 2025-03-02 PROCEDURE — 94799 UNLISTED PULMONARY SVC/PX: CPT

## 2025-03-02 PROCEDURE — 85025 COMPLETE CBC W/AUTO DIFF WBC: CPT | Performed by: INTERNAL MEDICINE

## 2025-03-02 PROCEDURE — 85610 PROTHROMBIN TIME: CPT | Performed by: INTERNAL MEDICINE

## 2025-03-02 PROCEDURE — 63710000001 INSULIN GLARGINE PER 5 UNITS: Performed by: INTERNAL MEDICINE

## 2025-03-02 PROCEDURE — 83735 ASSAY OF MAGNESIUM: CPT | Performed by: INTERNAL MEDICINE

## 2025-03-02 PROCEDURE — 94664 DEMO&/EVAL PT USE INHALER: CPT

## 2025-03-02 PROCEDURE — 94761 N-INVAS EAR/PLS OXIMETRY MLT: CPT

## 2025-03-02 PROCEDURE — 25010000002 FUROSEMIDE PER 20 MG: Performed by: INTERNAL MEDICINE

## 2025-03-02 PROCEDURE — 80048 BASIC METABOLIC PNL TOTAL CA: CPT | Performed by: INTERNAL MEDICINE

## 2025-03-02 PROCEDURE — 63710000001 INSULIN LISPRO (HUMAN) PER 5 UNITS: Performed by: INTERNAL MEDICINE

## 2025-03-02 PROCEDURE — 82948 REAGENT STRIP/BLOOD GLUCOSE: CPT

## 2025-03-02 PROCEDURE — 99222 1ST HOSP IP/OBS MODERATE 55: CPT | Performed by: INTERNAL MEDICINE

## 2025-03-02 RX ORDER — WARFARIN SODIUM 6 MG/1
6 TABLET ORAL DAILY
Start: 2025-03-03

## 2025-03-02 RX ORDER — FUROSEMIDE 40 MG/1
40 TABLET ORAL
Status: DISCONTINUED | OUTPATIENT
Start: 2025-03-02 | End: 2025-03-02 | Stop reason: HOSPADM

## 2025-03-02 RX ORDER — WARFARIN SODIUM 3 MG/1
6 TABLET ORAL
Status: COMPLETED | OUTPATIENT
Start: 2025-03-02 | End: 2025-03-02

## 2025-03-02 RX ORDER — GABAPENTIN 300 MG/1
600 CAPSULE ORAL DAILY PRN
Status: CANCELLED | OUTPATIENT
Start: 2025-03-02

## 2025-03-02 RX ORDER — FUROSEMIDE 40 MG/1
40 TABLET ORAL 2 TIMES DAILY
Qty: 60 TABLET | Refills: 5 | Status: SHIPPED | OUTPATIENT
Start: 2025-03-02

## 2025-03-02 RX ADMIN — SENNOSIDES, DOCUSATE SODIUM 2 TABLET: 50; 8.6 TABLET, FILM COATED ORAL at 08:55

## 2025-03-02 RX ADMIN — Medication 1 TABLET: at 08:56

## 2025-03-02 RX ADMIN — LOSARTAN POTASSIUM 25 MG: 25 TABLET, FILM COATED ORAL at 08:56

## 2025-03-02 RX ADMIN — SERTRALINE 150 MG: 50 TABLET, FILM COATED ORAL at 08:56

## 2025-03-02 RX ADMIN — IPRATROPIUM BROMIDE AND ALBUTEROL SULFATE 3 ML: .5; 2.5 SOLUTION RESPIRATORY (INHALATION) at 01:15

## 2025-03-02 RX ADMIN — INSULIN LISPRO 5 UNITS: 100 INJECTION, SOLUTION INTRAVENOUS; SUBCUTANEOUS at 12:12

## 2025-03-02 RX ADMIN — IPRATROPIUM BROMIDE AND ALBUTEROL SULFATE 3 ML: .5; 2.5 SOLUTION RESPIRATORY (INHALATION) at 06:43

## 2025-03-02 RX ADMIN — SPIRONOLACTONE 25 MG: 25 TABLET ORAL at 08:56

## 2025-03-02 RX ADMIN — ASPIRIN 81 MG: 81 TABLET, COATED ORAL at 08:56

## 2025-03-02 RX ADMIN — INSULIN GLARGINE 30 UNITS: 100 INJECTION, SOLUTION SUBCUTANEOUS at 08:55

## 2025-03-02 RX ADMIN — Medication 10 ML: at 08:57

## 2025-03-02 RX ADMIN — AMLODIPINE BESYLATE 10 MG: 10 TABLET ORAL at 08:56

## 2025-03-02 RX ADMIN — INSULIN LISPRO 8 UNITS: 100 INJECTION, SOLUTION INTRAVENOUS; SUBCUTANEOUS at 08:55

## 2025-03-02 RX ADMIN — FUROSEMIDE 40 MG: 10 INJECTION, SOLUTION INTRAMUSCULAR; INTRAVENOUS at 08:56

## 2025-03-02 RX ADMIN — WARFARIN SODIUM 6 MG: 3 TABLET ORAL at 12:11

## 2025-03-02 RX ADMIN — METOPROLOL SUCCINATE 50 MG: 50 TABLET, EXTENDED RELEASE ORAL at 08:55

## 2025-03-02 NOTE — PROGRESS NOTES
Pharmacy Anticoagulation Service Note:    Benitez Miranda is a 69 y.o. male being evaluated for warfarin management while hospitalized.       Indication for anticoagulation: mechanical valve  Specified goal INR range:  2.5-3.5  Usual home regimen:  7mg x 2 days then 8mg x 1 day then repeat (last dose of 8mg taken on 2/27 and 7mg on 2/28)  Admission INR: 4.07  Today's INR:  3.69  Other pertinent information:  dose held 3/1     Assessment/Plan:  Will give 6mg x 1 dose today and follow up with INR in the AM for further dosing recommendations.

## 2025-03-02 NOTE — PLAN OF CARE
Goal Outcome Evaluation:   Patient has been pleasant. Compliant with care. Patient remains on room air, saturations remaining above 90%. Patient to be discharged on this date.

## 2025-03-02 NOTE — CONSULTS
[facility]  CONSULT  NOTE      Patient information:  Date of Admit: 2/28/2025  Date of Consult: 03/02/25  Hospitalist/Referring MD:Mulu Casarez MD;   PCP: Mesha Christina MD  MRN:  [id number]  Visit Number:  [id number]    LOS: 1  CODE STATUS:  Code Status and Medical Interventions: CPR (Attempt to Resuscitate); Full Support   Ordered at: 03/01/25 1823     Level Of Support Discussed With:    Next of Kin (If No Surrogate)     Code Status (Patient has no pulse and is not breathing):    CPR (Attempt to Resuscitate)     Medical Interventions (Patient has pulse or is breathing):    Full Support       PROBLEM LIST: Principal Problem:    CHF exacerbation        Assessment       -Acute heart failure with preserved ejection fraction exacerbation  - Shortness of breath  -History of aortic valve replacement in 1999.   - CAD s/p PCI x 1  - Insulin-dependent diabetes mellitus  -Known moderate prosthetic mitral stenosis      Recommendations     -On personal review of echo.  EF is around 50%.  Mitral valve mean pressure gradient is 9 mmHg and max pressure gradient is 21 mmHg.  Consistent with moderate mitral stenosis.  Normal prosthetic aortic valve function.  -Continue Coumadin for anticoagulation.  Will recommend discharging patient on Lasix in addition to Aldactone for maintenance diuretics  -Should follow-up with regular cardiologist.   -Continue home aspirin and beta-blocker at this  -Cardiology will follow at distance.  Please reach out if any more questions      Reason for Cardiology consultation: CHF    Subjective Data   ADMISSION INFORMATION:  Chief Complaint   Patient presents with    Shortness of Breath     Shortness of Breath  Associated symptoms include leg swelling. Pertinent negatives include no chest pain, headaches or wheezing.       Benitez Miranda is a 69 y.o. male with a past medical history significant for coronary artery disease, hypertension,  hyperlipidemia, chronic diastolic congestive heart failure, type 2 diabetes mellitus, mechanical aortic valve replacement, ischemic stroke, COPD, chronic kidney disease stage III,  tobacco abuse and obesity.   Patient presented to [facility]) emergency room (ER) on 2/28/2025 with complaints of shortness of breath.  Patient reported in the emergency room he had progressive shortness of breath for the last week with swelling in his lower extremities.      Cardiology has been consulted for further evaluation and management.     Primary Cardiologist has been Dayan MOON/Dr Velazquez and he  was last seen in the office on 2/19/2024.     Patient is in room 314A and was examined by Dr. Woody.  Patient lying in bed resting quietly no signs of acute distress noted.  Patient denies any chest pain or shortness of breath.  Patient states that he is feeling much better and his shortness of breath is improved.      Cardiac risk factors:arteriosclerotic heart disease, diabetes mellitus, hypercholesterolemia, hypertension, smoking, and Obesity      Last Echo: Results for orders placed during the hospital encounter of 10/28/22    Adult Transthoracic Echo Complete W/ Cont if Necessary Per Protocol (With Agitated Saline)    Interpretation Summary    The left ventricular cavity is mildly dilated.    Left ventricular wall thickness is consistent with mild concentric hypertrophy.    Left ventricular systolic function is normal. Left ventricular ejection fraction appears to be 56 - 60%.    Left ventricular diastolic function is consistent with (grade I) impaired relaxation.    There is a mechanical aortic valve prosthesis present. The aortic valve peak and mean gradients are within defined limits. The prosthetic aortic valve is grossly normal.    Moderate calcific  mitral valve stenosis is present. ( Peak PG= 17 mm Hg and mean PG = 8 mm Hg)).  Unchanged since the study done 10/5/2021.    Moderate mitral valve  regurgitation is present.    Mild pulmonary hypertension is present.    Saline test is negative for the evidence of shunt at interatrial septum.    There is no evidence of pericardial effusion.         Last Stress: Results for orders placed in visit on 14    Stress test with myocardial perfusion    Narrative  Patient:      LAURA VELAZQUEZ  [facility] Rec#:     [id number]               :          1955  Date:         2014            Age:          58y  Account#:     [id number]            Height:       183.1 cm / 72.1 in  Accession#:   [id number]               Sex:          M  Weight:       120.45 kg / 265.5 lbs  BSA:          2.41  Admit Date#:  2014  Loc:          exam room 2    Referring:    Fabio Walker MD  Reading:      Yoshi Weldon MD  [facility]  Nuclear Med TeBARGO,RAPHAEL [facility]  Nuclear Med TeStamper ,[facility]  ______________________________________________________________________    Combined Nuclear/Stress Test    ICD Codes:      786.50 Chest pain, unspecified    Checklists:   Patient verbally identified self   Patient identified by ID band   Patient consent obtained in lab   Procedure verified and explained to patient   History and physical performed   Last Caffeine 2014 18:00:00   Last Meal 2014 18:00:00    History of:    Dyslipidemia Lipid Therapy Hypertension Renal  Failure Coronary Artery Disease (CAD) Family History of CAD PCN / IVP  DYE / SULFA Diabetes, managed with oral agents Diabetes for 11 years  METFORMIN,LEVEMIR Current smoker Typical Angina Dyspnea at rest  Paroxysmal nocturnal dyspnea Dyspnea with minimal exertion Dyspnea  with normal daily activity Dyspnea with more than normal activityNo  History of: Reactive Airway Disease Chronic Lung Disease Dialysis  CHF Peripheral Vascular Disease Cerebrovascular Disease Family  History of CHF  Cardiac Events and Interventions:  Most recent stent placed in . Stent in LAD. Most recent  valvular  surgery performed in 1997.    Most Recent Prior Cardiac Testing :   Pharmacologic Stress Test (date unknown)    Current Clinical Presentation:  The patient had no chest pain on presentation. The patient has no recent  history of CHF.    Medications I :    Amlodipine,Victoza,Levemir,Eplercrone,Cetirizine,Metformin,Meclizine,Zol  pidem,Alprazolam.   Coumadin (Warfarin)   Hydrochlorthiazide (HCTZ)   Metoprolol (Lopressor, Toprol)   Pravastatin (Pravachol)        Discharge At Completion of Exam :   Home    Baseline ECG:  NSR and IVCD  Pharmacologic Protocol:  A 400 mcg dose of Regadenoson was administered by intravenous bolus  injection.    Stress ECG :  Inconclusive due to baseline changes.  Recovery ECG:  3 minutes into recovery. No medications were administered during the  recovery period. No changes  Hemodynamics  Rest        Stress        Recovery  SBP         103 mmHg    145 mmHg      143 mmHg  DBP         83 mmHg     79 mmHg       84 mmHg  HR =       65 bpm      84 bpm        74 bpm  %MPHR                   52 %    Imaging Protocol:  One day stress-rest imaging protocol was followed using Tc-99m sestamibi  (Cardiolite) injected intravenously. For the rest portion of the study,  32 mCi of the radiopharmaceutical was administered at 01/30/2014  10:57:56. For the stress portion of the study, 10.7 mCi was administered  at 01/30/2014 09:00:10.    Perfusion Interpretation:  TID Ratio 1.01. There was a small, fixed defect in the apex segment(s).  The extent of this perfusion defect was mild.    Wall Motion Interpretation:  Gated imaging under rest conditions demonstrated normal wallmotion.  The patient's calculated rest LVEF was 59%. The patient's end diastolic  volume was 183ml. The patient's end systolic volume was 74ml.    Stress Test Conclusion:  There was a normal heart rate response, with a normal blood pressure  response.  The patient experienced no chest pain. Symptoms noted during  stress include:  dyspnea. Stress test is inconclusive by ECG criteria due  baseline changes.  Nuclear Cardiology Conclusion:  No evidence of Lexiscan induced ishemia. A small ssized fixed defect in  apical segment is an artifact. Normal LV systolic function and wall  motion    Electronically signed by: Yoshi Weldon MD on 01/30/2014 19:01:19  Thank you for the courtesy of this referral.        Last Cath:                           Past Medical History:   Diagnosis Date    Anxiety     Arthritis     CAD (coronary artery disease)     x1 stent; pulmonary HTN; EF 50-55%; rheumatic valve; MV stenosis    CHF (congestive heart failure)     CKD (chronic kidney disease), stage III     COPD (chronic obstructive pulmonary disease)     Depression     Diabetes mellitus     H/O mechanical aortic valve replacement     History of tobacco abuse     Hyperlipidemia     Hypertension     Ischemic heart disease     Kidney failure     third stage    Low back pain     Obesity     BMI 36.    Stroke     Valvular heart disease      Past Surgical History:   Procedure Laterality Date    AORTIC VALVE REPAIR/REPLACEMENT  1991    CARDIAC CATHETERIZATION      CARDIAC SURGERY  1991    valve on aortic    CARDIAC SURGERY  2001    stent     CATARACT EXTRACTION      COLONOSCOPY  2001    CORONARY STENT PLACEMENT  2005    CYSTOSCOPY URETEROSCOPY LASER LITHOTRIPSY Left 11/20/2020    Procedure: CYSTOSCOPY URETEROSCOPY LASER LITHOTRIPSY WITH STENT PLACEMENT;  Surgeon: Jonah Montgomery MD;  Location: [facility];  Service: Urology;  Laterality: Left;    KIDNEY STONE SURGERY       Family History   Problem Relation Age of Onset    Cancer Mother         breast    COPD Father     Heart attack Father 74    Diabetes Paternal Grandmother         both legs removed     Social History     Tobacco Use    Smoking status: Former     Current packs/day: 0.00     Average packs/day: 1 pack/day for 30.0 years (30.0 ttl pk-yrs)     Types: Pipe, Cigars, Cigarettes     Start date:  1986     Quit date: 2016     Years since quittin.6    Smokeless tobacco: Never    Tobacco comments:     currently smokes a pipe   Vaping Use    Vaping status: Never Used   Substance Use Topics    Alcohol use: No    Drug use: No       Medications listed below are reported home medications pulling from within the system:  Medications Prior to Admission   Medication Sig Dispense Refill Last Dose/Taking    amLODIPine (NORVASC) 10 MG tablet Take 1 tablet by mouth Daily. 90 tablet 1 2025    aspirin 81 MG EC tablet Take 1 tablet by mouth Daily. 30 tablet 0 2025 Morning    cetirizine (zyrTEC) 10 MG tablet Take 1 tablet by mouth Daily As Needed for Allergies.   2025    gabapentin (NEURONTIN) 600 MG tablet Take 1 tablet by mouth Daily As Needed (back pain).   Past Week    Insulin Glargine (BASAGLAR KWIKPEN) 100 UNIT/ML injection pen Inject 30 Units under the skin into the appropriate area as directed 2 (Two) Times a Day. 54 mL 1 2025    Insulin Lispro, 1 Unit Dial, (HUMALOG) 100 UNIT/ML solution pen-injector Inject 15 Units under the skin into the appropriate area as directed 3 (Three) Times a Day As Needed (high blood sugar).   2025    losartan (COZAAR) 25 MG tablet Take 1 tablet by mouth Daily. 90 tablet 1 2025    metoprolol succinate XL (TOPROL-XL) 50 MG 24 hr tablet Take 1 tablet by mouth Daily. 90 tablet 1 2025    multivitamin with minerals tablet tablet Take 1 tablet by mouth Daily.   2025    Semaglutide, 2 MG/DOSE, (OZEMPIC) 8 MG/3ML solution pen-injector Inject 2 mg under the skin into the appropriate area as directed 1 (One) Time Per Week.   Past Week    sertraline (ZOLOFT) 100 MG tablet Take 1.5 tablets by mouth Daily. 135 tablet 1 2025    spironolactone (ALDACTONE) 25 MG tablet Take 1 tablet by mouth once daily 90 tablet 0 2025    warfarin (COUMADIN) 1 MG tablet Take 1 tablet by mouth Take As Directed.   2025    warfarin (COUMADIN) 6 MG tablet Take  1 tablet by mouth Take As Directed.   2/28/2025    atorvastatin (LIPITOR) 80 MG tablet Take 1 tablet by mouth Every Night. 90 tablet 1 2/27/2025    warfarin (Coumadin) 2 MG tablet Take 1 tablet by mouth Every Night. (Patient taking differently: Take 1 tablet by mouth Take As Directed.) 90 tablet 1 2/27/2025     Allergies:  Penicillins, Ace inhibitors, and Contrast dye (echo or unknown ct/mr)    Review of Systems   Constitutional:  Negative for fatigue.   Respiratory:  Positive for shortness of breath. Negative for chest tightness and wheezing.    Cardiovascular:  Positive for leg swelling. Negative for chest pain and palpitations.   Neurological:  Negative for dizziness, syncope, light-headedness and headaches.       Objective Data      Vital Signs  Temp:  [97.1 °F (36.2 °C)-98.3 °F (36.8 °C)] 98.3 °F (36.8 °C)  Heart Rate:  [53-69] 62  Resp:  [18-20] 18  BP: (121-176)/(55-86) 121/67  Flow (L/min) (Oxygen Therapy):  [2] 2  Vital Signs (last 72 hrs)         02/27 0700  02/28 0659 02/28 0700  03/01 0659 03/01 0700  03/02 0659 03/02 0700  03/02 0947   Most Recent      Temp (°F)     98.3    97.1 -  98.3      98.3     98.3 (36.8) 03/02 0710    Heart Rate   59 -  78    53 -  69      62     62 03/02 0710    Resp     16    18 -  20      18     18 03/02 0710    BP   124/67 -  156/78    125/55 -  176/76      121/67     121/67 03/02 0710    SpO2 (%)   90 -  98    92 -  97      95     95 03/02 0710    Flow (L/min) (Oxygen Therapy)     2      2       2 03/02 0114    Oxygen Concentration (%)       30       30 03/01 2202          Body mass index is 34.54 kg/m².    Intake/Output Summary (Last 24 hours) at 3/2/2025 0947  Last data filed at 3/2/2025 0500  Gross per 24 hour   Intake --   Output 1775 ml   Net -1775 ml         Physical Exam  Vitals reviewed.   Constitutional:       General: He is awake. He is not in acute distress.     Appearance: Normal appearance. He is well-developed and well-groomed.   HENT:      Head:  Normocephalic.      Mouth/Throat:      Mouth: Mucous membranes are moist.   Eyes:      Pupils: Pupils are equal, round, and reactive to light.   Neck:      Vascular: No carotid bruit or JVD.   Cardiovascular:      Rate and Rhythm: Normal rate and regular rhythm.      Pulses: Normal pulses.      Heart sounds: Normal heart sounds. No murmur heard.     No S3 or S4 sounds.   Pulmonary:      Effort: Pulmonary effort is normal. No respiratory distress.      Breath sounds: Normal breath sounds. No wheezing, rhonchi or rales.   Abdominal:      General: Bowel sounds are normal.      Palpations: Abdomen is soft.      Tenderness: There is no abdominal tenderness.   Musculoskeletal:      Cervical back: Normal range of motion.      Right lower leg: Edema present.      Left lower leg: Edema present.      Comments: Trace edema present   Skin:     General: Skin is warm and dry.      Capillary Refill: Capillary refill takes less than 2 seconds.   Neurological:      Mental Status: He is alert and oriented to person, place, and time. Mental status is at baseline.   Psychiatric:         Mood and Affect: Mood normal.         Speech: Speech normal.         Behavior: Behavior is cooperative.         Results review   Results Review:    I have reviewed the patient's new clinical results.  Results from last 7 days   Lab Units 03/01/25  0006 02/28/25  2304   HSTROP T ng/L 31* 31*     Results from last 7 days   Lab Units 03/02/25  0134 03/01/25  0835 02/28/25  2304   WBC 10*3/mm3 9.42 11.68* 10.96*   HEMOGLOBIN g/dL 12.0* 12.4* 12.1*   PLATELETS 10*3/mm3 163 156 155     Results from last 7 days   Lab Units 03/02/25  0134 03/01/25  0835 02/28/25  2304   SODIUM mmol/L 135* 137 139   POTASSIUM mmol/L 3.7 4.2 4.4   CHLORIDE mmol/L 98 101 106   CO2 mmol/L 27.1 25.7 21.9*   BUN mg/dL 24* 21 23   CREATININE mg/dL 1.49* 1.25 1.32*   CALCIUM mg/dL 9.3 8.8 8.5*   GLUCOSE mg/dL 170* 203* 209*   ALT (SGPT) U/L  --   --  23   AST (SGOT) U/L  --   --  23  "    Lab Results   Component Value Date    INR 3.69 (H) 03/02/2025    INR 4.07 (H) 02/28/2025    INR 5.40 (A) 02/28/2025    INR 3.00 (A) 02/20/2025    INR 4.10 (A) 02/13/2025    INR 3.20 (A) 01/24/2025    INR 3.80 (A) 01/15/2025         Lab Results   Component Value Date    MG 1.8 03/02/2025    MG 1.8 03/01/2025    MG 2.1 11/08/2022     Lab Results   Component Value Date    TSH 3.690 09/13/2024    PSA 0.770 06/06/2024      No results found for: \"CHOL\", \"TRIG\", \"HDL\", \"LDL\"  Lab Results   Component Value Date    PROBNP 1,179.0 (H) 02/28/2025    PROBNP 705.0 04/10/2023     No results found.  No results found for: \"URICACID\"  Pain Management Panel  More data exists         Latest Ref Rng & Units 6/29/2020 10/25/2019   Pain Management Panel   Creatinine, Urine mg/dL 87.9  154.7       Details                 Microbiology Results (last 10 days)       Procedure Component Value - Date/Time    Blood Culture - Blood, Arm, Right [[id number]]  (Normal) Collected: 02/28/25 2304    Lab Status: Preliminary result Specimen: Blood from Arm, Right Updated: 03/01/25 2315     Blood Culture No growth at 24 hours    Blood Culture - Blood, Arm, Left [[id number]]  (Normal) Collected: 02/28/25 2304    Lab Status: Preliminary result Specimen: Blood from Arm, Left Updated: 03/01/25 2315     Blood Culture No growth at 24 hours    COVID-19 and FLU A/B PCR, 1 HR TAT - Swab, Nasopharynx [[id number]]  (Normal) Collected: 02/28/25 2304    Lab Status: Final result Specimen: Swab from Nasopharynx Updated: 02/28/25 2345     COVID19 Not Detected     Influenza A PCR Not Detected     Influenza B PCR Not Detected    Narrative:      Fact sheet for providers: https://www.fda.gov/media/714754/download    Fact sheet for patients: https://www.fda.gov/media/831626/download    Test performed by PCR.           Blood Culture   Date Value Ref Range Status   02/28/2025 No growth at 24 hours  Preliminary   02/28/2025 No growth at 24 hours  Preliminary "           EC2025 at 2214      ECG/EMG Results (last 24 hours)       Procedure Component Value Units Date/Time    Adult Transthoracic Echo Complete w/ Color, Spectral and Contrast if necessary per protocol [[id number]] Resulted: 25 1444     Updated: 25 1528    Telemetry Scan [[id number]] Resulted: 25     Updated: 25 1911    Telemetry Scan [[id number]] Resulted: 25     Updated: 25 191    Telemetry Scan [[id number]] Resulted: 25     Updated: 25 0010            TELEMETRY:         RADIOLOGY STUDIES:  Imaging Results (Last 72 Hours)       Procedure Component Value Units Date/Time    CT Chest Without Contrast Diagnostic [[id number]] Collected: 25 0426     Updated: 25 0436    Narrative:      CLINICAL HISTORY: Cough, dyspnea.     COMPARISON: 2023.     TECHNIQUE: Noncontrast CT of the chest was obtained.  Multiplanar  reformats were generated.  Limited exposure control, adjustment of the  mA and/or KV according to patient size or use of iterative  reconstruction technique was utilized.     FINDINGS:       Cardiac and mediastinal structures: Mild cardiomegaly. Coronary artery  calcifications are present.  Status post aortic valve replacement.   Normal pericardium. Pulmonary arteries are normal.  Ascending aortic  aneurysm measures 4.1 x 4.2 cm.  esophagus and mediastinal / hilar nodes  are normal.  Lungs and airways: Dependent bibasilar atelectasis.  Mild interstitial  thickening at the lung apices with mild groundglass opacities in the  dependent right lower and right upper lobes..  Pleura: Moderate right and small left pleural effusions.  Musculoskeletal and chest wall: no acute abnormality.  Lower neck and upper abdomen: Cholelithiasis       Impression:         PROBABLE CHF WITH MILD CARDIOMEGALY, PLEURAL EFFUSIONS (MODERATE RIGHT  AND SMALL LEFT), INTERSTITIAL AND MILD ALVEOLAR PULMONARY EDEMA.     This report was finalized on 3/1/2025 4:33 AM by  Keern Lemus MD.       XR Chest 1 View [[id number]] Collected: 03/01/25 0419     Updated: 03/01/25 0422    Narrative:      PROCEDURE: Portable chest x-ray examination performed on February 28, 2025. Single view.     HISTORY: Shortness of breath. Difficulty breathing.     COMPARISON: None.     FINDINGS:     Enlarged heart size with prior sternotomy.  Coarsened bronchovascular pattern to the lungs  Very small bands of scar versus atelectasis at the lung bases.  No pleural effusion or pneumothorax  No free air in the upper abdomen.       Impression:         Enlarged heart size.  Coarsened bronchovascular pattern to the lungs  Sternotomy.  No lobar consolidation or edema.   No pleural effusion or pneumothorax        This report was finalized on 3/1/2025 4:20 AM by Ortega Sheridan MD.               CURRENT MEDICATIONS:  Current list of medications may not reflect those currently placed in orders that are not signed or are being held.     amLODIPine, 10 mg, Oral, Daily  aspirin, 81 mg, Oral, Daily  atorvastatin, 80 mg, Oral, Nightly  furosemide, 40 mg, Intravenous, BID Diuretics  insulin glargine, 30 Units, Subcutaneous, BID  insulin lispro, 3-14 Units, Subcutaneous, 4x Daily AC & at Bedtime  ipratropium-albuterol, 3 mL, Nebulization, 4x Daily - RT  losartan, 25 mg, Oral, Daily  metoprolol succinate XL, 50 mg, Oral, Daily  multivitamin with minerals, 1 tablet, Oral, Daily  senna-docusate sodium, 2 tablet, Oral, BID  sertraline, 150 mg, Oral, Daily  sodium chloride, 10 mL, Intravenous, Q12H  spironolactone, 25 mg, Oral, Daily  warfarin, 6 mg, Oral, Once      Pharmacy to dose warfarin,         acetaminophen **OR** acetaminophen **OR** acetaminophen    senna-docusate sodium **AND** polyethylene glycol **AND** bisacodyl **AND** bisacodyl    Calcium Replacement - Follow Nurse / BPA Driven Protocol    cetirizine    dextrose    dextrose    dextrose    glucagon (human recombinant)    Magnesium Standard Dose Replacement - Follow  Nurse / BPA Driven Protocol    nitroglycerin    Pharmacy to dose warfarin    Phosphorus Replacement - Follow Nurse / BPA Driven Protocol    Potassium Replacement - Follow Nurse / BPA Driven Protocol    [COMPLETED] Insert Peripheral IV **AND** sodium chloride    sodium chloride    sodium chloride    traMADol              Thank you very much for asking us to be involved in this patient's care.  We will follow along with you.    I have discussed the patient's findings and my recommendations with patient.                  Electronically signed by RED Hester, 03/02/25, 1:16 PM EST.         Please note that portions of this note were copied and has been reviewed and is accurate as of 3/2/2025 .      Please note that portions of this note were completed with a voice recognition program.

## 2025-03-02 NOTE — PLAN OF CARE
Problem: Noninvasive Ventilation Acute  Goal: Effective Unassisted Ventilation and Oxygenation  Outcome: Progressing  Intervention: Monitor and Manage Noninvasive Ventilation  Recent Flowsheet Documentation  Taken 3/1/2025 2202 by Renetta Mistry, RRT  Airway/Ventilation Management: airway patency maintained  NPPV/CPAP Maintenance: (protect-a-gel in place.)   adjusted   proper fit/secure   skin-to-device protection utilized  Taken 3/1/2025 1911 by Renetta Mistry, GINNY  Airway/Ventilation Management:   airway patency maintained   oxygen therapy provided   positive pressure ventilation provided  NPPV/CPAP Maintenance: (protecta gel in place)   adjusted   full face mask   proper fit/secure   PAP initiated   skin-to-device protection utilized   Goal Outcome Evaluation:               Pt did 5 hrs on BiPAP

## 2025-03-02 NOTE — PLAN OF CARE
Goal Outcome Evaluation:Patient is being discharged home today.

## 2025-03-03 ENCOUNTER — TRANSITIONAL CARE MANAGEMENT TELEPHONE ENCOUNTER (OUTPATIENT)
Dept: CALL CENTER | Facility: HOSPITAL | Age: 70
End: 2025-03-03
Payer: MEDICARE

## 2025-03-03 ENCOUNTER — ANTICOAGULATION VISIT (OUTPATIENT)
Dept: FAMILY MEDICINE CLINIC | Facility: CLINIC | Age: 70
End: 2025-03-03
Payer: MEDICARE

## 2025-03-03 ENCOUNTER — TELEPHONE (OUTPATIENT)
Dept: TELEMETRY | Facility: HOSPITAL | Age: 70
End: 2025-03-03
Payer: MEDICARE

## 2025-03-03 DIAGNOSIS — Z79.01 ANTICOAGULATION GOAL OF INR 2.5 TO 3.5: Primary | ICD-10-CM

## 2025-03-03 DIAGNOSIS — Z51.81 ANTICOAGULATION GOAL OF INR 2.5 TO 3.5: Primary | ICD-10-CM

## 2025-03-03 LAB
INR PPP: 2.4 (ref 2–3)
QT INTERVAL: 478 MS
QTC INTERVAL: 519 MS

## 2025-03-03 PROCEDURE — 85610 PROTHROMBIN TIME: CPT | Performed by: FAMILY MEDICINE

## 2025-03-03 PROCEDURE — 36416 COLLJ CAPILLARY BLOOD SPEC: CPT | Performed by: FAMILY MEDICINE

## 2025-03-03 NOTE — OUTREACH NOTE
Call Center TCM Note      Flowsheet Row Responses   [facility] patient discharged from? [place]   Does the patient have one of the following disease processes/diagnoses(primary or secondary)? CHF   TCM attempt successful? Yes   Call start time 1258   Call end time 1302   Discharge diagnosis CHF exacerbation   Person spoke with today (if not patient) and relationship Son   Meds reviewed with patient/caregiver? Yes   Is the patient having any side effects they believe may be caused by any medication additions or changes? No   Does the patient have all medications ordered at discharge? Yes   Is the patient taking all medications as directed (includes completed medication regime)? Yes   Comments HOSP DC FU appt 3/11/25 130 pm.   Does the patient have an appointment with their PCP within 7-14 days of discharge? Yes   Has home health visited the patient within 72 hours of discharge? N/A   Pulse Ox monitoring Intermittent   O2 Sat: education provided Sat levels, Monitoring frequency, When to seek care   Psychosocial issues? No   Did the patient receive a copy of their discharge instructions? Yes   Nursing interventions Reviewed instructions with patient   What is the patient's perception of their health status since discharge? Improving   Nursing interventions Nurse provided patient education   Is the patient able to teach back signs and symptoms of worsening condition? (i.e. weight gain, shortness of air, etc.) Yes   Is the patient/caregiver able to teach back the hierarchy of who to call/visit for symptoms/problems? PCP, Specialist, Home health nurse, Urgent Care, ED, 911 Yes   TCM call completed? Yes   Wrap up additional comments Son reports Pt doing ok. Reviewed s/s of CHF and when to call Dr. Kwan needs at this time.   Call end time 1302             Gwen Abraham RN    3/3/2025, 13:02 EST

## 2025-03-03 NOTE — DISCHARGE SUMMARY
[facility] DISCHARGE SUMMARY      Date of Admission: 2/28/2025    Date of Discharge:  3/2/2025    PCP: Mesha Christina MD    Admission Diagnosis: CHF exacerbation    Discharge Diagnosis:   - Acute CHF exacerbation  - Shortness of breath  -History of aortic valve replacement in 1999.   - CAD s/p PCI x 1  - Insulin-dependent diabetes mellitus  - Peripheral neuropathy  - valve replacement on warfarin  -Supratherapeutic range of INR.   - History of CVA with right side residual weakness  - Essential hypertension      Procedures Performed:  Echocardiogram     Consults:   Consults       Date and Time Order Name Status Description    3/1/2025  9:20 AM Inpatient Cardiology Consult Completed               History of Present Illness:  Benitez Miranda is a 69 y.o. male who presented to [facility] ED with CC of   Chief Complaint   Patient presents with    Shortness of Breath   .   Benitez Miranda is a 69 y.o. male presents with shortness of breath. Onset of symptoms was a week ago.    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  Patient presented with progressive shortness of breath for the last week associated with swelling in the lower extremities and cough.  He reports no fever and no chest pain and no chills.  Patient reports no changes in his medications regiment.       Hospital Course  3/1/2025: Admitted patient to telemetry bed.  Strict intake and output, daily weight and fluid restriction diet.  Ordered echocardiogram and resume spironolactone.  Added Lasix 40 mg IV twice daily.   Continue bronchodilators.  Continue antiplatelet medications and other home meds.  3/2/2025: Discussed with cardiology and echocardiogram was unremarkable.  Patient felt much better clinically and Lasix was changed to p.o.  Patient was discharged home in stable condition and on Lasix  I also discussed with pharmacist to warfarin dose and the patient  was also discharged home on warfarin 6 mg daily.  He will need to follow-up with warfarin clinic.   Follow-up with a cardiologist as directed and also with primary care physician.        Pertinent Test Results: radiology: CXR: CHF, CT scan:  , and Ultrasound:  , nuclear medicine:  , and cardiac graphics: Telemetry:      Condition on Discharge:  Good    Vital Signs  Vitals:    03/02/25 1318   BP:    Pulse:    Resp: 18   Temp:    SpO2:        Physical Exam:  General:    Awake, alert, in no acute distress   Heart:      Normal S1 and S2. Regular rate and rhythm. No significant murmur, rubs or gallops appreciated.   Lungs:     Respirations regular, even and unlabored. Lungs clear to auscultation B/L. No wheezes, rales or rhonchi.   Abdomen:   Soft and nontender. No guarding, rebound tenderness or  organomegaly noted. Bowel sounds present x 4.   Extremities:  No clubbing, cyanosis or edema noted. Moves UE and LE equally B/L.     Discharge Disposition:   home      Discharge Medications:     Discharge Medications        New Medications        Instructions Start Date   furosemide 40 MG tablet  Commonly known as: Lasix   40 mg, Oral, 2 Times Daily             Changes to Medications        Instructions Start Date   warfarin 6 MG tablet  Commonly known as: COUMADIN  What changed:   when to take this  additional instructions  Another medication with the same name was removed. Continue taking this medication, and follow the directions you see here.   6 mg, Oral, Daily, Re-check INR within 3 days   Start Date: March 3, 2025            Continue These Medications        Instructions Start Date   amLODIPine 10 MG tablet  Commonly known as: NORVASC   10 mg, Oral, Daily      aspirin 81 MG EC tablet   81 mg, Oral, Daily      atorvastatin 80 MG tablet  Commonly known as: LIPITOR   80 mg, Oral, Nightly      BASAGLAR KWIKPEN 100 UNIT/ML injection pen   30 Units, Subcutaneous, 2 Times Daily      cetirizine 10 MG tablet  Commonly known as:  zyrTEC   10 mg, Daily PRN      gabapentin 600 MG tablet  Commonly known as: NEURONTIN   600 mg, Daily PRN      Insulin Lispro (1 Unit Dial) 100 UNIT/ML solution pen-injector  Commonly known as: HUMALOG   15 Units, 3 Times Daily PRN      losartan 25 MG tablet  Commonly known as: COZAAR   25 mg, Oral, Daily      metoprolol succinate XL 50 MG 24 hr tablet  Commonly known as: TOPROL-XL   50 mg, Oral, Daily      multivitamin with minerals tablet tablet   1 tablet, Daily      Semaglutide (2 MG/DOSE) 8 MG/3ML solution pen-injector  Commonly known as: OZEMPIC   2 mg, Weekly      sertraline 100 MG tablet  Commonly known as: ZOLOFT   150 mg, Oral, Daily      spironolactone 25 MG tablet  Commonly known as: ALDACTONE   25 mg, Oral, Daily                 Discharge Diet:   regular diet      Activity at Discharge:  activity as tolerated    Follow-up Appointments:  Additional Instructions for the Follow-ups that You Need to Schedule       Discharge Follow-up with PCP   As directed       Currently Documented PCP:    Mesha Christina MD    PCP Phone Number:    [phone]     Follow Up Details: 1 week        Discharge Follow-up with Specified Provider: Cardiology; 2 Weeks   As directed      To: Cardiology   Follow Up: 2 Weeks       Additional information on Labs and Follow-ups:    Please be sure to get INR rechecked in a few days per pharmacy.           Follow-up Information       Mesha Christina MD Follow up in 1 week(s).    Specialty: Family Medicine  Why: Will notify patient about follow-up appt. with Dr. Christina 03/03/25.  Contact information:  [address]  [place] [address]  [phone]               Zoya Woody MD Follow up in 2 week(s).    Specialties: Cardiology, Internal Medicine  Why: Will notify patient about follow-up appt. with Dr. Woody 03/03/25.  Contact information:  [address] [facility] [address]  [phone]                           Your Scheduled Appointments      Mar 18, 2025 10:30  AM  Office Visit with Mesha Christina MD  [facility] FAMILY [facility] [address] [place] [address] [phone]   Arrive 15 minutes prior to appointment.  If you are in need of a language or hearing  please call the Department.                  Test Results Pending at Discharge:  Pending Labs       Order Current Status    Blood Culture - Blood, Arm, Left Preliminary result    Blood Culture - Blood, Arm, Right Preliminary result             The ASCVD Risk score (Adore PANDYA, et al., 2019) failed to calculate for the following reasons:    Risk score cannot be calculated because patient has a medical history suggesting prior/existing ASCVD      Maksim Humphries DO  03/02/25  21:08 EST      Time: Greater than 30 minutes spent on this discharge.

## 2025-03-04 LAB
AV MEAN PRESS GRAD SYS DOP V1V2: 12 MMHG
AV VMAX SYS DOP: 241 CM/SEC
BH CV ECHO MEAS - ACS: 1.7 CM
BH CV ECHO MEAS - AO MAX PG: 23.2 MMHG
BH CV ECHO MEAS - AO ROOT DIAM: 2.9 CM
BH CV ECHO MEAS - AO V2 VTI: 49.6 CM
BH CV ECHO MEAS - AVA(I,D): 2.43 CM2
BH CV ECHO MEAS - EDV(CUBED): 125 ML
BH CV ECHO MEAS - EDV(MOD-SP2): 165 ML
BH CV ECHO MEAS - EDV(MOD-SP4): 173 ML
BH CV ECHO MEAS - EF(MOD-SP2): 50.4 %
BH CV ECHO MEAS - EF(MOD-SP4): 51.4 %
BH CV ECHO MEAS - ESV(CUBED): 46.7 ML
BH CV ECHO MEAS - ESV(MOD-SP2): 81.8 ML
BH CV ECHO MEAS - ESV(MOD-SP4): 84.1 ML
BH CV ECHO MEAS - FS: 28 %
BH CV ECHO MEAS - IVS/LVPW: 1 CM
BH CV ECHO MEAS - IVSD: 1 CM
BH CV ECHO MEAS - LA DIMENSION: 4.7 CM
BH CV ECHO MEAS - LAT PEAK E' VEL: 5.1 CM/SEC
BH CV ECHO MEAS - LV DIASTOLIC VOL/BSA (35-75): 72.6 CM2
BH CV ECHO MEAS - LV MASS(C)D: 182 GRAMS
BH CV ECHO MEAS - LV MAX PG: 13 MMHG
BH CV ECHO MEAS - LV MEAN PG: 7 MMHG
BH CV ECHO MEAS - LV SYSTOLIC VOL/BSA (12-30): 35.3 CM2
BH CV ECHO MEAS - LV V1 MAX: 180 CM/SEC
BH CV ECHO MEAS - LV V1 VTI: 38.4 CM
BH CV ECHO MEAS - LVIDD: 5 CM
BH CV ECHO MEAS - LVIDS: 3.6 CM
BH CV ECHO MEAS - LVOT AREA: 3.1 CM2
BH CV ECHO MEAS - LVOT DIAM: 2 CM
BH CV ECHO MEAS - LVPWD: 1 CM
BH CV ECHO MEAS - MED PEAK E' VEL: 5.7 CM/SEC
BH CV ECHO MEAS - MR MAX PG: 62.6 MMHG
BH CV ECHO MEAS - MR MAX VEL: 395.5 CM/SEC
BH CV ECHO MEAS - MV A MAX VEL: 163 CM/SEC
BH CV ECHO MEAS - MV DEC SLOPE: 587 CM/SEC2
BH CV ECHO MEAS - MV DEC TIME: 0.27 SEC
BH CV ECHO MEAS - MV E MAX VEL: 157 CM/SEC
BH CV ECHO MEAS - MV E/A: 0.96
BH CV ECHO MEAS - MV MAX PG: 20.8 MMHG
BH CV ECHO MEAS - MV MEAN PG: 9 MMHG
BH CV ECHO MEAS - MV V2 VTI: 67.6 CM
BH CV ECHO MEAS - MVA(VTI): 1.78 CM2
BH CV ECHO MEAS - PA ACC TIME: 0.09 SEC
BH CV ECHO MEAS - PA V2 MAX: 118 CM/SEC
BH CV ECHO MEAS - RAP SYSTOLE: 10 MMHG
BH CV ECHO MEAS - RVSP: 51 MMHG
BH CV ECHO MEAS - SV(LVOT): 120.6 ML
BH CV ECHO MEAS - SV(MOD-SP2): 83.2 ML
BH CV ECHO MEAS - SV(MOD-SP4): 88.9 ML
BH CV ECHO MEAS - SVI(LVOT): 50.7 ML/M2
BH CV ECHO MEAS - SVI(MOD-SP2): 34.9 ML/M2
BH CV ECHO MEAS - SVI(MOD-SP4): 37.3 ML/M2
BH CV ECHO MEAS - TAPSE (>1.6): 1.68 CM
BH CV ECHO MEAS - TR MAX PG: 41 MMHG
BH CV ECHO MEAS - TR MAX VEL: 320 CM/SEC
BH CV ECHO MEASUREMENTS AVERAGE E/E' RATIO: 29.07
LEFT ATRIUM VOLUME INDEX: 40.9 ML/M2
LV EF BIPLANE MOD: 49 %

## 2025-03-04 NOTE — NURSING NOTE
Transitional Care Note    Enrolled in [facility] Transitional Care Note    Enrolled in [facility] Transitional Care Services under theTCM Model to be followed for 6 weeks post discharge.  BTC will assist with support and education at time of transition home from hospital.  Hospital  will follow throughout the stay at [facility].  Home  will visit within 48 hours of discharge and follow with home vitals and telephone contact for 6 weeks.      Patient admitted to [facility] via Emergency Department, complaints of shortness of breath.   Admitted for further treatment of CHF.    Patient had been discharged contacted by phone.    Explained transition to home program and Patient is agreeable to home visits.  Home  will be [place]

## 2025-03-05 LAB
BACTERIA SPEC AEROBE CULT: NORMAL
BACTERIA SPEC AEROBE CULT: NORMAL

## 2025-03-07 ENCOUNTER — ANTICOAGULATION VISIT (OUTPATIENT)
Dept: FAMILY MEDICINE CLINIC | Facility: CLINIC | Age: 70
End: 2025-03-07
Payer: MEDICARE

## 2025-03-07 LAB — INR PPP: 3.1 (ref 3–3.5)

## 2025-03-11 ENCOUNTER — LAB (OUTPATIENT)
Dept: FAMILY MEDICINE CLINIC | Facility: CLINIC | Age: 70
End: 2025-03-11
Payer: MEDICARE

## 2025-03-11 ENCOUNTER — OFFICE VISIT (OUTPATIENT)
Dept: FAMILY MEDICINE CLINIC | Facility: CLINIC | Age: 70
End: 2025-03-11
Payer: MEDICARE

## 2025-03-11 VITALS
RESPIRATION RATE: 16 BRPM | DIASTOLIC BLOOD PRESSURE: 58 MMHG | HEIGHT: 72 IN | BODY MASS INDEX: 34.86 KG/M2 | SYSTOLIC BLOOD PRESSURE: 106 MMHG | OXYGEN SATURATION: 96 % | HEART RATE: 72 BPM | WEIGHT: 257.4 LBS | TEMPERATURE: 97.1 F

## 2025-03-11 DIAGNOSIS — E11.59 HYPERTENSION ASSOCIATED WITH DIABETES: ICD-10-CM

## 2025-03-11 DIAGNOSIS — E11.22 TYPE 2 DIABETES MELLITUS WITH STAGE 3 CHRONIC KIDNEY DISEASE, WITH LONG-TERM CURRENT USE OF INSULIN, UNSPECIFIED WHETHER STAGE 3A OR 3B CKD: ICD-10-CM

## 2025-03-11 DIAGNOSIS — I15.2 HYPERTENSION ASSOCIATED WITH DIABETES: ICD-10-CM

## 2025-03-11 DIAGNOSIS — Z95.2 H/O MECHANICAL AORTIC VALVE REPLACEMENT: ICD-10-CM

## 2025-03-11 DIAGNOSIS — N18.30 TYPE 2 DIABETES MELLITUS WITH STAGE 3 CHRONIC KIDNEY DISEASE, WITH LONG-TERM CURRENT USE OF INSULIN, UNSPECIFIED WHETHER STAGE 3A OR 3B CKD: ICD-10-CM

## 2025-03-11 DIAGNOSIS — N18.30 STAGE 3 CHRONIC KIDNEY DISEASE, UNSPECIFIED WHETHER STAGE 3A OR 3B CKD: ICD-10-CM

## 2025-03-11 DIAGNOSIS — Z79.4 TYPE 2 DIABETES MELLITUS WITH STAGE 3 CHRONIC KIDNEY DISEASE, WITH LONG-TERM CURRENT USE OF INSULIN, UNSPECIFIED WHETHER STAGE 3A OR 3B CKD: ICD-10-CM

## 2025-03-11 DIAGNOSIS — E78.5 HYPERLIPIDEMIA DUE TO TYPE 2 DIABETES MELLITUS: ICD-10-CM

## 2025-03-11 DIAGNOSIS — G62.9 NEUROPATHY: ICD-10-CM

## 2025-03-11 DIAGNOSIS — Z09 HOSPITAL DISCHARGE FOLLOW-UP: Primary | ICD-10-CM

## 2025-03-11 DIAGNOSIS — E11.69 HYPERLIPIDEMIA DUE TO TYPE 2 DIABETES MELLITUS: ICD-10-CM

## 2025-03-11 DIAGNOSIS — Z09 HOSPITAL DISCHARGE FOLLOW-UP: ICD-10-CM

## 2025-03-11 LAB — INR PPP: 3.3 (ref 3–3.5)

## 2025-03-11 PROCEDURE — 80053 COMPREHEN METABOLIC PANEL: CPT | Performed by: FAMILY MEDICINE

## 2025-03-11 PROCEDURE — 36415 COLL VENOUS BLD VENIPUNCTURE: CPT

## 2025-03-11 PROCEDURE — 82570 ASSAY OF URINE CREATININE: CPT | Performed by: FAMILY MEDICINE

## 2025-03-11 PROCEDURE — 82043 UR ALBUMIN QUANTITATIVE: CPT | Performed by: FAMILY MEDICINE

## 2025-03-11 RX ORDER — ATORVASTATIN CALCIUM 80 MG/1
80 TABLET, FILM COATED ORAL NIGHTLY
Qty: 90 TABLET | Refills: 1 | Status: SHIPPED | OUTPATIENT
Start: 2025-03-11

## 2025-03-11 RX ORDER — AMLODIPINE BESYLATE 10 MG/1
10 TABLET ORAL DAILY
Qty: 90 TABLET | Refills: 1 | Status: SHIPPED | OUTPATIENT
Start: 2025-03-11

## 2025-03-11 RX ORDER — BACLOFEN 20 MG/1
20 TABLET ORAL DAILY PRN
Qty: 30 TABLET | Refills: 2 | Status: SHIPPED | OUTPATIENT
Start: 2025-03-11 | End: 2025-03-14

## 2025-03-11 RX ORDER — METOPROLOL SUCCINATE 50 MG/1
50 TABLET, EXTENDED RELEASE ORAL DAILY
Qty: 90 TABLET | Refills: 1 | Status: SHIPPED | OUTPATIENT
Start: 2025-03-11

## 2025-03-11 RX ORDER — GABAPENTIN 600 MG/1
600 TABLET ORAL DAILY PRN
Qty: 30 TABLET | Refills: 0 | Status: SHIPPED | OUTPATIENT
Start: 2025-03-11

## 2025-03-12 LAB
ALBUMIN SERPL-MCNC: 4 G/DL (ref 3.5–5.2)
ALBUMIN UR-MCNC: 1.6 MG/DL
ALBUMIN/GLOB SERPL: 1 G/DL
ALP SERPL-CCNC: 79 U/L (ref 39–117)
ALT SERPL W P-5'-P-CCNC: 28 U/L (ref 1–41)
ANION GAP SERPL CALCULATED.3IONS-SCNC: 12 MMOL/L (ref 5–15)
AST SERPL-CCNC: 26 U/L (ref 1–40)
BILIRUB SERPL-MCNC: 0.5 MG/DL (ref 0–1.2)
BUN SERPL-MCNC: 39 MG/DL (ref 8–23)
BUN/CREAT SERPL: 19.7 (ref 7–25)
CALCIUM SPEC-SCNC: 9.4 MG/DL (ref 8.6–10.5)
CHLORIDE SERPL-SCNC: 100 MMOL/L (ref 98–107)
CO2 SERPL-SCNC: 26 MMOL/L (ref 22–29)
CREAT SERPL-MCNC: 1.98 MG/DL (ref 0.76–1.27)
CREAT UR-MCNC: 45.2 MG/DL
EGFRCR SERPLBLD CKD-EPI 2021: 35.4 ML/MIN/1.73
GLOBULIN UR ELPH-MCNC: 4.1 GM/DL
GLUCOSE SERPL-MCNC: 157 MG/DL (ref 65–99)
MICROALBUMIN/CREAT UR: 35.4 MG/G (ref 0–29)
POTASSIUM SERPL-SCNC: 4.6 MMOL/L (ref 3.5–5.2)
PROT SERPL-MCNC: 8.1 G/DL (ref 6–8.5)
SODIUM SERPL-SCNC: 138 MMOL/L (ref 136–145)

## 2025-03-13 ENCOUNTER — READMISSION MANAGEMENT (OUTPATIENT)
Dept: CALL CENTER | Facility: HOSPITAL | Age: 70
End: 2025-03-13
Payer: MEDICARE

## 2025-03-13 NOTE — OUTREACH NOTE
CHF Week 2 Survey      Flowsheet Row Responses   [facility] patient discharged from? [place]   Does the patient have one of the following disease processes/diagnoses(primary or secondary)? CHF   Week 2 attempt successful? Yes   Call start time 1448   Call end time 1452   Discharge diagnosis CHF exacerbation   Meds reviewed with patient/caregiver? Yes   Is the patient having any side effects they believe may be caused by any medication additions or changes? No   Does the patient have all medications ordered at discharge? Yes   Is the patient taking all medications as directed (includes completed medication regime)? Yes   Does the patient have a primary care provider?  Yes   Comments regarding PCP Patient has followed up with PCP.   Comments Patient will be seen in heart failure clinic 3/14.   Psychosocial issues? No   Did the patient receive a copy of their discharge instructions? Yes   Nursing interventions Reviewed instructions with patient   What is the patient's perception of their health status since discharge? Improving   Is the patient able to teach back signs and symptoms of worsening condition? (i.e. weight gain, shortness of air, etc.) Yes   Is the patient/caregiver able to teach back the hierarchy of who to call/visit for symptoms/problems? PCP, Specialist, Home health nurse, Urgent Care, ED, 911 Yes   CHF Zone this Call Green Zone   Green Zone Patient reports doing well, No new or worsening shortness of breath, No chest pain   Green Zone Interventions Meds as directed, Follow up visits planned   CHF Week 2 call completed? Yes   Is the patient interested in additional calls from an ambulatory ? No   Would this patient benefit from a Referral to Amb Social Work? No   Wrap up additional comments Patient reports doing okay. No questions at this time. Patient does mention new back pain. Patient states he will notify cardiologist at appt 3/14. Encouraged daily weight check. Patient verbalizes  understanding.   Call end time 1901            [place] - Registered Nurse

## 2025-03-14 ENCOUNTER — HOSPITAL ENCOUNTER (OUTPATIENT)
Dept: CARDIOLOGY | Facility: HOSPITAL | Age: 70
Discharge: HOME | End: 2025-03-14
Payer: MEDICARE

## 2025-03-14 VITALS
OXYGEN SATURATION: 97 % | BODY MASS INDEX: 34.98 KG/M2 | DIASTOLIC BLOOD PRESSURE: 77 MMHG | HEART RATE: 65 BPM | HEIGHT: 72 IN | WEIGHT: 258.3 LBS | SYSTOLIC BLOOD PRESSURE: 127 MMHG

## 2025-03-14 DIAGNOSIS — I10 ESSENTIAL HYPERTENSION: Chronic | ICD-10-CM

## 2025-03-14 DIAGNOSIS — Z95.2 H/O MECHANICAL AORTIC VALVE REPLACEMENT: Chronic | ICD-10-CM

## 2025-03-14 DIAGNOSIS — I50.32 CHRONIC HEART FAILURE WITH PRESERVED EJECTION FRACTION (HFPEF): Primary | ICD-10-CM

## 2025-03-14 DIAGNOSIS — I25.10 CORONARY ARTERY DISEASE INVOLVING NATIVE CORONARY ARTERY OF NATIVE HEART WITHOUT ANGINA PECTORIS: Chronic | ICD-10-CM

## 2025-03-14 LAB
ABSOLUTE LUNG FLUID CONTENT: 35 % (ref 20–35)
ANION GAP SERPL CALCULATED.3IONS-SCNC: 11.9 MMOL/L (ref 5–15)
BUN SERPL-MCNC: 42 MG/DL (ref 8–23)
BUN/CREAT SERPL: 21.1 (ref 7–25)
CALCIUM SPEC-SCNC: 9.1 MG/DL (ref 8.6–10.5)
CHLORIDE SERPL-SCNC: 103 MMOL/L (ref 98–107)
CO2 SERPL-SCNC: 29.1 MMOL/L (ref 22–29)
CREAT SERPL-MCNC: 1.99 MG/DL (ref 0.76–1.27)
EGFRCR SERPLBLD CKD-EPI 2021: 35.2 ML/MIN/1.73
GLUCOSE SERPL-MCNC: 124 MG/DL (ref 65–99)
MAGNESIUM SERPL-MCNC: 2 MG/DL (ref 1.6–2.4)
NT-PROBNP SERPL-MCNC: 993.7 PG/ML (ref 0–900)
POTASSIUM SERPL-SCNC: 4.3 MMOL/L (ref 3.5–5.2)
SODIUM SERPL-SCNC: 144 MMOL/L (ref 136–145)

## 2025-03-14 PROCEDURE — 80048 BASIC METABOLIC PNL TOTAL CA: CPT | Performed by: PHYSICIAN ASSISTANT

## 2025-03-14 PROCEDURE — 83735 ASSAY OF MAGNESIUM: CPT | Performed by: PHYSICIAN ASSISTANT

## 2025-03-14 PROCEDURE — 94726 PLETHYSMOGRAPHY LUNG VOLUMES: CPT | Performed by: PHYSICIAN ASSISTANT

## 2025-03-14 PROCEDURE — 83880 ASSAY OF NATRIURETIC PEPTIDE: CPT | Performed by: PHYSICIAN ASSISTANT

## 2025-03-14 PROCEDURE — 36415 COLL VENOUS BLD VENIPUNCTURE: CPT | Performed by: PHYSICIAN ASSISTANT

## 2025-03-14 RX ORDER — FUROSEMIDE 40 MG/1
40 TABLET ORAL DAILY
Qty: 60 TABLET | Refills: 5 | Status: SHIPPED | OUTPATIENT
Start: 2025-03-14

## 2025-03-14 RX ORDER — IBUPROFEN 200 MG
200 TABLET ORAL EVERY 8 HOURS PRN
COMMUNITY

## 2025-03-14 NOTE — PROGRESS NOTES
Heart Failure Clinic    Date: 03/14/25     Vitals:    03/14/25 0909   BP: 127/77   Pulse: 65   SpO2: 97%      Weight 258.3  Method of arrival: Ambulatory    Weighing self daily: No    Monitoring Heart Failure Zones: No    Today's HF Zone: Reviewed Zones    Taking medications as prescribed: Yes    Edema No    Shortness of Air: No    Number of pillows used at night:<2    Educational Materials given:  AVS, Heart Success patient book                                                                         ReDS Value: 35  25-35 Optimal Value Status      Niki Colorado RN 03/14/25 09:11 EDT

## 2025-03-14 NOTE — PROGRESS NOTES
[facility]  ASHLEY Dean Muhammad Soubhi, MD  [address]  [address]  [place] [address]    Thank you for asking me to see Benitez Miranda for congestive heart failure.    HPI:     This is a 69 y.o. male with known past medical history of :  Chronic HFrEF  TTE from 03/04/25 with EF 36-40% per report. Cardiology note from inpatient evaluation indicates personal read of around 50%.    Hx AV (mechanical) replacement  ASCVD s/p PCI  COPD  CKD III  Depression  DM type 2  CVA (right MCA territory)     Benitez Miranda presents for today for Heart Failure clinic evaluation.  The patient is typically seen by Mesha Christina MD.  Patient's primary cardiologist is Dr. Zoya Woody.     Last known EF 50%.   Last known hospitalization and/or ED visit: February 2025 hospitalization with HF exacerbation.             03/14/2025 visit data/details regarding:   Dyspnea: Denies dyspnea on exertion  Lower extremity swelling: Denies swelling of his extremities  Abdominal swelling: Denies  Home weight: Weight monitoring booklet provided during initial visit; Has scale.   Home BP: BP monitoring booklet provided during initial visit; Has BP cuff.   Home heart rate: HR monitoring booklet provided during initial visit  Daily activities of living: Performing on his own   Pillows/lying flat: 2 pillows in bed   HF zone: Green  Mr. Miranda is doing well from HF standpoint. He is chest pain free.  He is without shortness of breath and without swelling of his extremities.            Review of Systems - Review of Systems   Constitutional: Negative for decreased appetite, fever and malaise/fatigue.   Cardiovascular:  Negative for chest pain, dyspnea on exertion and leg swelling.   Respiratory:  Negative for shortness of breath.    Musculoskeletal:  Positive for arthritis.   Genitourinary:  Negative for bladder incontinence and dysuria.         All other systems were reviewed and were  negative.    Patient Active Problem List   Diagnosis    Type 2 diabetes mellitus with hyperglycemia, with long-term current use of insulin    COPD (chronic obstructive pulmonary disease)    Chronic diastolic congestive heart failure    Essential hypertension    Tobacco abuse    Coronary artery disease involving native coronary artery of native heart without angina pectoris    Low back pain    Hyperlipidemia LDL goal <70    H/O mechanical aortic valve replacement    Depression    CKD (chronic kidney disease), stage III    Rheumatic mitral stenosis    Morbidly obese    Recurrent major depressive disorder, in full remission    Ischemic stroke    CVA (cerebral vascular accident)    CHF exacerbation       family history includes COPD in his father; Cancer in his mother; Diabetes in his paternal grandmother; Heart attack (age of onset: 74) in his father.     reports that he quit smoking about 8 years ago. His smoking use included pipe, cigars, and cigarettes. He started smoking about 38 years ago. He has a 30 pack-year smoking history. He has never used smokeless tobacco. He reports that he does not drink alcohol and does not use drugs.    Allergies   Allergen Reactions    Penicillins Swelling     Tongue swelled    Ace Inhibitors Angioedema    Contrast Dye (Echo Or Unknown Ct/Mr) Rash         Current Outpatient Medications:     amLODIPine (NORVASC) 10 MG tablet, Take 1 tablet by mouth Daily., Disp: 90 tablet, Rfl: 1    aspirin 81 MG EC tablet, Take 1 tablet by mouth Daily., Disp: 30 tablet, Rfl: 0    atorvastatin (LIPITOR) 80 MG tablet, Take 1 tablet by mouth Every Night. (Patient taking differently: Take 0.5 tablets by mouth Every Night.), Disp: 90 tablet, Rfl: 1    cetirizine (zyrTEC) 10 MG tablet, Take 1 tablet by mouth Daily As Needed for Allergies., Disp: , Rfl:     furosemide (Lasix) 40 MG tablet, Take 1 tablet by mouth Daily., Disp: 60 tablet, Rfl: 5    gabapentin (NEURONTIN) 600 MG tablet, Take 1 tablet by mouth  Daily As Needed (back pain)., Disp: 30 tablet, Rfl: 0    ibuprofen (ADVIL,MOTRIN) 200 MG tablet, Take 1 tablet by mouth Every 8 (Eight) Hours As Needed for Mild Pain., Disp: , Rfl:     Insulin Glargine (BASAGLAR KWIKPEN) 100 UNIT/ML injection pen, Inject 30 Units under the skin into the appropriate area as directed 2 (Two) Times a Day., Disp: 54 mL, Rfl: 1    Insulin Lispro, 1 Unit Dial, (HUMALOG) 100 UNIT/ML solution pen-injector, Inject 15 Units under the skin into the appropriate area as directed 3 (Three) Times a Day As Needed (high blood sugar)., Disp: , Rfl:     losartan (COZAAR) 25 MG tablet, Take 1 tablet by mouth Daily., Disp: 90 tablet, Rfl: 1    metoprolol succinate XL (TOPROL-XL) 50 MG 24 hr tablet, Take 1 tablet by mouth Daily., Disp: 90 tablet, Rfl: 1    multivitamin with minerals tablet tablet, Take 1 tablet by mouth Daily., Disp: , Rfl:     Semaglutide, 2 MG/DOSE, (OZEMPIC) 8 MG/3ML solution pen-injector, Inject 2 mg under the skin into the appropriate area as directed 1 (One) Time Per Week., Disp: , Rfl:     sertraline (ZOLOFT) 100 MG tablet, Take 1.5 tablets by mouth Daily., Disp: 135 tablet, Rfl: 1    spironolactone (ALDACTONE) 25 MG tablet, Take 1 tablet by mouth once daily, Disp: 90 tablet, Rfl: 0    warfarin (COUMADIN) 6 MG tablet, Take 1 tablet by mouth Daily. Re-check INR within 3 days, Disp: , Rfl:     empagliflozin (JARDIANCE) 10 MG tablet tablet, Take 1 tablet by mouth Daily for 180 days., Disp: 90 tablet, Rfl: 1      Physical Exam:  I have reviewed the patient's current vital signs as documented in the patient's EMR.   Vitals:    03/14/25 0909   BP: 127/77   Pulse: 65   SpO2: 97%     Body mass index is 35.03 kg/m².       03/14/25  0909   Weight: 117 kg (258 lb 4.8 oz)      Physical Exam  Vitals and nursing note reviewed.   Constitutional:       Appearance: He is normal weight.   HENT:      Head: Normocephalic and atraumatic.      Nose: Nose normal. No congestion.      Mouth/Throat:       Mouth: Mucous membranes are moist.      Pharynx: Oropharynx is clear.   Eyes:      Extraocular Movements: Extraocular movements intact.      Pupils: Pupils are equal, round, and reactive to light.   Cardiovascular:      Rate and Rhythm: Normal rate and regular rhythm.      Heart sounds: Murmur heard.      Comments: Valve click appreciated.   Pulmonary:      Breath sounds: No stridor. No wheezing, rhonchi or rales.   Abdominal:      General: Bowel sounds are normal. There is no distension.   Musculoskeletal:         General: No swelling.   Skin:     General: Skin is warm and dry.   Neurological:      Mental Status: He is alert and oriented to person, place, and time. Mental status is at baseline.          JVP: Volume/Pulsation: Normal.        DATA REVIEWED:     ---------------------------------------------------  TTE/KIET:  Results for orders placed during the hospital encounter of 02/28/25    Adult Transthoracic Echo Complete w/ Color, Spectral and Contrast if necessary per protocol    Interpretation Summary    Left ventricular systolic function is moderately decreased. Left ventricular ejection fraction appears to be 36 - 40%.    Left ventricular diastolic function is consistent with (grade Ia w/high LAP) impaired relaxation.    Mildly reduced right ventricular systolic function noted.    The left atrial cavity is mild to moderately dilated.    The right atrial cavity is mild to moderately  dilated.    There is a mechanical aortic valve prosthesis present.    Moderate to severe mitral valve regurgitation is present.    Moderate mitral valve stenosis is present.    Estimated right ventricular systolic pressure from tricuspid regurgitation is moderately elevated (45-55 mmHg).        LAST HEART CATH RESULT/IF AVAILABLE:     No results found for this or any previous visit.      -----------------------------------------------------  CXR/Imaging:   Imaging Results (Most Recent)       None            I personally reviewed  and interpreted the CXR.      -----------------------------------------------------  CT:   CT Chest Without Contrast Diagnostic  Result Date: 3/1/2025   PROBABLE CHF WITH MILD CARDIOMEGALY, PLEURAL EFFUSIONS (MODERATE RIGHT AND SMALL LEFT), INTERSTITIAL AND MILD ALVEOLAR PULMONARY EDEMA.  This report was finalized on 3/1/2025 4:33 AM by Keren Lemus MD.      XR Chest 1 View  Result Date: 3/1/2025   Enlarged heart size. Coarsened bronchovascular pattern to the lungs Sternotomy. No lobar consolidation or edema. No pleural effusion or pneumothorax   This report was finalized on 3/1/2025 4:20 AM by Ortega Sheridan MD.      I personally reviewed the images of the CT scan.  My personal interpretation is below.      ----------------------------------------------------    --------------------------------------------------------------------------------------------------    Laboratory evaluations:    Lab Results   Component Value Date    GLUCOSE 157 (H) 03/11/2025    BUN 39 (H) 03/11/2025    CREATININE 1.98 (H) 03/11/2025    EGFRIFNONA 43 (L) 01/25/2022    EGFRIFAFRI 59 (L) 09/12/2016    BCR 19.7 03/11/2025    K 4.6 03/11/2025    CO2 26.0 03/11/2025    CALCIUM 9.4 03/11/2025    ALBUMIN 4.0 03/11/2025    AST 26 03/11/2025    ALT 28 03/11/2025     Lab Results   Component Value Date    WBC 9.42 03/02/2025    HGB 12.0 (L) 03/02/2025    HCT 36.7 (L) 03/02/2025    MCV 89.7 03/02/2025     03/02/2025     Lab Results   Component Value Date    CHOL 71 09/13/2024    CHLPL 167 07/18/2016    TRIG 102 09/13/2024    HDL 40 09/13/2024    LDL 12 09/13/2024     Lab Results   Component Value Date    TSH 3.690 09/13/2024     Lab Results   Component Value Date    HGBA1C 8.50 (H) 03/01/2025     Lab Results   Component Value Date    ALT 28 03/11/2025     Lab Results   Component Value Date    HGBA1C 8.50 (H) 03/01/2025    HGBA1C 8.00 (H) 12/17/2024    HGBA1C 8.70 (H) 09/13/2024     Lab Results   Component Value Date    MICROALBUR 1.6  "03/11/2025    CREATININE 1.98 (H) 03/11/2025     No results found for: \"IRON\", \"TIBC\", \"FERRITIN\"  Lab Results   Component Value Date    INR 3.30 (A) 03/11/2025    INR 3.10 (A) 03/07/2025    INR 2.40 (A) 03/03/2025    PROTIME 37.2 (H) 03/02/2025    PROTIME 40.2 (H) 02/28/2025    PROTIME 29.2 04/10/2023        Lab Results   Component Value Date    ABSOLUTELUNG 35 03/14/2025           1. Chronic heart failure with preserved ejection fraction (HFpEF)    2. H/O mechanical aortic valve replacement    3. Essential hypertension    4. Coronary artery disease involving native coronary artery of native heart without angina pectoris          ORDERS PLACED TODAY:  Orders Placed This Encounter   Procedures    ReDs Vest    Basic Metabolic Panel    Magnesium    proBNP    Basic Metabolic Panel    Magnesium    proBNP        Diagnoses and all orders for this visit:    1. Chronic heart failure with preserved ejection fraction (HFpEF) (Primary)  -     Basic Metabolic Panel; Future  -     Magnesium; Future  -     proBNP; Future  -     Basic Metabolic Panel; Standing  -     Basic Metabolic Panel  -     Magnesium; Standing  -     Magnesium  -     proBNP; Standing  -     proBNP  -     ReDs Vest    2. H/O mechanical aortic valve replacement  Overview:  Severe aortic valve stenosis.  Aortic valve replacement: [facility], 1991, Georgi Sánchez MD.  Currently anticoagulated with Coumadin.  INR followed by Dr. Canales's office.        3. Essential hypertension    4. Coronary artery disease involving native coronary artery of native heart without angina pectoris  Overview:  Cardiac catheterization by Dr. Carrasco (2004): NADINE to unknown coronary artery  Nuclear stress test (02/26/2015): Small to moderate region of inferior scar; no reversible ischemia detected; LVEF 52%.  Echocardiogram (02/13/2015): LVEF 50% to 55%. Mild LVH. Mechanical prosthetic aortic valve functioning well; mild to moderate MR.   Cardiac catheterization by Dr. Clark " Brian (06/08/2015): mild nonobstructive CAD with widely patent stent; normal functioning of mechanical aortic valve; pulmonary hypertension (PA 50/35 mmHg)  Admission to [facility] Room with chronic obstructive pulmonary disease exacerbation/diastolic heart failure, December 2015      Other orders  -     furosemide (Lasix) 40 MG tablet; Take 1 tablet by mouth Daily.  Dispense: 60 tablet; Refill: 5  -     empagliflozin (JARDIANCE) 10 MG tablet tablet; Take 1 tablet by mouth Daily for 180 days.  Dispense: 90 tablet; Refill: 1             MEDS ORDERED TODAY:    New Medications Ordered This Visit   Medications    furosemide (Lasix) 40 MG tablet     Sig: Take 1 tablet by mouth Daily.     Dispense:  60 tablet     Refill:  5    empagliflozin (JARDIANCE) 10 MG tablet tablet     Sig: Take 1 tablet by mouth Daily for 180 days.     Dispense:  90 tablet     Refill:  1        ---------------------------------------------------------------------------------------------------------------------------          ASSESSMENT/PLAN:      Diagnosis Plan   1. Chronic heart failure with preserved ejection fraction (HFpEF)  Basic Metabolic Panel    Magnesium    proBNP    Basic Metabolic Panel    Basic Metabolic Panel    Magnesium    Magnesium    proBNP    proBNP    ReDs Vest      2. H/O mechanical aortic valve replacement        3. Essential hypertension        4. Coronary artery disease involving native coronary artery of native heart without angina pectoris              CHF GOAL DIRECTED MEDICAL THERAPY FOR PATIENT ADDRESSED/ADJUSTED:     GDMT: HFpEF per Dr. Woody's inpatient evaluation    Drug Class   Drug   Dose Last Dose Adjustment Notes   ACEi/ARB/ARNI Losartan 25mg qd     Beta Blocker Toprol XL  50mg qd     MRA SPironolactone 25mg qd     SGLT2i Start Jardiance  10mg  N/A   Secondaries if applicable:          -CHF Specific BB:    Metoprolol Succinate  at dose of 50mg qd.   We discussed processes/benefits of HF  clinic including nursing, pharmacist, and provider evaluation during each visit with ability for in office ReDS vest, labs, and ability to provide IV diuresis in the clinic with close outpatient monitoring.  Additionally, patient was educated about the availability of delivery of medications to patient's clinic room prior to leaving the building which assists with medication compliance and insures medications are in hands when changes are made (if patient opts for apothecary usage) with thorough guidance regarding changes and medication schedule provided.          -ACE/ARB/ARNi:   Continue Losartan 25mg qd.     -MRA:   Continue Spironolactone with patient tolerating.  Has been on this for quite sometime now.     -SGLT2 inhibitor therapy:   The patient was advised to hold SGLT2I when PO intake is restricted due to a planned surgery, or due to an underlying illness.    Recommend starting  Jardiance (empagliflozin) 10mg with quarterly assessment of GFR.   Pt was advised SEs, some severe, including hypersensitivity and Stevie's; coupled with discussion regarding common side effects of UTIs and female genital mycotic infections were discussed. If you will be NPO, or are sick (poor PO intake, N&V) please hold the medication until you are back to a normal diet.     -Diuretic regimen:   ReDS Vest reading for. 03/14/25 is  35; ReDs Vest reading reviewed with patient.    Decrease lasix 40mg BID to 40mg qd given rise in creatinine on recent labs and patient appearing euvolemic.    BMP, Mag, & ProBNP reviewed with patient on 03/14/2025.  Given rise in creatinine, we have reduced Lasix.  Patient to RTC with labs in 4 weeks.      -Fluid restriction/Sodium restriction:   Requested 2000 ml restriction  Patient has been asked to weigh daily and was provided with a printed diuretic strategy.  1,500 mg Na restriction was discussed.    -Devices if applicable:       -Acute and/or Chronic underlying conditions other than HF addressed  during visit:   Essential HTN:   Continue Losartan 25 mg qd.     Hx Mechanical AV replacement:   Continue chronic anticoagulation with Coumadin.     ASCVD:   Continue ASA & statin.     Identifiable barriers to Heart Failure Self-care:   Medical Barriers:  No immediate known barriers  Social Barriers:  No immediate known barriers    Patient's (Body mass index is 35.03 kg/m².) indicates that they are morbidly obese (BMI > 40 or > 35 with obesity - related health condition) with health related conditions that include hypertension and coronary heart disease . Weight is improving with lifestyle modifications. BMI is is above average; BMI management plan is completed. We discussed portion control, increasing exercise, and Heart failure dietary measures .             This document has been electronically signed by Keren Pedraza PA-C  March 14, 2025 09:48 EDT      Dictated Utilizing Dragon Dictation: Part of this note may be an electronic transcription/translation of spoken language to printed text using the Dragon Dictation System.    Follow-up appointment and medication changes provided in hand delivered After Visit Summary as well as reviewed in the room.

## 2025-03-14 NOTE — PROGRESS NOTES
Heart Failure Clinic  Pharmacist Note     Benitez Miranda is a 69 y.o. male seen in the Heart Failure Clinic for HFpEF? With most recent EF first read at 36-40%, but another cardiologist read it at 50% on 3/1/25. Patient was recently hospitalized from 2/28-3/5/24 for acute CHF. Upon discharge, lasix 40mg bid was added.      Benitez Miranda reports a good understanding of medications and reports adherence. He reports that he has been taking the Lasix 40mg bid in the AM and PM and denies any swelling or SOB with it on board. He has not been checking his BP's at home, but has always been told that they are good and has not had any recent changes to his BP medications.     He reports no issues with paying for his medications at this time.     Medication Use:   Hx of med intolerances:  None related to HF/CV   Retail Rx Management: [employer]    Past Medical History:   Diagnosis Date    Anxiety     Arthritis     CAD (coronary artery disease)     x1 stent; pulmonary HTN; EF 50-55%; rheumatic valve; MV stenosis    CHF (congestive heart failure)     CKD (chronic kidney disease), stage III     COPD (chronic obstructive pulmonary disease)     Depression     Diabetes mellitus     H/O mechanical aortic valve replacement     History of tobacco abuse     Hyperlipidemia     Hypertension     Ischemic heart disease     Kidney failure     third stage    Low back pain     Obesity     BMI 36.    Stroke     Valvular heart disease      ALLERGIES: Penicillins, Ace inhibitors, and Contrast dye (echo or unknown ct/mr)  Current Outpatient Medications   Medication Sig Dispense Refill    amLODIPine (NORVASC) 10 MG tablet Take 1 tablet by mouth Daily. 90 tablet 1    aspirin 81 MG EC tablet Take 1 tablet by mouth Daily. 30 tablet 0    atorvastatin (LIPITOR) 80 MG tablet Take 1 tablet by mouth Every Night. (Patient taking differently: Take 0.5 tablets by mouth Every Night.) 90 tablet 1    cetirizine (zyrTEC) 10 MG tablet Take 1 tablet by mouth  "Daily As Needed for Allergies.      furosemide (Lasix) 40 MG tablet Take 1 tablet by mouth Daily. 60 tablet 5    gabapentin (NEURONTIN) 600 MG tablet Take 1 tablet by mouth Daily As Needed (back pain). 30 tablet 0    ibuprofen (ADVIL,MOTRIN) 200 MG tablet Take 1 tablet by mouth Every 8 (Eight) Hours As Needed for Mild Pain.      Insulin Glargine (BASAGLAR KWIKPEN) 100 UNIT/ML injection pen Inject 30 Units under the skin into the appropriate area as directed 2 (Two) Times a Day. 54 mL 1    Insulin Lispro, 1 Unit Dial, (HUMALOG) 100 UNIT/ML solution pen-injector Inject 15 Units under the skin into the appropriate area as directed 3 (Three) Times a Day As Needed (high blood sugar).      losartan (COZAAR) 25 MG tablet Take 1 tablet by mouth Daily. 90 tablet 1    metoprolol succinate XL (TOPROL-XL) 50 MG 24 hr tablet Take 1 tablet by mouth Daily. 90 tablet 1    multivitamin with minerals tablet tablet Take 1 tablet by mouth Daily.      Semaglutide, 2 MG/DOSE, (OZEMPIC) 8 MG/3ML solution pen-injector Inject 2 mg under the skin into the appropriate area as directed 1 (One) Time Per Week.      sertraline (ZOLOFT) 100 MG tablet Take 1.5 tablets by mouth Daily. 135 tablet 1    spironolactone (ALDACTONE) 25 MG tablet Take 1 tablet by mouth once daily 90 tablet 0    warfarin (COUMADIN) 6 MG tablet Take 1 tablet by mouth Daily. Re-check INR within 3 days      empagliflozin (JARDIANCE) 10 MG tablet tablet Take 1 tablet by mouth Daily for 180 days. 90 tablet 1     No current facility-administered medications for this encounter.       Vaccination History:   Pneumonia: UTD  Annual Influenza: UTD 24/25     Objective  Vitals:    03/14/25 0909   BP: 127/77   BP Location: Left arm   Patient Position: Sitting   Cuff Size: Adult   Pulse: 65   SpO2: 97%   Weight: 117 kg (258 lb 4.8 oz)   Height: 182.9 cm (72\")     Wt Readings from Last 3 Encounters:   03/14/25 117 kg (258 lb 4.8 oz)   03/11/25 117 kg (257 lb 6.4 oz)   03/02/25 116 kg (254 " lb 11.2 oz)         03/14/25  0909   Weight: 117 kg (258 lb 4.8 oz)     Lab Results   Component Value Date    GLUCOSE 124 (H) 03/14/2025    BUN 42 (H) 03/14/2025    CREATININE 1.99 (H) 03/14/2025    EGFRIFNONA 43 (L) 01/25/2022    EGFRIFAFRI 59 (L) 09/12/2016    BCR 21.1 03/14/2025    K 4.3 03/14/2025    CO2 29.1 (H) 03/14/2025    CALCIUM 9.1 03/14/2025    ALBUMIN 4.0 03/11/2025    AST 26 03/11/2025    ALT 28 03/11/2025     Lab Results   Component Value Date    WBC 9.42 03/02/2025    HGB 12.0 (L) 03/02/2025    HCT 36.7 (L) 03/02/2025    MCV 89.7 03/02/2025     03/02/2025     Lab Results   Component Value Date    TROPONINT 31 (H) 03/01/2025     Lab Results   Component Value Date    PROBNP 993.7 (H) 03/14/2025     Results for orders placed during the hospital encounter of 02/28/25    Adult Transthoracic Echo Complete w/ Color, Spectral and Contrast if necessary per protocol    Interpretation Summary    Left ventricular systolic function is moderately decreased. Left ventricular ejection fraction appears to be 36 - 40%.    Left ventricular diastolic function is consistent with (grade Ia w/high LAP) impaired relaxation.    Mildly reduced right ventricular systolic function noted.    The left atrial cavity is mild to moderately dilated.    The right atrial cavity is mild to moderately  dilated.    There is a mechanical aortic valve prosthesis present.    Moderate to severe mitral valve regurgitation is present.    Moderate mitral valve stenosis is present.    Estimated right ventricular systolic pressure from tricuspid regurgitation is moderately elevated (45-55 mmHg).         GDMT     Drug Class   Drug   Dose Last Dose Adjustment Additional Titration   Notes   ACEi/ARB/ARNI Losartan 25mg >3m     Beta Blocker Toprol 50mg >3m     MRA Spironolactone 25mg >3m     SGLT2i Jardiance 10mg 3/14/25  $12.15 for each           Other BP meds Amlodipine 10mg      Lasix 40mg daily (decreased from bid 3/14/25)      Drug Therapy  Problems    1. Drug Interactions Screening- Ozempic + Humalog + Basaglar- increased risk of hypoglycemia  2. Drug-Disease Interactions: NSAIDS  3. GDMT  4. No swelling or SOB with recent Lasix bid addition and kidney function decline- Scr 1.99 and eGFR 35.2, which is decreased from his usual which is more 1.49 and 49.9 respectively.       Recommendations:     Denies any hypoglycemia   2. Patient reports using IBU daily for his back pain and arthritis. I counseled the patient that IBU is not recommended in HF patients and interacts with his water pill. Patient will discuss with his PCP.   3. Recommend adding on a SGLT2i, both have a $12.15 copay and patient reports that it is affordable. Keren to start Jardiance 10mg.  4. Keren to decrease lasix to once daily dosing with no edema and bump in labs since discharge.       Patient was educated on heart failure medications and the importance of medication adherence. All questions were addressed and patient expressed understanding.   Thank you for allowing me to participate in the care of your patient,    Katrina Martines, PharmD  03/14/25  10:38 EDT

## 2025-03-24 ENCOUNTER — READMISSION MANAGEMENT (OUTPATIENT)
Dept: CALL CENTER | Facility: HOSPITAL | Age: 70
End: 2025-03-24
Payer: MEDICARE

## 2025-03-25 ENCOUNTER — OFFICE VISIT (OUTPATIENT)
Dept: CARDIOLOGY | Facility: CLINIC | Age: 70
End: 2025-03-25
Payer: MEDICARE

## 2025-03-25 VITALS
BODY MASS INDEX: 34.43 KG/M2 | HEART RATE: 50 BPM | OXYGEN SATURATION: 93 % | SYSTOLIC BLOOD PRESSURE: 114 MMHG | HEIGHT: 72 IN | WEIGHT: 254.2 LBS | DIASTOLIC BLOOD PRESSURE: 69 MMHG

## 2025-03-25 DIAGNOSIS — I05.0 RHEUMATIC MITRAL STENOSIS: ICD-10-CM

## 2025-03-25 DIAGNOSIS — E78.5 HYPERLIPIDEMIA LDL GOAL <70: ICD-10-CM

## 2025-03-25 DIAGNOSIS — E11.65 TYPE 2 DIABETES MELLITUS WITH HYPERGLYCEMIA, WITH LONG-TERM CURRENT USE OF INSULIN: ICD-10-CM

## 2025-03-25 DIAGNOSIS — I48.20 ATRIAL FIBRILLATION, CHRONIC: ICD-10-CM

## 2025-03-25 DIAGNOSIS — I10 ESSENTIAL HYPERTENSION: ICD-10-CM

## 2025-03-25 DIAGNOSIS — Z95.2 H/O MECHANICAL AORTIC VALVE REPLACEMENT: ICD-10-CM

## 2025-03-25 DIAGNOSIS — Z79.4 TYPE 2 DIABETES MELLITUS WITH HYPERGLYCEMIA, WITH LONG-TERM CURRENT USE OF INSULIN: ICD-10-CM

## 2025-03-25 DIAGNOSIS — I50.22 CHRONIC HFREF (HEART FAILURE WITH REDUCED EJECTION FRACTION): Primary | ICD-10-CM

## 2025-03-25 DIAGNOSIS — Z86.73 HISTORY OF CVA (CEREBROVASCULAR ACCIDENT): ICD-10-CM

## 2025-03-25 NOTE — OUTREACH NOTE
CHF Week 3 Survey      Flowsheet Row Responses   [facility] facility patient discharged from? [facility]   Does the patient have one of the following disease processes/diagnoses(primary or secondary)? CHF   Week 3 attempt successful? No   Unsuccessful attempts Attempt 1   Revoke Other  [ACM program]            Ginger BAPTISTE - Registered Nurse

## 2025-03-26 NOTE — PROGRESS NOTES
Carri, Mesha WATKINS MD  No ref. provider found    Benitez Miranda  1955  03/25/2025    Subjective     Benitez Miranda is a 69 y.o. male who presents today to [facility] CARDIOLOGY for Follow-up, Hospital Follow Up Visit (/), and Med Management (MEDICATION LIST PROVIDED. /).  History of Present Illness  69-year-old male with tight mitral valve.    History of rheumatic heart disease causing valve damage. Underwent aortic valve replacement. Previously under Dr. Dayan Philippe and Dr. Velazquez, discontinued 2 years ago. Monitored for tight mitral valve. 2015 heart catheterization by Dr. Torres showed no significant coronary blockages. No changes in diet or symptoms. No new chest pain, dyspnea, leg edema, palpitations, dizziness, lightheadedness, or syncope. Medication refills from Dr. Christina. Under care of Keren at heart failure clinic. On losartan 25 mg, metoprolol 50 mg, spironolactone 25 mg, Jardiance 10 mg, aspirin, amlodipine, and Coumadin.    Hospitalized for fluid retention. Initially on spironolactone, added Lasix due to insufficient control. Lasix dosage reduced from BID to QD.    INR monitored by Dr. Christina, target 3-3.5. No bleeding episodes.        Allergies   Allergen Reactions    Penicillins Swelling     Tongue swelled    Ace Inhibitors Angioedema    Contrast Dye (Echo Or Unknown Ct/Mr) Rash   :    Current Outpatient Medications:     amLODIPine (NORVASC) 10 MG tablet, Take 1 tablet by mouth Daily., Disp: 90 tablet, Rfl: 1    aspirin 81 MG EC tablet, Take 1 tablet by mouth Daily., Disp: 30 tablet, Rfl: 0    atorvastatin (LIPITOR) 80 MG tablet, Take 1 tablet by mouth Every Night. (Patient taking differently: Take 0.5 tablets by mouth Every Night.), Disp: 90 tablet, Rfl: 1    cetirizine (zyrTEC) 10 MG tablet, Take 1 tablet by mouth Daily As Needed for Allergies., Disp: , Rfl:     empagliflozin (JARDIANCE) 10 MG tablet tablet, Take 1 tablet by mouth Daily for 180 days., Disp: 90  tablet, Rfl: 1    furosemide (Lasix) 40 MG tablet, Take 1 tablet by mouth Daily., Disp: 60 tablet, Rfl: 5    gabapentin (NEURONTIN) 600 MG tablet, Take 1 tablet by mouth Daily As Needed (back pain)., Disp: 30 tablet, Rfl: 0    ibuprofen (ADVIL,MOTRIN) 200 MG tablet, Take 1 tablet by mouth Every 8 (Eight) Hours As Needed for Mild Pain., Disp: , Rfl:     Insulin Glargine (BASAGLAR KWIKPEN) 100 UNIT/ML injection pen, Inject 30 Units under the skin into the appropriate area as directed 2 (Two) Times a Day., Disp: 54 mL, Rfl: 1    Insulin Lispro, 1 Unit Dial, (HUMALOG) 100 UNIT/ML solution pen-injector, Inject 15 Units under the skin into the appropriate area as directed 3 (Three) Times a Day As Needed (high blood sugar)., Disp: , Rfl:     losartan (COZAAR) 25 MG tablet, Take 1 tablet by mouth Daily., Disp: 90 tablet, Rfl: 1    metoprolol succinate XL (TOPROL-XL) 50 MG 24 hr tablet, Take 1 tablet by mouth Daily., Disp: 90 tablet, Rfl: 1    multivitamin with minerals tablet tablet, Take 1 tablet by mouth Daily., Disp: , Rfl:     Semaglutide, 2 MG/DOSE, (OZEMPIC) 8 MG/3ML solution pen-injector, Inject 2 mg under the skin into the appropriate area as directed 1 (One) Time Per Week., Disp: , Rfl:     sertraline (ZOLOFT) 100 MG tablet, Take 1.5 tablets by mouth Daily., Disp: 135 tablet, Rfl: 1    spironolactone (ALDACTONE) 25 MG tablet, Take 1 tablet by mouth once daily, Disp: 90 tablet, Rfl: 0    warfarin (COUMADIN) 6 MG tablet, Take 1 tablet by mouth Daily. Re-check INR within 3 days, Disp: , Rfl:     Past Medical History:   Diagnosis Date    Anxiety     Arthritis     CAD (coronary artery disease)     x1 stent; pulmonary HTN; EF 50-55%; rheumatic valve; MV stenosis    CHF (congestive heart failure)     CKD (chronic kidney disease), stage III     COPD (chronic obstructive pulmonary disease)     Depression     Diabetes mellitus     H/O mechanical aortic valve replacement     History of tobacco abuse     Hyperlipidemia      "Hypertension     Ischemic heart disease     Kidney failure     third stage    Low back pain     Obesity     BMI 36.    Stroke     Valvular heart disease      Past Surgical History:   Procedure Laterality Date    AORTIC VALVE REPAIR/REPLACEMENT      CARDIAC CATHETERIZATION      CARDIAC SURGERY      valve on aortic    CARDIAC SURGERY      stent     CATARACT EXTRACTION      COLONOSCOPY      CORONARY STENT PLACEMENT      CYSTOSCOPY URETEROSCOPY LASER LITHOTRIPSY Left 2020    Procedure: CYSTOSCOPY URETEROSCOPY LASER LITHOTRIPSY WITH STENT PLACEMENT;  Surgeon: Jonah Montgomery MD;  Location: [facility];  Service: Urology;  Laterality: Left;    KIDNEY STONE SURGERY       Family History   Problem Relation Age of Onset    Cancer Mother         breast    COPD Father     Heart attack Father 74    Diabetes Paternal Grandmother         both legs removed     Social History     Tobacco Use    Smoking status: Former     Current packs/day: 0.00     Average packs/day: 1 pack/day for 30.0 years (30.0 ttl pk-yrs)     Types: Pipe, Cigars, Cigarettes     Start date: 1986     Quit date: 2016     Years since quittin.7    Smokeless tobacco: Never    Tobacco comments:     currently smokes a pipe   Vaping Use    Vaping status: Never Used   Substance Use Topics    Alcohol use: No    Drug use: No       Objective   Blood pressure 114/69, pulse 50, height 182.9 cm (72.01\"), weight 115 kg (254 lb 3.2 oz), SpO2 93%.  Body mass index is 34.47 kg/m².    Constitutional:       Appearance: Not in distress.   Pulmonary:      Effort: Pulmonary effort is normal.      Breath sounds: Normal breath sounds.   Cardiovascular:      Normal rate. Regular rhythm. Normal S1. Normal S2.       Midsystolic click.   Edema:     Peripheral edema absent.   Skin:     General: Skin is warm.   Neurological:      General: No focal deficit present.           DATA:  Results for orders placed during the hospital encounter of " 20    SCANNED - TELEMETRY     Results for orders placed during the hospital encounter of 25    Adult Transthoracic Echo Complete w/ Color, Spectral and Contrast if necessary per protocol    Interpretation Summary    Left ventricular systolic function is moderately decreased. Left ventricular ejection fraction appears to be 36 - 40%.    Left ventricular diastolic function is consistent with (grade Ia w/high LAP) impaired relaxation.    Mildly reduced right ventricular systolic function noted.    The left atrial cavity is mild to moderately dilated.    The right atrial cavity is mild to moderately  dilated.    There is a mechanical aortic valve prosthesis present.    Moderate to severe mitral valve regurgitation is present.    Moderate mitral valve stenosis is present.    Estimated right ventricular systolic pressure from tricuspid regurgitation is moderately elevated (45-55 mmHg).   Results for orders placed in visit on 14    Stress test with myocardial perfusion    Narrative  Patient:      LAURA VELAZQUEZ  [facility] Rec#:     [id number]               :          1955  Date:         2014            Age:          58y  Account#:     [id number]            Height:       183.1 cm / 72.1 in  Accession#:   [id number]               Sex:          M  Weight:       120.45 kg / 265.5 lbs  BSA:          2.41  Admit Date#:  2014  Loc:          exam room 2    Referring:    Fabio Walker MD  Reading:      Yoshi Weldon MD  [employer] [facility]  Nuclear Med RAPHAEL [facility]  Nuclear Med [facility]am[facility]  ______________________________________________________________________    Combined Nuclear/Stress Test    ICD Codes:      786.50 Chest pain, unspecified    Checklists:   Patient verbally identified self   Patient identified by ID band   Patient consent obtained in lab   Procedure verified and explained to patient   History and physical performed   Last Caffeine 2014 18:00:00    Last Meal 01/29/2014 18:00:00    History of:    Dyslipidemia Lipid Therapy Hypertension Renal  Failure Coronary Artery Disease (CAD) Family History of CAD PCN / IVP  DYE / SULFA Diabetes, managed with oral agents Diabetes for 11 years  METFORMIN,LEVEMIR Current smoker Typical Angina Dyspnea at rest  Paroxysmal nocturnal dyspnea Dyspnea with minimal exertion Dyspnea  with normal daily activity Dyspnea with more than normal activityNo  History of: Reactive Airway Disease Chronic Lung Disease Dialysis  CHF Peripheral Vascular Disease Cerebrovascular Disease Family  History of CHF  Cardiac Events and Interventions:  Most recent stent placed in 2007. Stent in LAD. Most recent valvular  surgery performed in 1997.    Most Recent Prior Cardiac Testing :   Pharmacologic Stress Test (date unknown)    Current Clinical Presentation:  The patient had no chest pain on presentation. The patient has no recent  history of CHF.    Medications I :    Amlodipine,Victoza,Levemir,Eplercrone,Cetirizine,Metformin,Meclizine,Zol  pidem,Alprazolam.   Coumadin (Warfarin)   Hydrochlorthiazide (HCTZ)   Metoprolol (Lopressor, Toprol)   Pravastatin (Pravachol)        Discharge At Completion of Exam :   Home    Baseline ECG:  NSR and IVCD  Pharmacologic Protocol:  A 400 mcg dose of Regadenoson was administered by intravenous bolus  injection.    Stress ECG :  Inconclusive due to baseline changes.  Recovery ECG:  3 minutes into recovery. No medications were administered during the  recovery period. No changes  Hemodynamics  Rest        Stress        Recovery  SBP         103 mmHg    145 mmHg      143 mmHg  DBP         83 mmHg     79 mmHg       84 mmHg  HR =       65 bpm      84 bpm        74 bpm  %MPHR                   52 %    Imaging Protocol:  One day stress-rest imaging protocol was followed using Tc-99m sestamibi  (Cardiolite) injected intravenously. For the rest portion of the study,  32 mCi of the radiopharmaceutical was administered at  01/30/2014  10:57:56. For the stress portion of the study, 10.7 mCi was administered  at 01/30/2014 09:00:10.    Perfusion Interpretation:  TID Ratio 1.01. There was a small, fixed defect in the apex segment(s).  The extent of this perfusion defect was mild.    Wall Motion Interpretation:  Gated imaging under rest conditions demonstrated normal wallmotion.  The patient's calculated rest LVEF was 59%. The patient's end diastolic  volume was 183ml. The patient's end systolic volume was 74ml.    Stress Test Conclusion:  There was a normal heart rate response, with a normal blood pressure  response.  The patient experienced no chest pain. Symptoms noted during  stress include: dyspnea. Stress test is inconclusive by ECG criteria due  baseline changes.  Nuclear Cardiology Conclusion:  No evidence of Lexiscan induced ishemia. A small ssized fixed defect in  apical segment is an artifact. Normal LV systolic function and wall  motion    Electronically signed by: Yoshi Weldon MD on 01/30/2014 19:01:19  Thank you for the courtesy of this referral.     Results for orders placed during the hospital encounter of 01/02/25    US Renal Bilateral    Narrative  EXAMINATION: US RENAL BILATERAL-    CLINICAL INDICATION: Left kidney mass seen on CT? Needs followup;  N28.89-Other specified disorders of kidney and ureter      COMPARISON: None immediately available    PROCEDURE: Sonographic imaging of the kidneys    FINDINGS:  Imaging of the right kidney demonstrates the right kidney measuring  11.03 x 5.01 x 4.28 cm. No solid mass or hydronephrosis.    Left kidney measures 10.77 x 4.84 x 4.29 cm. No solid mass or  hydronephrosis    Impression  1. No solid mass identified on today's exam..      This report was finalized on 1/2/2025 10:09 AM by Dr. Floyd Rodriguez MD.      Lab Results   Component Value Date     (H) 10/25/2015     Lab Results   Component Value Date    PSA 0.770 06/06/2024      Lab Results   Component Value Date     "MG 2.0 03/14/2025     Lab Results   Component Value Date    INR 3.30 (A) 03/11/2025    INR 3.10 (A) 03/07/2025    INR 2.40 (A) 03/03/2025     No results found for: \"CKTOTAL\"  Lab Results   Component Value Date    CHOL 71 09/13/2024    CHOL 68 06/23/2023    CHOL 63 04/10/2023    CHLPL 167 07/18/2016    CHLPL 135 06/08/2015     Lab Results   Component Value Date    TRIG 102 09/13/2024    TRIG 105 06/23/2023    TRIG 57 04/10/2023     Lab Results   Component Value Date    HDL 40 09/13/2024    HDL 39 (L) 06/23/2023    HDL 45 04/10/2023     Lab Results   Component Value Date    LDL 12 09/13/2024    LDL 9 06/23/2023    LDL <5 04/10/2023     No components found for: \"A1C\"      Lab Results   Component Value Date    TSH 3.690 09/13/2024             Invalid input(s): \"LABALBU\", \"PROT\"        Results review: During today's encounter, all relevant clinical data has been reviewed.      Procedures    Assessment & Plan    Diagnosis Plan   1. Chronic HFrEF (heart failure with reduced ejection fraction)        2. H/O mechanical aortic valve replacement        3. Hyperlipidemia LDL goal <70        4. Type 2 diabetes mellitus with hyperglycemia, with long-term current use of insulin        5. Essential hypertension        6. Rheumatic mitral stenosis        7. History of CVA (cerebrovascular accident)          Assessment & Plan  1.  Moderate to severe rheumatic mitral valve stenosis/new onset HFrEF/status post mechanical aortic valve replacement 1991 on Coumadin/prior CVA in setting of subtherapeutic INR current INR target 3-3.5/Atrial fibrillation  Recent hospitalization due to inadequate fluid control with spironolactone alone. Addition of Lasix improved condition.  Reports good control of volume status on the current dose of Lasix.  Blood pressure and cholesterol well-managed with current medications.  Denies any worsening chest pain or shortness of breath. Monitor for fluid accumulation (weight gain >3 lbs, leg swelling), take " additional Lasix if symptoms occur. Limit salt intake, stay active, diet rich in fruits and vegetables.  -Will get echocardiogram in 3 months to follow-up on the ejection fraction  -On Coumadin, INR monitored by Dr. Christina, goal 3-3.5. Upcoming INR check in a week. No bleeding issues.  -Continue spironolactone 25 mg p.o. once daily, Toprol-XL 50 mg p.o. once daily, losartan 25 mg p.o. once daily, Lasix 40 mg p.o. once daily, Jardiance 10 mg p.o. once daily, aspirin 81 mg p.o. once daily  -Continue Lipitor 40 mg p.o. once daily  -No indication for ICD since EF >35%    Recommendations  No orders of the defined types were placed in this encounter.       New Medications:   No orders of the defined types were placed in this encounter.      Discontinued Medications:   There are no discontinued medications.     Return in about 3 months (around 6/25/2025).    Patient or patient representative verbalized consent for the use of Ambient Listening during the visit with  Zoya Woody MD for chart documentation. 3/26/2025  08:59 EDT      Thank you for allowing me to participate in the care of Benitez Miranda. Feel free to contact me directly with any further questions or concerns.        This document has been electronically signed by Zoya Woody MD   March 26, 2025 08:59 EDT    Dictated Utilizing Dragon Dictation: Part of this note may be an electronic transcription/translation of spoken language to printed text using the Dragon Dictation System.

## 2025-03-31 ENCOUNTER — PATIENT OUTREACH (OUTPATIENT)
Dept: CASE MANAGEMENT | Facility: OTHER | Age: 70
End: 2025-03-31
Payer: MEDICARE

## 2025-03-31 NOTE — OUTREACH NOTE
AMBULATORY CASE MANAGEMENT NOTE    Names and Relationships of Patient/Support Persons: Contact: Benitez Miranda; Relationship: Self -     Patient Outreach    RN-ACM outreach to patient. Patient had a recent admission at [facility] where the principal problem is noted as CHF exacerbation.  TCM was successful and patient has since followed with primary care.  Patient reported to be without complaint this date and denied unmet needs, questions, and concerns for RN-ACM to address.  RN-ACM reviewed upcoming appointments and encouraged patient to call with care coordination needs.     Future Appointments         Provider Department Center    4/11/2025 1:00 PM [facility] FAIL CL[facility]    4/15/2025 3:00 PM Mesha Christina MD [facility] FAMILY MEDICINE COR    6/25/2025 2:30 PM Zoya Woody MD [facility] CARDIOLOGY COR            Kat BAPTISTE  Ambulatory Case Management  [phone]    3/31/2025, 13:25 EDT

## 2025-03-31 NOTE — PROGRESS NOTES
Transitional Care Follow Up Visit  Subjective     Benitez Miranda is a 69 y.o. male who presents for a transitional care management visit.    Within 48 business hours after discharge our office contacted him via telephone to coordinate his care and needs.      I reviewed and discussed the details of that call along with the discharge summary, hospital problems, inpatient lab results, inpatient diagnostic studies, and consultation reports with Benitez.        11/21/2020     6:52 PM 10/27/2022     6:58 PM 11/12/2022     8:23 AM 3/2/2025     6:14 PM   Date of TCM Phone Call   [facility]   Date of Admission 11/18/2020 10/25/2022 11/1/2022 2/28/2025   Date of Discharge 11/21/2020 10/27/2022 11/11/2022 3/2/2025   Discharge Disposition Home or Self Care Home or Self Care Home or Self Care Home or Self Care       History of Present Illness     History of Present Illness  The patient is a 69-year-old male with medical conditions significant for rheumatic mitral stenosis, hyperlipidemia, hypertension, coronary artery disease (CAD), and type 2 diabetes, presenting to the clinic for a transitional care management (TCM) visit. He was admitted on 02/28/2025 and discharged on 03/02/2025. Discharge diagnoses include acute congestive heart failure (CHF) exacerbation, shortness of breath, history of aortic valve replacement in 1999, CAD status post percutaneous coronary intervention (PCI) x1, insulin-dependent diabetes, peripheral neuropathy, valve replacement on warfarin, supratherapeutic range of INR, history of cerebrovascular accident (CVA), and essential hypertension. He presented to the ER due to shortness of breath, which began a week prior and was associated with swelling in the lower extremities and cough. He was admitted and treated with Lasix 40 mg IV twice a day, strict intake and output monitoring, continued bronchodilators, and antiplatelet  medications. An echocardiogram was unremarkable. After diuresis, Lasix was changed to oral administration, and he was discharged on Lasix. He comes in today with an INR of 3.3.    He reports an improvement in his condition since his last visit. He has been adhering to his medication regimen, including spironolactone, and has not experienced any shortness of breath or swelling since his discharge from the hospital. He underwent a CT scan during his hospital stay.    He is seeking a refill of his muscle relaxant prescription due to persistent back and neck pain. He has not taken gabapentin for the past 6 months.    His blood glucose levels have been relatively stable, averaging around 169 upon waking. He plans to refill his Humalog prescription today. He has a sufficient supply of Ozempic and Nasacort at home.         The following portions of the patient's history were reviewed and updated as appropriate: allergies, current medications, past family history, past medical history, past social history, past surgical history, and problem list.    Objective   Physical Exam  Physical Exam  Lungs were auscultated.    Gen: Patient in NAD. Pleasant and answers appropriately. A&Ox3.    Skin: Warm and dry with normal turgor. No purpura, rashes, or unusual pigmentation noted. Hair is normal in appearance and distribution.    HEENT: NC/AT. No lesions noted. Conjunctiva clear, sclera nonicteric. PERRL. EOMI without nystagmus or strabismus. Fundi appear benign. No hemorrhages or exudates of eyes. Auditory canals are patent bilaterally without lesions. TMs intact,  nonerythematous, bulging without lesions. Nasal mucosa pink, nonerythematous, and nonedematous. Frontal and maxillary sinuses are nontender. O/P nonerythematous and moist without exudate.    Neck: Supple without lymph nodes palpated. FROM.     Lungs: Decreased B/L without rales, rhonchi, crackles, or wheezes.    Heart: RRR. S1 and S2 normal. No S3 or S4. No RGT.  Positive  2/6 systolic murmur best heard at the left lateral border    Abd: Soft, nontender,nondistended. (+)BSx4 quadrants.     Extrem: No CC.  Trace edema bilateral lower extremities.  Radial pulses 2+/4 and equal B/L. FROMx4.  Positive joint tenderness noted.    Neuro: No focal motor/sensory deficits.    Results  Laboratory Studies  INR is 3.3 today. Blood sugar is 169 today.  December 2024 A1c is 8.0. Cholesterol levels are within normal limits 9/20/2024.    The ASCVD Risk score (Adore PANDYA, et al., 2019) failed to calculate for the following reasons:    Risk score cannot be calculated because patient has a medical history suggesting prior/existing ASCVD     Imaging  March 2025 echocardiogram showed a decrease in ejection fraction from 56-60% in 2022 to 36-40% currently. Mitral valve regurgitation has worsened.    Assessment & Plan   Diagnoses and all orders for this visit:    1. Hospital discharge follow-up (Primary)  -     Comprehensive Metabolic Panel; Future  -     POC INR    2. Hypertension associated with diabetes  -     amLODIPine (NORVASC) 10 MG tablet; Take 1 tablet by mouth Daily.  Dispense: 90 tablet; Refill: 1  -     metoprolol succinate XL (TOPROL-XL) 50 MG 24 hr tablet; Take 1 tablet by mouth Daily.  Dispense: 90 tablet; Refill: 1  -     Comprehensive Metabolic Panel; Future  -     POC INR    3. Hyperlipidemia due to type 2 diabetes mellitus  -     atorvastatin (LIPITOR) 80 MG tablet; Take 1 tablet by mouth Every Night. (Patient taking differently: Take 0.5 tablets by mouth Every Night.)  Dispense: 90 tablet; Refill: 1  -     Comprehensive Metabolic Panel; Future  -     POC INR    4. Stage 3 chronic kidney disease, unspecified whether stage 3a or 3b CKD  -     Comprehensive Metabolic Panel; Future  -     POC INR    5. H/O mechanical aortic valve replacement  -     Comprehensive Metabolic Panel; Future  -     POC INR    6. Neuropathy  -     Discontinue: baclofen (LIORESAL) 20 MG tablet; Take 1 tablet by mouth  Daily As Needed for Muscle Spasms.  Dispense: 30 tablet; Refill: 2  -     gabapentin (NEURONTIN) 600 MG tablet; Take 1 tablet by mouth Daily As Needed (back pain).  Dispense: 30 tablet; Refill: 0  -     Comprehensive Metabolic Panel; Future  -     POC INR    7. Type 2 diabetes mellitus with stage 3 chronic kidney disease, with long-term current use of insulin, unspecified whether stage 3a or 3b CKD  -     Comprehensive Metabolic Panel; Future  -     Microalbumin / Creatinine Urine Ratio - Urine, Clean Catch; Future  -     POC INR        Assessment & Plan  1. Transitional care management.  His INR levels are currently within the therapeutic range, necessitating continued monitoring. The echocardiogram results indicate a decline in cardiac function compared to previous assessments. His cholesterol levels are well-managed. He will maintain his current medication regimen, including Lasix, with ongoing renal function monitoring. He is scheduled for appointments at the heart failure clinic on 04/14/2025 and with Dr. Woody on 04/25/2025. He is advised to attend both appointments and report any symptoms of swelling or shortness of breath immediately.    2. Back and neck pain.  Prescriptions for gabapentin and baclofen have been provided to manage his back and neck pain.    3. Type 2 diabetes mellitus.  His blood sugar levels are fair, around 169 mg/dL upon waking. He will continue his current medications, including Humalog and Ozempic. A urine test will be conducted today to monitor protein levels.    4. Hypertension.  He will continue his current antihypertensive medications.    5. Coronary artery disease (CAD).  He will continue his current medications and follow up with the heart failure clinic and Dr. Woody as scheduled.    6. Hyperlipidemia.  His cholesterol levels are well-managed. He will continue his current lipid-lowering therapy.    7. Peripheral neuropathy.  He will continue his current medications.    8.  History of CVA.  He will continue his current medications and follow up with his healthcare providers as scheduled.    9. Essential hypertension.  He will continue his current antihypertensive medications.    10. Acute CHF exacerbation.  He will continue his current medications, including Lasix, and follow up with the heart failure clinic and Dr. Woody as scheduled.    Follow-up  The patient will follow up in 1 month after his consultation with Dr. Woody.              Patient or patient representative verbalized consent for the use of Ambient Listening during the visit with  Mesha Christina MD for chart documentation. 3/30/2025  23:24 EDT        Follow Up   Return in about 5 weeks (around 4/15/2025), or (cancel 3/18) LABS.  Findings and plans discussed with patient who verbalizes understanding and agreement. Will followup with patient once results are in. Patient was given instructions and counseling regarding his condition or for health maintenance advice. Please see specific information pulled into the AVS if appropriate.       Mesha Christina MD

## 2025-04-11 ENCOUNTER — ANTICOAGULATION VISIT (OUTPATIENT)
Dept: FAMILY MEDICINE CLINIC | Facility: CLINIC | Age: 70
End: 2025-04-11
Payer: MEDICARE

## 2025-04-11 ENCOUNTER — HOSPITAL ENCOUNTER (OUTPATIENT)
Dept: CARDIOLOGY | Facility: HOSPITAL | Age: 70
Discharge: HOME | End: 2025-04-11
Payer: MEDICARE

## 2025-04-11 VITALS
DIASTOLIC BLOOD PRESSURE: 65 MMHG | SYSTOLIC BLOOD PRESSURE: 113 MMHG | WEIGHT: 248 LBS | HEIGHT: 72 IN | OXYGEN SATURATION: 98 % | HEART RATE: 75 BPM | BODY MASS INDEX: 33.59 KG/M2

## 2025-04-11 DIAGNOSIS — I50.22 CHRONIC HFREF (HEART FAILURE WITH REDUCED EJECTION FRACTION): Primary | ICD-10-CM

## 2025-04-11 DIAGNOSIS — I25.10 CORONARY ARTERY DISEASE INVOLVING NATIVE CORONARY ARTERY OF NATIVE HEART WITHOUT ANGINA PECTORIS: Chronic | ICD-10-CM

## 2025-04-11 DIAGNOSIS — I10 ESSENTIAL HYPERTENSION: Chronic | ICD-10-CM

## 2025-04-11 DIAGNOSIS — E66.811 CLASS 1 OBESITY: ICD-10-CM

## 2025-04-11 DIAGNOSIS — Z95.2 H/O MECHANICAL AORTIC VALVE REPLACEMENT: Chronic | ICD-10-CM

## 2025-04-11 DIAGNOSIS — N18.30 STAGE 3 CHRONIC KIDNEY DISEASE, UNSPECIFIED WHETHER STAGE 3A OR 3B CKD: ICD-10-CM

## 2025-04-11 LAB
ABSOLUTE LUNG FLUID CONTENT: 32 % (ref 20–35)
ANION GAP SERPL CALCULATED.3IONS-SCNC: 12.2 MMOL/L (ref 5–15)
BUN SERPL-MCNC: 47 MG/DL (ref 8–23)
BUN/CREAT SERPL: 25.4 (ref 7–25)
CALCIUM SPEC-SCNC: 9.5 MG/DL (ref 8.6–10.5)
CHLORIDE SERPL-SCNC: 98 MMOL/L (ref 98–107)
CO2 SERPL-SCNC: 21.8 MMOL/L (ref 22–29)
CREAT SERPL-MCNC: 1.85 MG/DL (ref 0.76–1.27)
EGFRCR SERPLBLD CKD-EPI 2021: 38.5 ML/MIN/1.73
GLUCOSE SERPL-MCNC: 186 MG/DL (ref 65–99)
INR PPP: 3.8 (ref 3–3.5)
MAGNESIUM SERPL-MCNC: 2.2 MG/DL (ref 1.6–2.4)
NT-PROBNP SERPL-MCNC: 815.9 PG/ML (ref 0–900)
POTASSIUM SERPL-SCNC: 4.8 MMOL/L (ref 3.5–5.2)
SODIUM SERPL-SCNC: 132 MMOL/L (ref 136–145)

## 2025-04-11 PROCEDURE — 80048 BASIC METABOLIC PNL TOTAL CA: CPT | Performed by: PHYSICIAN ASSISTANT

## 2025-04-11 PROCEDURE — 94726 PLETHYSMOGRAPHY LUNG VOLUMES: CPT | Performed by: PHYSICIAN ASSISTANT

## 2025-04-11 PROCEDURE — 83880 ASSAY OF NATRIURETIC PEPTIDE: CPT | Performed by: PHYSICIAN ASSISTANT

## 2025-04-11 PROCEDURE — 83735 ASSAY OF MAGNESIUM: CPT | Performed by: PHYSICIAN ASSISTANT

## 2025-04-11 PROCEDURE — 36415 COLL VENOUS BLD VENIPUNCTURE: CPT | Performed by: PHYSICIAN ASSISTANT

## 2025-04-11 PROCEDURE — 99214 OFFICE O/P EST MOD 30 MIN: CPT | Performed by: PHYSICIAN ASSISTANT

## 2025-04-11 PROCEDURE — G2211 COMPLEX E/M VISIT ADD ON: HCPCS | Performed by: PHYSICIAN ASSISTANT

## 2025-04-11 RX ORDER — FUROSEMIDE 40 MG/1
40 TABLET ORAL EVERY OTHER DAY
Qty: 60 TABLET | Refills: 5 | Status: SHIPPED | OUTPATIENT
Start: 2025-04-11

## 2025-04-11 NOTE — PROGRESS NOTES
Heart Failure Clinic    Date: 04/11/25     Vitals:    04/11/25 1302   BP: 113/65   Pulse: 75   SpO2: 98%    Weight 248    Method of arrival: Ambulatory    Weighing self daily: No    Monitoring Heart Failure Zones: Most days    Today's HF Zone: Green    Taking medications as prescribed: Yes    Edema No    Shortness of Air: Yes    Number of pillows used at night:<2    Educational Materials given:  avs                                                                         ReDS Value: 32  25-35 Optimal Value Status      Niki Colorado RN 04/11/25 13:02 EDT

## 2025-04-11 NOTE — PATIENT INSTRUCTIONS
Heart Failure Action Plan  A heart failure action plan helps you know what to do when you have symptoms of heart failure. Your action plan is a color-coded plan that lists the symptoms to watch for and indicates what actions to take.  If you have symptoms in the green zone, you're doing well.  If you have symptoms in the yellow zone, you're having problems.  If you have symptoms in the red zone, you need medical care right away.  Follow the plan that was created by you and your health care provider. Review your plan each time you visit your provider.  Green zone  These signs mean you're doing well and can continue what you're doing:  You don't have new or worsening shortness of breath.  You have very little swelling or no new swelling.  Your weight is stable (no gain or loss).  You have a normal activity level.  You don't have chest pain or any other new symptoms.  Yellow zone  These signs and symptoms mean your condition may be getting worse and you should make some changes:  You have trouble breathing when you're active.  You have swelling in your feet or legs or have discomfort in your belly.  You gain 2-3 lb (0.9-1.4 kg) in 24 hours, or 5 lb (2.3 kg) in a week. This amount may be more or less depending on your condition.  You get tired easily.  You have trouble sleeping.  You have a dry cough.  If you have any of these symptoms:  Contact your provider within the next day.  Your provider may adjust your medicines.  Red zone  These signs and symptoms mean you should get medical help right away:  You have trouble breathing when resting or cannot lie flat and you need to raise your head to help you breathe.  You have a dry cough that's getting worse.  You have swelling or pain in your feet or legs or discomfort in your belly that's getting worse.  You suddenly gain more than 2-3 lb (0.9-1.4 kg) in 24 hours, or more than 5 lb (2.3 kg) in a week. This amount may be more or less depending on your condition.  You have  trouble staying awake or you feel confused.  You don't have an appetite.  You have worsening sadness or depression.  These symptoms may be an emergency. Call 911 right away.  Do not wait to see if the symptoms will go away.  Do not drive yourself to the hospital.  Follow these instructions at home:  Take medicines only as told.  Eat a heart-healthy diet. Work with a dietitian to create an eating plan that's best for you.  Weigh yourself each day.   Call your provider if you gain more than 3 lb in 24 hours, or more than 5 lb in a week.  Health care provider name: Keren [facility] care provider phone number: [phone]

## 2025-04-11 NOTE — PROGRESS NOTES
Heart Failure Clinic  Pharmacist Note     Benitez Miranda is a 69 y.o. male seen in the Heart Failure Clinic for HFpEF? With most recent EF first read at 36-40%, but another cardiologist read it at 50% on 3/1/25. Patient was recently hospitalized from 2/28-3/5/24 for acute CHF. Upon discharge, lasix 40mg bid was added.      Benitez Miranda reports a good understanding of medications and reports adherence. He reports that he has been taking the Lasix 40mg once daily and denies any swelling or SOB with it on board. He has not been checking his BP's at home, but has always been told that they are good and has not had any recent changes to his BP medications. His BP in clinic today was 113/65.     He reports no issues with paying for his medications at this time.     Medication Use:   Hx of med intolerances:  None related to HF/CV   Retail Rx Management: [facility]    Past Medical History:   Diagnosis Date    Anxiety     Arthritis     CAD (coronary artery disease)     x1 stent; pulmonary HTN; EF 50-55%; rheumatic valve; MV stenosis    CHF (congestive heart failure)     CKD (chronic kidney disease), stage III     COPD (chronic obstructive pulmonary disease)     Depression     Diabetes mellitus     H/O mechanical aortic valve replacement     History of tobacco abuse     Hyperlipidemia     Hypertension     Ischemic heart disease     Kidney failure     third stage    Low back pain     Obesity     BMI 36.    Stroke     Valvular heart disease      ALLERGIES: Penicillins, Ace inhibitors, and Contrast dye (echo or unknown ct/mr)  Current Outpatient Medications   Medication Sig Dispense Refill    amLODIPine (NORVASC) 10 MG tablet Take 1 tablet by mouth Daily. 90 tablet 1    aspirin 81 MG EC tablet Take 1 tablet by mouth Daily. 30 tablet 0    atorvastatin (LIPITOR) 80 MG tablet Take 1 tablet by mouth Every Night. (Patient taking differently: Take 0.5 tablets by mouth Every Night.) 90 tablet 1    cetirizine (zyrTEC) 10 MG  "tablet Take 1 tablet by mouth Daily As Needed for Allergies.      empagliflozin (JARDIANCE) 10 MG tablet tablet Take 1 tablet by mouth Daily for 180 days. 90 tablet 1    furosemide (Lasix) 40 MG tablet Take 1 tablet by mouth Daily. 60 tablet 5    gabapentin (NEURONTIN) 600 MG tablet Take 1 tablet by mouth Daily As Needed (back pain). 30 tablet 0    ibuprofen (ADVIL,MOTRIN) 200 MG tablet Take 1 tablet by mouth Every 8 (Eight) Hours As Needed for Mild Pain.      Insulin Glargine (BASAGLAR KWIKPEN) 100 UNIT/ML injection pen Inject 30 Units under the skin into the appropriate area as directed 2 (Two) Times a Day. 54 mL 1    Insulin Lispro, 1 Unit Dial, (HUMALOG) 100 UNIT/ML solution pen-injector Inject 15 Units under the skin into the appropriate area as directed 3 (Three) Times a Day As Needed (high blood sugar).      losartan (COZAAR) 25 MG tablet Take 1 tablet by mouth Daily. 90 tablet 1    metoprolol succinate XL (TOPROL-XL) 50 MG 24 hr tablet Take 1 tablet by mouth Daily. 90 tablet 1    multivitamin with minerals tablet tablet Take 1 tablet by mouth Daily.      Semaglutide, 2 MG/DOSE, (OZEMPIC) 8 MG/3ML solution pen-injector Inject 2 mg under the skin into the appropriate area as directed 1 (One) Time Per Week.      sertraline (ZOLOFT) 100 MG tablet Take 1.5 tablets by mouth Daily. 135 tablet 1    spironolactone (ALDACTONE) 25 MG tablet Take 1 tablet by mouth once daily 90 tablet 0    warfarin (COUMADIN) 6 MG tablet Take 1 tablet by mouth Daily. Re-check INR within 3 days       No current facility-administered medications for this encounter.       Vaccination History:   Pneumonia: UTD  Annual Influenza: UTD 24/25     Objective  Vitals:    04/11/25 1302   BP: 113/65   BP Location: Left arm   Patient Position: Sitting   Cuff Size: Adult   Pulse: 75   SpO2: 98%   Weight: 112 kg (248 lb)   Height: 182.9 cm (72\")       Wt Readings from Last 3 Encounters:   04/11/25 112 kg (248 lb)   03/25/25 115 kg (254 lb 3.2 oz) "   03/14/25 117 kg (258 lb 4.8 oz)         04/11/25  1302   Weight: 112 kg (248 lb)       Lab Results   Component Value Date    GLUCOSE 124 (H) 03/14/2025    BUN 42 (H) 03/14/2025    CREATININE 1.99 (H) 03/14/2025    EGFRIFNONA 43 (L) 01/25/2022    EGFRIFAFRI 59 (L) 09/12/2016    BCR 21.1 03/14/2025    K 4.3 03/14/2025    CO2 29.1 (H) 03/14/2025    CALCIUM 9.1 03/14/2025    ALBUMIN 4.0 03/11/2025    AST 26 03/11/2025    ALT 28 03/11/2025     Lab Results   Component Value Date    WBC 9.42 03/02/2025    HGB 12.0 (L) 03/02/2025    HCT 36.7 (L) 03/02/2025    MCV 89.7 03/02/2025     03/02/2025     Lab Results   Component Value Date    TROPONINT 31 (H) 03/01/2025     Lab Results   Component Value Date    PROBNP 993.7 (H) 03/14/2025     Results for orders placed during the hospital encounter of 02/28/25    Adult Transthoracic Echo Complete w/ Color, Spectral and Contrast if necessary per protocol    Interpretation Summary    Left ventricular systolic function is moderately decreased. Left ventricular ejection fraction appears to be 36 - 40%.    Left ventricular diastolic function is consistent with (grade Ia w/high LAP) impaired relaxation.    Mildly reduced right ventricular systolic function noted.    The left atrial cavity is mild to moderately dilated.    The right atrial cavity is mild to moderately  dilated.    There is a mechanical aortic valve prosthesis present.    Moderate to severe mitral valve regurgitation is present.    Moderate mitral valve stenosis is present.    Estimated right ventricular systolic pressure from tricuspid regurgitation is moderately elevated (45-55 mmHg).         GDMT     Drug Class   Drug   Dose Last Dose Adjustment Additional Titration   Notes   ACEi/ARB/ARNI Losartan 25mg >3m     Beta Blocker Toprol 50mg >3m     MRA Spironolactone 25mg >3m     SGLT2i Jardiance 10mg 3/14/25  $12.15 for each           Other BP meds Amlodipine 10mg      Lasix 40mg daily (decreased from bid  3/14/25)      Drug Therapy Problems    1. Drug Interactions Screening- Ozempic + Humalog + Basaglar- increased risk of hypoglycemia  2. Drug-Disease Interactions: NSAIDS      Recommendations:     Denies hypoglycemia.   2. Patient reports using IBU for his back pain and arthritis. I counseled the patient that IBU is not recommended in HF patients and interacts with his water pill. Patient will discuss with his PCP at his appointment on 4/15.      Patient was educated on heart failure medications and the importance of medication adherence. All questions were addressed and patient expressed understanding.   Thank you for allowing me to participate in the care of your patient,    Kristen Ana María, [facility]  04/11/25  13:19 EDT

## 2025-04-11 NOTE — PROGRESS NOTES
[facility]  ASHLEY Dean Cherie O, MD  [address] [place] [address]    Thank you for asking me to see Benitez Miranda for congestive heart failure.    HPI:     This is a 69 y.o. male with known past medical history of :  Chronic HFrEF  TTE from 03/04/25 with EF 36-40% per report. Cardiology note from inpatient evaluation indicates personal read of around 50%.    Hx AV (mechanical) replacement  ASCVD s/p PCI  COPD  CKD III  Depression  DM type 2  CVA (right MCA territory)     Benitez Miranda presents for today for Heart Failure clinic evaluation.  The patient is typically seen by Mesha Christina MD.  Patient's primary cardiologist is Dr. Zoya Woody.     Last known EF 50%.   Last known hospitalization and/or ED visit: February 2025 hospitalization with HF exacerbation.       04/11/2025 visit data/details regarding:   Dyspnea: Denies dyspnea on exertion  Lower extremity swelling: Denies swelling of his extremities  Abdominal swelling: Denies  Home weight: Weight monitoring booklet provided during initial visit; Has scale.   Home BP: BP monitoring booklet provided during initial visit; Has BP cuff.   Home heart rate: HR monitoring booklet provided during initial visit  Daily activities of living: Performing on his own   Pillows/lying flat: 2 pillows in bed   HF zone: Green  Mr. Miranda is chest pain free and doing well from HF standpoint. He reports dyspnea with prolonged exertion but is able to perform daily activities of living.     Upcoming repeat echocardiogram ordered per [facility] for June of this year.           Review of Systems - Review of Systems   Constitutional: Negative for decreased appetite, fever and malaise/fatigue.   Cardiovascular:  Negative for chest pain, dyspnea on exertion and leg swelling.   Respiratory:  Negative for shortness of breath.    Musculoskeletal:  Positive for arthritis.   Genitourinary:  Negative for bladder incontinence and  dysuria.         All other systems were reviewed and were negative.    Patient Active Problem List   Diagnosis    Type 2 diabetes mellitus with hyperglycemia, with long-term current use of insulin    COPD (chronic obstructive pulmonary disease)    Chronic diastolic congestive heart failure    Essential hypertension    Tobacco abuse    Coronary artery disease involving native coronary artery of native heart without angina pectoris    Low back pain    Hyperlipidemia LDL goal <70    H/O mechanical aortic valve replacement    Depression    CKD (chronic kidney disease), stage III    Rheumatic mitral stenosis    Morbidly obese    Recurrent major depressive disorder, in full remission    Ischemic stroke    CVA (cerebral vascular accident)    CHF exacerbation    Chronic HFrEF (heart failure with reduced ejection fraction)       family history includes COPD in his father; Cancer in his mother; Diabetes in his paternal grandmother; Heart attack (age of onset: 74) in his father.     reports that he quit smoking about 8 years ago. His smoking use included pipe, cigars, and cigarettes. He started smoking about 38 years ago. He has a 30 pack-year smoking history. He has never used smokeless tobacco. He reports that he does not drink alcohol and does not use drugs.    Allergies   Allergen Reactions    Penicillins Swelling     Tongue swelled    Ace Inhibitors Angioedema    Contrast Dye (Echo Or Unknown Ct/Mr) Rash         Current Outpatient Medications:     amLODIPine (NORVASC) 10 MG tablet, Take 1 tablet by mouth Daily., Disp: 90 tablet, Rfl: 1    aspirin 81 MG EC tablet, Take 1 tablet by mouth Daily., Disp: 30 tablet, Rfl: 0    atorvastatin (LIPITOR) 80 MG tablet, Take 1 tablet by mouth Every Night. (Patient taking differently: Take 0.5 tablets by mouth Every Night.), Disp: 90 tablet, Rfl: 1    cetirizine (zyrTEC) 10 MG tablet, Take 1 tablet by mouth Daily As Needed for Allergies., Disp: , Rfl:     empagliflozin (JARDIANCE) 10 MG  tablet tablet, Take 1 tablet by mouth Daily for 180 days., Disp: 90 tablet, Rfl: 1    furosemide (Lasix) 40 MG tablet, Take 1 tablet by mouth Every Other Day., Disp: 60 tablet, Rfl: 5    gabapentin (NEURONTIN) 600 MG tablet, Take 1 tablet by mouth Daily As Needed (back pain)., Disp: 30 tablet, Rfl: 0    ibuprofen (ADVIL,MOTRIN) 200 MG tablet, Take 1 tablet by mouth Every 8 (Eight) Hours As Needed for Mild Pain., Disp: , Rfl:     Insulin Glargine (BASAGLAR KWIKPEN) 100 UNIT/ML injection pen, Inject 30 Units under the skin into the appropriate area as directed 2 (Two) Times a Day., Disp: 54 mL, Rfl: 1    Insulin Lispro, 1 Unit Dial, (HUMALOG) 100 UNIT/ML solution pen-injector, Inject 15 Units under the skin into the appropriate area as directed 3 (Three) Times a Day As Needed (high blood sugar)., Disp: , Rfl:     losartan (COZAAR) 25 MG tablet, Take 1 tablet by mouth Daily., Disp: 90 tablet, Rfl: 1    metoprolol succinate XL (TOPROL-XL) 50 MG 24 hr tablet, Take 1 tablet by mouth Daily., Disp: 90 tablet, Rfl: 1    multivitamin with minerals tablet tablet, Take 1 tablet by mouth Daily., Disp: , Rfl:     Semaglutide, 2 MG/DOSE, (OZEMPIC) 8 MG/3ML solution pen-injector, Inject 2 mg under the skin into the appropriate area as directed 1 (One) Time Per Week., Disp: , Rfl:     sertraline (ZOLOFT) 100 MG tablet, Take 1.5 tablets by mouth Daily., Disp: 135 tablet, Rfl: 1    spironolactone (ALDACTONE) 25 MG tablet, Take 1 tablet by mouth once daily, Disp: 90 tablet, Rfl: 0    warfarin (COUMADIN) 6 MG tablet, Take 1 tablet by mouth Daily. Re-check INR within 3 days, Disp: , Rfl:       Physical Exam:  I have reviewed the patient's current vital signs as documented in the patient's EMR.   Vitals:    04/11/25 1302   BP: 113/65   Pulse: 75   SpO2: 98%       Body mass index is 33.63 kg/m².       04/11/25  1302   Weight: 112 kg (248 lb)        Physical Exam  Vitals and nursing note reviewed.   Constitutional:       Appearance: He is  normal weight.   HENT:      Head: Normocephalic and atraumatic.      Nose: Nose normal. No congestion.      Mouth/Throat:      Mouth: Mucous membranes are moist.      Pharynx: Oropharynx is clear.   Eyes:      Extraocular Movements: Extraocular movements intact.      Pupils: Pupils are equal, round, and reactive to light.   Cardiovascular:      Rate and Rhythm: Normal rate and regular rhythm.      Heart sounds: Murmur heard.      Comments: Valve click appreciated.   Pulmonary:      Breath sounds: No stridor. No wheezing, rhonchi or rales.   Abdominal:      General: Bowel sounds are normal. There is no distension.   Musculoskeletal:         General: No swelling.   Skin:     General: Skin is warm and dry.   Neurological:      Mental Status: He is alert and oriented to person, place, and time. Mental status is at baseline.            JVP: Volume/Pulsation: Normal.        DATA REVIEWED:     ---------------------------------------------------  TTE/KIET:  Results for orders placed during the hospital encounter of 02/28/25    Adult Transthoracic Echo Complete w/ Color, Spectral and Contrast if necessary per protocol    Interpretation Summary    Left ventricular systolic function is moderately decreased. Left ventricular ejection fraction appears to be 36 - 40%.    Left ventricular diastolic function is consistent with (grade Ia w/high LAP) impaired relaxation.    Mildly reduced right ventricular systolic function noted.    The left atrial cavity is mild to moderately dilated.    The right atrial cavity is mild to moderately  dilated.    There is a mechanical aortic valve prosthesis present.    Moderate to severe mitral valve regurgitation is present.    Moderate mitral valve stenosis is present.    Estimated right ventricular systolic pressure from tricuspid regurgitation is moderately elevated (45-55 mmHg).        LAST HEART CATH RESULT/IF AVAILABLE:     No results found for this or any previous  "visit.      -----------------------------------------------------  CXR/Imaging:   Imaging Results (Most Recent)       None            I personally reviewed and interpreted the CXR.      -----------------------------------------------------  CT:   No radiology results for the last 30 days.    I personally reviewed the images of the CT scan.  My personal interpretation is below.      ----------------------------------------------------    --------------------------------------------------------------------------------------------------    Laboratory evaluations:    Lab Results   Component Value Date    GLUCOSE 186 (H) 04/11/2025    BUN 47 (H) 04/11/2025    CREATININE 1.85 (H) 04/11/2025    EGFRIFNONA 43 (L) 01/25/2022    EGFRIFAFRI 59 (L) 09/12/2016    BCR 25.4 (H) 04/11/2025    K 4.8 04/11/2025    CO2 21.8 (L) 04/11/2025    CALCIUM 9.5 04/11/2025    ALBUMIN 4.0 03/11/2025    AST 26 03/11/2025    ALT 28 03/11/2025     Lab Results   Component Value Date    WBC 9.42 03/02/2025    HGB 12.0 (L) 03/02/2025    HCT 36.7 (L) 03/02/2025    MCV 89.7 03/02/2025     03/02/2025     Lab Results   Component Value Date    CHOL 71 09/13/2024    CHLPL 167 07/18/2016    TRIG 102 09/13/2024    HDL 40 09/13/2024    LDL 12 09/13/2024     Lab Results   Component Value Date    TSH 3.690 09/13/2024     Lab Results   Component Value Date    HGBA1C 8.50 (H) 03/01/2025     Lab Results   Component Value Date    ALT 28 03/11/2025     Lab Results   Component Value Date    HGBA1C 8.50 (H) 03/01/2025    HGBA1C 8.00 (H) 12/17/2024    HGBA1C 8.70 (H) 09/13/2024     Lab Results   Component Value Date    MICROALBUR 1.6 03/11/2025    CREATININE 1.85 (H) 04/11/2025     No results found for: \"IRON\", \"TIBC\", \"FERRITIN\"  Lab Results   Component Value Date    INR 3.80 (A) 04/11/2025    INR 3.30 (A) 03/11/2025    INR 3.10 (A) 03/07/2025    PROTIME 37.2 (H) 03/02/2025    PROTIME 40.2 (H) 02/28/2025    PROTIME 29.2 04/10/2023        Lab Results   Component " Value Date    ABSOLUTELUNG 32 04/11/2025    ABSOLUTELUNG 35 03/14/2025           1. Chronic HFrEF (heart failure with reduced ejection fraction)    2. H/O mechanical aortic valve replacement    3. Essential hypertension    4. Coronary artery disease involving native coronary artery of native heart without angina pectoris    5. Stage 3 chronic kidney disease, unspecified whether stage 3a or 3b CKD          ORDERS PLACED TODAY:  Orders Placed This Encounter   Procedures    ReDs Vest    Basic Metabolic Panel    Magnesium    proBNP    Basic Metabolic Panel    Magnesium    proBNP        Diagnoses and all orders for this visit:    1. Chronic HFrEF (heart failure with reduced ejection fraction) (Primary)  -     Basic Metabolic Panel; Future  -     Magnesium; Future  -     proBNP; Future  -     Basic Metabolic Panel; Standing  -     Basic Metabolic Panel  -     Magnesium; Standing  -     Magnesium  -     proBNP; Standing  -     proBNP  -     ReDs Vest    2. H/O mechanical aortic valve replacement  Overview:  Severe aortic valve stenosis.  Aortic valve replacement: [facility], 1991, Georgi Sánchez MD.  Currently anticoagulated with Coumadin.  INR followed by Dr. Canales's office.        3. Essential hypertension    4. Coronary artery disease involving native coronary artery of native heart without angina pectoris  Overview:  Cardiac catheterization by Dr. Carrasco (2004): NADINE to unknown coronary artery  Nuclear stress test (02/26/2015): Small to moderate region of inferior scar; no reversible ischemia detected; LVEF 52%.  Echocardiogram (02/13/2015): LVEF 50% to 55%. Mild LVH. Mechanical prosthetic aortic valve functioning well; mild to moderate MR.   Cardiac catheterization by Dr. Eduardo Torres (06/08/2015): mild nonobstructive CAD with widely patent stent; normal functioning of mechanical aortic valve; pulmonary hypertension (PA 50/35 mmHg)  Admission to [facility] Room with chronic  obstructive pulmonary disease exacerbation/diastolic heart failure, December 2015      5. Stage 3 chronic kidney disease, unspecified whether stage 3a or 3b CKD  Overview:  Stage 1-2 secondary to diabetic nephropathy.  Baseline creatinine 1.3.        Other orders  -     furosemide (Lasix) 40 MG tablet; Take 1 tablet by mouth Every Other Day.  Dispense: 60 tablet; Refill: 5             MEDS ORDERED TODAY:    New Medications Ordered This Visit   Medications    furosemide (Lasix) 40 MG tablet     Sig: Take 1 tablet by mouth Every Other Day.     Dispense:  60 tablet     Refill:  5        ---------------------------------------------------------------------------------------------------------------------------          ASSESSMENT/PLAN:      Diagnosis Plan   1. Chronic HFrEF (heart failure with reduced ejection fraction)  Basic Metabolic Panel    Magnesium    proBNP    Basic Metabolic Panel    Basic Metabolic Panel    Magnesium    Magnesium    proBNP    proBNP    ReDs Vest      2. H/O mechanical aortic valve replacement        3. Essential hypertension        4. Coronary artery disease involving native coronary artery of native heart without angina pectoris        5. Stage 3 chronic kidney disease, unspecified whether stage 3a or 3b CKD              CHF GOAL DIRECTED MEDICAL THERAPY FOR PATIENT ADDRESSED/ADJUSTED:     GDMT: HFpEF per Dr. Woody's inpatient evaluation    Drug Class   Drug   Dose Last Dose Adjustment Notes   ACEi/ARB/ARNI Losartan 25mg qd     Beta Blocker Toprol XL  50mg qd     MRA SPironolactone 25mg qd     SGLT2i Jardiance  10mg  N/A   Secondaries if applicable:          -CHF Specific BB:    Metoprolol Succinate  at dose of 50mg qd.    Titration upward limited 2/2 hemodynamics.      -ACE/ARB/ARNi:   Continue Losartan 25mg qd.     -MRA:   Continue Spironolactone with patient tolerating.  Has been on this for quite sometime now.     -SGLT2 inhibitor therapy:   The patient was advised to hold SGLT2I when PO  intake is restricted due to a planned surgery, or due to an underlying illness.    Recommend continuing Jardiance (empagliflozin) 10mg with quarterly assessment of GFR.   Pt was advised SEs, some severe, including hypersensitivity and Stevie's; coupled with discussion regarding common side effects of UTIs and female genital mycotic infections were discussed. If you will be NPO, or are sick (poor PO intake, N&V) please hold the medication until you are back to a normal diet.     -Diuretic regimen:   ReDS Vest reading for. 04/11/25 is 32; ReDs Vest reading reviewed with patient.    Decreased lasix to 40mg every other day.   BMP, Mag, & ProBNP reviewed with patient on 04/11/2025. Creatinine has not improved significantly and is up from prior values, thus decreased Lasix to every other day.       -Fluid restriction/Sodium restriction:   Requested 2000 ml restriction  Patient has been asked to weigh daily and was provided with a printed diuretic strategy.  1,500 mg Na restriction was discussed.    -Devices if applicable:       -Acute and/or Chronic underlying conditions other than HF addressed during visit:   Essential HTN:   Continue Losartan 25 mg qd. BP well controlled.     Hx Mechanical AV replacement:   Continue chronic anticoagulation with Coumadin.     ASCVD:   Continue ASA & statin.     CKD 3, a vs. B:   Lasix decreased to every other day.     Class 1 obesity:   Heart Failure dietary measures reviewed.     Identifiable barriers to Heart Failure Self-care:   Medical Barriers:  No immediate known barriers  Social Barriers:  No immediate known barriers            This document has been electronically signed by Keren Pedraza PA-C  April 11, 2025 14:50 EDT      Dictated Utilizing Dragon Dictation: Part of this note may be an electronic transcription/translation of spoken language to printed text using the Dragon Dictation System.    Follow-up appointment and medication changes provided in hand delivered  After Visit Summary as well as reviewed in the room.

## 2025-04-15 ENCOUNTER — OFFICE VISIT (OUTPATIENT)
Dept: FAMILY MEDICINE CLINIC | Facility: CLINIC | Age: 70
End: 2025-04-15
Payer: MEDICARE

## 2025-04-15 VITALS
BODY MASS INDEX: 33.64 KG/M2 | DIASTOLIC BLOOD PRESSURE: 60 MMHG | SYSTOLIC BLOOD PRESSURE: 106 MMHG | HEIGHT: 72 IN | OXYGEN SATURATION: 97 % | TEMPERATURE: 97.5 F | WEIGHT: 248.4 LBS | HEART RATE: 67 BPM | RESPIRATION RATE: 16 BRPM

## 2025-04-15 DIAGNOSIS — Z95.2 H/O MECHANICAL AORTIC VALVE REPLACEMENT: Primary | ICD-10-CM

## 2025-04-15 DIAGNOSIS — E11.22 TYPE 2 DIABETES MELLITUS WITH STAGE 3 CHRONIC KIDNEY DISEASE, WITH LONG-TERM CURRENT USE OF INSULIN, UNSPECIFIED WHETHER STAGE 3A OR 3B CKD: ICD-10-CM

## 2025-04-15 DIAGNOSIS — Z79.4 TYPE 2 DIABETES MELLITUS WITH STAGE 3 CHRONIC KIDNEY DISEASE, WITH LONG-TERM CURRENT USE OF INSULIN, UNSPECIFIED WHETHER STAGE 3A OR 3B CKD: ICD-10-CM

## 2025-04-15 DIAGNOSIS — N18.30 TYPE 2 DIABETES MELLITUS WITH STAGE 3 CHRONIC KIDNEY DISEASE, WITH LONG-TERM CURRENT USE OF INSULIN, UNSPECIFIED WHETHER STAGE 3A OR 3B CKD: ICD-10-CM

## 2025-04-15 DIAGNOSIS — N18.30 STAGE 3 CHRONIC KIDNEY DISEASE, UNSPECIFIED WHETHER STAGE 3A OR 3B CKD: ICD-10-CM

## 2025-04-15 LAB — INR PPP: 3 (ref 0.9–1.1)

## 2025-04-15 RX ORDER — WARFARIN SODIUM 1 MG/1
1 TABLET ORAL NIGHTLY
Qty: 90 TABLET | Refills: 1 | Status: SHIPPED | OUTPATIENT
Start: 2025-04-15

## 2025-04-15 RX ORDER — WARFARIN SODIUM 1 MG/1
1 TABLET ORAL
COMMUNITY
End: 2025-04-15 | Stop reason: SDUPTHER

## 2025-04-30 NOTE — PROGRESS NOTES
"Benitez Miranda     VITALS: Blood pressure 106/60, pulse 67, temperature 97.5 °F (36.4 °C), temperature source Temporal, resp. rate 16, height 182.9 cm (72\"), weight 113 kg (248 lb 6.4 oz), SpO2 97%.    Subjective  Chief Complaint  Congestive Heart Failure, Hypertension, Chronic Kidney Disease, and Diabetes    Subjective          History of Present Illness:    History of Present Illness  The patient is a 69-year-old male with medical conditions significant for rheumatic mitral stenosis, hyperlipidemia, hypertension, coronary artery disease (CAD), and type 2 diabetes, presenting to the clinic for a medical follow-up.    His blood glucose level this morning was 160.    No complaints regarding medications.     The following portions of the patient's history were reviewed and updated as appropriate: allergies, current medications, past family history, past medical history, past social history, past surgical history and problem list.    Past Medical History  Past Medical History:   Diagnosis Date    Anxiety     Arthritis     CAD (coronary artery disease)     x1 stent; pulmonary HTN; EF 50-55%; rheumatic valve; MV stenosis    CHF (congestive heart failure)     CKD (chronic kidney disease), stage III     COPD (chronic obstructive pulmonary disease)     Depression     Diabetes mellitus     H/O mechanical aortic valve replacement     History of tobacco abuse     Hyperlipidemia     Hypertension     Ischemic heart disease     Kidney failure     third stage    Low back pain     Obesity     BMI 36.    Stroke     Valvular heart disease        Surgical History  Past Surgical History:   Procedure Laterality Date    AORTIC VALVE REPAIR/REPLACEMENT  1991    CARDIAC CATHETERIZATION      CARDIAC SURGERY  1991    valve on aortic    CARDIAC SURGERY  2001    stent     CATARACT EXTRACTION      COLONOSCOPY  2001    CORONARY STENT PLACEMENT  2005    CYSTOSCOPY URETEROSCOPY LASER LITHOTRIPSY Left 11/20/2020    Procedure: CYSTOSCOPY " "URETEROSCOPY LASER LITHOTRIPSY WITH STENT PLACEMENT;  Surgeon: Jonah Montgomery MD;  [facility];  Service: Urology;  Laterality: Left;    KIDNEY STONE SURGERY         Family History  Family History   Problem Relation Age of Onset    Cancer Mother         breast    COPD Father     Heart attack Father 74    Diabetes Paternal Grandmother         both legs removed       Social History  Social History     Socioeconomic History    Marital status:     Number of children: 2   Tobacco Use    Smoking status: Former     Current packs/day: 0.00     Average packs/day: 1 pack/day for 30.0 years (30.0 ttl pk-yrs)     Types: Cigarettes, Pipe, Cigars     Start date: 1986     Quit date: 2016     Years since quittin.8    Smokeless tobacco: Never    Tobacco comments:     currently smokes a pipe   Vaping Use    Vaping status: Never Used   Substance and Sexual Activity    Alcohol use: No    Drug use: No    Sexual activity: Defer       Objective   Vital Signs:   /60 (BP Location: Right arm, Patient Position: Sitting, Cuff Size: Adult)   Pulse 67   Temp 97.5 °F (36.4 °C) (Temporal)   Resp 16   Ht 182.9 cm (72\")   Wt 113 kg (248 lb 6.4 oz)   SpO2 97%   BMI 33.69 kg/m²       Physical Exam     Physical Exam      Gen: Patient in NAD. Pleasant and answers appropriately. A&Ox3.    Skin: Warm and dry with normal turgor. No purpura, rashes, or unusual pigmentation noted. Hair is normal in appearance and distribution.    HEENT: NC/AT. No lesions noted. Conjunctiva clear, sclera nonicteric. PERRL. EOMI without nystagmus or strabismus. Fundi appear benign. No hemorrhages or exudates of eyes. Auditory canals are patent bilaterally without lesions. TMs intact,  nonerythematous, nonbulging without lesions. Nasal mucosa pink, nonerythematous, and nonedematous. Frontal and maxillary sinuses are nontender. O/P nonerythematous and moist without exudate.    Neck: Supple without lymph nodes palpated. FROM. No " carotid bruits appreciated bilaterally.    Lungs: Decreased B/L without rales, rhonchi, crackles, or wheezes.    Heart: RRR. S1 and S2 normal. No S3 or S4. No MRGT.    Abd: Soft, nontender,nondistended. (+)BSx4 quadrants.     Extrem: No CCE. Radial pulses 2+/4 and equal B/L. FROMx4.  Positive joint tenderness noted.    Neuro: No focal motor/sensory deficits.    Procedures    Result Review :   The following data was reviewed by: Mesha Christian MD on 04/15/2025:       Results  Laboratory Studies  Blood glucose today was 160. INR is 3.0.           Assessment and Plan      Benitez Miranda is a 69 y.o. here for medical followup.    Diagnoses and all orders for this visit:    1. H/O mechanical aortic valve replacement (Primary)  -     warfarin (COUMADIN) 1 MG tablet; Take 1 tablet by mouth Every Night.  Dispense: 90 tablet; Refill: 1  -     POC INR    2. Type 2 diabetes mellitus with stage 3 chronic kidney disease, with long-term current use of insulin, unspecified whether stage 3a or 3b CKD  Stable.  Continue medications.    3. Stage 3 chronic kidney disease, unspecified whether stage 3a or 3b CKD  Encouraged hydration.      Assessment & Plan  1. Type 2 Diabetes Mellitus.  His blood glucose this morning was 160.         Patient or patient representative verbalized consent for the use of Ambient Listening during the visit with  Mesha Christina MD for chart documentation. 4/29/2025  23:59 EDT      I spent 31 total minutes, face-to-face, caring for Benitez today. 80% of this time involved counseling and/or coordination of care as documented within this note.   Follow Up   Return in about 2 months (around 6/15/2025).  Findings and plans discussed with patient who verbalizes understanding and agreement. Will followup with patient once results are in. Patient was given instructions and counseling regarding his condition or for health maintenance advice. Please see specific information pulled into the AVS if appropriate.        Mesha Christina MD

## 2025-05-16 ENCOUNTER — HOSPITAL ENCOUNTER (OUTPATIENT)
Dept: CARDIOLOGY | Facility: HOSPITAL | Age: 70
Discharge: HOME | End: 2025-05-16
Payer: MEDICARE

## 2025-05-16 VITALS
HEIGHT: 72 IN | DIASTOLIC BLOOD PRESSURE: 55 MMHG | OXYGEN SATURATION: 97 % | HEART RATE: 66 BPM | BODY MASS INDEX: 33.97 KG/M2 | SYSTOLIC BLOOD PRESSURE: 101 MMHG | WEIGHT: 250.8 LBS

## 2025-05-16 DIAGNOSIS — I50.22 CHRONIC HFREF (HEART FAILURE WITH REDUCED EJECTION FRACTION): Primary | ICD-10-CM

## 2025-05-16 DIAGNOSIS — E11.59 HYPERTENSION ASSOCIATED WITH DIABETES: ICD-10-CM

## 2025-05-16 DIAGNOSIS — E11.65 TYPE 2 DIABETES MELLITUS WITH HYPERGLYCEMIA, WITH LONG-TERM CURRENT USE OF INSULIN: ICD-10-CM

## 2025-05-16 DIAGNOSIS — E61.1 IRON DEFICIENCY: ICD-10-CM

## 2025-05-16 DIAGNOSIS — Z95.2 H/O MECHANICAL AORTIC VALVE REPLACEMENT: ICD-10-CM

## 2025-05-16 DIAGNOSIS — I15.2 HYPERTENSION ASSOCIATED WITH DIABETES: ICD-10-CM

## 2025-05-16 DIAGNOSIS — Z95.2 H/O MECHANICAL AORTIC VALVE REPLACEMENT: Chronic | ICD-10-CM

## 2025-05-16 DIAGNOSIS — N18.30 STAGE 3 CHRONIC KIDNEY DISEASE, UNSPECIFIED WHETHER STAGE 3A OR 3B CKD: ICD-10-CM

## 2025-05-16 DIAGNOSIS — J44.9 COPD WITHOUT EXACERBATION: ICD-10-CM

## 2025-05-16 DIAGNOSIS — Z79.4 TYPE 2 DIABETES MELLITUS WITH HYPERGLYCEMIA, WITH LONG-TERM CURRENT USE OF INSULIN: ICD-10-CM

## 2025-05-16 LAB
ABSOLUTE LUNG FLUID CONTENT: 33 % (ref 20–35)
ANION GAP SERPL CALCULATED.3IONS-SCNC: 12.1 MMOL/L (ref 5–15)
BUN SERPL-MCNC: 42 MG/DL (ref 8–23)
BUN/CREAT SERPL: 22 (ref 7–25)
CALCIUM SPEC-SCNC: 9.4 MG/DL (ref 8.6–10.5)
CHLORIDE SERPL-SCNC: 103 MMOL/L (ref 98–107)
CO2 SERPL-SCNC: 22.9 MMOL/L (ref 22–29)
CREAT SERPL-MCNC: 1.91 MG/DL (ref 0.76–1.27)
DEPRECATED RDW RBC AUTO: 46.9 FL (ref 37–54)
EGFRCR SERPLBLD CKD-EPI 2021: 37 ML/MIN/1.73
ERYTHROCYTE [DISTWIDTH] IN BLOOD BY AUTOMATED COUNT: 14.4 % (ref 12.3–15.4)
FERRITIN SERPL-MCNC: 65.7 NG/ML (ref 30–400)
GLUCOSE SERPL-MCNC: 224 MG/DL (ref 65–99)
HCT VFR BLD AUTO: 41.1 % (ref 37.5–51)
HGB BLD-MCNC: 13 G/DL (ref 13–17.7)
IRON 24H UR-MRATE: 53 MCG/DL (ref 59–158)
IRON SATN MFR SERPL: 13 % (ref 20–50)
MAGNESIUM SERPL-MCNC: 1.9 MG/DL (ref 1.6–2.4)
MCH RBC QN AUTO: 28.2 PG (ref 26.6–33)
MCHC RBC AUTO-ENTMCNC: 31.6 G/DL (ref 31.5–35.7)
MCV RBC AUTO: 89.2 FL (ref 79–97)
NT-PROBNP SERPL-MCNC: 807 PG/ML (ref 0–900)
PLATELET # BLD AUTO: 150 10*3/MM3 (ref 140–450)
PMV BLD AUTO: 11.8 FL (ref 6–12)
POTASSIUM SERPL-SCNC: 4 MMOL/L (ref 3.5–5.2)
RBC # BLD AUTO: 4.61 10*6/MM3 (ref 4.14–5.8)
SODIUM SERPL-SCNC: 138 MMOL/L (ref 136–145)
TIBC SERPL-MCNC: 407 MCG/DL (ref 298–536)
TRANSFERRIN SERPL-MCNC: 273 MG/DL (ref 200–360)
WBC NRBC COR # BLD AUTO: 9.68 10*3/MM3 (ref 3.4–10.8)

## 2025-05-16 PROCEDURE — 85027 COMPLETE CBC AUTOMATED: CPT | Performed by: PHYSICIAN ASSISTANT

## 2025-05-16 PROCEDURE — 36415 COLL VENOUS BLD VENIPUNCTURE: CPT | Performed by: PHYSICIAN ASSISTANT

## 2025-05-16 PROCEDURE — 82728 ASSAY OF FERRITIN: CPT | Performed by: PHYSICIAN ASSISTANT

## 2025-05-16 PROCEDURE — 83540 ASSAY OF IRON: CPT | Performed by: PHYSICIAN ASSISTANT

## 2025-05-16 PROCEDURE — 84466 ASSAY OF TRANSFERRIN: CPT | Performed by: PHYSICIAN ASSISTANT

## 2025-05-16 PROCEDURE — 94726 PLETHYSMOGRAPHY LUNG VOLUMES: CPT | Performed by: PHYSICIAN ASSISTANT

## 2025-05-16 PROCEDURE — 80048 BASIC METABOLIC PNL TOTAL CA: CPT | Performed by: PHYSICIAN ASSISTANT

## 2025-05-16 PROCEDURE — 83880 ASSAY OF NATRIURETIC PEPTIDE: CPT | Performed by: PHYSICIAN ASSISTANT

## 2025-05-16 PROCEDURE — 83735 ASSAY OF MAGNESIUM: CPT | Performed by: PHYSICIAN ASSISTANT

## 2025-05-16 RX ORDER — SPIRONOLACTONE 25 MG/1
25 TABLET ORAL DAILY
Qty: 90 TABLET | Refills: 1 | Status: SHIPPED | OUTPATIENT
Start: 2025-05-16

## 2025-05-16 RX ORDER — WARFARIN SODIUM 6 MG/1
TABLET ORAL
Qty: 90 TABLET | Refills: 1 | Status: SHIPPED | OUTPATIENT
Start: 2025-05-16

## 2025-05-16 RX ORDER — HYDROCORTISONE SODIUM SUCCINATE 100 MG/2ML
100 INJECTION INTRAMUSCULAR; INTRAVENOUS AS NEEDED
Status: CANCELLED | OUTPATIENT
Start: 2025-05-19

## 2025-05-16 RX ORDER — CETIRIZINE HYDROCHLORIDE 10 MG/1
5 TABLET ORAL ONCE
Status: CANCELLED | OUTPATIENT
Start: 2025-05-19

## 2025-05-16 RX ORDER — DIPHENHYDRAMINE HYDROCHLORIDE 50 MG/ML
50 INJECTION, SOLUTION INTRAMUSCULAR; INTRAVENOUS AS NEEDED
Status: CANCELLED | OUTPATIENT
Start: 2025-05-19

## 2025-05-16 RX ORDER — SODIUM CHLORIDE 9 MG/ML
20 INJECTION, SOLUTION INTRAVENOUS ONCE
Status: CANCELLED | OUTPATIENT
Start: 2025-05-19

## 2025-05-16 RX ORDER — FAMOTIDINE 10 MG/ML
20 INJECTION, SOLUTION INTRAVENOUS AS NEEDED
Status: CANCELLED | OUTPATIENT
Start: 2025-05-19

## 2025-05-16 RX ORDER — FAMOTIDINE 20 MG/1
20 TABLET, FILM COATED ORAL ONCE
Status: CANCELLED | OUTPATIENT
Start: 2025-05-19

## 2025-05-16 RX ORDER — ACETAMINOPHEN 325 MG/1
650 TABLET ORAL ONCE
Status: CANCELLED | OUTPATIENT
Start: 2025-05-19

## 2025-05-16 NOTE — PROGRESS NOTES
[facility]  ASHLEY Dean Cherie O, MD  [address] [place] [address]    Thank you for asking me to see Benitez Miranda for congestive heart failure.    HPI:     This is a 69 y.o. male with known past medical history of :  Chronic HFrEF  TTE from 03/04/25 with EF 36-40% per report. Cardiology note from inpatient evaluation indicates personal read of around 50%.    Hx AV (mechanical) replacement  Chronic anticoagulation with Coumadin managed per PCP  ASCVD s/p PCI  Cardiac catheterization by Dr. Carrasco (2004): NADINE to unknown coronary artery  Nuclear stress test (02/26/2015): Small to moderate region of inferior scar; no reversible ischemia detected; LVEF 52%.   Cardiac catheterization by Dr. Eduardo Torres (06/08/2015): mild nonobstructive CAD with widely patent stent; normal functioning of mechanical aortic valve; pulmonary hypertension (PA 50/35 mmHg)  COPD  CKD III  Depression  DM type 2  CVA (right MCA territory)     Benitez Miranda presents for today for Heart Failure clinic evaluation.  The patient is typically seen by Mesha Christina MD.  Patient's primary cardiologist is Dr. Zoya Woody.     Last known EF 50%.   Last known hospitalization and/or ED visit: February 2025 hospitalization with HF exacerbation.       05/16/2025 visit data/details regarding:   Dyspnea: Denies dyspnea on exertion  Lower extremity swelling: Denies swelling of his extremities  Abdominal swelling: Denies  Home weight: Weight monitoring booklet provided during initial visit; Has scale.   Home BP: BP monitoring booklet provided during initial visit; Has BP cuff.  Monitoring with BP in the 100s-110s. Denies associated dizziness.    Home heart rate: HR monitoring booklet provided during initial visit  Daily activities of living: Performing on his own   Pillows/lying flat: 2 pillows in bed   HF zone: Green  Mr. Miranda is chest pain free without significant shortness of breath or  swelling of his extremities.  He is doing well and performing daily activities of living.  He reports he has been active and is mowing his lawn regularly.    Upcoming repeat echocardiogram ordered per [facility] for June of this year.           Review of Systems - Review of Systems   Constitutional: Negative for decreased appetite, fever and malaise/fatigue.   Cardiovascular:  Negative for chest pain, dyspnea on exertion and leg swelling.   Respiratory:  Negative for shortness of breath.    Musculoskeletal:  Positive for arthritis.   Genitourinary:  Negative for bladder incontinence and dysuria.         All other systems were reviewed and were negative.    Patient Active Problem List   Diagnosis    Type 2 diabetes mellitus with hyperglycemia, with long-term current use of insulin    COPD (chronic obstructive pulmonary disease)    Chronic diastolic congestive heart failure    Essential hypertension    Tobacco abuse    Coronary artery disease involving native coronary artery of native heart without angina pectoris    Low back pain    Hyperlipidemia LDL goal <70    H/O mechanical aortic valve replacement    Depression    CKD (chronic kidney disease), stage III    Rheumatic mitral stenosis    Recurrent major depressive disorder, in full remission    Ischemic stroke    CVA (cerebral vascular accident)    CHF exacerbation    Chronic HFrEF (heart failure with reduced ejection fraction)    Iron deficiency       family history includes COPD in his father; Cancer in his mother; Diabetes in his paternal grandmother; Heart attack (age of onset: 74) in his father.     reports that he quit smoking about 8 years ago. His smoking use included cigarettes, pipe, and cigars. He started smoking about 38 years ago. He has a 30 pack-year smoking history. He has never used smokeless tobacco. He reports that he does not drink alcohol and does not use drugs.    Allergies   Allergen Reactions    Penicillins Swelling     Tongue swelled    Ace  Inhibitors Angioedema    Contrast Dye (Echo Or Unknown Ct/Mr) Rash         Current Outpatient Medications:     amLODIPine (NORVASC) 10 MG tablet, Take 1 tablet by mouth Daily., Disp: 90 tablet, Rfl: 1    aspirin 81 MG EC tablet, Take 1 tablet by mouth Daily., Disp: 30 tablet, Rfl: 0    atorvastatin (LIPITOR) 80 MG tablet, Take 1 tablet by mouth Every Night. (Patient taking differently: Take 0.5 tablets by mouth Every Night.), Disp: 90 tablet, Rfl: 1    cetirizine (zyrTEC) 10 MG tablet, Take 1 tablet by mouth Daily As Needed for Allergies., Disp: , Rfl:     empagliflozin (JARDIANCE) 10 MG tablet tablet, Take 1 tablet by mouth Daily for 180 days., Disp: 90 tablet, Rfl: 1    furosemide (Lasix) 40 MG tablet, Take 1 tablet by mouth Every Other Day., Disp: 60 tablet, Rfl: 5    gabapentin (NEURONTIN) 600 MG tablet, Take 1 tablet by mouth Daily As Needed (back pain)., Disp: 30 tablet, Rfl: 0    ibuprofen (ADVIL,MOTRIN) 200 MG tablet, Take 1 tablet by mouth Every 8 (Eight) Hours As Needed for Mild Pain., Disp: , Rfl:     Insulin Glargine (BASAGLAR KWIKPEN) 100 UNIT/ML injection pen, Inject 30 Units under the skin into the appropriate area as directed 2 (Two) Times a Day., Disp: 54 mL, Rfl: 1    Insulin Lispro, 1 Unit Dial, (HUMALOG) 100 UNIT/ML solution pen-injector, Inject 15 Units under the skin into the appropriate area as directed 3 (Three) Times a Day As Needed (high blood sugar)., Disp: , Rfl:     losartan (COZAAR) 25 MG tablet, Take 1 tablet by mouth Daily., Disp: 90 tablet, Rfl: 1    metoprolol succinate XL (TOPROL-XL) 50 MG 24 hr tablet, Take 1 tablet by mouth Daily., Disp: 90 tablet, Rfl: 1    multivitamin with minerals tablet tablet, Take 1 tablet by mouth Daily., Disp: , Rfl:     Semaglutide, 2 MG/DOSE, (OZEMPIC) 8 MG/3ML solution pen-injector, Inject 2 mg under the skin into the appropriate area as directed 1 (One) Time Per Week., Disp: , Rfl:     sertraline (ZOLOFT) 100 MG tablet, Take 1.5 tablets by mouth  Daily., Disp: 135 tablet, Rfl: 1    spironolactone (ALDACTONE) 25 MG tablet, Take 1 tablet by mouth once daily, Disp: 90 tablet, Rfl: 1    warfarin (COUMADIN) 1 MG tablet, Take 1 tablet by mouth Every Night., Disp: 90 tablet, Rfl: 1    warfarin (COUMADIN) 6 MG tablet, TAKE 1 TABLET BY MOUTH ONCE DAILY EVERY  NIGHT  --  NEW  DOSE  7  MG/DAY, Disp: 90 tablet, Rfl: 1      Physical Exam:  I have reviewed the patient's current vital signs as documented in the patient's EMR.   Vitals:    05/16/25 1246   BP: 101/55   Pulse: 66   SpO2: 97%       Body mass index is 34.01 kg/m².       05/16/25  1246   Weight: 114 kg (250 lb 12.8 oz)        Physical Exam  Vitals and nursing note reviewed.   Constitutional:       Appearance: He is normal weight.   HENT:      Head: Normocephalic and atraumatic.      Nose: Nose normal. No congestion.      Mouth/Throat:      Mouth: Mucous membranes are moist.      Pharynx: Oropharynx is clear.   Eyes:      Extraocular Movements: Extraocular movements intact.      Pupils: Pupils are equal, round, and reactive to light.   Cardiovascular:      Rate and Rhythm: Normal rate and regular rhythm.      Heart sounds: Murmur heard.      Comments: Valve click appreciated.   Pulmonary:      Breath sounds: No stridor. No wheezing, rhonchi or rales.   Abdominal:      General: Bowel sounds are normal. There is no distension.   Musculoskeletal:         General: No swelling.   Skin:     General: Skin is warm and dry.   Neurological:      Mental Status: He is alert and oriented to person, place, and time. Mental status is at baseline.            JVP: Volume/Pulsation: Normal.        DATA REVIEWED:     ---------------------------------------------------  TTE/KIET:  Results for orders placed during the hospital encounter of 02/28/25    Adult Transthoracic Echo Complete w/ Color, Spectral and Contrast if necessary per protocol    Interpretation Summary    Left ventricular systolic function is moderately decreased. Left  ventricular ejection fraction appears to be 36 - 40%.    Left ventricular diastolic function is consistent with (grade Ia w/high LAP) impaired relaxation.    Mildly reduced right ventricular systolic function noted.    The left atrial cavity is mild to moderately dilated.    The right atrial cavity is mild to moderately  dilated.    There is a mechanical aortic valve prosthesis present.    Moderate to severe mitral valve regurgitation is present.    Moderate mitral valve stenosis is present.    Estimated right ventricular systolic pressure from tricuspid regurgitation is moderately elevated (45-55 mmHg).        LAST HEART CATH RESULT/IF AVAILABLE:     No results found for this or any previous visit.      -----------------------------------------------------  CXR/Imaging:   Imaging Results (Most Recent)       None            I personally reviewed and interpreted the CXR.      -----------------------------------------------------  CT:   No radiology results for the last 30 days.    I personally reviewed the images of the CT scan.  My personal interpretation is below.      ----------------------------------------------------    --------------------------------------------------------------------------------------------------    Laboratory evaluations:    Lab Results   Component Value Date    GLUCOSE 224 (H) 05/16/2025    BUN 42 (H) 05/16/2025    CREATININE 1.91 (H) 05/16/2025    EGFRIFNONA 43 (L) 01/25/2022    EGFRIFAFRI 59 (L) 09/12/2016    BCR 22.0 05/16/2025    K 4.0 05/16/2025    CO2 22.9 05/16/2025    CALCIUM 9.4 05/16/2025    ALBUMIN 4.0 03/11/2025    AST 26 03/11/2025    ALT 28 03/11/2025     Lab Results   Component Value Date    WBC 9.68 05/16/2025    HGB 13.0 05/16/2025    HCT 41.1 05/16/2025    MCV 89.2 05/16/2025     05/16/2025     Lab Results   Component Value Date    CHOL 71 09/13/2024    CHLPL 167 07/18/2016    TRIG 102 09/13/2024    HDL 40 09/13/2024    LDL 12 09/13/2024     Lab Results    Component Value Date    TSH 3.690 09/13/2024     Lab Results   Component Value Date    HGBA1C 8.50 (H) 03/01/2025     Lab Results   Component Value Date    ALT 28 03/11/2025     Lab Results   Component Value Date    HGBA1C 8.50 (H) 03/01/2025    HGBA1C 8.00 (H) 12/17/2024    HGBA1C 8.70 (H) 09/13/2024     Lab Results   Component Value Date    MICROALBUR 1.6 03/11/2025    CREATININE 1.91 (H) 05/16/2025     Lab Results   Component Value Date    IRON 53 (L) 05/16/2025    TIBC 407 05/16/2025    FERRITIN 65.70 05/16/2025     Lab Results   Component Value Date    INR 3.00 (A) 04/15/2025    INR 3.80 (A) 04/11/2025    INR 3.30 (A) 03/11/2025    PROTIME 37.2 (H) 03/02/2025    PROTIME 40.2 (H) 02/28/2025    PROTIME 29.2 04/10/2023        Lab Results   Component Value Date    ABSOLUTELUNG 33 05/16/2025    ABSOLUTELUNG 32 04/11/2025    ABSOLUTELUNG 35 03/14/2025           1. Chronic HFrEF (heart failure with reduced ejection fraction)    2. H/O mechanical aortic valve replacement    3. Iron deficiency    4. Stage 3 chronic kidney disease, unspecified whether stage 3a or 3b CKD    5. COPD without exacerbation    6. Type 2 diabetes mellitus with hyperglycemia, with long-term current use of insulin          ORDERS PLACED TODAY:  Orders Placed This Encounter   Procedures    ReDs Vest    Basic Metabolic Panel    Magnesium    proBNP    CBC (No Diff)    Ferritin    Iron Profile    Basic Metabolic Panel    Magnesium    proBNP    CBC (No Diff)    Ferritin    Iron Profile    Ambulatory Referral to [facility] For Infusion Treatment        Diagnoses and all orders for this visit:    1. Chronic HFrEF (heart failure with reduced ejection fraction) (Primary)  -     Basic Metabolic Panel; Future  -     Magnesium; Future  -     proBNP; Future  -     CBC (No Diff); Future  -     Ferritin; Future  -     Iron Profile; Future  -     Basic Metabolic Panel; Standing  -     Basic Metabolic Panel  -     Magnesium; Standing  -     Magnesium  -     proBNP;  Standing  -     proBNP  -     CBC (No Diff); Standing  -     CBC (No Diff)  -     Ferritin; Standing  -     Ferritin  -     Iron Profile; Standing  -     Iron Profile  -     ReDs Vest  -     Ambulatory Referral to [facility] For Infusion Treatment    2. H/O mechanical aortic valve replacement  Overview:  Severe aortic valve stenosis.  Aortic valve replacement: [facility], 1991, Georgi Sánchez MD.  Currently anticoagulated with Coumadin.  INR followed by Dr. Canales's office.        3. Iron deficiency  -     Ambulatory Referral to [facility] For Infusion Treatment    4. Stage 3 chronic kidney disease, unspecified whether stage 3a or 3b CKD  Overview:  Stage 1-2 secondary to diabetic nephropathy.  Baseline creatinine 1.3.      Orders:  -     Ambulatory Referral to [facility] For Infusion Treatment    5. COPD without exacerbation    6. Type 2 diabetes mellitus with hyperglycemia, with long-term current use of insulin    Other orders  -     sodium chloride 0.9 % infusion  -     acetaminophen (TYLENOL) tablet 650 mg  -     cetirizine (zyrTEC) tablet 5 mg  -     famotidine (PEPCID) tablet 20 mg  -     Hydrocortisone Sod Suc (PF) (Solu-CORTEF) injection 100 mg  -     diphenhydrAMINE (BENADRYL) injection 50 mg  -     famotidine (PEPCID) injection 20 mg             MEDS ORDERED TODAY:    No orders of the defined types were placed in this encounter.       ---------------------------------------------------------------------------------------------------------------------------          ASSESSMENT/PLAN:      Diagnosis Plan   1. Chronic HFrEF (heart failure with reduced ejection fraction)  Basic Metabolic Panel    Magnesium    proBNP    CBC (No Diff)    Ferritin    Iron Profile    Basic Metabolic Panel    Basic Metabolic Panel    Magnesium    Magnesium    proBNP    proBNP    CBC (No Diff)    CBC (No Diff)    Ferritin    Ferritin    Iron Profile    Iron Profile    ReDs Vest    Ambulatory Referral to [facility] For Infusion Treatment      2. H/O  mechanical aortic valve replacement        3. Iron deficiency  Ambulatory Referral to [facility] For Infusion Treatment      4. Stage 3 chronic kidney disease, unspecified whether stage 3a or 3b CKD  Ambulatory Referral to [facility] For Infusion Treatment      5. COPD without exacerbation        6. Type 2 diabetes mellitus with hyperglycemia, with long-term current use of insulin              CHF GOAL DIRECTED MEDICAL THERAPY FOR PATIENT ADDRESSED/ADJUSTED:     GDMT: HFpEF per Dr. Woody's inpatient evaluation    Drug Class   Drug   Dose Last Dose Adjustment Notes   ACEi/ARB/ARNI Losartan 25mg qd     Beta Blocker Toprol XL  50mg qd     MRA SPironolactone 25mg qd     SGLT2i Jardiance  10mg  N/A   Secondaries if applicable:          -CHF Specific BB:    Metoprolol Succinate  at dose of 50mg qd.    Titration upward limited 2/2 hemodynamics.      -ACE/ARB/ARNi:   Continue Losartan 25mg qd.  BP WNL.      -MRA:   Continue Spironolactone 25mg with Mr. Miranda tolerating well.      -SGLT2 inhibitor therapy:     Recommend continuing Jardiance (empagliflozin) 10mg with quarterly assessment of GFR.   Pt was advised SEs, some severe, including hypersensitivity and Stevie's; coupled with discussion regarding common side effects of UTIs and female genital mycotic infections were discussed. If you will be NPO, or are sick (poor PO intake, N&V) please hold the medication until you are back to a normal diet.     -Diuretic regimen:   ReDS Vest reading for. 05/16/25 is 33; ReDs Vest reading reviewed with patient.    Lasix 40mg every other day on board.    BMP, Mag, & ProBNP with creatinine stable and within baseline.  ProBNP WNL.        -Fluid restriction/Sodium restriction:   Requested 2000 ml restriction  Patient has been asked to weigh daily and was provided with a printed diuretic strategy.  1,500 mg Na restriction was discussed.        -Acute and/or Chronic underlying conditions other than HF addressed during visit:   Essential HTN:    Continue Losartan 25 mg qd. BP well controlled.     Hx Mechanical AV replacement:   Continue chronic anticoagulation with Coumadin as managed per PCP.     ASCVD:   Continue ASA & statin.     CKD 3, a vs. B:   Creatinine stable.      Iron deficiency:   Discussed iron deficiency being common in heart failure with iron deficiency negatively impacting functional capacity, quality of life, and life expectancy in patients with heart failure (HF).  Mr. Miranda has functional iron deficiency with serum ferritin 100-300 ng/mL (@ 65.7)  and TSAT <20%  (@13%). Given those values,  it is felt Benitez Miranda will likely benefit from IV iron infusions to assist with iron deficiency in the setting of heart failure.  Recent Hgb was 13 on 05/16/25.    Referral placed to infusion clinic.   Therapy plan completed for iron infusions with Injectafer if insurance improved.  (Injectafer or Monoferric, if not approved by insurance could try ferumoxytol, iron sucrose or ferric gluconate)       Class 1 obesity:   Heart Failure dietary measures reviewed.     Identifiable barriers to Heart Failure Self-care:   Medical Barriers:  No immediate known barriers  Social Barriers:  No immediate known barriers            This document has been electronically signed by Keren Pedraza PA-C  May 16, 2025 14:12 EDT      Dictated Utilizing Dragon Dictation: Part of this note may be an electronic transcription/translation of spoken language to printed text using the Dragon Dictation System.    Follow-up appointment and medication changes provided in hand delivered After Visit Summary as well as reviewed in the room.

## 2025-05-16 NOTE — PATIENT INSTRUCTIONS
Heart Failure Action Plan  A heart failure action plan helps you know what to do when you have symptoms of heart failure. Your action plan is a color-coded plan that lists the symptoms to watch for and indicates what actions to take.  If you have symptoms in the green zone, you're doing well.  If you have symptoms in the yellow zone, you're having problems.  If you have symptoms in the red zone, you need medical care right away.  Follow the plan that was created by you and your health care provider. Review your plan each time you visit your provider.  Green zone  These signs mean you're doing well and can continue what you're doing:  You don't have new or worsening shortness of breath.  You have very little swelling or no new swelling.  Your weight is stable (no gain or loss).  You have a normal activity level.  You don't have chest pain or any other new symptoms.  Yellow zone  These signs and symptoms mean your condition may be getting worse and you should make some changes:  You have trouble breathing when you're active.  You have swelling in your feet or legs or have discomfort in your belly.  You gain 2-3 lb (0.9-1.4 kg) in 24 hours, or 5 lb (2.3 kg) in a week. This amount may be more or less depending on your condition.  You get tired easily.  You have trouble sleeping.  You have a dry cough.  If you have any of these symptoms:  Contact your provider within the next day.  Your provider may adjust your medicines.  Red zone  These signs and symptoms mean you should get medical help right away:  You have trouble breathing when resting or cannot lie flat and you need to raise your head to help you breathe.  You have a dry cough that's getting worse.  You have swelling or pain in your feet or legs or discomfort in your belly that's getting worse.  You suddenly gain more than 2-3 lb (0.9-1.4 kg) in 24 hours, or more than 5 lb (2.3 kg) in a week. This amount may be more or less depending on your condition.  You have  trouble staying awake or you feel confused.  You don't have an appetite.  You have worsening sadness or depression.  These symptoms may be an emergency. Call 911 right away.  Do not wait to see if the symptoms will go away.  Do not drive yourself to the hospital.  Follow these instructions at home:  Take medicines only as told.  Eat a heart-healthy diet. Work with a dietitian to create an eating plan that's best for you.  Weigh yourself each day.   Call your provider if you gain more than 3 lb in 24 hours, or more than 5 lb in a week.  Health care provider name: Keren [facility] care provider phone number: [phone]

## 2025-05-16 NOTE — PROGRESS NOTES
Heart Failure Clinic  Pharmacist Note     Benitez Miranda is a 69 y.o. male seen in the Heart Failure Clinic for HFpEF? With most recent EF first read at 36-40%, but another cardiologist read it at 50% on 3/1/25. Patient was recently hospitalized from 2/28-3/5/24 for acute CHF. Upon discharge, lasix 40mg bid was added.      Benitez Miranda reports a good understanding of medications and reports adherence. He reports that he has been taking the Lasix 40mg every other day and denies any swelling or SOB with it on board. He reports that his BP's at home run his normal low in the 100's-110, which has been his normal for 2-3 years. He denies any dizziness at any time. IBU PRN is on his med list- he reports that he only takes it when he is having issues with his back/neck. He reports that he does not even use it every week, even though he did take it last night after mowing his lawn.     He reports no issues with paying for his medications at this time.     Medication Use:   Hx of med intolerances:  None related to HF/CV   Retail Rx Management: [facility]    Past Medical History:   Diagnosis Date    Anxiety     Arthritis     CAD (coronary artery disease)     x1 stent; pulmonary HTN; EF 50-55%; rheumatic valve; MV stenosis    CHF (congestive heart failure)     CKD (chronic kidney disease), stage III     COPD (chronic obstructive pulmonary disease)     Depression     Diabetes mellitus     H/O mechanical aortic valve replacement     History of tobacco abuse     Hyperlipidemia     Hypertension     Ischemic heart disease     Kidney failure     third stage    Low back pain     Obesity     BMI 36.    Stroke     Valvular heart disease      ALLERGIES: Penicillins, Ace inhibitors, and Contrast dye (echo or unknown ct/mr)  Current Outpatient Medications   Medication Sig Dispense Refill    amLODIPine (NORVASC) 10 MG tablet Take 1 tablet by mouth Daily. 90 tablet 1    aspirin 81 MG EC tablet Take 1 tablet by mouth Daily. 30 tablet  0    atorvastatin (LIPITOR) 80 MG tablet Take 1 tablet by mouth Every Night. (Patient taking differently: Take 0.5 tablets by mouth Every Night.) 90 tablet 1    cetirizine (zyrTEC) 10 MG tablet Take 1 tablet by mouth Daily As Needed for Allergies.      empagliflozin (JARDIANCE) 10 MG tablet tablet Take 1 tablet by mouth Daily for 180 days. 90 tablet 1    furosemide (Lasix) 40 MG tablet Take 1 tablet by mouth Every Other Day. 60 tablet 5    gabapentin (NEURONTIN) 600 MG tablet Take 1 tablet by mouth Daily As Needed (back pain). 30 tablet 0    ibuprofen (ADVIL,MOTRIN) 200 MG tablet Take 1 tablet by mouth Every 8 (Eight) Hours As Needed for Mild Pain.      Insulin Glargine (BASAGLAR KWIKPEN) 100 UNIT/ML injection pen Inject 30 Units under the skin into the appropriate area as directed 2 (Two) Times a Day. 54 mL 1    Insulin Lispro, 1 Unit Dial, (HUMALOG) 100 UNIT/ML solution pen-injector Inject 15 Units under the skin into the appropriate area as directed 3 (Three) Times a Day As Needed (high blood sugar).      losartan (COZAAR) 25 MG tablet Take 1 tablet by mouth Daily. 90 tablet 1    metoprolol succinate XL (TOPROL-XL) 50 MG 24 hr tablet Take 1 tablet by mouth Daily. 90 tablet 1    multivitamin with minerals tablet tablet Take 1 tablet by mouth Daily.      Semaglutide, 2 MG/DOSE, (OZEMPIC) 8 MG/3ML solution pen-injector Inject 2 mg under the skin into the appropriate area as directed 1 (One) Time Per Week.      sertraline (ZOLOFT) 100 MG tablet Take 1.5 tablets by mouth Daily. 135 tablet 1    spironolactone (ALDACTONE) 25 MG tablet Take 1 tablet by mouth once daily 90 tablet 1    warfarin (COUMADIN) 1 MG tablet Take 1 tablet by mouth Every Night. 90 tablet 1    warfarin (COUMADIN) 6 MG tablet TAKE 1 TABLET BY MOUTH ONCE DAILY EVERY  NIGHT  --  NEW  DOSE  7  MG/DAY 90 tablet 1     No current facility-administered medications for this encounter.       Vaccination History:   Pneumonia: UTD  Annual Influenza: UTD 24/25  "    Objective  Vitals:    05/16/25 1246   BP: 101/55   BP Location: Left arm   Patient Position: Sitting   Cuff Size: Adult   Pulse: 66   SpO2: 97%   Weight: 114 kg (250 lb 12.8 oz)   Height: 182.9 cm (72\")         Wt Readings from Last 3 Encounters:   05/16/25 114 kg (250 lb 12.8 oz)   04/15/25 113 kg (248 lb 6.4 oz)   04/11/25 112 kg (248 lb)         05/16/25  1246   Weight: 114 kg (250 lb 12.8 oz)         Lab Results   Component Value Date    GLUCOSE 224 (H) 05/16/2025    BUN 42 (H) 05/16/2025    CREATININE 1.91 (H) 05/16/2025    EGFRIFNONA 43 (L) 01/25/2022    EGFRIFAFRI 59 (L) 09/12/2016    BCR 22.0 05/16/2025    K 4.0 05/16/2025    CO2 22.9 05/16/2025    CALCIUM 9.4 05/16/2025    ALBUMIN 4.0 03/11/2025    AST 26 03/11/2025    ALT 28 03/11/2025     Lab Results   Component Value Date    WBC 9.68 05/16/2025    HGB 13.0 05/16/2025    HCT 41.1 05/16/2025    MCV 89.2 05/16/2025     05/16/2025     Lab Results   Component Value Date    TROPONINT 31 (H) 03/01/2025     Lab Results   Component Value Date    PROBNP 807.0 05/16/2025     Results for orders placed during the hospital encounter of 02/28/25    Adult Transthoracic Echo Complete w/ Color, Spectral and Contrast if necessary per protocol    Interpretation Summary    Left ventricular systolic function is moderately decreased. Left ventricular ejection fraction appears to be 36 - 40%.    Left ventricular diastolic function is consistent with (grade Ia w/high LAP) impaired relaxation.    Mildly reduced right ventricular systolic function noted.    The left atrial cavity is mild to moderately dilated.    The right atrial cavity is mild to moderately  dilated.    There is a mechanical aortic valve prosthesis present.    Moderate to severe mitral valve regurgitation is present.    Moderate mitral valve stenosis is present.    Estimated right ventricular systolic pressure from tricuspid regurgitation is moderately elevated (45-55 mmHg).         GDMT     Drug Class "   Drug   Dose Last Dose Adjustment Additional Titration   Notes   ACEi/ARB/ARNI Losartan 25mg >3m     Beta Blocker Toprol 50mg >3m     MRA Spironolactone 25mg >3m     SGLT2i Jardiance 10mg 3/14/25  $12.15 for each           Other BP meds Amlodipine 10mg      Lasix 40mg every other day      Drug Therapy Problems    1. Drug-Disease Interactions: IBU on med list      Recommendations:     I counseled the patient again that IBU is not recommended in HF patients in general as it interacts with his water pill, and especially with his kidney issues. Made Keren aware that he uses very rarely, but recommended for him to refrain from using.       Patient was educated on heart failure medications and the importance of medication adherence. All questions were addressed and patient expressed understanding.   Thank you for allowing me to participate in the care of your patient,    Katrina Martines, PharmD  05/16/25  14:23 EDT

## 2025-05-16 NOTE — PROGRESS NOTES
Heart Failure Clinic    Date: 05/16/25     Vitals:    05/16/25 1246   BP: 101/55   Pulse: 66   SpO2: 97%    Weight 250.8    Method of arrival: Ambulatory    Weighing self daily: Most days    Monitoring Heart Failure Zones: Most days    Today's HF Zone: Green    Taking medications as prescribed: Yes    Edema No    Shortness of Air: No    Number of pillows used at night:<2    Educational Materials given:  avs                                                                         ReDS Value: 33  25-35 Optimal Value Status      Niki Colorado RN 05/16/25 12:48 EDT

## 2025-05-19 ENCOUNTER — TELEPHONE (OUTPATIENT)
Dept: FAMILY MEDICINE CLINIC | Facility: CLINIC | Age: 70
End: 2025-05-19
Payer: MEDICARE

## 2025-05-19 ENCOUNTER — INFUSION (OUTPATIENT)
Dept: ONCOLOGY | Facility: HOSPITAL | Age: 70
End: 2025-05-19
Payer: MEDICARE

## 2025-05-19 VITALS
WEIGHT: 249 LBS | DIASTOLIC BLOOD PRESSURE: 84 MMHG | RESPIRATION RATE: 18 BRPM | TEMPERATURE: 97.4 F | SYSTOLIC BLOOD PRESSURE: 121 MMHG | BODY MASS INDEX: 33.77 KG/M2 | HEART RATE: 52 BPM | OXYGEN SATURATION: 99 %

## 2025-05-19 DIAGNOSIS — N18.30 STAGE 3 CHRONIC KIDNEY DISEASE, UNSPECIFIED WHETHER STAGE 3A OR 3B CKD: ICD-10-CM

## 2025-05-19 DIAGNOSIS — E61.1 IRON DEFICIENCY: ICD-10-CM

## 2025-05-19 DIAGNOSIS — I50.22 CHRONIC HFREF (HEART FAILURE WITH REDUCED EJECTION FRACTION): Primary | ICD-10-CM

## 2025-05-19 PROCEDURE — 96365 THER/PROPH/DIAG IV INF INIT: CPT

## 2025-05-19 PROCEDURE — 25810000003 SODIUM CHLORIDE 0.9 % SOLUTION: Performed by: PHYSICIAN ASSISTANT

## 2025-05-19 PROCEDURE — 25010000002 FERRIC CARBOXYMALTOSE 750 MG/15ML SOLUTION 15 ML VIAL: Performed by: PHYSICIAN ASSISTANT

## 2025-05-19 RX ORDER — ACETAMINOPHEN 325 MG/1
650 TABLET ORAL ONCE
Status: COMPLETED | OUTPATIENT
Start: 2025-05-19 | End: 2025-05-19

## 2025-05-19 RX ORDER — FAMOTIDINE 10 MG/ML
20 INJECTION, SOLUTION INTRAVENOUS AS NEEDED
OUTPATIENT
Start: 2025-05-26

## 2025-05-19 RX ORDER — FAMOTIDINE 20 MG/1
20 TABLET, FILM COATED ORAL ONCE
Status: COMPLETED | OUTPATIENT
Start: 2025-05-19 | End: 2025-05-19

## 2025-05-19 RX ORDER — CETIRIZINE HYDROCHLORIDE 10 MG/1
5 TABLET ORAL ONCE
OUTPATIENT
Start: 2025-05-26

## 2025-05-19 RX ORDER — HYDROCORTISONE SODIUM SUCCINATE 100 MG/2ML
100 INJECTION INTRAMUSCULAR; INTRAVENOUS AS NEEDED
OUTPATIENT
Start: 2025-05-26

## 2025-05-19 RX ORDER — DIPHENHYDRAMINE HYDROCHLORIDE 50 MG/ML
50 INJECTION, SOLUTION INTRAMUSCULAR; INTRAVENOUS AS NEEDED
OUTPATIENT
Start: 2025-05-26

## 2025-05-19 RX ORDER — CETIRIZINE HYDROCHLORIDE 10 MG/1
5 TABLET ORAL ONCE
Status: COMPLETED | OUTPATIENT
Start: 2025-05-19 | End: 2025-05-19

## 2025-05-19 RX ORDER — ACETAMINOPHEN 325 MG/1
650 TABLET ORAL ONCE
OUTPATIENT
Start: 2025-05-26

## 2025-05-19 RX ORDER — FAMOTIDINE 20 MG/1
20 TABLET, FILM COATED ORAL ONCE
OUTPATIENT
Start: 2025-05-26

## 2025-05-19 RX ORDER — SODIUM CHLORIDE 9 MG/ML
20 INJECTION, SOLUTION INTRAVENOUS ONCE
Status: COMPLETED | OUTPATIENT
Start: 2025-05-19 | End: 2025-05-19

## 2025-05-19 RX ORDER — SODIUM CHLORIDE 9 MG/ML
20 INJECTION, SOLUTION INTRAVENOUS ONCE
OUTPATIENT
Start: 2025-05-26

## 2025-05-19 RX ADMIN — FAMOTIDINE 20 MG: 20 TABLET, FILM COATED ORAL at 14:43

## 2025-05-19 RX ADMIN — CETIRIZINE HYDROCHLORIDE 5 MG: 10 TABLET, FILM COATED ORAL at 14:43

## 2025-05-19 RX ADMIN — ACETAMINOPHEN 650 MG: 325 TABLET ORAL at 14:43

## 2025-05-19 RX ADMIN — SODIUM CHLORIDE 20 ML/HR: 9 INJECTION, SOLUTION INTRAVENOUS at 15:14

## 2025-05-19 RX ADMIN — FERRIC CARBOXYMALTOSE INJECTION 1000 MG: 50 INJECTION, SOLUTION INTRAVENOUS at 15:13

## 2025-05-19 NOTE — TELEPHONE ENCOUNTER
Caller: KANE WITH [employer]    Relationship: DME PROVIDER    Best call back number: [phone]     What orders are you requesting (i.e. lab or imaging): CGM DIABETIC MONITOR AND SUPPLIES    In what timeframe would the patient need to come in: VISIT NOTES FAX REQUEST SENT 05/16/25 AND 05/17/25    Where will you receive your lab/imaging services:  [facility]    Additional notes: ASKING FOR DOCUMENTATION STATING THE PATIENT IS USING THE CGM. STATES THEY HAVE NOT SPOKEN TO THE PATIENT REGARDING THIS YET.    ASKING TO LOCATE FAX AND RESPOND WITH  ORDER AND NOTES -[phone]....  PHONE CUT OUT WHILE GETTING THE FAX BACK NUMBER. UNABLE TO COMPLETE

## 2025-05-23 ENCOUNTER — ANTICOAGULATION VISIT (OUTPATIENT)
Dept: FAMILY MEDICINE CLINIC | Facility: CLINIC | Age: 70
End: 2025-05-23
Payer: MEDICARE

## 2025-05-23 DIAGNOSIS — Z95.2 H/O MECHANICAL AORTIC VALVE REPLACEMENT: Primary | ICD-10-CM

## 2025-05-23 LAB — INR PPP: 6.2 (ref 3–3.5)

## 2025-05-23 PROCEDURE — 36416 COLLJ CAPILLARY BLOOD SPEC: CPT | Performed by: STUDENT IN AN ORGANIZED HEALTH CARE EDUCATION/TRAINING PROGRAM

## 2025-05-23 PROCEDURE — 85610 PROTHROMBIN TIME: CPT | Performed by: STUDENT IN AN ORGANIZED HEALTH CARE EDUCATION/TRAINING PROGRAM

## 2025-05-27 ENCOUNTER — ANTICOAGULATION VISIT (OUTPATIENT)
Dept: FAMILY MEDICINE CLINIC | Facility: CLINIC | Age: 70
End: 2025-05-27
Payer: MEDICARE

## 2025-05-27 DIAGNOSIS — Z95.2 H/O MECHANICAL AORTIC VALVE REPLACEMENT: Primary | ICD-10-CM

## 2025-05-27 LAB — INR PPP: 1.8 (ref 1–3)

## 2025-05-27 PROCEDURE — 85610 PROTHROMBIN TIME: CPT | Performed by: NURSE PRACTITIONER

## 2025-05-27 PROCEDURE — 36416 COLLJ CAPILLARY BLOOD SPEC: CPT | Performed by: NURSE PRACTITIONER

## 2025-06-03 ENCOUNTER — ANTICOAGULATION VISIT (OUTPATIENT)
Dept: FAMILY MEDICINE CLINIC | Facility: CLINIC | Age: 70
End: 2025-06-03
Payer: MEDICARE

## 2025-06-03 DIAGNOSIS — Z95.2 H/O MECHANICAL AORTIC VALVE REPLACEMENT: Primary | ICD-10-CM

## 2025-06-03 LAB — INR PPP: 3.7 (ref 3–3.5)

## 2025-06-03 PROCEDURE — 36416 COLLJ CAPILLARY BLOOD SPEC: CPT | Performed by: FAMILY MEDICINE

## 2025-06-03 PROCEDURE — 85610 PROTHROMBIN TIME: CPT | Performed by: FAMILY MEDICINE

## 2025-06-11 ENCOUNTER — ANTICOAGULATION VISIT (OUTPATIENT)
Dept: FAMILY MEDICINE CLINIC | Facility: CLINIC | Age: 70
End: 2025-06-11
Payer: MEDICARE

## 2025-06-11 DIAGNOSIS — Z79.01 ANTICOAGULANT LONG-TERM USE: Primary | ICD-10-CM

## 2025-06-11 LAB — INR PPP: 5.3 (ref 3–3.5)

## 2025-06-11 PROCEDURE — 85610 PROTHROMBIN TIME: CPT | Performed by: STUDENT IN AN ORGANIZED HEALTH CARE EDUCATION/TRAINING PROGRAM

## 2025-06-11 PROCEDURE — 36416 COLLJ CAPILLARY BLOOD SPEC: CPT | Performed by: STUDENT IN AN ORGANIZED HEALTH CARE EDUCATION/TRAINING PROGRAM

## 2025-06-12 ENCOUNTER — DOCUMENTATION (OUTPATIENT)
Dept: CARDIOLOGY | Facility: HOSPITAL | Age: 70
End: 2025-06-12
Payer: MEDICARE

## 2025-06-17 ENCOUNTER — OFFICE VISIT (OUTPATIENT)
Dept: FAMILY MEDICINE CLINIC | Facility: CLINIC | Age: 70
End: 2025-06-17
Payer: MEDICARE

## 2025-06-17 ENCOUNTER — LAB (OUTPATIENT)
Dept: FAMILY MEDICINE CLINIC | Facility: CLINIC | Age: 70
End: 2025-06-17
Payer: MEDICARE

## 2025-06-17 VITALS
BODY MASS INDEX: 32.78 KG/M2 | OXYGEN SATURATION: 96 % | TEMPERATURE: 96.8 F | SYSTOLIC BLOOD PRESSURE: 102 MMHG | DIASTOLIC BLOOD PRESSURE: 52 MMHG | RESPIRATION RATE: 16 BRPM | WEIGHT: 242 LBS | HEART RATE: 77 BPM | HEIGHT: 72 IN

## 2025-06-17 DIAGNOSIS — Z12.5 ENCOUNTER FOR SCREENING FOR MALIGNANT NEOPLASM OF PROSTATE: ICD-10-CM

## 2025-06-17 DIAGNOSIS — Z79.4 TYPE 2 DIABETES MELLITUS WITH STAGE 3 CHRONIC KIDNEY DISEASE, WITH LONG-TERM CURRENT USE OF INSULIN, UNSPECIFIED WHETHER STAGE 3A OR 3B CKD: Primary | ICD-10-CM

## 2025-06-17 DIAGNOSIS — E11.59 HYPERTENSION ASSOCIATED WITH DIABETES: ICD-10-CM

## 2025-06-17 DIAGNOSIS — F33.42 RECURRENT MAJOR DEPRESSIVE DISORDER, IN FULL REMISSION: ICD-10-CM

## 2025-06-17 DIAGNOSIS — N18.30 TYPE 2 DIABETES MELLITUS WITH STAGE 3 CHRONIC KIDNEY DISEASE, WITH LONG-TERM CURRENT USE OF INSULIN, UNSPECIFIED WHETHER STAGE 3A OR 3B CKD: Primary | ICD-10-CM

## 2025-06-17 DIAGNOSIS — N18.30 TYPE 2 DIABETES MELLITUS WITH STAGE 3 CHRONIC KIDNEY DISEASE, WITH LONG-TERM CURRENT USE OF INSULIN, UNSPECIFIED WHETHER STAGE 3A OR 3B CKD: ICD-10-CM

## 2025-06-17 DIAGNOSIS — Z79.4 TYPE 2 DIABETES MELLITUS WITH STAGE 3 CHRONIC KIDNEY DISEASE, WITH LONG-TERM CURRENT USE OF INSULIN, UNSPECIFIED WHETHER STAGE 3A OR 3B CKD: ICD-10-CM

## 2025-06-17 DIAGNOSIS — E11.22 TYPE 2 DIABETES MELLITUS WITH STAGE 3 CHRONIC KIDNEY DISEASE, WITH LONG-TERM CURRENT USE OF INSULIN, UNSPECIFIED WHETHER STAGE 3A OR 3B CKD: Primary | ICD-10-CM

## 2025-06-17 DIAGNOSIS — Z95.2 H/O MECHANICAL AORTIC VALVE REPLACEMENT: ICD-10-CM

## 2025-06-17 DIAGNOSIS — E11.22 TYPE 2 DIABETES MELLITUS WITH STAGE 3 CHRONIC KIDNEY DISEASE, WITH LONG-TERM CURRENT USE OF INSULIN, UNSPECIFIED WHETHER STAGE 3A OR 3B CKD: ICD-10-CM

## 2025-06-17 DIAGNOSIS — I15.2 HYPERTENSION ASSOCIATED WITH DIABETES: ICD-10-CM

## 2025-06-17 DIAGNOSIS — D50.9 IRON DEFICIENCY ANEMIA, UNSPECIFIED IRON DEFICIENCY ANEMIA TYPE: ICD-10-CM

## 2025-06-17 DIAGNOSIS — G62.9 NEUROPATHY: ICD-10-CM

## 2025-06-17 LAB
GLUCOSE BLDC GLUCOMTR-MCNC: 155 MG/DL (ref 70–130)
INR PPP: 3.3 (ref 3–3.5)

## 2025-06-17 PROCEDURE — 84443 ASSAY THYROID STIM HORMONE: CPT | Performed by: FAMILY MEDICINE

## 2025-06-17 PROCEDURE — G0103 PSA SCREENING: HCPCS | Performed by: FAMILY MEDICINE

## 2025-06-17 PROCEDURE — 83036 HEMOGLOBIN GLYCOSYLATED A1C: CPT | Performed by: FAMILY MEDICINE

## 2025-06-17 PROCEDURE — 84439 ASSAY OF FREE THYROXINE: CPT | Performed by: FAMILY MEDICINE

## 2025-06-17 PROCEDURE — 36415 COLL VENOUS BLD VENIPUNCTURE: CPT

## 2025-06-17 RX ORDER — SERTRALINE HYDROCHLORIDE 100 MG/1
150 TABLET, FILM COATED ORAL DAILY
Qty: 135 TABLET | Refills: 1 | Status: SHIPPED | OUTPATIENT
Start: 2025-06-17

## 2025-06-17 RX ORDER — GABAPENTIN 600 MG/1
600 TABLET ORAL DAILY PRN
Qty: 30 TABLET | Refills: 0 | Status: SHIPPED | OUTPATIENT
Start: 2025-06-17

## 2025-06-17 RX ORDER — LOSARTAN POTASSIUM 25 MG/1
25 TABLET ORAL DAILY
Qty: 90 TABLET | Refills: 1 | Status: SHIPPED | OUTPATIENT
Start: 2025-06-17

## 2025-06-18 ENCOUNTER — RESULTS FOLLOW-UP (OUTPATIENT)
Dept: FAMILY MEDICINE CLINIC | Facility: CLINIC | Age: 70
End: 2025-06-18
Payer: MEDICARE

## 2025-06-18 LAB
HBA1C MFR BLD: 7.5 % (ref 4.8–5.6)
PSA SERPL-MCNC: 1.09 NG/ML (ref 0–4)
T4 FREE SERPL-MCNC: 1.05 NG/DL (ref 0.92–1.68)
TSH SERPL DL<=0.05 MIU/L-ACNC: 2.69 UIU/ML (ref 0.27–4.2)

## 2025-06-18 NOTE — LETTER
Benitez Miranda  [address] [place] [address]    June 18, 2025     Dear Mr. Miranda:    Below are the results from your recent visit:Labs stable     Resulted Orders   PSA Screen   Result Value Ref Range    PSA 1.090 0.000 - 4.000 ng/mL   Hemoglobin A1c   Result Value Ref Range    Hemoglobin A1C 7.50 (H) 4.80 - 5.60 %   TSH   Result Value Ref Range    TSH 2.690 0.270 - 4.200 uIU/mL   T4, Free   Result Value Ref Range    Free T4 1.05 0.92 - 1.68 ng/dL         If you have any questions or concerns, please don't hesitate to call.         Sincerely,        Mesha Christina MD

## 2025-06-24 ENCOUNTER — HOSPITAL ENCOUNTER (OUTPATIENT)
Dept: CARDIOLOGY | Facility: HOSPITAL | Age: 70
Discharge: HOME | End: 2025-06-24
Payer: MEDICARE

## 2025-06-24 ENCOUNTER — ANTICOAGULATION VISIT (OUTPATIENT)
Dept: FAMILY MEDICINE CLINIC | Facility: CLINIC | Age: 70
End: 2025-06-24
Payer: MEDICARE

## 2025-06-24 ENCOUNTER — INFUSION (OUTPATIENT)
Dept: ONCOLOGY | Facility: HOSPITAL | Age: 70
End: 2025-06-24
Payer: MEDICARE

## 2025-06-24 VITALS
SYSTOLIC BLOOD PRESSURE: 92 MMHG | OXYGEN SATURATION: 98 % | RESPIRATION RATE: 18 BRPM | TEMPERATURE: 98.7 F | HEART RATE: 62 BPM | DIASTOLIC BLOOD PRESSURE: 58 MMHG

## 2025-06-24 DIAGNOSIS — N18.30 STAGE 3 CHRONIC KIDNEY DISEASE, UNSPECIFIED WHETHER STAGE 3A OR 3B CKD: ICD-10-CM

## 2025-06-24 DIAGNOSIS — E61.1 IRON DEFICIENCY: ICD-10-CM

## 2025-06-24 DIAGNOSIS — I50.22 CHRONIC HFREF (HEART FAILURE WITH REDUCED EJECTION FRACTION): ICD-10-CM

## 2025-06-24 DIAGNOSIS — I05.0 RHEUMATIC MITRAL STENOSIS: ICD-10-CM

## 2025-06-24 DIAGNOSIS — Z95.2 H/O MECHANICAL AORTIC VALVE REPLACEMENT: ICD-10-CM

## 2025-06-24 DIAGNOSIS — Z79.01 ANTICOAGULANT LONG-TERM USE: Primary | ICD-10-CM

## 2025-06-24 DIAGNOSIS — I50.22 CHRONIC HFREF (HEART FAILURE WITH REDUCED EJECTION FRACTION): Primary | ICD-10-CM

## 2025-06-24 LAB
BH CV ECHO MEAS - EDV(CUBED): 85.2 ML
BH CV ECHO MEAS - EDV(MOD-SP4): 75 ML
BH CV ECHO MEAS - EF(MOD-SP4): 54 %
BH CV ECHO MEAS - ESV(CUBED): 54.9 ML
BH CV ECHO MEAS - ESV(MOD-SP4): 34.5 ML
BH CV ECHO MEAS - FS: 13.6 %
BH CV ECHO MEAS - IVS/LVPW: 0.86 CM
BH CV ECHO MEAS - IVSD: 1.9 CM
BH CV ECHO MEAS - LV DIASTOLIC VOL/BSA (35-75): 32.5 CM2
BH CV ECHO MEAS - LV MASS(C)D: 440.7 GRAMS
BH CV ECHO MEAS - LV SYSTOLIC VOL/BSA (12-30): 14.9 CM2
BH CV ECHO MEAS - LVIDD: 4.4 CM
BH CV ECHO MEAS - LVIDS: 3.8 CM
BH CV ECHO MEAS - LVPWD: 2.2 CM
BH CV ECHO MEAS - SV(MOD-SP4): 40.5 ML
BH CV ECHO MEAS - SVI(MOD-SP4): 17.5 ML/M2
INR PPP: 3.1 (ref 3–3.5)

## 2025-06-24 PROCEDURE — 93308 TTE F-UP OR LMTD: CPT

## 2025-06-24 PROCEDURE — 25810000003 SODIUM CHLORIDE 0.9 % SOLUTION: Performed by: PHYSICIAN ASSISTANT

## 2025-06-24 PROCEDURE — 25010000002 FERRIC CARBOXYMALTOSE 750 MG/15ML SOLUTION 15 ML VIAL: Performed by: PHYSICIAN ASSISTANT

## 2025-06-24 PROCEDURE — 96365 THER/PROPH/DIAG IV INF INIT: CPT

## 2025-06-24 PROCEDURE — 93308 TTE F-UP OR LMTD: CPT | Performed by: INTERNAL MEDICINE

## 2025-06-24 PROCEDURE — 85610 PROTHROMBIN TIME: CPT | Performed by: FAMILY MEDICINE

## 2025-06-24 PROCEDURE — 36416 COLLJ CAPILLARY BLOOD SPEC: CPT | Performed by: FAMILY MEDICINE

## 2025-06-24 RX ORDER — ACETAMINOPHEN 325 MG/1
650 TABLET ORAL ONCE
Status: CANCELLED | OUTPATIENT
Start: 2025-06-24

## 2025-06-24 RX ORDER — FAMOTIDINE 10 MG/ML
20 INJECTION, SOLUTION INTRAVENOUS AS NEEDED
OUTPATIENT
Start: 2025-06-24

## 2025-06-24 RX ORDER — CETIRIZINE HYDROCHLORIDE 10 MG/1
5 TABLET ORAL ONCE
Status: COMPLETED | OUTPATIENT
Start: 2025-06-24 | End: 2025-06-24

## 2025-06-24 RX ORDER — CETIRIZINE HYDROCHLORIDE 10 MG/1
5 TABLET ORAL ONCE
Status: CANCELLED | OUTPATIENT
Start: 2025-06-24

## 2025-06-24 RX ORDER — SODIUM CHLORIDE 9 MG/ML
20 INJECTION, SOLUTION INTRAVENOUS ONCE
Status: COMPLETED | OUTPATIENT
Start: 2025-06-24 | End: 2025-06-24

## 2025-06-24 RX ORDER — DIPHENHYDRAMINE HYDROCHLORIDE 50 MG/ML
50 INJECTION, SOLUTION INTRAMUSCULAR; INTRAVENOUS AS NEEDED
OUTPATIENT
Start: 2025-06-24

## 2025-06-24 RX ORDER — FAMOTIDINE 20 MG/1
20 TABLET, FILM COATED ORAL ONCE
Status: COMPLETED | OUTPATIENT
Start: 2025-06-24 | End: 2025-06-24

## 2025-06-24 RX ORDER — HYDROCORTISONE SODIUM SUCCINATE 100 MG/2ML
100 INJECTION INTRAMUSCULAR; INTRAVENOUS AS NEEDED
OUTPATIENT
Start: 2025-06-24

## 2025-06-24 RX ORDER — FAMOTIDINE 20 MG/1
20 TABLET, FILM COATED ORAL ONCE
Status: CANCELLED | OUTPATIENT
Start: 2025-06-24

## 2025-06-24 RX ORDER — ACETAMINOPHEN 325 MG/1
650 TABLET ORAL ONCE
Status: COMPLETED | OUTPATIENT
Start: 2025-06-24 | End: 2025-06-24

## 2025-06-24 RX ORDER — SODIUM CHLORIDE 9 MG/ML
20 INJECTION, SOLUTION INTRAVENOUS ONCE
Status: CANCELLED | OUTPATIENT
Start: 2025-06-24

## 2025-06-24 RX ADMIN — SODIUM CHLORIDE 20 ML/HR: 9 INJECTION, SOLUTION INTRAVENOUS at 15:52

## 2025-06-24 RX ADMIN — FAMOTIDINE 20 MG: 20 TABLET, FILM COATED ORAL at 15:09

## 2025-06-24 RX ADMIN — CETIRIZINE HYDROCHLORIDE 5 MG: 10 TABLET, FILM COATED ORAL at 15:10

## 2025-06-24 RX ADMIN — ACETAMINOPHEN 650 MG: 325 TABLET ORAL at 15:10

## 2025-06-24 RX ADMIN — FERRIC CARBOXYMALTOSE INJECTION 500 MG: 50 INJECTION, SOLUTION INTRAVENOUS at 15:51

## 2025-06-25 ENCOUNTER — OFFICE VISIT (OUTPATIENT)
Dept: CARDIOLOGY | Facility: CLINIC | Age: 70
End: 2025-06-25
Payer: MEDICARE

## 2025-06-25 VITALS
HEIGHT: 72 IN | WEIGHT: 245.4 LBS | RESPIRATION RATE: 16 BRPM | SYSTOLIC BLOOD PRESSURE: 104 MMHG | OXYGEN SATURATION: 96 % | DIASTOLIC BLOOD PRESSURE: 66 MMHG | HEART RATE: 60 BPM | BODY MASS INDEX: 33.24 KG/M2

## 2025-06-25 DIAGNOSIS — I50.22 CHRONIC HFREF (HEART FAILURE WITH REDUCED EJECTION FRACTION): ICD-10-CM

## 2025-06-25 DIAGNOSIS — Z79.4 TYPE 2 DIABETES MELLITUS WITH HYPERGLYCEMIA, WITH LONG-TERM CURRENT USE OF INSULIN: ICD-10-CM

## 2025-06-25 DIAGNOSIS — Z95.2 H/O MECHANICAL AORTIC VALVE REPLACEMENT: Primary | ICD-10-CM

## 2025-06-25 DIAGNOSIS — I48.20 ATRIAL FIBRILLATION, CHRONIC: ICD-10-CM

## 2025-06-25 DIAGNOSIS — N18.30 STAGE 3 CHRONIC KIDNEY DISEASE, UNSPECIFIED WHETHER STAGE 3A OR 3B CKD: ICD-10-CM

## 2025-06-25 DIAGNOSIS — I10 ESSENTIAL HYPERTENSION: ICD-10-CM

## 2025-06-25 DIAGNOSIS — I05.0 RHEUMATIC MITRAL STENOSIS: ICD-10-CM

## 2025-06-25 DIAGNOSIS — Z86.73 HISTORY OF CVA (CEREBROVASCULAR ACCIDENT): ICD-10-CM

## 2025-06-25 DIAGNOSIS — E11.65 TYPE 2 DIABETES MELLITUS WITH HYPERGLYCEMIA, WITH LONG-TERM CURRENT USE OF INSULIN: ICD-10-CM

## 2025-06-26 NOTE — PROGRESS NOTES
Mesha Christina MD  No ref. provider found    Benitez Miranda  1955  06/25/2025    Subjective     Benitez Miranda is a 69 y.o. male who presents today to [facility] for Follow-up and Med Management (Verbal, no changes.).  History of Present Illness  69-year-old male with mechanical aortic valve replacement, stroke, and rheumatic mitral stenosis.    On Coumadin since 1991 valve replacement. INR goal 3.3-3.5, recent 3.1. Managed elevated INR with PCP. No new chest pain or shortness of breath. Prefers medical management over surgery for valvular heart disease. Compliant with amlodipine, aspirin, cholesterol medication, Jardiance, Lasix (every other day), losartan, metoprolol, spironolactone.    Experienced stroke with suboptimal INR.    Moderate to severe rheumatic mitral stenosis.    Mechanical aortic valve replacement in 1991.      Allergies   Allergen Reactions    Penicillins Swelling     Tongue swelled    Ace Inhibitors Angioedema    Contrast Dye (Echo Or Unknown Ct/Mr) Rash   :    Current Outpatient Medications:     amLODIPine (NORVASC) 10 MG tablet, Take 1 tablet by mouth Daily., Disp: 90 tablet, Rfl: 1    aspirin 81 MG EC tablet, Take 1 tablet by mouth Daily., Disp: 30 tablet, Rfl: 0    atorvastatin (LIPITOR) 80 MG tablet, Take 1 tablet by mouth Every Night. (Patient taking differently: Take 0.5 tablets by mouth Every Night.), Disp: 90 tablet, Rfl: 1    cetirizine (zyrTEC) 10 MG tablet, Take 1 tablet by mouth Daily As Needed for Allergies., Disp: , Rfl:     empagliflozin (JARDIANCE) 10 MG tablet tablet, Take 1 tablet by mouth Daily for 180 days., Disp: 90 tablet, Rfl: 1    furosemide (Lasix) 40 MG tablet, Take 1 tablet by mouth Every Other Day., Disp: 60 tablet, Rfl: 5    gabapentin (NEURONTIN) 600 MG tablet, Take 1 tablet by mouth Daily As Needed (back pain)., Disp: 30 tablet, Rfl: 0    ibuprofen (ADVIL,MOTRIN) 200 MG tablet, Take 1 tablet by mouth Every 8 (Eight) Hours  As Needed for Mild Pain., Disp: , Rfl:     Insulin Glargine (BASAGLAR KWIKPEN) 100 UNIT/ML injection pen, Inject 30 Units under the skin into the appropriate area as directed 2 (Two) Times a Day., Disp: 54 mL, Rfl: 1    Insulin Lispro, 1 Unit Dial, (HUMALOG) 100 UNIT/ML solution pen-injector, Inject 15 Units under the skin into the appropriate area as directed 3 (Three) Times a Day As Needed (high blood sugar)., Disp: , Rfl:     losartan (COZAAR) 25 MG tablet, Take 1 tablet by mouth Daily., Disp: 90 tablet, Rfl: 1    metoprolol succinate XL (TOPROL-XL) 50 MG 24 hr tablet, Take 1 tablet by mouth Daily., Disp: 90 tablet, Rfl: 1    multivitamin with minerals tablet tablet, Take 1 tablet by mouth Daily., Disp: , Rfl:     Semaglutide, 2 MG/DOSE, (OZEMPIC) 8 MG/3ML solution pen-injector, Inject 2 mg under the skin into the appropriate area as directed 1 (One) Time Per Week., Disp: , Rfl:     sertraline (ZOLOFT) 100 MG tablet, Take 1.5 tablets by mouth Daily., Disp: 135 tablet, Rfl: 1    spironolactone (ALDACTONE) 25 MG tablet, Take 1 tablet by mouth once daily, Disp: 90 tablet, Rfl: 1    warfarin (COUMADIN) 1 MG tablet, Take 1 tablet by mouth Every Night., Disp: 90 tablet, Rfl: 1    warfarin (COUMADIN) 6 MG tablet, TAKE 1 TABLET BY MOUTH ONCE DAILY EVERY  NIGHT  --  NEW  DOSE  7  MG/DAY, Disp: 90 tablet, Rfl: 1    Past Medical History:   Diagnosis Date    Anxiety     Arthritis     CAD (coronary artery disease)     x1 stent; pulmonary HTN; EF 50-55%; rheumatic valve; MV stenosis    CHF (congestive heart failure)     CKD (chronic kidney disease), stage III     COPD (chronic obstructive pulmonary disease)     Depression     Diabetes mellitus     H/O mechanical aortic valve replacement     History of tobacco abuse     Hyperlipidemia     Hypertension     Ischemic heart disease     Kidney failure     third stage    Low back pain     Obesity     BMI 36.    Stroke     Valvular heart disease      Past Surgical History:   Procedure  "Laterality Date    AORTIC VALVE REPAIR/REPLACEMENT      CARDIAC CATHETERIZATION      CARDIAC SURGERY      valve on aortic    CARDIAC SURGERY      stent     CATARACT EXTRACTION      COLONOSCOPY      CORONARY STENT PLACEMENT      CYSTOSCOPY URETEROSCOPY LASER LITHOTRIPSY Left 2020    Procedure: CYSTOSCOPY URETEROSCOPY LASER LITHOTRIPSY WITH STENT PLACEMENT;  Surgeon: Jonah Montgomery MD;  Location: [facility];  Service: Urology;  Laterality: Left;    KIDNEY STONE SURGERY       Family History   Problem Relation Age of Onset    Cancer Mother         breast    COPD Father     Heart attack Father 74    Diabetes Paternal Grandmother         both legs removed     Social History     Tobacco Use    Smoking status: Former     Current packs/day: 0.00     Average packs/day: 1 pack/day for 30.0 years (30.0 ttl pk-yrs)     Types: Cigarettes, Pipe, Cigars     Start date: 1986     Quit date: 2016     Years since quittin.9    Smokeless tobacco: Never    Tobacco comments:     currently smokes a pipe   Vaping Use    Vaping status: Never Used   Substance Use Topics    Alcohol use: No    Drug use: No       Objective   Blood pressure 104/66, pulse 60, resp. rate 16, height 182.9 cm (72.01\"), weight 111 kg (245 lb 6.4 oz), SpO2 96%.  Body mass index is 33.27 kg/m².    Constitutional:       Appearance: Not in distress.   Pulmonary:      Effort: Pulmonary effort is normal.      Breath sounds: Normal breath sounds.   Cardiovascular:      Normal rate. Regular rhythm. Normal S1. Normal S2.       Murmurs: There is a systolic murmur.   Edema:     Peripheral edema absent.   Skin:     General: Skin is warm.           DATA:  Results for orders placed during the hospital encounter of 20    SCANNED - TELEMETRY     Results for orders placed during the hospital encounter of 25    Adult Transthoracic Echo Limited W/ Cont if Necessary Per Protocol    Interpretation Summary    The study is technically " difficult for diagnosis.    Limited echocardiogram done to follow-up on the ejection fraction.  Interrogation of the valves was not done.    Left ventricular ejection fraction appears to be 41 - 45%.    The left atrial cavity is moderately dilated.    Compared to the prior echo from 2025, ejection fraction is slightly improved.   Results for orders placed in visit on 14    Stress test with myocardial perfusion    Narrative  Patient:      LAURA VELAZQUEZ  Med Rec#:     [id number]               :          1955  Date:         2014            Age:          58y  Account#:     [id number]            Height:       183.1 cm / 72.1 in  Accession#:   [id number]               Sex:          M  Weight:       120.45 kg / 265.5 lbs  BSA:          2.41  Admit Date#:  2014  Loc:          exam room 2    Referring:    Fabio Walker MD  Reading:      Yoshi Weldon MD   [employer]  Nuclear Med [facility]  Nuclear Med [facility]  ______________________________________________________________________    Combined Nuclear/Stress Test    ICD Codes:      786.50 Chest pain, unspecified    Checklists:   Patient verbally identified self   Patient identified by ID band   Patient consent obtained in lab   Procedure verified and explained to patient   History and physical performed   Last Caffeine 2014 18:00:00   Last Meal 2014 18:00:00    History of:    Dyslipidemia Lipid Therapy Hypertension Renal  Failure Coronary Artery Disease (CAD) Family History of CAD PCN / IVP  DYE / SULFA Diabetes, managed with oral agents Diabetes for 11 years  METFORMIN,LEVEMIR Current smoker Typical Angina Dyspnea at rest  Paroxysmal nocturnal dyspnea Dyspnea with minimal exertion Dyspnea  with normal daily activity Dyspnea with more than normal activityNo  History of: Reactive Airway Disease Chronic Lung Disease Dialysis  CHF Peripheral Vascular Disease Cerebrovascular Disease  Family  History of CHF  Cardiac Events and Interventions:  Most recent stent placed in 2007. Stent in LAD. Most recent valvular  surgery performed in 1997.    Most Recent Prior Cardiac Testing :   Pharmacologic Stress Test (date unknown)    Current Clinical Presentation:  The patient had no chest pain on presentation. The patient has no recent  history of CHF.    Medications I :    Amlodipine,Victoza,Levemir,Eplercrone,Cetirizine,Metformin,Meclizine,Zol  pidem,Alprazolam.   Coumadin (Warfarin)   Hydrochlorthiazide (HCTZ)   Metoprolol (Lopressor, Toprol)   Pravastatin (Pravachol)        Discharge At Completion of Exam :   Home    Baseline ECG:  NSR and IVCD  Pharmacologic Protocol:  A 400 mcg dose of Regadenoson was administered by intravenous bolus  injection.    Stress ECG :  Inconclusive due to baseline changes.  Recovery ECG:  3 minutes into recovery. No medications were administered during the  recovery period. No changes  Hemodynamics  Rest        Stress        Recovery  SBP         103 mmHg    145 mmHg      143 mmHg  DBP         83 mmHg     79 mmHg       84 mmHg  HR =       65 bpm      84 bpm        74 bpm  %MPHR                   52 %    Imaging Protocol:  One day stress-rest imaging protocol was followed using Tc-99m sestamibi  (Cardiolite) injected intravenously. For the rest portion of the study,  32 mCi of the radiopharmaceutical was administered at 01/30/2014  10:57:56. For the stress portion of the study, 10.7 mCi was administered  at 01/30/2014 09:00:10.    Perfusion Interpretation:  TID Ratio 1.01. There was a small, fixed defect in the apex segment(s).  The extent of this perfusion defect was mild.    Wall Motion Interpretation:  Gated imaging under rest conditions demonstrated normal wallmotion.  The patient's calculated rest LVEF was 59%. The patient's end diastolic  volume was 183ml. The patient's end systolic volume was 74ml.    Stress Test Conclusion:  There was a normal heart rate response,  "with a normal blood pressure  response.  The patient experienced no chest pain. Symptoms noted during  stress include: dyspnea. Stress test is inconclusive by ECG criteria due  baseline changes.  Nuclear Cardiology Conclusion:  No evidence of Lexiscan induced ishemia. A small ssized fixed defect in  apical segment is an artifact. Normal LV systolic function and wall  motion    Electronically signed by: Yoshi Weldon MD on 01/30/2014 19:01:19  Thank you for the courtesy of this referral.     Results for orders placed during the hospital encounter of 01/02/25    US Renal Bilateral    Narrative  EXAMINATION: US RENAL BILATERAL-    CLINICAL INDICATION: Left kidney mass seen on CT? Needs followup;  N28.89-Other specified disorders of kidney and ureter      COMPARISON: None immediately available    PROCEDURE: Sonographic imaging of the kidneys    FINDINGS:  Imaging of the right kidney demonstrates the right kidney measuring  11.03 x 5.01 x 4.28 cm. No solid mass or hydronephrosis.    Left kidney measures 10.77 x 4.84 x 4.29 cm. No solid mass or  hydronephrosis    Impression  1. No solid mass identified on today's exam..      This report was finalized on 1/2/2025 10:09 AM by Dr. Floyd Rodriguez MD.      Lab Results   Component Value Date     (H) 10/25/2015     Lab Results   Component Value Date    PSA 1.090 06/17/2025      Lab Results   Component Value Date    MG 1.9 05/16/2025     Lab Results   Component Value Date    INR 3.10 (A) 06/24/2025    INR 3.30 (A) 06/17/2025    INR 5.30 (A) 06/11/2025     No results found for: \"CKTOTAL\"  Lab Results   Component Value Date    CHOL 71 09/13/2024    CHOL 68 06/23/2023    CHOL 63 04/10/2023    CHLPL 167 07/18/2016    CHLPL 135 06/08/2015     Lab Results   Component Value Date    TRIG 102 09/13/2024    TRIG 105 06/23/2023    TRIG 57 04/10/2023     Lab Results   Component Value Date    HDL 40 09/13/2024    HDL 39 (L) 06/23/2023    HDL 45 04/10/2023     Lab Results " "  Component Value Date    LDL 12 09/13/2024    LDL 9 06/23/2023    LDL <5 04/10/2023     No components found for: \"A1C\"      Lab Results   Component Value Date    TSH 2.690 06/17/2025             Invalid input(s): \"LABALBU\", \"PROT\"        Results review: During today's encounter, all relevant clinical data has been reviewed.      Procedures    Assessment & Plan    Diagnosis Plan   1. H/O mechanical aortic valve replacement        2. Rheumatic mitral stenosis        3. Chronic HFrEF (heart failure with reduced ejection fraction)        4. Stage 3 chronic kidney disease, unspecified whether stage 3a or 3b CKD        5. Type 2 diabetes mellitus with hyperglycemia, with long-term current use of insulin        6. Essential hypertension        7. History of CVA (cerebrovascular accident)        8. Atrial fibrillation, chronic          Assessment & Plan  1. Moderate to severe rheumatic mitral valve stenosis/new onset HFrEF/status post mechanical aortic valve replacement 1991 on Coumadin/prior CVA in setting of subtherapeutic INR current INR target 3-3.5/Atrial fibrillation    - Continue Coumadin with INR goal 3.3-3.5  - Recent INR 3.1  - Continue current medications: Continue amlodipine since EF is more than 40%, aspirin, cholesterol medication, Jardiance, Lasix (every other day), losartan, metoprolol, spironolactone  - No new chest pain or shortness of breath  - Continue medical management  - Refer to surgeon in [place] if condition worsens or medications fail    2. Stroke:  - Monitor INR closely to prevent recurrence    3. Rheumatic mitral stenosis:  - Moderate to severe  - EF improved to 45%  - Continue current medications    Discussed risks, benefits, and alternatives of medical management vs. surgery. Patient prefers medications over surgery.    Follow-up in 6 months    Recommendations  No orders of the defined types were placed in this encounter.       New Medications:   No orders of the defined types were placed " in this encounter.      Discontinued Medications:   There are no discontinued medications.     Return in about 6 months (around 12/25/2025).    Patient or patient representative verbalized consent for the use of Ambient Listening during the visit with  Zoya Woody MD for chart documentation. 6/26/2025  12:47 EDT      Thank you for allowing me to participate in the care of Benitez Miranda. Feel free to contact me directly with any further questions or concerns.        This document has been electronically signed by Zoya Woody MD   June 26, 2025 12:47 EDT    Dictated Utilizing Dragon Dictation: Part of this note may be an electronic transcription/translation of spoken language to printed text using the Dragon Dictation System.

## 2025-07-02 NOTE — PROGRESS NOTES
"Benitez Miranda     VITALS: Blood pressure 102/52, pulse 77, temperature 96.8 °F (36 °C), temperature source Temporal, resp. rate 16, height 182.9 cm (72\"), weight 110 kg (242 lb), SpO2 96%.    Subjective  Chief Complaint  Diabetes, Chronic Kidney Disease, and Aortic Valve Replacement    Subjective          History of Present Illness:    History of Present Illness  The patient is a 69-year-old male with medical conditions significant for rheumatic mitral stenosis, hyperlipidemia, hypertension, coronary artery disease (CAD), history of stroke, and type 2 diabetes who presents to the clinic for a medical follow-up.    He is currently on a regimen of 7 mg of Coumadin. His INR today was 3.3.    He reports satisfactory management of his diabetes with insulin.    He expresses curiosity about the necessity of iron transfusions, as he has not observed any significant changes in his condition. He is scheduled for another iron infusion on Monday.    No complaints regarding medications.     The following portions of the patient's history were reviewed and updated as appropriate: allergies, current medications, past family history, past medical history, past social history, past surgical history and problem list.    Past Medical History  Past Medical History:   Diagnosis Date    Anxiety     Arthritis     CAD (coronary artery disease)     x1 stent; pulmonary HTN; EF 50-55%; rheumatic valve; MV stenosis    CHF (congestive heart failure)     CKD (chronic kidney disease), stage III     COPD (chronic obstructive pulmonary disease)     Depression     Diabetes mellitus     H/O mechanical aortic valve replacement     History of tobacco abuse     Hyperlipidemia     Hypertension     Ischemic heart disease     Kidney failure     third stage    Low back pain     Obesity     BMI 36.    Stroke     Valvular heart disease        Surgical History  Past Surgical History:   Procedure Laterality Date    AORTIC VALVE REPAIR/REPLACEMENT  1991    " "CARDIAC CATHETERIZATION      CARDIAC SURGERY      valve on aortic    CARDIAC SURGERY      stent     CATARACT EXTRACTION      COLONOSCOPY      CORONARY STENT PLACEMENT  2005    CYSTOSCOPY URETEROSCOPY LASER LITHOTRIPSY Left 2020    Procedure: CYSTOSCOPY URETEROSCOPY LASER LITHOTRIPSY WITH STENT PLACEMENT;  Surgeon: Jonah Montgomery MD;  Location: [facility];  Service: Urology;  Laterality: Left;    KIDNEY STONE SURGERY         Family History  Family History   Problem Relation Age of Onset    Cancer Mother         breast    COPD Father     Heart attack Father 74    Diabetes Paternal Grandmother         both legs removed       Social History  Social History     Socioeconomic History    Marital status:     Number of children: 2   Tobacco Use    Smoking status: Former     Current packs/day: 0.00     Average packs/day: 1 pack/day for 30.0 years (30.0 ttl pk-yrs)     Types: Cigarettes, Pipe, Cigars     Start date: 1986     Quit date: 2016     Years since quittin.9    Smokeless tobacco: Never    Tobacco comments:     currently smokes a pipe   Vaping Use    Vaping status: Never Used   Substance and Sexual Activity    Alcohol use: No    Drug use: No    Sexual activity: Defer       Objective   Vital Signs:   /52 (BP Location: Right arm, Patient Position: Sitting, Cuff Size: Adult)   Pulse 77   Temp 96.8 °F (36 °C) (Temporal)   Resp 16   Ht 182.9 cm (72\")   Wt 110 kg (242 lb)   SpO2 96%   BMI 32.82 kg/m²       Physical Exam     Physical Exam  Respiratory: Clear to auscultation, no wheezing, rales or rhonchi    Gen: Patient in NAD. Pleasant and answers appropriately. A&Ox3.    Skin: Warm and dry with normal turgor. No purpura, rashes, or unusual pigmentation noted. Hair is normal in appearance and distribution.    HEENT: NC/AT. No lesions noted. Conjunctiva clear, sclera nonicteric. PERRL. EOMI without nystagmus or strabismus. Fundi appear benign. No hemorrhages or " exudates of eyes. Auditory canals are patent bilaterally without lesions. TMs intact,  nonerythematous, bulging without lesions. Nasal mucosa pink, nonerythematous, and nonedematous. Frontal and maxillary sinuses are nontender. O/P nonerythematous and moist without exudate.    Neck: Supple without lymph nodes palpated. FROM. No carotid bruits appreciated bilaterally.    Lungs: CTA B/L without rales, rhonchi, crackles, or wheezes.    Heart: RRR. S1 and S2 normal. No S3 or S4. No MRGT.    Abd: Soft, nontender,nondistended. (+)BSx4 quadrants.     Extrem: No CCE. Radial pulses 2+/4 and equal B/L. FROMx4.  Positive joint tenderness noted.    Neuro: No focal motor/sensory deficits.    Procedures    Result Review :   The following data was reviewed by: Mesha Christina MD on 06/17/2025:       Results  Labs   - INR: 06/17/2025, 3.3    Lipid Panel          9/13/2024    09:42   Lipid Panel   Total Cholesterol 71    Triglycerides 102    HDL Cholesterol 40    VLDL Cholesterol 19    LDL Cholesterol  12    LDL/HDL Ratio 0.27      The ASCVD Risk score (Adore DK, et al., 2019) failed to calculate for the following reasons:    Risk score cannot be calculated because patient has a medical history suggesting prior/existing ASCVD    5/16/2025  Iron 53 Low    Iron Saturation (TSAT) 13 Low    Transferrin 273   TIBC 407   Ferritin 65.7           Assessment and Plan      Benitez Miranda is a 69 y.o. here for medical followup.    Diagnoses and all orders for this visit:    1. Type 2 diabetes mellitus with stage 3 chronic kidney disease, with long-term current use of insulin, unspecified whether stage 3a or 3b CKD (Primary)  -     POCT Glucose  -     Hemoglobin A1c; Future  -     TSH; Future  -     T4, Free; Future    2. Neuropathy  -     gabapentin (NEURONTIN) 600 MG tablet; Take 1 tablet by mouth Daily As Needed (back pain).  Dispense: 30 tablet; Refill: 0    3. Hypertension associated with diabetes  -     losartan (COZAAR) 25 MG tablet;  Take 1 tablet by mouth Daily.  Dispense: 90 tablet; Refill: 1    4. Recurrent major depressive disorder, in full remission  -     sertraline (ZOLOFT) 100 MG tablet; Take 1.5 tablets by mouth Daily.  Dispense: 135 tablet; Refill: 1    5. H/O mechanical aortic valve replacement  -     POC INR    6. Encounter for screening for malignant neoplasm of prostate  -     PSA Screen; Future    7. Iron deficiency anemia, unspecified iron deficiency anemia type        Assessment & Plan  1.  History of aortic valve replacement.  - INR level is within the therapeutic range at 3.3.  - Increased pain and limited mobility.  - Continued on Coumadin 7 mg daily.  - Follow-up appointment scheduled for next week to monitor INR levels; subsequent appointments will be spaced out to every 2 weeks if INR remains stable.    2. Type 2 diabetes mellitus.  - Insulin management reported to be effective with no complaints.  - Increased pain and limited mobility.  - A1c test to be conducted today to monitor glycemic control.  - Continued current insulin regimen.    3. Iron deficiency anemia.  - Iron levels are low.  - Increased pain and limited mobility.  - Another iron infusion scheduled for Monday; advised to report any unusual symptoms following the infusion.  - Plan to reassess after the third infusion to determine the next steps.         Patient or patient representative verbalized consent for the use of Ambient Listening during the visit with  Mesha Christina MD for chart documentation. 7/1/2025  23:36 EDT        Follow Up   Return in about 3 months (around 9/17/2025), or LABS.  Findings and plans discussed with patient who verbalizes understanding and agreement. Will followup with patient once results are in. Patient was given instructions and counseling regarding his condition or for health maintenance advice. Please see specific information pulled into the AVS if appropriate.       Mesha Christina MD

## 2025-07-03 ENCOUNTER — ANTICOAGULATION VISIT (OUTPATIENT)
Dept: FAMILY MEDICINE CLINIC | Facility: CLINIC | Age: 70
End: 2025-07-03
Payer: MEDICARE

## 2025-07-03 DIAGNOSIS — Z79.01 ANTICOAGULANT LONG-TERM USE: Primary | ICD-10-CM

## 2025-07-03 LAB — INR PPP: 3 (ref 3–3.5)

## 2025-07-03 PROCEDURE — 36416 COLLJ CAPILLARY BLOOD SPEC: CPT | Performed by: FAMILY MEDICINE

## 2025-07-03 PROCEDURE — 85610 PROTHROMBIN TIME: CPT | Performed by: FAMILY MEDICINE

## 2025-07-15 ENCOUNTER — ANTICOAGULATION VISIT (OUTPATIENT)
Dept: FAMILY MEDICINE CLINIC | Facility: CLINIC | Age: 70
End: 2025-07-15
Payer: MEDICARE

## 2025-07-15 DIAGNOSIS — Z79.01 ANTICOAGULANT LONG-TERM USE: Primary | ICD-10-CM

## 2025-07-15 LAB — INR PPP: 4.8 (ref 0.9–1.1)

## 2025-07-15 PROCEDURE — 85610 PROTHROMBIN TIME: CPT | Performed by: FAMILY MEDICINE

## 2025-07-15 PROCEDURE — 36416 COLLJ CAPILLARY BLOOD SPEC: CPT | Performed by: FAMILY MEDICINE

## 2025-07-18 ENCOUNTER — HOSPITAL ENCOUNTER (OUTPATIENT)
Dept: CARDIOLOGY | Facility: HOSPITAL | Age: 70
Discharge: HOME | End: 2025-07-18
Payer: MEDICARE

## 2025-07-18 VITALS
WEIGHT: 243 LBS | HEIGHT: 72 IN | BODY MASS INDEX: 32.91 KG/M2 | HEART RATE: 65 BPM | OXYGEN SATURATION: 97 % | SYSTOLIC BLOOD PRESSURE: 103 MMHG | DIASTOLIC BLOOD PRESSURE: 62 MMHG

## 2025-07-18 DIAGNOSIS — E61.1 IRON DEFICIENCY: ICD-10-CM

## 2025-07-18 DIAGNOSIS — E11.59 HYPERTENSION ASSOCIATED WITH DIABETES: ICD-10-CM

## 2025-07-18 DIAGNOSIS — J44.9 CHRONIC OBSTRUCTIVE PULMONARY DISEASE, UNSPECIFIED COPD TYPE: ICD-10-CM

## 2025-07-18 DIAGNOSIS — I10 ESSENTIAL HYPERTENSION: ICD-10-CM

## 2025-07-18 DIAGNOSIS — Z95.2 H/O MECHANICAL AORTIC VALVE REPLACEMENT: ICD-10-CM

## 2025-07-18 DIAGNOSIS — I05.0 RHEUMATIC MITRAL STENOSIS: ICD-10-CM

## 2025-07-18 DIAGNOSIS — I50.22 CHRONIC HFREF (HEART FAILURE WITH REDUCED EJECTION FRACTION): Primary | ICD-10-CM

## 2025-07-18 DIAGNOSIS — N18.30 STAGE 3 CHRONIC KIDNEY DISEASE, UNSPECIFIED WHETHER STAGE 3A OR 3B CKD: ICD-10-CM

## 2025-07-18 DIAGNOSIS — I15.2 HYPERTENSION ASSOCIATED WITH DIABETES: ICD-10-CM

## 2025-07-18 LAB
ABSOLUTE LUNG FLUID CONTENT: 39 % (ref 20–35)
ANION GAP SERPL CALCULATED.3IONS-SCNC: 13.4 MMOL/L (ref 5–15)
BUN SERPL-MCNC: 34.9 MG/DL (ref 8–23)
BUN/CREAT SERPL: 16.4 (ref 7–25)
CALCIUM SPEC-SCNC: 8.9 MG/DL (ref 8.6–10.5)
CHLORIDE SERPL-SCNC: 101 MMOL/L (ref 98–107)
CO2 SERPL-SCNC: 20.6 MMOL/L (ref 22–29)
CREAT SERPL-MCNC: 2.13 MG/DL (ref 0.76–1.27)
EGFRCR SERPLBLD CKD-EPI 2021: 32.5 ML/MIN/1.73
GLUCOSE SERPL-MCNC: 179 MG/DL (ref 65–99)
MAGNESIUM SERPL-MCNC: 2.1 MG/DL (ref 1.6–2.4)
NT-PROBNP SERPL-MCNC: 729 PG/ML (ref 0–900)
POTASSIUM SERPL-SCNC: 4.4 MMOL/L (ref 3.5–5.2)
SODIUM SERPL-SCNC: 135 MMOL/L (ref 136–145)

## 2025-07-18 PROCEDURE — 83880 ASSAY OF NATRIURETIC PEPTIDE: CPT | Performed by: PHYSICIAN ASSISTANT

## 2025-07-18 PROCEDURE — 36415 COLL VENOUS BLD VENIPUNCTURE: CPT | Performed by: PHYSICIAN ASSISTANT

## 2025-07-18 PROCEDURE — 80048 BASIC METABOLIC PNL TOTAL CA: CPT | Performed by: PHYSICIAN ASSISTANT

## 2025-07-18 PROCEDURE — 83735 ASSAY OF MAGNESIUM: CPT | Performed by: PHYSICIAN ASSISTANT

## 2025-07-18 PROCEDURE — 94726 PLETHYSMOGRAPHY LUNG VOLUMES: CPT | Performed by: PHYSICIAN ASSISTANT

## 2025-07-18 RX ORDER — AMLODIPINE BESYLATE 10 MG/1
10 TABLET ORAL DAILY
Qty: 90 TABLET | Refills: 1 | Status: SHIPPED | OUTPATIENT
Start: 2025-07-18

## 2025-07-18 RX ORDER — METOPROLOL SUCCINATE 50 MG/1
50 TABLET, EXTENDED RELEASE ORAL DAILY
Qty: 90 TABLET | Refills: 1 | Status: SHIPPED | OUTPATIENT
Start: 2025-07-18

## 2025-07-18 RX ORDER — LOSARTAN POTASSIUM 25 MG/1
25 TABLET ORAL DAILY
Qty: 90 TABLET | Refills: 1 | Status: SHIPPED | OUTPATIENT
Start: 2025-07-18

## 2025-07-18 NOTE — PROGRESS NOTES
Heart Failure Clinic    Date: 07/18/25     Vitals:    07/18/25 1305   BP: 103/62   Pulse: 65   SpO2: 97%    Weight 243    Method of arrival: Ambulatory    Weighing self daily: Most days    Monitoring Heart Failure Zones: Most days    Today's HF Zone: Green    Taking medications as prescribed: Yes    Edema No    Shortness of Air: No    Number of pillows used at night:<2    Educational Materials given:  avs                                                                         ReDS Value: 39  36-41 Possible Hypervolemic Status      Niki Colorado RN 07/18/25 13:06 EDT

## 2025-07-18 NOTE — PATIENT INSTRUCTIONS
Heart Failure Action Plan  A heart failure action plan helps you know what to do when you have symptoms of heart failure. Your action plan is a color-coded plan that lists the symptoms to watch for and indicates what actions to take.  If you have symptoms in the green zone, you're doing well.  If you have symptoms in the yellow zone, you're having problems.  If you have symptoms in the red zone, you need medical care right away.  Follow the plan that was created by you and your health care provider. Review your plan each time you visit your provider.  Green zone  These signs mean you're doing well and can continue what you're doing:  You don't have new or worsening shortness of breath.  You have very little swelling or no new swelling.  Your weight is stable (no gain or loss).  You have a normal activity level.  You don't have chest pain or any other new symptoms.  Yellow zone  These signs and symptoms mean your condition may be getting worse and you should make some changes:  You have trouble breathing when you're active.  You have swelling in your feet or legs or have discomfort in your belly.  You gain 2-3 lb (0.9-1.4 kg) in 24 hours, or 5 lb (2.3 kg) in a week. This amount may be more or less depending on your condition.  You get tired easily.  You have trouble sleeping.  You have a dry cough.  If you have any of these symptoms:  Contact your provider within the next day.  Your provider may adjust your medicines.  Red zone  These signs and symptoms mean you should get medical help right away:  You have trouble breathing when resting or cannot lie flat and you need to raise your head to help you breathe.  You have a dry cough that's getting worse.  You have swelling or pain in your feet or legs or discomfort in your belly that's getting worse.  You suddenly gain more than 2-3 lb (0.9-1.4 kg) in 24 hours, or more than 5 lb (2.3 kg) in a week. This amount may be more or less depending on your condition.  You have  trouble staying awake or you feel confused.  You don't have an appetite.  You have worsening sadness or depression.  These symptoms may be an emergency. Call 911 right away.  Do not wait to see if the symptoms will go away.  Do not drive yourself to the hospital.  Follow these instructions at home:  Take medicines only as told.  Eat a heart-healthy diet. Work with a dietitian to create an eating plan that's best for you.  Weigh yourself each day.   Call your provider if you gain more than 3 lb in 24 hours, or more than 5 lb in a week.  Health care provider name: [facility] care provider phone number: [phone]

## 2025-07-18 NOTE — PROGRESS NOTES
Heart Failure Clinic  Pharmacist Note     Benitez Miranda is a 69 y.o. male seen in the Heart Failure Clinic for HFpEF? With most recent EF first read at 36-40%, but another cardiologist read it at 50% on 3/1/25. Patient was recently hospitalized from 2/28-3/5/24 for acute CHF. Upon discharge, lasix 40mg bid was added.      Benitez Miranda reports a good understanding of medications and reports adherence. He reports that he has been taking the Lasix 40mg every other day and denies any swelling or SOB with it on board. He reports that his BP's at home run his normal low in the 100's-110, which has been his normal for 2-3 years. He denies any dizziness at any time. IBU PRN is on his med list- he reports that he only takes it when he is having issues with his back/neck which is NOT weekly.     He reports no issues with paying for his medications at this time.     Medication Use:   Hx of med intolerances:  None related to HF/CV   Retail Rx Management: [facility] [place]    Past Medical History:   Diagnosis Date    Anxiety     Arthritis     CAD (coronary artery disease)     x1 stent; pulmonary HTN; EF 50-55%; rheumatic valve; MV stenosis    CHF (congestive heart failure)     CKD (chronic kidney disease), stage III     COPD (chronic obstructive pulmonary disease)     Depression     Diabetes mellitus     H/O mechanical aortic valve replacement     History of tobacco abuse     Hyperlipidemia     Hypertension     Ischemic heart disease     Kidney failure     third stage    Low back pain     Obesity     BMI 36.    Stroke     Valvular heart disease      ALLERGIES: Penicillins, Ace inhibitors, and Contrast dye (echo or unknown ct/mr)  Current Outpatient Medications   Medication Sig Dispense Refill    amLODIPine (NORVASC) 10 MG tablet Take 1 tablet by mouth Daily. 90 tablet 1    aspirin 81 MG EC tablet Take 1 tablet by mouth Daily. 30 tablet 0    atorvastatin (LIPITOR) 80 MG tablet Take 1 tablet by mouth Every Night. (Patient taking  differently: Take 0.5 tablets by mouth Every Night.) 90 tablet 1    cetirizine (zyrTEC) 10 MG tablet Take 1 tablet by mouth Daily As Needed for Allergies.      empagliflozin (JARDIANCE) 10 MG tablet tablet Take 1 tablet by mouth Daily for 180 days. 90 tablet 1    furosemide (Lasix) 40 MG tablet Take 1 tablet by mouth Every Other Day. 60 tablet 5    gabapentin (NEURONTIN) 600 MG tablet Take 1 tablet by mouth Daily As Needed (back pain). 30 tablet 0    ibuprofen (ADVIL,MOTRIN) 200 MG tablet Take 1 tablet by mouth Every 8 (Eight) Hours As Needed for Mild Pain.      Insulin Glargine (BASAGLAR KWIKPEN) 100 UNIT/ML injection pen Inject 30 Units under the skin into the appropriate area as directed 2 (Two) Times a Day. 54 mL 1    Insulin Lispro, 1 Unit Dial, (HUMALOG) 100 UNIT/ML solution pen-injector Inject 15 Units under the skin into the appropriate area as directed 3 (Three) Times a Day As Needed (high blood sugar).      losartan (COZAAR) 25 MG tablet Take 1 tablet by mouth Daily. 90 tablet 1    metoprolol succinate XL (TOPROL-XL) 50 MG 24 hr tablet Take 1 tablet by mouth Daily. 90 tablet 1    Semaglutide, 2 MG/DOSE, (OZEMPIC) 8 MG/3ML solution pen-injector Inject 2 mg under the skin into the appropriate area as directed 1 (One) Time Per Week.      sertraline (ZOLOFT) 100 MG tablet Take 1.5 tablets by mouth Daily. 135 tablet 1    spironolactone (ALDACTONE) 25 MG tablet Take 1 tablet by mouth once daily 90 tablet 1    warfarin (COUMADIN) 1 MG tablet Take 1 tablet by mouth Every Night. 90 tablet 1    warfarin (COUMADIN) 6 MG tablet TAKE 1 TABLET BY MOUTH ONCE DAILY EVERY  NIGHT  --  NEW  DOSE  7  MG/DAY 90 tablet 1     No current facility-administered medications for this encounter.       Vaccination History:   Pneumonia: UTD  Annual Influenza: UTD 24/25     Objective  Vitals:    07/18/25 1305   BP: 103/62   BP Location: Left arm   Patient Position: Sitting   Cuff Size: Adult   Pulse: 65   SpO2: 97%   Weight: 110 kg (243  "lb)   Height: 182.9 cm (72\")         Wt Readings from Last 3 Encounters:   07/18/25 110 kg (243 lb)   06/25/25 111 kg (245 lb 6.4 oz)   06/17/25 110 kg (242 lb)         07/18/25  1305   Weight: 110 kg (243 lb)         Lab Results   Component Value Date    GLUCOSE 179 (H) 07/18/2025    BUN 34.9 (H) 07/18/2025    CREATININE 2.13 (H) 07/18/2025    EGFRIFNONA 43 (L) 01/25/2022    EGFRIFAFRI 59 (L) 09/12/2016    BCR 16.4 07/18/2025    K 4.4 07/18/2025    CO2 20.6 (L) 07/18/2025    CALCIUM 8.9 07/18/2025    ALBUMIN 4.0 03/11/2025    AST 26 03/11/2025    ALT 28 03/11/2025     Lab Results   Component Value Date    WBC 9.68 05/16/2025    HGB 13.0 05/16/2025    HCT 41.1 05/16/2025    MCV 89.2 05/16/2025     05/16/2025     Lab Results   Component Value Date    TROPONINT 31 (H) 03/01/2025     Lab Results   Component Value Date    PROBNP 729.0 07/18/2025     Results for orders placed during the hospital encounter of 06/24/25    Adult Transthoracic Echo Limited W/ Cont if Necessary Per Protocol 06/24/2025  1:57 PM    Interpretation Summary    The study is technically difficult for diagnosis.    Limited echocardiogram done to follow-up on the ejection fraction.  Interrogation of the valves was not done.    Left ventricular ejection fraction appears to be 41 - 45%.    The left atrial cavity is moderately dilated.    Compared to the prior echo from March 2025, ejection fraction is slightly improved.         GDMT     Drug Class   Drug   Dose Last Dose Adjustment Additional Titration   Notes   ACEi/ARB/ARNI Losartan 25mg >3m  Angioedema with ACE   Beta Blocker Toprol 50mg >3m     MRA Spironolactone 25mg >3m     SGLT2i Jardiance 10mg 3/14/25  $12.15 for each           Other BP meds Amlodipine 10mg      Lasix 40mg every other day      Drug Therapy Problems    1. Bump in kidney function- Scr 2.13, eGFR 32.5      Recommendations:     Continue to monitor      Patient was educated on heart failure medications and the importance of " medication adherence. All questions were addressed and patient expressed understanding.   Thank you for allowing me to participate in the care of your patient,    Katrina Martines, PharmD  07/18/25  14:34 EDT

## 2025-07-18 NOTE — PROGRESS NOTES
[facility]  ASHLEY Dean Cherie O, MD  [address] [place] [address]    Thank you for asking me to see Benitez Miranda for congestive heart failure.    HPI:     This is a 69 y.o. male with known past medical history of :  Chronic HFrEF  TTE from 03/04/25 with EF 36-40% per report. Cardiology note from inpatient evaluation indicates personal read of around 50%.    TTE from 06/2025 with EF 41-45%.   Hx AV (mechanical) replacement  Chronic anticoagulation with Coumadin managed per PCP  ASCVD s/p PCI  Cardiac catheterization by Dr. Carrasco (2004): NADINE to unknown coronary artery  Nuclear stress test (02/26/2015): Small to moderate region of inferior scar; no reversible ischemia detected; LVEF 52%.   Cardiac catheterization by Dr. Eduardo Torres (06/08/2015): mild nonobstructive CAD with widely patent stent; normal functioning of mechanical aortic valve; pulmonary hypertension (PA 50/35 mmHg)  COPD  CKD III  Depression  DM type 2  CVA (right MCA territory)     Benitez Miranda presents for today for Heart Failure clinic evaluation.  The patient is typically seen by Mesha Christina MD.  Patient's primary cardiologist is Dr. Zoya Woody.      Last known hospitalization and/or ED visit: February 2025 hospitalization with HF exacerbation.       07/18/2025 visit data/details regarding:   Dyspnea: Denies dyspnea on exertion  Lower extremity swelling: Denies swelling of his extremities  Abdominal swelling: Denies  Home weight: Weight monitoring booklet provided during initial visit; Has scale.   Home BP: BP monitoring booklet provided during initial visit; Has BP cuff.  Monitoring with BP in the 100s-110s. Denies associated dizziness.    Home heart rate: HR monitoring booklet provided during initial visit  Daily activities of living: Performing on his own   Pillows/lying flat: 2 pillows in bed   HF zone: Green  Mr. Miranda is chest pain free. He is without significant  shortness of breath with activity or at rest.  He denies significant swelling.  He continues to take his Lasix every other day.   He reports prior angioedema with ACEi but he does tolerate ARB.            Review of Systems - Review of Systems   Constitutional: Negative for decreased appetite, fever and malaise/fatigue.   Cardiovascular:  Negative for chest pain, dyspnea on exertion and leg swelling.   Respiratory:  Negative for shortness of breath.    Musculoskeletal:  Positive for arthritis.   Genitourinary:  Negative for bladder incontinence and dysuria.         All other systems were reviewed and were negative.    Patient Active Problem List   Diagnosis    Type 2 diabetes mellitus with hyperglycemia, with long-term current use of insulin    COPD (chronic obstructive pulmonary disease)    Chronic diastolic congestive heart failure    Essential hypertension    Tobacco abuse    Coronary artery disease involving native coronary artery of native heart without angina pectoris    Low back pain    Hyperlipidemia LDL goal <70    H/O mechanical aortic valve replacement    Depression    CKD (chronic kidney disease), stage III    Rheumatic mitral stenosis    Recurrent major depressive disorder, in full remission    Ischemic stroke    CVA (cerebral vascular accident)    CHF exacerbation    Chronic HFrEF (heart failure with reduced ejection fraction)    Iron deficiency       family history includes COPD in his father; Cancer in his mother; Diabetes in his paternal grandmother; Heart attack (age of onset: 74) in his father.     reports that he quit smoking about 9 years ago. His smoking use included cigarettes, pipe, and cigars. He started smoking about 39 years ago. He has a 30 pack-year smoking history. He has never used smokeless tobacco. He reports that he does not drink alcohol and does not use drugs.    Allergies   Allergen Reactions    Penicillins Swelling     Tongue swelled    Ace Inhibitors Angioedema    Contrast Dye  (Echo Or Unknown Ct/Mr) Rash         Current Outpatient Medications:     amLODIPine (NORVASC) 10 MG tablet, Take 1 tablet by mouth Daily., Disp: 90 tablet, Rfl: 1    aspirin 81 MG EC tablet, Take 1 tablet by mouth Daily., Disp: 30 tablet, Rfl: 0    atorvastatin (LIPITOR) 80 MG tablet, Take 1 tablet by mouth Every Night. (Patient taking differently: Take 0.5 tablets by mouth Every Night.), Disp: 90 tablet, Rfl: 1    cetirizine (zyrTEC) 10 MG tablet, Take 1 tablet by mouth Daily As Needed for Allergies., Disp: , Rfl:     empagliflozin (JARDIANCE) 10 MG tablet tablet, Take 1 tablet by mouth Daily for 180 days., Disp: 90 tablet, Rfl: 1    furosemide (Lasix) 40 MG tablet, Take 1 tablet by mouth Every Other Day., Disp: 60 tablet, Rfl: 5    gabapentin (NEURONTIN) 600 MG tablet, Take 1 tablet by mouth Daily As Needed (back pain)., Disp: 30 tablet, Rfl: 0    ibuprofen (ADVIL,MOTRIN) 200 MG tablet, Take 1 tablet by mouth Every 8 (Eight) Hours As Needed for Mild Pain., Disp: , Rfl:     Insulin Glargine (BASAGLAR KWIKPEN) 100 UNIT/ML injection pen, Inject 30 Units under the skin into the appropriate area as directed 2 (Two) Times a Day., Disp: 54 mL, Rfl: 1    Insulin Lispro, 1 Unit Dial, (HUMALOG) 100 UNIT/ML solution pen-injector, Inject 15 Units under the skin into the appropriate area as directed 3 (Three) Times a Day As Needed (high blood sugar)., Disp: , Rfl:     losartan (COZAAR) 25 MG tablet, Take 1 tablet by mouth Daily., Disp: 90 tablet, Rfl: 1    metoprolol succinate XL (TOPROL-XL) 50 MG 24 hr tablet, Take 1 tablet by mouth Daily., Disp: 90 tablet, Rfl: 1    multivitamin with minerals tablet tablet, Take 1 tablet by mouth Daily., Disp: , Rfl:     Semaglutide, 2 MG/DOSE, (OZEMPIC) 8 MG/3ML solution pen-injector, Inject 2 mg under the skin into the appropriate area as directed 1 (One) Time Per Week., Disp: , Rfl:     sertraline (ZOLOFT) 100 MG tablet, Take 1.5 tablets by mouth Daily., Disp: 135 tablet, Rfl: 1     spironolactone (ALDACTONE) 25 MG tablet, Take 1 tablet by mouth once daily, Disp: 90 tablet, Rfl: 1    warfarin (COUMADIN) 1 MG tablet, Take 1 tablet by mouth Every Night., Disp: 90 tablet, Rfl: 1    warfarin (COUMADIN) 6 MG tablet, TAKE 1 TABLET BY MOUTH ONCE DAILY EVERY  NIGHT  --  NEW  DOSE  7  MG/DAY, Disp: 90 tablet, Rfl: 1      Physical Exam:  I have reviewed the patient's current vital signs as documented in the patient's EMR.   Vitals:    07/18/25 1305   BP: 103/62   Pulse: 65   SpO2: 97%         Body mass index is 32.96 kg/m².       07/18/25  1305   Weight: 110 kg (243 lb)          Physical Exam  Vitals and nursing note reviewed.   Constitutional:       Appearance: He is normal weight.   HENT:      Head: Normocephalic and atraumatic.      Nose: Nose normal. No congestion.      Mouth/Throat:      Mouth: Mucous membranes are moist.      Pharynx: Oropharynx is clear.   Eyes:      Extraocular Movements: Extraocular movements intact.      Pupils: Pupils are equal, round, and reactive to light.   Cardiovascular:      Rate and Rhythm: Normal rate and regular rhythm.      Heart sounds: Murmur heard.      Comments: Valve click appreciated.   Pulmonary:      Breath sounds: No stridor. No wheezing, rhonchi or rales.   Abdominal:      General: Bowel sounds are normal. There is no distension.   Musculoskeletal:         General: No swelling.   Skin:     General: Skin is warm and dry.   Neurological:      Mental Status: He is alert and oriented to person, place, and time. Mental status is at baseline.          JVP: Volume/Pulsation: Normal.        DATA REVIEWED:     ---------------------------------------------------  TTE/KIET:  Results for orders placed during the hospital encounter of 06/24/25    Adult Transthoracic Echo Limited W/ Cont if Necessary Per Protocol 06/24/2025  1:57 PM    Interpretation Summary    The study is technically difficult for diagnosis.    Limited echocardiogram done to follow-up on the ejection  fraction.  Interrogation of the valves was not done.    Left ventricular ejection fraction appears to be 41 - 45%.    The left atrial cavity is moderately dilated.    Compared to the prior echo from March 2025, ejection fraction is slightly improved.        LAST HEART CATH RESULT/IF AVAILABLE:     No results found for this or any previous visit.      -----------------------------------------------------  CXR/Imaging:   Imaging Results (Most Recent)       None            I personally reviewed and interpreted the CXR.      -----------------------------------------------------  CT:   No radiology results for the last 30 days.    I personally reviewed the images of the CT scan.  My personal interpretation is below.      ----------------------------------------------------    --------------------------------------------------------------------------------------------------    Laboratory evaluations:    Lab Results   Component Value Date    GLUCOSE 224 (H) 05/16/2025    BUN 42 (H) 05/16/2025    CREATININE 1.91 (H) 05/16/2025    EGFRIFNONA 43 (L) 01/25/2022    EGFRIFAFRI 59 (L) 09/12/2016    BCR 22.0 05/16/2025    K 4.0 05/16/2025    CO2 22.9 05/16/2025    CALCIUM 9.4 05/16/2025    ALBUMIN 4.0 03/11/2025    AST 26 03/11/2025    ALT 28 03/11/2025     Lab Results   Component Value Date    WBC 9.68 05/16/2025    HGB 13.0 05/16/2025    HCT 41.1 05/16/2025    MCV 89.2 05/16/2025     05/16/2025     Lab Results   Component Value Date    CHOL 71 09/13/2024    CHLPL 167 07/18/2016    TRIG 102 09/13/2024    HDL 40 09/13/2024    LDL 12 09/13/2024     Lab Results   Component Value Date    TSH 2.690 06/17/2025     Lab Results   Component Value Date    HGBA1C 7.50 (H) 06/17/2025     Lab Results   Component Value Date    ALT 28 03/11/2025     Lab Results   Component Value Date    HGBA1C 7.50 (H) 06/17/2025    HGBA1C 8.50 (H) 03/01/2025    HGBA1C 8.00 (H) 12/17/2024     Lab Results   Component Value Date    MICROALBUR 1.6  03/11/2025    CREATININE 1.91 (H) 05/16/2025     Lab Results   Component Value Date    IRON 53 (L) 05/16/2025    TIBC 407 05/16/2025    FERRITIN 65.70 05/16/2025     Lab Results   Component Value Date    INR 4.80 (A) 07/15/2025    INR 3.00 (A) 07/03/2025    INR 3.10 (A) 06/24/2025    PROTIME 37.2 (H) 03/02/2025    PROTIME 40.2 (H) 02/28/2025    PROTIME 29.2 04/10/2023        Lab Results   Component Value Date    ABSOLUTELUNG 39 (A) 07/18/2025    ABSOLUTELUNG 33 05/16/2025    ABSOLUTELUNG 32 04/11/2025           1. Chronic HFrEF (heart failure with reduced ejection fraction)    2. Stage 3 chronic kidney disease, unspecified whether stage 3a or 3b CKD    3. H/O mechanical aortic valve replacement    4. Rheumatic mitral stenosis    5. Essential hypertension    6. Functional Iron Deficiency    7. Chronic obstructive pulmonary disease, unspecified COPD type          ORDERS PLACED TODAY:  Orders Placed This Encounter   Procedures    ReDs Vest    Basic Metabolic Panel    Magnesium    proBNP    Basic Metabolic Panel    Magnesium    proBNP        Diagnoses and all orders for this visit:    1. Chronic HFrEF (heart failure with reduced ejection fraction) (Primary)  -     Basic Metabolic Panel; Future  -     Magnesium; Future  -     proBNP; Future  -     ReDs Vest  -     Basic Metabolic Panel; Standing  -     Basic Metabolic Panel  -     Magnesium; Standing  -     Magnesium  -     proBNP; Standing  -     proBNP    2. Stage 3 chronic kidney disease, unspecified whether stage 3a or 3b CKD  Overview:  Stage 1-2 secondary to diabetic nephropathy.  Baseline creatinine 1.3.        3. H/O mechanical aortic valve replacement  Overview:  Severe aortic valve stenosis.  Aortic valve replacement: [facility], 1991, Georgi Sánchez MD.  Currently anticoagulated with Coumadin.  INR followed by Dr. Canales's office.        4. Rheumatic mitral stenosis  Overview:  History of rheumatic fever.   Echocardiogram (02/2016): Moderate to  severe mitral stenosis. Rheumatic valve  Echocardiogram (01/13/2017): Mean gradient of mitral valve 8 mmHg.  Normal functioning mechanical aortic valve  11/7/2021 TTE: LVEF 56 to 60%, grade II with high LAP diastolic dysfunction.  Mechanical aortic valve prosthesis present.  Mild aortic valve stenosis with mild perivalvular regurgitation.  Rheumatic mitral valvular disease moderate MAC with moderate calcification of the mitral valve anterior and posterior leaflets.  Moderate to severe mitral valve stenosis with moderate mitral valve regurgitation, peak transmitral valvular gradient of around 15 mmHg  10/28/2022 TTE: LVEF 56 to 60%, grade 1 diastolic dysfunction.  Mechanical aortic valve prosthesis present aortic valve peak and mean gradients are within defined limits and the prosthetic aortic valve is grossly normal.  Moderate calcific mitral valve stenosis noted peak gradient of 17 mmHg and mean gradient of 8 mmHg, unchanged since study of 10/5/2021.        5. Essential hypertension    6. Functional Iron Deficiency    7. Chronic obstructive pulmonary disease, unspecified COPD type             MEDS ORDERED TODAY:    No orders of the defined types were placed in this encounter.       ---------------------------------------------------------------------------------------------------------------------------          ASSESSMENT/PLAN:      Diagnosis Plan   1. Chronic HFrEF (heart failure with reduced ejection fraction)  Basic Metabolic Panel    Magnesium    proBNP    ReDs Vest    Basic Metabolic Panel    Basic Metabolic Panel    Magnesium    Magnesium    proBNP    proBNP      2. Stage 3 chronic kidney disease, unspecified whether stage 3a or 3b CKD        3. H/O mechanical aortic valve replacement        4. Rheumatic mitral stenosis        5. Essential hypertension        6. Functional Iron Deficiency        7. Chronic obstructive pulmonary disease, unspecified COPD type              CHF GOAL DIRECTED MEDICAL THERAPY FOR  PATIENT ADDRESSED/ADJUSTED:     GDMT: HFpEF per Dr. Woody's inpatient evaluation    Drug Class   Drug   Dose Last Dose Adjustment Notes   ACEi/ARB/ARNI Losartan 25mg qd     Beta Blocker Toprol XL  50mg qd     MRA SPironolactone 25mg qd     SGLT2i Jardiance  10mg  N/A   Secondaries if applicable:          -CHF Specific BB:    Metoprolol Succinate  at dose of 50mg qd.    Titration upward limited 2/2 hemodynamics.      -ACE/ARB/ARNi:   Continue Losartan 25mg qd.  BP WNL.  We considered changing ARB to ARNI; however, given prior aniogedema with ACEi this was not transitioned.      -MRA:   Continue Spironolactone 25mg with Mr. Miranda tolerating well.      -SGLT2 inhibitor therapy:     Recommend continuing Jardiance (empagliflozin) 10mg with quarterly assessment of GFR.   Pt was advised SEs, some severe, including hypersensitivity and Stevie's; coupled with discussion regarding common side effects of UTIs and female genital mycotic infections were discussed. If you will be NPO, or are sick (poor PO intake, N&V) please hold the medication until you are back to a normal diet.     -Diuretic regimen:   ReDS Vest reading for. 07/18/25 is 39; ReDs Vest reading reviewed with patient.     Lasix 40mg every other day on board.    BMP, Mag, & ProBNP with eGFR with slight drop.  If drops under 30, will likely DC Spironolactone 25 mg qd.        -Fluid restriction/Sodium restriction:   Requested 2000 ml restriction  Patient has been asked to weigh daily and was provided with a printed diuretic strategy.  1,500 mg Na restriction was discussed.        -Acute and/or Chronic underlying conditions other than HF addressed during visit:   Essential HTN:   Continue Losartan 25 mg qd. BP well controlled.     Hx Mechanical AV replacement:   Continue chronic anticoagulation with Coumadin as managed per PCP.     ASCVD:   Continue ASA & statin.     CKD 3, a vs. B:   eGFR with slight decline.     Functional Iron deficiency:   Received iron  infusions in June 2025.       Class 1 obesity:   Heart Failure dietary measures reviewed.     Identifiable barriers to Heart Failure Self-care:   Medical Barriers: No immediate known barriers  Social Barriers: No immediate known barriers            This document has been electronically signed by Keren Pedraza PA-C  July 18, 2025 13:12 EDT      Dictated Utilizing Dragon Dictation: Part of this note may be an electronic transcription/translation of spoken language to printed text using the Dragon Dictation System.    Follow-up appointment and medication changes provided in hand delivered After Visit Summary as well as reviewed in the room.      Some documentation in this note has been copied forward from a previous note, as the information is still current and accurate.  Text has been changed to reflect changes.

## 2025-07-25 ENCOUNTER — ANTICOAGULATION VISIT (OUTPATIENT)
Dept: FAMILY MEDICINE CLINIC | Facility: CLINIC | Age: 70
End: 2025-07-25
Payer: MEDICARE

## 2025-07-25 DIAGNOSIS — Z79.01 ANTICOAGULANT LONG-TERM USE: Primary | ICD-10-CM

## 2025-07-25 LAB — INR PPP: 2.6 (ref 3–3.5)

## 2025-08-01 ENCOUNTER — ANTICOAGULATION VISIT (OUTPATIENT)
Dept: FAMILY MEDICINE CLINIC | Facility: CLINIC | Age: 70
End: 2025-08-01
Payer: MEDICARE

## 2025-08-01 DIAGNOSIS — Z79.01 ANTICOAGULANT LONG-TERM USE: Primary | ICD-10-CM

## 2025-08-01 LAB — INR PPP: 4.5 (ref 0.9–1.1)

## 2025-08-01 PROCEDURE — 85610 PROTHROMBIN TIME: CPT | Performed by: FAMILY MEDICINE

## 2025-08-01 PROCEDURE — 36416 COLLJ CAPILLARY BLOOD SPEC: CPT | Performed by: FAMILY MEDICINE

## 2025-08-08 ENCOUNTER — ANTICOAGULATION VISIT (OUTPATIENT)
Dept: FAMILY MEDICINE CLINIC | Facility: CLINIC | Age: 70
End: 2025-08-08
Payer: MEDICARE

## 2025-08-08 DIAGNOSIS — Z79.01 ANTICOAGULANT LONG-TERM USE: Primary | ICD-10-CM

## 2025-08-08 LAB — INR PPP: 2.2 (ref 3–3.5)

## 2025-08-08 PROCEDURE — 85610 PROTHROMBIN TIME: CPT | Performed by: FAMILY MEDICINE

## 2025-08-08 PROCEDURE — 36416 COLLJ CAPILLARY BLOOD SPEC: CPT | Performed by: FAMILY MEDICINE

## 2025-08-15 ENCOUNTER — ANTICOAGULATION VISIT (OUTPATIENT)
Dept: FAMILY MEDICINE CLINIC | Facility: CLINIC | Age: 70
End: 2025-08-15
Payer: MEDICARE

## 2025-08-15 DIAGNOSIS — Z79.01 ANTICOAGULANT LONG-TERM USE: Primary | ICD-10-CM

## 2025-08-15 LAB — INR PPP: 4.9 (ref 3–3.5)

## 2025-08-20 ENCOUNTER — ANTICOAGULATION VISIT (OUTPATIENT)
Dept: FAMILY MEDICINE CLINIC | Facility: CLINIC | Age: 70
End: 2025-08-20
Payer: MEDICARE

## 2025-08-20 DIAGNOSIS — Z79.01 ANTICOAGULANT LONG-TERM USE: Primary | ICD-10-CM

## 2025-08-20 LAB — INR PPP: 2.9 (ref 3–3.5)

## 2025-08-20 PROCEDURE — 36416 COLLJ CAPILLARY BLOOD SPEC: CPT | Performed by: FAMILY MEDICINE

## 2025-08-20 PROCEDURE — 85610 PROTHROMBIN TIME: CPT | Performed by: FAMILY MEDICINE

## 2025-08-22 RX ORDER — INSULIN LISPRO 100 [IU]/ML
INJECTION, SOLUTION INTRAVENOUS; SUBCUTANEOUS
Qty: 42 ML | Refills: 1 | Status: SHIPPED | OUTPATIENT
Start: 2025-08-22

## 2025-08-27 ENCOUNTER — ANTICOAGULATION VISIT (OUTPATIENT)
Dept: FAMILY MEDICINE CLINIC | Facility: CLINIC | Age: 70
End: 2025-08-27
Payer: MEDICARE

## 2025-08-27 DIAGNOSIS — Z95.2 H/O MECHANICAL AORTIC VALVE REPLACEMENT: Primary | Chronic | ICD-10-CM

## 2025-08-27 LAB — INR PPP: 4.9 (ref 0.9–1.1)

## 2025-08-27 PROCEDURE — 85610 PROTHROMBIN TIME: CPT | Performed by: FAMILY MEDICINE

## 2025-08-27 PROCEDURE — 36416 COLLJ CAPILLARY BLOOD SPEC: CPT | Performed by: FAMILY MEDICINE

## 2025-08-29 ENCOUNTER — ANTICOAGULATION VISIT (OUTPATIENT)
Dept: FAMILY MEDICINE CLINIC | Facility: CLINIC | Age: 70
End: 2025-08-29
Payer: MEDICARE

## 2025-08-29 DIAGNOSIS — Z79.01 ANTICOAGULANT LONG-TERM USE: Primary | ICD-10-CM

## 2025-08-29 LAB — INR PPP: 2.7 (ref 0.9–1.1)

## 2025-08-29 PROCEDURE — 85610 PROTHROMBIN TIME: CPT | Performed by: FAMILY MEDICINE

## 2025-08-29 PROCEDURE — 36416 COLLJ CAPILLARY BLOOD SPEC: CPT | Performed by: FAMILY MEDICINE

## (undated) DEVICE — URETERAL ACCESS SHEATH SET: Brand: NAVIGATOR

## (undated) DEVICE — GW SUP AMPLATZ ULTR/STFF/STR PTFE SS 0.35IN 145CM

## (undated) DEVICE — GLV SURG PREMIERPRO MIC LTX PF SZ8 BRN

## (undated) DEVICE — SYS IRR PUMP SGL ACTN VAC SYR 10CC

## (undated) DEVICE — COR CYSTO: Brand: MEDLINE INDUSTRIES, INC.

## (undated) DEVICE — WATER 50W MAX / AIR 8W MAX: Brand: FLEXIVA TRACTIP

## (undated) DEVICE — GLW STD STR 3CM .035X150CM